# Patient Record
Sex: FEMALE | Race: WHITE | Employment: OTHER | URBAN - METROPOLITAN AREA
[De-identification: names, ages, dates, MRNs, and addresses within clinical notes are randomized per-mention and may not be internally consistent; named-entity substitution may affect disease eponyms.]

---

## 2018-09-11 RX ORDER — AMLODIPINE BESYLATE 5 MG/1
5 TABLET ORAL DAILY
COMMUNITY
End: 2019-01-31 | Stop reason: SDUPTHER

## 2018-09-11 NOTE — PROGRESS NOTES
[address] PRE-ADMISSION TESTING GENERAL INSTRUCTIONS- [facility]-phone number:[phone]    GENERAL INSTRUCTIONS  [x] Antibacterial Soap shower Night before and/or AM of Surgery  [] Gigi wipe instruction sheet and wipes given. [x] Nothing by mouth after midnight, including gum, candy, mints, or water. [x] You may brush your teeth, gargle, but do NOT swallow water. []Hibiclens shower  the night before and the morning of surgery. Do not use             Hibiclens on your face or head. []No smoking, chewing tobacco, illegal drugs, or alcohol within 24 hours of your surgery. [x] Jewelry, valuables or body piercing's should not be brought to the hospital. All body and/or tongue piercing's must be removed prior to arriving to hospital.  ALL hair pins must be removed. [x] Do not wear makeup, lotions, powders, deodorant. Nail polish as directed by the nurse. [x] Arrange transportation to and from the hospital.  Arrange for someone to be with you for the remainder of the day and for 24 hours after your procedure due to having had anesthesia. [x] Bring insurance card and photo ID.  [] Transfusion Bracelet: Please bring with you to hospital, day of surgery  [] Bring urine specimen day of surgery. Any small container is acceptable. [] Use inhalers the morning of surgery and bring with you to hospital.   []Bring copy of living will or healthcare power of  papers to be placed in your electronic record. [] CPAP/BI-PAP: Please bring your machine if you are to spend the night in the hospital.     ENDOSCOPY INSTRUCTIONS:   [] Bowel prep instructions reviewed. [] Nothing by mouth after midnight, including gum, candy, mints, or water.  [] You may brush your teeth, gargle, but do NOT swallow water. [] Do not wear makeup, lotions, powders, deodorant. Nail polish as directed by the nurse.   [] Arrange transportation to and from the hospital.  Arrange for someone to be with you for the procedure, you may call the pre-op area if you have concerns about your blood sugar [phone]. [] Use your inhalers the morning of surgery. Bring your emergency inhaler with you day of surgery. [x] Follow physician instructions regarding any blood thinners you may be taking. WHAT TO EXPECT:  [x] The day of surgery you will be greeted and  checked in by the [employer] .  A nurse will greet you in accordance to the time you are needed in the pre-op area to prepare you for surgery. Please do not be discouraged if you are not greeted in the order you arrive as there are many variables that are involved in patient preparation. Your patience is greatly appreciated as you wait for your nurse. Please bring in items such as: books, magazines, newspapers, electronics, or any other items  to occupy your time in the waiting area. []  Delays may occur with surgery and staff will make a sincere effort to keep you informed of delays. If any delays occur with your procedure, we apologize ahead of time for your inconvenience as we recognize the value of your time.

## 2018-09-16 ENCOUNTER — PREP FOR PROCEDURE (OUTPATIENT)
Dept: VASCULAR SURGERY | Age: 63
End: 2018-09-16

## 2018-09-16 RX ORDER — SODIUM CHLORIDE 9 MG/ML
INJECTION, SOLUTION INTRAVENOUS CONTINUOUS
Status: CANCELLED | OUTPATIENT
Start: 2018-09-16 | End: 2019-09-16

## 2018-09-16 RX ORDER — SODIUM CHLORIDE 0.9 % (FLUSH) 0.9 %
10 SYRINGE (ML) INJECTION EVERY 12 HOURS SCHEDULED
Status: CANCELLED | OUTPATIENT
Start: 2018-09-16 | End: 2019-09-16

## 2018-09-16 RX ORDER — SODIUM CHLORIDE 0.9 % (FLUSH) 0.9 %
10 SYRINGE (ML) INJECTION PRN
Status: CANCELLED | OUTPATIENT
Start: 2018-09-16 | End: 2019-09-16

## 2018-09-18 ENCOUNTER — ANESTHESIA (OUTPATIENT)
Dept: OPERATING ROOM | Age: 63
End: 2018-09-18
Payer: MEDICARE

## 2018-09-18 ENCOUNTER — HOSPITAL ENCOUNTER (OUTPATIENT)
Age: 63
Setting detail: OUTPATIENT SURGERY
Discharge: HOME | End: 2018-09-18
Attending: SPECIALIST | Admitting: SPECIALIST
Payer: MEDICARE

## 2018-09-18 ENCOUNTER — ANESTHESIA EVENT (OUTPATIENT)
Dept: OPERATING ROOM | Age: 63
End: 2018-09-18
Payer: MEDICARE

## 2018-09-18 VITALS
TEMPERATURE: 97.7 F | HEART RATE: 72 BPM | RESPIRATION RATE: 15 BRPM | OXYGEN SATURATION: 97 % | DIASTOLIC BLOOD PRESSURE: 71 MMHG | HEIGHT: 67 IN | WEIGHT: 240 LBS | SYSTOLIC BLOOD PRESSURE: 148 MMHG | BODY MASS INDEX: 37.67 KG/M2

## 2018-09-18 VITALS
OXYGEN SATURATION: 96 % | RESPIRATION RATE: 7 BRPM | SYSTOLIC BLOOD PRESSURE: 93 MMHG | DIASTOLIC BLOOD PRESSURE: 49 MMHG

## 2018-09-18 DIAGNOSIS — I77.0 AVF (ARTERIOVENOUS FISTULA) (HCC): ICD-10-CM

## 2018-09-18 DIAGNOSIS — Z01.812 PRE-OPERATIVE LABORATORY EXAMINATION: Primary | ICD-10-CM

## 2018-09-18 LAB
ANION GAP SERPL CALCULATED.3IONS-SCNC: 18 MMOL/L (ref 7–16)
BASOPHILS ABSOLUTE: 0.06 E9/L (ref 0–0.2)
BASOPHILS RELATIVE PERCENT: 0.6 % (ref 0–2)
BUN BLDV-MCNC: 37 MG/DL (ref 8–23)
CALCIUM SERPL-MCNC: 9.3 MG/DL (ref 8.6–10.2)
CHLORIDE BLD-SCNC: 97 MMOL/L (ref 98–107)
CO2: 24 MMOL/L (ref 22–29)
CREAT SERPL-MCNC: 3.3 MG/DL (ref 0.5–1)
EOSINOPHILS ABSOLUTE: 0.21 E9/L (ref 0.05–0.5)
EOSINOPHILS RELATIVE PERCENT: 2 % (ref 0–6)
GFR AFRICAN AMERICAN: 17
GFR NON-AFRICAN AMERICAN: 14 ML/MIN/1.73
GLUCOSE BLD-MCNC: 161 MG/DL
GLUCOSE BLD-MCNC: 169 MG/DL (ref 74–109)
HCT VFR BLD CALC: 34.2 % (ref 34–48)
HEMOGLOBIN: 10.8 G/DL (ref 11.5–15.5)
IMMATURE GRANULOCYTES #: 0.02 E9/L
IMMATURE GRANULOCYTES %: 0.2 % (ref 0–5)
LYMPHOCYTES ABSOLUTE: 3.62 E9/L (ref 1.5–4)
LYMPHOCYTES RELATIVE PERCENT: 35.3 % (ref 20–42)
MCH RBC QN AUTO: 31.6 PG (ref 26–35)
MCHC RBC AUTO-ENTMCNC: 31.6 % (ref 32–34.5)
MCV RBC AUTO: 100 FL (ref 80–99.9)
METER GLUCOSE: 161 MG/DL (ref 70–110)
MONOCYTES ABSOLUTE: 0.89 E9/L (ref 0.1–0.95)
MONOCYTES RELATIVE PERCENT: 8.7 % (ref 2–12)
NEUTROPHILS ABSOLUTE: 5.46 E9/L (ref 1.8–7.3)
NEUTROPHILS RELATIVE PERCENT: 53.2 % (ref 43–80)
PDW BLD-RTO: 12.3 FL (ref 11.5–15)
PLATELET # BLD: 278 E9/L (ref 130–450)
PMV BLD AUTO: 10.4 FL (ref 7–12)
POTASSIUM REFLEX MAGNESIUM: 5 MMOL/L (ref 3.5–5)
RBC # BLD: 3.42 E12/L (ref 3.5–5.5)
SODIUM BLD-SCNC: 139 MMOL/L (ref 132–146)
WBC # BLD: 10.3 E9/L (ref 4.5–11.5)

## 2018-09-18 PROCEDURE — 2709999900 HC NON-CHARGEABLE SUPPLY: Performed by: SPECIALIST

## 2018-09-18 PROCEDURE — 85025 COMPLETE CBC W/AUTO DIFF WBC: CPT

## 2018-09-18 PROCEDURE — 2500000003 HC RX 250 WO HCPCS: Performed by: NURSE ANESTHETIST, CERTIFIED REGISTERED

## 2018-09-18 PROCEDURE — 6360000002 HC RX W HCPCS: Performed by: ANESTHESIOLOGY

## 2018-09-18 PROCEDURE — 2500000003 HC RX 250 WO HCPCS: Performed by: SPECIALIST

## 2018-09-18 PROCEDURE — 82962 GLUCOSE BLOOD TEST: CPT

## 2018-09-18 PROCEDURE — 64415 NJX AA&/STRD BRCH PLXS IMG: CPT | Performed by: ANESTHESIOLOGY

## 2018-09-18 PROCEDURE — 7100000011 HC PHASE II RECOVERY - ADDTL 15 MIN: Performed by: SPECIALIST

## 2018-09-18 PROCEDURE — 80048 BASIC METABOLIC PNL TOTAL CA: CPT

## 2018-09-18 PROCEDURE — 7100000010 HC PHASE II RECOVERY - FIRST 15 MIN: Performed by: SPECIALIST

## 2018-09-18 PROCEDURE — 3700000000 HC ANESTHESIA ATTENDED CARE: Performed by: SPECIALIST

## 2018-09-18 PROCEDURE — 6360000002 HC RX W HCPCS: Performed by: NURSE ANESTHETIST, CERTIFIED REGISTERED

## 2018-09-18 PROCEDURE — 6370000000 HC RX 637 (ALT 250 FOR IP): Performed by: STUDENT IN AN ORGANIZED HEALTH CARE EDUCATION/TRAINING PROGRAM

## 2018-09-18 PROCEDURE — 3700000001 HC ADD 15 MINUTES (ANESTHESIA): Performed by: SPECIALIST

## 2018-09-18 PROCEDURE — 36415 COLL VENOUS BLD VENIPUNCTURE: CPT

## 2018-09-18 PROCEDURE — 6360000002 HC RX W HCPCS: Performed by: SPECIALIST

## 2018-09-18 PROCEDURE — 3600000012 HC SURGERY LEVEL 2 ADDTL 15MIN: Performed by: SPECIALIST

## 2018-09-18 PROCEDURE — C1757 CATH, THROMBECTOMY/EMBOLECT: HCPCS | Performed by: SPECIALIST

## 2018-09-18 PROCEDURE — 2580000003 HC RX 258: Performed by: SPECIALIST

## 2018-09-18 PROCEDURE — 2780000010 HC IMPLANT OTHER: Performed by: SPECIALIST

## 2018-09-18 PROCEDURE — 3600000002 HC SURGERY LEVEL 2 BASE: Performed by: SPECIALIST

## 2018-09-18 RX ORDER — ONDANSETRON 2 MG/ML
4 INJECTION INTRAMUSCULAR; INTRAVENOUS ONCE
Status: COMPLETED | OUTPATIENT
Start: 2018-09-18 | End: 2018-09-18

## 2018-09-18 RX ORDER — MEPERIDINE HYDROCHLORIDE 50 MG/ML
12.5 INJECTION INTRAMUSCULAR; INTRAVENOUS; SUBCUTANEOUS EVERY 5 MIN PRN
Status: DISCONTINUED | OUTPATIENT
Start: 2018-09-18 | End: 2018-09-18 | Stop reason: HOSPADM

## 2018-09-18 RX ORDER — HEPARIN SODIUM 10000 [USP'U]/ML
INJECTION, SOLUTION INTRAVENOUS; SUBCUTANEOUS PRN
Status: DISCONTINUED | OUTPATIENT
Start: 2018-09-18 | End: 2018-09-18 | Stop reason: SDUPTHER

## 2018-09-18 RX ORDER — HYDROCODONE BITARTRATE AND ACETAMINOPHEN 5; 325 MG/1; MG/1
2 TABLET ORAL PRN
Status: DISCONTINUED | OUTPATIENT
Start: 2018-09-18 | End: 2018-09-18 | Stop reason: HOSPADM

## 2018-09-18 RX ORDER — SODIUM CHLORIDE 0.9 % (FLUSH) 0.9 %
10 SYRINGE (ML) INJECTION EVERY 12 HOURS SCHEDULED
Status: DISCONTINUED | OUTPATIENT
Start: 2018-09-18 | End: 2018-09-18 | Stop reason: HOSPADM

## 2018-09-18 RX ORDER — OXYCODONE HYDROCHLORIDE AND ACETAMINOPHEN 5; 325 MG/1; MG/1
1 TABLET ORAL ONCE
Status: COMPLETED | OUTPATIENT
Start: 2018-09-18 | End: 2018-09-18

## 2018-09-18 RX ORDER — MORPHINE SULFATE 2 MG/ML
1 INJECTION, SOLUTION INTRAMUSCULAR; INTRAVENOUS EVERY 5 MIN PRN
Status: DISCONTINUED | OUTPATIENT
Start: 2018-09-18 | End: 2018-09-18 | Stop reason: HOSPADM

## 2018-09-18 RX ORDER — MORPHINE SULFATE 2 MG/ML
2 INJECTION, SOLUTION INTRAMUSCULAR; INTRAVENOUS EVERY 5 MIN PRN
Status: DISCONTINUED | OUTPATIENT
Start: 2018-09-18 | End: 2018-09-18 | Stop reason: HOSPADM

## 2018-09-18 RX ORDER — HYDROCODONE BITARTRATE AND ACETAMINOPHEN 5; 325 MG/1; MG/1
1 TABLET ORAL PRN
Status: DISCONTINUED | OUTPATIENT
Start: 2018-09-18 | End: 2018-09-18 | Stop reason: HOSPADM

## 2018-09-18 RX ORDER — FENTANYL CITRATE 50 UG/ML
50 INJECTION, SOLUTION INTRAMUSCULAR; INTRAVENOUS PRN
Status: DISCONTINUED | OUTPATIENT
Start: 2018-09-18 | End: 2018-09-18 | Stop reason: HOSPADM

## 2018-09-18 RX ORDER — ONDANSETRON 2 MG/ML
INJECTION INTRAMUSCULAR; INTRAVENOUS
Status: DISCONTINUED
Start: 2018-09-18 | End: 2018-09-18 | Stop reason: HOSPADM

## 2018-09-18 RX ORDER — SODIUM CHLORIDE 9 MG/ML
INJECTION, SOLUTION INTRAVENOUS CONTINUOUS
Status: DISCONTINUED | OUTPATIENT
Start: 2018-09-18 | End: 2018-09-18 | Stop reason: HOSPADM

## 2018-09-18 RX ORDER — SODIUM CHLORIDE 0.9 % (FLUSH) 0.9 %
10 SYRINGE (ML) INJECTION PRN
Status: DISCONTINUED | OUTPATIENT
Start: 2018-09-18 | End: 2018-09-18 | Stop reason: HOSPADM

## 2018-09-18 RX ORDER — OXYCODONE HYDROCHLORIDE AND ACETAMINOPHEN 5; 325 MG/1; MG/1
1 TABLET ORAL EVERY 6 HOURS PRN
Qty: 15 TABLET | Refills: 0 | Status: SHIPPED | OUTPATIENT
Start: 2018-09-18 | End: 2018-09-21

## 2018-09-18 RX ORDER — PROPOFOL 10 MG/ML
INJECTION, EMULSION INTRAVENOUS CONTINUOUS PRN
Status: DISCONTINUED | OUTPATIENT
Start: 2018-09-18 | End: 2018-09-18 | Stop reason: SDUPTHER

## 2018-09-18 RX ORDER — LIDOCAINE HYDROCHLORIDE AND EPINEPHRINE 10; 10 MG/ML; UG/ML
INJECTION, SOLUTION INFILTRATION; PERINEURAL PRN
Status: DISCONTINUED | OUTPATIENT
Start: 2018-09-18 | End: 2018-09-18 | Stop reason: HOSPADM

## 2018-09-18 RX ORDER — PROMETHAZINE HYDROCHLORIDE 25 MG/ML
6.25 INJECTION, SOLUTION INTRAMUSCULAR; INTRAVENOUS EVERY 10 MIN PRN
Status: DISCONTINUED | OUTPATIENT
Start: 2018-09-18 | End: 2018-09-18 | Stop reason: HOSPADM

## 2018-09-18 RX ORDER — EPHEDRINE SULFATE/0.9% NACL/PF 50 MG/5 ML
SYRINGE (ML) INTRAVENOUS PRN
Status: DISCONTINUED | OUTPATIENT
Start: 2018-09-18 | End: 2018-09-18 | Stop reason: SDUPTHER

## 2018-09-18 RX ORDER — MIDAZOLAM HYDROCHLORIDE 1 MG/ML
1 INJECTION INTRAMUSCULAR; INTRAVENOUS PRN
Status: DISCONTINUED | OUTPATIENT
Start: 2018-09-18 | End: 2018-09-18 | Stop reason: HOSPADM

## 2018-09-18 RX ADMIN — PROPOFOL 100 MCG/KG/MIN: 10 INJECTION, EMULSION INTRAVENOUS at 07:34

## 2018-09-18 RX ADMIN — HEPARIN SODIUM 4000 UNITS: 10000 INJECTION, SOLUTION INTRAVENOUS; SUBCUTANEOUS at 08:22

## 2018-09-18 RX ADMIN — OXYCODONE AND ACETAMINOPHEN 1 TABLET: 5; 325 TABLET ORAL at 10:56

## 2018-09-18 RX ADMIN — ONDANSETRON 4 MG: 2 INJECTION, SOLUTION INTRAMUSCULAR; INTRAVENOUS at 10:24

## 2018-09-18 RX ADMIN — Medication 10 MG: at 08:19

## 2018-09-18 RX ADMIN — Medication 2 G: at 07:29

## 2018-09-18 RX ADMIN — Medication 5 MG: at 08:09

## 2018-09-18 RX ADMIN — SODIUM CHLORIDE: 9 INJECTION, SOLUTION INTRAVENOUS at 06:48

## 2018-09-18 RX ADMIN — MEPIVACAINE HYDROCHLORIDE 400 MG: 20 INJECTION, SOLUTION EPIDURAL; INFILTRATION at 07:27

## 2018-09-18 ASSESSMENT — PAIN DESCRIPTION - LOCATION
LOCATION: ARM
LOCATION: ARM

## 2018-09-18 ASSESSMENT — PAIN DESCRIPTION - PAIN TYPE
TYPE: SURGICAL PAIN
TYPE: SURGICAL PAIN

## 2018-09-18 ASSESSMENT — PAIN SCALES - GENERAL
PAINLEVEL_OUTOF10: 2
PAINLEVEL_OUTOF10: 4
PAINLEVEL_OUTOF10: 6
PAINLEVEL_OUTOF10: 0
PAINLEVEL_OUTOF10: 0

## 2018-09-18 ASSESSMENT — PULMONARY FUNCTION TESTS
PIF_VALUE: 0

## 2018-09-18 ASSESSMENT — PAIN DESCRIPTION - ORIENTATION
ORIENTATION: LEFT
ORIENTATION: LEFT

## 2018-09-18 ASSESSMENT — PAIN - FUNCTIONAL ASSESSMENT: PAIN_FUNCTIONAL_ASSESSMENT: 0-10

## 2018-09-18 ASSESSMENT — PAIN DESCRIPTION - DESCRIPTORS: DESCRIPTORS: PRESSURE;BURNING

## 2018-09-18 ASSESSMENT — PAIN DESCRIPTION - FREQUENCY: FREQUENCY: CONTINUOUS

## 2018-09-18 NOTE — ANESTHESIA PRE PROCEDURE
08/07/17 228 lb (103.4 kg)   03/09/17 221 lb (100.2 kg)     Body mass index is 37.59 kg/m². CBC: No results found for: WBC, RBC, HGB, HCT, MCV, RDW, PLT    CMP:   Lab Results   Component Value Date     01/28/2016    K 3.4 01/28/2016     01/28/2016    CO2 31 01/28/2016    BUN 17 01/28/2016    CREATININE 1.55 01/28/2016    GLUCOSE 161 09/18/2018    CALCIUM 8.7 01/28/2016       POC Tests: No results for input(s): POCGLU, POCNA, POCK, POCCL, POCBUN, POCHEMO, POCHCT in the last 72 hours. Coags: No results found for: PROTIME, INR, APTT    HCG (If Applicable): No results found for: PREGTESTUR, PREGSERUM, HCG, HCGQUANT     ABGs: No results found for: PHART, PO2ART, QVB7ZNK, EHD2ODM, BEART, M1IVWTWZ     Type & Screen (If Applicable):  No results found for: [facility]         Anesthesia Evaluation  Patient summary reviewed and Nursing notes reviewed no history of anesthetic complications:   Airway: Mallampati: II  TM distance: >3 FB   Neck ROM: full  Mouth opening: > = 3 FB Dental:          Pulmonary: breath sounds clear to auscultation                            ROS comment: Former smoker. Quit in Sept. 2015. Cardiovascular:    (+) hypertension:, dysrhythmias: atrial fibrillation,         Rhythm: regular  Rate: normal           Beta Blocker:  Dose within 24 Hrs (pt takes metoprolol. took this am with small sip of water.)         Neuro/Psych:   (+) depression/anxiety  (stable with treatment.)             ROS comment: Severe spinal stenosis. GI/Hepatic/Renal:   (+) GERD:, renal disease (pt states that she has 13% function. has not started HD yet): ESRD,           Endo/Other:    (+) DiabetesType II DM, well controlled, , malignancy/cancer (bladder cancer. in remission since 2015.). Abdominal:   (+) obese,     Abdomen: soft.     Vascular:                                        Anesthesia Plan      MAC and regional     ASA 3     (Pt has a #20 in RAC at time of exam)  Induction:

## 2018-09-18 NOTE — ANESTHESIA POSTPROCEDURE EVALUATION
Department of Anesthesiology  Postprocedure Note    Patient: Jerzy Lorenzana  MRN: [id number]  YOB: 1955  Date of evaluation: 9/18/2018  Time:  11:24 AM     Procedure Summary     Date:  09/18/18 Room / Location:  [facility] 03 / [facility]    Anesthesia Start:   Anesthesia Stop:      Procedure:  AV FISTULA  LEFT ARM (Left ) Diagnosis:  (CHRONIC RENAL FAILURE )    Surgeon:  Don Bustamante MD Responsible Provider:      Anesthesia Type:  MAC, regional ASA Status:  3          Anesthesia Type: MAC, regional    Janet Phase I: Janet Score: 10    Janet Phase II: Janet Score: 10    Last vitals: Reviewed and per EMR flowsheets.        Anesthesia Post Evaluation    Patient location during evaluation: PACU  Patient participation: complete - patient participated  Level of consciousness: awake  Pain score: 3  Airway patency: patent  Nausea & Vomiting: no nausea and no vomiting  Complications: no  Cardiovascular status: blood pressure returned to baseline  Respiratory status: acceptable  Hydration status: euvolemic

## 2018-09-18 NOTE — OP NOTE
Thomas Patterson  1955      DATE OF PROCEDURE: 9/18/2018     SURGEON: Aisha Dias M.D.     ASSISTANT: Luis Alvarez PGY-2     PREOPERATIVE DIAGNOSIS: Chronic renal failure, Stage IV. POSTOPERATIVE DIAGNOSIS: Same    OPERATION: Creation of left brachiocephalic arteriovenous fistula (67499)     ANESTHESIA: LMAC     ESTIMATED BLOOD LOSS: less than 50 ml     COMPLICATIONS: None    DESCRIPTION OF PROCEDURE: The patient was identified and the procedure was confirmed. The left arm was prepped and draped in the usual sterile fashion. Lidocaine 1% mixed with 0.25% Marcaine was used for local anesthesia. A transverse skin incision was made in the antecubital fossa and carried down through the subcutaneous tissue. The cephalic vein was identified and dissected free from the surrounding tissues. The vein appeared to be good quality for the fistula. It was marked for orientation and branches were divided between silk ties. It was ligated distally and divided. A number 4 Kiran was passed through the vein proximally and dilated. A 8 Fr feeding catheter was inserted into the vein and capped. Medially, the brachial artery was identified and freed from the surrounding tissues. It was surrounded proximally and distally with vessel loops and bulldog clamps for control. The patient was heparinized and the artery was clamped proximally and distally. A longitudinal arteriotomy measuring 5 mm was made. A 7-0 Prolene stay suture was placed on the medial aspect. The vein was cut to the appropriate length and spatulated and anastomosed to the side of the artery using a running 7-0 Prolene suture. After completing the anastomosis, the clamps were released and a thrill was appreciated through the fistula. Some oozing was noted and hemostasis was achieved with 7-0 Prolene mattress sutures and surgical snow. A radial pulse was appreciated in the wrist. Hemostasis was obtained and the incisions were irrigated with saline solution.

## 2018-09-18 NOTE — ANESTHESIA PROCEDURE NOTES
Peripheral Block    Patient location during procedure: holding area  Start time: 9/18/2018 7:10 AM  End time: 9/18/2018 7:26 AM  Staffing  Anesthesiologist: Lucille Moncada  Other anesthesia staff: Manolo Bhatt  Performed: anesthesiologist   Preanesthetic Checklist  Completed: patient identified, site marked, surgical consent, pre-op evaluation, timeout performed, IV checked, risks and benefits discussed, monitors and equipment checked, anesthesia consent given, oxygen available and patient being monitored  Peripheral Block  Patient position: supine  Prep: ChloraPrep  Patient monitoring: cardiac monitor, continuous pulse ox, frequent blood pressure checks and IV access  Block type: Brachial plexus  Laterality: left  Injection technique: single-shot  Procedures: ultrasound guided  Local infiltration: lidocaine  Infiltration strength: 1 %  Dose: 3 mL  Interscalene  Provider prep: mask and sterile gloves  Local infiltration: lidocaine  Needle  Needle gauge: 21 G  Needle length: 10 cm  Needle localization: ultrasound guidance  Assessment  Injection assessment: negative aspiration for heme, no paresthesia on injection and local visualized surrounding nerve on ultrasound  Paresthesia pain: none  Slow fractionated injection: yes  Hemodynamics: stable  Additional Notes  Immediately prior to procedure a \"time out\" was called to verify the correct patient, allergies, laterality, procedure and equipment. Time out performed with  RN    Local Anesthetic: 2%   Amount: 30 ml  in 5 ml increments after negative aspiration each time.         Reason for block: post-op pain management and at surgeon's request

## 2018-09-18 NOTE — H&P
Vascular Surgery History & Physical      HPI :    Rebecca Mercado is a 59 y.o. female who presents for evaluation of elective AVF creation. She is CKD stage IV and is not on dialysis at the moment. She feels well and has not issues today. She stopped taking her Eliquis 5 days ago. ROS : Negative if blank [], Positive if [x]  General Vascular   [] Fevers [] Claudication (Blocks)   [] Chills [] Rest Pain   [] Weight Loss [] Tissue Loss   [] Chest Pain [] Clotting Disorder   [] SOB at rest [x] Leg Swelling   [] SOB with exertion [] DVT/PE      [] Nausea    [] Vomitting [] Stroke/TIA   [] Abdominal Pain [] Focal weakness   [] Melena [] Slurred Speech   [] Hematochezia [] Vision Changes   [] Hematuria    [] Dysuria [] Hx of Central Catheters   [x] Wears Glasses/Contacts  [] Dialysis and If so date initiated   [] Blindness     [x] Right Hand Dominant   [] Difficulty swallowing        Past Medical History:   Diagnosis Date    [facility]     Bladder cancer [facility]     Cancer ([facility]     CKD (chronic kidney disease)     Diabetes ([address]         Past Surgical History:   Procedure Laterality Date    CHOLECYSTECTOMY      COLONOSCOPY      TONSILLECTOMY      TUBAL LIGATION         Current Medications:    sodium chloride 30 mL/hr at 09/18/18 0648      sodium chloride flush, fentaNYL, midazolam    ceFAZolin (ANCEF) IVPB  2 g Intravenous On Call to OR    sodium chloride flush  10 mL Intravenous 2 times per day    mepivacaine  300 mg Intradermal Once        Allergies:  Patient has no known allergies. Social History     Social History    Marital status:      Spouse name: N/A    Number of children: N/A    Years of education: N/A     Occupational History    Not on file.      Social History Main Topics    Smoking status: Former Smoker     Quit date: 9/1/2017    Smokeless tobacco: Never Used    Alcohol use No    Drug use: No    Sexual activity: Not on file     Other Topics Concern    Not on

## 2018-10-30 ENCOUNTER — OFFICE VISIT (OUTPATIENT)
Dept: FAMILY MEDICINE CLINIC | Age: 63
End: 2018-10-30
Payer: MEDICARE

## 2018-10-30 VITALS
RESPIRATION RATE: 18 BRPM | TEMPERATURE: 98.4 F | BODY MASS INDEX: 39.39 KG/M2 | WEIGHT: 251 LBS | DIASTOLIC BLOOD PRESSURE: 68 MMHG | OXYGEN SATURATION: 98 % | HEIGHT: 67 IN | SYSTOLIC BLOOD PRESSURE: 144 MMHG | HEART RATE: 54 BPM

## 2018-10-30 DIAGNOSIS — E11.22 TYPE 2 DIABETES MELLITUS WITH STAGE 5 CHRONIC KIDNEY DISEASE NOT ON CHRONIC DIALYSIS, WITHOUT LONG-TERM CURRENT USE OF INSULIN (HCC): ICD-10-CM

## 2018-10-30 DIAGNOSIS — Z00.00 ANNUAL PHYSICAL EXAM: ICD-10-CM

## 2018-10-30 DIAGNOSIS — E11.22 TYPE 2 DIABETES MELLITUS WITH STAGE 5 CHRONIC KIDNEY DISEASE NOT ON CHRONIC DIALYSIS, WITHOUT LONG-TERM CURRENT USE OF INSULIN (HCC): Primary | ICD-10-CM

## 2018-10-30 DIAGNOSIS — N18.5 TYPE 2 DIABETES MELLITUS WITH STAGE 5 CHRONIC KIDNEY DISEASE NOT ON CHRONIC DIALYSIS, WITHOUT LONG-TERM CURRENT USE OF INSULIN (HCC): ICD-10-CM

## 2018-10-30 DIAGNOSIS — I10 ESSENTIAL HYPERTENSION: ICD-10-CM

## 2018-10-30 DIAGNOSIS — Z23 FLU VACCINE NEED: ICD-10-CM

## 2018-10-30 DIAGNOSIS — R53.82 CHRONIC FATIGUE: ICD-10-CM

## 2018-10-30 DIAGNOSIS — D64.9 ANEMIA, UNSPECIFIED TYPE: ICD-10-CM

## 2018-10-30 DIAGNOSIS — E78.5 HYPERLIPIDEMIA, UNSPECIFIED HYPERLIPIDEMIA TYPE: ICD-10-CM

## 2018-10-30 DIAGNOSIS — N18.5 TYPE 2 DIABETES MELLITUS WITH STAGE 5 CHRONIC KIDNEY DISEASE NOT ON CHRONIC DIALYSIS, WITHOUT LONG-TERM CURRENT USE OF INSULIN (HCC): Primary | ICD-10-CM

## 2018-10-30 DIAGNOSIS — N18.5 CKD (CHRONIC KIDNEY DISEASE) STAGE 5, GFR LESS THAN 15 ML/MIN (HCC): ICD-10-CM

## 2018-10-30 DIAGNOSIS — I48.0 PAROXYSMAL ATRIAL FIBRILLATION (HCC): ICD-10-CM

## 2018-10-30 PROCEDURE — 1036F TOBACCO NON-USER: CPT | Performed by: NURSE PRACTITIONER

## 2018-10-30 PROCEDURE — G0444 DEPRESSION SCREEN ANNUAL: HCPCS | Performed by: NURSE PRACTITIONER

## 2018-10-30 PROCEDURE — G0008 ADMIN INFLUENZA VIRUS VAC: HCPCS | Performed by: NURSE PRACTITIONER

## 2018-10-30 PROCEDURE — 2022F DILAT RTA XM EVC RTNOPTHY: CPT | Performed by: NURSE PRACTITIONER

## 2018-10-30 PROCEDURE — 3017F COLORECTAL CA SCREEN DOC REV: CPT | Performed by: NURSE PRACTITIONER

## 2018-10-30 PROCEDURE — 3046F HEMOGLOBIN A1C LEVEL >9.0%: CPT | Performed by: NURSE PRACTITIONER

## 2018-10-30 PROCEDURE — 99204 OFFICE O/P NEW MOD 45 MIN: CPT | Performed by: NURSE PRACTITIONER

## 2018-10-30 PROCEDURE — 90686 IIV4 VACC NO PRSV 0.5 ML IM: CPT | Performed by: NURSE PRACTITIONER

## 2018-10-30 PROCEDURE — G8417 CALC BMI ABV UP PARAM F/U: HCPCS | Performed by: NURSE PRACTITIONER

## 2018-10-30 PROCEDURE — G8482 FLU IMMUNIZE ORDER/ADMIN: HCPCS | Performed by: NURSE PRACTITIONER

## 2018-10-30 PROCEDURE — G8427 DOCREV CUR MEDS BY ELIG CLIN: HCPCS | Performed by: NURSE PRACTITIONER

## 2018-10-30 RX ORDER — METOPROLOL TARTRATE 50 MG/1
50 TABLET, FILM COATED ORAL 2 TIMES DAILY
Qty: 60 TABLET | Refills: 2 | Status: SHIPPED | OUTPATIENT
Start: 2018-10-30 | End: 2018-10-30 | Stop reason: SDUPTHER

## 2018-10-30 RX ORDER — METOPROLOL TARTRATE 50 MG/1
TABLET, FILM COATED ORAL
Qty: 180 TABLET | Refills: 2 | Status: SHIPPED | OUTPATIENT
Start: 2018-10-30 | End: 2019-07-17 | Stop reason: SDUPTHER

## 2018-10-30 ASSESSMENT — PATIENT HEALTH QUESTIONNAIRE - PHQ9
SUM OF ALL RESPONSES TO PHQ QUESTIONS 1-9: 16
1. LITTLE INTEREST OR PLEASURE IN DOING THINGS: 3
8. MOVING OR SPEAKING SO SLOWLY THAT OTHER PEOPLE COULD HAVE NOTICED. OR THE OPPOSITE, BEING SO FIGETY OR RESTLESS THAT YOU HAVE BEEN MOVING AROUND A LOT MORE THAN USUAL: 0
3. TROUBLE FALLING OR STAYING ASLEEP: 3
9. THOUGHTS THAT YOU WOULD BE BETTER OFF DEAD, OR OF HURTING YOURSELF: 0
7. TROUBLE CONCENTRATING ON THINGS, SUCH AS READING THE NEWSPAPER OR WATCHING TELEVISION: 1
4. FEELING TIRED OR HAVING LITTLE ENERGY: 3
6. FEELING BAD ABOUT YOURSELF - OR THAT YOU ARE A FAILURE OR HAVE LET YOURSELF OR YOUR FAMILY DOWN: 2
2. FEELING DOWN, DEPRESSED OR HOPELESS: 1
SUM OF ALL RESPONSES TO PHQ QUESTIONS 1-9: 16
5. POOR APPETITE OR OVEREATING: 3
10. IF YOU CHECKED OFF ANY PROBLEMS, HOW DIFFICULT HAVE THESE PROBLEMS MADE IT FOR YOU TO DO YOUR WORK, TAKE CARE OF THINGS AT HOME, OR GET ALONG WITH OTHER PEOPLE: 1
SUM OF ALL RESPONSES TO PHQ9 QUESTIONS 1 & 2: 4

## 2018-10-30 ASSESSMENT — ENCOUNTER SYMPTOMS
COUGH: 0
SHORTNESS OF BREATH: 0
NAUSEA: 1
EYES NEGATIVE: 1
DIARRHEA: 0
VOMITING: 0
WHEEZING: 0
CONSTIPATION: 0

## 2018-10-30 NOTE — PROGRESS NOTES
headaches. Psychiatric/Behavioral: Positive for sleep disturbance. Negative for confusion. The patient is not nervous/anxious. VS:  BP (!) 144/68   Pulse 54   Temp 98.4 °F (36.9 °C) (Oral)   Resp 18   Ht 5' 7\" (1.702 m)   Wt 251 lb (113.9 kg)   SpO2 98%   BMI 39.31 kg/m²     Patient's medical, social, and family history reviewed      Physical Exam  Physical Exam   Constitutional: She is oriented to person, place, and time. She appears well-developed and well-nourished. HENT:   Head: Normocephalic. Eyes: Pupils are equal, round, and reactive to light. Neck: Normal range of motion. Neck supple. No JVD present. No tracheal deviation present. No thyromegaly present. Cardiovascular: Normal rate and regular rhythm. Exam reveals no gallop and no friction rub. No murmur heard. Pulmonary/Chest: Effort normal and breath sounds normal. She has no wheezes. She has no rales. Abdominal: Soft. Bowel sounds are normal. She exhibits no distension. There is no tenderness. Musculoskeletal: Normal range of motion. She exhibits no edema or tenderness. Lymphadenopathy:     She has no cervical adenopathy. Neurological: She is alert and oriented to person, place, and time. Skin: Skin is warm and dry. Wart to bottom of right foot    Psychiatric: She has a normal mood and affect. Her behavior is normal.     Visual inspection:  Deformity/amputation: present - see PE  Skin lesions/pre-ulcerative calluses: absent  Edema: right- trace, left- trace    Sensory exam:  Monofilament sensation: normal  (minimum of 5 random plantar locations tested, avoiding callused areas - > 1 area with absence of sensation is + for neuropathy)    Plus at least one of the following:  Pulses: normal,   Pinprick: Intact  Proprioception: Intact  Vibration (128 Hz): N/A    Assessment/Plan:      1.  Type 2 diabetes mellitus with stage 5 chronic kidney disease not on chronic dialysis, without long-term current use of insulin ([facility]  Meds refilled for patient  No change in medications at this time  - metoprolol tartrate (LOPRESSOR) 50 MG tablet; Take 1 tablet by mouth 2 times daily  Dispense: 60 tablet; Refill: 2  - CBC Auto Differential; Future  - POCT Microalbumin; Future  - HM DIABETES FOOT EXAM    2. Chronic fatigue    - TSH without Reflex; Future  - Vitamin D 25 Hydrox, D2 & D3; Future  - Vitamin B12 & Folate; Future    3. Essential hypertension    - metoprolol tartrate (LOPRESSOR) 50 MG tablet; Take 1 tablet by mouth 2 times daily  Dispense: 60 tablet; Refill: 2  - CBC Auto Differential; Future  - Comprehensive Metabolic Panel; Future    4. Paroxysmal atrial fibrillation (HCC)  Continue antiocoagulation as scheduled   Patient to call me with name of the cardiologist     5. Annual physical exam    - TSH without Reflex; Future  - Vitamin D 25 Hydrox, D2 & D3; Future  - Vitamin B12 & Folate; Future    6. Hyperlipidemia, unspecified hyperlipidemia type    - Lipid Panel; Future  - TSH without Reflex; Future    7. CKD (chronic kidney disease) stage 5, GFR less than 15 ml/min ([facility])    - Comprehensive Metabolic Panel; Future  - Vitamin D 25 Hydrox, D2 & D3; Future  - Vitamin B12 & Folate; Future    8. Anemia, unspecified type    - Iron and TIBC; Future  - Ferritin; Future    9. Flu vaccine need    - INFLUENZA, QUADV, 3 YRS AND OLDER, IM, PF, PREFILL SYR OR SDV, 0.5ML (FLUZONE QUADV, PF)    Patient to return to the office this week for fasting labs    Return in about 3 months (around 1/30/2019) for Blood pressure, Diabetes, anxiety, Depression follow up.     RENUKA Rao - CNP

## 2018-11-05 ENCOUNTER — APPOINTMENT (OUTPATIENT)
Dept: GENERAL RADIOLOGY | Age: 63
End: 2018-11-05
Payer: MEDICARE

## 2018-11-05 ENCOUNTER — APPOINTMENT (OUTPATIENT)
Dept: CT IMAGING | Age: 63
End: 2018-11-05
Payer: MEDICARE

## 2018-11-05 ENCOUNTER — HOSPITAL ENCOUNTER (OUTPATIENT)
Age: 63
Setting detail: OBSERVATION
Discharge: HOME | End: 2018-11-07
Attending: EMERGENCY MEDICINE | Admitting: FAMILY MEDICINE
Payer: MEDICARE

## 2018-11-05 DIAGNOSIS — R55 SYNCOPE AND COLLAPSE: ICD-10-CM

## 2018-11-05 DIAGNOSIS — S20.212A CONTUSION OF LEFT CHEST WALL, INITIAL ENCOUNTER: Primary | ICD-10-CM
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
## 2018-11-05 LAB
ALBUMIN SERPL-MCNC: 4.1 G/DL (ref 3.5–5.2)
ALP BLD-CCNC: 128 U/L (ref 35–104)
ALT SERPL-CCNC: 12 U/L (ref 0–32)
ANION GAP SERPL CALCULATED.3IONS-SCNC: 15 MMOL/L (ref 7–16)
AST SERPL-CCNC: 15 U/L (ref 0–31)
BASOPHILS ABSOLUTE: 0.09 E9/L (ref 0–0.2)
BASOPHILS RELATIVE PERCENT: 0.9 % (ref 0–2)
BILIRUB SERPL-MCNC: 0.2 MG/DL (ref 0–1.2)
BUN BLDV-MCNC: 28 MG/DL (ref 8–23)
CALCIUM SERPL-MCNC: 9.5 MG/DL (ref 8.6–10.2)
CHLORIDE BLD-SCNC: 102 MMOL/L (ref 98–107)
CO2: 28 MMOL/L (ref 22–29)
CREAT SERPL-MCNC: 3.3 MG/DL (ref 0.5–1)
EKG ATRIAL RATE: 65 BPM
EKG P AXIS: 78 DEGREES
EKG P-R INTERVAL: 112 MS
EKG Q-T INTERVAL: 450 MS
EKG QRS DURATION: 94 MS
EKG QTC CALCULATION (BAZETT): 468 MS
EKG R AXIS: 39 DEGREES
EKG T AXIS: 54 DEGREES
EKG VENTRICULAR RATE: 65 BPM
EOSINOPHILS ABSOLUTE: 0.18 E9/L (ref 0.05–0.5)
EOSINOPHILS RELATIVE PERCENT: 1.8 % (ref 0–6)
GFR AFRICAN AMERICAN: 17
GFR NON-AFRICAN AMERICAN: 14 ML/MIN/1.73
GLUCOSE BLD-MCNC: 82 MG/DL (ref 74–99)
HCT VFR BLD CALC: 35.2 % (ref 34–48)
HEMOGLOBIN: 10.9 G/DL (ref 11.5–15.5)
IMMATURE GRANULOCYTES #: 0.03 E9/L
IMMATURE GRANULOCYTES %: 0.3 % (ref 0–5)
LYMPHOCYTES ABSOLUTE: 3.65 E9/L (ref 1.5–4)
LYMPHOCYTES RELATIVE PERCENT: 36.5 % (ref 20–42)
MCH RBC QN AUTO: 30.3 PG (ref 26–35)
MCHC RBC AUTO-ENTMCNC: 31 % (ref 32–34.5)
MCV RBC AUTO: 97.8 FL (ref 80–99.9)
MONOCYTES ABSOLUTE: 1.01 E9/L (ref 0.1–0.95)
MONOCYTES RELATIVE PERCENT: 10.1 % (ref 2–12)
NEUTROPHILS ABSOLUTE: 5.04 E9/L (ref 1.8–7.3)
NEUTROPHILS RELATIVE PERCENT: 50.4 % (ref 43–80)
PDW BLD-RTO: 12.4 FL (ref 11.5–15)
PLATELET # BLD: 267 E9/L (ref 130–450)
PMV BLD AUTO: 9.8 FL (ref 7–12)
POTASSIUM SERPL-SCNC: 3.9 MMOL/L (ref 3.5–5)
RBC # BLD: 3.6 E12/L (ref 3.5–5.5)
SODIUM BLD-SCNC: 145 MMOL/L (ref 132–146)
TOTAL PROTEIN: 7.4 G/DL (ref 6.4–8.3)
TROPONIN: <0.01 NG/ML (ref 0–0.03)
WBC # BLD: 10 E9/L (ref 4.5–11.5)

## 2018-11-05 PROCEDURE — 80053 COMPREHEN METABOLIC PANEL: CPT

## 2018-11-05 PROCEDURE — 93005 ELECTROCARDIOGRAM TRACING: CPT | Performed by: NURSE PRACTITIONER

## 2018-11-05 PROCEDURE — 6370000000 HC RX 637 (ALT 250 FOR IP): Performed by: EMERGENCY MEDICINE

## 2018-11-05 PROCEDURE — 85025 COMPLETE CBC W/AUTO DIFF WBC: CPT

## 2018-11-05 PROCEDURE — 71101 X-RAY EXAM UNILAT RIBS/CHEST: CPT

## 2018-11-05 PROCEDURE — 70450 CT HEAD/BRAIN W/O DYE: CPT

## 2018-11-05 PROCEDURE — 72125 CT NECK SPINE W/O DYE: CPT

## 2018-11-05 PROCEDURE — 84484 ASSAY OF TROPONIN QUANT: CPT

## 2018-11-05 PROCEDURE — 36415 COLL VENOUS BLD VENIPUNCTURE: CPT

## 2018-11-05 PROCEDURE — 72128 CT CHEST SPINE W/O DYE: CPT

## 2018-11-05 PROCEDURE — 71250 CT THORAX DX C-: CPT

## 2018-11-05 PROCEDURE — G0378 HOSPITAL OBSERVATION PER HR: HCPCS

## 2018-11-05 PROCEDURE — 99285 EMERGENCY DEPT VISIT HI MDM: CPT

## 2018-11-05 RX ORDER — OXYCODONE HYDROCHLORIDE AND ACETAMINOPHEN 5; 325 MG/1; MG/1
1 TABLET ORAL ONCE
Status: COMPLETED | OUTPATIENT
Start: 2018-11-05 | End: 2018-11-05

## 2018-11-05 RX ORDER — ACETAMINOPHEN 325 MG/1
650 TABLET ORAL ONCE
Status: COMPLETED | OUTPATIENT
Start: 2018-11-05 | End: 2018-11-05

## 2018-11-05 RX ADMIN — ACETAMINOPHEN 650 MG: 325 TABLET, FILM COATED ORAL at 23:01

## 2018-11-05 RX ADMIN — OXYCODONE AND ACETAMINOPHEN 1 TABLET: 5; 325 TABLET ORAL at 23:01

## 2018-11-05 ASSESSMENT — PAIN DESCRIPTION - LOCATION: LOCATION: RIB CAGE

## 2018-11-05 ASSESSMENT — PAIN SCALES - GENERAL
PAINLEVEL_OUTOF10: 9
PAINLEVEL_OUTOF10: 9

## 2018-11-05 ASSESSMENT — PAIN DESCRIPTION - ORIENTATION: ORIENTATION: LEFT

## 2018-11-06 PROBLEM — I10 HTN (HYPERTENSION): Status: ACTIVE | Noted: 2018-11-06

## 2018-11-06 PROBLEM — D64.9 ANEMIA: Status: ACTIVE | Noted: 2018-11-06

## 2018-11-06 PROBLEM — E11.9 TYPE 2 DIABETES MELLITUS, WITHOUT LONG-TERM CURRENT USE OF INSULIN (HCC): Status: ACTIVE | Noted: 2018-11-06

## 2018-11-06 PROBLEM — N18.5 CKD (CHRONIC KIDNEY DISEASE) STAGE 5, GFR LESS THAN 15 ML/MIN (HCC): Chronic | Status: ACTIVE | Noted: 2018-11-06

## 2018-11-06 PROBLEM — R73.03 PREDIABETES: Status: ACTIVE | Noted: 2018-11-06

## 2018-11-06 PROBLEM — N17.9 AKI (ACUTE KIDNEY INJURY) (HCC): Status: ACTIVE | Noted: 2018-11-06

## 2018-11-06 LAB
ANION GAP SERPL CALCULATED.3IONS-SCNC: 13 MMOL/L (ref 7–16)
BASOPHILS ABSOLUTE: 0.06 E9/L (ref 0–0.2)
BASOPHILS RELATIVE PERCENT: 0.7 % (ref 0–2)
BUN BLDV-MCNC: 30 MG/DL (ref 8–23)
CALCIUM SERPL-MCNC: 8.9 MG/DL (ref 8.6–10.2)
CHLORIDE BLD-SCNC: 99 MMOL/L (ref 98–107)
CHOLESTEROL, TOTAL: 172 MG/DL (ref 0–199)
CO2: 28 MMOL/L (ref 22–29)
CREAT SERPL-MCNC: 3.4 MG/DL (ref 0.5–1)
EOSINOPHILS ABSOLUTE: 0.21 E9/L (ref 0.05–0.5)
EOSINOPHILS RELATIVE PERCENT: 2.5 % (ref 0–6)
GFR AFRICAN AMERICAN: 17
GFR NON-AFRICAN AMERICAN: 14 ML/MIN/1.73
GLUCOSE BLD-MCNC: 88 MG/DL (ref 74–99)
HBA1C MFR BLD: 6 % (ref 4–5.6)
HCT VFR BLD CALC: 31.5 % (ref 34–48)
HDLC SERPL-MCNC: 37 MG/DL
HEMOGLOBIN: 9.7 G/DL (ref 11.5–15.5)
IMMATURE GRANULOCYTES #: 0.02 E9/L
IMMATURE GRANULOCYTES %: 0.2 % (ref 0–5)
LDL CHOLESTEROL CALCULATED: 93 MG/DL (ref 0–99)
LYMPHOCYTES ABSOLUTE: 3.6 E9/L (ref 1.5–4)
LYMPHOCYTES RELATIVE PERCENT: 42.4 % (ref 20–42)
MAGNESIUM: 2.1 MG/DL (ref 1.6–2.6)
MCH RBC QN AUTO: 30.7 PG (ref 26–35)
MCHC RBC AUTO-ENTMCNC: 30.8 % (ref 32–34.5)
MCV RBC AUTO: 99.7 FL (ref 80–99.9)
METER GLUCOSE: 151 MG/DL (ref 74–99)
METER GLUCOSE: 179 MG/DL (ref 74–99)
METER GLUCOSE: 212 MG/DL (ref 74–99)
METER GLUCOSE: 99 MG/DL (ref 74–99)
MONOCYTES ABSOLUTE: 0.7 E9/L (ref 0.1–0.95)
MONOCYTES RELATIVE PERCENT: 8.2 % (ref 2–12)
NEUTROPHILS ABSOLUTE: 3.9 E9/L (ref 1.8–7.3)
NEUTROPHILS RELATIVE PERCENT: 46 % (ref 43–80)
PDW BLD-RTO: 12.2 FL (ref 11.5–15)
PLATELET # BLD: 216 E9/L (ref 130–450)
PMV BLD AUTO: 10 FL (ref 7–12)
POTASSIUM SERPL-SCNC: 3.7 MMOL/L (ref 3.5–5)
RBC # BLD: 3.16 E12/L (ref 3.5–5.5)
SODIUM BLD-SCNC: 140 MMOL/L (ref 132–146)
TRIGL SERPL-MCNC: 211 MG/DL (ref 0–149)
VLDLC SERPL CALC-MCNC: 42 MG/DL
WBC # BLD: 8.5 E9/L (ref 4.5–11.5)

## 2018-11-06 PROCEDURE — G0378 HOSPITAL OBSERVATION PER HR: HCPCS

## 2018-11-06 PROCEDURE — 80048 BASIC METABOLIC PNL TOTAL CA: CPT

## 2018-11-06 PROCEDURE — 85025 COMPLETE CBC W/AUTO DIFF WBC: CPT

## 2018-11-06 PROCEDURE — 82962 GLUCOSE BLOOD TEST: CPT

## 2018-11-06 PROCEDURE — 80061 LIPID PANEL: CPT

## 2018-11-06 PROCEDURE — 83735 ASSAY OF MAGNESIUM: CPT

## 2018-11-06 PROCEDURE — 36415 COLL VENOUS BLD VENIPUNCTURE: CPT

## 2018-11-06 PROCEDURE — 6370000000 HC RX 637 (ALT 250 FOR IP): Performed by: INTERNAL MEDICINE

## 2018-11-06 PROCEDURE — 83036 HEMOGLOBIN GLYCOSYLATED A1C: CPT

## 2018-11-06 PROCEDURE — 6370000000 HC RX 637 (ALT 250 FOR IP): Performed by: FAMILY MEDICINE

## 2018-11-06 RX ORDER — DEXTROSE MONOHYDRATE 50 MG/ML
100 INJECTION, SOLUTION INTRAVENOUS PRN
Status: DISCONTINUED | OUTPATIENT
Start: 2018-11-06 | End: 2018-11-07 | Stop reason: HOSPADM

## 2018-11-06 RX ORDER — TORSEMIDE 20 MG/1
20 TABLET ORAL DAILY
Status: DISCONTINUED | OUTPATIENT
Start: 2018-11-06 | End: 2018-11-06

## 2018-11-06 RX ORDER — DEXTROSE MONOHYDRATE 25 G/50ML
12.5 INJECTION, SOLUTION INTRAVENOUS PRN
Status: DISCONTINUED | OUTPATIENT
Start: 2018-11-06 | End: 2018-11-07 | Stop reason: HOSPADM

## 2018-11-06 RX ORDER — TRAMADOL HYDROCHLORIDE 50 MG/1
50 TABLET ORAL EVERY 6 HOURS PRN
Status: DISCONTINUED | OUTPATIENT
Start: 2018-11-06 | End: 2018-11-07 | Stop reason: HOSPADM

## 2018-11-06 RX ORDER — BUPROPION HYDROCHLORIDE 150 MG/1
150 TABLET, EXTENDED RELEASE ORAL 2 TIMES DAILY
Status: DISCONTINUED | OUTPATIENT
Start: 2018-11-06 | End: 2018-11-07 | Stop reason: HOSPADM

## 2018-11-06 RX ORDER — NICOTINE POLACRILEX 4 MG
15 LOZENGE BUCCAL PRN
Status: DISCONTINUED | OUTPATIENT
Start: 2018-11-06 | End: 2018-11-07 | Stop reason: HOSPADM

## 2018-11-06 RX ORDER — AMLODIPINE BESYLATE 5 MG/1
5 TABLET ORAL DAILY
Status: DISCONTINUED | OUTPATIENT
Start: 2018-11-06 | End: 2018-11-06

## 2018-11-06 RX ORDER — TRAZODONE HYDROCHLORIDE 50 MG/1
100 TABLET ORAL NIGHTLY
Status: DISCONTINUED | OUTPATIENT
Start: 2018-11-06 | End: 2018-11-07 | Stop reason: HOSPADM

## 2018-11-06 RX ORDER — DEXTROSE MONOHYDRATE 25 G/50ML
12.5 INJECTION, SOLUTION INTRAVENOUS PRN
Status: DISCONTINUED | OUTPATIENT
Start: 2018-11-06 | End: 2018-11-06 | Stop reason: SDUPTHER

## 2018-11-06 RX ORDER — NICOTINE POLACRILEX 4 MG
15 LOZENGE BUCCAL PRN
Status: DISCONTINUED | OUTPATIENT
Start: 2018-11-06 | End: 2018-11-06 | Stop reason: SDUPTHER

## 2018-11-06 RX ORDER — PROPAFENONE HYDROCHLORIDE 325 MG/1
325 CAPSULE, EXTENDED RELEASE ORAL 2 TIMES DAILY
Status: DISCONTINUED | OUTPATIENT
Start: 2018-11-06 | End: 2018-11-07 | Stop reason: HOSPADM

## 2018-11-06 RX ORDER — ACETAMINOPHEN 325 MG/1
650 TABLET ORAL EVERY 4 HOURS PRN
Status: DISCONTINUED | OUTPATIENT
Start: 2018-11-06 | End: 2018-11-07 | Stop reason: HOSPADM

## 2018-11-06 RX ORDER — GLIPIZIDE 5 MG/1
10 TABLET ORAL
Status: DISCONTINUED | OUTPATIENT
Start: 2018-11-06 | End: 2018-11-06

## 2018-11-06 RX ORDER — ATORVASTATIN CALCIUM 20 MG/1
20 TABLET, FILM COATED ORAL DAILY
Status: DISCONTINUED | OUTPATIENT
Start: 2018-11-06 | End: 2018-11-07 | Stop reason: HOSPADM

## 2018-11-06 RX ORDER — AMLODIPINE BESYLATE 10 MG/1
10 TABLET ORAL DAILY
Status: DISCONTINUED | OUTPATIENT
Start: 2018-11-07 | End: 2018-11-07 | Stop reason: HOSPADM

## 2018-11-06 RX ORDER — METOPROLOL TARTRATE 50 MG/1
50 TABLET, FILM COATED ORAL 2 TIMES DAILY
Status: DISCONTINUED | OUTPATIENT
Start: 2018-11-06 | End: 2018-11-07 | Stop reason: HOSPADM

## 2018-11-06 RX ADMIN — INSULIN LISPRO 2 UNITS: 100 INJECTION, SOLUTION INTRAVENOUS; SUBCUTANEOUS at 21:22

## 2018-11-06 RX ADMIN — METOPROLOL TARTRATE 50 MG: 50 TABLET ORAL at 10:28

## 2018-11-06 RX ADMIN — TRAMADOL HYDROCHLORIDE 50 MG: 50 TABLET, FILM COATED ORAL at 18:01

## 2018-11-06 RX ADMIN — BUPROPION HYDROCHLORIDE 150 MG: 150 TABLET, EXTENDED RELEASE ORAL at 21:12

## 2018-11-06 RX ADMIN — INSULIN LISPRO 2 UNITS: 100 INJECTION, SOLUTION INTRAVENOUS; SUBCUTANEOUS at 12:53

## 2018-11-06 RX ADMIN — TORSEMIDE 20 MG: 20 TABLET ORAL at 09:35

## 2018-11-06 RX ADMIN — TRAZODONE HYDROCHLORIDE 100 MG: 50 TABLET ORAL at 21:12

## 2018-11-06 RX ADMIN — APIXABAN 5 MG: 5 TABLET, FILM COATED ORAL at 21:12

## 2018-11-06 RX ADMIN — INSULIN LISPRO 4 UNITS: 100 INJECTION, SOLUTION INTRAVENOUS; SUBCUTANEOUS at 18:07

## 2018-11-06 RX ADMIN — METOPROLOL TARTRATE 50 MG: 50 TABLET ORAL at 21:12

## 2018-11-06 RX ADMIN — ATORVASTATIN CALCIUM 20 MG: 20 TABLET, FILM COATED ORAL at 09:35

## 2018-11-06 RX ADMIN — BUPROPION HYDROCHLORIDE 150 MG: 150 TABLET, EXTENDED RELEASE ORAL at 09:35

## 2018-11-06 RX ADMIN — TRAMADOL HYDROCHLORIDE 50 MG: 50 TABLET, FILM COATED ORAL at 09:35

## 2018-11-06 RX ADMIN — AMLODIPINE BESYLATE 5 MG: 5 TABLET ORAL at 09:37

## 2018-11-06 ASSESSMENT — PAIN SCALES - GENERAL
PAINLEVEL_OUTOF10: 7
PAINLEVEL_OUTOF10: 6
PAINLEVEL_OUTOF10: 6
PAINLEVEL_OUTOF10: 7

## 2018-11-06 ASSESSMENT — PAIN DESCRIPTION - PAIN TYPE
TYPE: ACUTE PAIN

## 2018-11-06 ASSESSMENT — PAIN DESCRIPTION - FREQUENCY
FREQUENCY: CONTINUOUS

## 2018-11-06 ASSESSMENT — PAIN DESCRIPTION - ORIENTATION
ORIENTATION: LEFT

## 2018-11-06 ASSESSMENT — PAIN DESCRIPTION - LOCATION
LOCATION: RIB CAGE;CHEST
LOCATION: RIB CAGE
LOCATION: RIB CAGE

## 2018-11-06 ASSESSMENT — PAIN DESCRIPTION - DESCRIPTORS: DESCRIPTORS: BURNING

## 2018-11-06 ASSESSMENT — PAIN DESCRIPTION - PROGRESSION
CLINICAL_PROGRESSION: NOT CHANGED

## 2018-11-06 NOTE — ED PROVIDER NOTES
HPI:  11/5/18, Time: 1940. Rosmery Asher is a 59 y.o. female presenting to the ED for Fall, beginning a few hours ago. The complaint has been constant, moderate in severity, and worsened by general exertion. Pt arrives complaining of left chest wall pain, bilateral knee pain, thoracic back pain, and shortness of breath secondary to CP. Back pain is not new as she has a hx of spinal stenosis. Pt states she was walking to her vehicle when her legs gave out, causing her to fall onto her knees and left hand. Patient does not know whether she lost consciousness or how she really fell She did not slip, , and did not injure her head/neck. However, this occurrence has not happened before in the past. Pt currently is taking Eliquis and is undergoing kidney failure. She denies neck pain, headache, abdominal/leg pain, numbness/tingling, dizziness, nausea, emesis, or any other symptoms at this time. ROS:  Pertinent items noted in HPI. Unless otherwise stated in this report or unable to obtain because of the patient's clinical or mental status as evidenced by the medical record, this patients's positive and negative responses for Review of Systems, constitutional, psych, eyes, ENT, cardiovascular, respiratory, gastrointestinal, neurological, genitourinary, musculoskeletal, integument systems and systems related to the presenting problem are either stated in the preceding or were not pertinent or were negative for the symptoms and/or complaints related to the medical problem. PHYSICAL EXAM:  Nursing notes and vitals reviewed. Constitutional: She appears well-developed and well-nourished. Moderate distress. Head: Normocephalic and atraumatic. Eyes: Conjunctivae are normal. PERRL. HENT: Mucous membranes are moist.  Neck: Supple. Cardiovascular: Regular rate. Regular rhythm. Heart sounds normal.  Distal pulses intact. Pulmonary/Chest: No respiratory distress.  Breath sounds normal. Left chest wall

## 2018-11-07 VITALS
SYSTOLIC BLOOD PRESSURE: 152 MMHG | HEART RATE: 66 BPM | HEIGHT: 67 IN | DIASTOLIC BLOOD PRESSURE: 78 MMHG | TEMPERATURE: 97.1 F | RESPIRATION RATE: 18 BRPM | BODY MASS INDEX: 40.1 KG/M2 | OXYGEN SATURATION: 94 % | WEIGHT: 255.5 LBS

## 2018-11-07 LAB
ANION GAP SERPL CALCULATED.3IONS-SCNC: 14 MMOL/L (ref 7–16)
BUN BLDV-MCNC: 35 MG/DL (ref 8–23)
CALCIUM SERPL-MCNC: 9.2 MG/DL (ref 8.6–10.2)
CHLORIDE BLD-SCNC: 103 MMOL/L (ref 98–107)
CHLORIDE URINE RANDOM: 36 MMOL/L
CO2: 28 MMOL/L (ref 22–29)
CREAT SERPL-MCNC: 3.8 MG/DL (ref 0.5–1)
CREATININE URINE: 101 MG/DL (ref 29–226)
FERRITIN: 16 NG/ML
GFR AFRICAN AMERICAN: 15
GFR NON-AFRICAN AMERICAN: 12 ML/MIN/1.73
GLUCOSE BLD-MCNC: 133 MG/DL (ref 74–99)
HCT VFR BLD CALC: 30.7 % (ref 34–48)
HEMOGLOBIN: 9.5 G/DL (ref 11.5–15.5)
IRON SATURATION: 35 % (ref 15–50)
IRON: 112 MCG/DL (ref 37–145)
MAGNESIUM: 2.3 MG/DL (ref 1.6–2.6)
MCH RBC QN AUTO: 30.5 PG (ref 26–35)
MCHC RBC AUTO-ENTMCNC: 30.9 % (ref 32–34.5)
MCV RBC AUTO: 98.7 FL (ref 80–99.9)
METER GLUCOSE: 121 MG/DL (ref 74–99)
METER GLUCOSE: 132 MG/DL (ref 74–99)
OSMOLALITY URINE: 404 MOSM/KG (ref 300–900)
PDW BLD-RTO: 12.5 FL (ref 11.5–15)
PHOSPHORUS: 5.3 MG/DL (ref 2.5–4.5)
PLATELET # BLD: 212 E9/L (ref 130–450)
PMV BLD AUTO: 10 FL (ref 7–12)
POTASSIUM SERPL-SCNC: 3.8 MMOL/L (ref 3.5–5)
PROTEIN PROTEIN: 25 MG/DL (ref 0–12)
PROTEIN/CREAT RATIO: 0.2
PROTEIN/CREAT RATIO: 0.2 (ref 0–0.2)
RBC # BLD: 3.11 E12/L (ref 3.5–5.5)
SODIUM BLD-SCNC: 145 MMOL/L (ref 132–146)
SODIUM URINE: 86 MMOL/L
TOTAL IRON BINDING CAPACITY: 318 MCG/DL (ref 250–450)
WBC # BLD: 6.9 E9/L (ref 4.5–11.5)

## 2018-11-07 PROCEDURE — 84100 ASSAY OF PHOSPHORUS: CPT

## 2018-11-07 PROCEDURE — 82962 GLUCOSE BLOOD TEST: CPT

## 2018-11-07 PROCEDURE — 80048 BASIC METABOLIC PNL TOTAL CA: CPT

## 2018-11-07 PROCEDURE — 36415 COLL VENOUS BLD VENIPUNCTURE: CPT

## 2018-11-07 PROCEDURE — 82436 ASSAY OF URINE CHLORIDE: CPT

## 2018-11-07 PROCEDURE — 6370000000 HC RX 637 (ALT 250 FOR IP): Performed by: INTERNAL MEDICINE

## 2018-11-07 PROCEDURE — 6370000000 HC RX 637 (ALT 250 FOR IP): Performed by: FAMILY MEDICINE

## 2018-11-07 PROCEDURE — 84156 ASSAY OF PROTEIN URINE: CPT

## 2018-11-07 PROCEDURE — 83735 ASSAY OF MAGNESIUM: CPT

## 2018-11-07 PROCEDURE — 83540 ASSAY OF IRON: CPT

## 2018-11-07 PROCEDURE — G0378 HOSPITAL OBSERVATION PER HR: HCPCS

## 2018-11-07 PROCEDURE — 84300 ASSAY OF URINE SODIUM: CPT

## 2018-11-07 PROCEDURE — 83935 ASSAY OF URINE OSMOLALITY: CPT

## 2018-11-07 PROCEDURE — 83550 IRON BINDING TEST: CPT

## 2018-11-07 PROCEDURE — 85027 COMPLETE CBC AUTOMATED: CPT

## 2018-11-07 PROCEDURE — 82728 ASSAY OF FERRITIN: CPT

## 2018-11-07 PROCEDURE — 82570 ASSAY OF URINE CREATININE: CPT

## 2018-11-07 RX ADMIN — AMLODIPINE BESYLATE 10 MG: 10 TABLET ORAL at 08:59

## 2018-11-07 RX ADMIN — METOPROLOL TARTRATE 50 MG: 50 TABLET ORAL at 08:59

## 2018-11-07 RX ADMIN — TRAMADOL HYDROCHLORIDE 50 MG: 50 TABLET, FILM COATED ORAL at 13:58

## 2018-11-07 RX ADMIN — APIXABAN 5 MG: 5 TABLET, FILM COATED ORAL at 09:02

## 2018-11-07 RX ADMIN — BUPROPION HYDROCHLORIDE 150 MG: 150 TABLET, EXTENDED RELEASE ORAL at 08:59

## 2018-11-07 RX ADMIN — ATORVASTATIN CALCIUM 20 MG: 20 TABLET, FILM COATED ORAL at 09:02

## 2018-11-07 RX ADMIN — ACETAMINOPHEN 650 MG: 325 TABLET, FILM COATED ORAL at 08:59

## 2018-11-07 ASSESSMENT — PAIN SCALES - GENERAL
PAINLEVEL_OUTOF10: 0
PAINLEVEL_OUTOF10: 4
PAINLEVEL_OUTOF10: 6

## 2018-11-07 ASSESSMENT — PAIN DESCRIPTION - PROGRESSION: CLINICAL_PROGRESSION: NOT CHANGED

## 2018-11-07 NOTE — DISCHARGE SUMMARY
any daily progress note from day of discharge. Time Spent on discharge is 45 minutes  in the examination, evaluation, counseling and review of medications and discharge plan. Signed:    Marlena Sal DO   11/7/2018      Thank you Ina Campos MD for the opportunity to be involved in this patient's care.  If you have any questions or concerns please feel free to contact me at [phone]

## 2018-11-10 RX ORDER — SODIUM CHLORIDE 9 MG/ML
INJECTION, SOLUTION INTRAVENOUS CONTINUOUS
Status: CANCELLED | OUTPATIENT
Start: 2018-11-10

## 2018-11-10 RX ORDER — SODIUM CHLORIDE 0.9 % (FLUSH) 0.9 %
10 SYRINGE (ML) INJECTION EVERY 12 HOURS SCHEDULED
Status: CANCELLED | OUTPATIENT
Start: 2018-11-10

## 2018-11-10 RX ORDER — SODIUM CHLORIDE 0.9 % (FLUSH) 0.9 %
10 SYRINGE (ML) INJECTION PRN
Status: CANCELLED | OUTPATIENT
Start: 2018-11-10

## 2018-11-14 ENCOUNTER — HOSPITAL ENCOUNTER (OUTPATIENT)
Age: 63
Setting detail: OUTPATIENT SURGERY
Discharge: HOME | End: 2018-11-14
Attending: SPECIALIST | Admitting: SPECIALIST
Payer: MEDICARE

## 2018-11-14 ENCOUNTER — ANESTHESIA (OUTPATIENT)
Dept: OPERATING ROOM | Age: 63
End: 2018-11-14
Payer: MEDICARE

## 2018-11-14 ENCOUNTER — ANESTHESIA EVENT (OUTPATIENT)
Dept: OPERATING ROOM | Age: 63
End: 2018-11-14
Payer: MEDICARE

## 2018-11-14 VITALS
RESPIRATION RATE: 13 BRPM | OXYGEN SATURATION: 97 % | DIASTOLIC BLOOD PRESSURE: 65 MMHG | SYSTOLIC BLOOD PRESSURE: 114 MMHG

## 2018-11-14 VITALS
HEART RATE: 61 BPM | TEMPERATURE: 98.5 F | RESPIRATION RATE: 18 BRPM | HEIGHT: 67 IN | WEIGHT: 255 LBS | OXYGEN SATURATION: 92 % | BODY MASS INDEX: 40.02 KG/M2 | SYSTOLIC BLOOD PRESSURE: 142 MMHG | DIASTOLIC BLOOD PRESSURE: 75 MMHG

## 2018-11-14 DIAGNOSIS — T82.590D DIALYSIS AV FISTULA MALFUNCTION, SUBSEQUENT ENCOUNTER: ICD-10-CM

## 2018-11-14 DIAGNOSIS — Z01.812 PRE-OPERATIVE LABORATORY EXAMINATION: Primary | ICD-10-CM

## 2018-11-14 DIAGNOSIS — G89.18 POSTOPERATIVE PAIN: ICD-10-CM

## 2018-11-14 LAB
ANION GAP SERPL CALCULATED.3IONS-SCNC: 17 MMOL/L (ref 7–16)
APTT: 29.8 SEC (ref 24.5–35.1)
BASOPHILS ABSOLUTE: 0.06 E9/L (ref 0–0.2)
BASOPHILS RELATIVE PERCENT: 0.6 % (ref 0–2)
BUN BLDV-MCNC: 30 MG/DL (ref 8–23)
CALCIUM SERPL-MCNC: 9.2 MG/DL (ref 8.6–10.2)
CHLORIDE BLD-SCNC: 104 MMOL/L (ref 98–107)
CO2: 25 MMOL/L (ref 22–29)
CREAT SERPL-MCNC: 3.4 MG/DL (ref 0.5–1)
EOSINOPHILS ABSOLUTE: 0.2 E9/L (ref 0.05–0.5)
EOSINOPHILS RELATIVE PERCENT: 2.1 % (ref 0–6)
GFR AFRICAN AMERICAN: 17
GFR NON-AFRICAN AMERICAN: 14 ML/MIN/1.73
GLUCOSE BLD-MCNC: 130 MG/DL (ref 74–99)
HCT VFR BLD CALC: 32.7 % (ref 34–48)
HEMOGLOBIN: 10.3 G/DL (ref 11.5–15.5)
IMMATURE GRANULOCYTES #: 0.04 E9/L
IMMATURE GRANULOCYTES %: 0.4 % (ref 0–5)
INR BLD: 1
LYMPHOCYTES ABSOLUTE: 3.36 E9/L (ref 1.5–4)
LYMPHOCYTES RELATIVE PERCENT: 35 % (ref 20–42)
MCH RBC QN AUTO: 31.2 PG (ref 26–35)
MCHC RBC AUTO-ENTMCNC: 31.5 % (ref 32–34.5)
MCV RBC AUTO: 99.1 FL (ref 80–99.9)
METER GLUCOSE: 129 MG/DL (ref 74–99)
MONOCYTES ABSOLUTE: 0.9 E9/L (ref 0.1–0.95)
MONOCYTES RELATIVE PERCENT: 9.4 % (ref 2–12)
NEUTROPHILS ABSOLUTE: 5.05 E9/L (ref 1.8–7.3)
NEUTROPHILS RELATIVE PERCENT: 52.5 % (ref 43–80)
PDW BLD-RTO: 12.5 FL (ref 11.5–15)
PLATELET # BLD: 282 E9/L (ref 130–450)
PMV BLD AUTO: 10.2 FL (ref 7–12)
POTASSIUM REFLEX MAGNESIUM: 4 MMOL/L (ref 3.5–5)
PROTHROMBIN TIME: 11.8 SEC (ref 9.3–12.4)
RBC # BLD: 3.3 E12/L (ref 3.5–5.5)
SODIUM BLD-SCNC: 146 MMOL/L (ref 132–146)
WBC # BLD: 9.6 E9/L (ref 4.5–11.5)

## 2018-11-14 PROCEDURE — 2580000003 HC RX 258: Performed by: ANESTHESIOLOGY

## 2018-11-14 PROCEDURE — 6360000002 HC RX W HCPCS: Performed by: SPECIALIST

## 2018-11-14 PROCEDURE — 3600000012 HC SURGERY LEVEL 2 ADDTL 15MIN: Performed by: SPECIALIST

## 2018-11-14 PROCEDURE — 64415 NJX AA&/STRD BRCH PLXS IMG: CPT | Performed by: ANESTHESIOLOGY

## 2018-11-14 PROCEDURE — 36415 COLL VENOUS BLD VENIPUNCTURE: CPT

## 2018-11-14 PROCEDURE — 3700000001 HC ADD 15 MINUTES (ANESTHESIA): Performed by: SPECIALIST

## 2018-11-14 PROCEDURE — 2709999900 HC NON-CHARGEABLE SUPPLY: Performed by: SPECIALIST

## 2018-11-14 PROCEDURE — 2500000003 HC RX 250 WO HCPCS: Performed by: SPECIALIST

## 2018-11-14 PROCEDURE — 85025 COMPLETE CBC W/AUTO DIFF WBC: CPT

## 2018-11-14 PROCEDURE — 85730 THROMBOPLASTIN TIME PARTIAL: CPT

## 2018-11-14 PROCEDURE — 6360000002 HC RX W HCPCS: Performed by: ANESTHESIOLOGIST ASSISTANT

## 2018-11-14 PROCEDURE — 80048 BASIC METABOLIC PNL TOTAL CA: CPT

## 2018-11-14 PROCEDURE — 82962 GLUCOSE BLOOD TEST: CPT

## 2018-11-14 PROCEDURE — 85610 PROTHROMBIN TIME: CPT

## 2018-11-14 PROCEDURE — 2500000003 HC RX 250 WO HCPCS: Performed by: ANESTHESIOLOGIST ASSISTANT

## 2018-11-14 PROCEDURE — 7100000011 HC PHASE II RECOVERY - ADDTL 15 MIN: Performed by: SPECIALIST

## 2018-11-14 PROCEDURE — 2580000003 HC RX 258: Performed by: SPECIALIST

## 2018-11-14 PROCEDURE — 6360000002 HC RX W HCPCS: Performed by: ANESTHESIOLOGY

## 2018-11-14 PROCEDURE — 3600000002 HC SURGERY LEVEL 2 BASE: Performed by: SPECIALIST

## 2018-11-14 PROCEDURE — 3700000000 HC ANESTHESIA ATTENDED CARE: Performed by: SPECIALIST

## 2018-11-14 PROCEDURE — 7100000010 HC PHASE II RECOVERY - FIRST 15 MIN: Performed by: SPECIALIST

## 2018-11-14 RX ORDER — FENTANYL CITRATE 50 UG/ML
50 INJECTION, SOLUTION INTRAMUSCULAR; INTRAVENOUS ONCE
Status: COMPLETED | OUTPATIENT
Start: 2018-11-14 | End: 2018-11-14

## 2018-11-14 RX ORDER — SODIUM CHLORIDE 9 MG/ML
INJECTION, SOLUTION INTRAVENOUS CONTINUOUS
Status: DISCONTINUED | OUTPATIENT
Start: 2018-11-14 | End: 2018-11-14 | Stop reason: HOSPADM

## 2018-11-14 RX ORDER — PROPOFOL 10 MG/ML
INJECTION, EMULSION INTRAVENOUS CONTINUOUS PRN
Status: DISCONTINUED | OUTPATIENT
Start: 2018-11-14 | End: 2018-11-14 | Stop reason: SDUPTHER

## 2018-11-14 RX ORDER — 0.9 % SODIUM CHLORIDE 0.9 %
15 VIAL (ML) INJECTION ONCE
Status: COMPLETED | OUTPATIENT
Start: 2018-11-14 | End: 2018-11-14

## 2018-11-14 RX ORDER — MIDAZOLAM HYDROCHLORIDE 1 MG/ML
1 INJECTION INTRAMUSCULAR; INTRAVENOUS ONCE
Status: COMPLETED | OUTPATIENT
Start: 2018-11-14 | End: 2018-11-14

## 2018-11-14 RX ORDER — SODIUM CHLORIDE 0.9 % (FLUSH) 0.9 %
10 SYRINGE (ML) INJECTION EVERY 12 HOURS SCHEDULED
Status: DISCONTINUED | OUTPATIENT
Start: 2018-11-14 | End: 2018-11-14 | Stop reason: HOSPADM

## 2018-11-14 RX ORDER — OXYCODONE HYDROCHLORIDE AND ACETAMINOPHEN 5; 325 MG/1; MG/1
1 TABLET ORAL EVERY 6 HOURS PRN
Qty: 20 TABLET | Refills: 0 | Status: SHIPPED | OUTPATIENT
Start: 2018-11-14 | End: 2018-11-19

## 2018-11-14 RX ORDER — LIDOCAINE HYDROCHLORIDE 20 MG/ML
INJECTION, SOLUTION INFILTRATION; PERINEURAL PRN
Status: DISCONTINUED | OUTPATIENT
Start: 2018-11-14 | End: 2018-11-14 | Stop reason: SDUPTHER

## 2018-11-14 RX ORDER — FENTANYL CITRATE 50 UG/ML
INJECTION, SOLUTION INTRAMUSCULAR; INTRAVENOUS PRN
Status: DISCONTINUED | OUTPATIENT
Start: 2018-11-14 | End: 2018-11-14 | Stop reason: SDUPTHER

## 2018-11-14 RX ORDER — MIDAZOLAM HYDROCHLORIDE 1 MG/ML
INJECTION INTRAMUSCULAR; INTRAVENOUS PRN
Status: DISCONTINUED | OUTPATIENT
Start: 2018-11-14 | End: 2018-11-14 | Stop reason: SDUPTHER

## 2018-11-14 RX ORDER — SODIUM CHLORIDE 0.9 % (FLUSH) 0.9 %
10 SYRINGE (ML) INJECTION PRN
Status: DISCONTINUED | OUTPATIENT
Start: 2018-11-14 | End: 2018-11-14 | Stop reason: HOSPADM

## 2018-11-14 RX ORDER — DOCUSATE SODIUM 100 MG/1
100 CAPSULE, LIQUID FILLED ORAL 2 TIMES DAILY PRN
Qty: 30 CAPSULE | Refills: 1 | Status: SHIPPED | OUTPATIENT
Start: 2018-11-14 | End: 2019-05-17

## 2018-11-14 RX ADMIN — FENTANYL CITRATE 50 MCG: 50 INJECTION, SOLUTION INTRAMUSCULAR; INTRAVENOUS at 07:16

## 2018-11-14 RX ADMIN — PROPOFOL 50 MCG/KG/MIN: 10 INJECTION, EMULSION INTRAVENOUS at 07:24

## 2018-11-14 RX ADMIN — FENTANYL CITRATE 12.5 MCG: 50 INJECTION, SOLUTION INTRAMUSCULAR; INTRAVENOUS at 07:42

## 2018-11-14 RX ADMIN — MIDAZOLAM HYDROCHLORIDE 1 MG: 1 INJECTION, SOLUTION INTRAMUSCULAR; INTRAVENOUS at 07:18

## 2018-11-14 RX ADMIN — LIDOCAINE HYDROCHLORIDE 100 MG: 20 INJECTION, SOLUTION INFILTRATION; PERINEURAL at 07:24

## 2018-11-14 RX ADMIN — Medication 1.5 G: at 06:24

## 2018-11-14 RX ADMIN — SODIUM CHLORIDE: 9 INJECTION, SOLUTION INTRAVENOUS at 06:17

## 2018-11-14 RX ADMIN — FENTANYL CITRATE 12.5 MCG: 50 INJECTION, SOLUTION INTRAMUSCULAR; INTRAVENOUS at 08:06

## 2018-11-14 RX ADMIN — Medication 1.5 G: at 07:18

## 2018-11-14 RX ADMIN — SODIUM CHLORIDE 15 ML: 9 INJECTION INTRAMUSCULAR; INTRAVENOUS; SUBCUTANEOUS at 07:15

## 2018-11-14 RX ADMIN — MIDAZOLAM HYDROCHLORIDE 1 MG: 1 INJECTION, SOLUTION INTRAMUSCULAR; INTRAVENOUS at 07:15

## 2018-11-14 RX ADMIN — MEPIVACAINE HYDROCHLORIDE 15 ML: 20 INJECTION, SOLUTION EPIDURAL; INFILTRATION at 07:15

## 2018-11-14 ASSESSMENT — PAIN DESCRIPTION - ORIENTATION: ORIENTATION: LEFT

## 2018-11-14 ASSESSMENT — PAIN SCALES - GENERAL
PAINLEVEL_OUTOF10: 3
PAINLEVEL_OUTOF10: 2
PAINLEVEL_OUTOF10: 0
PAINLEVEL_OUTOF10: 2

## 2018-11-14 ASSESSMENT — PAIN DESCRIPTION - LOCATION: LOCATION: ARM

## 2018-11-14 ASSESSMENT — PAIN DESCRIPTION - DESCRIPTORS
DESCRIPTORS: ACHING;SORE
DESCRIPTORS: DULL;ACHING

## 2018-11-14 ASSESSMENT — PAIN - FUNCTIONAL ASSESSMENT: PAIN_FUNCTIONAL_ASSESSMENT: 0-10

## 2018-11-14 ASSESSMENT — PAIN DESCRIPTION - PAIN TYPE
TYPE: SURGICAL PAIN
TYPE: SURGICAL PAIN

## 2018-11-14 NOTE — ED NOTES
FIRST PROVIDER CONTACT ASSESSMENT NOTE      Department of Emergency Medicine   11/5/18  7:00 PM    Chief Complaint: Fall (states she was standing on the side of the car and \"just went down\", c/o left sided ribcage pain; doesn't believe she hit her head, +Eliquis)      History of Present Illness:    Montana Mejia is a 59 y.o. female who presents to the ED by private car for Fall. Patient states that she wasn't feeling well all day today. She denies any specific chest pain or shortness breath just not feeling well. She walked out her car and then the next thing she remembered she went down on the ground landing on the concrete. She states that her left rib, cervical spine and thoracic spine are all areas of pain she doesn't believe she hit her head she thinks she braced her fall with her hand. She denies loss of consciousness. She reports she was not able to get up on her own independently. She  also reports that she does have end-stage renal disease and a new left arm fistula was placed as well she has not yet started dialysis  F.ocused Screening Exam:  Constitutional:  Alert, appears stated age and is in no distress. *ALLERGIES*     Patient has no known allergies.      ED Triage Vitals [11/05/18 1857]   BP Temp Temp src Pulse Resp SpO2 Height Weight   (!) 150/65 98.2 °F (36.8 °C) -- 67 18 95 % 5' 7\" (1.702 m) 251 lb (113.9 kg)        Initial Plan of Care:  Initiate Treatment-Testing, Proceed toTreatment Area When Bed Available for ED Attending/MLP to Continue Care    -----------------END OF FIRST PROVIDER CONTACT ASSESSMENT NOTE--------------  Electronically signed by RENUKA Murray CNP   DD: 11/5/18         RENUKA Murray CNP  11/05/18 [id number] No

## 2018-11-14 NOTE — ANESTHESIA PRE PROCEDURE
upper abdomen including liver spleen,   pancreas and upper pole of both kidneys appears within normal patient   is status post cholecystectomy with surgical clips in the gallbladder   fossa. No acute pathology seen a CT of the chest      Anesthesia Evaluation  Patient summary reviewed and Nursing notes reviewed  Airway: Mallampati: II  TM distance: <3 FB   Neck ROM: full  Mouth opening: > = 3 FB Dental:          Pulmonary:       Smoker: ex smoker. Cardiovascular:    (+) hypertension:, dysrhythmias: atrial fibrillation,       ECG reviewed  Rhythm: regular  Rate: normal           Beta Blocker:  Dose within 24 Hrs         Neuro/Psych:   (+) psychiatric history (insomnia):depression/anxiety             GI/Hepatic/Renal:   (+) renal disease (stage 5, not on dialysis yet):,           Endo/Other:    (+) DiabetesType II DM, , electrolyte abnormalities (anemia), malignancy/cancer (bladder CA). Abdominal:           Vascular:                                      Anesthesia Plan      regional and MAC     ASA 4       Induction: intravenous. Anesthetic plan and risks discussed with patient. Use of blood products discussed with patient whom consented to blood products. Plan discussed with attending.                 Sebastian Royal   11/14/2018
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
## 2019-01-04 RX ORDER — GLIPIZIDE 5 MG/1
10 TABLET ORAL
Qty: 120 TABLET | Refills: 2 | Status: SHIPPED | OUTPATIENT
Start: 2019-01-04 | End: 2019-01-04 | Stop reason: SDUPTHER

## 2019-01-06 RX ORDER — GLIPIZIDE 5 MG/1
TABLET ORAL
Qty: 360 TABLET | Refills: 2 | Status: SHIPPED | OUTPATIENT
Start: 2019-01-06 | End: 2019-01-08 | Stop reason: SDUPTHER

## 2019-01-07 ENCOUNTER — HOSPITAL ENCOUNTER (OUTPATIENT)
Dept: SLEEP CENTER | Age: 64
Discharge: HOME | End: 2019-01-07
Payer: MEDICARE

## 2019-01-07 DIAGNOSIS — F32.A DEPRESSION, UNSPECIFIED DEPRESSION TYPE: ICD-10-CM

## 2019-01-07 DIAGNOSIS — I48.91 ATRIAL FIBRILLATION AND FLUTTER (HCC): ICD-10-CM

## 2019-01-07 DIAGNOSIS — F51.05 INSOMNIA DUE TO MENTAL CONDITION: ICD-10-CM

## 2019-01-07 DIAGNOSIS — E66.9 CLASS 2 OBESITY WITH BODY MASS INDEX (BMI) OF 39.0 TO 39.9 IN ADULT, UNSPECIFIED OBESITY TYPE, UNSPECIFIED WHETHER SERIOUS COMORBIDITY PRESENT: Primary | ICD-10-CM

## 2019-01-07 DIAGNOSIS — I48.92 ATRIAL FIBRILLATION AND FLUTTER (HCC): ICD-10-CM

## 2019-01-07 DIAGNOSIS — R06.83 SNORES: ICD-10-CM

## 2019-01-07 DIAGNOSIS — K21.9 GASTROESOPHAGEAL REFLUX DISEASE, ESOPHAGITIS PRESENCE NOT SPECIFIED: ICD-10-CM

## 2019-01-07 PROCEDURE — 95810 POLYSOM 6/> YRS 4/> PARAM: CPT

## 2019-01-08 VITALS
SYSTOLIC BLOOD PRESSURE: 142 MMHG | HEART RATE: 73 BPM | WEIGHT: 254 LBS | HEIGHT: 66 IN | DIASTOLIC BLOOD PRESSURE: 74 MMHG | BODY MASS INDEX: 40.82 KG/M2 | OXYGEN SATURATION: 94 %

## 2019-01-08 RX ORDER — GLIPIZIDE 5 MG/1
TABLET, FILM COATED, EXTENDED RELEASE ORAL
Qty: 120 TABLET | Refills: 1 | Status: SHIPPED | OUTPATIENT
Start: 2019-01-08 | End: 2019-01-31 | Stop reason: SDUPTHER

## 2019-01-08 RX ORDER — GLIPIZIDE 5 MG/1
TABLET, FILM COATED, EXTENDED RELEASE ORAL
Refills: 1 | COMMUNITY
Start: 2018-11-14 | End: 2019-01-08 | Stop reason: SDUPTHER

## 2019-01-08 ASSESSMENT — SLEEP AND FATIGUE QUESTIONNAIRES
HOW LIKELY ARE YOU TO NOD OFF OR FALL ASLEEP WHILE SITTING AND READING: 2
HOW LIKELY ARE YOU TO NOD OFF OR FALL ASLEEP WHILE SITTING AND TALKING TO SOMEONE: 0
HOW LIKELY ARE YOU TO NOD OFF OR FALL ASLEEP IN A CAR, WHILE STOPPED FOR A FEW MINUTES IN TRAFFIC: 0
HOW LIKELY ARE YOU TO NOD OFF OR FALL ASLEEP WHILE SITTING QUIETLY AFTER LUNCH WITHOUT ALCOHOL: 0
HOW LIKELY ARE YOU TO NOD OFF OR FALL ASLEEP WHILE LYING DOWN TO REST IN THE AFTERNOON WHEN CIRCUMSTANCES PERMIT: 3
HOW LIKELY ARE YOU TO NOD OFF OR FALL ASLEEP WHILE SITTING INACTIVE IN A PUBLIC PLACE: 0
ESS TOTAL SCORE: 9
HOW LIKELY ARE YOU TO NOD OFF OR FALL ASLEEP WHEN YOU ARE A PASSENGER IN A CAR FOR AN HOUR WITHOUT A BREAK: 2
HOW LIKELY ARE YOU TO NOD OFF OR FALL ASLEEP WHILE WATCHING TV: 2

## 2019-01-20 ENCOUNTER — HOSPITAL ENCOUNTER (INPATIENT)
Age: 64
LOS: 4 days | Discharge: HOME | DRG: 253 | End: 2019-01-24
Attending: EMERGENCY MEDICINE | Admitting: STUDENT IN AN ORGANIZED HEALTH CARE EDUCATION/TRAINING PROGRAM
Payer: MEDICARE

## 2019-01-20 DIAGNOSIS — T82.838A BLEEDING FROM DIALYSIS SHUNT, INITIAL ENCOUNTER (HCC): Primary | ICD-10-CM

## 2019-01-20 LAB
ABO/RH: NORMAL
ALBUMIN SERPL-MCNC: 3.6 G/DL (ref 3.5–5.2)
ALP BLD-CCNC: 133 U/L (ref 35–104)
ALT SERPL-CCNC: 12 U/L (ref 0–32)
ANION GAP SERPL CALCULATED.3IONS-SCNC: 12 MMOL/L (ref 7–16)
ANTIBODY SCREEN: NORMAL
APTT: 28 SEC (ref 24.5–35.1)
AST SERPL-CCNC: 24 U/L (ref 0–31)
BASOPHILS ABSOLUTE: 0.09 E9/L (ref 0–0.2)
BASOPHILS RELATIVE PERCENT: 0.7 % (ref 0–2)
BILIRUB SERPL-MCNC: 0.4 MG/DL (ref 0–1.2)
BUN BLDV-MCNC: 27 MG/DL (ref 8–23)
CALCIUM SERPL-MCNC: 8.9 MG/DL (ref 8.6–10.2)
CHLORIDE BLD-SCNC: 100 MMOL/L (ref 98–107)
CO2: 27 MMOL/L (ref 22–29)
CREAT SERPL-MCNC: 3.2 MG/DL (ref 0.5–1)
EOSINOPHILS ABSOLUTE: 0.13 E9/L (ref 0.05–0.5)
EOSINOPHILS RELATIVE PERCENT: 1 % (ref 0–6)
GFR AFRICAN AMERICAN: 18
GFR NON-AFRICAN AMERICAN: 15 ML/MIN/1.73
GLUCOSE BLD-MCNC: 174 MG/DL (ref 74–99)
HCT VFR BLD CALC: 32.8 % (ref 34–48)
HEMOGLOBIN: 10.3 G/DL (ref 11.5–15.5)
IMMATURE GRANULOCYTES #: 0.03 E9/L
IMMATURE GRANULOCYTES %: 0.2 % (ref 0–5)
LYMPHOCYTES ABSOLUTE: 3.48 E9/L (ref 1.5–4)
LYMPHOCYTES RELATIVE PERCENT: 26.5 % (ref 20–42)
MCH RBC QN AUTO: 30.4 PG (ref 26–35)
MCHC RBC AUTO-ENTMCNC: 31.4 % (ref 32–34.5)
MCV RBC AUTO: 96.8 FL (ref 80–99.9)
METER GLUCOSE: 172 MG/DL (ref 74–99)
MONOCYTES ABSOLUTE: 0.89 E9/L (ref 0.1–0.95)
MONOCYTES RELATIVE PERCENT: 6.8 % (ref 2–12)
NEUTROPHILS ABSOLUTE: 8.53 E9/L (ref 1.8–7.3)
NEUTROPHILS RELATIVE PERCENT: 64.8 % (ref 43–80)
PDW BLD-RTO: 13.2 FL (ref 11.5–15)
PLATELET # BLD: 355 E9/L (ref 130–450)
PMV BLD AUTO: 10.4 FL (ref 7–12)
POTASSIUM REFLEX MAGNESIUM: 4.6 MMOL/L (ref 3.5–5)
RBC # BLD: 3.39 E12/L (ref 3.5–5.5)
SODIUM BLD-SCNC: 139 MMOL/L (ref 132–146)
TOTAL PROTEIN: 7 G/DL (ref 6.4–8.3)
WBC # BLD: 13.2 E9/L (ref 4.5–11.5)

## 2019-01-20 PROCEDURE — 6360000002 HC RX W HCPCS

## 2019-01-20 PROCEDURE — 6370000000 HC RX 637 (ALT 250 FOR IP): Performed by: STUDENT IN AN ORGANIZED HEALTH CARE EDUCATION/TRAINING PROGRAM

## 2019-01-20 PROCEDURE — 85610 PROTHROMBIN TIME: CPT

## 2019-01-20 PROCEDURE — 99284 EMERGENCY DEPT VISIT MOD MDM: CPT

## 2019-01-20 PROCEDURE — 82962 GLUCOSE BLOOD TEST: CPT

## 2019-01-20 PROCEDURE — 85025 COMPLETE CBC W/AUTO DIFF WBC: CPT

## 2019-01-20 PROCEDURE — 85730 THROMBOPLASTIN TIME PARTIAL: CPT

## 2019-01-20 PROCEDURE — 2700000000 HC OXYGEN THERAPY PER DAY

## 2019-01-20 PROCEDURE — 1200000000 HC SEMI PRIVATE

## 2019-01-20 PROCEDURE — 36415 COLL VENOUS BLD VENIPUNCTURE: CPT

## 2019-01-20 PROCEDURE — 6360000002 HC RX W HCPCS: Performed by: STUDENT IN AN ORGANIZED HEALTH CARE EDUCATION/TRAINING PROGRAM

## 2019-01-20 PROCEDURE — 6360000002 HC RX W HCPCS: Performed by: EMERGENCY MEDICINE

## 2019-01-20 PROCEDURE — 2500000003 HC RX 250 WO HCPCS

## 2019-01-20 PROCEDURE — 86901 BLOOD TYPING SEROLOGIC RH(D): CPT

## 2019-01-20 PROCEDURE — 86900 BLOOD TYPING SEROLOGIC ABO: CPT

## 2019-01-20 PROCEDURE — 86850 RBC ANTIBODY SCREEN: CPT

## 2019-01-20 PROCEDURE — 2580000003 HC RX 258: Performed by: STUDENT IN AN ORGANIZED HEALTH CARE EDUCATION/TRAINING PROGRAM

## 2019-01-20 PROCEDURE — 96374 THER/PROPH/DIAG INJ IV PUSH: CPT

## 2019-01-20 PROCEDURE — 80053 COMPREHEN METABOLIC PANEL: CPT

## 2019-01-20 RX ORDER — LIDOCAINE HYDROCHLORIDE AND EPINEPHRINE 10; 10 MG/ML; UG/ML
INJECTION, SOLUTION INFILTRATION; PERINEURAL
Status: COMPLETED
Start: 2019-01-20 | End: 2019-01-20

## 2019-01-20 RX ORDER — METOPROLOL TARTRATE 50 MG/1
50 TABLET, FILM COATED ORAL 2 TIMES DAILY
Status: DISCONTINUED | OUTPATIENT
Start: 2019-01-20 | End: 2019-01-24 | Stop reason: HOSPADM

## 2019-01-20 RX ORDER — BUPROPION HYDROCHLORIDE 150 MG/1
150 TABLET, EXTENDED RELEASE ORAL 2 TIMES DAILY
Status: DISCONTINUED | OUTPATIENT
Start: 2019-01-20 | End: 2019-01-24 | Stop reason: HOSPADM

## 2019-01-20 RX ORDER — SODIUM CHLORIDE 0.9 % (FLUSH) 0.9 %
10 SYRINGE (ML) INJECTION EVERY 12 HOURS SCHEDULED
Status: DISCONTINUED | OUTPATIENT
Start: 2019-01-20 | End: 2019-01-24 | Stop reason: HOSPADM

## 2019-01-20 RX ORDER — FENTANYL CITRATE 50 UG/ML
INJECTION, SOLUTION INTRAMUSCULAR; INTRAVENOUS
Status: DISCONTINUED
Start: 2019-01-20 | End: 2019-01-20 | Stop reason: WASHOUT

## 2019-01-20 RX ORDER — DOCUSATE SODIUM 100 MG/1
100 CAPSULE, LIQUID FILLED ORAL 2 TIMES DAILY PRN
Status: DISCONTINUED | OUTPATIENT
Start: 2019-01-20 | End: 2019-01-24 | Stop reason: HOSPADM

## 2019-01-20 RX ORDER — OXYCODONE HYDROCHLORIDE 5 MG/1
5 TABLET ORAL EVERY 4 HOURS PRN
Status: DISCONTINUED | OUTPATIENT
Start: 2019-01-20 | End: 2019-01-22

## 2019-01-20 RX ORDER — TRAZODONE HYDROCHLORIDE 50 MG/1
100 TABLET ORAL NIGHTLY
Status: DISCONTINUED | OUTPATIENT
Start: 2019-01-20 | End: 2019-01-24 | Stop reason: HOSPADM

## 2019-01-20 RX ORDER — ONDANSETRON 2 MG/ML
4 INJECTION INTRAMUSCULAR; INTRAVENOUS EVERY 6 HOURS PRN
Status: DISCONTINUED | OUTPATIENT
Start: 2019-01-20 | End: 2019-01-24 | Stop reason: HOSPADM

## 2019-01-20 RX ORDER — TORSEMIDE 20 MG/1
20 TABLET ORAL DAILY
Status: DISCONTINUED | OUTPATIENT
Start: 2019-01-20 | End: 2019-01-24 | Stop reason: HOSPADM

## 2019-01-20 RX ORDER — ACETAMINOPHEN 325 MG/1
650 TABLET ORAL EVERY 4 HOURS PRN
Status: DISCONTINUED | OUTPATIENT
Start: 2019-01-20 | End: 2019-01-24 | Stop reason: HOSPADM

## 2019-01-20 RX ORDER — FENTANYL CITRATE 50 UG/ML
INJECTION, SOLUTION INTRAMUSCULAR; INTRAVENOUS
Status: COMPLETED
Start: 2019-01-20 | End: 2019-01-20

## 2019-01-20 RX ORDER — DULOXETIN HYDROCHLORIDE 60 MG/1
60 CAPSULE, DELAYED RELEASE ORAL DAILY
Status: DISCONTINUED | OUTPATIENT
Start: 2019-01-20 | End: 2019-01-20

## 2019-01-20 RX ORDER — FENTANYL CITRATE 50 UG/ML
50 INJECTION, SOLUTION INTRAMUSCULAR; INTRAVENOUS ONCE
Status: COMPLETED | OUTPATIENT
Start: 2019-01-20 | End: 2019-01-20

## 2019-01-20 RX ORDER — KETAMINE HYDROCHLORIDE 10 MG/ML
INJECTION, SOLUTION INTRAMUSCULAR; INTRAVENOUS
Status: DISCONTINUED
Start: 2019-01-20 | End: 2019-01-20 | Stop reason: WASHOUT

## 2019-01-20 RX ORDER — AMLODIPINE BESYLATE 5 MG/1
5 TABLET ORAL DAILY
Status: DISCONTINUED | OUTPATIENT
Start: 2019-01-20 | End: 2019-01-24 | Stop reason: HOSPADM

## 2019-01-20 RX ORDER — NICOTINE POLACRILEX 4 MG
15 LOZENGE BUCCAL PRN
Status: DISCONTINUED | OUTPATIENT
Start: 2019-01-20 | End: 2019-01-24 | Stop reason: HOSPADM

## 2019-01-20 RX ORDER — DEXTROSE MONOHYDRATE 50 MG/ML
100 INJECTION, SOLUTION INTRAVENOUS PRN
Status: DISCONTINUED | OUTPATIENT
Start: 2019-01-20 | End: 2019-01-24 | Stop reason: HOSPADM

## 2019-01-20 RX ORDER — OXYCODONE HYDROCHLORIDE 10 MG/1
10 TABLET ORAL EVERY 4 HOURS PRN
Status: DISCONTINUED | OUTPATIENT
Start: 2019-01-20 | End: 2019-01-22

## 2019-01-20 RX ORDER — SODIUM CHLORIDE 0.9 % (FLUSH) 0.9 %
10 SYRINGE (ML) INJECTION PRN
Status: DISCONTINUED | OUTPATIENT
Start: 2019-01-20 | End: 2019-01-24 | Stop reason: HOSPADM

## 2019-01-20 RX ORDER — DEXTROSE MONOHYDRATE 25 G/50ML
12.5 INJECTION, SOLUTION INTRAVENOUS PRN
Status: DISCONTINUED | OUTPATIENT
Start: 2019-01-20 | End: 2019-01-24 | Stop reason: HOSPADM

## 2019-01-20 RX ORDER — ATORVASTATIN CALCIUM 20 MG/1
20 TABLET, FILM COATED ORAL DAILY
Status: DISCONTINUED | OUTPATIENT
Start: 2019-01-20 | End: 2019-01-24 | Stop reason: HOSPADM

## 2019-01-20 RX ORDER — FENTANYL CITRATE 50 UG/ML
100 INJECTION, SOLUTION INTRAMUSCULAR; INTRAVENOUS ONCE
Status: COMPLETED | OUTPATIENT
Start: 2019-01-20 | End: 2019-01-20

## 2019-01-20 RX ORDER — LIDOCAINE HYDROCHLORIDE AND EPINEPHRINE 10; 10 MG/ML; UG/ML
INJECTION, SOLUTION INFILTRATION; PERINEURAL
Status: DISPENSED
Start: 2019-01-20 | End: 2019-01-21

## 2019-01-20 RX ADMIN — HYDROMORPHONE HYDROCHLORIDE 1 MG: 1 INJECTION, SOLUTION INTRAMUSCULAR; INTRAVENOUS; SUBCUTANEOUS at 21:56

## 2019-01-20 RX ADMIN — AMLODIPINE BESYLATE 5 MG: 5 TABLET ORAL at 18:50

## 2019-01-20 RX ADMIN — ONDANSETRON 4 MG: 2 INJECTION INTRAMUSCULAR; INTRAVENOUS at 21:37

## 2019-01-20 RX ADMIN — Medication 10 ML: at 21:59

## 2019-01-20 RX ADMIN — FENTANYL CITRATE 50 MCG: 50 INJECTION INTRAMUSCULAR; INTRAVENOUS at 16:55

## 2019-01-20 RX ADMIN — Medication 10 ML: at 20:41

## 2019-01-20 RX ADMIN — LIDOCAINE HYDROCHLORIDE,EPINEPHRINE BITARTRATE: 10; .01 INJECTION, SOLUTION INFILTRATION; PERINEURAL at 17:25

## 2019-01-20 RX ADMIN — TRAZODONE HYDROCHLORIDE 100 MG: 50 TABLET ORAL at 20:41

## 2019-01-20 RX ADMIN — ATORVASTATIN CALCIUM 20 MG: 20 TABLET, FILM COATED ORAL at 18:50

## 2019-01-20 RX ADMIN — BUPROPION HYDROCHLORIDE 150 MG: 150 TABLET, EXTENDED RELEASE ORAL at 20:41

## 2019-01-20 RX ADMIN — FENTANYL CITRATE 100 MCG: 50 INJECTION, SOLUTION INTRAMUSCULAR; INTRAVENOUS at 15:55

## 2019-01-20 RX ADMIN — Medication 10 ML: at 21:37

## 2019-01-20 RX ADMIN — METOPROLOL TARTRATE 50 MG: 50 TABLET, FILM COATED ORAL at 20:41

## 2019-01-20 RX ADMIN — OXYCODONE HYDROCHLORIDE 10 MG: 10 TABLET ORAL at 20:40

## 2019-01-20 RX ADMIN — INSULIN LISPRO 1 UNITS: 100 INJECTION, SOLUTION INTRAVENOUS; SUBCUTANEOUS at 18:51

## 2019-01-20 ASSESSMENT — PAIN DESCRIPTION - FREQUENCY: FREQUENCY: CONTINUOUS

## 2019-01-20 ASSESSMENT — PAIN SCALES - GENERAL
PAINLEVEL_OUTOF10: 10
PAINLEVEL_OUTOF10: 2
PAINLEVEL_OUTOF10: 0
PAINLEVEL_OUTOF10: 7
PAINLEVEL_OUTOF10: 0
PAINLEVEL_OUTOF10: 8
PAINLEVEL_OUTOF10: 10
PAINLEVEL_OUTOF10: 7

## 2019-01-20 ASSESSMENT — PAIN DESCRIPTION - ORIENTATION
ORIENTATION: LEFT
ORIENTATION: LEFT

## 2019-01-20 ASSESSMENT — PAIN DESCRIPTION - PAIN TYPE
TYPE: ACUTE PAIN
TYPE: ACUTE PAIN

## 2019-01-20 ASSESSMENT — PAIN DESCRIPTION - LOCATION
LOCATION: ARM
LOCATION: ARM

## 2019-01-20 ASSESSMENT — PAIN DESCRIPTION - DESCRIPTORS: DESCRIPTORS: SHARP

## 2019-01-21 ENCOUNTER — ANESTHESIA EVENT (OUTPATIENT)
Dept: OPERATING ROOM | Age: 64
DRG: 253 | End: 2019-01-21
Payer: MEDICARE

## 2019-01-21 ENCOUNTER — APPOINTMENT (OUTPATIENT)
Dept: GENERAL RADIOLOGY | Age: 64
DRG: 253 | End: 2019-01-21
Payer: MEDICARE

## 2019-01-21 ENCOUNTER — PREP FOR PROCEDURE (OUTPATIENT)
Dept: VASCULAR SURGERY | Age: 64
End: 2019-01-21

## 2019-01-21 LAB
ANION GAP SERPL CALCULATED.3IONS-SCNC: 13 MMOL/L (ref 7–16)
BUN BLDV-MCNC: 30 MG/DL (ref 8–23)
CALCIUM SERPL-MCNC: 8.5 MG/DL (ref 8.6–10.2)
CHLORIDE BLD-SCNC: 103 MMOL/L (ref 98–107)
CO2: 24 MMOL/L (ref 22–29)
CREAT SERPL-MCNC: 3.7 MG/DL (ref 0.5–1)
GFR AFRICAN AMERICAN: 15
GFR NON-AFRICAN AMERICAN: 12 ML/MIN/1.73
GLUCOSE BLD-MCNC: 187 MG/DL (ref 74–99)
HCT VFR BLD CALC: 22.7 % (ref 34–48)
HCT VFR BLD CALC: 22.9 % (ref 34–48)
HCT VFR BLD CALC: 24 % (ref 34–48)
HCT VFR BLD CALC: 24.7 % (ref 34–48)
HCT VFR BLD CALC: 25.2 % (ref 34–48)
HEMOGLOBIN: 6.9 G/DL (ref 11.5–15.5)
HEMOGLOBIN: 7 G/DL (ref 11.5–15.5)
HEMOGLOBIN: 7.3 G/DL (ref 11.5–15.5)
HEMOGLOBIN: 7.4 G/DL (ref 11.5–15.5)
HEMOGLOBIN: 7.7 G/DL (ref 11.5–15.5)
INR BLD: ABNORMAL
METER GLUCOSE: 151 MG/DL (ref 74–99)
METER GLUCOSE: 154 MG/DL (ref 74–99)
METER GLUCOSE: 175 MG/DL (ref 74–99)
METER GLUCOSE: 187 MG/DL (ref 74–99)
POTASSIUM REFLEX MAGNESIUM: 4.1 MMOL/L (ref 3.5–5)
PROTHROMBIN TIME: ABNORMAL SEC (ref 9.3–12.4)
SODIUM BLD-SCNC: 140 MMOL/L (ref 132–146)

## 2019-01-21 PROCEDURE — 82962 GLUCOSE BLOOD TEST: CPT

## 2019-01-21 PROCEDURE — 80048 BASIC METABOLIC PNL TOTAL CA: CPT

## 2019-01-21 PROCEDURE — 6360000002 HC RX W HCPCS: Performed by: STUDENT IN AN ORGANIZED HEALTH CARE EDUCATION/TRAINING PROGRAM

## 2019-01-21 PROCEDURE — 6370000000 HC RX 637 (ALT 250 FOR IP): Performed by: STUDENT IN AN ORGANIZED HEALTH CARE EDUCATION/TRAINING PROGRAM

## 2019-01-21 PROCEDURE — 86923 COMPATIBILITY TEST ELECTRIC: CPT

## 2019-01-21 PROCEDURE — 93005 ELECTROCARDIOGRAM TRACING: CPT | Performed by: STUDENT IN AN ORGANIZED HEALTH CARE EDUCATION/TRAINING PROGRAM

## 2019-01-21 PROCEDURE — 71045 X-RAY EXAM CHEST 1 VIEW: CPT

## 2019-01-21 PROCEDURE — 1200000000 HC SEMI PRIVATE

## 2019-01-21 PROCEDURE — 36415 COLL VENOUS BLD VENIPUNCTURE: CPT

## 2019-01-21 PROCEDURE — 85014 HEMATOCRIT: CPT

## 2019-01-21 PROCEDURE — 2580000003 HC RX 258: Performed by: STUDENT IN AN ORGANIZED HEALTH CARE EDUCATION/TRAINING PROGRAM

## 2019-01-21 PROCEDURE — 85018 HEMOGLOBIN: CPT

## 2019-01-21 RX ORDER — SODIUM CHLORIDE 0.9 % (FLUSH) 0.9 %
10 SYRINGE (ML) INJECTION EVERY 12 HOURS SCHEDULED
Status: CANCELLED | OUTPATIENT
Start: 2019-01-21

## 2019-01-21 RX ORDER — SODIUM CHLORIDE 0.9 % (FLUSH) 0.9 %
10 SYRINGE (ML) INJECTION PRN
Status: CANCELLED | OUTPATIENT
Start: 2019-01-21

## 2019-01-21 RX ORDER — SODIUM CHLORIDE 9 MG/ML
INJECTION, SOLUTION INTRAVENOUS CONTINUOUS
Status: CANCELLED | OUTPATIENT
Start: 2019-01-21

## 2019-01-21 RX ORDER — CALCIUM CARBONATE 200(500)MG
500 TABLET,CHEWABLE ORAL 2 TIMES DAILY PRN
Status: DISCONTINUED | OUTPATIENT
Start: 2019-01-21 | End: 2019-01-24 | Stop reason: HOSPADM

## 2019-01-21 RX ADMIN — INSULIN LISPRO 1 UNITS: 100 INJECTION, SOLUTION INTRAVENOUS; SUBCUTANEOUS at 16:35

## 2019-01-21 RX ADMIN — Medication 10 ML: at 07:01

## 2019-01-21 RX ADMIN — Medication 10 ML: at 09:17

## 2019-01-21 RX ADMIN — HYDROMORPHONE HYDROCHLORIDE 1 MG: 1 INJECTION, SOLUTION INTRAMUSCULAR; INTRAVENOUS; SUBCUTANEOUS at 02:19

## 2019-01-21 RX ADMIN — INSULIN LISPRO 1 UNITS: 100 INJECTION, SOLUTION INTRAVENOUS; SUBCUTANEOUS at 11:10

## 2019-01-21 RX ADMIN — HYDROMORPHONE HYDROCHLORIDE 1 MG: 1 INJECTION, SOLUTION INTRAMUSCULAR; INTRAVENOUS; SUBCUTANEOUS at 21:23

## 2019-01-21 RX ADMIN — TORSEMIDE 20 MG: 20 TABLET ORAL at 09:16

## 2019-01-21 RX ADMIN — Medication 10 ML: at 02:19

## 2019-01-21 RX ADMIN — HYDROMORPHONE HYDROCHLORIDE 1 MG: 1 INJECTION, SOLUTION INTRAMUSCULAR; INTRAVENOUS; SUBCUTANEOUS at 11:10

## 2019-01-21 RX ADMIN — Medication 10 ML: at 21:06

## 2019-01-21 RX ADMIN — OXYCODONE HYDROCHLORIDE 10 MG: 10 TABLET ORAL at 00:46

## 2019-01-21 RX ADMIN — HYDROMORPHONE HYDROCHLORIDE 1 MG: 1 INJECTION, SOLUTION INTRAMUSCULAR; INTRAVENOUS; SUBCUTANEOUS at 16:36

## 2019-01-21 RX ADMIN — BUPROPION HYDROCHLORIDE 150 MG: 150 TABLET, EXTENDED RELEASE ORAL at 21:07

## 2019-01-21 RX ADMIN — OXYCODONE HYDROCHLORIDE 10 MG: 10 TABLET ORAL at 06:03

## 2019-01-21 RX ADMIN — BUPROPION HYDROCHLORIDE 150 MG: 150 TABLET, EXTENDED RELEASE ORAL at 09:16

## 2019-01-21 RX ADMIN — AMLODIPINE BESYLATE 5 MG: 5 TABLET ORAL at 09:16

## 2019-01-21 RX ADMIN — ATORVASTATIN CALCIUM 20 MG: 20 TABLET, FILM COATED ORAL at 09:16

## 2019-01-21 RX ADMIN — Medication 10 ML: at 16:36

## 2019-01-21 RX ADMIN — ACETAMINOPHEN 650 MG: 325 TABLET ORAL at 10:53

## 2019-01-21 RX ADMIN — INSULIN LISPRO 1 UNITS: 100 INJECTION, SOLUTION INTRAVENOUS; SUBCUTANEOUS at 09:18

## 2019-01-21 RX ADMIN — METOPROLOL TARTRATE 50 MG: 50 TABLET, FILM COATED ORAL at 09:16

## 2019-01-21 RX ADMIN — Medication 10 ML: at 11:10

## 2019-01-21 RX ADMIN — TRAZODONE HYDROCHLORIDE 100 MG: 50 TABLET ORAL at 21:01

## 2019-01-21 RX ADMIN — HYDROMORPHONE HYDROCHLORIDE 1 MG: 1 INJECTION, SOLUTION INTRAMUSCULAR; INTRAVENOUS; SUBCUTANEOUS at 07:00

## 2019-01-21 ASSESSMENT — PAIN DESCRIPTION - ORIENTATION
ORIENTATION: LEFT

## 2019-01-21 ASSESSMENT — PAIN DESCRIPTION - LOCATION
LOCATION: ARM

## 2019-01-21 ASSESSMENT — PAIN DESCRIPTION - PAIN TYPE
TYPE: ACUTE PAIN

## 2019-01-21 ASSESSMENT — PAIN SCALES - GENERAL
PAINLEVEL_OUTOF10: 8
PAINLEVEL_OUTOF10: 0
PAINLEVEL_OUTOF10: 1
PAINLEVEL_OUTOF10: 7
PAINLEVEL_OUTOF10: 7
PAINLEVEL_OUTOF10: 9
PAINLEVEL_OUTOF10: 3
PAINLEVEL_OUTOF10: 8
PAINLEVEL_OUTOF10: 7
PAINLEVEL_OUTOF10: 7
PAINLEVEL_OUTOF10: 0
PAINLEVEL_OUTOF10: 0

## 2019-01-21 ASSESSMENT — PAIN DESCRIPTION - ONSET
ONSET: ON-GOING

## 2019-01-21 ASSESSMENT — PAIN - FUNCTIONAL ASSESSMENT
PAIN_FUNCTIONAL_ASSESSMENT: PREVENTS OR INTERFERES SOME ACTIVE ACTIVITIES AND ADLS
PAIN_FUNCTIONAL_ASSESSMENT: PREVENTS OR INTERFERES WITH ALL ACTIVE AND SOME PASSIVE ACTIVITIES

## 2019-01-21 ASSESSMENT — PAIN DESCRIPTION - PROGRESSION
CLINICAL_PROGRESSION: NOT CHANGED
CLINICAL_PROGRESSION: GRADUALLY WORSENING
CLINICAL_PROGRESSION: NOT CHANGED
CLINICAL_PROGRESSION: NOT CHANGED

## 2019-01-21 ASSESSMENT — PAIN DESCRIPTION - FREQUENCY
FREQUENCY: CONTINUOUS

## 2019-01-21 ASSESSMENT — PAIN DESCRIPTION - DESCRIPTORS
DESCRIPTORS: SHARP;CONSTANT;DISCOMFORT
DESCRIPTORS: CONSTANT;SHARP;OTHER (COMMENT)

## 2019-01-22 ENCOUNTER — ANESTHESIA (OUTPATIENT)
Dept: OPERATING ROOM | Age: 64
DRG: 253 | End: 2019-01-22
Payer: MEDICARE

## 2019-01-22 VITALS — OXYGEN SATURATION: 93 % | SYSTOLIC BLOOD PRESSURE: 155 MMHG | DIASTOLIC BLOOD PRESSURE: 97 MMHG

## 2019-01-22 LAB
ANION GAP SERPL CALCULATED.3IONS-SCNC: 13 MMOL/L (ref 7–16)
APTT: 27.1 SEC (ref 24.5–35.1)
BASOPHILS ABSOLUTE: 0.07 E9/L (ref 0–0.2)
BASOPHILS RELATIVE PERCENT: 0.5 % (ref 0–2)
BUN BLDV-MCNC: 34 MG/DL (ref 8–23)
CALCIUM SERPL-MCNC: 8.7 MG/DL (ref 8.6–10.2)
CHLORIDE BLD-SCNC: 97 MMOL/L (ref 98–107)
CO2: 26 MMOL/L (ref 22–29)
CREAT SERPL-MCNC: 4.3 MG/DL (ref 0.5–1)
EKG ATRIAL RATE: 77 BPM
EKG ATRIAL RATE: 77 BPM
EKG P AXIS: 38 DEGREES
EKG P AXIS: 52 DEGREES
EKG P-R INTERVAL: 154 MS
EKG P-R INTERVAL: 186 MS
EKG Q-T INTERVAL: 378 MS
EKG Q-T INTERVAL: 492 MS
EKG QRS DURATION: 152 MS
EKG QRS DURATION: 94 MS
EKG QTC CALCULATION (BAZETT): 427 MS
EKG QTC CALCULATION (BAZETT): 611 MS
EKG R AXIS: 20 DEGREES
EKG R AXIS: 29 DEGREES
EKG T AXIS: 35 DEGREES
EKG T AXIS: 61 DEGREES
EKG VENTRICULAR RATE: 77 BPM
EKG VENTRICULAR RATE: 93 BPM
EOSINOPHILS ABSOLUTE: 0.13 E9/L (ref 0.05–0.5)
EOSINOPHILS RELATIVE PERCENT: 1 % (ref 0–6)
GFR AFRICAN AMERICAN: 13
GFR NON-AFRICAN AMERICAN: 10 ML/MIN/1.73
GLUCOSE BLD-MCNC: 149 MG/DL (ref 74–99)
HCT VFR BLD CALC: 21.5 % (ref 34–48)
HCT VFR BLD CALC: 22.6 % (ref 34–48)
HCT VFR BLD CALC: 23.6 % (ref 34–48)
HEMOGLOBIN: 6.6 G/DL (ref 11.5–15.5)
HEMOGLOBIN: 7.1 G/DL (ref 11.5–15.5)
HEMOGLOBIN: 7.3 G/DL (ref 11.5–15.5)
IMMATURE GRANULOCYTES #: 0.05 E9/L
IMMATURE GRANULOCYTES %: 0.4 % (ref 0–5)
INR BLD: 1.1
LYMPHOCYTES ABSOLUTE: 4.65 E9/L (ref 1.5–4)
LYMPHOCYTES RELATIVE PERCENT: 34.4 % (ref 20–42)
MCH RBC QN AUTO: 30.4 PG (ref 26–35)
MCHC RBC AUTO-ENTMCNC: 30.7 % (ref 32–34.5)
MCV RBC AUTO: 99.1 FL (ref 80–99.9)
METER GLUCOSE: 135 MG/DL (ref 74–99)
METER GLUCOSE: 138 MG/DL (ref 74–99)
METER GLUCOSE: 160 MG/DL (ref 74–99)
METER GLUCOSE: 163 MG/DL (ref 74–99)
MONOCYTES ABSOLUTE: 1.34 E9/L (ref 0.1–0.95)
MONOCYTES RELATIVE PERCENT: 9.9 % (ref 2–12)
NEUTROPHILS ABSOLUTE: 7.29 E9/L (ref 1.8–7.3)
NEUTROPHILS RELATIVE PERCENT: 53.8 % (ref 43–80)
PDW BLD-RTO: 13.5 FL (ref 11.5–15)
PLATELET # BLD: 241 E9/L (ref 130–450)
PMV BLD AUTO: 10.1 FL (ref 7–12)
POTASSIUM REFLEX MAGNESIUM: 4.2 MMOL/L (ref 3.5–5)
PROTHROMBIN TIME: 12.8 SEC (ref 9.3–12.4)
RBC # BLD: 2.17 E12/L (ref 3.5–5.5)
SODIUM BLD-SCNC: 136 MMOL/L (ref 132–146)
WBC # BLD: 13.5 E9/L (ref 4.5–11.5)

## 2019-01-22 PROCEDURE — 7100000000 HC PACU RECOVERY - FIRST 15 MIN: Performed by: SPECIALIST

## 2019-01-22 PROCEDURE — 2580000003 HC RX 258: Performed by: SPECIALIST

## 2019-01-22 PROCEDURE — 3700000000 HC ANESTHESIA ATTENDED CARE: Performed by: SPECIALIST

## 2019-01-22 PROCEDURE — 6370000000 HC RX 637 (ALT 250 FOR IP): Performed by: STUDENT IN AN ORGANIZED HEALTH CARE EDUCATION/TRAINING PROGRAM

## 2019-01-22 PROCEDURE — 3700000001 HC ADD 15 MINUTES (ANESTHESIA): Performed by: SPECIALIST

## 2019-01-22 PROCEDURE — 3600000012 HC SURGERY LEVEL 2 ADDTL 15MIN: Performed by: SPECIALIST

## 2019-01-22 PROCEDURE — 85610 PROTHROMBIN TIME: CPT

## 2019-01-22 PROCEDURE — 2700000000 HC OXYGEN THERAPY PER DAY

## 2019-01-22 PROCEDURE — 85730 THROMBOPLASTIN TIME PARTIAL: CPT

## 2019-01-22 PROCEDURE — 6360000002 HC RX W HCPCS

## 2019-01-22 PROCEDURE — 85025 COMPLETE CBC W/AUTO DIFF WBC: CPT

## 2019-01-22 PROCEDURE — 2709999900 HC NON-CHARGEABLE SUPPLY: Performed by: SPECIALIST

## 2019-01-22 PROCEDURE — 1200000000 HC SEMI PRIVATE

## 2019-01-22 PROCEDURE — 85014 HEMATOCRIT: CPT

## 2019-01-22 PROCEDURE — 82962 GLUCOSE BLOOD TEST: CPT

## 2019-01-22 PROCEDURE — 80048 BASIC METABOLIC PNL TOTAL CA: CPT

## 2019-01-22 PROCEDURE — 6360000002 HC RX W HCPCS: Performed by: ANESTHESIOLOGY

## 2019-01-22 PROCEDURE — 93010 ELECTROCARDIOGRAM REPORT: CPT | Performed by: INTERNAL MEDICINE

## 2019-01-22 PROCEDURE — 6360000002 HC RX W HCPCS: Performed by: STUDENT IN AN ORGANIZED HEALTH CARE EDUCATION/TRAINING PROGRAM

## 2019-01-22 PROCEDURE — 05LF3ZZ OCCLUSION OF LEFT CEPHALIC VEIN, PERCUTANEOUS APPROACH: ICD-10-PCS | Performed by: SPECIALIST

## 2019-01-22 PROCEDURE — 2500000003 HC RX 250 WO HCPCS: Performed by: SPECIALIST

## 2019-01-22 PROCEDURE — 2580000003 HC RX 258: Performed by: STUDENT IN AN ORGANIZED HEALTH CARE EDUCATION/TRAINING PROGRAM

## 2019-01-22 PROCEDURE — 64415 NJX AA&/STRD BRCH PLXS IMG: CPT | Performed by: ANESTHESIOLOGY

## 2019-01-22 PROCEDURE — 85018 HEMOGLOBIN: CPT

## 2019-01-22 PROCEDURE — 3600000002 HC SURGERY LEVEL 2 BASE: Performed by: SPECIALIST

## 2019-01-22 PROCEDURE — 6360000002 HC RX W HCPCS: Performed by: SPECIALIST

## 2019-01-22 PROCEDURE — 36415 COLL VENOUS BLD VENIPUNCTURE: CPT

## 2019-01-22 PROCEDURE — 7100000001 HC PACU RECOVERY - ADDTL 15 MIN: Performed by: SPECIALIST

## 2019-01-22 PROCEDURE — P9016 RBC LEUKOCYTES REDUCED: HCPCS

## 2019-01-22 RX ORDER — SODIUM CHLORIDE 0.9 % (FLUSH) 0.9 %
10 SYRINGE (ML) INJECTION EVERY 12 HOURS SCHEDULED
Status: DISCONTINUED | OUTPATIENT
Start: 2019-01-22 | End: 2019-01-22 | Stop reason: HOSPADM

## 2019-01-22 RX ORDER — OXYCODONE HYDROCHLORIDE AND ACETAMINOPHEN 5; 325 MG/1; MG/1
1 TABLET ORAL EVERY 6 HOURS PRN
Qty: 20 TABLET | Refills: 0 | Status: SHIPPED | OUTPATIENT
Start: 2019-01-22 | End: 2019-01-27

## 2019-01-22 RX ORDER — PROPOFOL 10 MG/ML
INJECTION, EMULSION INTRAVENOUS CONTINUOUS PRN
Status: DISCONTINUED | OUTPATIENT
Start: 2019-01-22 | End: 2019-01-22 | Stop reason: SDUPTHER

## 2019-01-22 RX ORDER — MIDAZOLAM HYDROCHLORIDE 1 MG/ML
2 INJECTION INTRAMUSCULAR; INTRAVENOUS ONCE
Status: COMPLETED | OUTPATIENT
Start: 2019-01-22 | End: 2019-01-22

## 2019-01-22 RX ORDER — LIDOCAINE HYDROCHLORIDE 10 MG/ML
INJECTION, SOLUTION INFILTRATION; PERINEURAL PRN
Status: DISCONTINUED | OUTPATIENT
Start: 2019-01-22 | End: 2019-01-22 | Stop reason: HOSPADM

## 2019-01-22 RX ORDER — MORPHINE SULFATE 2 MG/ML
1 INJECTION, SOLUTION INTRAMUSCULAR; INTRAVENOUS EVERY 5 MIN PRN
Status: DISCONTINUED | OUTPATIENT
Start: 2019-01-22 | End: 2019-01-22 | Stop reason: HOSPADM

## 2019-01-22 RX ORDER — PROMETHAZINE HYDROCHLORIDE 25 MG/ML
12.5 INJECTION, SOLUTION INTRAMUSCULAR; INTRAVENOUS
Status: DISCONTINUED | OUTPATIENT
Start: 2019-01-22 | End: 2019-01-22 | Stop reason: HOSPADM

## 2019-01-22 RX ORDER — FENTANYL CITRATE 50 UG/ML
100 INJECTION, SOLUTION INTRAMUSCULAR; INTRAVENOUS ONCE
Status: COMPLETED | OUTPATIENT
Start: 2019-01-22 | End: 2019-01-22

## 2019-01-22 RX ORDER — OXYCODONE HYDROCHLORIDE AND ACETAMINOPHEN 5; 325 MG/1; MG/1
2 TABLET ORAL EVERY 4 HOURS PRN
Status: DISCONTINUED | OUTPATIENT
Start: 2019-01-22 | End: 2019-01-24 | Stop reason: HOSPADM

## 2019-01-22 RX ORDER — OXYCODONE HYDROCHLORIDE AND ACETAMINOPHEN 5; 325 MG/1; MG/1
1 TABLET ORAL EVERY 4 HOURS PRN
Status: DISCONTINUED | OUTPATIENT
Start: 2019-01-22 | End: 2019-01-24 | Stop reason: HOSPADM

## 2019-01-22 RX ORDER — SODIUM CHLORIDE 9 MG/ML
INJECTION, SOLUTION INTRAVENOUS CONTINUOUS
Status: DISCONTINUED | OUTPATIENT
Start: 2019-01-22 | End: 2019-01-22

## 2019-01-22 RX ORDER — 0.9 % SODIUM CHLORIDE 0.9 %
250 INTRAVENOUS SOLUTION INTRAVENOUS ONCE
Status: COMPLETED | OUTPATIENT
Start: 2019-01-22 | End: 2019-01-22

## 2019-01-22 RX ORDER — SODIUM CHLORIDE 0.9 % (FLUSH) 0.9 %
10 SYRINGE (ML) INJECTION PRN
Status: DISCONTINUED | OUTPATIENT
Start: 2019-01-22 | End: 2019-01-22 | Stop reason: HOSPADM

## 2019-01-22 RX ORDER — PHENYLEPHRINE HYDROCHLORIDE 10 MG/ML
INJECTION INTRAVENOUS PRN
Status: DISCONTINUED | OUTPATIENT
Start: 2019-01-22 | End: 2019-01-22 | Stop reason: SDUPTHER

## 2019-01-22 RX ORDER — LABETALOL HYDROCHLORIDE 5 MG/ML
10 INJECTION, SOLUTION INTRAVENOUS EVERY 10 MIN PRN
Status: DISCONTINUED | OUTPATIENT
Start: 2019-01-22 | End: 2019-01-22 | Stop reason: HOSPADM

## 2019-01-22 RX ADMIN — BUPROPION HYDROCHLORIDE 150 MG: 150 TABLET, EXTENDED RELEASE ORAL at 10:40

## 2019-01-22 RX ADMIN — BUPROPION HYDROCHLORIDE 150 MG: 150 TABLET, EXTENDED RELEASE ORAL at 21:08

## 2019-01-22 RX ADMIN — FENTANYL CITRATE 100 MCG: 50 INJECTION, SOLUTION INTRAMUSCULAR; INTRAVENOUS at 07:10

## 2019-01-22 RX ADMIN — PROPOFOL 50 MCG/KG/MIN: 10 INJECTION, EMULSION INTRAVENOUS at 07:25

## 2019-01-22 RX ADMIN — METOPROLOL TARTRATE 50 MG: 50 TABLET, FILM COATED ORAL at 10:40

## 2019-01-22 RX ADMIN — TRAZODONE HYDROCHLORIDE 100 MG: 50 TABLET ORAL at 21:09

## 2019-01-22 RX ADMIN — MEPIVACAINE HYDROCHLORIDE 300 MG: 10 INJECTION, SOLUTION EPIDURAL; INFILTRATION at 07:17

## 2019-01-22 RX ADMIN — OXYCODONE AND ACETAMINOPHEN 2 TABLET: 5; 325 TABLET ORAL at 19:11

## 2019-01-22 RX ADMIN — PHENYLEPHRINE HYDROCHLORIDE 100 MCG: 10 INJECTION INTRAVENOUS at 08:03

## 2019-01-22 RX ADMIN — SODIUM CHLORIDE 250 ML: 9 INJECTION, SOLUTION INTRAVENOUS at 10:39

## 2019-01-22 RX ADMIN — HYDROMORPHONE HYDROCHLORIDE 1 MG: 1 INJECTION, SOLUTION INTRAMUSCULAR; INTRAVENOUS; SUBCUTANEOUS at 21:00

## 2019-01-22 RX ADMIN — TORSEMIDE 20 MG: 20 TABLET ORAL at 10:40

## 2019-01-22 RX ADMIN — PHENYLEPHRINE HYDROCHLORIDE 100 MCG: 10 INJECTION INTRAVENOUS at 07:58

## 2019-01-22 RX ADMIN — HYDROMORPHONE HYDROCHLORIDE 1 MG: 1 INJECTION, SOLUTION INTRAMUSCULAR; INTRAVENOUS; SUBCUTANEOUS at 02:37

## 2019-01-22 RX ADMIN — METOPROLOL TARTRATE 50 MG: 50 TABLET, FILM COATED ORAL at 21:08

## 2019-01-22 RX ADMIN — MIDAZOLAM HYDROCHLORIDE 2 MG: 2 INJECTION, SOLUTION INTRAMUSCULAR; INTRAVENOUS at 07:10

## 2019-01-22 RX ADMIN — OXYCODONE AND ACETAMINOPHEN 2 TABLET: 5; 325 TABLET ORAL at 11:54

## 2019-01-22 RX ADMIN — VANCOMYCIN HYDROCHLORIDE 1.5 G: 10 INJECTION, POWDER, LYOPHILIZED, FOR SOLUTION INTRAVENOUS at 07:35

## 2019-01-22 RX ADMIN — ATORVASTATIN CALCIUM 20 MG: 20 TABLET, FILM COATED ORAL at 10:40

## 2019-01-22 RX ADMIN — PHENYLEPHRINE HYDROCHLORIDE 100 MCG: 10 INJECTION INTRAVENOUS at 07:55

## 2019-01-22 RX ADMIN — Medication 10 ML: at 21:03

## 2019-01-22 RX ADMIN — INSULIN LISPRO 1 UNITS: 100 INJECTION, SOLUTION INTRAVENOUS; SUBCUTANEOUS at 11:59

## 2019-01-22 RX ADMIN — SODIUM CHLORIDE: 9 INJECTION, SOLUTION INTRAVENOUS at 04:00

## 2019-01-22 ASSESSMENT — PAIN DESCRIPTION - LOCATION
LOCATION: ARM

## 2019-01-22 ASSESSMENT — PAIN DESCRIPTION - PAIN TYPE
TYPE: ACUTE PAIN

## 2019-01-22 ASSESSMENT — PAIN SCALES - GENERAL
PAINLEVEL_OUTOF10: 5
PAINLEVEL_OUTOF10: 0
PAINLEVEL_OUTOF10: 5
PAINLEVEL_OUTOF10: 7
PAINLEVEL_OUTOF10: 6
PAINLEVEL_OUTOF10: 8
PAINLEVEL_OUTOF10: 7
PAINLEVEL_OUTOF10: 0

## 2019-01-22 ASSESSMENT — PAIN DESCRIPTION - DESCRIPTORS
DESCRIPTORS: ACHING;DISCOMFORT
DESCRIPTORS: ACHING;DISCOMFORT;THROBBING
DESCRIPTORS: ACHING;PENETRATING;THROBBING

## 2019-01-22 ASSESSMENT — PAIN DESCRIPTION - ORIENTATION
ORIENTATION: LEFT

## 2019-01-22 ASSESSMENT — PAIN DESCRIPTION - FREQUENCY: FREQUENCY: CONTINUOUS

## 2019-01-22 ASSESSMENT — PAIN DESCRIPTION - PROGRESSION: CLINICAL_PROGRESSION: NOT CHANGED

## 2019-01-22 ASSESSMENT — PAIN DESCRIPTION - ONSET: ONSET: ON-GOING

## 2019-01-22 ASSESSMENT — PAIN - FUNCTIONAL ASSESSMENT
PAIN_FUNCTIONAL_ASSESSMENT: PREVENTS OR INTERFERES SOME ACTIVE ACTIVITIES AND ADLS
PAIN_FUNCTIONAL_ASSESSMENT: PREVENTS OR INTERFERES SOME ACTIVE ACTIVITIES AND ADLS

## 2019-01-23 LAB
BLOOD BANK DISPENSE STATUS: NORMAL
BLOOD BANK DISPENSE STATUS: NORMAL
BLOOD BANK PRODUCT CODE: NORMAL
BLOOD BANK PRODUCT CODE: NORMAL
BPU ID: NORMAL
BPU ID: NORMAL
DESCRIPTION BLOOD BANK: NORMAL
DESCRIPTION BLOOD BANK: NORMAL
HCT VFR BLD CALC: 21.8 % (ref 34–48)
HCT VFR BLD CALC: 24.8 % (ref 34–48)
HCT VFR BLD CALC: 28 % (ref 34–48)
HEMOGLOBIN: 6.9 G/DL (ref 11.5–15.5)
HEMOGLOBIN: 7.7 G/DL (ref 11.5–15.5)
HEMOGLOBIN: 8.9 G/DL (ref 11.5–15.5)
METER GLUCOSE: 146 MG/DL (ref 74–99)
METER GLUCOSE: 150 MG/DL (ref 74–99)
METER GLUCOSE: 232 MG/DL (ref 74–99)

## 2019-01-23 PROCEDURE — 36415 COLL VENOUS BLD VENIPUNCTURE: CPT

## 2019-01-23 PROCEDURE — 85018 HEMOGLOBIN: CPT

## 2019-01-23 PROCEDURE — 2700000000 HC OXYGEN THERAPY PER DAY

## 2019-01-23 PROCEDURE — 6370000000 HC RX 637 (ALT 250 FOR IP): Performed by: STUDENT IN AN ORGANIZED HEALTH CARE EDUCATION/TRAINING PROGRAM

## 2019-01-23 PROCEDURE — 36430 TRANSFUSION BLD/BLD COMPNT: CPT

## 2019-01-23 PROCEDURE — P9016 RBC LEUKOCYTES REDUCED: HCPCS

## 2019-01-23 PROCEDURE — 85014 HEMATOCRIT: CPT

## 2019-01-23 PROCEDURE — 82962 GLUCOSE BLOOD TEST: CPT

## 2019-01-23 PROCEDURE — 2580000003 HC RX 258: Performed by: STUDENT IN AN ORGANIZED HEALTH CARE EDUCATION/TRAINING PROGRAM

## 2019-01-23 PROCEDURE — 1200000000 HC SEMI PRIVATE

## 2019-01-23 RX ORDER — SODIUM CHLORIDE 9 MG/ML
250 INJECTION, SOLUTION INTRAVENOUS ONCE
Status: COMPLETED | OUTPATIENT
Start: 2019-01-23 | End: 2019-01-23

## 2019-01-23 RX ADMIN — INSULIN LISPRO 1 UNITS: 100 INJECTION, SOLUTION INTRAVENOUS; SUBCUTANEOUS at 17:28

## 2019-01-23 RX ADMIN — INSULIN LISPRO 1 UNITS: 100 INJECTION, SOLUTION INTRAVENOUS; SUBCUTANEOUS at 19:58

## 2019-01-23 RX ADMIN — AMLODIPINE BESYLATE 5 MG: 5 TABLET ORAL at 08:51

## 2019-01-23 RX ADMIN — OXYCODONE AND ACETAMINOPHEN 2 TABLET: 5; 325 TABLET ORAL at 19:51

## 2019-01-23 RX ADMIN — TRAZODONE HYDROCHLORIDE 100 MG: 50 TABLET ORAL at 19:52

## 2019-01-23 RX ADMIN — BUPROPION HYDROCHLORIDE 150 MG: 150 TABLET, EXTENDED RELEASE ORAL at 19:51

## 2019-01-23 RX ADMIN — BUPROPION HYDROCHLORIDE 150 MG: 150 TABLET, EXTENDED RELEASE ORAL at 08:51

## 2019-01-23 RX ADMIN — OXYCODONE AND ACETAMINOPHEN 2 TABLET: 5; 325 TABLET ORAL at 00:13

## 2019-01-23 RX ADMIN — OXYCODONE AND ACETAMINOPHEN 2 TABLET: 5; 325 TABLET ORAL at 15:50

## 2019-01-23 RX ADMIN — SODIUM CHLORIDE 250 ML: 0.9 INJECTION, SOLUTION INTRAVENOUS at 08:50

## 2019-01-23 RX ADMIN — OXYCODONE AND ACETAMINOPHEN 2 TABLET: 5; 325 TABLET ORAL at 08:50

## 2019-01-23 RX ADMIN — ATORVASTATIN CALCIUM 20 MG: 20 TABLET, FILM COATED ORAL at 08:51

## 2019-01-23 RX ADMIN — METOPROLOL TARTRATE 50 MG: 50 TABLET, FILM COATED ORAL at 19:52

## 2019-01-23 RX ADMIN — Medication 10 ML: at 19:55

## 2019-01-23 RX ADMIN — DOCUSATE SODIUM 100 MG: 100 CAPSULE, LIQUID FILLED ORAL at 08:51

## 2019-01-23 RX ADMIN — TORSEMIDE 20 MG: 20 TABLET ORAL at 08:51

## 2019-01-23 RX ADMIN — OXYCODONE AND ACETAMINOPHEN 2 TABLET: 5; 325 TABLET ORAL at 04:29

## 2019-01-23 RX ADMIN — METOPROLOL TARTRATE 50 MG: 50 TABLET, FILM COATED ORAL at 08:51

## 2019-01-23 RX ADMIN — INSULIN LISPRO 1 UNITS: 100 INJECTION, SOLUTION INTRAVENOUS; SUBCUTANEOUS at 08:53

## 2019-01-23 RX ADMIN — Medication 10 ML: at 08:53

## 2019-01-23 ASSESSMENT — PAIN SCALES - GENERAL
PAINLEVEL_OUTOF10: 7
PAINLEVEL_OUTOF10: 0
PAINLEVEL_OUTOF10: 7
PAINLEVEL_OUTOF10: 0
PAINLEVEL_OUTOF10: 7

## 2019-01-23 ASSESSMENT — PAIN DESCRIPTION - FREQUENCY
FREQUENCY: CONTINUOUS
FREQUENCY: CONTINUOUS

## 2019-01-23 ASSESSMENT — PAIN DESCRIPTION - PROGRESSION
CLINICAL_PROGRESSION: NOT CHANGED
CLINICAL_PROGRESSION: NOT CHANGED

## 2019-01-23 ASSESSMENT — PAIN DESCRIPTION - ONSET
ONSET: ON-GOING
ONSET: ON-GOING

## 2019-01-23 ASSESSMENT — PAIN DESCRIPTION - DESCRIPTORS
DESCRIPTORS: ACHING;CONSTANT;THROBBING

## 2019-01-23 ASSESSMENT — PAIN DESCRIPTION - LOCATION
LOCATION: ARM

## 2019-01-23 ASSESSMENT — PAIN DESCRIPTION - ORIENTATION
ORIENTATION: LEFT

## 2019-01-23 ASSESSMENT — PAIN DESCRIPTION - PAIN TYPE
TYPE: ACUTE PAIN
TYPE: ACUTE PAIN

## 2019-01-24 VITALS
TEMPERATURE: 98.1 F | WEIGHT: 250 LBS | RESPIRATION RATE: 18 BRPM | SYSTOLIC BLOOD PRESSURE: 141 MMHG | HEART RATE: 67 BPM | DIASTOLIC BLOOD PRESSURE: 69 MMHG | OXYGEN SATURATION: 98 % | BODY MASS INDEX: 40.18 KG/M2 | HEIGHT: 66 IN

## 2019-01-24 LAB
ANION GAP SERPL CALCULATED.3IONS-SCNC: 17 MMOL/L (ref 7–16)
BUN BLDV-MCNC: 33 MG/DL (ref 8–23)
CALCIUM SERPL-MCNC: 8.6 MG/DL (ref 8.6–10.2)
CHLORIDE BLD-SCNC: 102 MMOL/L (ref 98–107)
CO2: 20 MMOL/L (ref 22–29)
CREAT SERPL-MCNC: 3.8 MG/DL (ref 0.5–1)
GFR AFRICAN AMERICAN: 15
GFR NON-AFRICAN AMERICAN: 12 ML/MIN/1.73
GLUCOSE BLD-MCNC: 85 MG/DL (ref 74–99)
HCT VFR BLD CALC: 25.1 % (ref 34–48)
HCT VFR BLD CALC: 26.3 % (ref 34–48)
HEMOGLOBIN: 8 G/DL (ref 11.5–15.5)
HEMOGLOBIN: 8.2 G/DL (ref 11.5–15.5)
METER GLUCOSE: 115 MG/DL (ref 74–99)
METER GLUCOSE: 130 MG/DL (ref 74–99)
POTASSIUM SERPL-SCNC: 4.5 MMOL/L (ref 3.5–5)
SODIUM BLD-SCNC: 139 MMOL/L (ref 132–146)

## 2019-01-24 PROCEDURE — 2700000000 HC OXYGEN THERAPY PER DAY

## 2019-01-24 PROCEDURE — 2580000003 HC RX 258: Performed by: STUDENT IN AN ORGANIZED HEALTH CARE EDUCATION/TRAINING PROGRAM

## 2019-01-24 PROCEDURE — 80048 BASIC METABOLIC PNL TOTAL CA: CPT

## 2019-01-24 PROCEDURE — 82962 GLUCOSE BLOOD TEST: CPT

## 2019-01-24 PROCEDURE — 85014 HEMATOCRIT: CPT

## 2019-01-24 PROCEDURE — 6370000000 HC RX 637 (ALT 250 FOR IP): Performed by: STUDENT IN AN ORGANIZED HEALTH CARE EDUCATION/TRAINING PROGRAM

## 2019-01-24 PROCEDURE — 36415 COLL VENOUS BLD VENIPUNCTURE: CPT

## 2019-01-24 PROCEDURE — 85018 HEMOGLOBIN: CPT

## 2019-01-24 RX ADMIN — OXYCODONE AND ACETAMINOPHEN 2 TABLET: 5; 325 TABLET ORAL at 13:11

## 2019-01-24 RX ADMIN — METOPROLOL TARTRATE 50 MG: 50 TABLET, FILM COATED ORAL at 08:05

## 2019-01-24 RX ADMIN — Medication 10 ML: at 08:05

## 2019-01-24 RX ADMIN — AMLODIPINE BESYLATE 5 MG: 5 TABLET ORAL at 08:05

## 2019-01-24 RX ADMIN — TORSEMIDE 20 MG: 20 TABLET ORAL at 08:05

## 2019-01-24 RX ADMIN — BUPROPION HYDROCHLORIDE 150 MG: 150 TABLET, EXTENDED RELEASE ORAL at 08:05

## 2019-01-24 RX ADMIN — ATORVASTATIN CALCIUM 20 MG: 20 TABLET, FILM COATED ORAL at 08:05

## 2019-01-24 RX ADMIN — OXYCODONE AND ACETAMINOPHEN 2 TABLET: 5; 325 TABLET ORAL at 08:06

## 2019-01-24 ASSESSMENT — PAIN SCALES - GENERAL
PAINLEVEL_OUTOF10: 3
PAINLEVEL_OUTOF10: 7
PAINLEVEL_OUTOF10: 8

## 2019-01-24 ASSESSMENT — PAIN DESCRIPTION - ONSET: ONSET: GRADUAL

## 2019-01-24 ASSESSMENT — PAIN DESCRIPTION - FREQUENCY: FREQUENCY: INTERMITTENT

## 2019-01-24 ASSESSMENT — PAIN DESCRIPTION - PAIN TYPE: TYPE: ACUTE PAIN

## 2019-01-24 ASSESSMENT — PAIN DESCRIPTION - ORIENTATION: ORIENTATION: RIGHT;LEFT

## 2019-01-24 ASSESSMENT — PAIN - FUNCTIONAL ASSESSMENT: PAIN_FUNCTIONAL_ASSESSMENT: PREVENTS OR INTERFERES SOME ACTIVE ACTIVITIES AND ADLS

## 2019-01-24 ASSESSMENT — PAIN DESCRIPTION - PROGRESSION: CLINICAL_PROGRESSION: NOT CHANGED

## 2019-01-24 ASSESSMENT — PAIN DESCRIPTION - DESCRIPTORS: DESCRIPTORS: ACHING;CONSTANT;SHARP

## 2019-01-24 ASSESSMENT — PAIN DESCRIPTION - LOCATION: LOCATION: ARM

## 2019-01-31 ENCOUNTER — OFFICE VISIT (OUTPATIENT)
Dept: FAMILY MEDICINE CLINIC | Age: 64
End: 2019-01-31
Payer: MEDICARE

## 2019-01-31 ENCOUNTER — HOSPITAL ENCOUNTER (OUTPATIENT)
Age: 64
Discharge: HOME | End: 2019-02-02
Payer: MEDICARE

## 2019-01-31 VITALS
SYSTOLIC BLOOD PRESSURE: 126 MMHG | OXYGEN SATURATION: 96 % | BODY MASS INDEX: 40.18 KG/M2 | WEIGHT: 250 LBS | HEIGHT: 66 IN | RESPIRATION RATE: 16 BRPM | DIASTOLIC BLOOD PRESSURE: 64 MMHG | TEMPERATURE: 97.6 F | HEART RATE: 62 BPM

## 2019-01-31 DIAGNOSIS — E11.22 TYPE 2 DIABETES MELLITUS WITH STAGE 5 CHRONIC KIDNEY DISEASE NOT ON CHRONIC DIALYSIS, WITHOUT LONG-TERM CURRENT USE OF INSULIN (HCC): ICD-10-CM

## 2019-01-31 DIAGNOSIS — R53.83 FATIGUE, UNSPECIFIED TYPE: ICD-10-CM

## 2019-01-31 DIAGNOSIS — I48.91 ATRIAL FIBRILLATION, UNSPECIFIED TYPE (HCC): ICD-10-CM

## 2019-01-31 DIAGNOSIS — N18.5 TYPE 2 DIABETES MELLITUS WITH STAGE 5 CHRONIC KIDNEY DISEASE NOT ON CHRONIC DIALYSIS, WITHOUT LONG-TERM CURRENT USE OF INSULIN (HCC): ICD-10-CM

## 2019-01-31 DIAGNOSIS — T82.9XXD COMPLICATION OF ARTERIOVENOUS DIALYSIS FISTULA, SUBSEQUENT ENCOUNTER: Primary | ICD-10-CM

## 2019-01-31 DIAGNOSIS — N18.5 CKD (CHRONIC KIDNEY DISEASE) STAGE 5, GFR LESS THAN 15 ML/MIN (HCC): Chronic | ICD-10-CM

## 2019-01-31 DIAGNOSIS — D64.9 ANEMIA, UNSPECIFIED TYPE: ICD-10-CM

## 2019-01-31 LAB
BASOPHILS ABSOLUTE: 0.06 E9/L (ref 0–0.2)
BASOPHILS RELATIVE PERCENT: 0.6 % (ref 0–2)
EOSINOPHILS ABSOLUTE: 0.16 E9/L (ref 0.05–0.5)
EOSINOPHILS RELATIVE PERCENT: 1.7 % (ref 0–6)
HCT VFR BLD CALC: 30.6 % (ref 34–48)
HEMOGLOBIN: 9.5 G/DL (ref 11.5–15.5)
IMMATURE GRANULOCYTES #: 0.04 E9/L
IMMATURE GRANULOCYTES %: 0.4 % (ref 0–5)
LYMPHOCYTES ABSOLUTE: 3.28 E9/L (ref 1.5–4)
LYMPHOCYTES RELATIVE PERCENT: 35 % (ref 20–42)
MCH RBC QN AUTO: 29.9 PG (ref 26–35)
MCHC RBC AUTO-ENTMCNC: 31 % (ref 32–34.5)
MCV RBC AUTO: 96.2 FL (ref 80–99.9)
MONOCYTES ABSOLUTE: 0.79 E9/L (ref 0.1–0.95)
MONOCYTES RELATIVE PERCENT: 8.4 % (ref 2–12)
NEUTROPHILS ABSOLUTE: 5.04 E9/L (ref 1.8–7.3)
NEUTROPHILS RELATIVE PERCENT: 53.9 % (ref 43–80)
PDW BLD-RTO: 14.1 FL (ref 11.5–15)
PLATELET # BLD: 478 E9/L (ref 130–450)
PMV BLD AUTO: 10.1 FL (ref 7–12)
RBC # BLD: 3.18 E12/L (ref 3.5–5.5)
TSH SERPL DL<=0.05 MIU/L-ACNC: 0.8 UIU/ML (ref 0.27–4.2)
VITAMIN D 25-HYDROXY: 28 NG/ML (ref 30–100)
WBC # BLD: 9.4 E9/L (ref 4.5–11.5)

## 2019-01-31 PROCEDURE — G8417 CALC BMI ABV UP PARAM F/U: HCPCS | Performed by: NURSE PRACTITIONER

## 2019-01-31 PROCEDURE — 99214 OFFICE O/P EST MOD 30 MIN: CPT | Performed by: NURSE PRACTITIONER

## 2019-01-31 PROCEDURE — 3017F COLORECTAL CA SCREEN DOC REV: CPT | Performed by: NURSE PRACTITIONER

## 2019-01-31 PROCEDURE — 1111F DSCHRG MED/CURRENT MED MERGE: CPT | Performed by: NURSE PRACTITIONER

## 2019-01-31 PROCEDURE — G8427 DOCREV CUR MEDS BY ELIG CLIN: HCPCS | Performed by: NURSE PRACTITIONER

## 2019-01-31 PROCEDURE — 84443 ASSAY THYROID STIM HORMONE: CPT

## 2019-01-31 PROCEDURE — 85025 COMPLETE CBC W/AUTO DIFF WBC: CPT

## 2019-01-31 PROCEDURE — G8482 FLU IMMUNIZE ORDER/ADMIN: HCPCS | Performed by: NURSE PRACTITIONER

## 2019-01-31 PROCEDURE — 1036F TOBACCO NON-USER: CPT | Performed by: NURSE PRACTITIONER

## 2019-01-31 PROCEDURE — 36415 COLL VENOUS BLD VENIPUNCTURE: CPT | Performed by: NURSE PRACTITIONER

## 2019-01-31 PROCEDURE — 2022F DILAT RTA XM EVC RTNOPTHY: CPT | Performed by: NURSE PRACTITIONER

## 2019-01-31 PROCEDURE — 82306 VITAMIN D 25 HYDROXY: CPT

## 2019-01-31 PROCEDURE — 3046F HEMOGLOBIN A1C LEVEL >9.0%: CPT | Performed by: NURSE PRACTITIONER

## 2019-01-31 RX ORDER — GLIPIZIDE 5 MG/1
TABLET, FILM COATED, EXTENDED RELEASE ORAL
Qty: 120 TABLET | Refills: 3 | Status: SHIPPED | OUTPATIENT
Start: 2019-01-31 | End: 2019-06-27

## 2019-01-31 RX ORDER — ATORVASTATIN CALCIUM 20 MG/1
20 TABLET, FILM COATED ORAL DAILY
Qty: 30 TABLET | Refills: 3 | Status: SHIPPED | OUTPATIENT
Start: 2019-01-31 | End: 2019-07-05 | Stop reason: DRUGHIGH

## 2019-01-31 RX ORDER — AMLODIPINE BESYLATE 5 MG/1
5 TABLET ORAL DAILY
Qty: 30 TABLET | Refills: 3 | Status: SHIPPED | OUTPATIENT
Start: 2019-01-31 | End: 2020-01-31 | Stop reason: SDUPTHER

## 2019-01-31 ASSESSMENT — ENCOUNTER SYMPTOMS
SHORTNESS OF BREATH: 0
WHEEZING: 0
COUGH: 0
NAUSEA: 1
CONSTIPATION: 0
VOMITING: 0
DIARRHEA: 0

## 2019-02-15 RX ORDER — TORSEMIDE 20 MG/1
20 TABLET ORAL DAILY
Qty: 30 TABLET | Refills: 2 | Status: SHIPPED | OUTPATIENT
Start: 2019-02-15 | End: 2019-02-15 | Stop reason: SDUPTHER

## 2019-02-18 RX ORDER — TORSEMIDE 20 MG/1
20 TABLET ORAL DAILY
Qty: 90 TABLET | Refills: 2 | Status: SHIPPED | OUTPATIENT
Start: 2019-02-18 | End: 2019-11-15 | Stop reason: SDUPTHER

## 2019-03-14 ENCOUNTER — OFFICE VISIT (OUTPATIENT)
Dept: FAMILY MEDICINE CLINIC | Age: 64
End: 2019-03-14
Payer: MEDICARE

## 2019-03-14 ENCOUNTER — HOSPITAL ENCOUNTER (OUTPATIENT)
Age: 64
Discharge: HOME | End: 2019-03-16
Payer: MEDICARE

## 2019-03-14 VITALS
SYSTOLIC BLOOD PRESSURE: 132 MMHG | TEMPERATURE: 97.8 F | BODY MASS INDEX: 43.54 KG/M2 | HEIGHT: 64 IN | RESPIRATION RATE: 16 BRPM | HEART RATE: 81 BPM | OXYGEN SATURATION: 96 % | WEIGHT: 255 LBS | DIASTOLIC BLOOD PRESSURE: 70 MMHG

## 2019-03-14 DIAGNOSIS — I48.91 ATRIAL FIBRILLATION, UNSPECIFIED TYPE (HCC): ICD-10-CM

## 2019-03-14 DIAGNOSIS — R31.9 URINARY TRACT INFECTION WITH HEMATURIA, SITE UNSPECIFIED: Primary | ICD-10-CM

## 2019-03-14 DIAGNOSIS — R31.9 URINARY TRACT INFECTION WITH HEMATURIA, SITE UNSPECIFIED: ICD-10-CM

## 2019-03-14 DIAGNOSIS — N39.0 URINARY TRACT INFECTION WITH HEMATURIA, SITE UNSPECIFIED: ICD-10-CM

## 2019-03-14 DIAGNOSIS — N39.0 URINARY TRACT INFECTION WITH HEMATURIA, SITE UNSPECIFIED: Primary | ICD-10-CM

## 2019-03-14 LAB
BILIRUBIN, POC: ABNORMAL
BLOOD URINE, POC: ABNORMAL
CLARITY, POC: ABNORMAL
COLOR, POC: YELLOW
GLUCOSE URINE, POC: ABNORMAL
KETONES, POC: ABNORMAL
LEUKOCYTE EST, POC: ABNORMAL
NITRITE, POC: ABNORMAL
PH, POC: 5.5
PROTEIN, POC: ABNORMAL
SPECIFIC GRAVITY, POC: 1.02
UROBILINOGEN, POC: 0.2

## 2019-03-14 PROCEDURE — 1036F TOBACCO NON-USER: CPT | Performed by: NURSE PRACTITIONER

## 2019-03-14 PROCEDURE — G8427 DOCREV CUR MEDS BY ELIG CLIN: HCPCS | Performed by: NURSE PRACTITIONER

## 2019-03-14 PROCEDURE — 81002 URINALYSIS NONAUTO W/O SCOPE: CPT | Performed by: NURSE PRACTITIONER

## 2019-03-14 PROCEDURE — 87186 SC STD MICRODIL/AGAR DIL: CPT

## 2019-03-14 PROCEDURE — 87077 CULTURE AEROBIC IDENTIFY: CPT

## 2019-03-14 PROCEDURE — G8417 CALC BMI ABV UP PARAM F/U: HCPCS | Performed by: NURSE PRACTITIONER

## 2019-03-14 PROCEDURE — 87088 URINE BACTERIA CULTURE: CPT

## 2019-03-14 PROCEDURE — G8482 FLU IMMUNIZE ORDER/ADMIN: HCPCS | Performed by: NURSE PRACTITIONER

## 2019-03-14 PROCEDURE — 99213 OFFICE O/P EST LOW 20 MIN: CPT | Performed by: NURSE PRACTITIONER

## 2019-03-14 PROCEDURE — 3017F COLORECTAL CA SCREEN DOC REV: CPT | Performed by: NURSE PRACTITIONER

## 2019-03-14 RX ORDER — NITROFURANTOIN MACROCRYSTALS 100 MG/1
100 CAPSULE ORAL 2 TIMES DAILY
Qty: 10 CAPSULE | Refills: 0 | Status: SHIPPED | OUTPATIENT
Start: 2019-03-14 | End: 2019-03-19

## 2019-03-14 RX ORDER — PROPAFENONE HYDROCHLORIDE 325 MG/1
325 CAPSULE, EXTENDED RELEASE ORAL 2 TIMES DAILY
Qty: 180 CAPSULE | Refills: 1 | Status: SHIPPED | OUTPATIENT
Start: 2019-03-14 | End: 2019-07-05 | Stop reason: SDUPTHER

## 2019-03-14 ASSESSMENT — ENCOUNTER SYMPTOMS
NAUSEA: 0
WHEEZING: 0
SHORTNESS OF BREATH: 0
COUGH: 0
CONSTIPATION: 0
VOMITING: 0
DIARRHEA: 0

## 2019-03-16 LAB
ORGANISM: ABNORMAL
URINE CULTURE, ROUTINE: ABNORMAL
URINE CULTURE, ROUTINE: ABNORMAL

## 2019-04-10 ENCOUNTER — HOSPITAL ENCOUNTER (OUTPATIENT)
Age: 64
Discharge: HOME | End: 2019-04-12
Payer: MEDICARE

## 2019-04-10 ENCOUNTER — NURSE ONLY (OUTPATIENT)
Dept: FAMILY MEDICINE CLINIC | Age: 64
End: 2019-04-10

## 2019-04-10 DIAGNOSIS — D63.1 ANEMIA OF CHRONIC RENAL FAILURE, UNSPECIFIED CKD STAGE: ICD-10-CM

## 2019-04-10 DIAGNOSIS — N18.9 ANEMIA OF CHRONIC RENAL FAILURE, UNSPECIFIED CKD STAGE: ICD-10-CM

## 2019-04-10 DIAGNOSIS — E55.9 VITAMIN D DEFICIENCY: ICD-10-CM

## 2019-04-10 LAB
ALBUMIN SERPL-MCNC: 4.2 G/DL (ref 3.5–5.2)
ALP BLD-CCNC: 119 U/L (ref 35–104)
ALT SERPL-CCNC: 18 U/L (ref 0–32)
ANION GAP SERPL CALCULATED.3IONS-SCNC: 14 MMOL/L (ref 7–16)
AST SERPL-CCNC: 20 U/L (ref 0–31)
BILIRUB SERPL-MCNC: 0.4 MG/DL (ref 0–1.2)
BUN BLDV-MCNC: 28 MG/DL (ref 8–23)
CALCIUM SERPL-MCNC: 9.9 MG/DL (ref 8.6–10.2)
CHLORIDE BLD-SCNC: 104 MMOL/L (ref 98–107)
CO2: 24 MMOL/L (ref 22–29)
CREAT SERPL-MCNC: 3.1 MG/DL (ref 0.5–1)
GFR AFRICAN AMERICAN: 18
GFR NON-AFRICAN AMERICAN: 15 ML/MIN/1.73
GLUCOSE BLD-MCNC: 176 MG/DL (ref 74–99)
HCT VFR BLD CALC: 37.9 % (ref 34–48)
HEMOGLOBIN: 11 G/DL (ref 11.5–15.5)
IRON SATURATION: 26 % (ref 15–50)
IRON: 104 MCG/DL (ref 37–145)
MCH RBC QN AUTO: 28.9 PG (ref 26–35)
MCHC RBC AUTO-ENTMCNC: 29 % (ref 32–34.5)
MCV RBC AUTO: 99.5 FL (ref 80–99.9)
PARATHYROID HORMONE INTACT: 217 PG/ML (ref 15–65)
PDW BLD-RTO: 16.3 FL (ref 11.5–15)
PHOSPHORUS: 5.3 MG/DL (ref 2.5–4.5)
PLATELET # BLD: 371 E9/L (ref 130–450)
PMV BLD AUTO: 11.2 FL (ref 7–12)
POTASSIUM SERPL-SCNC: 4.8 MMOL/L (ref 3.5–5)
RBC # BLD: 3.81 E12/L (ref 3.5–5.5)
SODIUM BLD-SCNC: 142 MMOL/L (ref 132–146)
TOTAL IRON BINDING CAPACITY: 403 MCG/DL (ref 250–450)
TOTAL PROTEIN: 7.6 G/DL (ref 6.4–8.3)
VITAMIN D 25-HYDROXY: 23 NG/ML (ref 30–100)
WBC # BLD: 10.8 E9/L (ref 4.5–11.5)

## 2019-04-10 PROCEDURE — 80053 COMPREHEN METABOLIC PANEL: CPT

## 2019-04-10 PROCEDURE — 83550 IRON BINDING TEST: CPT

## 2019-04-10 PROCEDURE — 85027 COMPLETE CBC AUTOMATED: CPT

## 2019-04-10 PROCEDURE — 84100 ASSAY OF PHOSPHORUS: CPT

## 2019-04-10 PROCEDURE — 83540 ASSAY OF IRON: CPT

## 2019-04-10 PROCEDURE — 83970 ASSAY OF PARATHORMONE: CPT

## 2019-04-10 PROCEDURE — 82306 VITAMIN D 25 HYDROXY: CPT

## 2019-05-16 PROBLEM — N17.9 AKI (ACUTE KIDNEY INJURY) (HCC): Status: RESOLVED | Noted: 2018-11-06 | Resolved: 2019-05-16

## 2019-05-17 ENCOUNTER — OFFICE VISIT (OUTPATIENT)
Dept: FAMILY MEDICINE CLINIC | Age: 64
End: 2019-05-17
Payer: MEDICARE

## 2019-05-17 VITALS
WEIGHT: 258 LBS | HEIGHT: 67 IN | HEART RATE: 70 BPM | TEMPERATURE: 98.9 F | OXYGEN SATURATION: 94 % | DIASTOLIC BLOOD PRESSURE: 70 MMHG | RESPIRATION RATE: 16 BRPM | SYSTOLIC BLOOD PRESSURE: 124 MMHG | BODY MASS INDEX: 40.49 KG/M2

## 2019-05-17 DIAGNOSIS — F33.1 MODERATE EPISODE OF RECURRENT MAJOR DEPRESSIVE DISORDER (HCC): ICD-10-CM

## 2019-05-17 DIAGNOSIS — Z23 NEED FOR PROPHYLACTIC VACCINATION AGAINST STREPTOCOCCUS PNEUMONIAE (PNEUMOCOCCUS): ICD-10-CM

## 2019-05-17 DIAGNOSIS — Z12.11 COLON CANCER SCREENING: Primary | ICD-10-CM

## 2019-05-17 DIAGNOSIS — I48.91 ATRIAL FIBRILLATION, UNSPECIFIED TYPE (HCC): ICD-10-CM

## 2019-05-17 DIAGNOSIS — I10 HYPERTENSION, UNSPECIFIED TYPE: ICD-10-CM

## 2019-05-17 DIAGNOSIS — Z12.31 ENCOUNTER FOR SCREENING MAMMOGRAM FOR BREAST CANCER: ICD-10-CM

## 2019-05-17 PROCEDURE — 99214 OFFICE O/P EST MOD 30 MIN: CPT | Performed by: NURSE PRACTITIONER

## 2019-05-17 PROCEDURE — 90732 PPSV23 VACC 2 YRS+ SUBQ/IM: CPT | Performed by: NURSE PRACTITIONER

## 2019-05-17 PROCEDURE — G0009 ADMIN PNEUMOCOCCAL VACCINE: HCPCS | Performed by: NURSE PRACTITIONER

## 2019-05-17 RX ORDER — CALCIUM ACETATE 667 MG/1
CAPSULE ORAL
Refills: 3 | COMMUNITY
Start: 2019-04-17 | End: 2020-10-15

## 2019-05-17 RX ORDER — DULOXETIN HYDROCHLORIDE 30 MG/1
30 CAPSULE, DELAYED RELEASE ORAL DAILY
Qty: 30 CAPSULE | Refills: 1 | Status: SHIPPED | OUTPATIENT
Start: 2019-05-17 | End: 2019-07-17 | Stop reason: ALTCHOICE

## 2019-05-17 RX ORDER — BUPROPION HYDROCHLORIDE 150 MG/1
150 TABLET, EXTENDED RELEASE ORAL 2 TIMES DAILY
Qty: 60 TABLET | Refills: 5 | Status: CANCELLED | OUTPATIENT
Start: 2019-05-17

## 2019-05-17 RX ORDER — DULOXETIN HYDROCHLORIDE 60 MG/1
60 CAPSULE, DELAYED RELEASE ORAL DAILY
Qty: 30 CAPSULE | Refills: 5 | Status: CANCELLED | OUTPATIENT
Start: 2019-05-17

## 2019-05-17 RX ORDER — MELATONIN
1000 DAILY
Refills: 3 | COMMUNITY
Start: 2019-04-16 | End: 2021-04-27 | Stop reason: SDUPTHER

## 2019-05-17 ASSESSMENT — ENCOUNTER SYMPTOMS
SINUS PAIN: 0
CONSTIPATION: 0
COUGH: 0
VOMITING: 0
COLOR CHANGE: 0
SHORTNESS OF BREATH: 1
CHEST TIGHTNESS: 1
EYE PAIN: 0
WHEEZING: 0
NAUSEA: 1
DIARRHEA: 0

## 2019-05-24 ENCOUNTER — NURSE ONLY (OUTPATIENT)
Dept: FAMILY MEDICINE CLINIC | Age: 64
End: 2019-05-24

## 2019-05-24 ENCOUNTER — HOSPITAL ENCOUNTER (OUTPATIENT)
Age: 64
Discharge: HOME | End: 2019-05-26
Payer: MEDICARE

## 2019-05-24 DIAGNOSIS — F41.1 GENERALIZED ANXIETY DISORDER: ICD-10-CM

## 2019-05-24 DIAGNOSIS — Z12.11 COLON CANCER SCREENING: ICD-10-CM

## 2019-05-24 LAB
ANION GAP SERPL CALCULATED.3IONS-SCNC: 23 MMOL/L (ref 7–16)
BACTERIA: ABNORMAL /HPF
BILIRUBIN URINE: NEGATIVE
BLOOD, URINE: NEGATIVE
BUN BLDV-MCNC: 33 MG/DL (ref 8–23)
CALCIUM SERPL-MCNC: 10.2 MG/DL (ref 8.6–10.2)
CHLORIDE BLD-SCNC: 100 MMOL/L (ref 98–107)
CLARITY: CLEAR
CO2: 20 MMOL/L (ref 22–29)
COLOR: YELLOW
CREAT SERPL-MCNC: 2.8 MG/DL (ref 0.5–1)
CREATININE URINE: 49 MG/DL (ref 29–226)
GFR AFRICAN AMERICAN: 21
GFR NON-AFRICAN AMERICAN: 17 ML/MIN/1.73
GLUCOSE BLD-MCNC: 235 MG/DL (ref 74–99)
GLUCOSE URINE: NEGATIVE MG/DL
HCT VFR BLD CALC: 36.9 % (ref 34–48)
HEMOGLOBIN: 10.9 G/DL (ref 11.5–15.5)
KETONES, URINE: NEGATIVE MG/DL
LEUKOCYTE ESTERASE, URINE: ABNORMAL
MCH RBC QN AUTO: 29.3 PG (ref 26–35)
MCHC RBC AUTO-ENTMCNC: 29.5 % (ref 32–34.5)
MCV RBC AUTO: 99.2 FL (ref 80–99.9)
MICROALBUMIN UR-MCNC: 149.5 MG/L
MICROALBUMIN/CREAT UR-RTO: 305.1 (ref 0–30)
NITRITE, URINE: NEGATIVE
PARATHYROID HORMONE INTACT: 127 PG/ML (ref 15–65)
PDW BLD-RTO: 14.9 FL (ref 11.5–15)
PH UA: 6 (ref 5–9)
PHOSPHORUS: 4.1 MG/DL (ref 2.5–4.5)
PLATELET # BLD: 462 E9/L (ref 130–450)
PMV BLD AUTO: 10.7 FL (ref 7–12)
POTASSIUM SERPL-SCNC: 4.2 MMOL/L (ref 3.5–5)
PROTEIN PROTEIN: 35 MG/DL (ref 0–12)
PROTEIN UA: 30 MG/DL
PROTEIN/CREAT RATIO: 0.7
PROTEIN/CREAT RATIO: 0.7 (ref 0–0.2)
RBC # BLD: 3.72 E12/L (ref 3.5–5.5)
RBC UA: ABNORMAL /HPF (ref 0–2)
SODIUM BLD-SCNC: 143 MMOL/L (ref 132–146)
SPECIFIC GRAVITY UA: 1.01 (ref 1–1.03)
UROBILINOGEN, URINE: 0.2 E.U./DL
WBC # BLD: 12.6 E9/L (ref 4.5–11.5)
WBC UA: ABNORMAL /HPF (ref 0–5)

## 2019-05-24 PROCEDURE — 82044 UR ALBUMIN SEMIQUANTITATIVE: CPT

## 2019-05-24 PROCEDURE — 81001 URINALYSIS AUTO W/SCOPE: CPT

## 2019-05-24 PROCEDURE — 84100 ASSAY OF PHOSPHORUS: CPT

## 2019-05-24 PROCEDURE — 82570 ASSAY OF URINE CREATININE: CPT

## 2019-05-24 PROCEDURE — 84156 ASSAY OF PROTEIN URINE: CPT

## 2019-05-24 PROCEDURE — 83970 ASSAY OF PARATHORMONE: CPT

## 2019-05-24 PROCEDURE — 85027 COMPLETE CBC AUTOMATED: CPT

## 2019-05-24 PROCEDURE — 80048 BASIC METABOLIC PNL TOTAL CA: CPT

## 2019-06-06 ENCOUNTER — TELEPHONE (OUTPATIENT)
Dept: SURGERY | Age: 64
End: 2019-06-06

## 2019-06-06 NOTE — TELEPHONE ENCOUNTER
MA called pt to see if she would like to reschedule missed appointment with Dr. Matteo Nassar form today, pt opted to reschedule for 7/11/19 at 2:00 pm. Pt accepted new time/date for appointment MA mailed new appointment letter to pt.   Electronically signed by Danielle Morocho on 6/6/19 at 4:01 PM

## 2019-06-16 PROBLEM — Z12.11 COLON CANCER SCREENING: Status: RESOLVED | Noted: 2019-05-17 | Resolved: 2019-06-16

## 2019-06-26 DIAGNOSIS — N18.5 TYPE 2 DIABETES MELLITUS WITH STAGE 5 CHRONIC KIDNEY DISEASE NOT ON CHRONIC DIALYSIS, WITHOUT LONG-TERM CURRENT USE OF INSULIN (HCC): Primary | ICD-10-CM

## 2019-06-26 DIAGNOSIS — E11.22 TYPE 2 DIABETES MELLITUS WITH STAGE 5 CHRONIC KIDNEY DISEASE NOT ON CHRONIC DIALYSIS, WITHOUT LONG-TERM CURRENT USE OF INSULIN (HCC): Primary | ICD-10-CM

## 2019-06-27 RX ORDER — SODIUM BICARBONATE 650 MG/1
TABLET ORAL
Refills: 3 | COMMUNITY
Start: 2019-06-03 | End: 2021-08-06

## 2019-06-27 RX ORDER — GLIPIZIDE 5 MG/1
TABLET, FILM COATED, EXTENDED RELEASE ORAL
Qty: 120 TABLET | Refills: 0 | Status: SHIPPED | OUTPATIENT
Start: 2019-06-27 | End: 2019-07-27 | Stop reason: SDUPTHER

## 2019-07-05 ENCOUNTER — OFFICE VISIT (OUTPATIENT)
Dept: CARDIOLOGY CLINIC | Age: 64
End: 2019-07-05
Payer: MEDICARE

## 2019-07-05 VITALS
HEART RATE: 88 BPM | DIASTOLIC BLOOD PRESSURE: 50 MMHG | BODY MASS INDEX: 40.65 KG/M2 | WEIGHT: 259 LBS | RESPIRATION RATE: 16 BRPM | SYSTOLIC BLOOD PRESSURE: 130 MMHG | HEIGHT: 67 IN

## 2019-07-05 DIAGNOSIS — N18.30 TYPE 2 DIABETES MELLITUS WITH STAGE 3 CHRONIC KIDNEY DISEASE, WITHOUT LONG-TERM CURRENT USE OF INSULIN (HCC): ICD-10-CM

## 2019-07-05 DIAGNOSIS — I48.0 PAROXYSMAL ATRIAL FIBRILLATION (HCC): Primary | ICD-10-CM

## 2019-07-05 DIAGNOSIS — I10 ESSENTIAL HYPERTENSION: ICD-10-CM

## 2019-07-05 DIAGNOSIS — J44.9 CHRONIC OBSTRUCTIVE PULMONARY DISEASE, UNSPECIFIED COPD TYPE (HCC): ICD-10-CM

## 2019-07-05 DIAGNOSIS — R06.09 EXERTIONAL DYSPNEA: ICD-10-CM

## 2019-07-05 DIAGNOSIS — E66.01 MORBID OBESITY (HCC): ICD-10-CM

## 2019-07-05 DIAGNOSIS — G47.33 OBSTRUCTIVE SLEEP APNEA: ICD-10-CM

## 2019-07-05 DIAGNOSIS — E11.22 TYPE 2 DIABETES MELLITUS WITH STAGE 3 CHRONIC KIDNEY DISEASE, WITHOUT LONG-TERM CURRENT USE OF INSULIN (HCC): ICD-10-CM

## 2019-07-05 DIAGNOSIS — E78.00 PURE HYPERCHOLESTEROLEMIA: ICD-10-CM

## 2019-07-05 PROCEDURE — G8926 SPIRO NO PERF OR DOC: HCPCS | Performed by: INTERNAL MEDICINE

## 2019-07-05 PROCEDURE — G8417 CALC BMI ABV UP PARAM F/U: HCPCS | Performed by: INTERNAL MEDICINE

## 2019-07-05 PROCEDURE — G8427 DOCREV CUR MEDS BY ELIG CLIN: HCPCS | Performed by: INTERNAL MEDICINE

## 2019-07-05 PROCEDURE — 99205 OFFICE O/P NEW HI 60 MIN: CPT | Performed by: INTERNAL MEDICINE

## 2019-07-05 PROCEDURE — 2022F DILAT RTA XM EVC RTNOPTHY: CPT | Performed by: INTERNAL MEDICINE

## 2019-07-05 PROCEDURE — 3023F SPIROM DOC REV: CPT | Performed by: INTERNAL MEDICINE

## 2019-07-05 PROCEDURE — 93000 ELECTROCARDIOGRAM COMPLETE: CPT | Performed by: INTERNAL MEDICINE

## 2019-07-05 RX ORDER — ATORVASTATIN CALCIUM 40 MG/1
40 TABLET, FILM COATED ORAL DAILY
Qty: 30 TABLET | Refills: 3 | Status: SHIPPED | OUTPATIENT
Start: 2019-07-05 | End: 2019-11-19 | Stop reason: SDUPTHER

## 2019-07-05 RX ORDER — PROPAFENONE HYDROCHLORIDE 325 MG/1
325 CAPSULE, EXTENDED RELEASE ORAL 2 TIMES DAILY
Qty: 180 CAPSULE | Refills: 1 | Status: SHIPPED | OUTPATIENT
Start: 2019-07-05 | End: 2019-12-10 | Stop reason: SDUPTHER

## 2019-07-05 NOTE — PROGRESS NOTES
OUTPATIENT CARDIOLOGY CONSULT    Name: Rupa Nicole    Age: 62 y.o. Date of Service: 7/5/2019    Reason for Consultation: Excisional atrial fibrillation, chronic obstructive lung disease, hypertension, morbid obesity, obstructive sleep apnea    Referring Physician: Petros Gomez NP    History of Present Illness: The patient is a 23-year-old white female with a history of paroxysmal atrial fibrillation presently maintained in sinus rhythm on antiarrhythmic therapy and anticoagulation. She has no apparent history of documented structural heart disease with coronary angiography at the time of her initial diagnosis reportedly in 2008 demonstrating no evidence of coronary atherosclerosis with normal left ventricular systolic function. Additionally has a history of hypertension, diabetes with associated stage III chronic kidney disease, hyperlipidemia, chronic obstructive lung disease with ongoing tobacco consumption as well as that of morbid obesity with obstructive sleep apnea and lack of compliance with recommended initiation of nocturnal CPAP at the time of her initial sleep assessment. She relates no recent symptomatology of anginal-like chest discomfort or other ischemic equivalents nor manifestations of decompensated left ventricular systolic dysfunction or volume overload while maintaining a sedentary lifestyle and functional capabilities of less than 5 METs. She denies any symptoms of palpitations or other arrhythmia related manifestations and denies symptoms of a focal neurologic origin or bleeding in the face of chronic oral anticoagulation. Review of Systems: The remainder of a complete multisystem review including consitutional, central nervous, respiratory, circulatory, gastrointestinal, genitourinary, endocrinologic, hematologic, musculoskeletal and psychiatric are negative.     Past Medical History:  Past Medical History:   Diagnosis Date    Atrial fibrillation ([address]     Bladder

## 2019-07-11 ENCOUNTER — PREP FOR PROCEDURE (OUTPATIENT)
Dept: SURGERY | Age: 64
End: 2019-07-11

## 2019-07-11 ENCOUNTER — OFFICE VISIT (OUTPATIENT)
Dept: SURGERY | Age: 64
End: 2019-07-11
Payer: MEDICARE

## 2019-07-11 VITALS
HEIGHT: 67 IN | WEIGHT: 262 LBS | BODY MASS INDEX: 41.12 KG/M2 | HEART RATE: 79 BPM | DIASTOLIC BLOOD PRESSURE: 83 MMHG | SYSTOLIC BLOOD PRESSURE: 157 MMHG | RESPIRATION RATE: 16 BRPM | TEMPERATURE: 98.5 F | OXYGEN SATURATION: 95 %

## 2019-07-11 DIAGNOSIS — Z80.0 FAMILY HISTORY OF COLON CANCER: ICD-10-CM

## 2019-07-11 DIAGNOSIS — I48.0 PAROXYSMAL ATRIAL FIBRILLATION (HCC): Chronic | ICD-10-CM

## 2019-07-11 DIAGNOSIS — N18.30 TYPE 2 DIABETES MELLITUS WITH STAGE 3 CHRONIC KIDNEY DISEASE, WITHOUT LONG-TERM CURRENT USE OF INSULIN (HCC): Chronic | ICD-10-CM

## 2019-07-11 DIAGNOSIS — Z86.010 HISTORY OF COLON POLYPS: Primary | ICD-10-CM

## 2019-07-11 DIAGNOSIS — N18.5 CKD (CHRONIC KIDNEY DISEASE) STAGE 5, GFR LESS THAN 15 ML/MIN (HCC): Chronic | ICD-10-CM

## 2019-07-11 DIAGNOSIS — E11.22 TYPE 2 DIABETES MELLITUS WITH STAGE 3 CHRONIC KIDNEY DISEASE, WITHOUT LONG-TERM CURRENT USE OF INSULIN (HCC): Chronic | ICD-10-CM

## 2019-07-11 PROBLEM — I10 ESSENTIAL HYPERTENSION: Chronic | Status: ACTIVE | Noted: 2018-11-06

## 2019-07-11 PROBLEM — I48.91 ATRIAL FIBRILLATION (HCC): Chronic | Status: ACTIVE | Noted: 2019-01-31

## 2019-07-11 PROBLEM — E78.00 PURE HYPERCHOLESTEROLEMIA: Chronic | Status: ACTIVE | Noted: 2019-07-05

## 2019-07-11 PROBLEM — J44.9 CHRONIC OBSTRUCTIVE PULMONARY DISEASE (HCC): Chronic | Status: ACTIVE | Noted: 2019-07-05

## 2019-07-11 PROBLEM — G47.33 OBSTRUCTIVE SLEEP APNEA: Chronic | Status: ACTIVE | Noted: 2019-07-05

## 2019-07-11 PROBLEM — Z23 NEED FOR PROPHYLACTIC VACCINATION AGAINST STREPTOCOCCUS PNEUMONIAE (PNEUMOCOCCUS): Status: RESOLVED | Noted: 2019-05-17 | Resolved: 2019-07-11

## 2019-07-11 PROBLEM — E11.9 TYPE 2 DIABETES MELLITUS, WITHOUT LONG-TERM CURRENT USE OF INSULIN (HCC): Chronic | Status: ACTIVE | Noted: 2018-11-06

## 2019-07-11 PROCEDURE — 99202 OFFICE O/P NEW SF 15 MIN: CPT | Performed by: SURGERY

## 2019-07-11 PROCEDURE — 3046F HEMOGLOBIN A1C LEVEL >9.0%: CPT | Performed by: SURGERY

## 2019-07-11 PROCEDURE — 99999 PR OFFICE/OUTPT VISIT,PROCEDURE ONLY: CPT | Performed by: SURGERY

## 2019-07-11 PROCEDURE — 4004F PT TOBACCO SCREEN RCVD TLK: CPT | Performed by: SURGERY

## 2019-07-11 PROCEDURE — G8427 DOCREV CUR MEDS BY ELIG CLIN: HCPCS | Performed by: SURGERY

## 2019-07-11 PROCEDURE — 3017F COLORECTAL CA SCREEN DOC REV: CPT | Performed by: SURGERY

## 2019-07-11 PROCEDURE — 2022F DILAT RTA XM EVC RTNOPTHY: CPT | Performed by: SURGERY

## 2019-07-11 PROCEDURE — G8417 CALC BMI ABV UP PARAM F/U: HCPCS | Performed by: SURGERY

## 2019-07-11 RX ORDER — SODIUM CHLORIDE 0.9 % (FLUSH) 0.9 %
10 SYRINGE (ML) INJECTION EVERY 12 HOURS SCHEDULED
Status: CANCELLED | OUTPATIENT
Start: 2019-07-11

## 2019-07-11 RX ORDER — 0.9 % SODIUM CHLORIDE 0.9 %
10 VIAL (ML) INJECTION PRN
Status: CANCELLED | OUTPATIENT
Start: 2019-07-11

## 2019-07-11 RX ORDER — POLYETHYLENE GLYCOL 3350 17 G/17G
POWDER, FOR SOLUTION ORAL
Qty: 238 G | Refills: 0 | Status: ON HOLD | OUTPATIENT
Start: 2019-07-11 | End: 2019-08-29 | Stop reason: HOSPADM

## 2019-07-11 RX ORDER — SODIUM CHLORIDE 9 MG/ML
INJECTION, SOLUTION INTRAVENOUS CONTINUOUS
Status: CANCELLED | OUTPATIENT
Start: 2019-07-11

## 2019-07-11 NOTE — PATIENT INSTRUCTIONS
be slowly inserted through the rectum and into the bowel. The colonoscope will inject air into the colon. A small attached video camera will allow the doctor to view the colon's lining on a screen. The doctor will continue guiding the tool through the bowel and assess the lining. A tissue sample or polyps may be removed during the procedure. How Long Will It Take? Usually it takes about 30 to 45 minutes     Will It Hurt? Most people do not feel anything during the procedure and will not remember the procedure. After the procedure, gas pains and cramping are common. These pains should go away with the passing of gas. Post-procedure Care   If any tissue was removed: It will be sent to a lab to be examined. It may take 1-2 weeks for results. The doctor will usually give an initial report after the scope is removed. Other tests may be recommended. A small amount of bleeding may occur during the first few days after the procedure. When you return home after the procedure, be sure to follow your doctor's instructions, which may include:   Resume medicines as instructed by your doctor. Resume normal diet, unless directed otherwise by your doctor. The sedative will make you drowsy. Avoid driving, operating machinery, or making important decisions for the rest of the day. Rest for the remainder of the day. After arriving home, contact your doctor if any of the following occurs:   Bleeding from your rectum, notify your doctor if you pass a teaspoonful of blood or more. Black, tarry stools   Severe abdominal pain   Hard, swollen abdomen   Signs of infection, including fever or chills   Inability to pass gas or stool   Coughing, shortness of breath, chest pain, severe nausea or vomiting     In case of an emergency, CALL 911 .

## 2019-07-11 NOTE — PROGRESS NOTES
alternatives of the procedure were discussed. Patient understood and wished to proceed with the procedure. Electronically signed by Burton Ritter MD on 7/11/19 at 3:11 PM    Attending Physician Note  Chief Complaint   Patient presents with   [facility] Consultation     pt is here for colonoscopy consult, pt states that she had a colonoscopy prior in [place], about 10 years ago. PGF passed away from colon cancer     History:  The patient is 62 y.o. female. I discussed the case with the resident and I saw and examined the patient. I reviewed the resident's note and made corrections as appropriate. I reviewed the patient's Past Medical History, Past Surgical History, Medications, and Allergies. Physical Exam:  Vitals:    07/11/19 1418   BP: (!) 157/83   Site: Right Upper Arm   Position: Sitting   Cuff Size: Large Adult   Pulse: 79   Resp: 16   Temp: 98.5 °F (36.9 °C)   TempSrc: Oral   SpO2: 95%   Weight: 262 lb (118.8 kg)   Height: 5' 7\" (1.702 m)       Body mass index is 41.04 kg/m². Chest: Breath sounds were clear and equal with no rales, wheezes, or rhonchi. Respiratory effort was normal with no retractions or use of accessory muscles. Cardiovascular: Heart sounds were normal with a regular rate and rhythm. There were no murmurs or gallops. Abdomen: Bowel sounds were normal.  The abdomen was soft and non distended. There was no tenderness, guarding, rebound, or rigidity. There was no masses, hepatosplenomegaly, or hernias. Genitourinary: No inguinal hernias were noted on coughing and straining. Impression/Plan:  I reviewed the resident's impression and plan and made corrections as appropriate. History of Colon Polyp and Family History of Rectal Cancer - I recommended high risk screening colonoscopy with possible biopsy or polypectomy and explained the risk, benefits, expected outcome, and alternatives to the procedure.   Risks included but are not limited to bleeding, infection, respiratory

## 2019-07-12 ENCOUNTER — TELEPHONE (OUTPATIENT)
Dept: SURGERY | Age: 64
End: 2019-07-12

## 2019-07-12 NOTE — H&P
GENERAL SURGERY  H&P NOTE  7/11/2019    CC: Screening colonoscopy     HPI  Domenico Burkett is a 62 y.o. female who presents for evaluation of screening colonoscopy. Hx of colonoscopy 10 yrs ago in [place]. They removed a benign polyp at that time but no report is available so do not know what type of polyp it was. She denied fever, chills, night sweats, weight loss, nausea, vomiting, abdominal pain, diarrhea, melena or hematochezia. Denied changes to the frequency, consistency, caliber and color of stool. Daily bowel movements. Intermittent 1/2 ppd day smoking history for 52 years. No EtOH use. Family hx consistent with rectal cancer in her maternal grandfather at the age of 76. Hx of bladder cancer which he had intravesicular injection of chemotherapy. Treat completed in [place]. Patient has CKD with attempt creation of LUE fistula at had to be ligated this year. Patient reported that her CKD has improved. She makes urine and does not require dialysis at this time. She is on Eliquis for afib.        Past Medical History:   Diagnosis Date    Atrial fibrillation ([address]     Bladder cancer ([address]     Cancer ([address]     CKD (chronic kidney disease)     CKD (chronic kidney disease) stage 5, GFR less than 15 ml/min ([address] 11/6/2018    Diabetes ([address]     Hypertension        Past Surgical History:   Procedure Laterality Date    [facility]IAC [facility]HETERIZATION [facility]02/2008    @ [facility]    CHOLECYSTECTOMY      COLONOSCOPY      DIALYSIS FISTULA CREATION Left 1/22/2019    LIGATIION LEFT LEFT UPPER EXTREMITY OF AV FISTULA , EVACUATION HEMATOMA performed by Nida Low MD at [place] [id number] Left 9/18/2018    AV FISTULA  LEFT ARM performed by Nida Low MD at [place] [address] Left 11/14/2018    SUPERFICIALIZATION AV FISTULA  LEFT ARM , LIGATION TRIBUTORY AV FISTULA LEFT ARM performed by Nida Low MD at [place] [address]         Medications Prior to Admission:    Prior to Admission medications    Medication Sig Start Date End Date Taking?  Authorizing Provider   atorvastatin (LIPITOR) 40 MG tablet Take 1 tablet by mouth daily 7/5/19  Yes Elisabeth Spears MD   propafenone (RYTHMOL SR) 325 MG extended release capsule Take 1 capsule by mouth 2 times daily 7/5/19  Yes Elisabeth Spears MD   glipiZIDE (GLUCOTROL XL) 5 MG extended release tablet TAKE 2 TABLETS BY MOUTH TWICE DAILY 6/27/19  Yes RENUKA Upton CNP   sodium bicarbonate 650 MG tablet TK 1 T PO BID 6/3/19  Yes Historical Provider, MD   Calcium Acetate, Phos Binder, 667 MG CAPS TK 1 C PO TID WITH MEALS 4/17/19  Yes Historical Provider, MD   Cholecalciferol (VITAMIN D3) 1000 units TABS TK 1 T PO QD 4/16/19  Yes Historical Provider, MD   DULoxetine (CYMBALTA) 30 MG extended release capsule Take 1 capsule by mouth daily 5/17/19  Yes RENUKA Upton CNP   ELIQUIS 5 MG TABS tablet TAKE 1 TABLET BY MOUTH TWICE DAILY 4/29/19  Yes RENUKA Espinoza CNP   torsemide (DEMADEX) 20 MG tablet TAKE 1 TABLET BY MOUTH DAILY 2/18/19  Yes RENUKA Espinoza CNP   amLODIPine (NORVASC) 5 MG tablet Take 1 tablet by mouth daily  Patient taking differently: Take 5 mg by mouth 2 times daily  1/31/19  Yes RENUKA Espinoza CNP   metoprolol tartrate (LOPRESSOR) 50 MG tablet TAKE 1 TABLET BY MOUTH TWICE DAILY 10/30/18  Yes RENUKA Espinoza CNP       Allergies   Allergen Reactions    Adhesive Tape Rash    Penicillins Rash       Family History   Problem Relation Age of Onset    Cancer Mother         bladder    Diabetes Mother    Gavin Ban COPD Mother     Depression Father     Diabetes Father     Heart Disease Father     High Blood Pressure Father     Stroke Father     Prostate Cancer Father     Dementia Father     Alcohol Abuse Brother     Diabetes Brother     Bipolar Disorder Paternal Uncle     Anxiety Disorder Daughter     Colon Cancer Maternal Grandfather     Diabetes Brother

## 2019-07-15 ENCOUNTER — TELEPHONE (OUTPATIENT)
Dept: CARDIOLOGY CLINIC | Age: 64
End: 2019-07-15

## 2019-07-15 ENCOUNTER — TELEPHONE (OUTPATIENT)
Dept: SURGERY | Age: 64
End: 2019-07-15

## 2019-07-17 ENCOUNTER — OFFICE VISIT (OUTPATIENT)
Dept: FAMILY MEDICINE CLINIC | Age: 64
End: 2019-07-17
Payer: MEDICARE

## 2019-07-17 VITALS
HEART RATE: 79 BPM | BODY MASS INDEX: 41.09 KG/M2 | SYSTOLIC BLOOD PRESSURE: 132 MMHG | HEIGHT: 67 IN | RESPIRATION RATE: 18 BRPM | WEIGHT: 261.8 LBS | DIASTOLIC BLOOD PRESSURE: 68 MMHG | TEMPERATURE: 98.4 F | OXYGEN SATURATION: 98 %

## 2019-07-17 DIAGNOSIS — E11.22 TYPE 2 DIABETES MELLITUS WITH STAGE 5 CHRONIC KIDNEY DISEASE NOT ON CHRONIC DIALYSIS, WITHOUT LONG-TERM CURRENT USE OF INSULIN (HCC): ICD-10-CM

## 2019-07-17 DIAGNOSIS — I48.91 ATRIAL FIBRILLATION, UNSPECIFIED TYPE (HCC): ICD-10-CM

## 2019-07-17 DIAGNOSIS — N18.5 TYPE 2 DIABETES MELLITUS WITH STAGE 5 CHRONIC KIDNEY DISEASE NOT ON CHRONIC DIALYSIS, WITHOUT LONG-TERM CURRENT USE OF INSULIN (HCC): ICD-10-CM

## 2019-07-17 DIAGNOSIS — I10 ESSENTIAL HYPERTENSION: ICD-10-CM

## 2019-07-17 DIAGNOSIS — F33.1 MODERATE EPISODE OF RECURRENT MAJOR DEPRESSIVE DISORDER (HCC): ICD-10-CM

## 2019-07-17 PROCEDURE — 99213 OFFICE O/P EST LOW 20 MIN: CPT | Performed by: NURSE PRACTITIONER

## 2019-07-17 RX ORDER — DULOXETIN HYDROCHLORIDE 60 MG/1
60 CAPSULE, DELAYED RELEASE ORAL DAILY
Qty: 60 CAPSULE | Refills: 0 | Status: SHIPPED | OUTPATIENT
Start: 2019-07-17 | End: 2019-09-12 | Stop reason: SDUPTHER

## 2019-07-17 RX ORDER — METOPROLOL TARTRATE 50 MG/1
TABLET, FILM COATED ORAL
Qty: 180 TABLET | Refills: 2 | Status: SHIPPED | OUTPATIENT
Start: 2019-07-17 | End: 2019-07-29

## 2019-07-17 ASSESSMENT — ENCOUNTER SYMPTOMS
COUGH: 0
DIARRHEA: 0
SINUS PAIN: 0
SHORTNESS OF BREATH: 0
VOMITING: 0
EYE PAIN: 0
COLOR CHANGE: 0
CONSTIPATION: 0
WHEEZING: 0
CHEST TIGHTNESS: 0
NAUSEA: 0

## 2019-07-28 DIAGNOSIS — I10 ESSENTIAL HYPERTENSION: ICD-10-CM

## 2019-07-28 DIAGNOSIS — E11.22 TYPE 2 DIABETES MELLITUS WITH STAGE 5 CHRONIC KIDNEY DISEASE NOT ON CHRONIC DIALYSIS, WITHOUT LONG-TERM CURRENT USE OF INSULIN (HCC): ICD-10-CM

## 2019-07-28 DIAGNOSIS — N18.5 TYPE 2 DIABETES MELLITUS WITH STAGE 5 CHRONIC KIDNEY DISEASE NOT ON CHRONIC DIALYSIS, WITHOUT LONG-TERM CURRENT USE OF INSULIN (HCC): ICD-10-CM

## 2019-07-29 DIAGNOSIS — N18.30 TYPE 2 DIABETES MELLITUS WITH STAGE 3 CHRONIC KIDNEY DISEASE, WITHOUT LONG-TERM CURRENT USE OF INSULIN (HCC): Primary | Chronic | ICD-10-CM

## 2019-07-29 DIAGNOSIS — E11.22 TYPE 2 DIABETES MELLITUS WITH STAGE 3 CHRONIC KIDNEY DISEASE, WITHOUT LONG-TERM CURRENT USE OF INSULIN (HCC): Primary | Chronic | ICD-10-CM

## 2019-07-29 RX ORDER — METOPROLOL TARTRATE 50 MG/1
TABLET, FILM COATED ORAL
Qty: 180 TABLET | Refills: 0 | Status: SHIPPED | OUTPATIENT
Start: 2019-07-29 | End: 2020-01-07

## 2019-08-02 ENCOUNTER — HOSPITAL ENCOUNTER (OUTPATIENT)
Dept: CARDIOLOGY | Age: 64
Discharge: HOME | End: 2019-08-02
Payer: MEDICARE

## 2019-08-02 ENCOUNTER — TELEPHONE (OUTPATIENT)
Dept: CARDIOLOGY CLINIC | Age: 64
End: 2019-08-02

## 2019-08-02 VITALS
SYSTOLIC BLOOD PRESSURE: 136 MMHG | WEIGHT: 259 LBS | BODY MASS INDEX: 40.65 KG/M2 | HEIGHT: 67 IN | DIASTOLIC BLOOD PRESSURE: 60 MMHG | HEART RATE: 93 BPM

## 2019-08-02 DIAGNOSIS — I48.0 PAROXYSMAL ATRIAL FIBRILLATION (HCC): ICD-10-CM

## 2019-08-02 DIAGNOSIS — R06.09 EXERTIONAL DYSPNEA: ICD-10-CM

## 2019-08-02 LAB
LV EF: 65 %
LVEF MODALITY: NORMAL

## 2019-08-02 PROCEDURE — 93306 TTE W/DOPPLER COMPLETE: CPT

## 2019-08-02 PROCEDURE — 93017 CV STRESS TEST TRACING ONLY: CPT

## 2019-08-02 PROCEDURE — 78452 HT MUSCLE IMAGE SPECT MULT: CPT

## 2019-08-02 PROCEDURE — 2580000003 HC RX 258: Performed by: INTERNAL MEDICINE

## 2019-08-02 PROCEDURE — 3430000000 HC RX DIAGNOSTIC RADIOPHARMACEUTICAL: Performed by: INTERNAL MEDICINE

## 2019-08-02 PROCEDURE — 6360000002 HC RX W HCPCS: Performed by: INTERNAL MEDICINE

## 2019-08-02 PROCEDURE — A9502 TC99M TETROFOSMIN: HCPCS | Performed by: INTERNAL MEDICINE

## 2019-08-02 RX ORDER — SODIUM CHLORIDE 0.9 % (FLUSH) 0.9 %
10 SYRINGE (ML) INJECTION PRN
Status: DISCONTINUED | OUTPATIENT
Start: 2019-08-02 | End: 2019-08-03 | Stop reason: HOSPADM

## 2019-08-02 RX ADMIN — TETROFOSMIN 12 MILLICURIE: 0.23 INJECTION, POWDER, LYOPHILIZED, FOR SOLUTION INTRAVENOUS at 09:29

## 2019-08-02 RX ADMIN — Medication 10 ML: at 10:47

## 2019-08-02 RX ADMIN — Medication 10 ML: at 09:29

## 2019-08-02 RX ADMIN — REGADENOSON 0.4 MG: 0.08 INJECTION, SOLUTION INTRAVENOUS at 10:48

## 2019-08-02 RX ADMIN — Medication 10 ML: at 10:48

## 2019-08-02 RX ADMIN — TETROFOSMIN 35.8 MILLICURIE: 0.23 INJECTION, POWDER, LYOPHILIZED, FOR SOLUTION INTRAVENOUS at 10:48

## 2019-08-26 NOTE — PROGRESS NOTES
if you are to spend the night in the hospital.     PARKING INSTRUCTIONS:   [x] Arrival Time:0700_____________  · [] Parking lot '\"I\"  is located on [facility] (the corner of [facility] and [facility]). To enter, press the button and the gate will lift. A free token will be provided to exit the lot. One car per patient is allowed to park in this lot. All other cars are to park on [address] [place] [address] either in the parking garage or the handicap lot. [] To reach the [facility] lobby from [address] [place] [address], upon entering the hospital, take elevator B to the 3rd floor. EDUCATION INSTRUCTIONS:      [] Knee or hip replacement booklet & exercise pamphlets given. [x] [address] placed in chart. [] Pre-admission Testing educational folder given  [] Incentive Spirometry,coughing & deep breathing exercises reviewed. []Medication information sheet(s)   []Fluoroscopy-Xray used in surgery reviewed with patient. Educational pamphlet placed in chart. [x]Pain: Post-op pain is normal and to be expected. You will be asked to rate your pain from 0-10(a zero is not acceptable-education is needed). Your post-op pain goal is:2[] Ask your nurse for your pain medication. [] Joint camp offered. [] Joint replacement booklets given. [] Other:___________________________    MEDICATION INSTRUCTIONS:   [x]Bring a complete list of your medications, please write the last time you took the medicine, give this list to the nurse. [x] Take the following medications the morning of surgery with 1-2 ounces of water: SEE MED LIST  [] Stop herbal supplements and vitamins 5 days before your surgery. [] DO NOT take any diabetic medicine the morning of surgery. Follow instructions for insulin the day before surgery. [] If you are diabetic and your blood sugar is low or you feel symptomatic, you may drink 1-2 ounces of apple juice or take a glucose tablet.   The morning of your procedure, you may call

## 2019-08-28 ENCOUNTER — ANESTHESIA EVENT (OUTPATIENT)
Dept: ENDOSCOPY | Age: 64
End: 2019-08-28
Payer: MEDICARE

## 2019-08-29 ENCOUNTER — ANESTHESIA (OUTPATIENT)
Dept: ENDOSCOPY | Age: 64
End: 2019-08-29
Payer: MEDICARE

## 2019-08-29 ENCOUNTER — HOSPITAL ENCOUNTER (OUTPATIENT)
Age: 64
Setting detail: OUTPATIENT SURGERY
Discharge: HOME | End: 2019-08-29
Attending: SURGERY | Admitting: SURGERY
Payer: MEDICARE

## 2019-08-29 VITALS
DIASTOLIC BLOOD PRESSURE: 62 MMHG | RESPIRATION RATE: 16 BRPM | SYSTOLIC BLOOD PRESSURE: 122 MMHG | OXYGEN SATURATION: 90 %

## 2019-08-29 VITALS
RESPIRATION RATE: 15 BRPM | OXYGEN SATURATION: 93 % | BODY MASS INDEX: 41.62 KG/M2 | SYSTOLIC BLOOD PRESSURE: 133 MMHG | HEART RATE: 77 BPM | WEIGHT: 259 LBS | HEIGHT: 66 IN | TEMPERATURE: 97 F | DIASTOLIC BLOOD PRESSURE: 58 MMHG

## 2019-08-29 LAB — METER GLUCOSE: 183 MG/DL (ref 74–99)

## 2019-08-29 PROCEDURE — 2580000003 HC RX 258: Performed by: SURGERY

## 2019-08-29 PROCEDURE — 3700000000 HC ANESTHESIA ATTENDED CARE: Performed by: SURGERY

## 2019-08-29 PROCEDURE — 45384 COLONOSCOPY W/LESION REMOVAL: CPT | Performed by: SURGERY

## 2019-08-29 PROCEDURE — 88305 TISSUE EXAM BY PATHOLOGIST: CPT

## 2019-08-29 PROCEDURE — 2709999900 HC NON-CHARGEABLE SUPPLY: Performed by: SURGERY

## 2019-08-29 PROCEDURE — 45380 COLONOSCOPY AND BIOPSY: CPT | Performed by: SURGERY

## 2019-08-29 PROCEDURE — 7100000010 HC PHASE II RECOVERY - FIRST 15 MIN: Performed by: SURGERY

## 2019-08-29 PROCEDURE — 6360000002 HC RX W HCPCS: Performed by: NURSE ANESTHETIST, CERTIFIED REGISTERED

## 2019-08-29 PROCEDURE — 3609010200 HC COLONOSCOPY ABLATION TUMOR POLYP/OTHER LES: Performed by: SURGERY

## 2019-08-29 PROCEDURE — 7100000011 HC PHASE II RECOVERY - ADDTL 15 MIN: Performed by: SURGERY

## 2019-08-29 PROCEDURE — 3609010600 HC COLONOSCOPY POLYPECTOMY SNARE/COLD BIOPSY: Performed by: SURGERY

## 2019-08-29 PROCEDURE — 82962 GLUCOSE BLOOD TEST: CPT

## 2019-08-29 PROCEDURE — 3700000001 HC ADD 15 MINUTES (ANESTHESIA): Performed by: SURGERY

## 2019-08-29 RX ORDER — 0.9 % SODIUM CHLORIDE 0.9 %
10 VIAL (ML) INJECTION PRN
Status: DISCONTINUED | OUTPATIENT
Start: 2019-08-29 | End: 2019-08-29 | Stop reason: HOSPADM

## 2019-08-29 RX ORDER — SODIUM CHLORIDE 0.9 % (FLUSH) 0.9 %
10 SYRINGE (ML) INJECTION EVERY 12 HOURS SCHEDULED
Status: DISCONTINUED | OUTPATIENT
Start: 2019-08-29 | End: 2019-08-29 | Stop reason: HOSPADM

## 2019-08-29 RX ORDER — PROPOFOL 10 MG/ML
INJECTION, EMULSION INTRAVENOUS PRN
Status: DISCONTINUED | OUTPATIENT
Start: 2019-08-29 | End: 2019-08-29 | Stop reason: SDUPTHER

## 2019-08-29 RX ORDER — SODIUM CHLORIDE 9 MG/ML
INJECTION, SOLUTION INTRAVENOUS CONTINUOUS
Status: DISCONTINUED | OUTPATIENT
Start: 2019-08-29 | End: 2019-08-29 | Stop reason: HOSPADM

## 2019-08-29 RX ORDER — ONDANSETRON 2 MG/ML
INJECTION INTRAMUSCULAR; INTRAVENOUS PRN
Status: DISCONTINUED | OUTPATIENT
Start: 2019-08-29 | End: 2019-08-29 | Stop reason: SDUPTHER

## 2019-08-29 RX ORDER — MIDAZOLAM HYDROCHLORIDE 1 MG/ML
INJECTION INTRAMUSCULAR; INTRAVENOUS PRN
Status: DISCONTINUED | OUTPATIENT
Start: 2019-08-29 | End: 2019-08-29 | Stop reason: SDUPTHER

## 2019-08-29 RX ADMIN — SODIUM CHLORIDE: 9 INJECTION, SOLUTION INTRAVENOUS at 08:25

## 2019-08-29 RX ADMIN — PROPOFOL 600 MG: 10 INJECTION, EMULSION INTRAVENOUS at 08:27

## 2019-08-29 RX ADMIN — ONDANSETRON HYDROCHLORIDE 4 MG: 2 INJECTION, SOLUTION INTRAMUSCULAR; INTRAVENOUS at 08:32

## 2019-08-29 RX ADMIN — MIDAZOLAM HYDROCHLORIDE 2 MG: 1 INJECTION, SOLUTION INTRAMUSCULAR; INTRAVENOUS at 08:27

## 2019-08-29 RX ADMIN — SODIUM CHLORIDE: 9 INJECTION, SOLUTION INTRAVENOUS at 07:49

## 2019-08-29 ASSESSMENT — LIFESTYLE VARIABLES: SMOKING_STATUS: 1

## 2019-08-29 ASSESSMENT — PAIN - FUNCTIONAL ASSESSMENT: PAIN_FUNCTIONAL_ASSESSMENT: 0-10

## 2019-08-29 ASSESSMENT — ENCOUNTER SYMPTOMS: DYSPNEA ACTIVITY LEVEL: AFTER AMBULATING 1 FLIGHT OF STAIRS

## 2019-08-29 ASSESSMENT — PAIN SCALES - GENERAL: PAINLEVEL_OUTOF10: 0

## 2019-08-29 NOTE — H&P
History and Physical    Patient's Name/Date of Birth: Calvin Hubbard / 1955, (62 y.o.), female      Date: August 29, 2019     Chief Complaint: colorectal cancer screening    HPI:   She was seen in the office on 7/11/2019 for the above. She had a colonoscopy 10 yrs ago in [place]. A benign polyp was reportedly removed but no report was available so do not know what type of polyp it was. She denied any GI or abdominal complaints. Family was positive for rectal cancer in maternal grandfather at the age of 76. She had a history of bladder cancer which was treated with intravesicular injection of chemotherapy. Patient had  Chronic Kidney Disease (CKD) but has not required dialysis yet. She has atrial fibrillation and has been on Eliquis. Her cardiologist, Dr. Silver Gunderson, approved holding Eliquis for 2 days prior to colonoscopy. Past Medical History:   Diagnosis Date    Atrial fibrillation ([address]     Bladder cancer ([address]     Cancer ([address]     CKD (chronic kidney disease)     CKD (chronic kidney disease) stage 5, GFR less than 15 ml/min ([address] 11/6/2018    Diabetes ([address]     Hypertension          Past Surgical History:   Procedure Laterality Date    [facility]    CHOLECYSTECTOMY      COLONOSCOPY      DIALYSIS FISTULA CREATION Left 1/22/2019    LIGATIION LEFT LEFT UPPER EXTREMITY OF AV FISTULA , EVACUATION HEMATOMA performed by Miya Lanza MD at [place] [address] Left 9/18/2018    AV FISTULA  LEFT ARM performed by Miya Lanza MD at [place] [address] Left 11/14/2018    SUPERFICIALIZATION AV FISTULA  LEFT ARM , LIGATION TRIBUTORY AV FISTULA LEFT ARM performed by Miya Lanza MD at [place] [address]         No current facility-administered medications for this encounter.         Allergies   Allergen Reactions    Adhesive Tape Rash    Penicillins Rash       Family History   Problem Relation Age of Onset    Cancer Mother         bladder    Diabetes Mother     COPD Mother     Depression Father     Diabetes Father     Heart Disease Father     High Blood Pressure Father     Stroke Father     Prostate Cancer Father     Dementia Father     Alcohol Abuse Brother     Diabetes Brother     Bipolar Disorder Paternal Uncle     Anxiety Disorder Daughter     Colon Cancer Maternal Grandfather        Social History     Socioeconomic History    Marital status:      Spouse name: Not on file    Number of children: Not on file    Years of education: Not on file    Highest education level: Not on file   Occupational History    Not on file   Social Needs    Financial resource strain: Not on file    Food insecurity:     Worry: Not on file     Inability: Not on file    Transportation needs:     Medical: Not on file     Non-medical: Not on file   Tobacco Use    Smoking status: Current Every Day Smoker     Packs/day: 0.50     Years: 43.00     Pack years: 21.50     Types: Cigarettes     Last attempt to quit: 2017     Years since quittin.9    Smokeless tobacco: Never Used   Substance and Sexual Activity    Alcohol use: Not Currently    Drug use: Never    Sexual activity: Not on file   Lifestyle    Physical activity:     Days per week: Not on file     Minutes per session: Not on file    Stress: Not on file   Relationships    Social connections:     Talks on phone: Not on file     Gets together: Not on file     Attends Scientologist service: Not on file     Active member of club or organization: Not on file     Attends meetings of clubs or organizations: Not on file     Relationship status: Not on file    Intimate partner violence:     Fear of current or ex partner: Not on file     Emotionally abused: Not on file     Physically abused: Not on file     Forced sexual activity: Not on file   Other Topics Concern    Not on file   Social History Narrative    Drinks 1 cup of coffee daily.

## 2019-08-29 NOTE — ANESTHESIA PRE PROCEDURE
by mouth 2 times daily  1/31/19  Yes RENUKA Garza - CNP       Current medications:    Current Facility-Administered Medications   Medication Dose Route Frequency Provider Last Rate Last Dose    0.9 % sodium chloride infusion   Intravenous Continuous Sharyn Shine MD 75 mL/hr at 08/29/19 0749      sodium chloride (PF) 0.9 % injection 10 mL  10 mL Intravenous PRN Sharyn Shine MD        sodium chloride flush 0.9 % injection 10 mL  10 mL Intravenous 2 times per day Sharyn Shine MD           Allergies:     Allergies   Allergen Reactions    Adhesive Tape Rash    Penicillins Rash       Problem List:    Patient Active Problem List   Diagnosis Code    Moderate episode of recurrent major depressive disorder ([facility]) F33.1    Generalized anxiety disorder F41.1    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.05    Anemia D64.9    Type 2 diabetes mellitus, without long-term current use of insulin ([facility]) E11.9    Essential hypertension I10    CKD (chronic kidney disease) stage 5, GFR less than 15 ml/min ([facility]) N18.5    Atrial fibrillation ([facility]) I48.91    Pure hypercholesterolemia E78.00    Morbid obesity ([facility]) E66.01    Chronic obstructive pulmonary disease ([facility]) J44.9    Obstructive sleep apnea G47.33    Exertional dyspnea R06.09       Past Medical History:        Diagnosis Date    Atrial fibrillation (HCC)     Bladder cancer ([facility])     Cancer [facility]     CKD (chronic kidney disease)     CKD (chronic kidney disease) stage 5, GFR less than 15 ml/min ([facility] 11/6/2018    Diabetes ([facility]     Hypertension        Past Surgical History:        Procedure Laterality Date    [facility]AC [facility]    @ [facility]    CHOLECYSTECTOMY      COLONOSCOPY      DIALYSIS FISTULA CREATION Left 1/22/2019    LIGATIION LEFT LEFT UPPER EXTREMITY OF AV FISTULA , EVACUATION HEMATOMA performed by Jaime Moss MD at [place] [address] Left 9/18/2018    AV FISTULA  LEFT ARM performed by Kavita Vazquez Shane Richard MD at [place] [address] Left 2018    SUPERFICIALIZATION AV FISTULA  LEFT ARM , LIGATION TRIBUTORY AV FISTULA LEFT ARM performed by Amelie Soliz MD at [facility]         Social History:    Social History     Tobacco Use    Smoking status: Current Every Day Smoker     Packs/day: 0.50     Years: 43.00     Pack years: 21.50     Types: Cigarettes     Last attempt to quit: 2017     Years since quittin.9    Smokeless tobacco: Never Used   Substance Use Topics    Alcohol use: Not Currently                                Ready to quit: Not Answered  Counseling given: Not Answered      Vital Signs (Current):   Vitals:    19 1153 19 0719   BP:  (!) 19193   Pulse:  81   Resp:  20   Temp:  36.4 °C (97.6 °F)   TempSrc:  Temporal   SpO2:  95%   Weight: 259 lb (117.5 kg) 259 lb (117.5 kg)   Height: 5' 6\" (1.676 m) 5' 6\" (1.676 m)                                              BP Readings from Last 3 Encounters:   19 (!) 191/93   19 136/60   19 132/68       NPO Status: Time of last liquid consumption:                         Time of last solid consumption:                         Date of last liquid consumption: 19                        Date of last solid food consumption: 19    BMI:   Wt Readings from Last 3 Encounters:   19 259 lb (117.5 kg)   19 259 lb (117.5 kg)   19 261 lb 12.8 oz (118.8 kg)     Body mass index is 41.8 kg/m².     CBC:   Lab Results   Component Value Date    WBC 12.6 2019    RBC 3.72 2019    HGB 10.9 2019    HCT 36.9 2019    MCV 99.2 2019    RDW 14.9 2019     2019       CMP:   Lab Results   Component Value Date     2019    K 4.2 2019    K 4.2 2019     2019    CO2 20 2019    BUN 33 2019    CREATININE 2.8 2019    GFRAA 2019    LABGLOM 17 2019 evaluated.   [address]  8/29/19

## 2019-09-03 ENCOUNTER — NURSE ONLY (OUTPATIENT)
Dept: FAMILY MEDICINE CLINIC | Age: 64
End: 2019-09-03
Payer: MEDICARE

## 2019-09-03 ENCOUNTER — HOSPITAL ENCOUNTER (OUTPATIENT)
Age: 64
Discharge: HOME | End: 2019-09-05
Payer: MEDICARE

## 2019-09-03 DIAGNOSIS — J44.9 CHRONIC OBSTRUCTIVE PULMONARY DISEASE, UNSPECIFIED COPD TYPE (HCC): Chronic | ICD-10-CM

## 2019-09-03 DIAGNOSIS — Z86.010 HISTORY OF COLON POLYPS: ICD-10-CM

## 2019-09-03 LAB
ALBUMIN SERPL-MCNC: 4.4 G/DL (ref 3.5–5.2)
ALP BLD-CCNC: 112 U/L (ref 35–104)
ALT SERPL-CCNC: 16 U/L (ref 0–32)
ANION GAP SERPL CALCULATED.3IONS-SCNC: 20 MMOL/L (ref 7–16)
AST SERPL-CCNC: 21 U/L (ref 0–31)
BILIRUB SERPL-MCNC: 0.3 MG/DL (ref 0–1.2)
BUN BLDV-MCNC: 31 MG/DL (ref 8–23)
C3 COMPLEMENT: 167 MG/DL (ref 90–180)
C4 COMPLEMENT: 29 MG/DL (ref 10–40)
CALCIUM SERPL-MCNC: 10.7 MG/DL (ref 8.6–10.2)
CHLORIDE BLD-SCNC: 99 MMOL/L (ref 98–107)
CO2: 26 MMOL/L (ref 22–29)
CREAT SERPL-MCNC: 3 MG/DL (ref 0.5–1)
CREATININE URINE: 61 MG/DL (ref 29–226)
GFR AFRICAN AMERICAN: 19
GFR NON-AFRICAN AMERICAN: 16 ML/MIN/1.73
GLUCOSE BLD-MCNC: 226 MG/DL (ref 74–99)
HCT VFR BLD CALC: 46.2 % (ref 34–48)
HEMOGLOBIN: 14.2 G/DL (ref 11.5–15.5)
MCH RBC QN AUTO: 32 PG (ref 26–35)
MCHC RBC AUTO-ENTMCNC: 30.7 % (ref 32–34.5)
MCV RBC AUTO: 104.1 FL (ref 80–99.9)
PARATHYROID HORMONE INTACT: 121 PG/ML (ref 15–65)
PDW BLD-RTO: 14.2 FL (ref 11.5–15)
PHOSPHORUS: 3.7 MG/DL (ref 2.5–4.5)
PLATELET # BLD: 361 E9/L (ref 130–450)
PMV BLD AUTO: 10.4 FL (ref 7–12)
POTASSIUM SERPL-SCNC: 3.4 MMOL/L (ref 3.5–5)
PROTEIN PROTEIN: 57 MG/DL (ref 0–12)
PROTEIN/CREAT RATIO: 0.9
PROTEIN/CREAT RATIO: 0.9 (ref 0–0.2)
RBC # BLD: 4.44 E12/L (ref 3.5–5.5)
SODIUM BLD-SCNC: 145 MMOL/L (ref 132–146)
TOTAL PROTEIN: 7.9 G/DL (ref 6.4–8.3)
VITAMIN D 25-HYDROXY: 27 NG/ML (ref 30–100)
WBC # BLD: 10 E9/L (ref 4.5–11.5)

## 2019-09-03 PROCEDURE — 82306 VITAMIN D 25 HYDROXY: CPT

## 2019-09-03 PROCEDURE — 36415 COLL VENOUS BLD VENIPUNCTURE: CPT | Performed by: NURSE PRACTITIONER

## 2019-09-03 PROCEDURE — 80053 COMPREHEN METABOLIC PANEL: CPT

## 2019-09-03 PROCEDURE — 82570 ASSAY OF URINE CREATININE: CPT

## 2019-09-03 PROCEDURE — 86334 IMMUNOFIX E-PHORESIS SERUM: CPT

## 2019-09-03 PROCEDURE — 83970 ASSAY OF PARATHORMONE: CPT

## 2019-09-03 PROCEDURE — 86038 ANTINUCLEAR ANTIBODIES: CPT

## 2019-09-03 PROCEDURE — 84156 ASSAY OF PROTEIN URINE: CPT

## 2019-09-03 PROCEDURE — 84100 ASSAY OF PHOSPHORUS: CPT

## 2019-09-03 PROCEDURE — 86160 COMPLEMENT ANTIGEN: CPT

## 2019-09-03 PROCEDURE — 84165 PROTEIN E-PHORESIS SERUM: CPT

## 2019-09-03 PROCEDURE — 85027 COMPLETE CBC AUTOMATED: CPT

## 2019-09-04 LAB — ANTI-NUCLEAR ANTIBODY (ANA): NEGATIVE

## 2019-09-06 LAB
ALBUMIN SERPL-MCNC: 3.4 G/DL (ref 3.5–4.7)
ALPHA-1-GLOBULIN: 0.2 G/DL (ref 0.2–0.4)
ALPHA-2-GLOBULIN: 1.3 G/FL (ref 0.5–1)
BETA GLOBULIN: 1.6 G/DL (ref 0.8–1.3)
ELECTROPHORESIS: ABNORMAL
GAMMA GLOBULIN: 1.1 G/DL (ref 0.7–1.6)
IMMUNOFIXATION RESULT, SERUM: NORMAL

## 2019-09-19 ENCOUNTER — TELEPHONE (OUTPATIENT)
Dept: FAMILY MEDICINE CLINIC | Age: 64
End: 2019-09-19

## 2019-10-15 DIAGNOSIS — I48.91 ATRIAL FIBRILLATION, UNSPECIFIED TYPE (HCC): ICD-10-CM

## 2019-10-18 DIAGNOSIS — I48.91 ATRIAL FIBRILLATION, UNSPECIFIED TYPE (HCC): ICD-10-CM

## 2019-10-21 ENCOUNTER — OFFICE VISIT (OUTPATIENT)
Dept: FAMILY MEDICINE CLINIC | Age: 64
End: 2019-10-21
Payer: MEDICARE

## 2019-10-21 ENCOUNTER — HOSPITAL ENCOUNTER (OUTPATIENT)
Age: 64
Discharge: HOME | End: 2019-10-23
Payer: MEDICARE

## 2019-10-21 VITALS
OXYGEN SATURATION: 95 % | DIASTOLIC BLOOD PRESSURE: 68 MMHG | BODY MASS INDEX: 41.64 KG/M2 | HEART RATE: 102 BPM | TEMPERATURE: 98 F | RESPIRATION RATE: 14 BRPM | WEIGHT: 258 LBS | SYSTOLIC BLOOD PRESSURE: 132 MMHG

## 2019-10-21 DIAGNOSIS — E11.22 TYPE 2 DIABETES MELLITUS WITH STAGE 3 CHRONIC KIDNEY DISEASE, WITHOUT LONG-TERM CURRENT USE OF INSULIN (HCC): Primary | Chronic | ICD-10-CM

## 2019-10-21 DIAGNOSIS — R53.83 FATIGUE, UNSPECIFIED TYPE: ICD-10-CM

## 2019-10-21 DIAGNOSIS — E11.22 TYPE 2 DIABETES MELLITUS WITH STAGE 3 CHRONIC KIDNEY DISEASE, WITHOUT LONG-TERM CURRENT USE OF INSULIN (HCC): Chronic | ICD-10-CM

## 2019-10-21 DIAGNOSIS — Z23 FLU VACCINE NEED: ICD-10-CM

## 2019-10-21 DIAGNOSIS — N18.30 TYPE 2 DIABETES MELLITUS WITH STAGE 3 CHRONIC KIDNEY DISEASE, WITHOUT LONG-TERM CURRENT USE OF INSULIN (HCC): Primary | Chronic | ICD-10-CM

## 2019-10-21 DIAGNOSIS — D51.9 ANEMIA DUE TO VITAMIN B12 DEFICIENCY, UNSPECIFIED B12 DEFICIENCY TYPE: ICD-10-CM

## 2019-10-21 DIAGNOSIS — N18.30 TYPE 2 DIABETES MELLITUS WITH STAGE 3 CHRONIC KIDNEY DISEASE, WITHOUT LONG-TERM CURRENT USE OF INSULIN (HCC): Chronic | ICD-10-CM

## 2019-10-21 LAB
FOLATE: 9.6 NG/ML (ref 4.8–24.2)
HBA1C MFR BLD: 6.6 % (ref 4–5.6)
VITAMIN B-12: 371 PG/ML (ref 211–946)

## 2019-10-21 PROCEDURE — 82607 VITAMIN B-12: CPT

## 2019-10-21 PROCEDURE — 4004F PT TOBACCO SCREEN RCVD TLK: CPT | Performed by: NURSE PRACTITIONER

## 2019-10-21 PROCEDURE — 83036 HEMOGLOBIN GLYCOSYLATED A1C: CPT

## 2019-10-21 PROCEDURE — G8417 CALC BMI ABV UP PARAM F/U: HCPCS | Performed by: NURSE PRACTITIONER

## 2019-10-21 PROCEDURE — 3017F COLORECTAL CA SCREEN DOC REV: CPT | Performed by: NURSE PRACTITIONER

## 2019-10-21 PROCEDURE — 99213 OFFICE O/P EST LOW 20 MIN: CPT | Performed by: NURSE PRACTITIONER

## 2019-10-21 PROCEDURE — 90653 IIV ADJUVANT VACCINE IM: CPT | Performed by: NURSE PRACTITIONER

## 2019-10-21 PROCEDURE — 82746 ASSAY OF FOLIC ACID SERUM: CPT

## 2019-10-21 PROCEDURE — 3044F HG A1C LEVEL LT 7.0%: CPT | Performed by: NURSE PRACTITIONER

## 2019-10-21 PROCEDURE — G8427 DOCREV CUR MEDS BY ELIG CLIN: HCPCS | Performed by: NURSE PRACTITIONER

## 2019-10-21 PROCEDURE — 2022F DILAT RTA XM EVC RTNOPTHY: CPT | Performed by: NURSE PRACTITIONER

## 2019-10-21 PROCEDURE — G0008 ADMIN INFLUENZA VIRUS VAC: HCPCS | Performed by: NURSE PRACTITIONER

## 2019-10-21 PROCEDURE — G8482 FLU IMMUNIZE ORDER/ADMIN: HCPCS | Performed by: NURSE PRACTITIONER

## 2019-10-21 PROCEDURE — 36415 COLL VENOUS BLD VENIPUNCTURE: CPT | Performed by: NURSE PRACTITIONER

## 2019-10-21 ASSESSMENT — ENCOUNTER SYMPTOMS
NAUSEA: 0
DIARRHEA: 0
CONSTIPATION: 0
CHEST TIGHTNESS: 0
COUGH: 0
WHEEZING: 0
VOMITING: 0
SINUS PAIN: 0
EYE PAIN: 0
SHORTNESS OF BREATH: 0
COLOR CHANGE: 0

## 2019-11-19 RX ORDER — ATORVASTATIN CALCIUM 40 MG/1
40 TABLET, FILM COATED ORAL DAILY
Qty: 90 TABLET | Refills: 2 | Status: SHIPPED
Start: 2019-11-19 | End: 2020-07-30 | Stop reason: SDUPTHER

## 2019-12-10 RX ORDER — PROPAFENONE HYDROCHLORIDE 325 MG/1
325 CAPSULE, EXTENDED RELEASE ORAL 2 TIMES DAILY
Qty: 180 CAPSULE | Refills: 3 | Status: SHIPPED
Start: 2019-12-10 | End: 2020-12-02 | Stop reason: SDUPTHER

## 2020-01-03 ENCOUNTER — HOSPITAL ENCOUNTER (OUTPATIENT)
Age: 65
Discharge: HOME | End: 2020-01-05
Payer: MEDICARE

## 2020-01-03 ENCOUNTER — HOSPITAL ENCOUNTER (OUTPATIENT)
Age: 65
End: 2020-01-03
Payer: MEDICARE

## 2020-01-03 LAB
ALBUMIN SERPL-MCNC: 4 G/DL (ref 3.5–5.2)
ALBUMIN SERPL-MCNC: 4 G/DL (ref 3.5–5.2)
ALP BLD-CCNC: 133 U/L (ref 35–104)
ALP BLD-CCNC: 133 U/L (ref 35–104)
ALT SERPL-CCNC: 12 U/L (ref 0–32)
ALT SERPL-CCNC: 13 U/L (ref 0–32)
ANION GAP SERPL CALCULATED.3IONS-SCNC: 16 MMOL/L (ref 7–16)
ANION GAP SERPL CALCULATED.3IONS-SCNC: 16 MMOL/L (ref 7–16)
ANION GAP SERPL CALCULATED.3IONS-SCNC: 17 MMOL/L (ref 7–16)
AST SERPL-CCNC: 15 U/L (ref 0–31)
AST SERPL-CCNC: 15 U/L (ref 0–31)
BILIRUB SERPL-MCNC: 0.4 MG/DL (ref 0–1.2)
BILIRUB SERPL-MCNC: 0.4 MG/DL (ref 0–1.2)
BUN BLDV-MCNC: 32 MG/DL (ref 8–23)
BUN BLDV-MCNC: 32 MG/DL (ref 8–23)
BUN BLDV-MCNC: 33 MG/DL (ref 8–23)
CALCIUM SERPL-MCNC: 9.6 MG/DL (ref 8.6–10.2)
CALCIUM SERPL-MCNC: 9.8 MG/DL (ref 8.6–10.2)
CALCIUM SERPL-MCNC: 9.8 MG/DL (ref 8.6–10.2)
CHLORIDE BLD-SCNC: 103 MMOL/L (ref 98–107)
CHLORIDE BLD-SCNC: 103 MMOL/L (ref 98–107)
CHLORIDE BLD-SCNC: 104 MMOL/L (ref 98–107)
CO2: 23 MMOL/L (ref 22–29)
CREAT SERPL-MCNC: 3.1 MG/DL (ref 0.5–1)
CREAT SERPL-MCNC: 3.4 MG/DL (ref 0.5–1)
CREAT SERPL-MCNC: 3.5 MG/DL (ref 0.5–1)
GFR AFRICAN AMERICAN: 16
GFR AFRICAN AMERICAN: 16
GFR AFRICAN AMERICAN: 18
GFR NON-AFRICAN AMERICAN: 13 ML/MIN/1.73
GFR NON-AFRICAN AMERICAN: 14 ML/MIN/1.73
GFR NON-AFRICAN AMERICAN: 15 ML/MIN/1.73
GLUCOSE BLD-MCNC: 174 MG/DL (ref 74–99)
GLUCOSE BLD-MCNC: 178 MG/DL (ref 74–99)
GLUCOSE BLD-MCNC: 198 MG/DL (ref 74–99)
HCT VFR BLD CALC: 43.3 % (ref 34–48)
HEMOGLOBIN: 13.2 G/DL (ref 11.5–15.5)
MCH RBC QN AUTO: 31.9 PG (ref 26–35)
MCHC RBC AUTO-ENTMCNC: 30.5 % (ref 32–34.5)
MCV RBC AUTO: 104.6 FL (ref 80–99.9)
PARATHYROID HORMONE INTACT: 143 PG/ML (ref 15–65)
PDW BLD-RTO: 12.5 FL (ref 11.5–15)
PHOSPHORUS: 3.2 MG/DL (ref 2.5–4.5)
PLATELET # BLD: 287 E9/L (ref 130–450)
PMV BLD AUTO: 10.7 FL (ref 7–12)
POTASSIUM SERPL-SCNC: 4 MMOL/L (ref 3.5–5)
POTASSIUM SERPL-SCNC: 4 MMOL/L (ref 3.5–5)
POTASSIUM SERPL-SCNC: 4.1 MMOL/L (ref 3.5–5)
RBC # BLD: 4.14 E12/L (ref 3.5–5.5)
SODIUM BLD-SCNC: 142 MMOL/L (ref 132–146)
SODIUM BLD-SCNC: 142 MMOL/L (ref 132–146)
SODIUM BLD-SCNC: 144 MMOL/L (ref 132–146)
TOTAL PROTEIN: 7.3 G/DL (ref 6.4–8.3)
TOTAL PROTEIN: 7.5 G/DL (ref 6.4–8.3)
VITAMIN D 25-HYDROXY: 24 NG/ML (ref 30–100)
WBC # BLD: 10.5 E9/L (ref 4.5–11.5)

## 2020-01-03 PROCEDURE — 84100 ASSAY OF PHOSPHORUS: CPT

## 2020-01-03 PROCEDURE — 80053 COMPREHEN METABOLIC PANEL: CPT

## 2020-01-03 PROCEDURE — 85027 COMPLETE CBC AUTOMATED: CPT

## 2020-01-03 PROCEDURE — 83970 ASSAY OF PARATHORMONE: CPT

## 2020-01-03 PROCEDURE — 80048 BASIC METABOLIC PNL TOTAL CA: CPT

## 2020-01-03 PROCEDURE — 82306 VITAMIN D 25 HYDROXY: CPT

## 2020-01-07 RX ORDER — METOPROLOL TARTRATE 50 MG/1
TABLET, FILM COATED ORAL
Qty: 180 TABLET | Refills: 0 | Status: SHIPPED
Start: 2020-01-07 | End: 2020-04-05

## 2020-01-31 ENCOUNTER — OFFICE VISIT (OUTPATIENT)
Dept: FAMILY MEDICINE CLINIC | Age: 65
End: 2020-01-31
Payer: MEDICARE

## 2020-01-31 VITALS
RESPIRATION RATE: 20 BRPM | HEIGHT: 67 IN | BODY MASS INDEX: 41.2 KG/M2 | WEIGHT: 262.5 LBS | SYSTOLIC BLOOD PRESSURE: 126 MMHG | HEART RATE: 68 BPM | OXYGEN SATURATION: 97 % | TEMPERATURE: 98.1 F | DIASTOLIC BLOOD PRESSURE: 70 MMHG

## 2020-01-31 LAB — HBA1C MFR BLD: 6.7 %

## 2020-01-31 PROCEDURE — G8482 FLU IMMUNIZE ORDER/ADMIN: HCPCS | Performed by: NURSE PRACTITIONER

## 2020-01-31 PROCEDURE — 3017F COLORECTAL CA SCREEN DOC REV: CPT | Performed by: NURSE PRACTITIONER

## 2020-01-31 PROCEDURE — G0438 PPPS, INITIAL VISIT: HCPCS | Performed by: NURSE PRACTITIONER

## 2020-01-31 PROCEDURE — 83036 HEMOGLOBIN GLYCOSYLATED A1C: CPT | Performed by: NURSE PRACTITIONER

## 2020-01-31 PROCEDURE — 3044F HG A1C LEVEL LT 7.0%: CPT | Performed by: NURSE PRACTITIONER

## 2020-01-31 ASSESSMENT — ENCOUNTER SYMPTOMS
NAUSEA: 0
DIARRHEA: 0
COLOR CHANGE: 0
SHORTNESS OF BREATH: 0
CONSTIPATION: 0
WHEEZING: 0
EYE PAIN: 0
COUGH: 0
VOMITING: 0
CHEST TIGHTNESS: 0
SINUS PAIN: 0

## 2020-01-31 ASSESSMENT — LIFESTYLE VARIABLES: HOW OFTEN DO YOU HAVE A DRINK CONTAINING ALCOHOL: 0

## 2020-01-31 ASSESSMENT — PATIENT HEALTH QUESTIONNAIRE - PHQ9
SUM OF ALL RESPONSES TO PHQ QUESTIONS 1-9: 2
SUM OF ALL RESPONSES TO PHQ QUESTIONS 1-9: 2

## 2020-01-31 NOTE — PROGRESS NOTES
Medicare Annual Wellness Visit  Name: Enmanuel Garcia Date: 2020   MRN: [id number] Sex: Female   Age: 60 y.o. Ethnicity: Non-/Non    : 1955 Race: Asim Liao is here for Medicare AWV and Health Maintenance (needs dm eye and foot exam, mammogram not complete)    Screenings for behavioral, psychosocial and functional/safety risks, and cognitive dysfunction are all negative except as indicated below. These results, as well as other patient data from the [address] form, are documented in Flowsheets linked to this Encounter. Allergies   Allergen Reactions    Adhesive Tape Rash    Penicillins Rash         Prior to Visit Medications    Medication Sig Taking?  Authorizing Provider   DULoxetine (CYMBALTA) 60 MG extended release capsule Take 1 capsule by mouth daily Yes RENUKA Martínez CNP   amLODIPine (NORVASC) 5 MG tablet Take 1 tablet by mouth 2 times daily Yes RENUKA Martínez CNP   hydrochlorothiazide (MICROZIDE) 12.5 MG capsule Take 1 capsule by mouth every other day On Monday, Wednesday & Friday Yes RENUKA Martínez CNP   Respiratory Therapy Supplies (SPIROMETER) KIT 1 kit by Does not apply route 2 times daily Yes RENUKA Martínez CNP   metoprolol tartrate (LOPRESSOR) 50 MG tablet TAKE 1 TABLET BY MOUTH TWICE DAILY Yes RENUKA Martínez CNP   apixaban (ELIQUIS) 2.5 MG TABS tablet Take 1 tablet by mouth 2 times daily Yes RENUKA Martínez CNP   propafenone (RYTHMOL SR) 325 MG extended release capsule Take 1 capsule by mouth 2 times daily Yes Cain Anguiano MD   atorvastatin (LIPITOR) 40 MG tablet Take 1 tablet by mouth daily Yes Cain Anguiano MD   torsemide (DEMADEX) 20 MG tablet TAKE 1 TABLET BY MOUTH DAILY Yes RENUKA Martínez CNP   glipiZIDE (GLUCOTROL XL) 5 MG extended release tablet TAKE 2 TABLETS BY MOUTH TWICE DAILY Yes RENUKA Martínez CNP   sodium bicarbonate 650 MG tablet TK 1 T PO BID Yes Historical Provider, MD   Calcium Acetate, Phos Binder, 667 MG CAPS TK 1 C PO TID WITH MEALS Yes Historical Provider, MD   Cholecalciferol (VITAMIN D3) 1000 units TABS TK 1 T PO QD Yes Historical Provider, MD         Past Medical History:   Diagnosis Date    At[facility] fibrillation ([facility]     Bladder cancer ([facility]     Cancer ([facility]     CKD (chronic kidney disease)     CKD (chronic kidney disease) stage 5, GFR less than 15 ml/min ([facility] 11/6/2018    Diabetes ([facility]     Hypertension        Past Surgical History:   Procedure Laterality Date    CARDIAC CATHETERIZATION Left 10/02/2008    @ [facility]    CHOLECYSTECTOMY      COLONOSCOPY      COLONOSCOPY N/A 8/29/2019    mid ascending colon polyp bx/cauterization (inflammatory polyp), proximal transverse colon polyp bx/cauterized (tubular adenoma), descending colon polyp 80 cm from anus bx/cauterized (hyperplastic polyp), sigmoid colon polyp 35 cm from anus bx/cauterized (tubular adenoma), sigmoid colon polyps 30 cm from anus bx/cauterized (tubular adenoma), Dr. Aleida Gee, [facility]    COLONOSCOPY  8/29/2019    mid ascending colon polyp bx/cauterization (inflammatory polyp), proximal transverse colon polyp bx/cauterized (tubular adenoma), descending colon polyp 80 cm from anus bx/cauterized (hyperplastic polyp), sigmoid colon polyp 35 cm from anus bx/cauterized (tubular adenoma), sigmoid colon polyps 30 cm from anus bx/cauterized (tubular adenoma), Dr. Aleida Gee, [facility]    DIALYSIS FISTULA CREATION Left 1/22/2019    LIGATIION LEFT LEFT UPPER EXTREMITY OF AV FISTULA , EVACUATION HEMATOMA performed by Alberto Couch MD at [place] [address] Left 9/18/2018    AV FISTULA  LEFT ARM performed by Alberto Couch MD at [place] [address] Left 11/14/2018    SUPERFICIALIZATION AV FISTULA  LEFT ARM , LIGATION TRIBUTORY AV FISTULA LEFT ARM performed by [facility]to [facility]ouch [facility] at [facility] OR    TONSILLECTOMY      TUBAL LIGATION           Family History   Problem Relation telephone use, food preparation, transportation, or taking medications?: (!) Housekeeping  ADL Interventions:  · Patient declines any further evaluation/treatment for this issue    Personalized Preventive Plan   Current Health Maintenance Status  Immunization History   Administered Date(s) Administered    Influenza Vaccine, unspecified formulation 10/02/2008    Influenza Virus Vaccine 10/02/2008    Influenza, Quadv, IM, PF (6 mo and older Fluzone, Flulaval, Fluarix, and 3 yrs and older Afluria) 10/30/2018    Influenza, Triv, inactivated, subunit, adjuvanted, IM (Fluad 65 yrs and older) 10/21/2019    Pneumococcal Polysaccharide ([id number]) 10/02/2008, 05/17/2019      Health Maintenance   Topic Date Due    Diabetic retinal exam  01/29/1966    DTaP/Tdap/Td vaccine (1 - Tdap) 01/29/1967    HIV screen  01/29/1971    Hepatitis B vaccine (1 of 3 - Risk 3-dose series) 01/29/1975    Cervical cancer screen  01/29/1977    Breast cancer screen  01/29/2006    Shingles Vaccine (1 of 2) 01/29/2006    Annual Wellness Visit (AWV)  05/29/2019    Diabetic foot exam  10/30/2019    Lipid screen  11/06/2019    Pneumococcal 0-64 years Vaccine (3 of 3 - PCV13) 05/17/2020    Diabetic microalbuminuria test  05/24/2020    Potassium monitoring  01/03/2021    Creatinine monitoring  01/03/2021    A1C test (Diabetic or Prediabetic)  01/31/2021    Colon cancer screen colonoscopy  08/29/2022    Flu vaccine  Completed    Hepatitis C screen  Completed    HPV vaccine  Aged Out     Recommendations for Preventive Services Due: see orders and patient instructions/AVS.  . Recommended screening schedule for the next 5-10 years is provided to the patient in written form: see Patient Instructions/AVS.    Payton Rosario was seen today for medicare awv and health maintenance.     Diagnoses and all orders for this visit:    Type 2 diabetes mellitus with stage 5 chronic kidney disease not on chronic dialysis, without long-term current use of insulin (HCC)  -     POCT glycosylated hemoglobin (Hb A1C), 6.7%, stable  - The current medical regimen is effective;  continue present plan and medications. Moderate episode of recurrent major depressive disorder (HCC)  -     DULoxetine (CYMBALTA) 60 MG extended release capsule; Take 1 capsule by mouth daily    Routine general medical examination at a health care facility    Essential hypertension, stable  -     amLODIPine (NORVASC) 5 MG tablet; Take 1 tablet by mouth 2 times daily  -     hydrochlorothiazide (MICROZIDE) 12.5 MG capsule; Take 1 capsule by mouth every other day On Monday, Wednesday & Friday    Chronic obstructive pulmonary disease, unspecified COPD type ([facility]  -     Respiratory Therapy Supplies (SPIROMETER) KIT; 1 kit by Does not apply route 2 times daily  - The current medical regimen is effective;  continue present plan and medications.

## 2020-02-01 RX ORDER — AMLODIPINE BESYLATE 5 MG/1
5 TABLET ORAL 2 TIMES DAILY
Qty: 180 TABLET | Refills: 1 | Status: SHIPPED
Start: 2020-02-01 | End: 2020-09-01

## 2020-02-01 RX ORDER — DULOXETIN HYDROCHLORIDE 60 MG/1
60 CAPSULE, DELAYED RELEASE ORAL DAILY
Qty: 90 CAPSULE | Refills: 1 | Status: SHIPPED
Start: 2020-02-01 | End: 2020-09-01

## 2020-02-01 RX ORDER — HYDROCHLOROTHIAZIDE 12.5 MG/1
12.5 CAPSULE, GELATIN COATED ORAL EVERY OTHER DAY
Qty: 30 CAPSULE | Refills: 5 | Status: SHIPPED | OUTPATIENT
Start: 2020-02-01 | End: 2020-02-03

## 2020-03-27 ENCOUNTER — TELEPHONE (OUTPATIENT)
Dept: FAMILY MEDICINE CLINIC | Age: 65
End: 2020-03-27

## 2020-07-11 RX ORDER — APIXABAN 2.5 MG/1
TABLET, FILM COATED ORAL
Qty: 180 TABLET | Refills: 1 | Status: SHIPPED
Start: 2020-07-11 | End: 2020-10-01 | Stop reason: SDUPTHER

## 2020-07-30 RX ORDER — ATORVASTATIN CALCIUM 40 MG/1
40 TABLET, FILM COATED ORAL DAILY
Qty: 90 TABLET | Refills: 2 | Status: SHIPPED
Start: 2020-07-30 | End: 2020-10-01 | Stop reason: SDUPTHER

## 2020-09-01 RX ORDER — DULOXETIN HYDROCHLORIDE 60 MG/1
60 CAPSULE, DELAYED RELEASE ORAL DAILY
Qty: 90 CAPSULE | Refills: 1 | Status: SHIPPED
Start: 2020-09-01 | End: 2020-10-01 | Stop reason: SDUPTHER

## 2020-09-01 RX ORDER — AMLODIPINE BESYLATE 5 MG/1
TABLET ORAL
Qty: 180 TABLET | Refills: 1 | Status: SHIPPED
Start: 2020-09-01 | End: 2020-10-01 | Stop reason: SDUPTHER

## 2020-09-03 ENCOUNTER — HOSPITAL ENCOUNTER (OUTPATIENT)
Age: 65
Discharge: HOME | End: 2020-09-05
Payer: MEDICARE

## 2020-09-03 LAB
ANION GAP SERPL CALCULATED.3IONS-SCNC: 13 MMOL/L (ref 7–16)
BUN BLDV-MCNC: 43 MG/DL (ref 8–23)
CALCIUM SERPL-MCNC: 9.8 MG/DL (ref 8.6–10.2)
CHLORIDE BLD-SCNC: 102 MMOL/L (ref 98–107)
CO2: 26 MMOL/L (ref 22–29)
CREAT SERPL-MCNC: 3.5 MG/DL (ref 0.5–1)
GFR AFRICAN AMERICAN: 16
GFR NON-AFRICAN AMERICAN: 13 ML/MIN/1.73
GLUCOSE BLD-MCNC: 223 MG/DL (ref 74–99)
POTASSIUM SERPL-SCNC: 4.7 MMOL/L (ref 3.5–5)
SODIUM BLD-SCNC: 141 MMOL/L (ref 132–146)

## 2020-09-03 PROCEDURE — 80048 BASIC METABOLIC PNL TOTAL CA: CPT

## 2020-09-03 PROCEDURE — 36415 COLL VENOUS BLD VENIPUNCTURE: CPT

## 2020-09-29 ENCOUNTER — OFFICE VISIT (OUTPATIENT)
Dept: FAMILY MEDICINE CLINIC | Age: 65
End: 2020-09-29
Payer: MEDICARE

## 2020-09-29 VITALS
HEART RATE: 71 BPM | RESPIRATION RATE: 16 BRPM | HEIGHT: 67 IN | WEIGHT: 261 LBS | SYSTOLIC BLOOD PRESSURE: 130 MMHG | BODY MASS INDEX: 40.97 KG/M2 | DIASTOLIC BLOOD PRESSURE: 64 MMHG | TEMPERATURE: 97 F | OXYGEN SATURATION: 94 %

## 2020-09-29 PROCEDURE — 3017F COLORECTAL CA SCREEN DOC REV: CPT | Performed by: NURSE PRACTITIONER

## 2020-09-29 PROCEDURE — 4004F PT TOBACCO SCREEN RCVD TLK: CPT | Performed by: NURSE PRACTITIONER

## 2020-09-29 PROCEDURE — 3044F HG A1C LEVEL LT 7.0%: CPT | Performed by: NURSE PRACTITIONER

## 2020-09-29 PROCEDURE — 90686 IIV4 VACC NO PRSV 0.5 ML IM: CPT | Performed by: NURSE PRACTITIONER

## 2020-09-29 PROCEDURE — G8417 CALC BMI ABV UP PARAM F/U: HCPCS | Performed by: NURSE PRACTITIONER

## 2020-09-29 PROCEDURE — G0008 ADMIN INFLUENZA VIRUS VAC: HCPCS | Performed by: NURSE PRACTITIONER

## 2020-09-29 PROCEDURE — G8427 DOCREV CUR MEDS BY ELIG CLIN: HCPCS | Performed by: NURSE PRACTITIONER

## 2020-09-29 PROCEDURE — 2022F DILAT RTA XM EVC RTNOPTHY: CPT | Performed by: NURSE PRACTITIONER

## 2020-09-29 PROCEDURE — 99213 OFFICE O/P EST LOW 20 MIN: CPT | Performed by: NURSE PRACTITIONER

## 2020-09-29 PROCEDURE — 83036 HEMOGLOBIN GLYCOSYLATED A1C: CPT | Performed by: NURSE PRACTITIONER

## 2020-09-29 RX ORDER — ALBUTEROL SULFATE 90 UG/1
2 AEROSOL, METERED RESPIRATORY (INHALATION) EVERY 6 HOURS PRN
Qty: 1 INHALER | Refills: 5 | Status: SHIPPED
Start: 2020-09-29 | End: 2020-09-30

## 2020-09-29 RX ORDER — BROMPHENIRAMINE MALEATE, PSEUDOEPHEDRINE HYDROCHLORIDE, AND DEXTROMETHORPHAN HYDROBROMIDE 2; 30; 10 MG/5ML; MG/5ML; MG/5ML
10 SYRUP ORAL 4 TIMES DAILY PRN
Qty: 120 ML | Refills: 0 | Status: SHIPPED
Start: 2020-09-29 | End: 2020-10-01

## 2020-09-29 RX ORDER — BENZONATATE 100 MG/1
100 CAPSULE ORAL 3 TIMES DAILY PRN
Qty: 21 CAPSULE | Refills: 0 | Status: SHIPPED | OUTPATIENT
Start: 2020-09-29 | End: 2020-10-06

## 2020-09-29 RX ORDER — DOXYCYCLINE HYCLATE 100 MG
100 TABLET ORAL 2 TIMES DAILY
Qty: 20 TABLET | Refills: 0 | Status: SHIPPED | OUTPATIENT
Start: 2020-09-29 | End: 2020-10-09

## 2020-09-29 RX ORDER — PREDNISONE 20 MG/1
20 TABLET ORAL 2 TIMES DAILY
Qty: 10 TABLET | Refills: 0 | Status: SHIPPED | OUTPATIENT
Start: 2020-09-29 | End: 2020-10-04

## 2020-09-29 ASSESSMENT — ENCOUNTER SYMPTOMS
SHORTNESS OF BREATH: 1
COUGH: 1
CHEST TIGHTNESS: 0
COLOR CHANGE: 0
CONSTIPATION: 0
VOMITING: 0
SINUS PAIN: 0
NAUSEA: 0
DIARRHEA: 0
WHEEZING: 1
EYE PAIN: 0

## 2020-09-29 ASSESSMENT — PATIENT HEALTH QUESTIONNAIRE - PHQ9
SUM OF ALL RESPONSES TO PHQ9 QUESTIONS 1 & 2: 0
SUM OF ALL RESPONSES TO PHQ QUESTIONS 1-9: 0
SUM OF ALL RESPONSES TO PHQ QUESTIONS 1-9: 0
2. FEELING DOWN, DEPRESSED OR HOPELESS: 0
1. LITTLE INTEREST OR PLEASURE IN DOING THINGS: 0

## 2020-09-29 NOTE — PROGRESS NOTES
Dominic Gibbs is a 60 y.o. female who presents today for     Chief Complaint   Patient presents with    Diabetes     pt hasnt been sleeping      She is concerned about her  & his snoring. His son has come to stay with them & brought him a Pitbull which is frustrating for her. She tries to stay safe with coloring and games. Admits she has had SOB and a cough that won't clear. Notices worse when she is laying down. Has been trying to manage with OTC and rest.     [address]:    Patient's past medical, surgical, social and/or family history reviewed, updated in chart, and are non-contributory (unless otherwise stated). Medications and allergies also reviewed and updated in chart. Review of Systems    Review of Systems   Constitutional: Positive for fatigue. Negative for activity change, appetite change, chills and fever. HENT: Negative for congestion, ear pain, sinus pain and sneezing. Eyes: Negative for pain and visual disturbance. Respiratory: Positive for cough (yellow-green), shortness of breath and wheezing. Negative for chest tightness. Cardiovascular: Negative for chest pain, palpitations and leg swelling. Gastrointestinal: Negative for constipation, diarrhea, nausea and vomiting. Endocrine: Negative for cold intolerance, heat intolerance and polyuria. Genitourinary: Negative for difficulty urinating. Musculoskeletal: Negative for arthralgias and myalgias. Skin: Negative for color change and rash. Neurological: Negative for dizziness, light-headedness and headaches. Hematological: Negative for adenopathy. Does not bruise/bleed easily. Psychiatric/Behavioral: Positive for sleep disturbance. Negative for agitation, decreased concentration and suicidal ideas. The patient is not nervous/anxious.       Physical Exam:    VS:  /64   Pulse 71   Temp 97 °F (36.1 °C)   Resp 16   Ht 5' 7\" (1.702 m)   Wt 261 lb (118.4 kg)   SpO2 94%   BMI 40.88 kg/m²     LAST WEIGHT:  Wt Readings from Last 3 Encounters:   09/29/20 261 lb (118.4 kg)   01/31/20 262 lb 8 oz (119.1 kg)   10/21/19 258 lb (117 kg)     BMI Readings from Last 3 Encounters:   09/29/20 40.88 kg/m²   01/31/20 41.11 kg/m²   10/21/19 41.64 kg/m²     Physical Exam  Vitals signs reviewed. Constitutional:       General: She is not in acute distress. Appearance: Normal appearance. She is well-developed and normal weight. She is not ill-appearing, toxic-appearing or diaphoretic. HENT:      Head: Normocephalic and atraumatic. Right Ear: Tympanic membrane, ear canal and external ear normal.      Left Ear: Tympanic membrane, ear canal and external ear normal.      Nose: Congestion and rhinorrhea present. Mouth/Throat:      Mouth: Mucous membranes are moist.      Pharynx: Oropharynx is clear. Eyes:      Extraocular Movements: Extraocular movements intact. Conjunctiva/sclera: Conjunctivae normal.      Pupils: Pupils are equal, round, and reactive to light. Neck:      Musculoskeletal: Normal range of motion and neck supple. Thyroid: No thyromegaly. Vascular: No carotid bruit or JVD. Cardiovascular:      Rate and Rhythm: Normal rate and regular rhythm. Pulses: Normal pulses. Heart sounds: Normal heart sounds. No murmur. Pulmonary:      Effort: Pulmonary effort is normal. No respiratory distress. Breath sounds: Wheezing (diffuse expiratory) present. Comments: Intermittent, moist cough during examination    Chest:      Chest wall: No tenderness. Abdominal:      General: Bowel sounds are normal. There is no distension. Palpations: Abdomen is soft. Tenderness: There is no abdominal tenderness. Genitourinary:     Comments: Deferred. Musculoskeletal: Normal range of motion. General: No swelling or deformity. Right lower leg: No edema. Left lower leg: No edema. Lymphadenopathy:      Cervical: No cervical adenopathy.    Skin:     General: Skin is warm and dry. Capillary Refill: Capillary refill takes less than 2 seconds. Findings: No bruising, erythema or rash. Neurological:      General: No focal deficit present. Mental Status: She is alert and oriented to person, place, and time. Mental status is at baseline. Cranial Nerves: No cranial nerve deficit. Sensory: No sensory deficit. Motor: No abnormal muscle tone. Coordination: Coordination normal.      Gait: Gait normal.      Deep Tendon Reflexes: Reflexes normal.   Psychiatric:         Mood and Affect: Mood normal.         Behavior: Behavior normal.         Thought Content: Thought content normal.         Judgment: Judgment normal.     Labs:    Lab Results   Component Value Date     09/03/2020    K 4.7 09/03/2020    K 4.2 01/22/2019     09/03/2020    CO2 26 09/03/2020    BUN 43 09/03/2020    CREATININE 3.5 09/03/2020    PROT 7.3 01/03/2020    LABALBU 4.0 01/03/2020    CALCIUM 9.8 09/03/2020    GFRAA 16 09/03/2020    LABGLOM 13 09/03/2020    GLUCOSE 223 09/03/2020    AST 15 01/03/2020    ALT 12 01/03/2020    ALKPHOS 133 01/03/2020    BILITOT 0.4 01/03/2020    TSH 0.804 01/31/2019    CHOL 172 11/06/2018    TRIG 211 11/06/2018    HDL 37 11/06/2018    LDLCALC 93 11/06/2018    LABA1C 6.7 01/31/2020        Lab Results   Component Value Date    CHOL 172 11/06/2018     Lab Results   Component Value Date    TRIG 211 (H) 11/06/2018     Lab Results   Component Value Date    HDL 37 11/06/2018     Lab Results   Component Value Date    LDLCALC 93 11/06/2018       Lab Results   Component Value Date    LABA1C 6.7 01/31/2020    LABA1C 6.6 (H) 10/21/2019    LABA1C 6.0 (H) 11/06/2018     Lab Results   Component Value Date    LABMICR 149.5 (H) 05/24/2019    LDLCALC 93 11/06/2018    CREATININE 3.5 (H) 09/03/2020         Assessment / Plan:      Navin Prescott was seen today for diabetes.     Diagnoses and all orders for this visit:    Type 2 diabetes mellitus with stage 5 chronic kidney disease not on chronic dialysis, without long-term current use of insulin (HCC)  -     POCT glycosylated hemoglobin (Hb A1C)  - The current medical regimen is effective;  continue present plan and medications. Persistent cough for 3 weeks or longer  -     doxycycline hyclate (VIBRA-TABS) 100 MG tablet; Take 1 tablet by mouth 2 times daily for 10 days  -     predniSONE (DELTASONE) 20 MG tablet; Take 1 tablet by mouth 2 times daily for 5 days  -     XR CHEST STANDARD (2 VW); Future  -     benzonatate (TESSALON PERLES) 100 MG capsule; Take 1 capsule by mouth 3 times daily as needed for Cough  - Contact office if symptoms remain,may have to be evaluated in the flu clinic    Diffuse wheezing  -     brompheniramine-pseudoephedrine-DM (BROMFED DM) 2-30-10 MG/5ML syrup; Take 10 mLs by mouth 4 times daily as needed for Congestion or Cough  - Insurance will not cover, OTC recommendations are suggested    SOB (shortness of breath)  -     albuterol sulfate  (90 Base) MCG/ACT inhaler; Inhale 2 puffs into the lungs every 6 hours as needed for Wheezing    Flu vaccine need  -     INFLUENZA, QUADV, 3 YRS AND OLDER, IM PF, PREFILL SYR OR SDV, 0.5ML (Vanda Jaimes, PF)    Kate Roman is interested in the mail order/dispense pill packs & would like participate with Brianda Olivares. We will place orders for 30 days & 5 refills & submit paperwork for her & her . Follow Up:    Return in about 2 months (around 11/29/2020), or if symptoms worsen or fail to improve. or sooner if necessary. Educational materials and/or home exercises printed for patient's review and were included in patient instructions on his/her AfterVisit Summary and given to patient at the end of visit. Counseled regarding above diagnosis,including possible risks and complications,  especially if left uncontrolled.  Counseled regarding the possible side effects, risks, benefits and alternatives to treatment; patient and/or guardian verbalizes understanding, agrees, feels comfortable with and wishes to proceed with above treatment plan. Advised patient to call with any new medication issues, and read all Rx info from pharmacy to assure of all possible risks and side effects of medication before taking. Reviewed age and gender appropriate health screening exams and vaccinations. Advised patient regarding importance of keeping up with recommended health maintenance and to schedule as soon as possible if overdue, as this is important in assessing for undiagnosed pathology, especially cancer, as well as protecting against potentially harmful/life threatening disease. Patient and/or guardian verbalizes understandingand agrees with above counseling, assessment and plan. All questions answered.     José Miguel Stringer, APRN - CNP

## 2020-09-30 ENCOUNTER — TELEPHONE (OUTPATIENT)
Dept: FAMILY MEDICINE CLINIC | Age: 65
End: 2020-09-30

## 2020-09-30 NOTE — TELEPHONE ENCOUNTER
Pharm sent over request for alternate drug called insurance they said there is no alternative we need to maybe try the cough syrups separate instead of all three together please advise.

## 2020-10-01 RX ORDER — AMLODIPINE BESYLATE 5 MG/1
5 TABLET ORAL DAILY
Qty: 30 TABLET | Refills: 5 | Status: SHIPPED | OUTPATIENT
Start: 2020-10-01 | End: 2021-08-23

## 2020-10-01 RX ORDER — ALBUTEROL SULFATE 90 UG/1
1 AEROSOL, METERED RESPIRATORY (INHALATION) EVERY 6 HOURS PRN
Qty: 25.5 G | Refills: 5 | Status: SHIPPED | OUTPATIENT
Start: 2020-10-01 | End: 2020-10-15

## 2020-10-01 RX ORDER — ATORVASTATIN CALCIUM 40 MG/1
40 TABLET, FILM COATED ORAL DAILY
Qty: 30 TABLET | Refills: 5 | Status: SHIPPED | OUTPATIENT
Start: 2020-10-01 | End: 2021-04-27 | Stop reason: SDUPTHER

## 2020-10-01 RX ORDER — TORSEMIDE 20 MG/1
20 TABLET ORAL DAILY
Qty: 30 TABLET | Refills: 5 | Status: SHIPPED | OUTPATIENT
Start: 2020-10-01 | End: 2021-05-26

## 2020-10-01 RX ORDER — DULOXETIN HYDROCHLORIDE 60 MG/1
60 CAPSULE, DELAYED RELEASE ORAL DAILY
Qty: 30 CAPSULE | Refills: 5 | Status: SHIPPED | OUTPATIENT
Start: 2020-10-01 | End: 2021-07-20

## 2020-10-01 RX ORDER — GLIPIZIDE 5 MG/1
5 TABLET, FILM COATED, EXTENDED RELEASE ORAL 2 TIMES DAILY
Qty: 60 TABLET | Refills: 5 | Status: SHIPPED | OUTPATIENT
Start: 2020-10-01 | End: 2020-11-20 | Stop reason: SDUPTHER

## 2020-10-01 RX ORDER — METOPROLOL TARTRATE 50 MG/1
50 TABLET, FILM COATED ORAL 2 TIMES DAILY
Qty: 60 TABLET | Refills: 5 | Status: SHIPPED | OUTPATIENT
Start: 2020-10-01 | End: 2021-04-27

## 2020-10-06 ENCOUNTER — HOSPITAL ENCOUNTER (OUTPATIENT)
Dept: ULTRASOUND IMAGING | Age: 65
Discharge: HOME | End: 2020-10-08
Payer: MEDICARE

## 2020-10-06 PROCEDURE — 93970 EXTREMITY STUDY: CPT

## 2020-10-06 PROCEDURE — 93970 EXTREMITY STUDY: CPT | Performed by: RADIOLOGY

## 2020-10-07 ENCOUNTER — TELEPHONE (OUTPATIENT)
Dept: VASCULAR SURGERY | Age: 65
End: 2020-10-07

## 2020-10-07 NOTE — TELEPHONE ENCOUNTER
Referral received from Dr. Paula Prado for evaluation for AVF.   Spoke with pt, scheduled office consultation with Dr. Caro Casey 11/11

## 2020-10-15 ENCOUNTER — OFFICE VISIT (OUTPATIENT)
Dept: CARDIOLOGY CLINIC | Age: 65
End: 2020-10-15
Payer: MEDICARE

## 2020-10-15 VITALS
HEART RATE: 71 BPM | HEIGHT: 67 IN | DIASTOLIC BLOOD PRESSURE: 84 MMHG | BODY MASS INDEX: 40.02 KG/M2 | SYSTOLIC BLOOD PRESSURE: 126 MMHG | WEIGHT: 255 LBS

## 2020-10-15 PROBLEM — E11.9 TYPE 2 DIABETES MELLITUS, WITHOUT LONG-TERM CURRENT USE OF INSULIN (HCC): Chronic | Status: RESOLVED | Noted: 2018-11-06 | Resolved: 2020-10-15

## 2020-10-15 PROCEDURE — 4004F PT TOBACCO SCREEN RCVD TLK: CPT | Performed by: INTERNAL MEDICINE

## 2020-10-15 PROCEDURE — 93000 ELECTROCARDIOGRAM COMPLETE: CPT | Performed by: INTERNAL MEDICINE

## 2020-10-15 PROCEDURE — 3044F HG A1C LEVEL LT 7.0%: CPT | Performed by: INTERNAL MEDICINE

## 2020-10-15 PROCEDURE — G8482 FLU IMMUNIZE ORDER/ADMIN: HCPCS | Performed by: INTERNAL MEDICINE

## 2020-10-15 PROCEDURE — 3023F SPIROM DOC REV: CPT | Performed by: INTERNAL MEDICINE

## 2020-10-15 PROCEDURE — G8417 CALC BMI ABV UP PARAM F/U: HCPCS | Performed by: INTERNAL MEDICINE

## 2020-10-15 PROCEDURE — G8427 DOCREV CUR MEDS BY ELIG CLIN: HCPCS | Performed by: INTERNAL MEDICINE

## 2020-10-15 PROCEDURE — 3017F COLORECTAL CA SCREEN DOC REV: CPT | Performed by: INTERNAL MEDICINE

## 2020-10-15 PROCEDURE — G8926 SPIRO NO PERF OR DOC: HCPCS | Performed by: INTERNAL MEDICINE

## 2020-10-15 PROCEDURE — 2022F DILAT RTA XM EVC RTNOPTHY: CPT | Performed by: INTERNAL MEDICINE

## 2020-10-15 PROCEDURE — 99214 OFFICE O/P EST MOD 30 MIN: CPT | Performed by: INTERNAL MEDICINE

## 2020-10-15 NOTE — PROGRESS NOTES
proximal transverse colon polyp bx/cauterized (tubular adenoma), descending colon polyp 80 cm from anus bx/cauterized (hyperplastic polyp), sigmoid colon polyp 35 cm from anus bx/cauterized (tubular adenoma), sigmoid colon polyps 30 cm from anus bx/cauterized (tubular adenoma), Dr. Escobedo, [facility]    COLONOSCOPY  8/29/2019    mid ascending colon polyp bx/cauterization (inflammatory polyp), proximal transverse colon polyp bx/cauterized (tubular adenoma), descending colon polyp 80 cm from anus bx/cauterized (hyperplastic polyp), sigmoid colon polyp 35 cm from anus bx/cauterized (tubular adenoma), sigmoid colon polyps 30 cm from anus bx/cauterized (tubular adenoma), Dr. Escobedo, [facility]    DIALYSIS FISTULA CREATION Left 1/22/2019    LIGATIION LEFT LEFT UPPER EXTREMITY OF AV FISTULA , EVACUATION HEMATOMA performed by Karlie Lovell MD at [place] [address] Left 9/18/2018    AV FISTULA  LEFT ARM performed by Karlie Lovell MD at [place] [address] Left 11/14/2018    SUPERFICIALIZATION AV FISTULA  LEFT ARM , LIGATION TRIBUTORY AV FISTULA LEFT ARM performed by Karlie Lovell MD at [place] [id number]         Family History:  Family History   Problem Relation Age of Onset    Cancer Mother         bladder    Diabetes Mother     COPD Mother     Depression Father     Diabetes Father     Heart Disease Father     High Blood Pressure Father     Stroke Father     Prostate Cancer Father     Dementia Father     Alcohol Abuse Brother     Diabetes Brother     Bipolar Disorder Paternal Uncle     Anxiety Disorder Daughter     Colon Cancer Maternal Grandfather        Social History:  Social History     Socioeconomic History    Marital status:      Spouse name: Not on file    Number of children: Not on file    Years of education: Not on file    Highest education level: Not on file   Occupational History    Not on file   Social Needs    Financial resource strain: Not on file    Food insecurity     Worry: Not on file     Inability: Not on file    Transportation needs     Medical: Not on file     Non-medical: Not on file   Tobacco Use    Smoking status: Current Every Day Smoker     Packs/day: 0.50     Years: 43.00     Pack years: 21.50     Types: Cigarettes     Last attempt to quit: 9/1/2017     Years since quitting: 3.1    Smokeless tobacco: Never Used   Substance and Sexual Activity    Alcohol use: Not Currently     Comment: 1 cup of coffee a day     Drug use: Never    Sexual activity: Not on file   Lifestyle    Physical activity     Days per week: Not on file     Minutes per session: Not on file    Stress: Not on file   Relationships    Social connections     Talks on phone: Not on file     Gets together: Not on file     Attends Baptism service: Not on file     Active member of club or organization: Not on file     Attends meetings of clubs or organizations: Not on file     Relationship status: Not on file    Intimate partner violence     Fear of current or ex partner: Not on file     Emotionally abused: Not on file     Physically abused: Not on file     Forced sexual activity: Not on file   Other Topics Concern    Not on file   Social History Narrative    Drinks 1 cup of coffee daily. Allergies:   Allergies   Allergen Reactions    Adhesive Tape Rash    Penicillins Rash       Current Medications:  Current Outpatient Medications   Medication Sig Dispense Refill    apixaban (ELIQUIS) 5 MG TABS tablet Take 1 tablet by mouth 2 times daily 60 tablet 3    DULoxetine (CYMBALTA) 60 MG extended release capsule Take 1 capsule by mouth daily 30 capsule 5    amLODIPine (NORVASC) 5 MG tablet Take 1 tablet by mouth daily 30 tablet 5    atorvastatin (LIPITOR) 40 MG tablet Take 1 tablet by mouth daily 30 tablet 5    torsemide (DEMADEX) 20 MG tablet Take 1 tablet by mouth daily 30 tablet 5    metoprolol tartrate (LOPRESSOR) 50 MG tablet Take 1 tablet by mouth 2 times daily 60 tablet 5    glipiZIDE (GLUCOTROL XL) 5 MG extended release tablet Take 1 tablet by mouth 2 times daily 60 tablet 5    Respiratory Therapy Supplies (SPIROMETER) KIT 1 kit by Does not apply route 2 times daily 1 kit 0    propafenone (RYTHMOL SR) 325 MG extended release capsule Take 1 capsule by mouth 2 times daily 180 capsule 3    sodium bicarbonate 650 MG tablet TK 1 T PO BID  3    Cholecalciferol (VITAMIN D3) 1000 units TABS TK 1 T PO QD  3     No current facility-administered medications for this visit. Physical Exam:  /84 (Site: Right Upper Arm, Position: Sitting, Cuff Size: Large Adult)   Pulse 71   Ht 5' 7\" (1.702 m)   Wt 255 lb (115.7 kg)   BMI 39.94 kg/m²   Wt Readings from Last 3 Encounters:   10/15/20 255 lb (115.7 kg)   09/29/20 261 lb (118.4 kg)   01/31/20 262 lb 8 oz (119.1 kg)     The patient is awake, alert and in no discomfort or distress. No gross musculoskeletal deformity is present. No significant skin or nail changes are present. Gross examination of head, eyes, nose and throat are negative. Jugular venous pressure is normal and no carotid bruits are present. Normal respiratory effort is noted with no accessory muscle usage present. Lung fields are clear to ascultation. Cardiac examination is notable for a regular rate and rhythm with no palpable thrill. No gallop rhythm or cardiac murmur are identified. A benign abdominal examination is present with the exception of obesity and no masses or organomegaly. Intact pulses are present throughout all extremities and no peripheral edema is present. No focal neurologic deficits are present.     Laboratory Tests:  Lab Results   Component Value Date    CREATININE 3.5 (H) 09/03/2020    BUN 43 (H) 09/03/2020     09/03/2020    K 4.7 09/03/2020     09/03/2020    CO2 26 09/03/2020     No results found for: BNP  Lab Results   Component Value Date    WBC 10.5 01/03/2020    RBC 4.14 01/03/2020 HGB 13.2 01/03/2020    HCT 43.3 01/03/2020    .6 01/03/2020    MCH 31.9 01/03/2020    MCHC 30.5 01/03/2020    RDW 12.5 01/03/2020     01/03/2020    MPV 10.7 01/03/2020     No results for input(s): ALKPHOS, ALT, AST, PROT, BILITOT, BILIDIR, LABALBU in the last 72 hours. Lab Results   Component Value Date    MG 2.3 11/07/2018     Lab Results   Component Value Date    PROTIME 12.8 01/22/2019    INR 1.1 01/22/2019     Lab Results   Component Value Date    TSH 0.804 01/31/2019     No components found for: CHLPL  Lab Results   Component Value Date    TRIG 211 (H) 11/06/2018     Lab Results   Component Value Date    HDL 37 11/06/2018     Lab Results   Component Value Date    LDLCALC 93 11/06/2018       Cardiac Tests:  ECG: A resting electrocardiogram demonstrates evidence of sinus rhythm with nonspecific ST changes and a corrected QT interval approximately 420 ms      ASSESSMENT / PLAN: On a clinical basis, the patient remains stable from a cardiovascular standpoint with no interim arrhythmia related symptoms nor objective assessment at the time of her most recent assessment contraindicating her present antiarrhythmic therapy. In spite of her progressive renal insufficiency based on the associated factors of her age and weight, the reduction of her apixaban dosage is inappropriate with present dose modification to that of previously recommended 5 mg twice daily dosing. She is scheduled to undergo placement of a dialysis fistula but if present relates in the absence of uremic symptomatology or volume related issues no immediate plans of the initiation of dialysis with need of continued careful monitoring of her renal function to optimize medication administration.   I have continue to encourage lifestyle modification to achieve weight reduction to benefit diastolic cardiac performance as well as to assist in management of her obstructive sleep apnea in addition to the appropriate use of her nocturnal CPAP to reduce risk of adverse cardiovascular effects including an increased risk of recurrent atrial arrhythmias. Continued aggressive risk factor modification of blood pressure, diabetes and serum lipids remain essential to reducing risk of future atherosclerotic development. Presently plan to continue annual cardiovascular reassessment and would happily reassess her in the interim should additional cardiovascular difficulties or concerns arise. The patient's current medication list, allergies, problem list and results of all previously ordered testing were reviewed at today's visit. Follow-up office visit in 1 year      Note: This report was completed using computerized voice recognition software. Every effort has been made to ensure accuracy, however; and invert and computerized transcription errors may be present. Rubi Gibson [facility], [address] [facility]    An electronic copy of this follow-up progress note was forwarded to Dr. Clara Patel and Marga Villa NP

## 2020-10-21 ENCOUNTER — TELEPHONE (OUTPATIENT)
Dept: FAMILY MEDICINE CLINIC | Age: 65
End: 2020-10-21

## 2020-10-21 NOTE — TELEPHONE ENCOUNTER
Pharmacy called for clarification on script of ELIQUIS  is it 5mg or 2.5mg they have a script for both with like someone to call [phone]. Please advise.

## 2020-11-10 ENCOUNTER — TELEPHONE (OUTPATIENT)
Dept: VASCULAR SURGERY | Age: 65
End: 2020-11-10

## 2020-11-11 ENCOUNTER — OFFICE VISIT (OUTPATIENT)
Dept: VASCULAR SURGERY | Age: 65
End: 2020-11-11
Payer: MEDICARE

## 2020-11-11 VITALS
HEIGHT: 67 IN | SYSTOLIC BLOOD PRESSURE: 140 MMHG | DIASTOLIC BLOOD PRESSURE: 80 MMHG | BODY MASS INDEX: 40.81 KG/M2 | WEIGHT: 260 LBS | RESPIRATION RATE: 16 BRPM

## 2020-11-11 PROCEDURE — G8417 CALC BMI ABV UP PARAM F/U: HCPCS | Performed by: SURGERY

## 2020-11-11 PROCEDURE — G8427 DOCREV CUR MEDS BY ELIG CLIN: HCPCS | Performed by: SURGERY

## 2020-11-11 PROCEDURE — G8482 FLU IMMUNIZE ORDER/ADMIN: HCPCS | Performed by: SURGERY

## 2020-11-11 PROCEDURE — 99203 OFFICE O/P NEW LOW 30 MIN: CPT | Performed by: SURGERY

## 2020-11-11 PROCEDURE — 4004F PT TOBACCO SCREEN RCVD TLK: CPT | Performed by: SURGERY

## 2020-11-11 PROCEDURE — 3017F COLORECTAL CA SCREEN DOC REV: CPT | Performed by: SURGERY

## 2020-11-11 NOTE — PROGRESS NOTES
Vascular Surgery Outpatient Consultation        Reason for Consult:    Chief Complaint   Patient presents with   [facility] Consultation     new pt. AVF eval       Requesting Physician:  No referring provider defined for this encounter. Chief Complaint   Patient presents with   [facility] Consultation     new pt. AVF eval       HISTORY OF PRESENT ILLNESS:                The patient is a 60 y.o. female who is referred for evaluation of arteriovenous access. She has a history of renal disease. She has a left arm arteriovenous access with multiple procedures including an initial procedure followed by an elevation which she describes. Followed by balloon angioplasties. At this point this is thrombosed. She has an adequate basilic vein on the left side but it is short. On the right side her cephalic vein action looks very good and she is not opposed to using her right arm even though she is right-handed dominant. She denies any chest pain shortness of breath or discomfort. She is currently not on dialysis. She is on anticoagulation for her atrial fibrillation. .    Past Medical History:        Diagnosis Date    Atrial fibrillation ([address]     Bladder cancer ([address]     Cancer ([address]     CKD (chronic kidney disease)     CKD (chronic kidney disease) stage 5, GFR less than 15 ml/min ([address] 11/6/2018    Diabetes ([address]     Hypertension      Past Surgical History:        Procedure Laterality Date    CARDIAC CATHETERIZATION Left 10/02/2008    @ [facility]    CHOLECYSTECTOMY      COLONOSCOPY      COLONOSCOPY N/A 8/29/2019    mid ascending colon polyp bx/cauterization (inflammatory polyp), proximal transverse colon polyp bx/cauterized (tubular adenoma), descending colon polyp 80 cm from anus bx/cauterized (hyperplastic polyp), sigmoid colon polyp 35 cm from anus bx/cauterized (tubular adenoma), sigmoid colon polyps 30 cm from anus bx/cauterized (tubular adenoma), Dr. Davis Meza, [facility]    COLONOSCOPY  8/29/2019    mid ascending colon polyp bx/cauterization (inflammatory polyp), proximal transverse colon polyp bx/cauterized (tubular adenoma), descending colon polyp 80 cm from anus bx/cauterized (hyperplastic polyp), sigmoid colon polyp 35 cm from anus bx/cauterized (tubular adenoma), sigmoid colon polyps 30 cm from anus bx/cauterized (tubular adenoma), Dr. Brandon Artis, [facility]    DIALYSIS FISTULA CREATION Left 1/22/2019    LIGATIION LEFT LEFT UPPER EXTREMITY OF AV FISTULA , EVACUATION HEMATOMA performed by Chrissie Landa MD at [place] [address] Left 9/18/2018    AV FISTULA  LEFT ARM performed by Chrissie Landa MD at [place] [address] Left 11/14/2018    SUPERFICIALIZATION AV FISTULA  LEFT ARM , LIGATION TRIBUTORY AV FISTULA LEFT ARM performed by Chrissie Landa MD at [place] [address]       Current Medications:   Prior to Admission medications    Medication Sig Start Date End Date Taking?  Authorizing Provider   apixaban (ELIQUIS) 5 MG TABS tablet Take 1 tablet by mouth 2 times daily 10/15/20  Yes Eda Francis MD   DULoxetine (CYMBALTA) 60 MG extended release capsule Take 1 capsule by mouth daily 10/1/20  Yes Eliott Collet, APRN - CNP   amLODIPine (NORVASC) 5 MG tablet Take 1 tablet by mouth daily 10/1/20  Yes Eliott Collet, APRN - CNP   atorvastatin (LIPITOR) 40 MG tablet Take 1 tablet by mouth daily 10/1/20  Yes Eliott Collet, APRN - CNP   torsemide (DEMADEX) 20 MG tablet Take 1 tablet by mouth daily 10/1/20  Yes Eliott Collet, APRN - CNP   metoprolol tartrate (LOPRESSOR) 50 MG tablet Take 1 tablet by mouth 2 times daily 10/1/20  Yes Eliott Collet, APRN - CNP   glipiZIDE (GLUCOTROL XL) 5 MG extended release tablet Take 1 tablet by mouth 2 times daily 10/1/20  Yes Eliott Collet, APRN - CNP   Respiratory Therapy Supplies (SPIROMETER) KIT 1 kit by Does not apply route 2 times daily 2/1/20  Yes Eliott Collet, APRN - CNP   propafenone (RYTHMOL SR) 325 MG extended release capsule Take 1 capsule by mouth 2 times daily 12/10/19  Yes Ailyn Andrews MD   sodium bicarbonate 650 MG tablet TK 1 T PO BID 6/3/19  Yes Historical Provider, MD   Cholecalciferol (VITAMIN D3) 1000 units TABS TK 1 T PO QD 4/16/19  Yes Historical Provider, MD     Allergies:  Adhesive tape and Penicillins    Social History     Socioeconomic History    Marital status:      Spouse name: Not on file    Number of children: Not on file    Years of education: Not on file    Highest education level: Not on file   Occupational History    Not on file   Social Needs    Financial resource strain: Not on file    Food insecurity     Worry: Not on file     Inability: Not on file    Transportation needs     Medical: Not on file     Non-medical: Not on file   Tobacco Use    Smoking status: Current Every Day Smoker     Packs/day: 0.50     Years: 43.00     Pack years: 21.50     Types: Cigarettes     Last attempt to quit: 9/1/2017     Years since quitting: 3.1    Smokeless tobacco: Never Used   Substance and Sexual Activity    Alcohol use: Not Currently     Comment: 1 cup of coffee a day     Drug use: Never    Sexual activity: Not on file   Lifestyle    Physical activity     Days per week: Not on file     Minutes per session: Not on file    Stress: Not on file   Relationships    Social connections     Talks on phone: Not on file     Gets together: Not on file     Attends Judaism service: Not on file     Active member of club or organization: Not on file     Attends meetings of clubs or organizations: Not on file     Relationship status: Not on file    Intimate partner violence     Fear of current or ex partner: Not on file     Emotionally abused: Not on file     Physically abused: Not on file     Forced sexual activity: Not on file   Other Topics Concern    Not on file   Social History Narrative    Drinks 1 cup of coffee daily.          Family History   Problem Relation Age of Onset    Cancer Mother         bladder    Diabetes Mother     COPD Mother     Depression Father     Diabetes Father     Heart Disease Father     High Blood Pressure Father     Stroke Father     Prostate Cancer Father     Dementia Father     Alcohol Abuse Brother     Diabetes Brother     Bipolar Disorder Paternal Uncle     Anxiety Disorder Daughter     Colon Cancer Maternal Grandfather        REVIEW OF SYSTEMS (New symptoms):    Eyes:      Blurred vision:  No [x]/Yes []               Diplopia:   No [x]/Yes []               Vision loss:       No [x]/Yes []   Ears, nose, throat:             Hearing loss:    No [x]/Yes []      Vertigo:   No [x]/Yes []                       Swallowing problem:  No [x]/Yes []               Nose bleeds:   No [x]/Yes []      Voice hoarseness:  No [x]/Yes []  Respiratory:             Cough:   No [x]/Yes []      Pleuritic chest pain:  No [x]/Yes []                        Dyspnea:   No [x]/Yes []      Wheezing:   No [x]/Yes []  Cardiovascular:             Angina:   No [x]/Yes []      Palpitations:   No [x]/Yes []          Claudication:    No [x]/Yes []      Leg swelling:   No [x]/Yes []  Gastrointestinal:             Nausea or vomiting:  No [x]/Yes []               Abdominal pain:  No [x]/Yes []                     Intestinal bleeding: No [x]/Yes []  Musculoskeletal:             Leg pain:   No [x]/Yes []      Back pain:   No [x]/Yes []                    Weakness:   No [x]/Yes []  Neurologic:             Numbness:   No [x]/Yes []      Paralysis:   No [x]/Yes []                       Headaches:   No [x]/Yes []  Hematologic, lymphatic:   Anemia:   No [x]/Yes []              Bleeding or bruising:  No [x]/Yes []              Fevers or chills: No [x]/Yes []  Endocrine:             Temp intolerance:   No [x]/Yes []                       Polydipsia, polyuria:  No [x]/Yes []  Skin:              Rash:    No [x]/Yes []      Ulcers:   No [x]/Yes []              Abnorm pigment: No [x]/Yes []  :              Frequency/urgency:  No [x]/Yes []      Hematuria:    No [x]/Yes []                      Incontinence:    No [x]/Yes []  He has a history of atrial fibrillation currently is negative see above  PHYSICAL EXAM:  Vitals:    11/11/20 1007   BP: (!) 140/80   Resp: 16     General Appearance: Morbidly obese alert and oriented to person, place and time, well developed and well- nourished, in no acute distress  Skin: warm and dry, no rash or erythema  Head: normocephalic and atraumatic  Eyes: extraocular eye movements intact, conjunctivae normal  ENT: external ear and ear canal normal bilaterally, nose without deformity  Pulmonary/Chest: clear to auscultation bilaterally- no wheezes, rales or rhonchi, normal air movement, no respiratory distress  Cardiovascular: normal rate, regular rhythm, normal S1 and S2, no murmurs, no carotid bruits  Abdomen: soft, non-tender, non-distended, normal bowel sounds, no masses or organomegaly  Musculoskeletal: normal range of motion, no joint swelling, deformity or tenderness  Neurologic: no cranial nerve deficit, gait, coordination and speech normal  Extremities: Patient with bilateral brachial radial pulses. Both are symmetric and equal.  Jeferson's test bilaterally is unremarkable. She has a scar of the left upper extremity. Consistent with prior access interventions. There is multiple scars that are present. Her basilic vein is patent but is small in joins the brachial about the mid segment of her arm. On the right side she has a nicely developed cephalic vein better than the vein mapping and measures over 3 mm at the antecubital crease and is fairly consistent throughout the upper arm. She has a nice disease-free brachial artery. Problem List Items Addressed This Visit     CKD (chronic kidney disease) stage 5, GFR less than 15 ml/min ([facility]) - Primary (Chronic)            1 end-stage renal disease not on dialysis.   I have given her the option such as a brachiobasilic transposition I have explained the issues with regards to the access. I also talked about the possibility of using her right arm. She is not opposed at all and using her right arm and therefore we will proceed with a right arm brachiocephalic fistula. Risk benefits and alternatives were discussed with the patient including but not limited to bleeding, infection, arterial venous nerve injury, arterial steal, venous hypertension, sensorimotor disturbance or loss, maintenance of the access, need for additional access, loss of the access, if an AV graft needs to be used risk of infection and/or removal, distal embolization, wound complications, limb loss and/or death the patient understands and wishes to proceed. No follow-ups on file.

## 2020-11-18 ENCOUNTER — TELEPHONE (OUTPATIENT)
Dept: FAMILY MEDICINE CLINIC | Age: 65
End: 2020-11-18

## 2020-11-18 NOTE — TELEPHONE ENCOUNTER
Pharmacy asking for new script to be sent over with directions. Pt said she is taking different then what signature says please advise.

## 2020-11-20 RX ORDER — GLIPIZIDE 5 MG/1
5 TABLET, FILM COATED, EXTENDED RELEASE ORAL 2 TIMES DAILY
Qty: 60 TABLET | Refills: 5 | Status: SHIPPED
Start: 2020-11-20 | End: 2021-01-05 | Stop reason: SDUPTHER

## 2020-11-25 NOTE — PROGRESS NOTES
Patient agreed to COVID test on 12/2 at the  [facility]  located at  [address] [place] [address] Drive the hours of 6 am- 2:30 pm, instructed to bring ID. Patient instructed to self isolate until day of surgery.

## 2020-12-01 NOTE — PROGRESS NOTES
[address] PRE-ADMISSION TESTING GENERAL INSTRUCTIONS- [facility]-phone number:[phone]    GENERAL INSTRUCTIONS  [x] Antibacterial Soap shower Night before and/or AM of Surgery  [] Gigi wipe instruction sheet and wipes given. [x] Nothing by mouth after midnight, including gum, candy, mints, or water. [x] You may brush your teeth, gargle, but do NOT swallow water. []Hibiclens shower  the night before and the morning of surgery. Do not use             Hibiclens on your face or head. [x]No smoking, chewing tobacco, illegal drugs, or alcohol within 24 hours of your surgery. [x] Jewelry, valuables or body piercing's should not be brought to the hospital. All body and/or tongue piercing's must be removed prior to arriving to hospital.  ALL hair pins must be removed. [x] Do not wear makeup, lotions, powders, deodorant. Nail polish as directed by the nurse. [x] Arrange transportation with a responsible adult  to and from the hospital. If you do not have a responsible adult  to transport you, you will need to make arrangements with a medical transportation company (i.e. [employer]. A Uber/taxi/bus is not appropriate unless you are accompanied by a responsible adult ). Arrange for someone to be with you for the remainder of the day and for 24 hours after your procedure due to having had anesthesia. Who will be your  for transportation?______HUSBAND____________   Who will be staying with you for 24 hrs after your procedure?____________HUSBNAND______  [x] Bring insurance card and photo ID.  [] Transfusion Bracelet: Please bring with you to hospital, day of surgery  [] Bring urine specimen day of surgery. Any small container is acceptable. [] Use inhalers the morning of surgery and bring with you to hospital.  [] Bring copy of living will or healthcare power of  papers to be placed in your electronic record.   [] CPAP/BI-PAP: Please bring your machine if you are to spend the night in the hospital.     PARKING INSTRUCTIONS:   [x] Arrival Time:_______1130______  · [] Parking lot '\"I\"  is located on [facility] (the corner of [facility] and [facility]). To enter, press the button and the gate will lift. A free token will be provided to exit the lot. One car per patient is allowed to park in this lot. All other cars are to park on [address] [place] [address] either in the parking garage or the handicap lot. [] To reach the [facility] lobby from [address] [place] [address], upon entering the hospital, take elevator B to the 3rd floor. EDUCATION INSTRUCTIONS:      [] Knee or hip replacement booklet & exercise pamphlets given. [] [address] placed in chart. [] Pre-admission Testing educational folder given  [] Incentive Spirometry,coughing & deep breathing exercises reviewed. []Medication information sheet(s)   []Fluoroscopy-Xray used in surgery reviewed with patient. Educational pamphlet placed in chart. []Pain: Post-op pain is normal and to be expected. You will be asked to rate your pain from 0-10(a zero is not acceptable-education is needed). Your post-op pain goal is:  [] Ask your nurse for your pain medication. [] Joint camp offered. [] Joint replacement booklets given. [] Other:___________________________    MEDICATION INSTRUCTIONS:   [x]Bring a complete list of your medications, please write the last time you took the medicine, give this list to the nurse. [x] Take the following medications the morning of surgery with 1-2 ounces of water: SEE MED SHEET  [x] Stop herbal supplements and vitamins 5 days before your surgery. [x] DO NOT take any diabetic medicine the morning of surgery. Follow instructions for insulin the day before surgery. [x] If you are diabetic and your blood sugar is low or you feel symptomatic, you may drink 1-2 ounces of apple juice or take a glucose tablet.   The morning of your procedure, you may call the pre-op area if you have concerns about your blood sugar [phone]. [] Use your inhalers the morning of surgery. Bring your emergency inhaler with you day of surgery. [] Follow physician instructions regarding any blood thinners you may be taking. WHAT TO EXPECT:  [x] The day of surgery you will be greeted and checked in by the [facility].  In addition, you will be registered in the [facility] by a Patient Access Representative. Please bring your photo ID and insurance card. A nurse will greet you in accordance to the time you are needed in the pre-op area to prepare you for surgery. Please do not be discouraged if you are not greeted in the order you arrive as there are many variables that are involved in patient preparation. Your patience is greatly appreciated as you wait for your nurse. Please bring in items such as: books, magazines, newspapers, electronics, or any other items  to occupy your time in the waiting area. []  Delays may occur with surgery and staff will make a sincere effort to keep you informed of delays. If any delays occur with your procedure, we apologize ahead of time for your inconvenience as we recognize the value of your time.

## 2020-12-02 ENCOUNTER — HOSPITAL ENCOUNTER (OUTPATIENT)
Age: 65
Discharge: HOME | End: 2020-12-04
Payer: MEDICARE

## 2020-12-02 PROCEDURE — U0003 INFECTIOUS AGENT DETECTION BY NUCLEIC ACID (DNA OR RNA); SEVERE ACUTE RESPIRATORY SYNDROME CORONAVIRUS 2 (SARS-COV-2) (CORONAVIRUS DISEASE [COVID-19]), AMPLIFIED PROBE TECHNIQUE, MAKING USE OF HIGH THROUGHPUT TECHNOLOGIES AS DESCRIBED BY CMS-2020-01-R: HCPCS

## 2020-12-02 RX ORDER — PROPAFENONE HYDROCHLORIDE 325 MG/1
325 CAPSULE, EXTENDED RELEASE ORAL 2 TIMES DAILY
Qty: 180 CAPSULE | Refills: 3 | Status: SHIPPED
Start: 2020-12-02 | End: 2021-08-18 | Stop reason: SDUPTHER

## 2020-12-04 LAB
SARS-COV-2: NOT DETECTED
SOURCE: NORMAL

## 2020-12-06 ENCOUNTER — ANESTHESIA EVENT (OUTPATIENT)
Dept: OPERATING ROOM | Age: 65
End: 2020-12-06
Payer: MEDICARE

## 2020-12-06 RX ORDER — ROPIVACAINE HYDROCHLORIDE 5 MG/ML
30 INJECTION, SOLUTION EPIDURAL; INFILTRATION; PERINEURAL ONCE
Status: CANCELLED | OUTPATIENT
Start: 2020-12-06

## 2020-12-06 RX ORDER — MIDAZOLAM HYDROCHLORIDE 2 MG/2ML
1 INJECTION, SOLUTION INTRAMUSCULAR; INTRAVENOUS PRN
Status: CANCELLED | OUTPATIENT
Start: 2020-12-06

## 2020-12-06 RX ORDER — FENTANYL CITRATE 50 UG/ML
50 INJECTION, SOLUTION INTRAMUSCULAR; INTRAVENOUS PRN
Status: CANCELLED | OUTPATIENT
Start: 2020-12-06

## 2020-12-06 NOTE — PROGRESS NOTES
Form completed, signed, and faxed after virtual appointment to [phone]   HPI:  The patient is a 62 y.o. female who presents today to establish care, she moved a year ago and has been taking care of her . The patient complains of palpitations, SOB, depression & kidney failure. Wilver Jimenez was taking the Wellbutrin as a 2nd drug for smoking cessation and depression. She is again smoking about a 1/2 pack a day, sometimes less. It's habit for her, that she is aware of. She was also taking the Cymbalta for depression, she realizes that it was a high dose. She was seeing a jonas for several months and he retired. She had seen Dr Kelli Walters for her medications, but she does not see him anymore. She does not follow a formal exercise plan, she can not work in her yard like she used to, she gets fatigued & SOB. She has spinal stenosis. She walks in grocery store with shopping cart. She has horrible eating habits, especially when she doesn't feel good. She eats ice all night long and doesn't choose the best foods for her or her . She does not have a cardiologist in the area, she has swelling in both ankles, hands & face.      Past Medical History:   Diagnosis Date    Atrial fibrillation ([address]     Bladder cancer ([address]     Cancer ([address]     CKD (chronic kidney disease)     CKD (chronic kidney disease) stage 5, GFR less than 15 ml/min ([address] 11/6/2018    Diabetes ([address]     Hypertension       Past Surgical History:   Procedure Laterality Date    CHOLECYSTECTOMY      COLONOSCOPY      DIALYSIS FISTULA CREATION Left 1/22/2019    LIGATIION LEFT LEFT UPPER EXTREMITY OF AV FISTULA , EVACUATION HEMATOMA performed by Daren Batista MD at [place] [address] Left 9/18/2018    AV FISTULA  LEFT ARM performed by Daren Batista MD at [place] [address] Left 11/14/2018    SUPERFICIALIZATION AV FISTULA  LEFT ARM , LIGATION TRIBUTORY AV FISTULA LEFT ARM performed by Daren Batista MD at [facility] heat intolerance and polyuria. Genitourinary: Negative for difficulty urinating. Musculoskeletal: Positive for gait problem (Unsteady at times). Negative for arthralgias and myalgias. Skin: Negative for color change, rash and wound. Dry itching skin, she applies OTC itchy cream & it helps. Neurological: Positive for dizziness, light-headedness and headaches. Falls towards direction of head. Hematological: Negative for adenopathy. Bruises/bleeds easily (takes medication. ). Psychiatric/Behavioral: Positive for sleep disturbance (Trouble falling asleep, mind races). Negative for agitation, decreased concentration and suicidal ideas. The patient is not nervous/anxious. BP Readings from Last 1 Encounters:   05/17/19 124/70    Recheck if >140/90  Hemoglobin A1C (%)   Date Value   11/06/2018 6.0 (H)     Have you had your Pneumonia Vaccine no, will take    Have you had a Colorectal Screening  Yes, found polyps never followed up, will refer for screening    Have you had a Screening Mammogram  No, will do bus    Have you seen any other physician or provider since your last visit: kidney & hospital admission    Have you had any other diagnostic tests since your last visit? yes -     Physical Exam:    Vitals:    05/17/19 0916   BP: 124/70   Site: Left Upper Arm   Position: Sitting   Cuff Size: Large Adult   Pulse: 70   Resp: 16   Temp: 98.9 °F (37.2 °C)   TempSrc: Axillary   SpO2: 94%   Weight: 258 lb (117 kg)   Height: 5' 7\" (1.702 m)     Physical Exam   Constitutional: She is oriented to person, place, and time. She appears well-developed and well-nourished. HENT:   Head: Normocephalic and atraumatic. Eyes: Pupils are equal, round, and reactive to light. EOM are normal.   Neck: Normal range of motion. Neck supple. No thyromegaly present. Cardiovascular: Normal rate, regular rhythm and intact distal pulses. Murmur heard.   Pulmonary/Chest: Effort normal and breath sounds normal. Abdominal: Soft. Bowel sounds are normal.   Genitourinary:   Genitourinary Comments: Deferred. Musculoskeletal: Normal range of motion. She exhibits edema (+1, bilateral ankles). Lymphadenopathy:     She has no cervical adenopathy. Neurological: She is alert and oriented to person, place, and time. No cranial nerve deficit. She exhibits normal muscle tone. Coordination normal.   CN II to XII intact. Gait steady and purposeful. Skin: Skin is warm and dry. Capillary refill takes less than 2 seconds. Psychiatric: She has a normal mood and affect. Judgment normal.   Nursing note and vitals reviewed. Assessment/Plan:    Jaz Do was seen today for [facility] care and health maintenance. Diagnoses and all orders for this visit:    Colon cancer screening  -     Cancel: COLONOSCOPY (Screening)  -     Wolfgang Castorena MD, General Surgery, [place]    Moderate episode of recurrent major depressive disorder (HCC)  -     DULoxetine (CYMBALTA) 30 MG extended release capsule; Take 1 capsule by mouth daily  - Last dose was Cymbalta 60 mg, but no records of provider prescribing. Choice was Wellbutrin or Cymbalta and patient chose this. Dose was lowered to 30 mg or we can refer her for therapy for increased dose. Encounter for screening mammogram for breast cancer  -     [facility] Digital Screen Bilateral [[id number]]; Future    Need for prophylactic vaccination against Streptococcus pneumoniae (pneumococcus)  -     Pneumococcal polysaccharide vaccine 23-valent PPSV23    Atrial fibrillation, unspecified type ([facility]   -The current medical regimen is effective;  continue present plan and medications. - Referral to [facility]    Hypertension, unspecified type    - The current medical regimen is effective;  continue present plan and medications. - Referral to [facility]    - Will complete lab work at next appointment    Return in about 2 months (around 7/17/2019). , or sooner if necessary.

## 2020-12-07 ENCOUNTER — HOSPITAL ENCOUNTER (OUTPATIENT)
Age: 65
Setting detail: OUTPATIENT SURGERY
Discharge: HOME | End: 2020-12-07
Attending: SURGERY | Admitting: SURGERY
Payer: MEDICARE

## 2020-12-07 ENCOUNTER — ANESTHESIA (OUTPATIENT)
Dept: OPERATING ROOM | Age: 65
End: 2020-12-07
Payer: MEDICARE

## 2020-12-07 VITALS
TEMPERATURE: 97.8 F | WEIGHT: 270 LBS | SYSTOLIC BLOOD PRESSURE: 138 MMHG | OXYGEN SATURATION: 95 % | RESPIRATION RATE: 16 BRPM | HEIGHT: 67 IN | HEART RATE: 88 BPM | DIASTOLIC BLOOD PRESSURE: 68 MMHG | BODY MASS INDEX: 42.38 KG/M2

## 2020-12-07 VITALS — DIASTOLIC BLOOD PRESSURE: 64 MMHG | SYSTOLIC BLOOD PRESSURE: 110 MMHG | TEMPERATURE: 97.5 F | OXYGEN SATURATION: 95 %

## 2020-12-07 LAB
ABO/RH: NORMAL
ANION GAP SERPL CALCULATED.3IONS-SCNC: 14 MMOL/L (ref 7–16)
ANTIBODY SCREEN: NORMAL
BUN BLDV-MCNC: 40 MG/DL (ref 8–23)
CALCIUM SERPL-MCNC: 9.4 MG/DL (ref 8.6–10.2)
CHLORIDE BLD-SCNC: 103 MMOL/L (ref 98–107)
CO2: 23 MMOL/L (ref 22–29)
CREAT SERPL-MCNC: 3.4 MG/DL (ref 0.5–1)
GFR AFRICAN AMERICAN: 16
GFR NON-AFRICAN AMERICAN: 14 ML/MIN/1.73
GLUCOSE BLD-MCNC: 214 MG/DL (ref 74–99)
HCT VFR BLD CALC: 40.6 % (ref 34–48)
HEMOGLOBIN: 13 G/DL (ref 11.5–15.5)
INR BLD: 1
MCH RBC QN AUTO: 31.9 PG (ref 26–35)
MCHC RBC AUTO-ENTMCNC: 32 % (ref 32–34.5)
MCV RBC AUTO: 99.8 FL (ref 80–99.9)
METER GLUCOSE: 203 MG/DL (ref 74–99)
PDW BLD-RTO: 12.5 FL (ref 11.5–15)
PLATELET # BLD: 336 E9/L (ref 130–450)
PMV BLD AUTO: 10.3 FL (ref 7–12)
POTASSIUM SERPL-SCNC: 3.6 MMOL/L (ref 3.5–5)
PROTHROMBIN TIME: 11.4 SEC (ref 9.3–12.4)
RBC # BLD: 4.07 E12/L (ref 3.5–5.5)
SODIUM BLD-SCNC: 140 MMOL/L (ref 132–146)
WBC # BLD: 12.4 E9/L (ref 4.5–11.5)

## 2020-12-07 PROCEDURE — 85610 PROTHROMBIN TIME: CPT

## 2020-12-07 PROCEDURE — 82962 GLUCOSE BLOOD TEST: CPT

## 2020-12-07 PROCEDURE — 2580000003 HC RX 258: Performed by: SURGERY

## 2020-12-07 PROCEDURE — 86850 RBC ANTIBODY SCREEN: CPT

## 2020-12-07 PROCEDURE — 2500000003 HC RX 250 WO HCPCS: Performed by: SURGERY

## 2020-12-07 PROCEDURE — 7100000001 HC PACU RECOVERY - ADDTL 15 MIN: Performed by: SURGERY

## 2020-12-07 PROCEDURE — 7100000010 HC PHASE II RECOVERY - FIRST 15 MIN: Performed by: SURGERY

## 2020-12-07 PROCEDURE — 3600000012 HC SURGERY LEVEL 2 ADDTL 15MIN: Performed by: SURGERY

## 2020-12-07 PROCEDURE — 7100000011 HC PHASE II RECOVERY - ADDTL 15 MIN: Performed by: SURGERY

## 2020-12-07 PROCEDURE — 94640 AIRWAY INHALATION TREATMENT: CPT

## 2020-12-07 PROCEDURE — 7100000000 HC PACU RECOVERY - FIRST 15 MIN: Performed by: SURGERY

## 2020-12-07 PROCEDURE — 80048 BASIC METABOLIC PNL TOTAL CA: CPT

## 2020-12-07 PROCEDURE — 3700000000 HC ANESTHESIA ATTENDED CARE: Performed by: SURGERY

## 2020-12-07 PROCEDURE — 6360000002 HC RX W HCPCS: Performed by: SURGERY

## 2020-12-07 PROCEDURE — 6360000002 HC RX W HCPCS: Performed by: ANESTHESIOLOGY

## 2020-12-07 PROCEDURE — 2709999900 HC NON-CHARGEABLE SUPPLY: Performed by: SURGERY

## 2020-12-07 PROCEDURE — 2580000003 HC RX 258

## 2020-12-07 PROCEDURE — 36415 COLL VENOUS BLD VENIPUNCTURE: CPT

## 2020-12-07 PROCEDURE — 3700000001 HC ADD 15 MINUTES (ANESTHESIA): Performed by: SURGERY

## 2020-12-07 PROCEDURE — 85027 COMPLETE CBC AUTOMATED: CPT

## 2020-12-07 PROCEDURE — 36821 AV FUSION DIRECT ANY SITE: CPT | Performed by: SURGERY

## 2020-12-07 PROCEDURE — 3600000002 HC SURGERY LEVEL 2 BASE: Performed by: SURGERY

## 2020-12-07 PROCEDURE — 86900 BLOOD TYPING SEROLOGIC ABO: CPT

## 2020-12-07 PROCEDURE — 6360000002 HC RX W HCPCS

## 2020-12-07 PROCEDURE — 94664 DEMO&/EVAL PT USE INHALER: CPT

## 2020-12-07 PROCEDURE — 86901 BLOOD TYPING SEROLOGIC RH(D): CPT

## 2020-12-07 RX ORDER — SODIUM CHLORIDE 0.9 % (FLUSH) 0.9 %
10 SYRINGE (ML) INJECTION EVERY 12 HOURS SCHEDULED
Status: DISCONTINUED | OUTPATIENT
Start: 2020-12-07 | End: 2020-12-07 | Stop reason: HOSPADM

## 2020-12-07 RX ORDER — SODIUM CHLORIDE 450 MG/100ML
INJECTION, SOLUTION INTRAVENOUS CONTINUOUS
Status: DISCONTINUED | OUTPATIENT
Start: 2020-12-07 | End: 2020-12-07 | Stop reason: HOSPADM

## 2020-12-07 RX ORDER — MIDAZOLAM HYDROCHLORIDE 2 MG/2ML
2 INJECTION, SOLUTION INTRAMUSCULAR; INTRAVENOUS ONCE
Status: COMPLETED | OUTPATIENT
Start: 2020-12-07 | End: 2020-12-07

## 2020-12-07 RX ORDER — SODIUM CHLORIDE 9 MG/ML
INJECTION, SOLUTION INTRAVENOUS CONTINUOUS PRN
Status: DISCONTINUED | OUTPATIENT
Start: 2020-12-07 | End: 2020-12-07 | Stop reason: SDUPTHER

## 2020-12-07 RX ORDER — ALBUTEROL SULFATE 2.5 MG/3ML
2.5 SOLUTION RESPIRATORY (INHALATION) ONCE
Status: COMPLETED | OUTPATIENT
Start: 2020-12-07 | End: 2020-12-07

## 2020-12-07 RX ORDER — ROPIVACAINE HYDROCHLORIDE 5 MG/ML
30 INJECTION, SOLUTION EPIDURAL; INFILTRATION; PERINEURAL ONCE
Status: COMPLETED | OUTPATIENT
Start: 2020-12-07 | End: 2020-12-07

## 2020-12-07 RX ORDER — HEPARIN SODIUM 1000 [USP'U]/ML
INJECTION, SOLUTION INTRAVENOUS; SUBCUTANEOUS PRN
Status: DISCONTINUED | OUTPATIENT
Start: 2020-12-07 | End: 2020-12-07 | Stop reason: SDUPTHER

## 2020-12-07 RX ORDER — FENTANYL CITRATE 50 UG/ML
25 INJECTION, SOLUTION INTRAMUSCULAR; INTRAVENOUS EVERY 5 MIN PRN
Status: DISCONTINUED | OUTPATIENT
Start: 2020-12-07 | End: 2020-12-07 | Stop reason: HOSPADM

## 2020-12-07 RX ORDER — PROPOFOL 10 MG/ML
INJECTION, EMULSION INTRAVENOUS CONTINUOUS PRN
Status: DISCONTINUED | OUTPATIENT
Start: 2020-12-07 | End: 2020-12-07 | Stop reason: SDUPTHER

## 2020-12-07 RX ORDER — PROTAMINE SULFATE 10 MG/ML
INJECTION, SOLUTION INTRAVENOUS PRN
Status: DISCONTINUED | OUTPATIENT
Start: 2020-12-07 | End: 2020-12-07 | Stop reason: SDUPTHER

## 2020-12-07 RX ORDER — SODIUM CHLORIDE 0.9 % (FLUSH) 0.9 %
10 SYRINGE (ML) INJECTION PRN
Status: DISCONTINUED | OUTPATIENT
Start: 2020-12-07 | End: 2020-12-07 | Stop reason: HOSPADM

## 2020-12-07 RX ORDER — HYDROCODONE BITARTRATE AND ACETAMINOPHEN 5; 325 MG/1; MG/1
1 TABLET ORAL EVERY 6 HOURS PRN
Qty: 28 TABLET | Refills: 0 | Status: SHIPPED | OUTPATIENT
Start: 2020-12-07 | End: 2020-12-14

## 2020-12-07 RX ORDER — FENTANYL CITRATE 50 UG/ML
50 INJECTION, SOLUTION INTRAMUSCULAR; INTRAVENOUS EVERY 5 MIN PRN
Status: DISCONTINUED | OUTPATIENT
Start: 2020-12-07 | End: 2020-12-07 | Stop reason: HOSPADM

## 2020-12-07 RX ORDER — FENTANYL CITRATE 50 UG/ML
100 INJECTION, SOLUTION INTRAMUSCULAR; INTRAVENOUS ONCE
Status: COMPLETED | OUTPATIENT
Start: 2020-12-07 | End: 2020-12-07

## 2020-12-07 RX ADMIN — ROPIVACAINE HYDROCHLORIDE 30 ML: 5 INJECTION, SOLUTION EPIDURAL; INFILTRATION; PERINEURAL at 12:30

## 2020-12-07 RX ADMIN — FENTANYL CITRATE 100 MCG: 50 INJECTION, SOLUTION INTRAMUSCULAR; INTRAVENOUS at 12:22

## 2020-12-07 RX ADMIN — ALBUTEROL SULFATE 2.5 MG: 2.5 SOLUTION RESPIRATORY (INHALATION) at 14:45

## 2020-12-07 RX ADMIN — MIDAZOLAM 2 MG: 1 INJECTION INTRAMUSCULAR; INTRAVENOUS at 12:21

## 2020-12-07 RX ADMIN — HEPARIN SODIUM 12000 UNITS: 1000 INJECTION INTRAVENOUS; SUBCUTANEOUS at 12:59

## 2020-12-07 RX ADMIN — Medication 2 G: at 12:46

## 2020-12-07 RX ADMIN — PROPOFOL 50 MCG/KG/MIN: 10 INJECTION, EMULSION INTRAVENOUS at 12:43

## 2020-12-07 RX ADMIN — PROTAMINE SULFATE 30 MG: 10 INJECTION, SOLUTION INTRAVENOUS at 13:20

## 2020-12-07 RX ADMIN — SODIUM CHLORIDE: 9 INJECTION, SOLUTION INTRAVENOUS at 12:37

## 2020-12-07 ASSESSMENT — PAIN SCALES - GENERAL
PAINLEVEL_OUTOF10: 0

## 2020-12-07 ASSESSMENT — PULMONARY FUNCTION TESTS
PIF_VALUE: 1
PIF_VALUE: 0
PIF_VALUE: 1
PIF_VALUE: 0
PIF_VALUE: 1
PIF_VALUE: 0
PIF_VALUE: 1
PIF_VALUE: 0
PIF_VALUE: 1
PIF_VALUE: 0
PIF_VALUE: 0
PIF_VALUE: 1
PIF_VALUE: 0
PIF_VALUE: 1

## 2020-12-07 ASSESSMENT — LIFESTYLE VARIABLES: SMOKING_STATUS: 1

## 2020-12-07 ASSESSMENT — ENCOUNTER SYMPTOMS: SHORTNESS OF BREATH: 1

## 2020-12-07 ASSESSMENT — PAIN - FUNCTIONAL ASSESSMENT: PAIN_FUNCTIONAL_ASSESSMENT: 0-10

## 2020-12-07 NOTE — OP NOTE
Operative Note      Patient: Jeff Padilla  YOB: 1955  MRN: [id number]      Jeff Padilla  1955      DATE OF PROCEDURE: 12/7/2020     SURGEON: Luke Arguello M.D.     ASSISTANT: Floersita Pacheco     PREOPERATIVE DIAGNOSIS: Chronic renal failure, Stage 5     POSTOPERATIVE DIAGNOSIS: Same    OPERATION: Creation of right brachiocephalic arteriovenous fistula     ANESTHESIA: Regional, local and LMAC     ESTIMATED BLOOD LOSS: Minimal     COMPLICATIONS: None    DESCRIPTION OF PROCEDURE: The patient was identified and the procedure was confirmed. The right arm was prepped and draped in the usual sterile fashion. Lidocaine 1% mixed with 0.25% Marcaine was used for local anesthesia. A transverse skin incision was made in the antecubital fossa and carried down through the subcutaneous tissue. The cephalic vein was identified and dissected free from the surrounding tissues. The vein appeared to be good quality for the fistula. It was marked for orientation and branches were divided between silk ties. It was ligated distally and divided. Medially, the brachial artery was identified and freed from the surrounding tissues. It was surrounded proximally and distally with vessel loops for control. The patient was heparinized and the artery was clamped proximally and distally. A longitudinal arteriotomy measuring 5 mm was made. The vein was cut to the appropriate length and spatulated and anastomosed to the side of the artery using a running 6-0 Prolene suture. After completing the anastomosis, the clamps were released and a thrill was appreciated through the fistula. A radial pulse was appreciated in the wrist. Hemostasis was obtained and the incisions were irrigated with saline solution. The incision was approximated with Vicryl sutures and Dermabond was applied to the incisions in the operating room. Needle, sponge and instrument counts were reported as correct x2.  The patient tolerated the procedure and was transferred to the recovery area in satisfactory condition.      Zoila Vargas    CC : Bautista Lewis, APRN - CNP     Electronically signed by Zoila Vargas MD on 12/7/2020 at 2:05 PM

## 2020-12-07 NOTE — ANESTHESIA PRE PROCEDURE
0.45 % sodium chloride infusion   Intravenous Continuous Darleen Kenyon MD        sodium chloride flush 0.9 % injection 10 mL  10 mL Intravenous 2 times per day Darleen Kenyon MD        sodium chloride flush 0.9 % injection 10 mL  10 mL Intravenous PRN Darleen Kenyon MD        ceFAZolin (ANCEF) 2 g in sterile water 20 mL IV syringe  2 g Intravenous On Call to [address], MD        fentaNYL (SUBLIMAZE) injection 25 mcg  25 mcg Intravenous Q5 Min PRN Carrie Streeter MD        fentaNYL (SUBLIMAZE) injection 50 mcg  50 mcg Intravenous Q5 Min PRN Carrie Streeter MD           Allergies:     Allergies   Allergen Reactions    Adhesive Tape Rash    Penicillins Rash       Problem List:    Patient Active Problem List   Diagnosis Code    Moderate episode of recurrent major depressive disorder (HCC) F33.1    Generalized anxiety disorder F41.1    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.05    Anemia D64.9    Essential hypertension I10    CKD (chronic kidney disease) stage 5, GFR less than 15 ml/min ([facility] N18.5    Atrial fibrillation (HCC) I48.91    Pure hypercholesterolemia E78.00    Morbid obesity (HCC) E66.01    Chronic obstructive pulmonary disease (HCC) J44.9    Obstructive sleep apnea G47.33    Exertional dyspnea R06.00    History of colon polyps Z86.010    Family history of rectal cancer Z80.0       Past Medical History:        Diagnosis Date    Atrial fibrillation (HCC)     Bladder cancer (HCC)     [facility]     CKD (chronic kidney disease)     CKD (chronic kidney disease) stage 5, GFR less than 15 ml/min ([facility] 11/6/2018    Diabetes ([facility]     Hypertension     Obesity     260 #       Past Surgical History:        Procedure Laterality Date    CARDIAC CATHETERIZATION Left 10/02/2008    @ [facility]    CHOLECYSTECTOMY      COLONOSCOPY      COLONOSCOPY N/A 8/29/2019    mid ascending colon polyp bx/cauterization (inflammatory polyp), proximal transverse colon polyp bx/cauterized (tubular adenoma), descending colon polyp 80 cm from anus bx/cauterized (hyperplastic polyp), sigmoid colon polyp 35 cm from anus bx/cauterized (tubular adenoma), sigmoid colon polyps 30 cm from anus bx/cauterized (tubular adenoma), Dr. Hanna Ward, [address] COLONOSCOPY  8/29/2019    mid ascending colon polyp bx/cauterization (inflammatory polyp), proximal transverse colon polyp bx/cauterized (tubular adenoma), descending colon polyp 80 cm from anus bx/cauterized (hyperplastic polyp), sigmoid colon polyp 35 cm from anus bx/cauterized (tubular adenoma), sigmoid colon polyps 30 cm from anus bx/cauterized (tubular adenoma), Dr. Hanna Ward, PHYSICIANS [facility] SURGICAL [facility]    DIALYSIS FISTULA CREATION Left 1/22/2019    LIGATIION LEFT LEFT UPPER EXTREMITY OF AV FISTULA , EVACUATION HEMATOMA performed by [place] Salguero MD at [place] [address] Left 9/18/2018    AV FISTULA  LEFT ARM performed by [place] Salguero MD at [place] [address] Left 11/14/2018    SUPERFICIALIZATION AV FISTULA  LEFT ARM , LIGATION TRIBUTORY AV FISTULA LEFT ARM performed by [place] Salguero MD at [address] [place] [address]         Social History:    Social History     Tobacco Use    Smoking status: Current Every Day Smoker     Packs/day: 0.50     Years: 43.00     Pack years: 21.50     Types: Cigarettes     Last attempt to quit: 9/1/2017     Years since quitting: 3.2    Smokeless tobacco: Never Used   Substance Use Topics    Alcohol use: Not Currently     Comment: 1 cup of coffee a day                                 Ready to quit: Not Answered  Counseling given: Not Answered      Vital Signs (Current):   Vitals:    12/07/20 1053   BP: (!) 168/77   Pulse: 79   Resp: 20   Temp: 97.9 °F (36.6 °C)   TempSrc: Temporal   SpO2: 97%   Weight: 270 lb (122.5 kg)   Height: 5' 7\" (1.702 m)                                              BP Readings from Last 3 Encounters:   12/07/20 (!) 168/77   11/11/20 (!) 140/80   10/15/20 126/84       NPO Status: Time of last liquid consumption: 2200                        Time of last solid consumption: 2000                        Date of last liquid consumption: 12/06/20                        Date of last solid food consumption: 12/06/20    BMI:   Wt Readings from Last 3 Encounters:   12/07/20 270 lb (122.5 kg)   11/11/20 260 lb (117.9 kg)   10/15/20 255 lb (115.7 kg)     Body mass index is 42.29 kg/m². CBC:   Lab Results   Component Value Date    WBC 12.4 12/07/2020    RBC 4.07 12/07/2020    HGB 13.0 12/07/2020    HCT 40.6 12/07/2020    MCV 99.8 12/07/2020    RDW 12.5 12/07/2020     12/07/2020       CMP:   Lab Results   Component Value Date     09/03/2020    K 4.7 09/03/2020    K 4.2 01/22/2019     09/03/2020    CO2 26 09/03/2020    BUN 43 09/03/2020    CREATININE 3.5 09/03/2020    GFRAA 16 09/03/2020    LABGLOM 13 09/03/2020    GLUCOSE 223 09/03/2020    PROT 7.3 01/03/2020    CALCIUM 9.8 09/03/2020    BILITOT 0.4 01/03/2020    ALKPHOS 133 01/03/2020    AST 15 01/03/2020    ALT 12 01/03/2020       POC Tests: No results for input(s): POCGLU, POCNA, POCK, POCCL, POCBUN, POCHEMO, POCHCT in the last 72 hours.     Coags:   Lab Results   Component Value Date    PROTIME 12.8 01/22/2019    INR 1.1 01/22/2019    APTT 27.1 01/22/2019       HCG (If Applicable): No results found for: PREGTESTUR, PREGSERUM, HCG, HCGQUANT     ABGs: No results found for: PHART, PO2ART, JXP5OER, FCC4UFI, BEART, S9TKPDML     Type & Screen (If Applicable):  No results found for: LABABO, LABRH    Drug/Infectious Status (If Applicable):  Lab Results   Component Value Date    HEPCAB NOT DETECTED 01/28/2016       COVID-19 Screening (If Applicable):   Lab Results   Component Value Date    COVID19 Not Detected 12/02/2020         Anesthesia Evaluation  Patient summary reviewed and Nursing notes reviewed no history of anesthetic complications:   Airway: Mallampati: III  TM distance: >3 FB   Neck ROM: full  Mouth opening: > = 3 FB Dental:      Comment: Poor dentition    Pulmonary: breath sounds clear to auscultation  (+) COPD:  shortness of breath:  sleep apnea: on CPAP,  current smoker                           Cardiovascular:    (+) hypertension:, dysrhythmias: atrial fibrillation, IVY:, hyperlipidemia      ECG reviewed  Rhythm: regular  Rate: normal  Echocardiogram reviewed  Stress test reviewed                Neuro/Psych:   (+) psychiatric history:depression/anxiety             GI/Hepatic/Renal:   (+) renal disease: CRI,           Endo/Other:    (+) DiabetesType II DM, poorly controlled, , blood dyscrasia: anticoagulation therapy:., .                 Abdominal:           Vascular:                                        Anesthesia Plan      MAC and regional     ASA 4       Induction: intravenous. Anesthetic plan and risks discussed with patient (granddaughter).       Plan discussed with CRNA and surgical team.                  Mercedez Crowe MD   12/7/2020

## 2020-12-07 NOTE — ANESTHESIA PROCEDURE NOTES
Peripheral Block    Patient location during procedure: procedure area  Start time: 12/7/2020 12:21 PM  End time: 12/7/2020 12:30 PM  Staffing  Anesthesiologist: Gaston Crawford MD  Performed: anesthesiologist   Preanesthetic Checklist  Completed: patient identified, site marked, surgical consent, pre-op evaluation, timeout performed, IV checked, risks and benefits discussed, monitors and equipment checked, anesthesia consent given, oxygen available and patient being monitored  Peripheral Block  Patient position: supine  Prep: ChloraPrep  Patient monitoring: cardiac monitor, continuous pulse ox, continuous capnometry and frequent blood pressure checks  Block type: Brachial plexus  Laterality: right  Injection technique: single-shot  Procedures: ultrasound guided and nerve stimulator  Local infiltration: ropivacaine  Infiltration strength: 0.5 %  Dose: 30 mL  Supraclavicular  Provider prep: mask and sterile gloves  Local infiltration: ropivacaine  Needle  Needle type: combined needle/nerve stimulator   Needle gauge: 21 G  Needle length: 2 inch. Needle localization: nerve stimulator and ultrasound guidance  Assessment  Injection assessment: negative aspiration for heme, no paresthesia on injection and local visualized surrounding nerve on ultrasound  Paresthesia pain: none  Slow fractionated injection: yes  Hemodynamics: stable  Additional Notes  Patient tolerated procedure well. VSS.   Versed 2 mg and fentanyl 100  Mcg IV given in divided doses for sedation for block  Reason for block: post-op pain management, primary anesthetic and at surgeon's request

## 2020-12-07 NOTE — ANESTHESIA POSTPROCEDURE EVALUATION
Department of Anesthesiology  Postprocedure Note    Patient: Patty Helms  MRN: [id number]  YOB: 1955  Date of evaluation: 12/7/2020  Time:  3:03 PM     Procedure Summary     Date:  12/07/20 Room / Location:  [facility] OR 04 / [facility]    Anesthesia Start:  1237 Anesthesia Stop:  1343    Procedure:  AV FISTULA CREATION -- RIGHT ARM (Right ) Diagnosis:  (CHRONIC RENAL FAILURE)    Surgeon:  Nick Carlton MD Responsible Provider:  Severo Loser, MD    Anesthesia Type:  MAC, regional ASA Status:  4          Anesthesia Type: MAC, regional    Janet Phase I: Janet Score: 8    Janet Phase II:      Last vitals: Reviewed and per EMR flowsheets.        Anesthesia Post Evaluation    Patient location during evaluation: PACU  Patient participation: complete - patient participated  Level of consciousness: awake and alert  Pain score: 0  Airway patency: patent  Nausea & Vomiting: no vomiting and no nausea  Complications: no  Cardiovascular status: hemodynamically stable  Respiratory status: spontaneous ventilation  Hydration status: stable

## 2020-12-07 NOTE — PROGRESS NOTES
COVID testing completed on: 12/2/2020  Results of the test: negative  The patient verbally confirms that they did adhere to the self-quarantine guidelines. No signs or symptoms expressed or observed.

## 2020-12-07 NOTE — PROGRESS NOTES
Discharge instructions gone over with patient all questions answered at this time , fabian hilario called patient up to the bathroom and dressed , patients sling applied

## 2020-12-07 NOTE — H&P
Vascular Surgery History & Physical Exam      Chief Complaint: CRF    HISTORY OF PRESENT ILLNESS:                The patient is a 60 y.o. female who presents to the hospital for elective creation of a arteriovenous fistula. The patient denies any problems since the last office visit. IMPRESSION:   Active Hospital Problems    Diagnosis    CKD (chronic kidney disease) stage 5, GFR less than 15 ml/min ([facility]) [N18.5]       PLAN:  Creation of a right  arteriovenous fistula. Risk benefits and alternatives were discussed with the patient including but not limited to bleeding, infection, arterial venous nerve injury, arterial steal, venous hypertension, sensorimotor disturbance or loss, maintenance of the access, need for additional access, loss of the access, if an AV graft needs to be used risk of infection and/or removal, distal embolization, wound complications, limb loss and/or death the patient understands and wishes to proceed.       Patient Active Problem List   Diagnosis Code    Moderate episode of recurrent major depressive disorder (HCC) F33.1    Generalized anxiety disorder F41.1    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.05    Anemia D64.9    Essential hypertension I10    CKD (chronic kidney disease) stage 5, GFR less than 15 ml/min (HCC) N18.5    Atrial fibrillation (HCC) I48.91    Pure hypercholesterolemia E78.00    Morbid obesity (HCC) E66.01    Chronic obstructive pulmonary disease (HCC) J44.9    Obstructive sleep apnea G47.33    Exertional dyspnea R06.00    History of colon polyps Z86.010    Family history of rectal cancer Z80.0       Past Medical History:   Diagnosis Date    Atrial fibrillation (HCC)     Bladder cancer (HCC)     Cancer ([facility] [address]     CKD (chronic kidney disease)     CKD (chronic kidney disease) stage 5, GFR less than 15 ml/min ([address] 11/6/2018    Diabetes ([facility] [address]     Hypertension     Obesity     260 #        Past Surgical History:   Procedure Laterality Date    CARDIAC CATHETERIZATION Left 10/02/2008    @ [facility]    CHOLECYSTECTOMY      COLONOSCOPY      COLONOSCOPY N/A 8/29/2019    mid ascending colon polyp bx/cauterization (inflammatory polyp), proximal transverse colon polyp bx/cauterized (tubular adenoma), descending colon polyp 80 cm from anus bx/cauterized (hyperplastic polyp), sigmoid colon polyp 35 cm from anus bx/cauterized (tubular adenoma), sigmoid colon polyps 30 cm from anus bx/cauterized (tubular adenoma), Dr. Melba Huerta, [address] COLONOSCOPY  8/29/2019    mid ascending colon polyp bx/cauterization (inflammatory polyp), proximal transverse colon polyp bx/cauterized (tubular adenoma), descending colon polyp 80 cm from anus bx/cauterized (hyperplastic polyp), sigmoid colon polyp 35 cm from anus bx/cauterized (tubular adenoma), sigmoid colon polyps 30 cm from anus bx/cauterized (tubular adenoma), Dr. Melba Huerta, [address] [facility] FISTULA CREATION Left 1/22/2019    LIGATIION LEFT LEFT UPPER EXTREMITY OF AV FISTULA , EVACUATION HEMATOMA performed by Leesa Briggs MD at [place] [address] Left 9/18/2018    AV FISTULA  LEFT ARM performed by Leesa Briggs MD at [place] [address] Left 11/14/2018    SUPERFICIALIZATION AV FISTULA  LEFT ARM , LIGATION TRIBUTORY AV FISTULA LEFT ARM performed by Leesa Briggs MD at [address] [facility] [address]         Current Medications:     Current Facility-Administered Medications:     0.45 % sodium chloride infusion, , Intravenous, Continuous, Lindsey Calero MD    sodium chloride flush 0.9 % injection 10 mL, 10 mL, Intravenous, 2 times per day, Lindsey Calero MD    sodium chloride flush 0.9 % injection 10 mL, 10 mL, Intravenous, PRN, Lindsey Calero MD    ceFAZolin (ANCEF) 2 g in sterile water 20 mL IV syringe, 2 g, Intravenous, On Call to OR, Lindsey Calero MD    fentaNYL (SUBLIMAZE) injection 25 mcg, 25 mcg, Intravenous, Q5 Min PRN, Estelle Solomon MD    fentaNYL (SUBLIMAZE) injection 50 mcg, 50 mcg, Intravenous, Q5 Min PRN, Estevan Ramirez MD    ropivacaine (NAROPIN) 0.5% injection 30 mL, 30 mL, Infiltration, Once, Estevan Ramirez MD    fentaNYL (SUBLIMAZE) injection 100 mcg, 100 mcg, Intravenous, Once, Estevan Ramirez MD    midazolam PF (VERSED) injection 2 mg, 2 mg, Intravenous, Once, Estevan Ramirez MD    Allergies:  Adhesive tape and Penicillins    Social History     Socioeconomic History    Marital status:      Spouse name: Not on file    Number of children: Not on file    Years of education: Not on file    Highest education level: Not on file   Occupational History    Not on file   Social Needs    Financial resource strain: Not on file    Food insecurity     Worry: Not on file     Inability: Not on file    Transportation needs     Medical: Not on file     Non-medical: Not on file   Tobacco Use    Smoking status: Current Every Day Smoker     Packs/day: 0.50     Years: 43.00     Pack years: 21.50     Types: Cigarettes     Last attempt to quit: 9/1/2017     Years since quitting: 3.2    Smokeless tobacco: Never Used   Substance and Sexual Activity    Alcohol use: Not Currently     Comment: 1 cup of coffee a day     Drug use: Never    Sexual activity: Not on file   Lifestyle    Physical activity     Days per week: Not on file     Minutes per session: Not on file    Stress: Not on file   Relationships    Social connections     Talks on phone: Not on file     Gets together: Not on file     Attends Jew service: Not on file     Active member of club or organization: Not on file     Attends meetings of clubs or organizations: Not on file     Relationship status: Not on file    Intimate partner violence     Fear of current or ex partner: Not on file     Emotionally abused: Not on file     Physically abused: Not on file     Forced sexual activity: Not on file   Other Topics Concern    Not on file   Social History Narrative    Drinks 1 cup of coffee daily. Family History   Problem Relation Age of Onset   [facility] Cancer Mother         bladder    Diabetes Mother     COPD Mother     Depression Father     Diabetes Father     Heart Disease Father     High Blood Pressure Father     Stroke Father     Prostate Cancer Father     Dementia Father     Alcohol Abuse Brother     Diabetes Brother     Bipolar Disorder Paternal Uncle     Anxiety Disorder Daughter     Colon Cancer Maternal Grandfather        REVIEW OF SYSTEMS:  The chart was reviewed.     PHYSICAL EXAM:    Vitals:    12/07/20 1053   BP: (!) 168/77   Pulse: 79   Resp: 20   Temp: 97.9 °F (36.6 °C)   SpO2: 97%     CONSTITUTIONAL:  awake, alert, cooperative, no apparent distress, and appears stated age  NECK:  Supple, symmetrical, trachea midline, no adenopathy, thyroid symmetric, not enlarged and no tenderness, skin normal  LUNGS:  no increased work of breathing, good air exchange and clear to auscultation  CARDIOVASCULAR:  regular rate and rhythm and pulses 2 plus all extermities bilaterally  ABDOMEN:  soft, non-distended and non-tender    LABS:    Lab Results   Component Value Date    WBC 12.4 (H) 12/07/2020    HGB 13.0 12/07/2020    HCT 40.6 12/07/2020     12/07/2020    PROTIME 12.8 (H) 01/22/2019    INR 1.1 01/22/2019    APTT 27.1 01/22/2019    K 3.6 12/07/2020    BUN 40 (H) 12/07/2020    CREATININE 3.4 (H) 12/07/2020       RADIOLOGY:

## 2020-12-22 ENCOUNTER — TELEPHONE (OUTPATIENT)
Dept: VASCULAR SURGERY | Age: 65
End: 2020-12-22

## 2020-12-22 NOTE — TELEPHONE ENCOUNTER
Called to confirm appointment for 12/23/20 at 930, left message with date time and phone number for patient.

## 2020-12-23 ENCOUNTER — OFFICE VISIT (OUTPATIENT)
Dept: VASCULAR SURGERY | Age: 65
End: 2020-12-23

## 2020-12-23 VITALS
HEIGHT: 67 IN | BODY MASS INDEX: 40.81 KG/M2 | RESPIRATION RATE: 16 BRPM | DIASTOLIC BLOOD PRESSURE: 82 MMHG | WEIGHT: 260 LBS | SYSTOLIC BLOOD PRESSURE: 140 MMHG

## 2020-12-23 PROCEDURE — 99024 POSTOP FOLLOW-UP VISIT: CPT | Performed by: SURGERY

## 2020-12-23 NOTE — PROGRESS NOTES
AV FISTULA CREATION -- RIGHT ARM performed by Connor Rodriguez MD at [place] [address] Left 9/18/2018    AV FISTULA  LEFT ARM performed by Luis Davis MD at [place] [address] Left 11/14/2018    SUPERFICIALIZATION AV FISTULA  LEFT ARM , LIGATION TRIBUTORY AV FISTULA LEFT ARM performed by Luis Davis MD at [place] [address]       Current Medications:   Prior to Admission medications    Medication Sig Start Date End Date Taking?  Authorizing Provider   propafenone (RYTHMOL SR) 325 MG extended release capsule Take 1 capsule by mouth 2 times daily 12/2/20  Yes Hellen Parsons MD   glipiZIDE (GLUCOTROL XL) 5 MG extended release tablet Take 1 tablet by mouth 2 times daily  Patient taking differently: Take 5 mg by mouth 2 times daily TAKES 5 MG  IN AM AND DINNER 2 TABS AT HS 11/20/20  Yes RENUKA Hutton CNP   DULoxetine (CYMBALTA) 60 MG extended release capsule Take 1 capsule by mouth daily 10/1/20  Yes ERNUKA Hutton CNP   amLODIPine (NORVASC) 5 MG tablet Take 1 tablet by mouth daily 10/1/20  Yes RENUKA Hutton CNP   atorvastatin (LIPITOR) 40 MG tablet Take 1 tablet by mouth daily 10/1/20  Yes RENUKA Hutton CNP   torsemide (DEMADEX) 20 MG tablet Take 1 tablet by mouth daily 10/1/20  Yes RENUKA Hutton CNP   metoprolol tartrate (LOPRESSOR) 50 MG tablet Take 1 tablet by mouth 2 times daily 10/1/20  Yes RENUKA Hutton CNP   Respiratory Therapy Supplies (SPIROMETER) KIT 1 kit by Does not apply route 2 times daily 2/1/20  Yes RENUKA Hutotn CNP   sodium bicarbonate 650 MG tablet TK 1 T PO BID 6/3/19  Yes Historical Provider, MD   Cholecalciferol (VITAMIN D3) 1000 units TABS Take 1,000 Units by mouth daily  4/16/19  Yes Historical Provider, MD     Allergies:  Adhesive tape and Penicillins    Social History     Socioeconomic History    Marital status:      Spouse name: Not on file  Number of children: Not on file    Years of education: Not on file    Highest education level: Not on file   Occupational History    Not on file   Social Needs    Financial resource strain: Not on file    Food insecurity     Worry: Not on file     Inability: Not on file    Transportation needs     Medical: Not on file     Non-medical: Not on file   Tobacco Use    Smoking status: Current Every Day Smoker     Packs/day: 0.50     Years: 43.00     Pack years: 21.50     Types: Cigarettes     Last attempt to quit: 9/1/2017     Years since quitting: 3.3    Smokeless tobacco: Never Used   Substance and Sexual Activity    Alcohol use: Not Currently     Comment: 1 cup of coffee a day     Drug use: Never    Sexual activity: Not on file   Lifestyle    Physical activity     Days per week: Not on file     Minutes per session: Not on file    Stress: Not on file   Relationships    Social connections     Talks on phone: Not on file     Gets together: Not on file     Attends Latter day service: Not on file     Active member of club or organization: Not on file     Attends meetings of clubs or organizations: Not on file     Relationship status: Not on file    Intimate partner violence     Fear of current or ex partner: Not on file     Emotionally abused: Not on file     Physically abused: Not on file     Forced sexual activity: Not on file   Other Topics Concern    Not on file   Social History Narrative    Drinks 1 cup of coffee daily.          Family History   Problem Relation Age of Onset   [facility] Cancer Mother         bladder    Diabetes Mother     COPD Mother     Depression Father     Diabetes Father     Heart Disease Father     High Blood Pressure Father     Stroke Father     Prostate Cancer Father     Dementia Father     Alcohol Abuse Brother     Diabetes Brother     Bipolar Disorder Paternal Uncle     Anxiety Disorder Daughter     Colon Cancer Maternal Grandfather        REVIEW OF SYSTEMS: Constitutional: Negative for activity change, appetite change, chills, diaphoresis, fatigue, fever and unexpected weight change. PHYSICAL EXAM:  Vitals:    12/23/20 0954   BP: (!) 140/82   Resp: 16     General Appearance: alert and oriented to person, place and time, well developed and well- nourished, in no acute distress  Skin: warm and dry, no rash or erythema incisions almost healed  Head: normocephalic and atraumatic  Eyes: extraocular eye movements intact, conjunctivae normal  ENT: external ear and ear canal normal bilaterally, nose without deformity  Pulmonary/Chest: clear to auscultation bilaterally- no wheezes, rales or rhonchi, normal air movement, no respiratory distress  Cardiovascular: normal rate, regular rhythm, normal S1 and S2, no murmurs, no carotid bruits  Abdomen: soft, non-tender, non-distended, normal bowel sounds, no masses or organomegaly  Musculoskeletal: normal range of motion, no joint swelling, deformity or tenderness  Neurologic: no cranial nerve deficit, gait, coordination and speech normal  Extremities:     Problem List Items Addressed This Visit     CKD (chronic kidney disease) stage 5, GFR less than 15 ml/min ([facility]) - Primary (Chronic)          Patient with a successful right arm arteriovenous access. At this point have measured it just under a centimeter. The anastomosis is widely patent. It may be a little bit deep in the upper arm. We will see how she does in the next couple months we will see her back then she call for any questions or concerns or issues. If at some point she needs access for hemodialysis they can try to use this in the next up and coming month. I wanted to least allow 6 weeks of maturation prior to proceeding      No follow-ups on file.

## 2021-01-05 ENCOUNTER — TELEPHONE (OUTPATIENT)
Dept: FAMILY MEDICINE CLINIC | Age: 66
End: 2021-01-05

## 2021-01-05 DIAGNOSIS — N18.5 TYPE 2 DIABETES MELLITUS WITH STAGE 5 CHRONIC KIDNEY DISEASE NOT ON CHRONIC DIALYSIS, WITHOUT LONG-TERM CURRENT USE OF INSULIN (HCC): ICD-10-CM

## 2021-01-05 DIAGNOSIS — E11.22 TYPE 2 DIABETES MELLITUS WITH STAGE 5 CHRONIC KIDNEY DISEASE NOT ON CHRONIC DIALYSIS, WITHOUT LONG-TERM CURRENT USE OF INSULIN (HCC): ICD-10-CM

## 2021-01-05 RX ORDER — GLIPIZIDE 5 MG/1
10 TABLET, FILM COATED, EXTENDED RELEASE ORAL 2 TIMES DAILY
Qty: 60 TABLET | Refills: 5 | Status: SHIPPED
Start: 2021-01-05 | End: 2021-06-23

## 2021-01-05 NOTE — TELEPHONE ENCOUNTER
Pt states that she takes Glipizide XL 5mg 2 tablets twice daily last script had one tablet QAM and 2 tablets QPM pt needs new script.   Please advise

## 2021-02-24 ENCOUNTER — TELEPHONE (OUTPATIENT)
Dept: CARDIOLOGY CLINIC | Age: 66
End: 2021-02-24

## 2021-02-24 NOTE — TELEPHONE ENCOUNTER
Patient was taking at the last time of evaluation and has a history of paroxysmal atrial fibrillation.   If this was discontinued need to know who stopped it and for what reason otherwise appropriate dosage should be 5 mg twice daily

## 2021-02-24 NOTE — TELEPHONE ENCOUNTER
Patient is calling in asking for Eliquis 5mg to be sent into [facility] - it is no longer listed in her medication list. Wasn't sure if she's to be on it

## 2021-03-24 ENCOUNTER — OFFICE VISIT (OUTPATIENT)
Dept: VASCULAR SURGERY | Age: 66
End: 2021-03-24
Payer: MEDICARE

## 2021-03-24 VITALS — DIASTOLIC BLOOD PRESSURE: 72 MMHG | SYSTOLIC BLOOD PRESSURE: 130 MMHG

## 2021-03-24 DIAGNOSIS — Z99.2 ENCOUNTER REGARDING VASCULAR ACCESS FOR DIALYSIS FOR ESRD (HCC): Primary | ICD-10-CM

## 2021-03-24 DIAGNOSIS — N18.6 ENCOUNTER REGARDING VASCULAR ACCESS FOR DIALYSIS FOR ESRD (HCC): Primary | ICD-10-CM

## 2021-03-24 PROCEDURE — 1036F TOBACCO NON-USER: CPT | Performed by: PHYSICIAN ASSISTANT

## 2021-03-24 PROCEDURE — 4040F PNEUMOC VAC/ADMIN/RCVD: CPT | Performed by: PHYSICIAN ASSISTANT

## 2021-03-24 PROCEDURE — G8417 CALC BMI ABV UP PARAM F/U: HCPCS | Performed by: PHYSICIAN ASSISTANT

## 2021-03-24 PROCEDURE — 3017F COLORECTAL CA SCREEN DOC REV: CPT | Performed by: PHYSICIAN ASSISTANT

## 2021-03-24 PROCEDURE — 99213 OFFICE O/P EST LOW 20 MIN: CPT | Performed by: PHYSICIAN ASSISTANT

## 2021-03-24 PROCEDURE — G8400 PT W/DXA NO RESULTS DOC: HCPCS | Performed by: PHYSICIAN ASSISTANT

## 2021-03-24 PROCEDURE — G8427 DOCREV CUR MEDS BY ELIG CLIN: HCPCS | Performed by: PHYSICIAN ASSISTANT

## 2021-03-24 PROCEDURE — 1123F ACP DISCUSS/DSCN MKR DOCD: CPT | Performed by: PHYSICIAN ASSISTANT

## 2021-03-24 PROCEDURE — G8482 FLU IMMUNIZE ORDER/ADMIN: HCPCS | Performed by: PHYSICIAN ASSISTANT

## 2021-03-24 PROCEDURE — 1090F PRES/ABSN URINE INCON ASSESS: CPT | Performed by: PHYSICIAN ASSISTANT

## 2021-03-24 NOTE — PROGRESS NOTES
Flor Malin MD at [facility] [address] Left 11/14/2018    SUPERFICIALIZATION AV FISTULA  LEFT ARM , LIGATION TRIBUTORY AV FISTULA LEFT ARM performed by Joshua Baltazar MD at [place] [address]       Physical Exam:  The incision(s) are healing without evidence of infection. Heart rhythm is regular. Fistula has a nice palpable thrill.  + bruit. It still is tracking deep without a long enough superficial segment. Very nicely dilated to over 1 cm. Right      Left   Brachial 3    Radial 3    Ulnar 1    Popliteal     Dorsalis Pedis     Posterior Tibial     (3=normal, 2=diminished, 1=barely palpable, 4=widened)    Assessment:  Post-operative AV access. Problem List Items Addressed This Visit     None      Visit Diagnoses     Encounter regarding vascular access for dialysis for ESRD ([facility] [address]    -  Primary        I reviewed with the patient that normal activities can be resumed as tolerated. I encouraged her to go get her lab work that she has had ordered since December. At this time she is overall feeling well. I reviewed that the fistula does not have a long enough superficial segment for dialysis access. We discussed that we will need to elevate the fistula in order for it to be successfully cannulated. We discussed the procedure as well as risk, benefits, alternatives. She would like to proceed. Plan for R AVF superficialization.     Plan discussed with Dr Tomas Delong PA-C

## 2021-04-02 ENCOUNTER — HOSPITAL ENCOUNTER (OUTPATIENT)
Age: 66
Discharge: HOME | End: 2021-04-04
Payer: MEDICARE

## 2021-04-02 DIAGNOSIS — U07.1 COVID-19: ICD-10-CM

## 2021-04-02 PROCEDURE — U0005 INFEC AGEN DETEC AMPLI PROBE: HCPCS

## 2021-04-02 PROCEDURE — U0003 INFECTIOUS AGENT DETECTION BY NUCLEIC ACID (DNA OR RNA); SEVERE ACUTE RESPIRATORY SYNDROME CORONAVIRUS 2 (SARS-COV-2) (CORONAVIRUS DISEASE [COVID-19]), AMPLIFIED PROBE TECHNIQUE, MAKING USE OF HIGH THROUGHPUT TECHNOLOGIES AS DESCRIBED BY CMS-2020-01-R: HCPCS

## 2021-04-03 LAB
SARS-COV-2: NOT DETECTED
SOURCE: NORMAL

## 2021-04-08 ENCOUNTER — ANESTHESIA (OUTPATIENT)
Dept: OPERATING ROOM | Age: 66
End: 2021-04-08
Payer: MEDICARE

## 2021-04-08 ENCOUNTER — HOSPITAL ENCOUNTER (OUTPATIENT)
Age: 66
Setting detail: OUTPATIENT SURGERY
Discharge: HOME | End: 2021-04-08
Attending: SURGERY | Admitting: SURGERY
Payer: MEDICARE

## 2021-04-08 ENCOUNTER — ANESTHESIA EVENT (OUTPATIENT)
Dept: OPERATING ROOM | Age: 66
End: 2021-04-08
Payer: MEDICARE

## 2021-04-08 VITALS
OXYGEN SATURATION: 96 % | RESPIRATION RATE: 19 BRPM | WEIGHT: 260 LBS | HEIGHT: 67 IN | TEMPERATURE: 98 F | DIASTOLIC BLOOD PRESSURE: 70 MMHG | BODY MASS INDEX: 40.81 KG/M2 | SYSTOLIC BLOOD PRESSURE: 155 MMHG | HEART RATE: 71 BPM

## 2021-04-08 VITALS — SYSTOLIC BLOOD PRESSURE: 131 MMHG | OXYGEN SATURATION: 90 % | TEMPERATURE: 96.4 F | DIASTOLIC BLOOD PRESSURE: 71 MMHG

## 2021-04-08 DIAGNOSIS — Z99.2 ENCOUNTER REGARDING VASCULAR ACCESS FOR DIALYSIS FOR END-STAGE RENAL DISEASE (HCC): ICD-10-CM

## 2021-04-08 DIAGNOSIS — U07.1 COVID-19: Primary | ICD-10-CM

## 2021-04-08 DIAGNOSIS — N18.6 ENCOUNTER REGARDING VASCULAR ACCESS FOR DIALYSIS FOR END-STAGE RENAL DISEASE (HCC): ICD-10-CM

## 2021-04-08 DIAGNOSIS — Z01.818 PREOP TESTING: ICD-10-CM

## 2021-04-08 DIAGNOSIS — G89.18 POST-OP PAIN: ICD-10-CM

## 2021-04-08 LAB
ABO/RH: NORMAL
ANION GAP SERPL CALCULATED.3IONS-SCNC: 17 MMOL/L (ref 7–16)
ANTIBODY SCREEN: NORMAL
BUN BLDV-MCNC: 48 MG/DL (ref 8–23)
CALCIUM SERPL-MCNC: 9.3 MG/DL (ref 8.6–10.2)
CHLORIDE BLD-SCNC: 105 MMOL/L (ref 98–107)
CHP ED QC CHECK: NORMAL
CO2: 21 MMOL/L (ref 22–29)
CREAT SERPL-MCNC: 4.1 MG/DL (ref 0.5–1)
GFR AFRICAN AMERICAN: 13
GFR NON-AFRICAN AMERICAN: 11 ML/MIN/1.73
GLUCOSE BLD-MCNC: 176 MG/DL
GLUCOSE BLD-MCNC: 188 MG/DL (ref 74–99)
HCT VFR BLD CALC: 36.7 % (ref 34–48)
HEMOGLOBIN: 11.4 G/DL (ref 11.5–15.5)
INR BLD: 1
MCH RBC QN AUTO: 30.9 PG (ref 26–35)
MCHC RBC AUTO-ENTMCNC: 31.1 % (ref 32–34.5)
MCV RBC AUTO: 99.5 FL (ref 80–99.9)
METER GLUCOSE: 130 MG/DL (ref 74–99)
METER GLUCOSE: 176 MG/DL (ref 74–99)
PDW BLD-RTO: 12.2 FL (ref 11.5–15)
PLATELET # BLD: 325 E9/L (ref 130–450)
PMV BLD AUTO: 10 FL (ref 7–12)
POTASSIUM REFLEX MAGNESIUM: 4.1 MMOL/L (ref 3.5–5)
PROTHROMBIN TIME: 11.3 SEC (ref 9.3–12.4)
RBC # BLD: 3.69 E12/L (ref 3.5–5.5)
SODIUM BLD-SCNC: 143 MMOL/L (ref 132–146)
WBC # BLD: 13.2 E9/L (ref 4.5–11.5)

## 2021-04-08 PROCEDURE — 6360000002 HC RX W HCPCS: Performed by: NURSE ANESTHETIST, CERTIFIED REGISTERED

## 2021-04-08 PROCEDURE — 7100000011 HC PHASE II RECOVERY - ADDTL 15 MIN: Performed by: SURGERY

## 2021-04-08 PROCEDURE — 7100000000 HC PACU RECOVERY - FIRST 15 MIN: Performed by: SURGERY

## 2021-04-08 PROCEDURE — 6360000002 HC RX W HCPCS: Performed by: ANESTHESIOLOGY

## 2021-04-08 PROCEDURE — 3700000001 HC ADD 15 MINUTES (ANESTHESIA): Performed by: SURGERY

## 2021-04-08 PROCEDURE — 36832 AV FISTULA REVISION OPEN: CPT | Performed by: PHYSICIAN ASSISTANT

## 2021-04-08 PROCEDURE — 3700000000 HC ANESTHESIA ATTENDED CARE: Performed by: SURGERY

## 2021-04-08 PROCEDURE — 85610 PROTHROMBIN TIME: CPT

## 2021-04-08 PROCEDURE — 86901 BLOOD TYPING SEROLOGIC RH(D): CPT

## 2021-04-08 PROCEDURE — 6370000000 HC RX 637 (ALT 250 FOR IP): Performed by: ANESTHESIOLOGY

## 2021-04-08 PROCEDURE — 36415 COLL VENOUS BLD VENIPUNCTURE: CPT

## 2021-04-08 PROCEDURE — 2580000003 HC RX 258: Performed by: NURSE PRACTITIONER

## 2021-04-08 PROCEDURE — 86850 RBC ANTIBODY SCREEN: CPT

## 2021-04-08 PROCEDURE — 3600000012 HC SURGERY LEVEL 2 ADDTL 15MIN: Performed by: SURGERY

## 2021-04-08 PROCEDURE — 6370000000 HC RX 637 (ALT 250 FOR IP)

## 2021-04-08 PROCEDURE — 6360000002 HC RX W HCPCS: Performed by: NURSE PRACTITIONER

## 2021-04-08 PROCEDURE — 94640 AIRWAY INHALATION TREATMENT: CPT

## 2021-04-08 PROCEDURE — 7100000001 HC PACU RECOVERY - ADDTL 15 MIN: Performed by: SURGERY

## 2021-04-08 PROCEDURE — 82962 GLUCOSE BLOOD TEST: CPT

## 2021-04-08 PROCEDURE — 2709999900 HC NON-CHARGEABLE SUPPLY: Performed by: SURGERY

## 2021-04-08 PROCEDURE — 36832 AV FISTULA REVISION OPEN: CPT | Performed by: SURGERY

## 2021-04-08 PROCEDURE — 2500000003 HC RX 250 WO HCPCS: Performed by: SURGERY

## 2021-04-08 PROCEDURE — 2500000003 HC RX 250 WO HCPCS: Performed by: NURSE ANESTHETIST, CERTIFIED REGISTERED

## 2021-04-08 PROCEDURE — 86900 BLOOD TYPING SEROLOGIC ABO: CPT

## 2021-04-08 PROCEDURE — 7100000010 HC PHASE II RECOVERY - FIRST 15 MIN: Performed by: SURGERY

## 2021-04-08 PROCEDURE — 3600000002 HC SURGERY LEVEL 2 BASE: Performed by: SURGERY

## 2021-04-08 PROCEDURE — 85027 COMPLETE CBC AUTOMATED: CPT

## 2021-04-08 PROCEDURE — 80048 BASIC METABOLIC PNL TOTAL CA: CPT

## 2021-04-08 RX ORDER — NEOSTIGMINE METHYLSULFATE 1 MG/ML
INJECTION, SOLUTION INTRAVENOUS PRN
Status: DISCONTINUED | OUTPATIENT
Start: 2021-04-08 | End: 2021-04-08 | Stop reason: SDUPTHER

## 2021-04-08 RX ORDER — FENTANYL CITRATE 50 UG/ML
25 INJECTION, SOLUTION INTRAMUSCULAR; INTRAVENOUS EVERY 5 MIN PRN
Status: DISCONTINUED | OUTPATIENT
Start: 2021-04-08 | End: 2021-04-08 | Stop reason: HOSPADM

## 2021-04-08 RX ORDER — LIDOCAINE HYDROCHLORIDE 20 MG/ML
INJECTION, SOLUTION INTRAVENOUS PRN
Status: DISCONTINUED | OUTPATIENT
Start: 2021-04-08 | End: 2021-04-08 | Stop reason: SDUPTHER

## 2021-04-08 RX ORDER — HYDROCODONE BITARTRATE AND ACETAMINOPHEN 5; 325 MG/1; MG/1
TABLET ORAL
Status: COMPLETED
Start: 2021-04-08 | End: 2021-04-08

## 2021-04-08 RX ORDER — EPHEDRINE SULFATE/0.9% NACL/PF 50 MG/5 ML
SYRINGE (ML) INTRAVENOUS PRN
Status: DISCONTINUED | OUTPATIENT
Start: 2021-04-08 | End: 2021-04-08 | Stop reason: SDUPTHER

## 2021-04-08 RX ORDER — MIDAZOLAM HYDROCHLORIDE 2 MG/2ML
1 INJECTION, SOLUTION INTRAMUSCULAR; INTRAVENOUS PRN
Status: DISCONTINUED | OUTPATIENT
Start: 2021-04-08 | End: 2021-04-08 | Stop reason: HOSPADM

## 2021-04-08 RX ORDER — MORPHINE SULFATE 2 MG/ML
1 INJECTION, SOLUTION INTRAMUSCULAR; INTRAVENOUS EVERY 5 MIN PRN
Status: DISCONTINUED | OUTPATIENT
Start: 2021-04-08 | End: 2021-04-08 | Stop reason: HOSPADM

## 2021-04-08 RX ORDER — SODIUM CHLORIDE 9 MG/ML
INJECTION, SOLUTION INTRAVENOUS CONTINUOUS
Status: DISCONTINUED | OUTPATIENT
Start: 2021-04-08 | End: 2021-04-08 | Stop reason: HOSPADM

## 2021-04-08 RX ORDER — ONDANSETRON 2 MG/ML
4 INJECTION INTRAMUSCULAR; INTRAVENOUS
Status: DISCONTINUED | OUTPATIENT
Start: 2021-04-08 | End: 2021-04-08 | Stop reason: HOSPADM

## 2021-04-08 RX ORDER — HYDROCODONE BITARTRATE AND ACETAMINOPHEN 5; 325 MG/1; MG/1
1 TABLET ORAL
Status: COMPLETED | OUTPATIENT
Start: 2021-04-08 | End: 2021-04-08

## 2021-04-08 RX ORDER — PROPOFOL 10 MG/ML
INJECTION, EMULSION INTRAVENOUS PRN
Status: DISCONTINUED | OUTPATIENT
Start: 2021-04-08 | End: 2021-04-08 | Stop reason: SDUPTHER

## 2021-04-08 RX ORDER — ALBUTEROL SULFATE 2.5 MG/3ML
2.5 SOLUTION RESPIRATORY (INHALATION)
Status: COMPLETED | OUTPATIENT
Start: 2021-04-08 | End: 2021-04-08

## 2021-04-08 RX ORDER — FENTANYL CITRATE 50 UG/ML
50 INJECTION, SOLUTION INTRAMUSCULAR; INTRAVENOUS EVERY 10 MIN PRN
Status: COMPLETED | OUTPATIENT
Start: 2021-04-08 | End: 2021-04-08

## 2021-04-08 RX ORDER — ROCURONIUM BROMIDE 10 MG/ML
INJECTION, SOLUTION INTRAVENOUS PRN
Status: DISCONTINUED | OUTPATIENT
Start: 2021-04-08 | End: 2021-04-08 | Stop reason: SDUPTHER

## 2021-04-08 RX ORDER — FENTANYL CITRATE 50 UG/ML
INJECTION, SOLUTION INTRAMUSCULAR; INTRAVENOUS PRN
Status: DISCONTINUED | OUTPATIENT
Start: 2021-04-08 | End: 2021-04-08 | Stop reason: SDUPTHER

## 2021-04-08 RX ORDER — SODIUM CHLORIDE 0.9 % (FLUSH) 0.9 %
5-40 SYRINGE (ML) INJECTION EVERY 12 HOURS SCHEDULED
Status: DISCONTINUED | OUTPATIENT
Start: 2021-04-08 | End: 2021-04-08 | Stop reason: HOSPADM

## 2021-04-08 RX ORDER — FENTANYL CITRATE 50 UG/ML
50 INJECTION, SOLUTION INTRAMUSCULAR; INTRAVENOUS EVERY 5 MIN PRN
Status: DISCONTINUED | OUTPATIENT
Start: 2021-04-08 | End: 2021-04-08 | Stop reason: HOSPADM

## 2021-04-08 RX ORDER — DEXAMETHASONE SODIUM PHOSPHATE 10 MG/ML
INJECTION, SOLUTION INTRAMUSCULAR; INTRAVENOUS PRN
Status: DISCONTINUED | OUTPATIENT
Start: 2021-04-08 | End: 2021-04-08 | Stop reason: SDUPTHER

## 2021-04-08 RX ORDER — SODIUM CHLORIDE 9 MG/ML
25 INJECTION, SOLUTION INTRAVENOUS PRN
Status: DISCONTINUED | OUTPATIENT
Start: 2021-04-08 | End: 2021-04-08 | Stop reason: HOSPADM

## 2021-04-08 RX ORDER — GLYCOPYRROLATE 1 MG/5 ML
SYRINGE (ML) INTRAVENOUS PRN
Status: DISCONTINUED | OUTPATIENT
Start: 2021-04-08 | End: 2021-04-08 | Stop reason: SDUPTHER

## 2021-04-08 RX ORDER — MORPHINE SULFATE 2 MG/ML
2 INJECTION, SOLUTION INTRAMUSCULAR; INTRAVENOUS EVERY 5 MIN PRN
Status: DISCONTINUED | OUTPATIENT
Start: 2021-04-08 | End: 2021-04-08 | Stop reason: HOSPADM

## 2021-04-08 RX ORDER — SODIUM CHLORIDE 0.9 % (FLUSH) 0.9 %
5-40 SYRINGE (ML) INJECTION PRN
Status: DISCONTINUED | OUTPATIENT
Start: 2021-04-08 | End: 2021-04-08 | Stop reason: HOSPADM

## 2021-04-08 RX ORDER — HYDROCODONE BITARTRATE AND ACETAMINOPHEN 5; 325 MG/1; MG/1
1 TABLET ORAL EVERY 6 HOURS PRN
Qty: 10 TABLET | Refills: 0 | Status: SHIPPED | OUTPATIENT
Start: 2021-04-08 | End: 2021-04-11

## 2021-04-08 RX ORDER — ACETAMINOPHEN 500 MG
500 TABLET ORAL ONCE
Status: COMPLETED | OUTPATIENT
Start: 2021-04-08 | End: 2021-04-08

## 2021-04-08 RX ORDER — SUCCINYLCHOLINE/SOD CL,ISO/PF 200MG/10ML
SYRINGE (ML) INTRAVENOUS PRN
Status: DISCONTINUED | OUTPATIENT
Start: 2021-04-08 | End: 2021-04-08 | Stop reason: SDUPTHER

## 2021-04-08 RX ORDER — ONDANSETRON 2 MG/ML
INJECTION INTRAMUSCULAR; INTRAVENOUS PRN
Status: DISCONTINUED | OUTPATIENT
Start: 2021-04-08 | End: 2021-04-08 | Stop reason: SDUPTHER

## 2021-04-08 RX ADMIN — MIDAZOLAM 1 MG: 1 INJECTION INTRAMUSCULAR; INTRAVENOUS at 09:41

## 2021-04-08 RX ADMIN — FENTANYL CITRATE 50 MCG: 50 INJECTION, SOLUTION INTRAMUSCULAR; INTRAVENOUS at 09:40

## 2021-04-08 RX ADMIN — ACETAMINOPHEN 500 MG: 500 TABLET ORAL at 09:15

## 2021-04-08 RX ADMIN — Medication 3 MG: at 12:38

## 2021-04-08 RX ADMIN — MEPIVACAINE HYDROCHLORIDE 300 MG: 10 INJECTION, SOLUTION EPIDURAL; INFILTRATION at 09:48

## 2021-04-08 RX ADMIN — Medication 2000 MG: at 11:46

## 2021-04-08 RX ADMIN — ROCURONIUM BROMIDE 10 MG: 10 INJECTION, SOLUTION INTRAVENOUS at 11:41

## 2021-04-08 RX ADMIN — HYDROCODONE BITARTRATE AND ACETAMINOPHEN 1 TABLET: 5; 325 TABLET ORAL at 16:05

## 2021-04-08 RX ADMIN — ROCURONIUM BROMIDE 15 MG: 10 INJECTION, SOLUTION INTRAVENOUS at 11:47

## 2021-04-08 RX ADMIN — PROPOFOL 160 MG: 10 INJECTION, EMULSION INTRAVENOUS at 11:41

## 2021-04-08 RX ADMIN — Medication 0.6 MG: at 12:38

## 2021-04-08 RX ADMIN — Medication 2 MG: at 12:43

## 2021-04-08 RX ADMIN — SODIUM CHLORIDE: 9 INJECTION, SOLUTION INTRAVENOUS at 08:12

## 2021-04-08 RX ADMIN — Medication 10 MG: at 11:59

## 2021-04-08 RX ADMIN — LIDOCAINE HYDROCHLORIDE 20 MG: 20 INJECTION, SOLUTION INTRAVENOUS at 11:41

## 2021-04-08 RX ADMIN — ONDANSETRON HYDROCHLORIDE 4 MG: 2 INJECTION, SOLUTION INTRAMUSCULAR; INTRAVENOUS at 11:41

## 2021-04-08 RX ADMIN — Medication 2000 MG: at 11:31

## 2021-04-08 RX ADMIN — Medication 0.4 MG: at 12:43

## 2021-04-08 RX ADMIN — ALBUTEROL SULFATE 2.5 MG: 2.5 SOLUTION RESPIRATORY (INHALATION) at 14:50

## 2021-04-08 RX ADMIN — Medication 180 MG: at 11:41

## 2021-04-08 RX ADMIN — Medication 10 MG: at 11:54

## 2021-04-08 RX ADMIN — SODIUM CHLORIDE: 9 INJECTION, SOLUTION INTRAVENOUS at 11:31

## 2021-04-08 RX ADMIN — MIDAZOLAM 1 MG: 1 INJECTION INTRAMUSCULAR; INTRAVENOUS at 09:37

## 2021-04-08 RX ADMIN — FENTANYL CITRATE 25 MCG: 50 INJECTION, SOLUTION INTRAMUSCULAR; INTRAVENOUS at 13:20

## 2021-04-08 RX ADMIN — DEXAMETHASONE SODIUM PHOSPHATE 10 MG: 10 INJECTION, SOLUTION INTRAMUSCULAR; INTRAVENOUS at 11:41

## 2021-04-08 RX ADMIN — FENTANYL CITRATE 50 MCG: 50 INJECTION, SOLUTION INTRAMUSCULAR; INTRAVENOUS at 11:41

## 2021-04-08 RX ADMIN — FENTANYL CITRATE 50 MCG: 50 INJECTION, SOLUTION INTRAMUSCULAR; INTRAVENOUS at 09:38

## 2021-04-08 ASSESSMENT — PAIN DESCRIPTION - PROGRESSION
CLINICAL_PROGRESSION: NOT CHANGED
CLINICAL_PROGRESSION: NOT CHANGED

## 2021-04-08 ASSESSMENT — PAIN SCALES - GENERAL
PAINLEVEL_OUTOF10: 2
PAINLEVEL_OUTOF10: 6
PAINLEVEL_OUTOF10: 5
PAINLEVEL_OUTOF10: 5
PAINLEVEL_OUTOF10: 4
PAINLEVEL_OUTOF10: 4
PAINLEVEL_OUTOF10: 0

## 2021-04-08 ASSESSMENT — PULMONARY FUNCTION TESTS
PIF_VALUE: 30
PIF_VALUE: 1
PIF_VALUE: 41
PIF_VALUE: 12
PIF_VALUE: 0
PIF_VALUE: 1
PIF_VALUE: 35
PIF_VALUE: 31
PIF_VALUE: 10
PIF_VALUE: 31
PIF_VALUE: 31
PIF_VALUE: 32
PIF_VALUE: 30
PIF_VALUE: 33
PIF_VALUE: 1
PIF_VALUE: 32
PIF_VALUE: 34
PIF_VALUE: 2
PIF_VALUE: 36
PIF_VALUE: 14
PIF_VALUE: 34
PIF_VALUE: 34
PIF_VALUE: 33
PIF_VALUE: 35
PIF_VALUE: 0
PIF_VALUE: 30
PIF_VALUE: 32
PIF_VALUE: 1
PIF_VALUE: 1
PIF_VALUE: 30
PIF_VALUE: 1
PIF_VALUE: 0
PIF_VALUE: 33
PIF_VALUE: 12
PIF_VALUE: 35
PIF_VALUE: 32
PIF_VALUE: 1
PIF_VALUE: 30
PIF_VALUE: 1
PIF_VALUE: 32
PIF_VALUE: 34
PIF_VALUE: 30
PIF_VALUE: 0
PIF_VALUE: 32
PIF_VALUE: 1
PIF_VALUE: 3
PIF_VALUE: 35
PIF_VALUE: 35
PIF_VALUE: 4
PIF_VALUE: 11
PIF_VALUE: 33
PIF_VALUE: 1
PIF_VALUE: 34

## 2021-04-08 ASSESSMENT — PAIN DESCRIPTION - PAIN TYPE
TYPE: SURGICAL PAIN

## 2021-04-08 ASSESSMENT — PAIN DESCRIPTION - FREQUENCY
FREQUENCY: INTERMITTENT
FREQUENCY: INTERMITTENT
FREQUENCY: CONTINUOUS
FREQUENCY: CONTINUOUS
FREQUENCY: INTERMITTENT

## 2021-04-08 ASSESSMENT — ENCOUNTER SYMPTOMS: SHORTNESS OF BREATH: 1

## 2021-04-08 ASSESSMENT — PAIN DESCRIPTION - DESCRIPTORS
DESCRIPTORS: ACHING;DISCOMFORT
DESCRIPTORS: DULL
DESCRIPTORS: ACHING;DISCOMFORT
DESCRIPTORS: ACHING;DISCOMFORT

## 2021-04-08 ASSESSMENT — PAIN DESCRIPTION - LOCATION
LOCATION: ARM

## 2021-04-08 ASSESSMENT — PAIN DESCRIPTION - ONSET
ONSET: ON-GOING
ONSET: ON-GOING

## 2021-04-08 ASSESSMENT — LIFESTYLE VARIABLES: SMOKING_STATUS: 1

## 2021-04-08 NOTE — ANESTHESIA POSTPROCEDURE EVALUATION
Department of Anesthesiology  Postprocedure Note    Patient: Hillary Conroy  MRN: [id number]  YOB: 1955  Date of evaluation: 4/8/2021  Time:  2:44 PM     Procedure Summary     Date: 04/08/21 Room / Location: [facility] OR 04 / [facility]    Anesthesia Start: 1131 Anesthesia Stop: 1257    Procedure: AV FISTULA  REVISION RIGHT ARM (Right Arm Upper) Diagnosis: (CHRONIC RENAL FAILURE)    Surgeons: Sarah Lopez MD Responsible Provider: Crissy Martínez DO    Anesthesia Type: MAC, regional ASA Status: 4          Anesthesia Type: MAC, regional    Janet Phase I: Janet Score: 9    Janet Phase II:      Last vitals: Reviewed and per EMR flowsheets.        Anesthesia Post Evaluation    Patient location during evaluation: PACU  Patient participation: complete - patient participated  Level of consciousness: awake and alert  Airway patency: patent  Nausea & Vomiting: no nausea and no vomiting  Complications: no  Cardiovascular status: blood pressure returned to baseline  Respiratory status: acceptable  Hydration status: euvolemic

## 2021-04-08 NOTE — ANESTHESIA PROCEDURE NOTES
Peripheral Block    Patient location during procedure: procedure area  End time: 4/8/2021 9:45 AM  Staffing  Performed: anesthesiologist   Anesthesiologist: Norma Martínez,   Preanesthetic Checklist  Completed: patient identified, IV checked, site marked, risks and benefits discussed, surgical consent, monitors and equipment checked, pre-op evaluation, timeout performed, anesthesia consent given, oxygen available and patient being monitored  Peripheral Block  Patient position: supine  Prep: ChloraPrep  Patient monitoring: cardiac monitor, continuous pulse ox, frequent blood pressure checks and IV access  Block type: Brachial plexus  Laterality: right  Injection technique: single-shot  Guidance: nerve stimulator and ultrasound guided  Anesthesia block local: Mepivacaine. Infiltration strength: 1 %  Dose: 30 mL  Interscalene  Provider prep: mask and sterile gloves  Anesthesia block local: Mepivacaine. Needle  Needle type:  Other (Stimuplex)   Needle gauge: 22 G  Needle length: 5 cm  Needle localization: nerve stimulator and ultrasound guidance  Assessment  Injection assessment: negative aspiration for heme, no paresthesia on injection and local visualized surrounding nerve on ultrasound  Slow fractionated injection: yes  Hemodynamics: stable  Reason for block: primary anesthetic and at surgeon's request

## 2021-04-08 NOTE — ANESTHESIA PRE PROCEDURE
Department of Anesthesiology  Preprocedure Note       Name:  Kaitlin Franco   Age:  73 y.o.  :  1955                                          MRN:  [id number]         Date:  2021      Surgeon: Emmanuel Cody):  Andres Arita MD    Procedure: Procedure(s):  AV FISTULA  REVISION RIGHT ARM    Medications prior to admission:   Prior to Admission medications    Medication Sig Start Date End Date Taking? Authorizing Provider   apixaban (ELIQUIS) 5 MG TABS tablet Take 1 tablet by mouth 2 times daily 21   Achilles Lesch, MD   glipiZIDE (GLUCOTROL XL) 5 MG extended release tablet Take 2 tablets by mouth 2 times daily 21   RENUKA Vincent CNP   propafenone (RYTHMOL SR) 325 MG extended release capsule Take 1 capsule by mouth 2 times daily 20   Achilles Lesch, MD   DULoxetine (CYMBALTA) 60 MG extended release capsule Take 1 capsule by mouth daily 10/1/20   RENUKA Vincent CNP   amLODIPine (NORVASC) 5 MG tablet Take 1 tablet by mouth daily 10/1/20   RENUKA Vincent CNP   atorvastatin (LIPITOR) 40 MG tablet Take 1 tablet by mouth daily 10/1/20   RENUKA Vincent CNP   torsemide (DEMADEX) 20 MG tablet Take 1 tablet by mouth daily 10/1/20   RENUKA Vincent CNP   metoprolol tartrate (LOPRESSOR) 50 MG tablet Take 1 tablet by mouth 2 times daily 10/1/20   RENUKA Vincent CNP   sodium bicarbonate 650 MG tablet TK 1 T PO BID 6/3/19   Historical Provider, MD   Cholecalciferol (VITAMIN D3) 1000 units TABS Take 1,000 Units by mouth daily  19   Historical Provider, MD       Current medications:    No current facility-administered medications for this visit. No current outpatient medications on file.      Facility-Administered Medications Ordered in Other Visits   Medication Dose Route Frequency Provider Last Rate Last Admin    0.9 % sodium chloride infusion   Intravenous Continuous RENUKA Hernandez  mL/hr at 21 6088 New Bag at 21 6000    sodium chloride flush 0.9 % injection 5-40 mL  5-40 mL Intravenous 2 times per day Delcia Romulo, APRN - CNP        sodium chloride flush 0.9 % injection 5-40 mL  5-40 mL Intravenous PRN Delcia Romulo, APRN - CNP        0.9 % sodium chloride infusion  25 mL Intravenous PRN Delcia Romulo, APRN - CNP        ceFAZolin (ANCEF) 2000 mg in sterile water 20 mL IV syringe  2,000 mg Intravenous On Call to [facility], APRN - CNP        fentaNYL (SUBLIMAZE) injection 25 mcg  25 mcg Intravenous Q5 Min PRN Kevin Peto Erlen, DO        fentaNYL (SUBLIMAZE) injection 50 mcg  50 mcg Intravenous Q5 Min PRN Kevin Peto Erlen, DO        morphine (PF) injection 1 mg  1 mg Intravenous Q5 Min PRN Kevin Peto Erlen, DO        morphine (PF) injection 2 mg  2 mg Intravenous Q5 Min PRN Kevin Peto Erlen, DO        [facility] injection 4 mg  4 mg Intravenous Once PRN Kevin Peto Janis, DO        fentaNYL (SUBLIMAZE) injection 50 mcg  50 mcg Intravenous Q10 Min PRN Kevin Peto Erlen, DO        midazolam PF (VERSED) injection 1 mg  1 mg Intravenous PRN Kevin Peto Janis, DO        mepivacaine (CARBOCAINE) 1 % injection 300 mg  300 mg Intradermal Once Kevin Peto Janis, DO        acetaminophen (TYLENOL) tablet 500 mg  500 mg Oral Once Simona Borden, DO           Allergies:     Allergies   Allergen Reactions    Adhesive Tape Rash    Penicillins Rash       Problem List:    Patient Active Problem List   Diagnosis Code    Moderate episode of recurrent major depressive disorder ([facility]) F33.1    Generalized anxiety disorder F41.1    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.05    Anemia D64.9    Essential hypertension I10    CKD (chronic kidney disease) stage 5, GFR less than 15 ml/min ([facility]) N18.5    Atrial fibrillation ([facility]) I48.91    Pure hypercholesterolemia E78.00    Morbid obesity ([facility]) E66.01    Chronic obstructive pulmonary disease ([facility]) J44.9    Obstructive sleep apnea G47.33    Exertional dyspnea R06.00    History of colon polyps Z86.010    Family history of rectal cancer Z80.0       Past Medical History:        Diagnosis Date    Atrial fibrillation (HCC)     [facility]     Cancer ([facility]     CKD (chronic kidney disease)     CKD (chronic kidney disease) stage 5, GFR less than 15 ml/min ([facility] 11/6/2018    Diabetes ([facility]     Hypertension     Obesity     260 #    Sleep apnea        Past Surgical History:        Procedure Laterality Date    CARDIAC CATHETERIZATION Left 10/02/2008    @ [facility]    CHOLECYSTECTOMY      COLONOSCOPY      COLONOSCOPY N/A 8/29/2019    mid ascending colon polyp bx/cauterization (inflammatory polyp), proximal transverse colon polyp bx/cauterized (tubular adenoma), descending colon polyp 80 cm from anus bx/cauterized (hyperplastic polyp), sigmoid colon polyp 35 cm from anus bx/cauterized (tubular adenoma), sigmoid colon polyps 30 cm from anus bx/cauterized (tubular adenoma), Dr. Eliza Byers, [facility]    COLONOSCOPY  8/29/2019    mid ascending colon polyp bx/cauterization (inflammatory polyp), proximal transverse colon polyp bx/cauterized (tubular adenoma), descending colon polyp 80 cm from anus bx/cauterized (hyperplastic polyp), sigmoid colon polyp 35 cm from anus bx/cauterized (tubular adenoma), sigmoid colon polyps 30 cm from anus bx/cauterized (tubular adenoma), Dr. Eliza Byers, [facility]    DIALYSIS FISTULA CREATION Left 1/22/2019    LIGATIION LEFT LEFT UPPER EXTREMITY OF AV FISTULA , EVACUATION HEMATOMA performed by Sobeida Gentile MD at [address] Right 12/7/2020    AV FISTULA CREATION -- RIGHT ARM performed by Mini Cerna MD at [place] [address] Left 9/18/2018    AV FISTULA  LEFT ARM performed by Sobeida Gentile MD at [place] [address] Left 11/14/2018    SUPERFICIALIZATION AV FISTULA  LEFT ARM , LIGATION TRIBUTORY AV FISTULA LEFT ARM performed by Sobeida Gentile MD at [facility]    TONSILLECTOMY      TUBAL LIGATION         Social History:    Social History     Tobacco Use    Smoking status: Former Smoker     Packs/day: 0.50     Years: 43.00     Pack years: 21.50     Types: Cigarettes     Quit date: 2021     Years since quittin.1    Smokeless tobacco: Never Used   Substance Use Topics    Alcohol use: Not Currently     Comment: 1 cup of coffee a day                                 Counseling given: Not Answered      Vital Signs (Current): There were no vitals filed for this visit. BP Readings from Last 3 Encounters:   21 (!) 190/80   21 130/72   20 (!) 140/82       NPO Status:                                                                                 BMI:   Wt Readings from Last 3 Encounters:   21 260 lb (117.9 kg)   20 260 lb (117.9 kg)   20 270 lb (122.5 kg)     There is no height or weight on file to calculate BMI.    CBC:   Lab Results   Component Value Date    WBC 13.2 2021    RBC 3.69 2021    HGB 11.4 2021    HCT 36.7 2021    MCV 99.5 2021    RDW 12.2 2021     2021       CMP:   Lab Results   Component Value Date     2020    K 3.6 2020    K 4.2 2019     2020    CO2 23 2020    BUN 40 2020    CREATININE 3.4 2020    GFRAA 16 2020    LABGLOM 14 2020    GLUCOSE 176 2021    PROT 7.3 2020    CALCIUM 9.4 2020    BILITOT 0.4 2020    ALKPHOS 133 2020    AST 15 2020    ALT 12 2020       POC Tests: No results for input(s): POCGLU, POCNA, POCK, POCCL, POCBUN, POCHEMO, POCHCT in the last 72 hours.     Coags:   Lab Results   Component Value Date    PROTIME 11.3 2021    INR 1.0 2021    APTT 27.1 2019       HCG (If Applicable): No results found for: PREGTESTUR, PREGSERUM, HCG, HCGQUANT     ABGs: No results found for: PHART, plan discussed with care team members and agreed upon.     Rosalina Brown,    4/8/2021  8:57 AM

## 2021-04-08 NOTE — H&P
Vascular Surgery History & Physical Exam      Chief Complaint: CRF    HISTORY OF PRESENT ILLNESS:                The patient is a 73 y.o. female who presents to the hospital for elective revision/elevation of an arteriovenous fistula. The patient denies any problems since the last office visit. IMPRESSION:   Active Hospital Problems    Diagnosis    Encounter regarding vascular access for dialysis for end-stage renal disease ([facility] [N18.6, Z99.2]       PLAN:  Elevation/revision of a right BC arteriovenous fistula. I reviewed the procedure with the patient. I discussed the risks, benefits, and alternatives of the procedure. The patient understands and consents. All questions were answered.     Patient Active Problem List   Diagnosis Code    Moderate episode of recurrent major depressive disorder (HCC) F33.1    Generalized anxiety disorder F41.1    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.05    Anemia D64.9    Essential hypertension I10    CKD (chronic kidney disease) stage 5, GFR less than 15 ml/min ([facility]) N18.5    Atrial fibrillation (HCC) I48.91    Pure hypercholesterolemia E78.00    Morbid obesity (HCC) E66.01    Chronic obstructive pulmonary disease (HCC) J44.9    Obstructive sleep apnea G47.33    Exertional dyspnea R06.00    History of colon polyps Z86.010    Family history of rectal cancer Z80.0    Encounter regarding vascular access for dialysis for end-stage renal disease (HCC) N18.6, Z99.2       Past Medical History:   Diagnosis Date    Atrial fibrillation (HCC)     Bladder cancer ([facility]     Cancer ([facility]     CKD (chronic kidney disease)     CKD (chronic kidney disease) stage 5, GFR less than 15 ml/min ([address] 11/6/2018    Diabetes ([facility] [address] [facility]     Hypertension     Obesity     260 #    Sleep apnea         Past Surgical History:   Procedure Laterality Date    [facility]ARDIAC [facility]ATION [facility]    @ [facility]    CHOLECYSTECTOMY      COLONOSCOPY      COLONOSCOPY N/A 8/29/2019    mid ascending Relationship status: Not on file    Intimate partner violence     Fear of current or ex partner: Not on file     Emotionally abused: Not on file     Physically abused: Not on file     Forced sexual activity: Not on file   Other Topics Concern    Not on file   Social History Narrative    Drinks 1 cup of coffee daily. Family History   Problem Relation Age of Onset   Wilbert Layer Cancer Mother         bladder    Diabetes Mother     COPD Mother     Depression Father     Diabetes Father     Heart Disease Father     High Blood Pressure Father     Stroke Father     Prostate Cancer Father     Dementia Father     Alcohol Abuse Brother     Diabetes Brother     Bipolar Disorder Paternal Uncle     Anxiety Disorder Daughter     Colon Cancer Maternal Grandfather        REVIEW OF SYSTEMS:  The chart was reviewed. PHYSICAL EXAM:    Vitals:    04/08/21 0945   BP: 139/69   Pulse: 65   Resp: 10   Temp:    SpO2: 100%     CONSTITUTIONAL:  awake, alert, cooperative, no apparent distress  LUNGS:  no increased work of breathing, good air exchange  CARDIOVASCULAR:  regular rate and rhythm  ABDOMEN:  soft, non-distended and non-tender  R UE: + thrill through AVF. 2+ radial pulse    LABS:    Lab Results   Component Value Date    WBC 13.2 (H) 04/08/2021    HGB 11.4 (L) 04/08/2021    HCT 36.7 04/08/2021     04/08/2021    PROTIME 11.3 04/08/2021    INR 1.0 04/08/2021    APTT 27.1 01/22/2019    K 4.1 04/08/2021    BUN 48 (H) 04/08/2021    CREATININE 4.1 (H) 04/08/2021       RADIOLOGY:      Patient was seen and examined. Agree with above. Evaluated the fistula at the nicely dilated fistula. She is just a very large woman and it is deep. Therefore planning fistula revision/elevation. Risk benefits and alternatives were discussed with patient she understands and wishes to proceed.     Rosalinda Talbot

## 2021-04-08 NOTE — PROGRESS NOTES
Covid testing done  Results negative  Self quarantine guidelines have been maintained  No unusual signs or symptoms to report  Screening questionnaire completed
Dr. Bebeto Villa at the bedside regarding patient oxygen saturation dropping. Pt not short of breath and is able to use the IS without difficulty.  Okay to discharge from his point of view
IS given to patient and instructed on how to use.  Currently using IS
Patient agreed to COVID test on 4/2/21 at the  [facility], between the hours of 6 am- 2:30 pm located at  [facility]. Patient instructed to bring ID. Patient instructed to self isolate until day of surgery.
Patient given discharge instructions & verbalized understanding.
Patient sitting up in bed taking PO. Belongings returned. Patient's Granddaughter updated by phone & will come pick patient up.
concerns about your blood sugar [phone]. [] Use your inhalers the morning of surgery. Bring your emergency inhaler with you day of surgery. [x] Follow physician instructions regarding any blood thinners you may be taking. HAS STOPPED ELIQUIS 4-4  WHAT TO EXPECT:  [x] The day of surgery you will be greeted and checked in by the [facility] [employer] [facility].  In addition, you will be registered in the [place] by a Patient Access Representative. Please bring your photo ID and insurance card. A nurse will greet you in accordance to the time you are needed in the pre-op area to prepare you for surgery. Please do not be discouraged if you are not greeted in the order you arrive as there are many variables that are involved in patient preparation. Your patience is greatly appreciated as you wait for your nurse. Please bring in items such as: books, magazines, newspapers, electronics, or any other items  to occupy your time in the waiting area. []  Delays may occur with surgery and staff will make a sincere effort to keep you informed of delays. If any delays occur with your procedure, we apologize ahead of time for your inconvenience as we recognize the value of your time.

## 2021-04-09 NOTE — OP NOTE
Operative Note      Patient: Paris Briceño  YOB: 1955  MRN: [id number]    Date of procedure: 4/8/2021    Preoperative diagnosis: Renal failure. With difficult arterial venous access cannulation secondary to the depth of the cephalic vein. Postoperative diagnosis: Same with identification of nicely dilated cephalic vein. Was deep and was able to be superficial lysed    Surgeon: Fer Méndez MD    Assistant Ana Addison    Anesthesia: Local lidocaine and Marcaine combined with MAC    Estimated blood loss: Less than 10 cc    Procedure:  #1 right arm brachiocephalic fistula revision/elevation. Description of the procedure: After risk benefits and alternatives were discussed with the patient. Consent was achieved. She is brought to the operating room and placed in the supine position. She was prepped and draped in standard sterile fashion. Time was taken verify the person the operative site as well as the procedure. At this point lidocaine was used infiltrate skin and subcutaneous tissue. An incision was made along the cephalic vein. This was premarked with an ultrasound. This was done with a #15 blade. Dissection was performed sharply with Metzenbaum dissection and Bovie dissection all the way up to the axillary crease. The vein was able to be mobilized branches ligated and divided. The vein was able to be superficial lysed in a bed of subcutaneous tissue created with a running 3-0 Vicryl sutures. The overlying subcutaneous tissue was then closed overlying the vein. Followed by running subcuticular closure. At the end of the procedure over-the-counter for the patient tolerate procedure well with a palpable radial pulse and a nice thrill in the access. Kirill Mcclain PA-C was scrubbed and participated in the entire procedure including incision, exposure, anastomosis, and closure. There was no qualified general surgery resident available.       Electronically signed by Vanessa Mendoza Reggie Medina MD on 4/9/2021 at 4:00 PM

## 2021-04-16 LAB
ALBUMIN SERPL-MCNC: 3.7 G/DL (ref 3.5–5.2)
ALP BLD-CCNC: 148 U/L (ref 35–104)
ALT SERPL-CCNC: 19 U/L (ref 0–32)
ANION GAP SERPL CALCULATED.3IONS-SCNC: 19 MMOL/L (ref 7–16)
AST SERPL-CCNC: 21 U/L (ref 0–31)
BILIRUB SERPL-MCNC: 0.3 MG/DL (ref 0–1.2)
BUN BLDV-MCNC: 53 MG/DL (ref 8–23)
CALCIUM SERPL-MCNC: 9.9 MG/DL (ref 8.6–10.2)
CHLORIDE BLD-SCNC: 104 MMOL/L (ref 98–107)
CO2: 21 MMOL/L (ref 22–29)
CREAT SERPL-MCNC: 4.3 MG/DL (ref 0.5–1)
GFR AFRICAN AMERICAN: 12
GFR NON-AFRICAN AMERICAN: 10 ML/MIN/1.73
GLUCOSE BLD-MCNC: 143 MG/DL (ref 74–99)
HCT VFR BLD CALC: 37.9 % (ref 34–48)
HEMOGLOBIN: 11.5 G/DL (ref 11.5–15.5)
MCH RBC QN AUTO: 31.2 PG (ref 26–35)
MCHC RBC AUTO-ENTMCNC: 30.3 % (ref 32–34.5)
MCV RBC AUTO: 102.7 FL (ref 80–99.9)
PARATHYROID HORMONE INTACT: 359 PG/ML (ref 15–65)
PDW BLD-RTO: 12.1 FL (ref 11.5–15)
PHOSPHORUS: 5 MG/DL (ref 2.5–4.5)
PLATELET # BLD: 426 E9/L (ref 130–450)
PMV BLD AUTO: 10.6 FL (ref 7–12)
POTASSIUM SERPL-SCNC: 4.8 MMOL/L (ref 3.5–5)
RBC # BLD: 3.69 E12/L (ref 3.5–5.5)
SODIUM BLD-SCNC: 144 MMOL/L (ref 132–146)
TOTAL PROTEIN: 7.6 G/DL (ref 6.4–8.3)
VITAMIN D 25-HYDROXY: 23 NG/ML (ref 30–100)
WBC # BLD: 13.8 E9/L (ref 4.5–11.5)

## 2021-04-26 ENCOUNTER — OFFICE VISIT (OUTPATIENT)
Dept: FAMILY MEDICINE CLINIC | Age: 66
End: 2021-04-26
Payer: MEDICARE

## 2021-04-26 VITALS
DIASTOLIC BLOOD PRESSURE: 72 MMHG | WEIGHT: 253 LBS | BODY MASS INDEX: 39.71 KG/M2 | TEMPERATURE: 97 F | SYSTOLIC BLOOD PRESSURE: 126 MMHG | HEART RATE: 76 BPM | HEIGHT: 67 IN | OXYGEN SATURATION: 98 %

## 2021-04-26 DIAGNOSIS — I10 ESSENTIAL HYPERTENSION: Chronic | ICD-10-CM

## 2021-04-26 DIAGNOSIS — E11.22 TYPE 2 DIABETES MELLITUS WITH STAGE 5 CHRONIC KIDNEY DISEASE NOT ON CHRONIC DIALYSIS, WITHOUT LONG-TERM CURRENT USE OF INSULIN (HCC): ICD-10-CM

## 2021-04-26 DIAGNOSIS — R05.9 COUGH: Primary | ICD-10-CM

## 2021-04-26 DIAGNOSIS — N18.5 CKD (CHRONIC KIDNEY DISEASE) STAGE 5, GFR LESS THAN 15 ML/MIN (HCC): Primary | Chronic | ICD-10-CM

## 2021-04-26 DIAGNOSIS — N18.5 TYPE 2 DIABETES MELLITUS WITH STAGE 5 CHRONIC KIDNEY DISEASE NOT ON CHRONIC DIALYSIS, WITHOUT LONG-TERM CURRENT USE OF INSULIN (HCC): ICD-10-CM

## 2021-04-26 DIAGNOSIS — E55.9 VITAMIN D DEFICIENCY: ICD-10-CM

## 2021-04-26 PROCEDURE — 4040F PNEUMOC VAC/ADMIN/RCVD: CPT | Performed by: NURSE PRACTITIONER

## 2021-04-26 PROCEDURE — 1090F PRES/ABSN URINE INCON ASSESS: CPT | Performed by: NURSE PRACTITIONER

## 2021-04-26 PROCEDURE — 99213 OFFICE O/P EST LOW 20 MIN: CPT | Performed by: NURSE PRACTITIONER

## 2021-04-26 PROCEDURE — G8427 DOCREV CUR MEDS BY ELIG CLIN: HCPCS | Performed by: NURSE PRACTITIONER

## 2021-04-26 PROCEDURE — 1036F TOBACCO NON-USER: CPT | Performed by: NURSE PRACTITIONER

## 2021-04-26 PROCEDURE — 3017F COLORECTAL CA SCREEN DOC REV: CPT | Performed by: NURSE PRACTITIONER

## 2021-04-26 PROCEDURE — G8417 CALC BMI ABV UP PARAM F/U: HCPCS | Performed by: NURSE PRACTITIONER

## 2021-04-26 PROCEDURE — G8400 PT W/DXA NO RESULTS DOC: HCPCS | Performed by: NURSE PRACTITIONER

## 2021-04-26 PROCEDURE — 1123F ACP DISCUSS/DSCN MKR DOCD: CPT | Performed by: NURSE PRACTITIONER

## 2021-04-26 ASSESSMENT — ENCOUNTER SYMPTOMS
CHEST TIGHTNESS: 0
COLOR CHANGE: 0
SINUS PAIN: 0
DIARRHEA: 0
VOMITING: 0
COUGH: 1
SHORTNESS OF BREATH: 0
NAUSEA: 0
WHEEZING: 0
CONSTIPATION: 0
EYE PAIN: 0

## 2021-04-26 NOTE — PROGRESS NOTES
2021    Daniel Bianchi (:  1955) is a 73 y.o. female, here for a     Chief Complaint   Patient presents with    Cough     Sometimes productive and not as bad as it was after surgery. Since rt arm fistula surgery 21      She has quit smoking 2 months ago, cough has improved since scheduled appointment. Has follow up with surgeon on . Denies recent illness, CP, SOB, fever, N&V or diarrhea. Has been stressed dealing with her own health & her husbands. ASSESSMENT/PLAN:    1. Cough  - Lung sounds clear, no cough during examination. If symptoms return, contact office for x-ray or medication management to be discussed at this time    [facility]  No acute findings today meriting change in management plan. Health Maintenance   Topic Date Due    Diabetic retinal exam  Never done    HIV screen  Never done    COVID-19 Vaccine (1) Never done    DTaP/Tdap/Td vaccine (1 - Tdap) Never done    Cervical cancer screen  Never done    Breast cancer screen  Never done    Shingles Vaccine (1 of 2) Never done    DEXA (modify frequency per FRAX score)  Never done    Annual Wellness Visit (AWV)  Never done    Diabetic foot exam  10/30/2019    Lipid screen  2019    Pneumococcal 65+ yrs at Risk Vaccine (1 of 2 - PCV13) 2021    A1C test (Diabetic or Prediabetic)  2021    Potassium monitoring  2022    Creatinine monitoring  2022    Colon cancer screen colonoscopy  2022    Flu vaccine  Completed    Hepatitis C screen  Completed    Hepatitis A vaccine  Aged Out    Hib vaccine  Aged Out    Meningococcal (ACWY) vaccine  Aged Out     - Health Maintenance reviewed today & counseled patient as appropriate.     Patient Active Problem List   Diagnosis    Moderate episode of recurrent major depressive disorder (HCC)    Generalized anxiety disorder    Insomnia due to mental condition    Anemia    Essential hypertension    CKD (chronic kidney disease) stage 5, GFR less than 15 ml/min (HCC)    Atrial fibrillation (HCC)    Pure hypercholesterolemia    Morbid obesity (HCC)    Chronic obstructive pulmonary disease (HCC)    Obstructive sleep apnea    Exertional dyspnea    History of colon polyps    Family history of rectal cancer    Encounter regarding vascular access for dialysis for end-stage renal disease ([facility]    COVID-19       Vitals:    04/26/21 1105   BP: 126/72   Pulse: 76   Temp: 97 °F (36.1 °C)   TempSrc: Temporal   SpO2: 98%   Weight: 253 lb (114.8 kg)   Height: 5' 7\" (1.702 m)     Estimated body mass index is 39.63 kg/m² as calculated from the following:    Height as of this encounter: 5' 7\" (1.702 m). Weight as of this encounter: 253 lb (114.8 kg). Review of Systems   Constitutional: Negative for activity change, appetite change, chills and fever. HENT: Negative for congestion, ear pain, sinus pain and sneezing. Eyes: Negative for pain and visual disturbance. Respiratory: Positive for cough. Negative for chest tightness, shortness of breath and wheezing. Cardiovascular: Negative for chest pain, palpitations and leg swelling. Gastrointestinal: Negative for constipation, diarrhea, nausea and vomiting. Endocrine: Negative for cold intolerance, heat intolerance and polyuria. Genitourinary: Negative for difficulty urinating. Musculoskeletal: Negative for arthralgias and myalgias. Skin: Negative for color change and rash. Neurological: Negative for dizziness, light-headedness and headaches. Hematological: Negative for adenopathy. Does not bruise/bleed easily. Psychiatric/Behavioral: Negative for agitation, decreased concentration, sleep disturbance and suicidal ideas. The patient is not nervous/anxious. Physical Exam  Vitals signs and nursing note reviewed. Constitutional:       General: She is not in acute distress. Appearance: Normal appearance. She is well-developed and normal weight.  She is not ill-appearing, toxic-appearing or diaphoretic. HENT:      Head: Normocephalic and atraumatic. Right Ear: Tympanic membrane, ear canal and external ear normal. There is no impacted cerumen. Left Ear: Tympanic membrane, ear canal and external ear normal. There is no impacted cerumen. Ears:      Comments: ENT: canals clear, TMs intact and pearly gray, nasal turbinates erythematous and boggy, pharynx shows erythema and post nasal drip       Nose: No congestion or rhinorrhea. Mouth/Throat:      Mouth: Mucous membranes are moist.      Pharynx: Oropharynx is clear. No oropharyngeal exudate or posterior oropharyngeal erythema. Eyes:      Extraocular Movements: Extraocular movements intact. Conjunctiva/sclera: Conjunctivae normal.      Pupils: Pupils are equal, round, and reactive to light. Neck:      Musculoskeletal: Normal range of motion and neck supple. Thyroid: No thyromegaly. Cardiovascular:      Rate and Rhythm: Normal rate and regular rhythm. Pulses: Normal pulses. Heart sounds: Normal heart sounds. No murmur. Pulmonary:      Effort: Pulmonary effort is normal. No respiratory distress. Breath sounds: Normal breath sounds. No wheezing or rhonchi. Chest:      Chest wall: No tenderness. Abdominal:      General: Bowel sounds are normal. There is no distension. Palpations: Abdomen is soft. Tenderness: There is no abdominal tenderness. There is no guarding or rebound. Genitourinary:     Vagina: Normal.      Comments: Deferred. Musculoskeletal: Normal range of motion. Lymphadenopathy:      Cervical: No cervical adenopathy. Skin:     General: Skin is warm and dry. Capillary Refill: Capillary refill takes less than 2 seconds. Findings: No bruising, erythema or rash. Comments: Fistula incision site intact, no erythema, discharge or edema noted. Neurological:      General: No focal deficit present.       Mental Status: She is alert and oriented to person, place, and time. Mental status is at baseline. Cranial Nerves: No cranial nerve deficit. Sensory: No sensory deficit. Motor: No weakness. Coordination: Coordination normal.      Gait: Gait normal.   Psychiatric:         Mood and Affect: Mood normal.         Behavior: Behavior normal.         Thought Content: Thought content normal.         Judgment: Judgment normal.       Prior to Visit Medications    Medication Sig Taking?  Authorizing Provider   apixaban (ELIQUIS) 5 MG TABS tablet Take 1 tablet by mouth 2 times daily Yes Jaylon Drake MD   glipiZIDE (GLUCOTROL XL) 5 MG extended release tablet Take 2 tablets by mouth 2 times daily Yes RENUKA Romero CNP   propafenone (RYTHMOL SR) 325 MG extended release capsule Take 1 capsule by mouth 2 times daily Yes Jaylon Drake MD   DULoxetine (CYMBALTA) 60 MG extended release capsule Take 1 capsule by mouth daily Yes RENUKA Romero CNP   amLODIPine (NORVASC) 5 MG tablet Take 1 tablet by mouth daily Yes RENUKA Romero CNP   atorvastatin (LIPITOR) 40 MG tablet Take 1 tablet by mouth daily Yes RENUKA Romero CNP   torsemide (DEMADEX) 20 MG tablet Take 1 tablet by mouth daily  Patient taking differently: Take 20 mg by mouth every other day  Yes RENUKA Romero CNP   metoprolol tartrate (LOPRESSOR) 50 MG tablet Take 1 tablet by mouth 2 times daily Yes RENUKA Romero CNP   sodium bicarbonate 650 MG tablet TK 1 T PO BID Yes Historical Provider, MD   Cholecalciferol (VITAMIN D3) 1000 units TABS Take 1,000 Units by mouth daily  Yes Historical Provider, MD      Allergies   Allergen Reactions    Adhesive Tape Rash    Penicillins Rash     ADVANCE DIRECTIVE: N, <no information>    Immunization History   Administered Date(s) Administered    Hepatitis B 07/19/2018    Influenza Vaccine, unspecified formulation 10/02/2008    Influenza Virus Vaccine 10/02/2008, 10/09/2015    Influenza, Quadv, IM, PF (6 mo and older Fluzone, Flulaval, Fluarix, and 3 yrs and older Afluria) 10/30/2018, 09/29/2020    Influenza, Triv, inactivated, subunit, adjuvanted, IM (Fluad 65 yrs and older) 10/21/2019    Pneumococcal Polysaccharide ([id number]) 10/02/2008, 05/17/2019     No follow-ups on file. An electronic signature was used to authenticate this note.     --RENUKA Crespo - CNP on 4/26/2021 at 1:52 PM

## 2021-04-26 NOTE — TELEPHONE ENCOUNTER
Last Appointment:  4/26/2021  Future Appointments   Date Time Provider Abhishek Singh   4/28/2021  1:15 PM Andres Arita MD [facility]

## 2021-04-27 RX ORDER — METOPROLOL TARTRATE 50 MG/1
TABLET, FILM COATED ORAL
Qty: 60 TABLET | Refills: 5 | Status: SHIPPED
Start: 2021-04-27 | End: 2021-10-18

## 2021-04-27 RX ORDER — MELATONIN
1000 DAILY
Qty: 30 TABLET | Refills: 5 | Status: SHIPPED
Start: 2021-04-27 | End: 2021-10-14 | Stop reason: SDUPTHER

## 2021-04-27 RX ORDER — ATORVASTATIN CALCIUM 40 MG/1
40 TABLET, FILM COATED ORAL DAILY
Qty: 30 TABLET | Refills: 5 | Status: SHIPPED
Start: 2021-04-27 | End: 2021-10-14

## 2021-04-28 ENCOUNTER — OFFICE VISIT (OUTPATIENT)
Dept: VASCULAR SURGERY | Age: 66
End: 2021-04-28

## 2021-04-28 VITALS
DIASTOLIC BLOOD PRESSURE: 82 MMHG | SYSTOLIC BLOOD PRESSURE: 136 MMHG | WEIGHT: 253 LBS | HEIGHT: 67 IN | BODY MASS INDEX: 39.71 KG/M2

## 2021-04-28 DIAGNOSIS — Z99.2 ENCOUNTER REGARDING VASCULAR ACCESS FOR DIALYSIS FOR ESRD (HCC): Primary | ICD-10-CM

## 2021-04-28 DIAGNOSIS — N18.6 ENCOUNTER REGARDING VASCULAR ACCESS FOR DIALYSIS FOR ESRD (HCC): Primary | ICD-10-CM

## 2021-04-28 LAB
ANION GAP SERPL CALCULATED.3IONS-SCNC: 18 MMOL/L (ref 7–16)
BUN BLDV-MCNC: 53 MG/DL (ref 6–23)
CALCIUM SERPL-MCNC: 9.8 MG/DL (ref 8.6–10.2)
CHLORIDE BLD-SCNC: 106 MMOL/L (ref 98–107)
CO2: 18 MMOL/L (ref 22–29)
CREAT SERPL-MCNC: 4.5 MG/DL (ref 0.5–1)
GFR AFRICAN AMERICAN: 12
GFR NON-AFRICAN AMERICAN: 10 ML/MIN/1.73
GLUCOSE BLD-MCNC: 172 MG/DL (ref 74–99)
POTASSIUM SERPL-SCNC: 5.1 MMOL/L (ref 3.5–5)
SODIUM BLD-SCNC: 142 MMOL/L (ref 132–146)

## 2021-04-28 PROCEDURE — 99024 POSTOP FOLLOW-UP VISIT: CPT | Performed by: NURSE PRACTITIONER

## 2021-04-28 NOTE — PROGRESS NOTES
4/28/2021    Ya Rivera  1955    Chief Complaint   Patient presents with    Post-Op Check     check rt. arm       Patient returns for post operative evaluation status post revision of a right brachiocephalic AVF. The patient denies any unexpected problems since the procedure. She denies any loss of sensation or motor of her right hand. She is not yet on dialysis. She states that her creatinine has increased and has a follow up appointment with her nephrologist, Dr. Silvia Solomon, tomorrow.         Procedure Laterality Date    CARDIAC CATHETERIZATION Left 10/02/2008    @ [facility]    CHOLECYSTECTOMY      COLONOSCOPY      COLONOSCOPY N/A 8/29/2019    mid ascending colon polyp bx/cauterization (inflammatory polyp), proximal transverse colon polyp bx/cauterized (tubular adenoma), descending colon polyp 80 cm from anus bx/cauterized (hyperplastic polyp), sigmoid colon polyp 35 cm from anus bx/cauterized (tubular adenoma), sigmoid colon polyps 30 cm from anus bx/cauterized (tubular adenoma), Dr. Rachel Berman, [facility]    COLONOSCOPY  8/29/2019    mid ascending colon polyp bx/cauterization (inflammatory polyp), proximal transverse colon polyp bx/cauterized (tubular adenoma), descending colon polyp 80 cm from anus bx/cauterized (hyperplastic polyp), sigmoid colon polyp 35 cm from anus bx/cauterized (tubular adenoma), sigmoid colon polyps 30 cm from anus bx/cauterized (tubular adenoma), Dr. Rachel Berman, [facility]    DIALYSIS FISTULA CREATION Left 1/22/2019    LIGATIION LEFT LEFT UPPER EXTREMITY OF AV FISTULA , EVACUATION HEMATOMA performed by Matt Sierra MD at [address] Right 12/7/2020    AV FISTULA CREATION -- RIGHT ARM performed by Yovany Angeles MD at [address] Right 4/8/2021    AV FISTULA  REVISION RIGHT ARM performed by Yovany Angeles MD at [place] [address] Left 9/18/2018    AV FISTULA  LEFT ARM performed by Matt Sierra MD at [place] [address] ANASTOMOSIS,AV,ANY SITE Left 11/14/2018    SUPERFICIALIZATION AV FISTULA  LEFT ARM , LIGATION TRIBUTORY AV FISTULA LEFT ARM performed by Chandra Klinefelter, MD at [address] [facility]         Physical Exam:  The incision(s) are healing without evidence of infection. Heart rhythm is regular. AVF + thrill, + bruit. Right      Left   Brachial     Radial 2    Femoral     Popliteal     Dorsalis Pedis     Posterior Tibial     (3=normal, 2=diminished, 1=barely palpable, 4=widened)    Assessment:  Post-operative AV access. Problem List Items Addressed This Visit     None      Visit Diagnoses     Encounter regarding vascular access for dialysis for ESRD ([facility] [address] [facility]    -  Primary        I reviewed with the patient that her access is patent and can be cannulated for dialysis in 3 weeks if she would need to be started on dialysis. If dialysis needs to be initiated before then, she may require insertion tunneled dialysis catheter. I reviewed with the patient that normal activities can be resumed as tolerated. Pt seen and plan reviewed with Dr. Nicolle Cuevas.      Domenico Campuzano, CNP

## 2021-05-25 DIAGNOSIS — I10 ESSENTIAL HYPERTENSION: Chronic | ICD-10-CM

## 2021-05-26 RX ORDER — TORSEMIDE 20 MG/1
20 TABLET ORAL EVERY OTHER DAY
Qty: 30 TABLET | Refills: 2 | Status: SHIPPED
Start: 2021-05-26 | End: 2021-08-06

## 2021-06-17 LAB
ALBUMIN SERPL-MCNC: 4 G/DL (ref 3.5–5.2)
ALP BLD-CCNC: 146 U/L (ref 35–104)
ALT SERPL-CCNC: 16 U/L (ref 0–32)
ANION GAP SERPL CALCULATED.3IONS-SCNC: 18 MMOL/L (ref 7–16)
AST SERPL-CCNC: 19 U/L (ref 0–31)
BILIRUB SERPL-MCNC: 0.4 MG/DL (ref 0–1.2)
BUN BLDV-MCNC: 42 MG/DL (ref 6–23)
CALCIUM SERPL-MCNC: 10 MG/DL (ref 8.6–10.2)
CHLORIDE BLD-SCNC: 106 MMOL/L (ref 98–107)
CO2: 16 MMOL/L (ref 22–29)
CREAT SERPL-MCNC: 4.2 MG/DL (ref 0.5–1)
GFR AFRICAN AMERICAN: 13
GFR NON-AFRICAN AMERICAN: 11 ML/MIN/1.73
GLUCOSE BLD-MCNC: 141 MG/DL (ref 74–99)
HCT VFR BLD CALC: 36 % (ref 34–48)
HEMOGLOBIN: 11.1 G/DL (ref 11.5–15.5)
MCH RBC QN AUTO: 31.2 PG (ref 26–35)
MCHC RBC AUTO-ENTMCNC: 30.8 % (ref 32–34.5)
MCV RBC AUTO: 101.1 FL (ref 80–99.9)
PARATHYROID HORMONE INTACT: 283 PG/ML (ref 15–65)
PDW BLD-RTO: 12.8 FL (ref 11.5–15)
PHOSPHORUS: 5 MG/DL (ref 2.5–4.5)
PLATELET # BLD: 347 E9/L (ref 130–450)
PMV BLD AUTO: 10.7 FL (ref 7–12)
POTASSIUM SERPL-SCNC: 4.8 MMOL/L (ref 3.5–5)
RBC # BLD: 3.56 E12/L (ref 3.5–5.5)
SODIUM BLD-SCNC: 140 MMOL/L (ref 132–146)
TOTAL PROTEIN: 7.7 G/DL (ref 6.4–8.3)
VITAMIN D 25-HYDROXY: 20 NG/ML (ref 30–100)
WBC # BLD: 12.6 E9/L (ref 4.5–11.5)

## 2021-06-22 DIAGNOSIS — E11.22 TYPE 2 DIABETES MELLITUS WITH STAGE 5 CHRONIC KIDNEY DISEASE NOT ON CHRONIC DIALYSIS, WITHOUT LONG-TERM CURRENT USE OF INSULIN (HCC): ICD-10-CM

## 2021-06-22 DIAGNOSIS — N18.5 TYPE 2 DIABETES MELLITUS WITH STAGE 5 CHRONIC KIDNEY DISEASE NOT ON CHRONIC DIALYSIS, WITHOUT LONG-TERM CURRENT USE OF INSULIN (HCC): ICD-10-CM

## 2021-06-23 RX ORDER — GLIPIZIDE 5 MG/1
TABLET, FILM COATED, EXTENDED RELEASE ORAL
Qty: 120 TABLET | Refills: 0 | Status: SHIPPED
Start: 2021-06-23 | End: 2021-08-23

## 2021-07-20 DIAGNOSIS — F33.1 MODERATE EPISODE OF RECURRENT MAJOR DEPRESSIVE DISORDER (HCC): ICD-10-CM

## 2021-07-20 RX ORDER — DULOXETIN HYDROCHLORIDE 60 MG/1
60 CAPSULE, DELAYED RELEASE ORAL DAILY
Qty: 30 CAPSULE | Refills: 2 | Status: SHIPPED
Start: 2021-07-20 | End: 2021-11-23

## 2021-08-06 ENCOUNTER — HOSPITAL ENCOUNTER (EMERGENCY)
Age: 66
Discharge: HOME | End: 2021-08-06
Attending: EMERGENCY MEDICINE
Payer: MEDICARE

## 2021-08-06 ENCOUNTER — APPOINTMENT (OUTPATIENT)
Dept: GENERAL RADIOLOGY | Age: 66
End: 2021-08-06
Payer: MEDICARE

## 2021-08-06 VITALS
TEMPERATURE: 97.5 F | DIASTOLIC BLOOD PRESSURE: 56 MMHG | RESPIRATION RATE: 16 BRPM | OXYGEN SATURATION: 94 % | BODY MASS INDEX: 39.71 KG/M2 | HEART RATE: 80 BPM | HEIGHT: 67 IN | WEIGHT: 253 LBS | SYSTOLIC BLOOD PRESSURE: 125 MMHG

## 2021-08-06 DIAGNOSIS — E87.5 HYPERKALEMIA: Primary | ICD-10-CM

## 2021-08-06 DIAGNOSIS — R06.02 SHORTNESS OF BREATH: ICD-10-CM

## 2021-08-06 LAB
ALBUMIN SERPL-MCNC: 3.8 G/DL (ref 3.5–5.2)
ALP BLD-CCNC: 155 U/L (ref 35–104)
ALT SERPL-CCNC: 18 U/L (ref 0–32)
ANION GAP SERPL CALCULATED.3IONS-SCNC: 17 MMOL/L (ref 7–16)
AST SERPL-CCNC: 14 U/L (ref 0–31)
BASOPHILS ABSOLUTE: 0.07 E9/L (ref 0–0.2)
BASOPHILS RELATIVE PERCENT: 0.5 % (ref 0–2)
BILIRUB SERPL-MCNC: 0.5 MG/DL (ref 0–1.2)
BUN BLDV-MCNC: 39 MG/DL (ref 6–23)
CALCIUM SERPL-MCNC: 9.8 MG/DL (ref 8.6–10.2)
CHLORIDE BLD-SCNC: 107 MMOL/L (ref 98–107)
CO2: 18 MMOL/L (ref 22–29)
CREAT SERPL-MCNC: 5.3 MG/DL (ref 0.5–1)
EKG ATRIAL RATE: 75 BPM
EKG P-R INTERVAL: 174 MS
EKG Q-T INTERVAL: 402 MS
EKG QRS DURATION: 100 MS
EKG QTC CALCULATION (BAZETT): 448 MS
EKG R AXIS: 46 DEGREES
EKG T AXIS: 51 DEGREES
EKG VENTRICULAR RATE: 75 BPM
EOSINOPHILS ABSOLUTE: 0.16 E9/L (ref 0.05–0.5)
EOSINOPHILS RELATIVE PERCENT: 1.2 % (ref 0–6)
GFR AFRICAN AMERICAN: 10
GFR NON-AFRICAN AMERICAN: 8 ML/MIN/1.73
GLUCOSE BLD-MCNC: 196 MG/DL (ref 74–99)
HCT VFR BLD CALC: 33.2 % (ref 34–48)
HEMOGLOBIN: 10.2 G/DL (ref 11.5–15.5)
IMMATURE GRANULOCYTES #: 0.08 E9/L
IMMATURE GRANULOCYTES %: 0.6 % (ref 0–5)
LYMPHOCYTES ABSOLUTE: 3.01 E9/L (ref 1.5–4)
LYMPHOCYTES RELATIVE PERCENT: 22.2 % (ref 20–42)
MCH RBC QN AUTO: 30.8 PG (ref 26–35)
MCHC RBC AUTO-ENTMCNC: 30.7 % (ref 32–34.5)
MCV RBC AUTO: 100.3 FL (ref 80–99.9)
MONOCYTES ABSOLUTE: 1.12 E9/L (ref 0.1–0.95)
MONOCYTES RELATIVE PERCENT: 8.3 % (ref 2–12)
NEUTROPHILS ABSOLUTE: 9.1 E9/L (ref 1.8–7.3)
NEUTROPHILS RELATIVE PERCENT: 67.2 % (ref 43–80)
PDW BLD-RTO: 12.7 FL (ref 11.5–15)
PLATELET # BLD: 337 E9/L (ref 130–450)
PMV BLD AUTO: 9.8 FL (ref 7–12)
POTASSIUM SERPL-SCNC: 5.1 MMOL/L (ref 3.5–5)
PRO-BNP: 9319 PG/ML (ref 0–125)
RBC # BLD: 3.31 E12/L (ref 3.5–5.5)
SARS-COV-2, NAAT: NOT DETECTED
SODIUM BLD-SCNC: 142 MMOL/L (ref 132–146)
TOTAL PROTEIN: 7.5 G/DL (ref 6.4–8.3)
TROPONIN, HIGH SENSITIVITY: 15 NG/L (ref 0–9)
TROPONIN, HIGH SENSITIVITY: 17 NG/L (ref 0–9)
WBC # BLD: 13.5 E9/L (ref 4.5–11.5)

## 2021-08-06 PROCEDURE — 6360000002 HC RX W HCPCS: Performed by: STUDENT IN AN ORGANIZED HEALTH CARE EDUCATION/TRAINING PROGRAM

## 2021-08-06 PROCEDURE — 93005 ELECTROCARDIOGRAM TRACING: CPT | Performed by: PHYSICIAN ASSISTANT

## 2021-08-06 PROCEDURE — 96375 TX/PRO/DX INJ NEW DRUG ADDON: CPT

## 2021-08-06 PROCEDURE — 96374 THER/PROPH/DIAG INJ IV PUSH: CPT

## 2021-08-06 PROCEDURE — 85025 COMPLETE CBC W/AUTO DIFF WBC: CPT

## 2021-08-06 PROCEDURE — 93010 ELECTROCARDIOGRAM REPORT: CPT | Performed by: INTERNAL MEDICINE

## 2021-08-06 PROCEDURE — 83880 ASSAY OF NATRIURETIC PEPTIDE: CPT

## 2021-08-06 PROCEDURE — 80053 COMPREHEN METABOLIC PANEL: CPT

## 2021-08-06 PROCEDURE — 99284 EMERGENCY DEPT VISIT MOD MDM: CPT

## 2021-08-06 PROCEDURE — 71046 X-RAY EXAM CHEST 2 VIEWS: CPT

## 2021-08-06 PROCEDURE — 6370000000 HC RX 637 (ALT 250 FOR IP): Performed by: STUDENT IN AN ORGANIZED HEALTH CARE EDUCATION/TRAINING PROGRAM

## 2021-08-06 PROCEDURE — 84484 ASSAY OF TROPONIN QUANT: CPT

## 2021-08-06 PROCEDURE — 87635 SARS-COV-2 COVID-19 AMP PRB: CPT

## 2021-08-06 PROCEDURE — 2500000003 HC RX 250 WO HCPCS: Performed by: STUDENT IN AN ORGANIZED HEALTH CARE EDUCATION/TRAINING PROGRAM

## 2021-08-06 RX ORDER — DEXTROSE MONOHYDRATE 25 G/50ML
25 INJECTION, SOLUTION INTRAVENOUS ONCE
Status: COMPLETED | OUTPATIENT
Start: 2021-08-06 | End: 2021-08-06

## 2021-08-06 RX ORDER — FUROSEMIDE 10 MG/ML
40 INJECTION INTRAMUSCULAR; INTRAVENOUS ONCE
Status: COMPLETED | OUTPATIENT
Start: 2021-08-06 | End: 2021-08-06

## 2021-08-06 RX ORDER — TORSEMIDE 20 MG/1
40 TABLET ORAL DAILY
Qty: 30 TABLET | Refills: 1 | Status: SHIPPED | OUTPATIENT
Start: 2021-08-06 | End: 2021-09-21

## 2021-08-06 RX ORDER — SODIUM BICARBONATE 650 MG/1
650 TABLET ORAL 3 TIMES DAILY
Qty: 90 TABLET | Refills: 0 | Status: SHIPPED | OUTPATIENT
Start: 2021-08-06 | End: 2021-09-02 | Stop reason: ALTCHOICE

## 2021-08-06 RX ORDER — DEXTROSE MONOHYDRATE 25 G/50ML
25 INJECTION, SOLUTION INTRAVENOUS PRN
Status: DISCONTINUED | OUTPATIENT
Start: 2021-08-06 | End: 2021-08-06 | Stop reason: HOSPADM

## 2021-08-06 RX ADMIN — DEXTROSE MONOHYDRATE 25 G: 25 INJECTION, SOLUTION INTRAVENOUS at 15:12

## 2021-08-06 RX ADMIN — INSULIN HUMAN 2 UNITS: 100 INJECTION, SOLUTION PARENTERAL at 15:05

## 2021-08-06 RX ADMIN — FUROSEMIDE 40 MG: 10 INJECTION, SOLUTION INTRAMUSCULAR; INTRAVENOUS at 14:00

## 2021-08-06 NOTE — ED NOTES
FIRST PROVIDER CONTACT ASSESSMENT NOTE                                                                                                Department of Emergency Medicine                                                      First Provider Note  21  9:15 AM EDT  NAME: Sameer Winters  : 1955  MRN: [id number]    Chief Complaint: No chief complaint on file. History of Present Illness:   Sameer Winters is a 73 y.o. female who presents to the ED for shortness of breath x 2 days, reports chest pain. Focused Physical Exam:  VS:    ED Triage Vitals [21 0910]   BP Temp Temp Source Pulse Resp SpO2 Height Weight   -- 97.5 °F (36.4 °C) Tympanic 85 -- 94 % -- --        General: Alert and in no apparent distress. Medical History:  has a past medical history of Atrial fibrillation ([address] Bladder cancer ([address] Cancer ([address] CKD (chronic kidney disease), CKD (chronic kidney disease) stage 5, GFR less than 15 ml/min ([address] Diabetes ([address] Hypertension, Obesity, and Sleep apnea. Surgical History:  has a past surgical history that includes Cholecystectomy; Tubal ligation; Tonsillectomy; Colonoscopy; pr anastomosis,av,any site (Left, 2018); pr anastomosis,av,any site (Left, 2018); Dialysis fistula creation (Left, 2019); Cardiac catheterization (Left, 10/02/2008); Colonoscopy (N/A, 2019); Colonoscopy (2019); Dialysis fistula creation (Right, 2020); and Dialysis fistula creation (Right, 2021). Social History:  reports that she quit smoking about 5 months ago. Her smoking use included cigarettes. She has a 21.50 pack-year smoking history. She has never used smokeless tobacco. She reports previous alcohol use. She reports that she does not use drugs. Family History: family history includes Alcohol Abuse in her brother;  Anxiety Disorder in her daughter; Bipolar Disorder in her paternal uncle; COPD in her mother; Cancer in her mother; Colon Cancer in her maternal grandfather; Dementia in her father; Depression in her father; Diabetes in her brother, father, and mother; Heart Disease in her father; High Blood Pressure in her father; Prostate Cancer in her father; Stroke in her father.     Allergies: Adhesive tape and Penicillins     Initial Plan of Care:  Initiate Treatment-Testing, Proceed toTreatment Area When Bed Available for ED Attending/MLP to Continue Care    -------------------------------------------------END OF FIRST PROVIDER CONTACT ASSESSMENT NOTE--------------------------------------------------------  Electronically signed by TOBIAS Roach   DD: 8/6/21       TOBIAS Roach  08/06/21 [id number]

## 2021-08-06 NOTE — ED NOTES
Bed: HB  Expected date:   Expected time:   Means of arrival:   Comments:  triage     Sonny Wilson RN  08/06/21 2192

## 2021-08-06 NOTE — ED NOTES
Bed: 19  Expected date:   Expected time:   Means of arrival:   Comments:  triage     Flavio Mccullough RN  08/06/21 1217

## 2021-08-07 NOTE — ED PROVIDER NOTES
Department of Emergency Medicine   ED  Provider Note  Admit Date/RoomTime: 8/6/2021 12:29 PM  ED Room: [facility]          History of Present Illness:  8/6/21, Time: 8:23 PM EDT  Chief Complaint   Patient presents with    Shortness of Breath     sent in by nephrologist for SOB x2 days and leg swelling          Dominic Gibbs is a 73 y.o. female presenting to the ED for Mild shortness of breath. The patient states that it is worse with exertion. Denies any chest pain, nausea, vomiting associated with it. The patient states that she has some mild lower extremity edema as well. No exacerbating alleviating factors. She states that she has been on the verge of having dialysis started as an outpatient for worsening renal function. Review of Systems:  Constitutional: Denies fevers  Eyes: Denies blurry vision  ENT: Denies sore throat  Cardiovascular: Denies chest pain  Respiratory: Positive for shortness of breath  Gastrointestinal: Denies abdominal pain  Genitourinary: Denies dysuria  Musculoskeletal: Denies myalgias  Integumentary: Denies rashes  Neurological: Denies headache    --------------------------------------------- PAST HISTORY ---------------------------------------------  Past Medical History:  has a past medical history of Atrial fibrillation ([facility] Bladder cancer ([facility] [facility] Cancer ([facility] [facility] CKD (chronic kidney disease), CKD (chronic kidney disease) stage 5, GFR less than 15 ml/min ([facility] [facility] Diabetes ([facility] Hypertension, Obesity, and Sleep apnea. Past Surgical History:  has a past surgical history that includes Cholecystectomy; Tubal ligation; Tonsillectomy; Colonoscopy; pr anastomosis,av,any site (Left, 9/18/2018); pr anastomosis,av,any site (Left, 11/14/2018); Dialysis fistula creation (Left, 1/22/2019); Cardiac catheterization (Left, 10/02/2008); Colonoscopy (N/A, 8/29/2019); Colonoscopy (8/29/2019);  Dialysis fistula creation (Right, 12/7/2020); and Dialysis fistula creation (Right, pulmonary vascular congestion. Work-up was initiated as such. Labs: Patient is hyperkalemic at 5.1 she has elevated creatinine 5.3 slightly above her normal baseline. Patient's troponins trended from 15-17 likely secondary to the patient's renal function. CBC is around the patient's normal baseline she is under any more significant leukocytosis than usual.  Chest x-ray is unremarkable for any pulmonary vascular congestion. Covid swab is negative. Date of EKG: August 6 2021  Time: 9:09 AM    Rhythm: Sinus rhythm  Rate: 75 beats per  Axis: Normal  Conduction: QRS duration within normal limits at 100 ms  ST Segments: No T-segment elevation or depression, there is a PVC noted on the tracing  T Waves: Normal    Clinical Impression: Normal sinus rhythm no ischemia or infarct with 1 PVC  Comparison to prior EKG: None    I spoke with this patient's nephrology attending Dr. Daysi Melendez who requested the patient's dose of torsemide be doubled to 40 mg daily. She is also started on bicarbonate 650 mg to be taken now 2 tabs twice daily instead of 1 tab twice daily. The patient will follow up on Monday. She was also treated for hyperkalemia of 5.1 with insulin as well as D50. Patient will follow-up as an outpatient. Counseling: The emergency provider has spoken with the patient and discussed todays results, in addition to providing specific details for the plan of care and counseling regarding the diagnosis and prognosis. Questions are answered at this time and they are agreeable with the plan.       --------------------------------- IMPRESSION AND DISPOSITION ---------------------------------    IMPRESSION  1. Hyperkalemia    2. Shortness of breath        DISPOSITION  Disposition: Discharge to home  Patient condition is good    I, Dr. Jennifer Urena, am the primary provider of record    Jennifer Urena, DO  Emergency Medicine    NOTE: This report was transcribed using voice recognition software. Every effort was made to ensure accuracy; however, inadvertent computerized transcription errors may be present         Selene Flores DO  08/06/21 2025

## 2021-08-18 RX ORDER — PROPAFENONE HYDROCHLORIDE 325 MG/1
325 CAPSULE, EXTENDED RELEASE ORAL 2 TIMES DAILY
Qty: 180 CAPSULE | Refills: 3 | Status: SHIPPED
Start: 2021-08-18 | End: 2022-09-12

## 2021-08-20 DIAGNOSIS — N18.5 TYPE 2 DIABETES MELLITUS WITH STAGE 5 CHRONIC KIDNEY DISEASE NOT ON CHRONIC DIALYSIS, WITHOUT LONG-TERM CURRENT USE OF INSULIN (HCC): ICD-10-CM

## 2021-08-20 DIAGNOSIS — E11.22 TYPE 2 DIABETES MELLITUS WITH STAGE 5 CHRONIC KIDNEY DISEASE NOT ON CHRONIC DIALYSIS, WITHOUT LONG-TERM CURRENT USE OF INSULIN (HCC): ICD-10-CM

## 2021-08-20 DIAGNOSIS — I10 ESSENTIAL HYPERTENSION: Chronic | ICD-10-CM

## 2021-08-23 RX ORDER — GLIPIZIDE 5 MG/1
TABLET, FILM COATED, EXTENDED RELEASE ORAL
Qty: 120 TABLET | Refills: 0 | Status: SHIPPED
Start: 2021-08-23 | End: 2021-10-14

## 2021-08-23 RX ORDER — AMLODIPINE BESYLATE 5 MG/1
5 TABLET ORAL DAILY
Qty: 30 TABLET | Refills: 0 | Status: SHIPPED
Start: 2021-08-23 | End: 2021-10-14

## 2021-09-02 ENCOUNTER — OFFICE VISIT (OUTPATIENT)
Dept: FAMILY MEDICINE CLINIC | Age: 66
End: 2021-09-02
Payer: MEDICARE

## 2021-09-02 VITALS
BODY MASS INDEX: 41.06 KG/M2 | HEART RATE: 70 BPM | SYSTOLIC BLOOD PRESSURE: 138 MMHG | HEIGHT: 67 IN | RESPIRATION RATE: 16 BRPM | DIASTOLIC BLOOD PRESSURE: 60 MMHG | OXYGEN SATURATION: 96 % | TEMPERATURE: 97.4 F | WEIGHT: 261.6 LBS

## 2021-09-02 DIAGNOSIS — I48.0 PAROXYSMAL ATRIAL FIBRILLATION (HCC): Chronic | ICD-10-CM

## 2021-09-02 DIAGNOSIS — I10 ESSENTIAL HYPERTENSION: Chronic | ICD-10-CM

## 2021-09-02 DIAGNOSIS — F41.1 GENERALIZED ANXIETY DISORDER: ICD-10-CM

## 2021-09-02 DIAGNOSIS — N18.5 CHRONIC KIDNEY DISEASE, STAGE V (HCC): Primary | ICD-10-CM

## 2021-09-02 DIAGNOSIS — J44.9 CHRONIC OBSTRUCTIVE PULMONARY DISEASE, UNSPECIFIED COPD TYPE (HCC): Chronic | ICD-10-CM

## 2021-09-02 PROCEDURE — 99213 OFFICE O/P EST LOW 20 MIN: CPT | Performed by: INTERNAL MEDICINE

## 2021-09-02 PROCEDURE — 4040F PNEUMOC VAC/ADMIN/RCVD: CPT | Performed by: INTERNAL MEDICINE

## 2021-09-02 PROCEDURE — 1090F PRES/ABSN URINE INCON ASSESS: CPT | Performed by: INTERNAL MEDICINE

## 2021-09-02 PROCEDURE — G8427 DOCREV CUR MEDS BY ELIG CLIN: HCPCS | Performed by: INTERNAL MEDICINE

## 2021-09-02 PROCEDURE — G8417 CALC BMI ABV UP PARAM F/U: HCPCS | Performed by: INTERNAL MEDICINE

## 2021-09-02 PROCEDURE — G8926 SPIRO NO PERF OR DOC: HCPCS | Performed by: INTERNAL MEDICINE

## 2021-09-02 PROCEDURE — 1036F TOBACCO NON-USER: CPT | Performed by: INTERNAL MEDICINE

## 2021-09-02 PROCEDURE — G8400 PT W/DXA NO RESULTS DOC: HCPCS | Performed by: INTERNAL MEDICINE

## 2021-09-02 PROCEDURE — 1123F ACP DISCUSS/DSCN MKR DOCD: CPT | Performed by: INTERNAL MEDICINE

## 2021-09-02 PROCEDURE — 3023F SPIROM DOC REV: CPT | Performed by: INTERNAL MEDICINE

## 2021-09-02 PROCEDURE — 3017F COLORECTAL CA SCREEN DOC REV: CPT | Performed by: INTERNAL MEDICINE

## 2021-09-02 RX ORDER — CALCITRIOL 0.25 UG/1
0.5 CAPSULE, LIQUID FILLED ORAL
COMMUNITY
Start: 2021-07-16 | End: 2022-06-20

## 2021-09-02 RX ORDER — SODIUM BICARBONATE 650 MG/1
650 TABLET ORAL 2 TIMES DAILY
COMMUNITY

## 2021-09-02 SDOH — ECONOMIC STABILITY: FOOD INSECURITY: WITHIN THE PAST 12 MONTHS, YOU WORRIED THAT YOUR FOOD WOULD RUN OUT BEFORE YOU GOT MONEY TO BUY MORE.: NEVER TRUE

## 2021-09-02 SDOH — ECONOMIC STABILITY: FOOD INSECURITY: WITHIN THE PAST 12 MONTHS, THE FOOD YOU BOUGHT JUST DIDN'T LAST AND YOU DIDN'T HAVE MONEY TO GET MORE.: NEVER TRUE

## 2021-09-02 ASSESSMENT — SOCIAL DETERMINANTS OF HEALTH (SDOH): HOW HARD IS IT FOR YOU TO PAY FOR THE VERY BASICS LIKE FOOD, HOUSING, MEDICAL CARE, AND HEATING?: NOT HARD AT ALL

## 2021-09-02 NOTE — PROGRESS NOTES
[facility] PRIMARY CARE  [address] [place] [address]  [place] [address]  Dept: [phone]  Dept Fax: [phone]     NAME: Silvana Shahid        :  1955        MRN:  [id number]    Chief Complaint   Patient presents with    New Patient     f/u [facility] 2021, needing labs done from Kidney dr. Zannie Cowden Hypertension     A-fib    COPD    Chronic Kidney Disease     stage 5    Anxiety       History of Present Illness  Silvana Shahid is a 73 y.o. female who presents today to establish care. Previous patient of [facility]. Patient here for routine follow-up stating that she is in need of lab work as ordered by her nephrologist.  Patient has stage V kidney disease and has an AV fistula that has recently matured. She is in the process of being started on dialysis. She is getting monthly BMPs currently. Otherwise patient states that she is doing well on her current regimen of medications. Her blood pressure is well controlled. She does not check. Blood glucose on a regular basis but denies any episodes of hypoglycemia. Review of Systems  Please see HPI above. All bolded are positive.   Gen: fever, chills, fatigue, weakness, diaphoresis, or unintentional weight change  Head: headache, vision change, hearing loss  Chest: chest pain/heaviness, palpitations  Lungs: shortness of breath, wheezing, coughing, hemoptysis  Abdomen: abdominal pain, nausea, vomiting, diarrhea, constipation, melena, hematochezia, hematemesis, or loss of appetite  Extremities: lower extremity edema, myalgias, arthralgias  Urinary: dysuria, hematuria, weak flow, or increase in frequency  Neurologic: lightheadedness, dizziness, confusion, syncope  Endocrine: polydipsia, polyuria, heat or cold intolerance  Psychiatric: depression, suicidal ideation, or anxiety  Derm: Rashes, ulcers, burns    Medical History   Past Medical History:   Diagnosis Date    Atrial fibrillation (HCC)     Bladder [facility] ([facility]     Cancer ([facility]     CKD (chronic kidney disease)     CKD (chronic kidney disease) stage 5, GFR less than 15 ml/min ([facility] 11/6/2018    Diabetes ([facility]     Hypertension     Obesity     260 #    Sleep apnea        Surgical History   Past Surgical History:   Procedure Laterality Date    CARDIAC CATHETERIZATION Left 10/02/2008    @ [facility]    CHOLECYSTECTOMY      COLONOSCOPY      COLONOSCOPY N/A 8/29/2019    mid ascending colon polyp bx/cauterization (inflammatory polyp), proximal transverse colon polyp bx/cauterized (tubular adenoma), descending colon polyp 80 cm from anus bx/cauterized (hyperplastic polyp), sigmoid colon polyp 35 cm from anus bx/cauterized (tubular adenoma), sigmoid colon polyps 30 cm from anus bx/cauterized (tubular adenoma), Dr. Melba Huerta, [facility]    COLONOSCOPY  8/29/2019    mid ascending colon polyp bx/cauterization (inflammatory polyp), proximal transverse colon polyp bx/cauterized (tubular adenoma), descending colon polyp 80 cm from anus bx/cauterized (hyperplastic polyp), sigmoid colon polyp 35 cm from anus bx/cauterized (tubular adenoma), sigmoid colon polyps 30 cm from anus bx/cauterized (tubular adenoma), Dr. Melba Huerta, [facility]    DIALYSIS FISTULA CREATION Left 1/22/2019    LIGATIION LEFT LEFT UPPER EXTREMITY OF AV FISTULA , EVACUATION HEMATOMA performed by Leesa Briggs MD at [address] [place] [address] Right 12/7/2020    AV FISTULA CREATION -- RIGHT ARM performed by Lindsey Calero MD at [address] [place] [address] Right 4/8/2021    AV FISTULA  REVISION RIGHT ARM performed by Lindsey Calero MD at [place] [address] Left 9/18/2018    AV FISTULA  LEFT ARM performed by Leesa Briggs MD at [place] [address] Left 11/14/2018    SUPERFICIALIZATION AV FISTULA  LEFT ARM , LIGATION TRIBUTORY AV FISTULA LEFT ARM performed by Leesa Briggs MD at [place] [address]         Family History  Family History   Problem Relation Age of Onset    Cancer Mother         bladder    Diabetes Mother     COPD Mother     Depression Father     Diabetes Father     Heart Disease Father     High Blood Pressure Father     Stroke Father     Prostate Cancer Father     Dementia Father     Alcohol Abuse Brother     Diabetes Brother     Bipolar Disorder Paternal Uncle     Anxiety Disorder Daughter     Colon Cancer Maternal Grandfather        Social History  Social History     Tobacco Use    Smoking status: Former Smoker     Packs/day: 0.50     Years: 43.00     Pack years: 21.50     Types: Cigarettes     Quit date: 2021     Years since quittin.5    Smokeless tobacco: Never Used   Substance Use Topics    Alcohol use: Not Currently     Comment: 1 cup of coffee a day        Home Medications  Current Outpatient Medications   Medication Sig Dispense Refill    sodium bicarbonate 650 MG tablet Take 1,300 mg by mouth 3 times daily      calcitRIOL (ROCALTROL) 0.25 MCG capsule       apixaban (ELIQUIS) 5 MG TABS tablet Take 1 tablet by mouth 2 times daily 180 tablet 3    glipiZIDE (GLUCOTROL XL) 5 MG extended release tablet TAKE 2 TABLETS BY MOUTH TWICE A  tablet 0    amLODIPine (NORVASC) 5 MG tablet TAKE 1 TABLET BY MOUTH DAILY 30 tablet 0    propafenone (RYTHMOL SR) 325 MG extended release capsule Take 1 capsule by mouth 2 times daily 180 capsule 3    torsemide (DEMADEX) 20 MG tablet Take 2 tablets by mouth daily 30 tablet 1    DULoxetine (CYMBALTA) 60 MG extended release capsule TAKE 1 CAPSULE BY MOUTH DAILY 30 capsule 2    metoprolol tartrate (LOPRESSOR) 50 MG tablet TAKE 1 TABLET BY MOUTH TWICE A DAY 60 tablet 5    atorvastatin (LIPITOR) 40 MG tablet Take 1 tablet by mouth daily 30 tablet 5    vitamin D3 (CHOLECALCIFEROL) 25 MCG (1000 UT) TABS tablet Take 1 tablet by mouth daily 30 tablet 5     No current facility-administered medications for this visit.         Allergies  Allergies   Allergen Reactions    Adhesive Tape Rash    Penicillins Rash       Objective  Vitals:    09/02/21 1321   BP: 138/60   Pulse: 70   Resp: 16   Temp: 97.4 °F (36.3 °C)   TempSrc: Infrared   SpO2: 96%   Weight: 261 lb 9.6 oz (118.7 kg)   Height: 5' 7\" (1.702 m)        Physical Exam:  General: Awake, alert, and oriented to person, place, time, and purpose, appears stated age and cooperative, No acute distress  Head: Normocephalic, atraumatic  Eyes: conjunctivae/corneas clear, EOM's intact. Mouth: Mucous membranes moist with no pharyngeal exudate or erythema  Neck: no JVD, no adenopathy, no carotid bruit, supple, symmetrical, trachea midline  Back: symmetric, ROM normal, No CVA tenderness. Lungs: clear to auscultation bilaterally without wheezes, rales, or rhonchi  Heart: regular rate and rhythm, S1, S2 normal, no murmur, click, rub or gallop  Abdomen: soft, non-tender; bowel sounds normal; no masses,  no organomegaly  Extremities: atraumatic, no cyanosis, no edema, 2+ pulses palpated in all 4 extremities, AV fistula with palpable thrill to right upper extremity  Skin: color, texture, turgor within normal limits.  No rashes or lesions or normal  Neurologic: speech appropriate, moves all 4 extremities, normal muscle strength and tone, CN 2-12 grossly intact    Labs  Lab Results   Component Value Date    WBC 13.5 (H) 08/06/2021    HGB 10.2 (L) 08/06/2021    HCT 33.2 (L) 08/06/2021     08/06/2021     09/02/2021    K 4.6 09/02/2021     09/02/2021    CREATININE 5.3 (H) 09/02/2021    BUN 57 (H) 09/02/2021    CO2 19 (L) 09/02/2021    GLUCOSE 152 (H) 09/02/2021    ALT 18 08/06/2021    AST 14 08/06/2021    INR 1.0 04/08/2021     Lab Results   Component Value Date    TSH 0.804 01/31/2019     Lab Results   Component Value Date    TRIG 211 (H) 11/06/2018     Lab Results   Component Value Date    HDL 37 11/06/2018     Lab Results   Component Value Date    LDLCALC 93 11/06/2018     Lab Results   Component Value Date LABA1C 6.7 01/31/2020     Lab Results   Component Value Date    INR 1.0 04/08/2021    INR 1.0 12/07/2020    INR 1.1 01/22/2019    PROTIME 11.3 04/08/2021    PROTIME 11.4 12/07/2020    PROTIME 12.8 (H) 01/22/2019      *All recent labs were reviewed. Please see electronic chart for a more comprehensive set of labs    Radiology  XR CHEST (2 VW)    Result Date: 8/6/2021  EXAMINATION: TWO XRAY VIEWS OF THE CHEST 8/6/2021 10:34 am COMPARISON: 01/21/2019 HISTORY: ORDERING SYSTEM PROVIDED HISTORY: shortness of breath, chest pain TECHNOLOGIST PROVIDED HISTORY: Reason for exam:->shortness of breath, chest pain What reading provider will be dictating this exam?->CRC FINDINGS: The lungs are without acute focal process. There is no effusion or pneumothorax. The cardiomediastinal silhouette is without acute process. The osseous structures are without acute process. No acute process. Health Maintenance Due   Topic Date Due    Diabetic retinal exam  Never done    COVID-19 Vaccine (1) Never done    HIV screen  Never done    DTaP/Tdap/Td vaccine (1 - Tdap) Never done    Cervical cancer screen  Never done    Breast cancer screen  Never done    Shingles Vaccine (1 of 2) Never done    DEXA (modify frequency per FRAX score)  Never done    Annual Wellness Visit (AWV)  Never done    Diabetic foot exam  10/30/2019    Low dose CT lung screening  11/05/2019    Lipid screen  11/06/2019    Pneumococcal 65+ yrs at Risk Vaccine (1 of 2 - PCV13) 01/29/2021    A1C test (Diabetic or Prediabetic)  01/31/2021    Flu vaccine (1) 09/01/2021         Assessment and Plan  Eun Bloom presents today to establish care. Luisa was seen today for new patient, hypertension, copd, chronic kidney disease and anxiety. Diagnoses and all orders for this visit:    Chronic kidney disease, stage V ([address]  -     BASIC METABOLIC PANEL;  Future    Paroxysmal atrial fibrillation (HCC)    Chronic obstructive pulmonary disease, unspecified COPD type ([facility]    Generalized anxiety disorder    Essential hypertension    -Lab work will be obtained here in the office today with results sent directly to her nephrologist as well.  -Otherwise patient will continue her current regimen of medications and keep her scheduled follow-up in a few months. Educational materials and/or home exercises printed for patient's review and were included in patient instructions on his/her After Visit Summary and given to patient at the end of visit. Counseled regarding above diagnosis, including possible risks and complications, especially if left uncontrolled. Counseled regarding the possible side effects, risks, benefits and alternatives to treatment; patient and/or guardian verbalizes understanding, agrees, feels comfortable with and wishes to proceed with above treatment plan. Advised patient to call Shawn Arriola new medication issues, and read all Rx info from pharmacy to assure aware of all possible risks and side effects of medication before taking. Reviewed age and gender appropriate health screening exams and vaccinations. Advised patient regarding importance of keeping up with recommended health maintenance and to schedule as soon as possible if overdue, as this is important in assessing for undiagnosed pathology, especially cancer, as well as protecting against potentially harmful/life threatening disease. Patient verbalizes understanding and agrees with above counseling, assessment and plan. All questions answered.     Kellen Melendez DO  9/3/2021  12:47 PM

## 2021-10-14 DIAGNOSIS — E11.22 TYPE 2 DIABETES MELLITUS WITH STAGE 5 CHRONIC KIDNEY DISEASE NOT ON CHRONIC DIALYSIS, WITHOUT LONG-TERM CURRENT USE OF INSULIN (HCC): ICD-10-CM

## 2021-10-14 DIAGNOSIS — E55.9 VITAMIN D DEFICIENCY: ICD-10-CM

## 2021-10-14 DIAGNOSIS — N18.5 TYPE 2 DIABETES MELLITUS WITH STAGE 5 CHRONIC KIDNEY DISEASE NOT ON CHRONIC DIALYSIS, WITHOUT LONG-TERM CURRENT USE OF INSULIN (HCC): ICD-10-CM

## 2021-10-14 DIAGNOSIS — I10 ESSENTIAL HYPERTENSION: Chronic | ICD-10-CM

## 2021-10-14 RX ORDER — AMLODIPINE BESYLATE 5 MG/1
5 TABLET ORAL DAILY
Qty: 30 TABLET | Refills: 1 | Status: SHIPPED
Start: 2021-10-14 | End: 2022-01-19

## 2021-10-14 RX ORDER — ATORVASTATIN CALCIUM 40 MG/1
40 TABLET, FILM COATED ORAL DAILY
Qty: 30 TABLET | Refills: 3 | Status: SHIPPED
Start: 2021-10-14 | End: 2022-03-17

## 2021-10-14 RX ORDER — MELATONIN
1000 DAILY
Qty: 30 TABLET | Refills: 1 | Status: SHIPPED
Start: 2021-10-14 | End: 2022-09-14

## 2021-10-14 RX ORDER — GLIPIZIDE 5 MG/1
5 TABLET, FILM COATED, EXTENDED RELEASE ORAL 2 TIMES DAILY
Qty: 60 TABLET | Refills: 1 | Status: SHIPPED
Start: 2021-10-14 | End: 2021-10-18

## 2021-10-14 NOTE — TELEPHONE ENCOUNTER
Last Appointment:  4/26/2021  Future Appointments   Date Time Provider Abhishek Singh   12/2/2021  1:00 PM Jennifer Lepe  [address]

## 2021-10-18 ENCOUNTER — HOSPITAL ENCOUNTER (INPATIENT)
Age: 66
LOS: 9 days | Discharge: HOME HEALTH | DRG: 871 | End: 2021-10-27
Attending: EMERGENCY MEDICINE | Admitting: INTERNAL MEDICINE
Payer: MEDICARE

## 2021-10-18 ENCOUNTER — NURSE TRIAGE (OUTPATIENT)
Dept: OTHER | Facility: CLINIC | Age: 66
End: 2021-10-18

## 2021-10-18 ENCOUNTER — APPOINTMENT (OUTPATIENT)
Dept: GENERAL RADIOLOGY | Age: 66
DRG: 871 | End: 2021-10-18
Payer: MEDICARE

## 2021-10-18 DIAGNOSIS — J12.82 PNEUMONIA DUE TO COVID-19 VIRUS: ICD-10-CM

## 2021-10-18 DIAGNOSIS — J96.01 ACUTE RESPIRATORY FAILURE WITH HYPOXIA (HCC): Primary | ICD-10-CM

## 2021-10-18 DIAGNOSIS — N17.9 ACUTE RENAL FAILURE SUPERIMPOSED ON CHRONIC KIDNEY DISEASE, UNSPECIFIED CKD STAGE, UNSPECIFIED ACUTE RENAL FAILURE TYPE (HCC): ICD-10-CM

## 2021-10-18 DIAGNOSIS — U07.1 PNEUMONIA DUE TO COVID-19 VIRUS: ICD-10-CM

## 2021-10-18 DIAGNOSIS — U07.1 COVID-19: ICD-10-CM

## 2021-10-18 DIAGNOSIS — N18.9 ACUTE RENAL FAILURE SUPERIMPOSED ON CHRONIC KIDNEY DISEASE, UNSPECIFIED CKD STAGE, UNSPECIFIED ACUTE RENAL FAILURE TYPE (HCC): ICD-10-CM

## 2021-10-18 LAB
ALBUMIN SERPL-MCNC: 2.9 G/DL (ref 3.5–5.2)
ALP BLD-CCNC: 126 U/L (ref 35–104)
ALT SERPL-CCNC: 23 U/L (ref 0–32)
ANION GAP SERPL CALCULATED.3IONS-SCNC: 25 MMOL/L (ref 7–16)
AST SERPL-CCNC: 35 U/L (ref 0–31)
BASOPHILS ABSOLUTE: 0.03 E9/L (ref 0–0.2)
BASOPHILS RELATIVE PERCENT: 0.2 % (ref 0–2)
BILIRUB SERPL-MCNC: 0.4 MG/DL (ref 0–1.2)
BUN BLDV-MCNC: 95 MG/DL (ref 6–23)
CALCIUM SERPL-MCNC: 9.2 MG/DL (ref 8.6–10.2)
CHLORIDE BLD-SCNC: 93 MMOL/L (ref 98–107)
CO2: 15 MMOL/L (ref 22–29)
CREAT SERPL-MCNC: 9.4 MG/DL (ref 0.5–1)
EOSINOPHILS ABSOLUTE: 0 E9/L (ref 0.05–0.5)
EOSINOPHILS RELATIVE PERCENT: 0 % (ref 0–6)
GFR AFRICAN AMERICAN: 5
GFR NON-AFRICAN AMERICAN: 4 ML/MIN/1.73
GLUCOSE BLD-MCNC: 258 MG/DL (ref 74–99)
HCT VFR BLD CALC: 29.3 % (ref 34–48)
HEMOGLOBIN: 9.5 G/DL (ref 11.5–15.5)
IMMATURE GRANULOCYTES #: 0.14 E9/L
IMMATURE GRANULOCYTES %: 1.2 % (ref 0–5)
LYMPHOCYTES ABSOLUTE: 1.6 E9/L (ref 1.5–4)
LYMPHOCYTES RELATIVE PERCENT: 13.3 % (ref 20–42)
MCH RBC QN AUTO: 30.5 PG (ref 26–35)
MCHC RBC AUTO-ENTMCNC: 32.4 % (ref 32–34.5)
MCV RBC AUTO: 94.2 FL (ref 80–99.9)
MONOCYTES ABSOLUTE: 1 E9/L (ref 0.1–0.95)
MONOCYTES RELATIVE PERCENT: 8.3 % (ref 2–12)
NEUTROPHILS ABSOLUTE: 9.3 E9/L (ref 1.8–7.3)
NEUTROPHILS RELATIVE PERCENT: 77 % (ref 43–80)
PDW BLD-RTO: 12.5 FL (ref 11.5–15)
PLATELET # BLD: 401 E9/L (ref 130–450)
PMV BLD AUTO: 9.8 FL (ref 7–12)
POTASSIUM SERPL-SCNC: 3.9 MMOL/L (ref 3.5–5)
PRO-BNP: 4850 PG/ML (ref 0–125)
PROCALCITONIN: 1.04 NG/ML (ref 0–0.08)
RBC # BLD: 3.11 E12/L (ref 3.5–5.5)
SARS-COV-2, NAAT: DETECTED
SODIUM BLD-SCNC: 133 MMOL/L (ref 132–146)
TOTAL PROTEIN: 7.3 G/DL (ref 6.4–8.3)
TROPONIN, HIGH SENSITIVITY: 31 NG/L (ref 0–9)
WBC # BLD: 12.1 E9/L (ref 4.5–11.5)

## 2021-10-18 PROCEDURE — 71045 X-RAY EXAM CHEST 1 VIEW: CPT

## 2021-10-18 PROCEDURE — 96374 THER/PROPH/DIAG INJ IV PUSH: CPT

## 2021-10-18 PROCEDURE — 2700000000 HC OXYGEN THERAPY PER DAY

## 2021-10-18 PROCEDURE — 93005 ELECTROCARDIOGRAM TRACING: CPT | Performed by: EMERGENCY MEDICINE

## 2021-10-18 PROCEDURE — 84145 PROCALCITONIN (PCT): CPT

## 2021-10-18 PROCEDURE — 6370000000 HC RX 637 (ALT 250 FOR IP): Performed by: EMERGENCY MEDICINE

## 2021-10-18 PROCEDURE — 2500000003 HC RX 250 WO HCPCS: Performed by: EMERGENCY MEDICINE

## 2021-10-18 PROCEDURE — 6360000002 HC RX W HCPCS: Performed by: EMERGENCY MEDICINE

## 2021-10-18 PROCEDURE — 84484 ASSAY OF TROPONIN QUANT: CPT

## 2021-10-18 PROCEDURE — 36415 COLL VENOUS BLD VENIPUNCTURE: CPT

## 2021-10-18 PROCEDURE — 2580000003 HC RX 258: Performed by: EMERGENCY MEDICINE

## 2021-10-18 PROCEDURE — 80053 COMPREHEN METABOLIC PANEL: CPT

## 2021-10-18 PROCEDURE — 85025 COMPLETE CBC W/AUTO DIFF WBC: CPT

## 2021-10-18 PROCEDURE — 83880 ASSAY OF NATRIURETIC PEPTIDE: CPT

## 2021-10-18 PROCEDURE — 87635 SARS-COV-2 COVID-19 AMP PRB: CPT

## 2021-10-18 PROCEDURE — 99285 EMERGENCY DEPT VISIT HI MDM: CPT

## 2021-10-18 PROCEDURE — 1200000000 HC SEMI PRIVATE

## 2021-10-18 RX ORDER — METOPROLOL TARTRATE 50 MG/1
50 TABLET, FILM COATED ORAL 2 TIMES DAILY
COMMUNITY
End: 2021-11-23

## 2021-10-18 RX ORDER — GLIPIZIDE 5 MG/1
10 TABLET, FILM COATED, EXTENDED RELEASE ORAL DAILY
COMMUNITY
End: 2022-01-19

## 2021-10-18 RX ORDER — DEXAMETHASONE SODIUM PHOSPHATE 10 MG/ML
10 INJECTION INTRAMUSCULAR; INTRAVENOUS ONCE
Status: COMPLETED | OUTPATIENT
Start: 2021-10-18 | End: 2021-10-18

## 2021-10-18 RX ORDER — ACETAMINOPHEN 500 MG
1000 TABLET ORAL ONCE
Status: COMPLETED | OUTPATIENT
Start: 2021-10-18 | End: 2021-10-18

## 2021-10-18 RX ORDER — SODIUM CHLORIDE 9 MG/ML
INJECTION, SOLUTION INTRAVENOUS CONTINUOUS
Status: DISCONTINUED | OUTPATIENT
Start: 2021-10-18 | End: 2021-10-19

## 2021-10-18 RX ORDER — 0.9 % SODIUM CHLORIDE 0.9 %
500 INTRAVENOUS SOLUTION INTRAVENOUS ONCE
Status: COMPLETED | OUTPATIENT
Start: 2021-10-18 | End: 2021-10-18

## 2021-10-18 RX ADMIN — SODIUM CHLORIDE 500 ML: 9 INJECTION, SOLUTION INTRAVENOUS at 14:14

## 2021-10-18 RX ADMIN — SODIUM BICARBONATE: 84 INJECTION, SOLUTION INTRAVENOUS at 21:08

## 2021-10-18 RX ADMIN — SODIUM CHLORIDE: 9 INJECTION, SOLUTION INTRAVENOUS at 15:55

## 2021-10-18 RX ADMIN — ACETAMINOPHEN 1000 MG: 500 TABLET ORAL at 14:13

## 2021-10-18 RX ADMIN — DEXAMETHASONE SODIUM PHOSPHATE 10 MG: 10 INJECTION INTRAMUSCULAR; INTRAVENOUS at 13:05

## 2021-10-18 ASSESSMENT — PAIN SCALES - GENERAL: PAINLEVEL_OUTOF10: 5

## 2021-10-18 NOTE — ED PROVIDER NOTES
Her smoking use included cigarettes. She has a 21.50 pack-year smoking history. She has never used smokeless tobacco. She reports previous alcohol use. She reports that she does not use drugs. Family History: family history includes Alcohol Abuse in her brother; Anxiety Disorder in her daughter; Bipolar Disorder in her paternal uncle; COPD in her mother; Cancer in her mother; Colon Cancer in her maternal grandfather; Dementia in her father; Depression in her father; Diabetes in her brother, father, and mother; Heart Disease in her father; High Blood Pressure in her father; Prostate Cancer in her father; Stroke in her father. . Unless otherwise noted, family history is non contributory    The patients home medications have been reviewed. Allergies: Adhesive tape and Penicillins        ---------------------------------------------------PHYSICAL EXAM--------------------------------------    Constitutional/General: Alert and oriented x3, ill-appearing  Head: Normocephalic and atraumatic  Eyes: PERRL, EOMI, sclera non icteric  Mouth: Oropharynx clear, handling secretions, no trismus, no asymmetry of the posterior oropharynx or uvular edema  Neck: Supple, full ROM, no stridor, no meningeal signs  Respiratory: Lungs clear to auscultation bilaterally, no wheezes, rales, or rhonchi. Not in respiratory distress  Cardiovascular: Irregular rate and rhythm. 2+ distal pulses. Equal extremity pulses. Chest: No chest wall tenderness  GI:  Abdomen Soft, Non tender, Non distended. No rebound, guarding, or rigidity. No pulsatile masses. Musculoskeletal: Moves all extremities x 4. Warm and well perfused, no clubbing, cyanosis, or edema. Capillary refill <3 seconds  Integument: skin warm and dry. No rashes.    Neurologic: GCS 15, no focal deficits, symmetric strength 5/5 in the upper and lower extremities bilaterally  Psychiatric: Normal Affect          -------------------------------------------------- RESULTS -------------------------------------------------  I have personally reviewed all laboratory and imaging results for this patient. Results are listed below.      LABS: (Lab results interpreted by me)  Results for orders placed or performed during the hospital encounter of 10/18/21   COVID-19, Rapid    Specimen: Nasopharyngeal Swab   Result Value Ref Range    SARS-CoV-2, NAAT DETECTED (A) Not Detected   CBC Auto Differential   Result Value Ref Range    WBC 12.1 (H) 4.5 - 11.5 E9/L    RBC 3.11 (L) 3.50 - 5.50 E12/L    Hemoglobin 9.5 (L) 11.5 - 15.5 g/dL    Hematocrit 29.3 (L) 34.0 - 48.0 %    MCV 94.2 80.0 - 99.9 fL    MCH 30.5 26.0 - 35.0 pg    MCHC 32.4 32.0 - 34.5 %    RDW 12.5 11.5 - 15.0 fL    Platelets 501 115 - 466 E9/L    MPV 9.8 7.0 - 12.0 fL    Neutrophils % 77.0 43.0 - 80.0 %    Immature Granulocytes % 1.2 0.0 - 5.0 %    Lymphocytes % 13.3 (L) 20.0 - 42.0 %    Monocytes % 8.3 2.0 - 12.0 %    Eosinophils % 0.0 0.0 - 6.0 %    Basophils % 0.2 0.0 - 2.0 %    Neutrophils Absolute 9.30 (H) 1.80 - 7.30 E9/L    Immature Granulocytes # 0.14 E9/L    Lymphocytes Absolute 1.60 1.50 - 4.00 E9/L    Monocytes Absolute 1.00 (H) 0.10 - 0.95 E9/L    Eosinophils Absolute 0.00 (L) 0.05 - 0.50 E9/L    Basophils Absolute 0.03 0.00 - 0.20 E9/L   Comprehensive Metabolic Panel   Result Value Ref Range    Sodium 133 132 - 146 mmol/L    Potassium 3.9 3.5 - 5.0 mmol/L    Chloride 93 (L) 98 - 107 mmol/L    CO2 15 (L) 22 - 29 mmol/L    Anion Gap 25 (H) 7 - 16 mmol/L    Glucose 258 (H) 74 - 99 mg/dL    BUN 95 (H) 6 - 23 mg/dL    CREATININE 9.4 (HH) 0.5 - 1.0 mg/dL    GFR Non-African American 4 >=60 mL/min/1.73    GFR African American 5     Calcium 9.2 8.6 - 10.2 mg/dL    Total Protein 7.3 6.4 - 8.3 g/dL    Albumin 2.9 (L) 3.5 - 5.2 g/dL    Total Bilirubin 0.4 0.0 - 1.2 mg/dL    Alkaline Phosphatase 126 (H) 35 - 104 U/L    ALT 23 0 - 32 U/L    AST 35 (H) 0 - 31 U/L   Brain Natriuretic Peptide   Result Value Ref Range    Pro-BNP 4,850 (H) 0 - 125 pg/mL   Troponin   Result Value Ref Range    Troponin, High Sensitivity 31 (H) 0 - 9 ng/L   EKG 12 Lead   Result Value Ref Range    Ventricular Rate 146 BPM    Atrial Rate 156 BPM    QRS Duration 88 ms    Q-T Interval 304 ms    QTc Calculation (Bazett) 473 ms    R Axis -3 degrees    T Axis 42 degrees   ,       RADIOLOGY:  Interpreted by Radiologist unless otherwise specified  XR CHEST PORTABLE   Final Result   1. Extensive multifocal bilateral pneumonia. EKG Interpretation  Interpreted by emergency department physician, Dr. Carlos LOPES fib, rate 146, no STEMI, no ischemic change         ------------------------- NURSING NOTES AND VITALS REVIEWED ---------------------------   The nursing notes within the ED encounter and vital signs as below have been reviewed by myself  BP (!) 124/59   Pulse 99   Resp 27   Wt 265 lb (120.2 kg)   SpO2 91%   BMI 41.50 kg/m²     Oxygen Saturation Interpretation: Improved after treatment    The patients available past medical records and past encounters were reviewed. ------------------------------ ED COURSE/MEDICAL DECISION MAKING----------------------  Medications   0.9 % sodium chloride bolus (500 mLs IntraVENous New Bag 10/18/21 1414)   0.9 % sodium chloride infusion (has no administration in time range)   dexamethasone (DECADRON) injection 10 mg (10 mg IntraVENous Given 10/18/21 1305)   acetaminophen (TYLENOL) tablet 1,000 mg (1,000 mg Oral Given 10/18/21 1413)           The cardiac monitor revealed sinus with a heart rate in the 80s as interpreted by me. The cardiac monitor was ordered secondary to the patient's SOB and to monitor the patient for dysrhythmia. CPT [id number]         Medical Decision Making:    Patient was 85% on nonrebreather on arrival.  She was placed with additional nasal cannula which did improve her saturations about 91%. She was then placed on airflow, and responded appropriately. She did receive 10 mg of Decadron.   Labs and imaging reviewed. She is acute on chronic renal failure. Nephrology consulted. She is on Eliquis, low suspicion for PE given this. Discussed with the hospitalist, patient will be admitted. Critical care time: 33 minutes    Counseling: The emergency provider has spoken with the patient and discussed todays results, in addition to providing specific details for the plan of care and counseling regarding the diagnosis and prognosis. Questions are answered at this time and they are agreeable with the plan.       --------------------------------- IMPRESSION AND DISPOSITION ---------------------------------    IMPRESSION  1. Acute respiratory failure with hypoxia ([facility]    2. Pneumonia due to COVID-19 virus    3. Acute renal failure superimposed on chronic kidney disease, unspecified CKD stage, unspecified acute renal failure type ([facility]        DISPOSITION  Disposition: Admit to telemetry  Patient condition is stable        NOTE: This report was transcribed using voice recognition software.  Every effort was made to ensure accuracy; however, inadvertent computerized transcription errors may be present       Meggan Gleason MD  10/18/21 6943

## 2021-10-18 NOTE — ED NOTES
Bed: 13  Expected date:   Expected time:   Means of arrival:   Comments:  doug Esquivel RN  10/18/21 4537

## 2021-10-18 NOTE — TELEPHONE ENCOUNTER
Received call from [facility] at [facility] with Red Flag Complaint. Brief description of triage: See below    Triage indicates for patient to call  now. Per patient's granddaughter, \"I tried earlier and they refused to take her to the hospital because it is too full. \"  Advised to call EMS again and if they refuse to take the patient to the ED then drive patient to the ED; granddaughter v/u. Care advice provided, patient verbalizes understanding; denies any other questions or concerns; instructed to call back for any new or worsening symptoms. Attention Provider: Thank you for allowing me to participate in the care of your patient. The patient was connected to triage in response to information provided to the [facility]/PSC. Please do not respond through this encounter as the response is not directed to a shared pool. Reason for Disposition   Sounds like a life-threatening emergency to the triager    Answer Assessment - Initial Assessment Questions  1. DESCRIPTION: \"Describe your dizziness. \"      Patient is waking up in the middle of the night dazed per patient's granddaughter. Off-balanced. 2. LIGHTHEADED: \"Do you feel lightheaded? \" (e.g., somewhat faint, woozy, weak upon standing)      \"Sometimes\"    3. VERTIGO: \"Do you feel like either you or the room is spinning or tilting? \" (i.e. vertigo)      \"Sometimes\"    4. SEVERITY: \"How bad is it? \"  \"Do you feel like you are going to faint? \" \"Can you stand and walk? \"    - MILD - walking normally    - MODERATE - interferes with normal activities (e.g., work, school)     - SEVERE - unable to stand, requires support to walk, feels like passing out now. Moderate    5. ONSET:  \"When did the dizziness begin? \"      2 weeks ago    6. AGGRAVATING FACTORS: \"Does anything make it worse? \" (e.g., standing, change in head position)      Denies    7. HEART RATE: \"Can you tell me your heart rate? \" \"How many beats in 15 seconds? \"  (Note: not all patients can do this)        Unable to obtain, but patient's granddaughter reports that it is very fast.  Granddaughter reports O2 sat is reading 70%. 8. CAUSE: \"What do you think is causing the dizziness? \"      Unsure    9. RECURRENT SYMPTOM: \"Have you had dizziness before? \" If so, ask: \"When was the last time? \" \"What happened that time? \"      NA    10. OTHER SYMPTOMS: \"Do you have any other symptoms? \" (e.g., fever, chest pain, vomiting, diarrhea, bleeding)        Mouth sores, patient's granddaughter reports that the patient can't stop shaking    Protocols used: [id number]

## 2021-10-18 NOTE — H&P
Hospitalist History & Physical      PCP: Pan Titus DO    Date of Admission: 10/18/2021    Date of Service: Pt seen/examined on 10/18/2021 and is admitted to Inpatient with expected LOS greater than two midnights due to medical therapy. Chief Complaint:  had concerns including Positive For Covid-19 (per EMS patient tested positive for covid 11 days ago, pulse ox in 70s today, heart rate 160s) and Fatigue. History Of Present Illness:    Ms. Francisco Christian, a 73y.o. year old female  who  has a past medical history of Atrial fibrillation ([address] Bladder cancer ([address] Cancer ([address] CKD (chronic kidney disease), CKD (chronic kidney disease) stage 5, GFR less than 15 ml/min ([address] Diabetes ([address] Hypertension, Obesity, and Sleep apnea. A 29-year-old female who lives with her family and is independent with her activities. Patient states that she had a grandson and granddaughter who are sick but she is not sure whether they are positive for Covid or not. Patient herself has been feeling sick with worsening shortness of breath, low-grade fever, chills, loss of appetite and loss of taste, along with some weakness for almost 2 weeks. In the last 48 hours patient was unable to sit up straight without getting short of breath which was concerning and brought her to the hospital.  Patient also complains of decreasing intake and decreasing urine in the last day or so. In the ER, patient was found to have atrial fibrillation with heart rate episodes up to 150s but no persistent rapid ventricular rate. She was positive for COVID-19. Other significant finding was acute kidney injury with creatinine of 9.4, compared to her baseline of 5.2. Her BNP was also elevated to 4850. ER physician discussed with nephrology who recommended low-dose bicarb drip and close management. She also required AIRVO in the ER with oxygen about 30 L. I have seen and examined this patient in the ER.   Patient is alert awake oriented x3 and providing most of the information. She appears anxious with some hand tremors, which she attributes to her sickness.       Past Medical History:   Diagnosis Date    Atrial fibrillation ([address]     Bladder cancer ([address]     Cancer ([address]     CKD (chronic kidney disease)     CKD (chronic kidney disease) stage 5, GFR less than 15 ml/min ([address] 11/6/2018    Diabetes ([address]     Hypertension     Obesity     260 #    Sleep apnea        Past Surgical History:   Procedure Laterality Date    CARDIAC CATHETERIZATION Left 10/02/2008    @ [facility]    CHOLECYSTECTOMY      COLONOSCOPY      COLONOSCOPY N/A 8/29/2019    mid ascending colon polyp bx/cauterization (inflammatory polyp), proximal transverse colon polyp bx/cauterized (tubular adenoma), descending colon polyp 80 cm from anus bx/cauterized (hyperplastic polyp), sigmoid colon polyp 35 cm from anus bx/cauterized (tubular adenoma), sigmoid colon polyps 30 cm from anus bx/cauterized (tubular adenoma), Dr. Bety Jo, [facility]    COLONOSCOPY  8/29/2019    mid ascending colon polyp bx/cauterization (inflammatory polyp), proximal transverse colon polyp bx/cauterized (tubular adenoma), descending colon polyp 80 cm from anus bx/cauterized (hyperplastic polyp), sigmoid colon polyp 35 cm from anus bx/cauterized (tubular adenoma), sigmoid colon polyps 30 cm from anus bx/cauterized (tubular adenoma), Dr. Bety Jo, [facility]    DIALYSIS FISTULA CREATION Left 1/22/2019    LIGATIION LEFT LEFT UPPER EXTREMITY OF AV FISTULA , EVACUATION HEMATOMA performed by Feli Otoole MD at [address] [place] [address] Right 12/7/2020    AV FISTULA CREATION -- RIGHT ARM performed by Antonio Kothari MD at [address] [place] [address] Right 4/8/2021    AV FISTULA  REVISION RIGHT ARM performed by Antonio Kothari MD at [place] [id number] Left 9/18/2018    AV FISTULA  LEFT ARM performed by Feli Otoole MD at [place] [address] Left 11/14/2018    SUPERFICIALIZATION AV FISTULA  LEFT ARM , LIGATION TRIBUTORY AV FISTULA LEFT ARM performed by Geraldine Lei MD at [address] [place] [address]         Prior to Admission medications    Medication Sig Start Date End Date Taking? Authorizing Provider   metoprolol tartrate (LOPRESSOR) 50 MG tablet Take 50 mg by mouth 2 times daily   Yes Historical Provider, MD   glipiZIDE (GLUCOTROL XL) 5 MG extended release tablet Take 10 mg by mouth daily   Yes Historical Provider, MD   amLODIPine (NORVASC) 5 MG tablet TAKE 1 TABLET BY MOUTH DAILY 10/14/21  Yes Malina Espinosa DO   atorvastatin (LIPITOR) 40 MG tablet Take 1 tablet by mouth daily 10/14/21  Yes Malina Espinosa DO   vitamin D3 (CHOLECALCIFEROL) 25 MCG (1000 UT) TABS tablet Take 1 tablet by mouth daily 10/14/21  Yes Malina Espinosa DO   torsemide (DEMADEX) 20 MG tablet Take 2 tablets by mouth daily 9/21/21  Yes Malina Espinosa DO   sodium bicarbonate 650 MG tablet Take 650 mg by mouth 2 times daily    Yes Historical Provider, MD   calcitRIOL (ROCALTROL) 0.25 MCG capsule Take 0.5 mcg by mouth Five times weekly Given Monday,Tuesday,Wednesday,Thursday,Friday 7/16/21  Yes Historical Provider, MD   apixaban (ELIQUIS) 5 MG TABS tablet Take 1 tablet by mouth 2 times daily 8/25/21  Yes Marguerite Antonio MD   propafenone (RYTHMOL SR) 325 MG extended release capsule Take 1 capsule by mouth 2 times daily 8/18/21  Yes Eligio Rangel MD   DULoxetine (CYMBALTA) 60 MG extended release capsule TAKE 1 CAPSULE BY MOUTH DAILY 7/20/21  Yes Osbaldo Darling, APRN - CNP         Allergies:  Adhesive tape and Penicillins    Social History:    TOBACCO:   reports that she quit smoking about 7 months ago. Her smoking use included cigarettes. She has a 21.50 pack-year smoking history. She has never used smokeless tobacco.  ETOH:   reports previous alcohol use. Family History:    Reviewed in detail and negative for DM, CAD, Cancer, CVA. Positive as follows\"      Problem Relation Age of Onset    Cancer Mother         bladder    Diabetes Mother     COPD Mother     Depression Father     Diabetes Father     Heart Disease Father     High Blood Pressure Father     Stroke Father     Prostate Cancer Father     Dementia Father     Alcohol Abuse Brother     Diabetes Brother     Bipolar Disorder Paternal Uncle     Anxiety Disorder Daughter     Colon Cancer Maternal Grandfather        REVIEW OF SYSTEMS:   Pertinent positives as noted in the HPI. All other systems reviewed and negative. PHYSICAL EXAM:  BP (!) 124/59   Pulse 99   Resp 27   Wt 265 lb (120.2 kg)   SpO2 91%   BMI 41.50 kg/m²   General appearance: No apparent distress, appears stated age and cooperative. HEENT: Normal cephalic, atraumatic without obvious deformity. Pupils equal, round, and reactive to light. Extra ocular muscles intact. Conjunctivae/corneas clear. Neck: Supple, with full range of motion. No jugular venous distention. Trachea midline. Respiratory: Bilateral inspiratory crackles noted. No expiratory wheeze or rhonchi noted. Cardiovascular: S1, S2, irregular rhythm. No obvious murmurs noted. Abdomen: Soft, nontender, nondistended. Musculoskeletal: No clubbing, cyanosis, edema of bilateral lower extremities. Brisk capillary refill. Skin: Normal skin color. No rashes or lesions. Neurologic:  Neurovascularly intact without any focal sensory/motor deficits.  Cranial nerves: II-XII intact, grossly non-focal.  Psychiatric: Alert and oriented, thought content appropriate, normal insight    Reviewed EKG and CXR personally      CBC:   Recent Labs     10/18/21  1250   WBC 12.1*   RBC 3.11*   HGB 9.5*   HCT 29.3*   MCV 94.2   RDW 12.5        BMP:   Recent Labs     10/18/21  1250      K 3.9   CL 93*   CO2 15*   BUN 95*   CREATININE 9.4*     LFT:  Recent Labs     10/18/21  1250   PROT 7.3   ALKPHOS 126*   ALT 23   AST 35*   BILITOT 0.4     CE:  No results for input(s): Patrisha Meigs in the last 72 hours. PT/INR: No results for input(s): INR, APTT in the last 72 hours. BNP: No results for input(s): BNP in the last 72 hours.   ESR: No results found for: SEDRATE  CRP: No results found for: CRP  D Dimer: No results found for: DDIMER   Folate and B12:   Lab Results   Component Value Date    GFYSBCQM60 371 10/21/2019   ,   Lab Results   Component Value Date    FOLATE 9.6 10/21/2019     Lactic Acid: No results found for: LACTA  Thyroid Studies:   Lab Results   Component Value Date    TSH 0.804 01/31/2019       Oupatient labs:  Lab Results   Component Value Date    CHOL 172 11/06/2018    TRIG 211 (H) 11/06/2018    HDL 37 11/06/2018    LDLCALC 93 11/06/2018    TSH 0.804 01/31/2019    INR 1.0 04/08/2021    LABA1C 6.7 01/31/2020       Urinalysis:    Lab Results   Component Value Date    NITRU Negative 05/24/2019    WBCUA 0-1 05/24/2019    BACTERIA RARE 05/24/2019    RBCUA NONE 05/24/2019    BLOODU Negative 05/24/2019    SPECGRAV 1.010 05/24/2019    GLUCOSEU Negative 05/24/2019       ASSESSMENT & PLAN:    Sepsis  Secondary to COVID-19 pneumonia  See management below      Acute hypoxemic respiratory failure  Severe hypoxia, use of excessive respiratory muscles, chest x-ray positive for bilateral infiltrates, requiring high flow oxygen  COVID-19 pneumonia  Bilateral airspace disease on chest x-ray  Superimposed bacterial pneumonia cannot be ruled out  Check procalcitonin levels, if elevated, start broad-spectrum antibiotics for community-acquired pneumonia  For Covid maintain on dexamethasone 10 mg IV daily  Consult pulmonology and infectious disease  Currently on AIRVO    Acute kidney injury on chronic kidney disease  Nonoliguric LEW, secondary to COVID-19 pneumonia  Baseline creatinine 5.2  Admitted with creatinine 9.5  No significant electrolyte abnormalities except acidosis  Started on bicarb drip by nephrology    CKD stage V  Presumably secondary to diabetic nephropathy  Patient has right arm AV fistula, with good thrill and bruit, mature and ready for use in case needed    Elevated BNP  No previous history of heart failure  EF 65% in August 2019 without any systolic or diastolic dysfunction  Elevated BNP of 4850, likely due to LEW on CKD  Patient on home dose torsemide, hold for now because of LEW and without any obvious fluid overload    Atrial fibrillation  Not in rapid ventricular rate  Resume home dose metoprolol and Eliquis  Monitor closely    Diabetes mellitus type 2  Home dose glipizide held  Maintain on sliding scale insulin    Hyperlipidemia  Continue statins    End-of-life care discussion  Patient has a living will and she does not want any heroic measures but she is not clear about intubation and resuscitation  Currently wants to be fully coded and discussed with her family      Diet: No diet orders on file  Code Status: Prior  Surrogate decision maker confirmed with patient:   Extended Emergency Contact Information  Primary Emergency Contact: Jonah Metzger [address] Phone: [phone]  Mobile Phone: [phone]  Relation: Spouse  Secondary Emergency Contact: Nicholas German [address] Phone: [phone]  Mobile Phone: [phone]  Relation: Grandchild    DVT Prophylaxis: []Lovenox []Heparin []PCD [x] [address] []Encouraged ambulation  Disposition: []Med/Surg [x] Intermediate [] ICU/CCU  Admit status: [] Observation [x] Inpatient     +++++++++++++++++++++++++++++++++++++++++++++++++  Shubham Joshi MD  [address]  +++++++++++++++++++++++++++++++++++++++++++++++++  NOTE: This report was transcribed using voice recognition software. Every effort was made to ensure accuracy; however, inadvertent computerized transcription errors may be present.

## 2021-10-18 NOTE — ED NOTES
PurWick in place. Patient able to doze off. RR=/rapid, slowed, feels improved per patient. Requesting inpatient bed for comfort. Fistula R AC thrill and bruit felt.        Ruchi Pritchard RN  10/18/21 9713

## 2021-10-19 LAB
ALBUMIN SERPL-MCNC: 2.8 G/DL (ref 3.5–5.2)
ALP BLD-CCNC: 111 U/L (ref 35–104)
ALT SERPL-CCNC: 20 U/L (ref 0–32)
ANION GAP SERPL CALCULATED.3IONS-SCNC: 20 MMOL/L (ref 7–16)
AST SERPL-CCNC: 21 U/L (ref 0–31)
BETA-HYDROXYBUTYRATE: 1.12 MMOL/L (ref 0.02–0.27)
BILIRUB SERPL-MCNC: 0.3 MG/DL (ref 0–1.2)
BUN BLDV-MCNC: 105 MG/DL (ref 6–23)
C-REACTIVE PROTEIN: 7.4 MG/DL (ref 0–0.4)
CALCIUM SERPL-MCNC: 9 MG/DL (ref 8.6–10.2)
CHLORIDE BLD-SCNC: 96 MMOL/L (ref 98–107)
CO2: 15 MMOL/L (ref 22–29)
CREAT SERPL-MCNC: 9.1 MG/DL (ref 0.5–1)
EKG ATRIAL RATE: 156 BPM
EKG Q-T INTERVAL: 304 MS
EKG QRS DURATION: 88 MS
EKG QTC CALCULATION (BAZETT): 473 MS
EKG R AXIS: -3 DEGREES
EKG T AXIS: 42 DEGREES
EKG VENTRICULAR RATE: 146 BPM
GFR AFRICAN AMERICAN: 5
GFR NON-AFRICAN AMERICAN: 4 ML/MIN/1.73
GLUCOSE BLD-MCNC: 311 MG/DL (ref 74–99)
HBA1C MFR BLD: 6.7 % (ref 4–5.6)
HCT VFR BLD CALC: 24.7 % (ref 34–48)
HEMOGLOBIN: 8.2 G/DL (ref 11.5–15.5)
MCH RBC QN AUTO: 29.8 PG (ref 26–35)
MCHC RBC AUTO-ENTMCNC: 33.2 % (ref 32–34.5)
MCV RBC AUTO: 89.8 FL (ref 80–99.9)
METER GLUCOSE: 317 MG/DL (ref 74–99)
METER GLUCOSE: 355 MG/DL (ref 74–99)
METER GLUCOSE: 410 MG/DL (ref 74–99)
PDW BLD-RTO: 12.4 FL (ref 11.5–15)
PLATELET # BLD: 379 E9/L (ref 130–450)
PMV BLD AUTO: 9.5 FL (ref 7–12)
POTASSIUM SERPL-SCNC: 3.6 MMOL/L (ref 3.5–5)
RBC # BLD: 2.75 E12/L (ref 3.5–5.5)
SODIUM BLD-SCNC: 131 MMOL/L (ref 132–146)
TOTAL PROTEIN: 6.6 G/DL (ref 6.4–8.3)
WBC # BLD: 5.1 E9/L (ref 4.5–11.5)

## 2021-10-19 PROCEDURE — 2700000000 HC OXYGEN THERAPY PER DAY

## 2021-10-19 PROCEDURE — 1200000000 HC SEMI PRIVATE

## 2021-10-19 PROCEDURE — 6360000002 HC RX W HCPCS: Performed by: INTERNAL MEDICINE

## 2021-10-19 PROCEDURE — 80053 COMPREHEN METABOLIC PANEL: CPT

## 2021-10-19 PROCEDURE — 82010 KETONE BODYS QUAN: CPT

## 2021-10-19 PROCEDURE — 93010 ELECTROCARDIOGRAM REPORT: CPT | Performed by: INTERNAL MEDICINE

## 2021-10-19 PROCEDURE — 83036 HEMOGLOBIN GLYCOSYLATED A1C: CPT

## 2021-10-19 PROCEDURE — 6370000000 HC RX 637 (ALT 250 FOR IP): Performed by: INTERNAL MEDICINE

## 2021-10-19 PROCEDURE — 86140 C-REACTIVE PROTEIN: CPT

## 2021-10-19 PROCEDURE — 36415 COLL VENOUS BLD VENIPUNCTURE: CPT

## 2021-10-19 PROCEDURE — 99223 1ST HOSP IP/OBS HIGH 75: CPT | Performed by: INTERNAL MEDICINE

## 2021-10-19 PROCEDURE — 87449 NOS EACH ORGANISM AG IA: CPT

## 2021-10-19 PROCEDURE — 85027 COMPLETE CBC AUTOMATED: CPT

## 2021-10-19 PROCEDURE — 82962 GLUCOSE BLOOD TEST: CPT

## 2021-10-19 RX ORDER — ATORVASTATIN CALCIUM 40 MG/1
40 TABLET, FILM COATED ORAL DAILY
Status: DISCONTINUED | OUTPATIENT
Start: 2021-10-19 | End: 2021-10-27 | Stop reason: HOSPADM

## 2021-10-19 RX ORDER — POLYETHYLENE GLYCOL 3350 17 G/17G
17 POWDER, FOR SOLUTION ORAL DAILY PRN
Status: DISCONTINUED | OUTPATIENT
Start: 2021-10-19 | End: 2021-10-27 | Stop reason: HOSPADM

## 2021-10-19 RX ORDER — SODIUM CHLORIDE 0.9 % (FLUSH) 0.9 %
5-40 SYRINGE (ML) INJECTION PRN
Status: DISCONTINUED | OUTPATIENT
Start: 2021-10-19 | End: 2021-10-27 | Stop reason: HOSPADM

## 2021-10-19 RX ORDER — ONDANSETRON 4 MG/1
4 TABLET, ORALLY DISINTEGRATING ORAL EVERY 8 HOURS PRN
Status: DISCONTINUED | OUTPATIENT
Start: 2021-10-19 | End: 2021-10-27 | Stop reason: HOSPADM

## 2021-10-19 RX ORDER — DOXYCYCLINE HYCLATE 100 MG/1
100 CAPSULE ORAL EVERY 12 HOURS SCHEDULED
Status: DISCONTINUED | OUTPATIENT
Start: 2021-10-19 | End: 2021-10-21

## 2021-10-19 RX ORDER — PROPAFENONE HYDROCHLORIDE 325 MG/1
325 CAPSULE, EXTENDED RELEASE ORAL 2 TIMES DAILY
Status: DISCONTINUED | OUTPATIENT
Start: 2021-10-19 | End: 2021-10-22

## 2021-10-19 RX ORDER — SODIUM CHLORIDE 9 MG/ML
25 INJECTION, SOLUTION INTRAVENOUS PRN
Status: DISCONTINUED | OUTPATIENT
Start: 2021-10-19 | End: 2021-10-27 | Stop reason: HOSPADM

## 2021-10-19 RX ORDER — SODIUM CHLORIDE 0.9 % (FLUSH) 0.9 %
5-40 SYRINGE (ML) INJECTION EVERY 12 HOURS SCHEDULED
Status: DISCONTINUED | OUTPATIENT
Start: 2021-10-19 | End: 2021-10-27 | Stop reason: HOSPADM

## 2021-10-19 RX ORDER — DEXTROSE MONOHYDRATE 50 MG/ML
100 INJECTION, SOLUTION INTRAVENOUS PRN
Status: DISCONTINUED | OUTPATIENT
Start: 2021-10-19 | End: 2021-10-27 | Stop reason: HOSPADM

## 2021-10-19 RX ORDER — DEXTROSE MONOHYDRATE 25 G/50ML
12.5 INJECTION, SOLUTION INTRAVENOUS PRN
Status: DISCONTINUED | OUTPATIENT
Start: 2021-10-19 | End: 2021-10-27 | Stop reason: HOSPADM

## 2021-10-19 RX ORDER — ACETAMINOPHEN 650 MG/1
650 SUPPOSITORY RECTAL EVERY 6 HOURS PRN
Status: DISCONTINUED | OUTPATIENT
Start: 2021-10-19 | End: 2021-10-27 | Stop reason: HOSPADM

## 2021-10-19 RX ORDER — ONDANSETRON 2 MG/ML
4 INJECTION INTRAMUSCULAR; INTRAVENOUS EVERY 6 HOURS PRN
Status: DISCONTINUED | OUTPATIENT
Start: 2021-10-19 | End: 2021-10-27 | Stop reason: HOSPADM

## 2021-10-19 RX ORDER — ACETAMINOPHEN 325 MG/1
650 TABLET ORAL EVERY 6 HOURS PRN
Status: DISCONTINUED | OUTPATIENT
Start: 2021-10-19 | End: 2021-10-27 | Stop reason: HOSPADM

## 2021-10-19 RX ORDER — DEXAMETHASONE SODIUM PHOSPHATE 10 MG/ML
10 INJECTION INTRAMUSCULAR; INTRAVENOUS EVERY 24 HOURS
Status: DISCONTINUED | OUTPATIENT
Start: 2021-10-19 | End: 2021-10-24

## 2021-10-19 RX ORDER — CALCITRIOL 0.25 UG/1
0.5 CAPSULE, LIQUID FILLED ORAL
Status: DISCONTINUED | OUTPATIENT
Start: 2021-10-19 | End: 2021-10-27 | Stop reason: HOSPADM

## 2021-10-19 RX ORDER — METOPROLOL TARTRATE 50 MG/1
50 TABLET, FILM COATED ORAL 2 TIMES DAILY
Status: DISCONTINUED | OUTPATIENT
Start: 2021-10-19 | End: 2021-10-27 | Stop reason: HOSPADM

## 2021-10-19 RX ORDER — INSULIN GLARGINE 100 [IU]/ML
10 INJECTION, SOLUTION SUBCUTANEOUS NIGHTLY
Status: DISCONTINUED | OUTPATIENT
Start: 2021-10-19 | End: 2021-10-23

## 2021-10-19 RX ORDER — NICOTINE POLACRILEX 4 MG
15 LOZENGE BUCCAL PRN
Status: DISCONTINUED | OUTPATIENT
Start: 2021-10-19 | End: 2021-10-27 | Stop reason: HOSPADM

## 2021-10-19 RX ADMIN — DEXAMETHASONE SODIUM PHOSPHATE 10 MG: 10 INJECTION INTRAMUSCULAR; INTRAVENOUS at 10:04

## 2021-10-19 RX ADMIN — APIXABAN 5 MG: 5 TABLET, FILM COATED ORAL at 10:03

## 2021-10-19 RX ADMIN — ATORVASTATIN CALCIUM 40 MG: 40 TABLET, FILM COATED ORAL at 10:03

## 2021-10-19 RX ADMIN — APIXABAN 5 MG: 5 TABLET, FILM COATED ORAL at 20:45

## 2021-10-19 RX ADMIN — METOPROLOL TARTRATE 50 MG: 50 TABLET, FILM COATED ORAL at 20:44

## 2021-10-19 RX ADMIN — INSULIN LISPRO 3 UNITS: 100 INJECTION, SOLUTION INTRAVENOUS; SUBCUTANEOUS at 20:45

## 2021-10-19 RX ADMIN — METOPROLOL TARTRATE 50 MG: 50 TABLET, FILM COATED ORAL at 10:04

## 2021-10-19 NOTE — CONSULTS
Pulmonary [facility]                             Pulmonary Consult/Progress Note :          Patient: Stalin Singh  MRN: [id number]  : 1955      Date of Admission: .10/18/2021 12:39 PM    Consulting Physician:Dr Duke Read         Reason for Consultation:COVID   CC : SOB   HPI:   Stalin Singh is a 73y.o. year old female who lives with her family and she has about 5 PPY smoking   Patient states that she had a grandson and granddaughter who are sick but she is not sure whether they are positive for Covidt. Patient herself has been feeling sick with worsening shortness of breath, low-grade fever, chills, loss of appetite and loss of taste, along with some weakness for almost 2 weeks.  she got worse over the last 2 days and she presented with worsening SOB and cough and she was sat in the 70 and she was stsrated on Non rebreather Known CKD stage V/ ESRD      PAST MEDICAL HISTORY:   Past Medical History:   Diagnosis Date    Atrial fibrillation ([address]     Bladder cancer ([address]     Cancer ([address]     CKD (chronic kidney disease)     CKD (chronic kidney disease) stage 5, GFR less than 15 ml/min ([address] 2018    Diabetes ([address]     Hypertension     Obesity     260 #    Sleep apnea        PAST SURGICAL HISTORY:   Past Surgical History:   Procedure Laterality Date    CARDIAC CATHETERIZATION Left 10/02/2008    @ [facility]    CHOLECYSTECTOMY      COLONOSCOPY      COLONOSCOPY N/A 2019    mid ascending colon polyp bx/cauterization (inflammatory polyp), proximal transverse colon polyp bx/cauterized (tubular adenoma), descending colon polyp 80 cm from anus bx/cauterized (hyperplastic polyp), sigmoid colon polyp 35 cm from anus bx/cauterized (tubular adenoma), sigmoid colon polyps 30 cm from anus bx/cauterized (tubular adenoma), Dr. Grabiel Jurado, [facility]    COLONOSCOPY  2019    mid ascending colon polyp bx/cauterization (inflammatory polyp), proximal transverse colon polyp bx/cauterized (tubular adenoma), descending colon polyp 80 cm from anus bx/cauterized (hyperplastic polyp), sigmoid colon polyp 35 cm from anus bx/cauterized (tubular adenoma), sigmoid colon polyps 30 cm from anus bx/cauterized (tubular adenoma), Dr. Gomez Carrel, [facility] FISTULA CREATION Left 2019    LIGATIION LEFT LEFT UPPER EXTREMITY OF AV FISTULA , EVACUATION HEMATOMA performed by Kb You MD at [facility] Right 2020    AV FISTULA CREATION -- RIGHT ARM performed by Jong Herring MD at [address] [place] [address] Right 2021    AV FISTULA  REVISION RIGHT ARM performed by Jong Herring MD at [place] [address] Left 2018    AV FISTULA  LEFT ARM performed by Kb You MD at [place] [address] Left 2018    SUPERFICIALIZATION AV FISTULA  LEFT ARM , LIGATION TRIBUTORY AV FISTULA LEFT ARM performed by Kb You MD at [address] [place] [address] HISTORY:   Family History   Problem Relation Age of Onset    Cancer Mother         bladder    Diabetes Mother     COPD Mother     Depression Father     Diabetes Father     Heart Disease Father     High Blood Pressure Father     Stroke Father     Prostate Cancer Father     Dementia Father     Alcohol Abuse Brother     Diabetes Brother     Bipolar Disorder Paternal Uncle     Anxiety Disorder Daughter     Colon Cancer Maternal Grandfather        SOCIAL HISTORY:   Social History     Socioeconomic History    Marital status:      Spouse name: Not on file    Number of children: Not on file    Years of education: Not on file    Highest education level: Not on file   Occupational History    Not on file   Tobacco Use    Smoking status: Former Smoker     Packs/day: 0.50     Years: 43.00     Pack years: 21.50     Types: Cigarettes     Quit date: 2021     Years since quittin.6    Smokeless tobacco: Never Used   Vaping Use    Vaping Use: Some days    Last attempt to quit: 2016    Substances: Always    Devices: Pre-filled or refillable cartridge, Refillable tank   Substance and Sexual Activity    Alcohol use: Not Currently     Comment: 1 cup of coffee a day     Drug use: Never    Sexual activity: Not on file   Other Topics Concern    Not on file   Social History Narrative    Drinks 1 cup of coffee daily. Social Determinants of Health     Financial Resource Strain: Low Risk     Difficulty of Paying Living Expenses: Not hard at all   Food Insecurity: No Food Insecurity    Worried About Running Out of Food in the Last Year: Never true    Nanette of Food in the Last Year: Never true   Transportation Needs:     Lack of Transportation (Medical):  Lack of Transportation (Non-Medical):    Physical Activity:     Days of Exercise per Week:     Minutes of Exercise per Session:    Stress:     Feeling of Stress :    Social Connections:     Frequency of Communication with Friends and Family:     Frequency of Social Gatherings with Friends and Family:     Attends [employer] Services:     Active Member of Clubs or Organizations:     Attends Club or Organization Meetings:     Marital Status:    Intimate Partner Violence:     Fear of Current or Ex-Partner:     Emotionally Abused:     Physically Abused:     Sexually Abused:      Social History     Tobacco Use   Smoking Status Former Smoker    Packs/day: 0.50    Years: 43.00    Pack years: 21.50    Types: Cigarettes    Quit date: 2021    Years since quittin.6   Smokeless Tobacco Never Used     Social History     Substance and Sexual Activity   Alcohol Use Not Currently    Comment: 1 cup of coffee a day      Social History     Substance and Sexual Activity   Drug Use Never       OCCUPATIONAL HISTORY:            HOME MEDICATIONS:  Prior to Admission medications    Medication Sig Start Date End Date Taking?  Authorizing Provider   metoprolol tartrate (LOPRESSOR) 50 MG tablet Take 50 mg by mouth 2 times daily   Yes Historical Provider, MD   glipiZIDE (GLUCOTROL XL) 5 MG extended release tablet Take 10 mg by mouth daily   Yes Historical Provider, MD   amLODIPine (NORVASC) 5 MG tablet TAKE 1 TABLET BY MOUTH DAILY 10/14/21  Yes Jona Gutierrez, DO   atorvastatin (LIPITOR) 40 MG tablet Take 1 tablet by mouth daily 10/14/21  Yes Jona Gutierrez, DO   vitamin D3 (CHOLECALCIFEROL) 25 MCG (1000 UT) TABS tablet Take 1 tablet by mouth daily 10/14/21  Yes Jona Gutierrez, DO   torsemide (DEMADEX) 20 MG tablet Take 2 tablets by mouth daily 9/21/21  Yes Jona Gutierrez, DO   sodium bicarbonate 650 MG tablet Take 650 mg by mouth 2 times daily    Yes Historical Provider, MD   calcitRIOL (ROCALTROL) 0.25 MCG capsule Take 0.5 mcg by mouth Five times weekly Given Monday,Tuesday,Wednesday,Thursday,Friday 7/16/21  Yes Historical Provider, MD   apixaban (ELIQUIS) 5 MG TABS tablet Take 1 tablet by mouth 2 times daily 8/25/21  Yes Hansel Latham MD   propafenone (RYTHMOL SR) 325 MG extended release capsule Take 1 capsule by mouth 2 times daily 8/18/21  Yes Maxim Serna MD   DULoxetine (CYMBALTA) 60 MG extended release capsule TAKE 1 CAPSULE BY MOUTH DAILY 7/20/21  Yes Josefina Lopes, APRN - CNP       CURRENT MEDICATIONS:  Current Facility-Administered Medications: apixaban (ELIQUIS) tablet 5 mg, 5 mg, Oral, BID  atorvastatin (LIPITOR) tablet 40 mg, 40 mg, Oral, Daily  calcitRIOL (ROCALTROL) capsule 0.5 mcg, 0.5 mcg, Oral, Once per day on Mon Wed Fri  metoprolol tartrate (LOPRESSOR) tablet 50 mg, 50 mg, Oral, BID  propafenone (RYTHMOL SR) extended release capsule 325 mg, 325 mg, Oral, BID  sodium chloride flush 0.9 % injection 5-40 mL, 5-40 mL, IntraVENous, 2 times per day  sodium chloride flush 0.9 % injection 5-40 mL, 5-40 mL, IntraVENous, PRN  0.9 % sodium chloride infusion, 25 mL, IntraVENous, PRN  ondansetron (ZOFRAN-ODT) disintegrating tablet 4 mg, 4 mg, Oral, Q8H PRN **OR** ondansetron (ZOFRAN) injection 4 mg, 4 mg, IntraVENous, Q6H PRN  polyethylene glycol (GLYCOLAX) packet 17 g, 17 g, Oral, Daily PRN  acetaminophen (TYLENOL) tablet 650 mg, 650 mg, Oral, Q6H PRN **OR** acetaminophen (TYLENOL) suppository 650 mg, 650 mg, Rectal, Q6H PRN  glucose (GLUTOSE) 40 % oral gel 15 g, 15 g, Oral, PRN  dextrose 50 % IV solution, 12.5 g, IntraVENous, PRN  glucagon (rDNA) injection 1 mg, 1 mg, IntraMUSCular, PRN  dextrose 5 % solution, 100 mL/hr, IntraVENous, PRN  insulin lispro (HUMALOG) injection vial 0-6 Units, 0-6 Units, SubCUTAneous, TID WC  insulin lispro (HUMALOG) injection vial 0-3 Units, 0-3 Units, SubCUTAneous, Nightly  dexamethasone (DECADRON) injection 10 mg, 10 mg, IntraVENous, Q24H  sodium bicarbonate 150 mEq in dextrose 5 % 1,000 mL infusion, , IntraVENous, Continuous  sodium bicarbonate 8.4 % injection 50 mEq, 50 mEq, IntraVENous, Once    IV MEDICATIONS:   sodium chloride      dextrose      sodium bicarbonate infusion 100 mL/hr at 10/19/21 1110       ALLERGIES:  Allergies   Allergen Reactions    Adhesive Tape Rash    Penicillins Rash       REVIEW OF SYSTEMS:  General ROS:  No weight loss ,no fatigue     ENT ROS:   No Sore throat ,no lymphoadenopathy,no nasal stuffiness     Hematological and Lymphatic ROS:   No ecchymosis ,no tendency to bleed  Respiratory ROS:   SOB   Cardiovascular ROS:   No CP,No Palpitation   Gastrointestinal ROS:   No Gi bleed,no nausea or vomiting      - Musculoskeletal ROS:      - no joint swelling ,no joint pain   Neurological ROS:     -no weakness or numbness    Dermatological ROS:   No skin rash ,no urticaria     PHYSICAL EXAMINATION:     VITAL SIGNS:  /81   Pulse 94   Temp 97.5 °F (36.4 °C) (Infrared)   Resp 23   Wt 265 lb (120.2 kg)   SpO2 95%   BMI 41.50 kg/m²   Wt Readings from Last 3 Encounters:   10/18/21 265 lb (120.2 kg)   09/02/21 261 lb 9.6 oz (118.7 kg)   08/06/21 253 lb (114.8 kg)     Temp Readings from Last 3 Encounters:   10/19/21 97.5 °F (36.4 °C) (Infrared)   09/02/21 97.4 °F (36.3 °C) (Infrared)   08/06/21 97.5 °F (36.4 °C) (Tympanic)     TMAX:  BP Readings from Last 3 Encounters:   10/19/21 111/81   09/02/21 138/60   08/06/21 (!) 125/56     Pulse Readings from Last 3 Encounters:   10/19/21 94   09/02/21 70   08/06/21 80           INTAKE/OUTPUTS:  I/O last 3 completed shifts:   In: 500 [IV Piggyback:500]  Out: 300 [Urine:300]    Intake/Output Summary (Last 24 hours) at 10/19/2021 1551  Last data filed at 10/18/2021 2110  Gross per 24 hour   Intake --   Output 300 ml   Net -300 ml       General Appearance: alert and oriented to person, place and time, well-developed and   well-nourished, in no acute distress   Eyes: pupils equal, round, and reactive to light, extraocular eye movements intact, conjunctivae normal and sclera anicteric   Neck: neck supple and non tender without mass, no thyromegaly, no thyroid nodules and no cervical adenopathy   Pulmonary/Chest:Rhonchi   Cardiovascular: normal rate, regular rhythm, normal S1 and S2, no murmurs, rubs, clicks or gallops, distal pulses intact, no carotid bruits, no murmurs, no gallops, no carotid bruits and no JVD   Abdomen: obese, soft, non-tender, non-distended, normal bowel sounds, no masses or organomegaly   Extremities:no edema ,no cyanosis   Musculoskeletal: normal range of motion, no joint swelling, deformity or tenderness   Neurologic: reflexes normal and symmetric, no cranial nerve deficit noted    LABS/IMAGING:    CBC:  Lab Results   Component Value Date    WBC 5.1 10/19/2021    HGB 8.2 (L) 10/19/2021    HCT 24.7 (L) 10/19/2021    MCV 89.8 10/19/2021     10/19/2021    LYMPHOPCT 13.3 (L) 10/18/2021    RBC 2.75 (L) 10/19/2021    MCH 29.8 10/19/2021    MCHC 33.2 10/19/2021    RDW 12.4 10/19/2021    NEUTOPHILPCT 77.0 10/18/2021    MONOPCT 8.3 10/18/2021    BASOPCT 0.2 10/18/2021    NEUTROABS 9.30 (H) 10/18/2021 LYMPHSABS 1.60 10/18/2021    MONOSABS 1.00 (H) 10/18/2021    EOSABS 0.00 (L) 10/18/2021    BASOSABS 0.03 10/18/2021       Recent Labs     10/19/21  0510 10/18/21  1250   WBC 5.1 12.1*   HGB 8.2* 9.5*   HCT 24.7* 29.3*   MCV 89.8 94.2    401       BMP:   Recent Labs     10/18/21  1250 10/19/21  0510    131*   K 3.9 3.6   CL 93* 96*   CO2 15* 15*   BUN 95* 105*   CREATININE 9.4* 9.1*       MG:   Lab Results   Component Value Date    MG 2.3 11/07/2018     Ca/Phos:   Lab Results   Component Value Date    CALCIUM 9.0 10/19/2021    PHOS 5.0 (H) 06/17/2021     Amylase: No results found for: AMYLASE  Lipase: No results found for: LIPASE  LIVER PROFILE:   Recent Labs     10/18/21  1250 10/19/21  0510   AST 35* 21   ALT 23 20   BILITOT 0.4 0.3   ALKPHOS 126* 111*       PT/INR: No results for input(s): PROTIME, INR in the last 72 hours. APTT: No results for input(s): APTT in the last 72 hours. Cardiac Enzymes:  Lab Results   Component Value Date    TROPONINI <0.01 11/05/2018               PROBLEM LIST:  Patient Active Problem List   Diagnosis    Moderate episode of recurrent major depressive disorder ([facility]    Generalized anxiety disorder    Insomnia due to mental condition    Anemia    Essential hypertension    CKD (chronic kidney disease) stage 5, GFR less than 15 ml/min (HCC)    Atrial fibrillation (HCC)    Pure hypercholesterolemia    Morbid obesity (HCC)    Chronic obstructive pulmonary disease (HCC)    Obstructive sleep apnea    Exertional dyspnea    History of colon polyps    Family history of rectal cancer    Encounter regarding vascular access for dialysis for end-stage renal disease ([facility]    COVID-19    Pneumonia due to COVID-19 virus               ASSESSMENT:  1.) COVID pneumonia   2. )Acute hypoxic respiratory failure   3. )ESRD  4.)A fib   5. )Anemia   6- Possible Pneumonia     PLAN:  *-Nephrology to see  *-Decadron ,watch BS  *-*- Wean O2 as tolerated   *-giving the diffuse

## 2021-10-19 NOTE — ED NOTES
Patient family would like Infectious Disease Doctor to call them for phone consult.  Rosa [phone] granddaughter     Enrico Pacheco RN  10/19/21 7800

## 2021-10-19 NOTE — CARE COORDINATION
GABRIELA transition of care. Pt presented to [facility] ER secondary to oxygen sats in the 70s and tachycardia. Pt is covid positive. Met with pt at bedside who is A&O x 3 to discuss d/c planning pt. Pt is currently on Heated high flow nc and NRB,  receiving IV decadron 10 mg IV daily, and a bicarb gtt @ 100 ml/hr. Pt does not wear home oxygen and if required upon d/c pt has no preference of the company used. Pt lives with her spouse is independent with ADLs does not drive. Reports no hx of HHC or SNF. Pt has a ww and a rollator. Pt d/c plan uncear at this time. CM/SW to follow.   Cata Blackwell RN CM

## 2021-10-19 NOTE — PROGRESS NOTES
Hospitalist Progress Note      SYNOPSIS: Patient admitted on 10/18/2021 for shortness of breath    Patient with significant past medical history came to our hospital with chief complaint of worsening shortness of breath for last 2 weeks and was found to have acute hypoxemic respiratory failure requiring high flow oxygen as well as acute kidney injury on her chronic kidney disease stage V. In the ER, patient was found to have atrial fibrillation with heart rate episodes up to 150s but no persistent rapid ventricular rate. She was positive for COVID-19. Other significant finding was acute kidney injury with creatinine of 9.4, compared to her baseline of 5.2. Her BNP was also elevated to 4850. ER physician discussed with nephrology who recommended low-dose bicarb drip and close management. She also required AIRVO in the ER with oxygen about 30 L    SUBJECTIVE:    Patient seen and examined in the ER. Patient remains comfortable. Denies any overnight major issues. States that shortness of breath slightly improved. Denies any chest pain, nausea, vomiting  Records reviewed. Stable overnight. No other overnight issues reported. Temp (24hrs), Av.4 °F (37.4 °C), Min:99.1 °F (37.3 °C), Max:99.6 °F (37.6 °C)    DIET: ADULT DIET; Regular; 3 carb choices (45 gm/meal); Low Sodium (2 gm); Low Potassium (Less than 3000 mg/day); Low Phosphorus (Less than 1000 mg)  CODE: Full Code    Intake/Output Summary (Last 24 hours) at 10/19/2021 0749  Last data filed at 10/18/2021 2110  Gross per 24 hour   Intake 500 ml   Output 300 ml   Net 200 ml       OBJECTIVE:    BP (!) 130/56   Pulse 96   Temp 99.1 °F (37.3 °C) (Oral)   Resp 22   Wt 265 lb (120.2 kg)   SpO2 94%   BMI 41.50 kg/m²     General appearance: No apparent distress, appears stated age and cooperative. HEENT:  Conjunctivae/corneas clear. Neck: Supple. No jugular venous distention.    Respiratory: Clear to auscultation bilaterally, normal respiratory clear discharge disposition at the moment. Medications:  REVIEWED DAILY    Infusion Medications    sodium chloride      dextrose      sodium chloride 100 mL/hr at 10/18/21 1555    sodium bicarbonate infusion 50 mL/hr at 10/18/21 2108     Scheduled Medications    apixaban  5 mg Oral BID    atorvastatin  40 mg Oral Daily    calcitRIOL  0.5 mcg Oral Once per day on Mon Wed Fri    metoprolol tartrate  50 mg Oral BID    propafenone  325 mg Oral BID    sodium chloride flush  5-40 mL IntraVENous 2 times per day    insulin lispro  0-6 Units SubCUTAneous TID WC    insulin lispro  0-3 Units SubCUTAneous Nightly    dexamethasone  10 mg IntraVENous Q24H    sodium bicarbonate  50 mEq IntraVENous Once     PRN Meds: sodium chloride flush, sodium chloride, ondansetron **OR** ondansetron, polyethylene glycol, acetaminophen **OR** acetaminophen, glucose, dextrose, glucagon (rDNA), dextrose    Labs:     Recent Labs     10/18/21  1250 10/19/21  0510   WBC 12.1* 5.1   HGB 9.5* 8.2*   HCT 29.3* 24.7*    379       Recent Labs     10/18/21  1250 10/19/21  0510    131*   K 3.9 3.6   CL 93* 96*   CO2 15* 15*   BUN 95* 105*   CREATININE 9.4* 9.1*   CALCIUM 9.2 9.0       Recent Labs     10/18/21  1250 10/19/21  0510   PROT 7.3 6.6   ALKPHOS 126* 111*   ALT 23 20   AST 35* 21   BILITOT 0.4 0.3       No results for input(s): INR in the last 72 hours. No results for input(s): Michelle Nelson in the last 72 hours.     Chronic labs:    Lab Results   Component Value Date    CHOL 172 11/06/2018    TRIG 211 (H) 11/06/2018    HDL 37 11/06/2018    LDLCALC 93 11/06/2018    TSH 0.804 01/31/2019    INR 1.0 04/08/2021    LABA1C 6.7 (H) 10/19/2021       Radiology: REVIEWED DAILY    +++++++++++++++++++++++++++++++++++++++++++++++++  Radha Gonzales MD  [place] [facility] Physician - [address] [facility], [place]  +++++++++++++++++++++++++++++++++++++++++++++++++  NOTE: This report was transcribed using voice recognition software. Every effort was made to ensure accuracy; however, inadvertent computerized transcription errors may be present.

## 2021-10-19 NOTE — ACP (ADVANCE CARE PLANNING)
Advance Care Planning     Advance Care Planning Activator (Inpatient)  Conversation Note      Date of ACP Conversation: 10/19/2021     Conversation Conducted with: Patient with Decision Making Capacity    ACP Activator: Janell Mcmanus RN        Health Care Decision Maker:     Current Designated Health Care Decision Maker:     Primary Decision Maker: Dale Jacome - [phone]    Secondary Decision Maker: Dontae Mcneill - Mirian - [phone]   Pt spouse has Parkinson's  Click here to complete Healthcare Decision Makers including section of the Healthcare Decision Maker Relationship (ie \"Primary\")  Today we documented Decision Maker(s) consistent with Legal Next of Kin hierarchy. Care Preferences    Ventilation: \"If you were in your present state of health and suddenly became very ill and were unable to breathe on your own, what would your preference be about the use of a ventilator (breathing machine) if it were available to you? \"      Would the patient desire the use of ventilator (breathing machine)?: yes    \"If your health worsens and it becomes clear that your chance of recovery is unlikely, what would your preference be about the use of a ventilator (breathing machine) if it were available to you? \"     Would the patient desire the use of ventilator (breathing machine)?: Yes      Resuscitation  \"CPR works best to restart the heart when there is a sudden event, like a heart attack, in someone who is otherwise healthy. Unfortunately, CPR does not typically restart the heart for people who have serious health conditions or who are very sick. \"    \"In the event your heart stopped as a result of an underlying serious health condition, would you want attempts to be made to restart your heart (answer \"yes\" for attempt to resuscitate) or would you prefer a natural death (answer \"no\" for do not attempt to resuscitate)? \" yes       [] Yes   [x] No   Educated Patient / Decision Maker regarding differences between Advance Directives and portable DNR orders.     Length of ACP Conversation in minutes:  5    Conversation Outcomes:  [x] ACP discussion completed  [] Existing advance directive reviewed with patient; no changes to patient's previously recorded wishes  [] New Advance Directive completed  [] Portable Do Not Rescitate prepared for Provider review and signature  [] POLST/POST/MOLST/MOST prepared for Provider review and signature      Follow-up plan:    [] Schedule follow-up conversation to continue planning  [] Referred individual to Provider for additional questions/concerns   [] Advised patient/agent/surrogate to review completed ACP document and update if needed with changes in condition, patient preferences or care setting    [x] This note routed to one or more involved healthcare providers

## 2021-10-20 LAB
ALBUMIN SERPL-MCNC: 3 G/DL (ref 3.5–5.2)
ALP BLD-CCNC: 111 U/L (ref 35–104)
ALT SERPL-CCNC: 22 U/L (ref 0–32)
ANION GAP SERPL CALCULATED.3IONS-SCNC: 18 MMOL/L (ref 7–16)
ANION GAP SERPL CALCULATED.3IONS-SCNC: 23 MMOL/L (ref 7–16)
AST SERPL-CCNC: 27 U/L (ref 0–31)
BILIRUB SERPL-MCNC: 0.3 MG/DL (ref 0–1.2)
BUN BLDV-MCNC: 123 MG/DL (ref 6–23)
BUN BLDV-MCNC: 124 MG/DL (ref 6–23)
CALCIUM SERPL-MCNC: 9.7 MG/DL (ref 8.6–10.2)
CALCIUM SERPL-MCNC: 9.7 MG/DL (ref 8.6–10.2)
CHLORIDE BLD-SCNC: 92 MMOL/L (ref 98–107)
CHLORIDE BLD-SCNC: 95 MMOL/L (ref 98–107)
CO2: 20 MMOL/L (ref 22–29)
CO2: 21 MMOL/L (ref 22–29)
CREAT SERPL-MCNC: 9.3 MG/DL (ref 0.5–1)
CREAT SERPL-MCNC: 9.5 MG/DL (ref 0.5–1)
GFR AFRICAN AMERICAN: 5
GFR AFRICAN AMERICAN: 5
GFR NON-AFRICAN AMERICAN: 4 ML/MIN/1.73
GFR NON-AFRICAN AMERICAN: 4 ML/MIN/1.73
GLUCOSE BLD-MCNC: 328 MG/DL (ref 74–99)
GLUCOSE BLD-MCNC: 366 MG/DL (ref 74–99)
HCT VFR BLD CALC: 25.8 % (ref 34–48)
HEMOGLOBIN: 8.4 G/DL (ref 11.5–15.5)
INR BLD: 1.5
L. PNEUMOPHILA SEROGP 1 UR AG: NORMAL
MAGNESIUM: 2.5 MG/DL (ref 1.6–2.6)
MCH RBC QN AUTO: 30.1 PG (ref 26–35)
MCHC RBC AUTO-ENTMCNC: 32.6 % (ref 32–34.5)
MCV RBC AUTO: 92.5 FL (ref 80–99.9)
METER GLUCOSE: 331 MG/DL (ref 74–99)
METER GLUCOSE: 388 MG/DL (ref 74–99)
PDW BLD-RTO: 12.6 FL (ref 11.5–15)
PHOSPHORUS: 7.3 MG/DL (ref 2.5–4.5)
PLATELET # BLD: 542 E9/L (ref 130–450)
PMV BLD AUTO: 9.5 FL (ref 7–12)
POTASSIUM SERPL-SCNC: 3.9 MMOL/L (ref 3.5–5)
POTASSIUM SERPL-SCNC: 3.9 MMOL/L (ref 3.5–5)
PROTHROMBIN TIME: 16.8 SEC (ref 9.3–12.4)
RBC # BLD: 2.79 E12/L (ref 3.5–5.5)
SODIUM BLD-SCNC: 134 MMOL/L (ref 132–146)
SODIUM BLD-SCNC: 135 MMOL/L (ref 132–146)
STREP PNEUMONIAE ANTIGEN, URINE: NORMAL
TOTAL PROTEIN: 6.6 G/DL (ref 6.4–8.3)
WBC # BLD: 9.6 E9/L (ref 4.5–11.5)

## 2021-10-20 PROCEDURE — 6360000002 HC RX W HCPCS: Performed by: INTERNAL MEDICINE

## 2021-10-20 PROCEDURE — 83735 ASSAY OF MAGNESIUM: CPT

## 2021-10-20 PROCEDURE — 82962 GLUCOSE BLOOD TEST: CPT

## 2021-10-20 PROCEDURE — 80048 BASIC METABOLIC PNL TOTAL CA: CPT

## 2021-10-20 PROCEDURE — 6370000000 HC RX 637 (ALT 250 FOR IP): Performed by: INTERNAL MEDICINE

## 2021-10-20 PROCEDURE — 2580000003 HC RX 258: Performed by: INTERNAL MEDICINE

## 2021-10-20 PROCEDURE — 2500000003 HC RX 250 WO HCPCS: Performed by: INTERNAL MEDICINE

## 2021-10-20 PROCEDURE — 80074 ACUTE HEPATITIS PANEL: CPT

## 2021-10-20 PROCEDURE — 99233 SBSQ HOSP IP/OBS HIGH 50: CPT | Performed by: INTERNAL MEDICINE

## 2021-10-20 PROCEDURE — 90935 HEMODIALYSIS ONE EVALUATION: CPT | Performed by: INTERNAL MEDICINE

## 2021-10-20 PROCEDURE — 85610 PROTHROMBIN TIME: CPT

## 2021-10-20 PROCEDURE — 5A1D70Z PERFORMANCE OF URINARY FILTRATION, INTERMITTENT, LESS THAN 6 HOURS PER DAY: ICD-10-PCS | Performed by: INTERNAL MEDICINE

## 2021-10-20 PROCEDURE — 2700000000 HC OXYGEN THERAPY PER DAY

## 2021-10-20 PROCEDURE — 86706 HEP B SURFACE ANTIBODY: CPT

## 2021-10-20 PROCEDURE — 84100 ASSAY OF PHOSPHORUS: CPT

## 2021-10-20 PROCEDURE — 1200000000 HC SEMI PRIVATE

## 2021-10-20 PROCEDURE — 36415 COLL VENOUS BLD VENIPUNCTURE: CPT

## 2021-10-20 PROCEDURE — 85027 COMPLETE CBC AUTOMATED: CPT

## 2021-10-20 PROCEDURE — 80053 COMPREHEN METABOLIC PANEL: CPT

## 2021-10-20 RX ADMIN — SODIUM BICARBONATE: 84 INJECTION, SOLUTION INTRAVENOUS at 21:50

## 2021-10-20 RX ADMIN — WATER 1000 MG: 1 INJECTION INTRAMUSCULAR; INTRAVENOUS; SUBCUTANEOUS at 00:38

## 2021-10-20 RX ADMIN — METOPROLOL TARTRATE 50 MG: 50 TABLET, FILM COATED ORAL at 10:00

## 2021-10-20 RX ADMIN — INSULIN LISPRO 5 UNITS: 100 INJECTION, SOLUTION INTRAVENOUS; SUBCUTANEOUS at 10:01

## 2021-10-20 RX ADMIN — DOXYCYCLINE HYCLATE 100 MG: 100 CAPSULE ORAL at 21:46

## 2021-10-20 RX ADMIN — DEXAMETHASONE SODIUM PHOSPHATE 10 MG: 10 INJECTION INTRAMUSCULAR; INTRAVENOUS at 03:17

## 2021-10-20 RX ADMIN — INSULIN GLARGINE 10 UNITS: 100 INJECTION, SOLUTION SUBCUTANEOUS at 00:38

## 2021-10-20 RX ADMIN — APIXABAN 5 MG: 5 TABLET, FILM COATED ORAL at 21:44

## 2021-10-20 RX ADMIN — INSULIN GLARGINE 10 UNITS: 100 INJECTION, SOLUTION SUBCUTANEOUS at 22:30

## 2021-10-20 RX ADMIN — WATER 1000 MG: 1 INJECTION INTRAMUSCULAR; INTRAVENOUS; SUBCUTANEOUS at 21:46

## 2021-10-20 RX ADMIN — Medication 10 ML: at 21:45

## 2021-10-20 RX ADMIN — INSULIN LISPRO 2 UNITS: 100 INJECTION, SOLUTION INTRAVENOUS; SUBCUTANEOUS at 22:30

## 2021-10-20 RX ADMIN — DOXYCYCLINE HYCLATE 100 MG: 100 CAPSULE ORAL at 10:01

## 2021-10-20 RX ADMIN — METOPROLOL TARTRATE 50 MG: 50 TABLET, FILM COATED ORAL at 21:45

## 2021-10-20 RX ADMIN — ATORVASTATIN CALCIUM 40 MG: 40 TABLET, FILM COATED ORAL at 10:00

## 2021-10-20 RX ADMIN — APIXABAN 5 MG: 5 TABLET, FILM COATED ORAL at 10:00

## 2021-10-20 RX ADMIN — DOXYCYCLINE HYCLATE 100 MG: 100 CAPSULE ORAL at 00:39

## 2021-10-20 ASSESSMENT — PAIN SCALES - GENERAL
PAINLEVEL_OUTOF10: 0

## 2021-10-20 NOTE — FLOWSHEET NOTE
10/20/21 1800   Vital Signs   BP (!) 154/67   Temp 97.5 °F (36.4 °C)   Pulse 109   Resp 18   Weight 252 lb 6.8 oz (114.5 kg)   Weight Method Bed scale   Percent Weight Change -0.87   Pain Assessment   Pain Assessment 0-10   Pain Level 0   Post-Hemodialysis Assessment   Post-Treatment Procedures Blood returned; Access bleeding time < 10 minutes   Machine Disinfection Process Acid/Vinegar Clean;Bleach; Exterior Machine Disinfection   Rinseback Volume (ml) 300 ml   Total Liters Processed (l/min) 21.7 l/min   Dialyzer Clearance Lightly streaked   Duration of Treatment (minutes) 120 minutes   Heparin amount administered during treatment (units) 0 units   Hemodialysis Intake (ml) 300 ml   Hemodialysis Output (ml) 1300 ml   NET Removed (ml) 1000 ml   Tolerated Treatment Good   Patient Response to Treatment tolerated well, 1L fluid removal   Bilateral Breath Sounds Diminished   Edema Left lower extremity;Right lower extremity   Physician Notified?  No

## 2021-10-20 NOTE — CONSULTS
[facility] MD Min Estevez MD Ezequiel Peers, MD Waddell Lan, MD Deanne Rubens, REX Seaman, HARRY  Consultation  Patient's Name: Karen London  9:26 PM  10/19/2021    Nephrologist: Min Iqbal MD    Reason for Consult: LEW, CKD  Requesting Physician:  Adriano Reina DO    Chief Complaint: Dyspnea    History Obtained From: Patient, chart    History of Present Ilness:    Ms. Carmen Giles is a 61-year-old woman with advanced chronic kidney disease, stage V with a baseline creatinine 4.8 to 5.3 mg/dL, secondary to diabetic nephropathy, renal microvascular atherosclerotic disease. She is followed longitudinally from nephrology standpoint by Dr. Carlito Cao. She presented to the emergency department yesterday with a roughly 2-week history of progressive dyspnea, intermittent cough at times productive of thinnish phlegm but not routinely, fever, chills, diaphoresis, anosmia and dysgeusia, progressive generalized weakness. Intake of food and fluid has been quite poor in particular over the past several days. She denies lightheadedness, chest pain or palpitations, abdominal pain or cramping, diarrhea or constipation, dysuria hematuria change in bowel habits of late. On presentation O2 saturations were in the 70s, she was started on nonrebreather mask. She notes that she has been breathing more or less comfortably throughout most of today, and she has been oxygenating well. BUN and creatinine on presentation were 95 and 9.4, respectively (10/18) which compares with BUN/creatinine 57 and 5.3, respectively on (9/2). BUN/creatinine this morning 105 and 9.1, respectively. HCO3 is 15 mmol/L. At the time of my initial evaluation this early evening she was resting more or less comfortably on her gurney in the ER. She notes that she feels a good deal better now than he did the time of presentation.   She is able to speak in complete sentences.     Past Medical History:   Diagnosis Date    At[facility] fibrillation ([facility]     Bladder cancer ([facility]     Cancer ([facility]     CKD (chronic kidney disease)     CKD (chronic kidney disease) stage 5, GFR less than 15 ml/min ([facility] [facility] 11/6/2018    Diabetes ([facility] [facility]     Hypertension     Obesity     260 #    Sleep apnea        Past Surgical History:   Procedure Laterality Date    CARDIAC CATHETERIZATION Left 10/02/2008    @ [facility]    CHOLECYSTECTOMY      COLONOSCOPY      COLONOSCOPY N/A 8/29/2019    mid ascending colon polyp bx/cauterization (inflammatory polyp), proximal transverse colon polyp bx/cauterized (tubular adenoma), descending colon polyp 80 cm from anus bx/cauterized (hyperplastic polyp), sigmoid colon polyp 35 cm from anus bx/cauterized (tubular adenoma), sigmoid colon polyps 30 cm from anus bx/cauterized (tubular adenoma), Dr. Irais Madrid, [address]    COLONOSCOPY  8/29/2019    mid ascending colon polyp bx/cauterization (inflammatory polyp), proximal transverse colon polyp bx/cauterized (tubular adenoma), descending colon polyp 80 cm from anus bx/cauterized (hyperplastic polyp), sigmoid colon polyp 35 cm from anus bx/cauterized (tubular adenoma), sigmoid colon polyps 30 cm from anus bx/cauterized (tubular adenoma), Dr. Irais Madrid, [address]    DIALYSIS FISTULA CREATION Left 1/22/2019    LIGATIION LEFT LEFT UPPER EXTREMITY OF AV FISTULA , EVACUATION HEMATOMA performed by Simon Ag MD at [address] Right 12/7/2020    AV FISTULA CREATION -- RIGHT ARM performed by Lawyer Jayme MD at [address] Right 4/8/2021    AV FISTULA  REVISION RIGHT ARM performed by Lawyer Jayme MD at [place] [id number] Left 9/18/2018    AV FISTULA  LEFT ARM performed by Simon Ag MD at [place] [address] Left 11/14/2018    SUPERFICIALIZATION AV FISTULA  LEFT ARM , LIGATION TRIBUTORY AV FISTULA LEFT ARM performed by [facility] at [facility]    TONSILLECTOMY      TUBAL LIGATION         Family History   Problem Relation Age of Onset   [facility] Cancer Mother         bladder    Diabetes Mother    [facility] COPD Mother     Depression Father     Diabetes Father     Heart Disease Father     High Blood Pressure Father     Stroke Father     Prostate Cancer Father     Dementia Father     Alcohol Abuse Brother     Diabetes Brother     Bipolar Disorder Paternal Uncle     Anxiety Disorder Daughter     Colon Cancer Maternal Grandfather         reports that she quit smoking about 7 months ago. Her smoking use included cigarettes. She has a 21.50 pack-year smoking history. She has never used smokeless tobacco. She reports previous alcohol use. She reports that she does not use drugs.     Allergies:  Adhesive tape and Penicillins    Current Medications:    apixaban (ELIQUIS) tablet 5 mg, BID  atorvastatin (LIPITOR) tablet 40 mg, Daily  calcitRIOL (ROCALTROL) capsule 0.5 mcg, Once per day on Mon Wed Fri  metoprolol tartrate (LOPRESSOR) tablet 50 mg, BID  propafenone (RYTHMOL SR) extended release capsule 325 mg, BID  sodium chloride flush 0.9 % injection 5-40 mL, 2 times per day  sodium chloride flush 0.9 % injection 5-40 mL, PRN  0.9 % sodium chloride infusion, PRN  ondansetron (ZOFRAN-ODT) disintegrating tablet 4 mg, Q8H PRN   Or  ondansetron (ZOFRAN) injection 4 mg, Q6H PRN  polyethylene glycol (GLYCOLAX) packet 17 g, Daily PRN  acetaminophen (TYLENOL) tablet 650 mg, Q6H PRN   Or  acetaminophen (TYLENOL) suppository 650 mg, Q6H PRN  glucose (GLUTOSE) 40 % oral gel 15 g, PRN  dextrose 50 % IV solution, PRN  glucagon (rDNA) injection 1 mg, PRN  dextrose 5 % solution, PRN  insulin lispro (HUMALOG) injection vial 0-6 Units, TID WC  insulin lispro (HUMALOG) injection vial 0-3 Units, Nightly  dexamethasone (DECADRON) injection 10 mg, Q24H  insulin glargine (LANTUS) injection vial 10 Units, Nightly  cefTRIAXone (ROCEPHIN) 1,000 mg in sterile water 10 mL IV syringe, Q24H  doxycycline hyclate (VIBRAMYCIN) capsule 100 mg, 2 times per day  sodium bicarbonate 150 mEq in dextrose 5 % 1,000 mL infusion, Continuous  sodium bicarbonate 8.4 % injection 50 mEq, Once        Review of Systems:   As above HPI, otherwise unremarkable. No NSAIDs. Recent course of antimicrobials. Physical exam:   BP (!) 129/56   Pulse 91   Temp 97.3 °F (36.3 °C) (Infrared)   Resp 24   Wt 265 lb (120.2 kg)   SpO2 95%   BMI 41.50 kg/m²   Age-appropriate morbidly obese white woman who appears ill but in no apparent distress  NC/AT EOMI sclera conjunctiva are clear and anicteric mucous membranes are somewhat dry though she is wearing respiratory mask  Neck soft supple trachea midline no JVD at 60 degrees no bruit though exam somewhat compromised by respiratory noises from the mask  Coarse breath sounds, crackles scattered throughout lung fields, relatively mild, good air entry in all fields. Mildly prolonged expiratory phase  Regular rhythm normal S1 and S2, no murmur no rub  Abdomen soft nontender nondistended normal active bowel sounds no mass no hepatosplenomegaly though could not lay her supine for proper examination  Distal extremities reveal chronic-type edema bilaterally, mild distal erythema consistent with chronic venous stasis, no pitting edema.   No other rash or lesion on visible portions of integument  Pulses 1+x4  Moves all 4 extremities  No asterixis  Cranial nerves II through XII grossly intact  Awake, alert, interactive and appropriate      Data:   Labs:  CBC with Differential:    Lab Results   Component Value Date    WBC 5.1 10/19/2021    RBC 2.75 10/19/2021    HGB 8.2 10/19/2021    HCT 24.7 10/19/2021     10/19/2021    MCV 89.8 10/19/2021    MCH 29.8 10/19/2021    MCHC 33.2 10/19/2021    RDW 12.4 10/19/2021    LYMPHOPCT 13.3 10/18/2021    MONOPCT 8.3 10/18/2021    BASOPCT 0.2 10/18/2021    MONOSABS 1.00 10/18/2021    LYMPHSABS 1.60 10/18/2021    EOSABS 0.00 10/18/2021 BASOSABS 0.03 10/18/2021     CMP:    Lab Results   Component Value Date     10/19/2021    K 3.6 10/19/2021    K 4.1 04/08/2021    CL 96 10/19/2021    CO2 15 10/19/2021     10/19/2021    CREATININE 9.1 10/19/2021    GFRAA 5 10/19/2021    LABGLOM 4 10/19/2021    GLUCOSE 311 10/19/2021    PROT 6.6 10/19/2021    LABALBU 2.8 10/19/2021    CALCIUM 9.0 10/19/2021    BILITOT 0.3 10/19/2021    ALKPHOS 111 10/19/2021    AST 21 10/19/2021    ALT 20 10/19/2021     Ionized Calcium:  No results found for: IONCA  Magnesium:    Lab Results   Component Value Date    MG 2.3 11/07/2018     Phosphorus:    Lab Results   Component Value Date    PHOS 5.0 06/17/2021     U/A:    Lab Results   Component Value Date    NITRITE neg 03/14/2019    COLORU Yellow 05/24/2019    PHUR 6.0 05/24/2019    WBCUA 0-1 05/24/2019    RBCUA NONE 05/24/2019    BACTERIA RARE 05/24/2019    CLARITYU Clear 05/24/2019    SPECGRAV 1.010 05/24/2019    LEUKOCYTESUR TRACE 05/24/2019    UROBILINOGEN 0.2 05/24/2019    BILIRUBINUR Negative 05/24/2019    BILIRUBINUR neg 03/14/2019    BLOODU Negative 05/24/2019    GLUCOSEU Negative 05/24/2019     Microalbumen/Creatinine ratio:  No components found for: RUCREAT  Iron Saturation:  No components found for: PERCENTFE  TIBC:    Lab Results   Component Value Date    TIBC 403 04/10/2019     FERRITIN:    Lab Results   Component Value Date    FERRITIN 16 11/07/2018        Imaging:  XR CHEST PORTABLE   Final Result   1. Extensive multifocal bilateral pneumonia. Assessment  1. Acute kidney injury, likely hemodynamically mediated due to volume contraction associated with poor intake due to anorexia, increase insensible losses due to tachypnea, fever and diaphoresis. Currently nonoliguric. 2. Chronic kidney disease stage V, baseline creatinine 4.8 to 5.3 mg/dL with at least the past 4 months. Etiology is diabetic nephropathy, renal microvascular atherosclerotic disease  3.  Metabolic acidosis, wide anion gap,

## 2021-10-20 NOTE — PROGRESS NOTES
Patients daughter William's contacted, updated on status, and informed that she will be the only person given information on patient. Dual phone consent signed for patient to have HD by two RNs and consent placed in chart.

## 2021-10-20 NOTE — PROGRESS NOTES
[facility] MD Vince Arredondo, MD Delaney Gongora, MD Ryan Phan, MD Jayce Barrios, REX Serrano, HARRY  Progress note   Patient's Name: Fatuma Lion  6:37 PM  10/20/2021    Sub :  10/20 :pt known to me from office . Has advanced ckd and has a recent AVF placed in    stable bp , feels better . 1th HD session today     History of Present Ilness:    Ms. Katherin Maddox is a 55-year-old woman with advanced chronic kidney disease, stage V with a baseline creatinine 4.8 to 5.3 mg/dL, secondary to diabetic nephropathy, renal microvascular atherosclerotic disease. She is followed longitudinally from nephrology standpoint by Dr. Delaney Gongora. She presented to the emergency department yesterday with a roughly 2-week history of progressive dyspnea, intermittent cough at times productive of thinnish phlegm but not routinely, fever, chills, diaphoresis, anosmia and dysgeusia, progressive generalized weakness. Intake of food and fluid has been quite poor in particular over the past several days. She denies lightheadedness, chest pain or palpitations, abdominal pain or cramping, diarrhea or constipation, dysuria hematuria change in bowel habits of late. On presentation O2 saturations were in the 70s, she was started on nonrebreather mask. She notes that she has been breathing more or less comfortably throughout most of today, and she has been oxygenating well. BUN and creatinine on presentation were 95 and 9.4, respectively (10/18) which compares with BUN/creatinine 57 and 5.3, respectively on (9/2). BUN/creatinine this morning 105 and 9.1, respectively. HCO3 is 15 mmol/L. At the time of my initial evaluation this early evening she was resting more or less comfortably on her gurney in the ER. She notes that she feels a good deal better now than he did the time of presentation. She is able to speak in complete sentences.     Past Medical History:   Diagnosis Date  Atrial fibrillation (HCC)     Bladder cancer (HCC)     [facility] ([facility] [facility]     CKD (chronic kidney disease)     CKD (chronic kidney disease) stage 5, GFR less than 15 ml/min ([facility] [address] [facility] 11/6/2018    Diabetes ([facility] [address] [facility]     Hypertension     Obesity     260 #    Sleep apnea        Past Surgical History:   Procedure Laterality Date    CARDIAC CATHETERIZATION [facility] 10/02/2008    @ [facility]    CHOLECYSTECTOMY      COLONOSCOPY      COLONOSCOPY N/A 8/29/2019    mid ascending colon polyp bx/cauterization (inflammatory polyp), proximal transverse colon polyp bx/cauterized (tubular adenoma), descending colon polyp 80 cm from anus bx/cauterized (hyperplastic polyp), sigmoid colon polyp 35 cm from anus bx/cauterized (tubular adenoma), sigmoid colon polyps 30 cm from anus bx/cauterized (tubular adenoma), Dr. Jorge Draper, [facility]    COLONOSCOPY  8/29/2019    mid ascending colon polyp bx/cauterization (inflammatory polyp), proximal transverse colon polyp bx/cauterized (tubular adenoma), descending colon polyp 80 cm from anus bx/cauterized (hyperplastic polyp), sigmoid colon polyp 35 cm from anus bx/cauterized (tubular adenoma), sigmoid colon polyps 30 cm from anus bx/cauterized (tubular adenoma), Dr. Jorge Draper, [facility]    DIALYSIS FISTULA CREATION Left 1/22/2019    LIGATIION LEFT LEFT UPPER EXTREMITY OF AV FISTULA , EVACUATION HEMATOMA performed by Priyank Thompson MD at [address] Right 12/7/2020    AV FISTULA CREATION -- RIGHT ARM performed by Emili Montilla MD at [address] Right 4/8/2021    AV FISTULA  REVISION RIGHT ARM performed by Emili Montilla MD at [facility] Left 9/18/2018    AV FISTULA  LEFT ARM performed by Priyank Thompson MD at [facility] Left 11/14/2018    SUPERFICIALIZATION AV FISTULA  LEFT ARM , LIGATION TRIBUTORY AV FISTULA LEFT ARM performed by Priyank Thompson MD at [place] [address] Family History   Problem Relation Age of Onset   [facility] Cancer Mother         bladder    Diabetes Mother    [facility] COPD Mother     Depression Father     Diabetes Father     Heart Disease Father     High Blood Pressure Father     Stroke Father     Prostate Cancer Father     Dementia Father     Alcohol Abuse Brother     Diabetes Brother     Bipolar Disorder Paternal Uncle     Anxiety Disorder Daughter     Colon Cancer Maternal Grandfather         reports that she quit smoking about 7 months ago. Her smoking use included cigarettes. She has a 21.50 pack-year smoking history. She has never used smokeless tobacco. She reports previous alcohol use. She reports that she does not use drugs.     Allergies:  Adhesive tape and Penicillins    Current Medications:    apixaban (ELIQUIS) tablet 5 mg, BID  atorvastatin (LIPITOR) tablet 40 mg, Daily  calcitRIOL (ROCALTROL) capsule 0.5 mcg, Once per day on Mon Wed Fri  metoprolol tartrate (LOPRESSOR) tablet 50 mg, BID  propafenone (RYTHMOL SR) extended release capsule 325 mg, BID  sodium chloride flush 0.9 % injection 5-40 mL, 2 times per day  sodium chloride flush 0.9 % injection 5-40 mL, PRN  0.9 % sodium chloride infusion, PRN  ondansetron (ZOFRAN-ODT) disintegrating tablet 4 mg, Q8H PRN   Or  ondansetron (ZOFRAN) injection 4 mg, Q6H PRN  polyethylene glycol (GLYCOLAX) packet 17 g, Daily PRN  acetaminophen (TYLENOL) tablet 650 mg, Q6H PRN   Or  acetaminophen (TYLENOL) suppository 650 mg, Q6H PRN  glucose (GLUTOSE) 40 % oral gel 15 g, PRN  dextrose 50 % IV solution, PRN  glucagon (rDNA) injection 1 mg, PRN  dextrose 5 % solution, PRN  insulin lispro (HUMALOG) injection vial 0-6 Units, TID WC  insulin lispro (HUMALOG) injection vial 0-3 Units, Nightly  dexamethasone (DECADRON) injection 10 mg, Q24H  insulin glargine (LANTUS) injection vial 10 Units, Nightly  cefTRIAXone (ROCEPHIN) 1,000 mg in sterile water 10 mL IV syringe, Q24H  doxycycline hyclate (VIBRAMYCIN) capsule 100 mg, 2 times per day  sodium bicarbonate 150 mEq in dextrose 5 % 1,000 mL infusion, Continuous  sodium bicarbonate 8.4 % injection 50 mEq, Once        Review of Systems:   As above HPI, otherwise unremarkable. No NSAIDs. Recent course of antimicrobials. Physical exam:   BP (!) 154/67   Pulse 109   Temp 97.5 °F (36.4 °C)   Resp 18   Wt 252 lb 6.8 oz (114.5 kg)   SpO2 93%   BMI 39.54 kg/m²   Age-appropriate morbidly obese white woman who appears ill but in no apparent distress  NC/AT EOMI sclera conjunctiva are clear and anicteric mucous membranes are somewhat dry though she is wearing respiratory mask  Neck soft supple trachea midline no JVD at 60 degrees no bruit though exam somewhat compromised by respiratory noises from the mask  Coarse breath sounds, crackles scattered throughout lung fields, relatively mild, good air entry in all fields. Mildly prolonged expiratory phase  Regular rhythm normal S1 and S2, no murmur no rub  Abdomen soft nontender nondistended normal active bowel sounds no mass no hepatosplenomegaly though could not lay her supine for proper examination  Distal extremities reveal chronic-type edema bilaterally, mild distal erythema consistent with chronic venous stasis, no pitting edema.   No other rash or lesion on visible portions of integument  Pulses 1+x4  Moves all 4 extremities  No asterixis  Cranial nerves II through XII grossly intact  Awake, alert, interactive and appropriate      Data:   Labs:  CBC with Differential:    Lab Results   Component Value Date    WBC 9.6 10/20/2021    RBC 2.79 10/20/2021    HGB 8.4 10/20/2021    HCT 25.8 10/20/2021     10/20/2021    MCV 92.5 10/20/2021    MCH 30.1 10/20/2021    MCHC 32.6 10/20/2021    RDW 12.6 10/20/2021    LYMPHOPCT 13.3 10/18/2021    MONOPCT 8.3 10/18/2021    BASOPCT 0.2 10/18/2021    MONOSABS 1.00 10/18/2021    LYMPHSABS 1.60 10/18/2021    EOSABS 0.00 10/18/2021    BASOSABS 0.03 10/18/2021     CMP:    Lab Results   Component Value Date     10/20/2021    K 3.9 10/20/2021    K 4.1 04/08/2021    CL 92 10/20/2021    CO2 20 10/20/2021     10/20/2021    CREATININE 9.3 10/20/2021    GFRAA 5 10/20/2021    LABGLOM 4 10/20/2021    GLUCOSE 328 10/20/2021    PROT 6.6 10/20/2021    LABALBU 3.0 10/20/2021    CALCIUM 9.7 10/20/2021    BILITOT 0.3 10/20/2021    ALKPHOS 111 10/20/2021    AST 27 10/20/2021    ALT 22 10/20/2021     Ionized Calcium:  No results found for: IONCA  Magnesium:    Lab Results   Component Value Date    MG 2.5 10/20/2021     Phosphorus:    Lab Results   Component Value Date    PHOS 7.3 10/20/2021     U/A:    Lab Results   Component Value Date    NITRITE neg 03/14/2019    COLORU Yellow 05/24/2019    PHUR 6.0 05/24/2019    WBCUA 0-1 05/24/2019    RBCUA NONE 05/24/2019    BACTERIA RARE 05/24/2019    CLARITYU Clear 05/24/2019    SPECGRAV 1.010 05/24/2019    LEUKOCYTESUR TRACE 05/24/2019    UROBILINOGEN 0.2 05/24/2019    BILIRUBINUR Negative 05/24/2019    BILIRUBINUR neg 03/14/2019    BLOODU Negative 05/24/2019    GLUCOSEU Negative 05/24/2019     Microalbumen/Creatinine ratio:  No components found for: RUCREAT  Iron Saturation:  No components found for: PERCENTFE  TIBC:    Lab Results   Component Value Date    TIBC 403 04/10/2019     FERRITIN:    Lab Results   Component Value Date    FERRITIN 16 11/07/2018        Imaging:  XR CHEST PORTABLE   Final Result   1. Extensive multifocal bilateral pneumonia. Assessment  1. Acute kidney injury, likely hemodynamically mediated due to volume contraction associated with poor intake due to anorexia, increase insensible losses due to tachypnea, fever and diaphoresis. Currently nonoliguric. 2. Chronic kidney disease stage V, baseline creatinine 4.8 to 5.3 mg/dL with at least the past 4 months. Etiology is diabetic nephropathy, renal microvascular atherosclerotic disease  3.  Metabolic acidosis, wide anion gap, secondary to acute kidney injury superimposed on chronic kidney disease  4. Anemia secondary to advanced chronic kidney disease. No recent melena or hematochezia  5. Secondary hyperparathyroidism/mineral bone disease due to advanced CKD. On low-dose calcitriol Mondays Wednesdays and Fridays.   6. Morbid obesity, BMI 41.5 kg/m²    PLAN :    Progressive azotemia in patient with advanced CKD V at baseline   Plan 1th HD session via AVF   Next HD treatment in am             Electronically signed by Marj Fairchild MD on 10/20/2021

## 2021-10-20 NOTE — ED NOTES
Bed: 16  Expected date:   Expected time:   Means of arrival:   Comments:  Room [address] [place] [address]  10/19/21 4054

## 2021-10-20 NOTE — PROGRESS NOTES
Hospitalist Progress Note      SYNOPSIS: Patient admitted on 10/18/2021 for shortness of breath    Patient with significant past medical history came to our hospital with chief complaint of worsening shortness of breath for last 2 weeks and was found to have acute hypoxemic respiratory failure requiring high flow oxygen as well as acute kidney injury on her chronic kidney disease stage V. In the ER, patient was found to have atrial fibrillation with heart rate episodes up to 150s but no persistent rapid ventricular rate. She was positive for COVID-19. Other significant finding was acute kidney injury with creatinine of 9.4, compared to her baseline of 5.2. Her BNP was also elevated to 4850. ER physician discussed with nephrology who recommended low-dose bicarb drip and close management. She also required AIRVO in the ER with oxygen about 30 L. Over the next 48 hours her renal function remained stable without requiring any dialysis. She was seen by pulmonology and started on IV ceftriaxone and doxycycline to cover for community-acquired pneumonia. SUBJECTIVE:    Patient seen and examined in the ER. Patient overall feeling better. Denies any major overnight events. Shortness of breath stable. Denies any chest pain, nausea, vomiting  Records reviewed. Stable overnight. No other overnight issues reported. Temp (24hrs), Av.5 °F (36.4 °C), Min:97.3 °F (36.3 °C), Max:97.7 °F (36.5 °C)    DIET: ADULT DIET; Regular; 3 carb choices (45 gm/meal); Low Sodium (2 gm); Low Potassium (Less than 3000 mg/day);  Low Phosphorus (Less than 1000 mg)  CODE: Full Code    Intake/Output Summary (Last 24 hours) at 10/20/2021 0816  Last data filed at 10/20/2021 2470  Gross per 24 hour   Intake --   Output 1150 ml   Net -1150 ml       OBJECTIVE:    /79   Pulse 89   Temp 97.3 °F (36.3 °C) (Infrared)   Resp 23   Wt 265 lb (120.2 kg)   SpO2 99%   BMI 41.50 kg/m²     General appearance: No apparent distress, appears stated age and cooperative. HEENT:  Conjunctivae/corneas clear. Neck: Supple. No jugular venous distention. Respiratory: Clear to auscultation bilaterally, normal respiratory effort. Bilateral inspiratory crackles noted. Cardiovascular: Regular rate rhythm, normal S1-S2  Abdomen: Soft, nontender, nondistended  Musculoskeletal: No clubbing, cyanosis, no bilateral lower extremity edema. Brisk capillary refill. Skin:  No rashes  on visible skin  Neurologic: awake, alert and following commands. Barrow catheter noted.     ASSESSMENT & PLAN:    Sepsis  Secondary to COVID-19 pneumonia  See management below     Acute hypoxemic respiratory failure  Severe hypoxia, use of excessive respiratory muscles, chest x-ray positive for bilateral infiltrates, requiring high flow oxygen  COVID-19 pneumonia  Bilateral airspace disease on chest x-ray  For Covid maintain on dexamethasone 10 mg IV daily, started on 10/18  Consult pulmonology, waiting for recommendations  Currently on [facility]  Pulmonology recommending Tocilizumab    Community-acquired pneumonia  Bilateral infiltrates, elevated procalcitonin of 1.04  Started on IV ceftriaxone and doxycycline on 10/19     Acute kidney injury on chronic kidney disease  Nonoliguric LEW, secondary to COVID-19 pneumonia  Baseline creatinine 5.2  Admitted with creatinine 9.5, remained stable at about 9.5  Nephrology following, no plan for acute dialysis at the moment    CKD stage V  Presumably secondary to diabetic nephropathy  Patient has right arm AV fistula, with good thrill and bruit, mature and ready for use in case needed     Elevated BNP  No previous history of heart failure  EF 65% in August 2019 without any systolic or diastolic dysfunction  Elevated BNP of 4850, likely due to LEW on CKD  Patient on home dose torsemide, hold for now because of LEW and without any obvious fluid overload     Atrial fibrillation  Not in rapid ventricular rate  Resume home dose metoprolol and Eliquis  Monitor closely     Diabetes mellitus type 2  Home dose glipizide held  Maintain on sliding scale insulin     Hyperlipidemia  Continue statins     End-of-life care discussion  Full code for now       DISPOSITION:   No clear discharge disposition at the moment. Remains in the emergency room due to lack of beds.     Medications:  REVIEWED DAILY    Infusion Medications    sodium chloride      dextrose      sodium bicarbonate infusion 100 mL/hr at 10/19/21 1110     Scheduled Medications    apixaban  5 mg Oral BID    atorvastatin  40 mg Oral Daily    calcitRIOL  0.5 mcg Oral Once per day on Mon Wed Fri    metoprolol tartrate  50 mg Oral BID    propafenone  325 mg Oral BID    sodium chloride flush  5-40 mL IntraVENous 2 times per day    insulin lispro  0-6 Units SubCUTAneous TID WC    insulin lispro  0-3 Units SubCUTAneous Nightly    dexamethasone  10 mg IntraVENous Q24H    insulin glargine  10 Units SubCUTAneous Nightly    cefTRIAXone (ROCEPHIN) IV  1,000 mg IntraVENous Q24H    doxycycline hyclate  100 mg Oral 2 times per day    sodium bicarbonate  50 mEq IntraVENous Once     PRN Meds: sodium chloride flush, sodium chloride, ondansetron **OR** ondansetron, polyethylene glycol, acetaminophen **OR** acetaminophen, glucose, dextrose, glucagon (rDNA), dextrose    Labs:     Recent Labs     10/18/21  1250 10/19/21  0510 10/20/21  0558   WBC 12.1* 5.1 9.6   HGB 9.5* 8.2* 8.4*   HCT 29.3* 24.7* 25.8*    379 542*       Recent Labs     10/18/21  1250 10/19/21  0510 10/20/21  0558    131* 134   K 3.9 3.6 3.9   CL 93* 96* 95*   CO2 15* 15* 21*   BUN 95* 105* 123*   CREATININE 9.4* 9.1* 9.5*   CALCIUM 9.2 9.0 9.7   PHOS  --   --  7.3*       Recent Labs     10/18/21  1250 10/19/21  0510 10/20/21  0558   PROT 7.3 6.6 6.6   ALKPHOS 126* 111* 111*   ALT 23 20 22   AST 35* 21 27   BILITOT 0.4 0.3 0.3       Recent Labs     10/20/21  0558   INR 1.5       No results for input(s): Patrisha Meigs in the last 72 hours. Chronic labs:    Lab Results   Component Value Date    CHOL 172 11/06/2018    TRIG 211 (H) 11/06/2018    HDL 37 11/06/2018    LDLCALC 93 11/06/2018    TSH 0.804 01/31/2019    INR 1.5 10/20/2021    LABA1C 6.7 (H) 10/19/2021       Radiology: REVIEWED DAILY    +++++++++++++++++++++++++++++++++++++++++++++++++  Cherie Woody MD  [employer] - [address] [place], [place]  +++++++++++++++++++++++++++++++++++++++++++++++++  NOTE: This report was transcribed using voice recognition software. Every effort was made to ensure accuracy; however, inadvertent computerized transcription errors may be present.

## 2021-10-20 NOTE — ACP (ADVANCE CARE PLANNING)
Advance Care Planning   The patient has the following advanced directives on file:  Advance Directives     Power of [address]art[address] Will ACP-Advance Directive ACP-Power of     Not on File Not on File Not on File Not on File          The patient has appointed the following active healthcare agents:    Primary Decision Maker: Kamron Diana - [phone]    Secondary Decision Maker: Socorro Gibson Child - [phone]    The Patient has the following current code status:    Code Status: Full Code      Christy Ellis RN  10/20/2021

## 2021-10-20 NOTE — ED NOTES
Pt found with both her high flow NC and NRB off. Room air spo2 68%. Pt states she took them off by accident. High flow replaced, spo2 increased to 82%. Spo2 up to 99% with addition of NRB 15L. AM labs obtained. Barrow emptied. Pt has no other requests.       Anthony Au, LACY  10/20/21 2276

## 2021-10-21 ENCOUNTER — APPOINTMENT (OUTPATIENT)
Dept: GENERAL RADIOLOGY | Age: 66
DRG: 871 | End: 2021-10-21
Payer: MEDICARE

## 2021-10-21 LAB
ALBUMIN SERPL-MCNC: 2.7 G/DL (ref 3.5–5.2)
ALBUMIN SERPL-MCNC: 2.9 G/DL (ref 3.5–5.2)
ALP BLD-CCNC: 102 U/L (ref 35–104)
ALP BLD-CCNC: 108 U/L (ref 35–104)
ALT SERPL-CCNC: 30 U/L (ref 0–32)
ALT SERPL-CCNC: 30 U/L (ref 0–32)
ANION GAP SERPL CALCULATED.3IONS-SCNC: 15 MMOL/L (ref 7–16)
ANION GAP SERPL CALCULATED.3IONS-SCNC: 19 MMOL/L (ref 7–16)
AST SERPL-CCNC: 38 U/L (ref 0–31)
AST SERPL-CCNC: 39 U/L (ref 0–31)
BASOPHILS ABSOLUTE: 0 E9/L (ref 0–0.2)
BASOPHILS RELATIVE PERCENT: 0.1 % (ref 0–2)
BILIRUB SERPL-MCNC: 0.3 MG/DL (ref 0–1.2)
BILIRUB SERPL-MCNC: 0.3 MG/DL (ref 0–1.2)
BUN BLDV-MCNC: 91 MG/DL (ref 6–23)
BUN BLDV-MCNC: 92 MG/DL (ref 6–23)
C-REACTIVE PROTEIN: 2.7 MG/DL (ref 0–0.4)
CALCIUM SERPL-MCNC: 9.2 MG/DL (ref 8.6–10.2)
CALCIUM SERPL-MCNC: 9.4 MG/DL (ref 8.6–10.2)
CHLORIDE BLD-SCNC: 90 MMOL/L (ref 98–107)
CHLORIDE BLD-SCNC: 94 MMOL/L (ref 98–107)
CO2: 26 MMOL/L (ref 22–29)
CO2: 29 MMOL/L (ref 22–29)
CREAT SERPL-MCNC: 6.4 MG/DL (ref 0.5–1)
CREAT SERPL-MCNC: 6.5 MG/DL (ref 0.5–1)
EOSINOPHILS ABSOLUTE: 0 E9/L (ref 0.05–0.5)
EOSINOPHILS RELATIVE PERCENT: 0 % (ref 0–6)
GFR AFRICAN AMERICAN: 8
GFR AFRICAN AMERICAN: 8
GFR NON-AFRICAN AMERICAN: 6 ML/MIN/1.73
GFR NON-AFRICAN AMERICAN: 7 ML/MIN/1.73
GLUCOSE BLD-MCNC: 344 MG/DL (ref 74–99)
GLUCOSE BLD-MCNC: 373 MG/DL (ref 74–99)
HAV IGM SER IA-ACNC: NORMAL
HBV SURFACE AB TITR SER: NORMAL {TITER}
HCT VFR BLD CALC: 23 % (ref 34–48)
HCT VFR BLD CALC: 24.3 % (ref 34–48)
HEMOGLOBIN: 7.9 G/DL (ref 11.5–15.5)
HEMOGLOBIN: 8.1 G/DL (ref 11.5–15.5)
HEPATITIS B CORE IGM ANTIBODY: NORMAL
HEPATITIS B SURFACE ANTIGEN INTERPRETATION: NORMAL
HEPATITIS C ANTIBODY INTERPRETATION: NORMAL
LYMPHOCYTES ABSOLUTE: 0.33 E9/L (ref 1.5–4)
LYMPHOCYTES RELATIVE PERCENT: 4.3 % (ref 20–42)
MCH RBC QN AUTO: 30.5 PG (ref 26–35)
MCH RBC QN AUTO: 31.1 PG (ref 26–35)
MCHC RBC AUTO-ENTMCNC: 33.3 % (ref 32–34.5)
MCHC RBC AUTO-ENTMCNC: 34.3 % (ref 32–34.5)
MCV RBC AUTO: 90.6 FL (ref 80–99.9)
MCV RBC AUTO: 91.4 FL (ref 80–99.9)
METAMYELOCYTES RELATIVE PERCENT: 1.7 % (ref 0–1)
METER GLUCOSE: 226 MG/DL (ref 74–99)
METER GLUCOSE: 286 MG/DL (ref 74–99)
METER GLUCOSE: 348 MG/DL (ref 74–99)
METER GLUCOSE: 357 MG/DL (ref 74–99)
METER GLUCOSE: 373 MG/DL (ref 74–99)
MONOCYTES ABSOLUTE: 0.9 E9/L (ref 0.1–0.95)
MONOCYTES RELATIVE PERCENT: 11.3 % (ref 2–12)
MYELOCYTE PERCENT: 0.9 % (ref 0–0)
NEUTROPHILS ABSOLUTE: 6.89 E9/L (ref 1.8–7.3)
NEUTROPHILS RELATIVE PERCENT: 81.7 % (ref 43–80)
PDW BLD-RTO: 12.3 FL (ref 11.5–15)
PDW BLD-RTO: 12.5 FL (ref 11.5–15)
PLATELET # BLD: 471 E9/L (ref 130–450)
PLATELET # BLD: 487 E9/L (ref 130–450)
PMV BLD AUTO: 9.2 FL (ref 7–12)
PMV BLD AUTO: 9.3 FL (ref 7–12)
POLYCHROMASIA: ABNORMAL
POTASSIUM SERPL-SCNC: 3.5 MMOL/L (ref 3.5–5)
POTASSIUM SERPL-SCNC: 3.7 MMOL/L (ref 3.5–5)
RBC # BLD: 2.54 E12/L (ref 3.5–5.5)
RBC # BLD: 2.66 E12/L (ref 3.5–5.5)
SODIUM BLD-SCNC: 134 MMOL/L (ref 132–146)
SODIUM BLD-SCNC: 139 MMOL/L (ref 132–146)
TOTAL PROTEIN: 5.7 G/DL (ref 6.4–8.3)
TOTAL PROTEIN: 6.3 G/DL (ref 6.4–8.3)
WBC # BLD: 7.9 E9/L (ref 4.5–11.5)
WBC # BLD: 8.2 E9/L (ref 4.5–11.5)

## 2021-10-21 PROCEDURE — 2500000003 HC RX 250 WO HCPCS: Performed by: INTERNAL MEDICINE

## 2021-10-21 PROCEDURE — 36415 COLL VENOUS BLD VENIPUNCTURE: CPT

## 2021-10-21 PROCEDURE — 2700000000 HC OXYGEN THERAPY PER DAY

## 2021-10-21 PROCEDURE — 94660 CPAP INITIATION&MGMT: CPT

## 2021-10-21 PROCEDURE — 2580000003 HC RX 258: Performed by: INTERNAL MEDICINE

## 2021-10-21 PROCEDURE — 82962 GLUCOSE BLOOD TEST: CPT

## 2021-10-21 PROCEDURE — 6370000000 HC RX 637 (ALT 250 FOR IP): Performed by: INTERNAL MEDICINE

## 2021-10-21 PROCEDURE — 99233 SBSQ HOSP IP/OBS HIGH 50: CPT | Performed by: INTERNAL MEDICINE

## 2021-10-21 PROCEDURE — 86140 C-REACTIVE PROTEIN: CPT

## 2021-10-21 PROCEDURE — 80053 COMPREHEN METABOLIC PANEL: CPT

## 2021-10-21 PROCEDURE — 1200000000 HC SEMI PRIVATE

## 2021-10-21 PROCEDURE — 90935 HEMODIALYSIS ONE EVALUATION: CPT

## 2021-10-21 PROCEDURE — 6360000002 HC RX W HCPCS: Performed by: INTERNAL MEDICINE

## 2021-10-21 PROCEDURE — 85027 COMPLETE CBC AUTOMATED: CPT

## 2021-10-21 PROCEDURE — 71045 X-RAY EXAM CHEST 1 VIEW: CPT

## 2021-10-21 PROCEDURE — 2140000000 HC CCU INTERMEDIATE R&B

## 2021-10-21 PROCEDURE — 85025 COMPLETE CBC W/AUTO DIFF WBC: CPT

## 2021-10-21 RX ADMIN — Medication 30 ML: at 11:57

## 2021-10-21 RX ADMIN — INSULIN LISPRO 5 UNITS: 100 INJECTION, SOLUTION INTRAVENOUS; SUBCUTANEOUS at 06:18

## 2021-10-21 RX ADMIN — METOPROLOL TARTRATE 50 MG: 50 TABLET, FILM COATED ORAL at 20:36

## 2021-10-21 RX ADMIN — ATORVASTATIN CALCIUM 40 MG: 40 TABLET, FILM COATED ORAL at 20:37

## 2021-10-21 RX ADMIN — INSULIN LISPRO 2 UNITS: 100 INJECTION, SOLUTION INTRAVENOUS; SUBCUTANEOUS at 21:27

## 2021-10-21 RX ADMIN — INSULIN GLARGINE 10 UNITS: 100 INJECTION, SOLUTION SUBCUTANEOUS at 21:27

## 2021-10-21 RX ADMIN — METOPROLOL TARTRATE 50 MG: 50 TABLET, FILM COATED ORAL at 11:50

## 2021-10-21 RX ADMIN — SODIUM BICARBONATE: 84 INJECTION, SOLUTION INTRAVENOUS at 16:30

## 2021-10-21 RX ADMIN — APIXABAN 5 MG: 5 TABLET, FILM COATED ORAL at 11:50

## 2021-10-21 RX ADMIN — INSULIN LISPRO 5 UNITS: 100 INJECTION, SOLUTION INTRAVENOUS; SUBCUTANEOUS at 17:10

## 2021-10-21 RX ADMIN — APIXABAN 5 MG: 5 TABLET, FILM COATED ORAL at 20:37

## 2021-10-21 RX ADMIN — DEXAMETHASONE SODIUM PHOSPHATE 10 MG: 10 INJECTION INTRAMUSCULAR; INTRAVENOUS at 03:28

## 2021-10-21 RX ADMIN — SODIUM BICARBONATE: 84 INJECTION, SOLUTION INTRAVENOUS at 06:50

## 2021-10-21 RX ADMIN — INSULIN LISPRO 3 UNITS: 100 INJECTION, SOLUTION INTRAVENOUS; SUBCUTANEOUS at 12:33

## 2021-10-21 RX ADMIN — DOXYCYCLINE HYCLATE 100 MG: 100 CAPSULE ORAL at 11:50

## 2021-10-21 ASSESSMENT — PAIN SCALES - GENERAL
PAINLEVEL_OUTOF10: 0

## 2021-10-21 NOTE — CONSULTS
[address] [place] [address] [facility]  DARIUS    Consultation Note     Admit Date: 10/18/2021 12:39 PM    Reason for Consult:   Family requested Rimactane for treatment of COVID-19      Attending Physician:  Brien Cardona MD     Chief Complaint: Short of breath    HISTORY OF PRESENT ILLNESS:   The patient is a 73 y.o.  woman not known to the Infectious Diseases service. The patient is placed in the emergency room with shortness of breath. Patient with significant past medical history came to our hospital with chief complaint of worsening shortness of breath for last 2 weeks and was found to have acute hypoxemic respiratory failure requiring high flow oxygen as well as acute kidney injury on her chronic kidney disease stage V. In the ER, patient was found to have atrial fibrillation with heart rate episodes up to 150s but no persistent rapid ventricular rate.  She was positive for COVID-19.  Other significant finding was acute kidney injury with creatinine of 9.4, compared to her baseline of 5.2. Trenda Stamp BNP was also elevated to 4850.  ER physician discussed with nephrology who recommended low-dose bicarb drip and close management. She also required AIRVO in the ER with oxygen about 30 L. Over the next 48 hours her renal function remained stable without requiring any dialysis. She was seen by pulmonology and started on IV ceftriaxone and doxycycline to cover for community-acquired pneumonia. In addition to the dexamethasone she is on a bicarbonate drip. Patient was never vaccinated. Her C-reactive protein is 7.4 white count is 8.2 and chest x-ray shows bilateral infiltrates.   ID was asked to evaluate because family requested patient be treated with ivermectin              Past Medical History:        Diagnosis Date    Atrial fibrillation ([facility]     Bladder cancer ([facility]     Cancer ([facility]     CKD (chronic kidney disease)     CKD (chronic kidney disease) stage 5, GFR less than 15 ml/min ([address] 11/6/2018    Diabetes ([facility]     Hypertension     Obesity     260 #    Sleep apnea      Past Surgical History:        Procedure Laterality Date    CARDIAC CATHETERIZATION Left 10/02/2008    @ [facility]    CHOLECYSTECTOMY      COLONOSCOPY      COLONOSCOPY N/A 8/29/2019    mid ascending colon polyp bx/cauterization (inflammatory polyp), proximal transverse colon polyp bx/cauterized (tubular adenoma), descending colon polyp 80 cm from anus bx/cauterized (hyperplastic polyp), sigmoid colon polyp 35 cm from anus bx/cauterized (tubular adenoma), sigmoid colon polyps 30 cm from anus bx/cauterized (tubular adenoma), Dr. Donna Elias, [address] COLONOSCOPY  8/29/2019    mid ascending colon polyp bx/cauterization (inflammatory polyp), proximal transverse colon polyp bx/cauterized (tubular adenoma), descending colon polyp 80 cm from anus bx/cauterized (hyperplastic polyp), sigmoid colon polyp 35 cm from anus bx/cauterized (tubular adenoma), sigmoid colon polyps 30 cm from anus bx/cauterized (tubular adenoma), Dr. Donna Elias, [facility]    DIALYSIS FISTULA CREATION Left 1/22/2019    LIGATIION LEFT LEFT UPPER EXTREMITY OF AV FISTULA , EVACUATION HEMATOMA performed by Alvin Canela MD at [address] Right 12/7/2020    AV FISTULA CREATION -- RIGHT ARM performed by Khushboo Waller MD at [address] Right 4/8/2021    AV FISTULA  REVISION RIGHT ARM performed by Khushboo Waller MD at [place] [address] Left 9/18/2018    AV FISTULA  LEFT ARM performed by Alvin Canela MD at [place] [address] Left 11/14/2018    SUPERFICIALIZATION AV FISTULA  LEFT ARM , LIGATION TRIBUTORY AV FISTULA LEFT ARM performed by Alvin Canela MD at [place] [address]       Current Medications:   Scheduled Meds:   apixaban  5 mg Oral BID    atorvastatin  40 mg Oral Daily    calcitRIOL  0.5 mcg Oral Once per day on Mon Wed Fri    metoprolol tartrate Never true   Transportation Needs:     Lack of Transportation (Medical):  Lack of Transportation (Non-Medical):    Physical Activity:     Days of Exercise per Week:     Minutes of Exercise per Session:    Stress:     Feeling of Stress :    Social Connections:     Frequency of Communication with Friends and Family:     Frequency of Social Gatherings with Friends and Family:     Attends Shinto Services:     Active Member of Clubs or Organizations:     Attends Club or Organization Meetings:     Marital Status:    Intimate Partner Violence:     Fear of Current or Ex-Partner:     Emotionally Abused:     Physically Abused:     Sexually Abused:        Family History:       Problem Relation Age of Onset    Cancer Mother         bladder    Diabetes Mother     COPD Mother     Depression Father     Diabetes Father     Heart Disease Father     High Blood Pressure Father     Stroke Father     Prostate Cancer Father     Dementia Father     Alcohol Abuse Brother     Diabetes Brother     Bipolar Disorder Paternal Uncle     Anxiety Disorder Daughter     Colon Cancer Maternal Grandfather    . Otherwise non-pertinent to the chief complaint. REVIEW OF SYSTEMS:    CONSTITUTIONAL: Positive chills, low-grade fevers or night sweats. No loss of weight. EYES:  No double vision or drainage from eyes, ears or throat. HEENT:  No neck stiffness. No dysphagia. No drainage from eyes, ears or throat  RESPIRATORY: Positive cough, productive sputum or hemoptysis. CARDIOVASCULAR:  No chest pain, palpitations, positive orthopnea positive dyspnea on exertion. GASTROINTESTINAL:  No nausea, vomiting, diarrhea or constipation or hematochezia   GENITOURINARY:  No frequency burning dysuria or hematuria. INTEGUMENT/BREAST:  No rash or breast masses. HEMATOLOGIC/LYMPHATIC:  No lymphadenopathy or blood dyscrasics. ALLERGIC/IMMUNOLOGIC:  No anaphylaxis.    ENDOCRINE:  No polyuria or polydipsia or temperature intolerance. MUSCULOSKELETAL:  No myalgia or arthralgia. Full ROM. NEUROLOGICAL:  No focal motor sensory deficit. BEHAVIOR/PSYCH:  No psychosis. PHYSICAL EXAM:    Vitals:    BP (!) 152/57   Pulse 92   Temp 98.9 °F (37.2 °C) (Axillary)   Resp 22   Wt 245 lb 9.5 oz (111.4 kg)   SpO2 97%   BMI 38.47 kg/m²   Constitutional: The patient is awake, alert, and oriented. Skin: Warm and dry. No rashes were noted. No jaundice. HEENT: Eyes show round, and reactive pupils. Moist mucous membranes, no ulcerations, no thrush. Neck: Supple to movements. No lymphadenopathy. Chest: No use of accessory muscles to breathe. Symmetrical expansion. Auscultation reveals no wheezing, crackles, or rhonchi. Poor air exchange  Cardiovascular: S1 and S2 are rhythmic and regular. No murmurs appreciated. Abdomen: Positive bowel sounds to auscultation. Benign to palpation. No masses felt. No hepatosplenomegaly. Genitourinary: Female  Extremities: No clubbing, no cyanosis, positive edema. Musculoskeletal: Equal and symmetrical  Neurological: No focal  Lines: peripheral      CBC+dif:  Recent Labs     10/20/21  0558 10/20/21  0558 10/21/21  0702 10/21/21  0702 10/21/21  0805   WBC 9.6  --  7.9  --  8.2   HGB 8.4*   < > 7.9*   < > 8.1*   HCT 25.8*   < > 23.0*   < > 24.3*   MCV 92.5   < > 90.6   < > 91.4   *   < > 471*   < > 487*   NEUTROABS  --   --   --   --  6.89    < > = values in this interval not displayed.      Lab Results   Component Value Date    CRP 7.4 (H) 10/19/2021     No results found for: CRPHS  No results found for: SEDRATE  Lab Results   Component Value Date    ALT 30 10/21/2021    AST 39 (H) 10/21/2021    ALKPHOS 108 (H) 10/21/2021    BILITOT 0.3 10/21/2021     Lab Results   Component Value Date     10/21/2021    K 3.7 10/21/2021    K 4.1 04/08/2021    CL 94 10/21/2021    CO2 26 10/21/2021    BUN 92 10/21/2021    CREATININE 6.4 10/21/2021    GFRAA 8 10/21/2021    LABGLOM 7 10/21/2021    GLUCOSE 344 10/21/2021    PROT 6.3 10/21/2021    LABALBU 2.7 10/21/2021    CALCIUM 9.4 10/21/2021    BILITOT 0.3 10/21/2021    ALKPHOS 108 10/21/2021    AST 39 10/21/2021    ALT 30 10/21/2021       Lab Results   Component Value Date    PROTIME 16.8 10/20/2021    INR 1.5 10/20/2021       Lab Results   Component Value Date    TSH 0.804 01/31/2019       Lab Results   Component Value Date    NITRITE neg 03/14/2019    COLORU Yellow 05/24/2019    PHUR 6.0 05/24/2019    WBCUA 0-1 05/24/2019    RBCUA NONE 05/24/2019    BACTERIA RARE 05/24/2019    CLARITYU Clear 05/24/2019    SPECGRAV 1.010 05/24/2019    LEUKOCYTESUR TRACE 05/24/2019    UROBILINOGEN 0.2 05/24/2019    BILIRUBINUR Negative 05/24/2019    BILIRUBINUR neg 03/14/2019    BLOODU Negative 05/24/2019    GLUCOSEU Negative 05/24/2019       No results found for: YYN9PTX, BEART, D6NRJJRV, PHART, THGBART, MVY3HLZ, PO2ART, BGG3PQB  Radiology:  XR CHEST PORTABLE   Final Result   Persistent widespread bilateral infiltrates. The no significant changes   since the October 18. The heart appears to be enlarged. XR CHEST PORTABLE   Final Result   1. Extensive multifocal bilateral pneumonia. Microbiology:  Pending  No results for input(s): BC in the last 72 hours. No results for input(s): ORG in the last 72 hours. No results for input(s): Sanjiv Puff in the last 72 hours. Recent Labs     10/19/21  2124   STREPNEUMAGU Presumptive negative- suggests no current or recent  pneumococcal infection. Infection due to Strep pneumoniae cannot be  ruled out since the antigen present in the sample  may be below the detection limit of the test.  Normal Range:Presumptive Negative       Recent Labs     10/19/21  2057   LP1UAG Presumptive Negative -suggesting no recent or current infections  with Legionella pneumophila serogroup 1.   Infection to Legionella cannot be ruled out since other serogroups  and species may cause infection, antigen may not be present in  early infection, or level of antigen may be below the  detection limit. Normal Range: Presumptive Negative       No results for input(s): ASO in the last 72 hours. No results for input(s): CULTRESP in the last 72 hours. Assessment:  · Covid pneumonia superimposed on this volume overload    Plan:    · Cont dexamethasone plus remdesivir  · Stop Rocephin and doxycycline  · Ivermectin is not FDA approved for treatment animal studies from outside [place] of note have been weak and very controversial.  In in addition is not on her formulary because it is not FDA approved for treatment for Covid only for parasitic infections  · Check cultures  · Discussed with pulmonary  · Baseline ESR, CRP  · Monitor labs  · Will follow with you    Thank you for having us see this patient in consultation. I will be discussing this case with the treating physicians.       Electronically signed by Chaim Kumar MD on 10/21/2021 at 6:01 PM

## 2021-10-21 NOTE — PROGRESS NOTES
[facility] MD Chago Martinez, MD Orlando Llanes, MD Ria Luna, REX Zimmer, HARRY  Progress note   Patient's Name: Shea Kohli  11:46 AM  10/21/2021    Subjective :  10/20 :pt known to me from office . Has advanced ckd and has a recent AVF placed in    stable bp , feels better . 1th HD session today   10/21: Patient was seen, interviewed and examined while undergoing her second hemodialysis treatment today. Nurses were using 17-gauge needles to cannulate her arm for her 2-1/2-hour dialysis today. She is scheduled for her third treatment tomorrow with higher blood flow attempting to go to 250-300 mill per minute. History of Present Ilness:    Ms. Miles Newby is a 21-year-old woman with advanced chronic kidney disease, stage V with a baseline creatinine 4.8 to 5.3 mg/dL, secondary to diabetic nephropathy, renal microvascular atherosclerotic disease. She is followed longitudinally from nephrology standpoint by Dr. Orlando Llanes. She presented to the emergency department yesterday with a roughly 2-week history of progressive dyspnea, intermittent cough at times productive of thinnish phlegm but not routinely, fever, chills, diaphoresis, anosmia and dysgeusia, progressive generalized weakness. Intake of food and fluid has been quite poor in particular over the past several days. She denies lightheadedness, chest pain or palpitations, abdominal pain or cramping, diarrhea or constipation, dysuria hematuria change in bowel habits of late. On presentation O2 saturations were in the 70s, she was started on nonrebreather mask. She notes that she has been breathing more or less comfortably throughout most of today, and she has been oxygenating well. BUN and creatinine on presentation were 95 and 9.4, respectively (10/18) which compares with BUN/creatinine 57 and 5.3, respectively on (9/2).   BUN/creatinine this morning 105 and 9.1, respectively. HCO3 is 15 mmol/L. At the time of my initial evaluation this early evening she was resting more or less comfortably on her gurney in the ER. She notes that she feels a good deal better now than he did the time of presentation. She is able to speak in complete sentences.     Past Medical History:   Diagnosis Date    Atrial fibrillation ([address]     Bladder cancer ([address]     Cancer ([address]     CKD (chronic kidney disease)     CKD (chronic kidney disease) stage 5, GFR less than 15 ml/min ([address] 11/6/2018    Diabetes ([address]     Hypertension     Obesity     260 #    Sleep apnea        Past Surgical History:   Procedure Laterality Date    CARDIAC CATHETERIZATION Left 10/02/2008    @ [facility]    CHOLECYSTECTOMY      COLONOSCOPY      COLONOSCOPY N/A 8/29/2019    mid ascending colon polyp bx/cauterization (inflammatory polyp), proximal transverse colon polyp bx/cauterized (tubular adenoma), descending colon polyp 80 cm from anus bx/cauterized (hyperplastic polyp), sigmoid colon polyp 35 cm from anus bx/cauterized (tubular adenoma), sigmoid colon polyps 30 cm from anus bx/cauterized (tubular adenoma), Dr. Griselda Anderson, [facility]    COLONOSCOPY  8/29/2019    mid ascending colon polyp bx/cauterization (inflammatory polyp), proximal transverse colon polyp bx/cauterized (tubular adenoma), descending colon polyp 80 cm from anus bx/cauterized (hyperplastic polyp), sigmoid colon polyp 35 cm from anus bx/cauterized (tubular adenoma), sigmoid colon polyps 30 cm from anus bx/cauterized (tubular adenoma), Dr. Griselda Anderson, [facility]    DIALYSIS FISTULA CREATION Left 1/22/2019    LIGATIION LEFT LEFT UPPER EXTREMITY OF AV FISTULA , EVACUATION HEMATOMA performed by Jhonatan Alejandre MD at [address] Right 12/7/2020    AV FISTULA CREATION -- RIGHT ARM performed by Rande Dandy, MD at [address] Right 4/8/2021    AV FISTULA  REVISION RIGHT ARM performed by Rande Dandy, MD at [facility] OR    SD ANASTOMOSIS,AV,ANY SITE Left 9/18/2018    AV FISTULA  LEFT ARM performed by Harmeet Petty MD at [facility] [address] Left 11/14/2018    SUPERFICIALIZATION AV FISTULA  LEFT ARM , LIGATION TRIBUTORY AV FISTULA LEFT ARM performed by Harmeet Petty MD at [facility] 62         Family History   Problem Relation Age of Onset   [facility] Cancer Mother         bladder    Diabetes Mother     COPD Mother     Depression Father     Diabetes Father     Heart Disease Father     High Blood Pressure Father     Stroke Father     Prostate Cancer Father     Dementia Father     Alcohol Abuse Brother     Diabetes Brother     Bipolar Disorder Paternal Uncle     Anxiety Disorder Daughter     Colon Cancer Maternal Grandfather         reports that she quit smoking about 7 months ago. Her smoking use included cigarettes. She has a 21.50 pack-year smoking history. She has never used smokeless tobacco. She reports previous alcohol use. She reports that she does not use drugs.     Allergies:  Adhesive tape and Penicillins    Current Medications:    apixaban (ELIQUIS) tablet 5 mg, BID  atorvastatin (LIPITOR) tablet 40 mg, Daily  calcitRIOL (ROCALTROL) capsule 0.5 mcg, Once per day on Mon Wed Fri  metoprolol tartrate (LOPRESSOR) tablet 50 mg, BID  propafenone (RYTHMOL SR) extended release capsule 325 mg, BID  sodium chloride flush 0.9 % injection 5-40 mL, 2 times per day  sodium chloride flush 0.9 % injection 5-40 mL, PRN  0.9 % sodium chloride infusion, PRN  ondansetron (ZOFRAN-ODT) disintegrating tablet 4 mg, Q8H PRN   Or  ondansetron (ZOFRAN) injection 4 mg, Q6H PRN  polyethylene glycol (GLYCOLAX) packet 17 g, Daily PRN  acetaminophen (TYLENOL) tablet 650 mg, Q6H PRN   Or  acetaminophen (TYLENOL) suppository 650 mg, Q6H PRN  glucose (GLUTOSE) 40 % oral gel 15 g, PRN  dextrose 50 % IV solution, PRN  glucagon (rDNA) injection 1 mg, PRN  dextrose 5 % solution, PRN  insulin lispro (HUMALOG) injection vial 0-6 Units, TID WC  insulin lispro (HUMALOG) injection vial 0-3 Units, Nightly  dexamethasone (DECADRON) injection 10 mg, Q24H  insulin glargine (LANTUS) injection vial 10 Units, Nightly  cefTRIAXone (ROCEPHIN) 1,000 mg in sterile water 10 mL IV syringe, Q24H  doxycycline hyclate (VIBRAMYCIN) capsule 100 mg, 2 times per day  sodium bicarbonate 150 mEq in dextrose 5 % 1,000 mL infusion, Continuous  sodium bicarbonate 8.4 % injection 50 mEq, Once        Review of Systems:   As above HPI, otherwise unremarkable. No NSAIDs. Recent course of antimicrobials. Physical exam:   /67   Pulse 93   Temp 98.2 °F (36.8 °C) (Temporal)   Resp 21   Wt 245 lb 9.5 oz (111.4 kg)   SpO2 98%   BMI 38.47 kg/m²   Age-appropriate morbidly obese white woman who appears ill but in no apparent distress  NC/AT EOMI sclera conjunctiva are clear and anicteric mucous membranes are somewhat dry though she is wearing respiratory mask  Neck soft supple trachea midline no JVD at 60 degrees no bruit though exam somewhat compromised by respiratory noises from the mask  Coarse breath sounds, crackles scattered throughout lung fields, relatively mild, good air entry in all fields. Mildly prolonged expiratory phase  Regular rhythm normal S1 and S2, no murmur no rub  Abdomen soft nontender nondistended normal active bowel sounds no mass no hepatosplenomegaly though could not lay her supine for proper examination  Distal extremities reveal chronic-type edema bilaterally, mild distal erythema consistent with chronic venous stasis, no pitting edema.   No other rash or lesion on visible portions of integument  Pulses 1+x4  Moves all 4 extremities  No asterixis  Cranial nerves II through XII grossly intact  Awake, alert, interactive and appropriate      Data:   Labs:  CBC with Differential:    Lab Results   Component Value Date    WBC 8.2 10/21/2021    RBC 2.66 10/21/2021    HGB 8.1 10/21/2021    HCT 24.3 10/21/2021    PLT 487 10/21/2021    MCV 91.4 10/21/2021    MCH 30.5 10/21/2021    MCHC 33.3 10/21/2021    RDW 12.3 10/21/2021    METASPCT 1.7 10/21/2021    LYMPHOPCT 4.3 10/21/2021    MONOPCT 11.3 10/21/2021    MYELOPCT 0.9 10/21/2021    BASOPCT 0.1 10/21/2021    MONOSABS 0.90 10/21/2021    LYMPHSABS 0.33 10/21/2021    EOSABS 0.00 10/21/2021    BASOSABS 0.00 10/21/2021     CMP:    Lab Results   Component Value Date     10/21/2021    K 3.7 10/21/2021    K 4.1 04/08/2021    CL 94 10/21/2021    CO2 26 10/21/2021    BUN 92 10/21/2021    CREATININE 6.4 10/21/2021    GFRAA 8 10/21/2021    LABGLOM 7 10/21/2021    GLUCOSE 344 10/21/2021    PROT 6.3 10/21/2021    LABALBU 2.7 10/21/2021    CALCIUM 9.4 10/21/2021    BILITOT 0.3 10/21/2021    ALKPHOS 108 10/21/2021    AST 39 10/21/2021    ALT 30 10/21/2021     Ionized Calcium:  No results found for: IONCA  Magnesium:    Lab Results   Component Value Date    MG 2.5 10/20/2021     Phosphorus:    Lab Results   Component Value Date    PHOS 7.3 10/20/2021     U/A:    Lab Results   Component Value Date    NITRITE neg 03/14/2019    COLORU Yellow 05/24/2019    PHUR 6.0 05/24/2019    WBCUA 0-1 05/24/2019    RBCUA NONE 05/24/2019    BACTERIA RARE 05/24/2019    CLARITYU Clear 05/24/2019    SPECGRAV 1.010 05/24/2019    LEUKOCYTESUR TRACE 05/24/2019    UROBILINOGEN 0.2 05/24/2019    BILIRUBINUR Negative 05/24/2019    BILIRUBINUR neg 03/14/2019    BLOODU Negative 05/24/2019    GLUCOSEU Negative 05/24/2019     Microalbumen/Creatinine ratio:  No components found for: RUCREAT  Iron Saturation:  No components found for: PERCENTFE  TIBC:    Lab Results   Component Value Date    TIBC 403 04/10/2019     FERRITIN:    Lab Results   Component Value Date    FERRITIN 16 11/07/2018        Imaging:  XR CHEST PORTABLE   Final Result   1. Extensive multifocal bilateral pneumonia. XR CHEST PORTABLE    (Results Pending)       Assessment  Admitted with pneumonia due to COVID-19 virus.   1. Acute kidney injury, likely hemodynamically mediated due to volume contraction associated with poor intake due to anorexia, increase insensible losses due to tachypnea, fever and diaphoresis. Currently nonoliguric. 2. Chronic kidney disease stage V, baseline creatinine 4.8 to 5.3 mg/dL with at least the past 4 months. Etiology is diabetic nephropathy, renal microvascular atherosclerotic disease  3. Metabolic acidosis, wide anion gap, secondary to acute kidney injury superimposed on chronic kidney disease  4. Anemia secondary to advanced chronic kidney disease. No recent melena or hematochezia  5. Secondary hyperparathyroidism/mineral bone disease due to advanced CKD. On low-dose calcitriol Mondays Wednesdays and Fridays. 6. Morbid obesity, BMI 41.5 kg/m²    PLAN :    Progressive azotemia in patient with advanced CKD V at baseline   Plan: third HD session via AVF   Next HD treatment in am   Follow clinical condition daily with lab reviews.           Electronically signed by Luis Beltran MD on 10/21/2021

## 2021-10-21 NOTE — CARE COORDINATION
10/21/21 Update CM Note: Spoke at length with patients daughter, CASA [facility], who is a nurse and resides in [place]. She states her mother lives with her grand daughter in an apartment off the main house with her . Her  is not well and has parkinsons disease. She states the grand daughter cares for both of them. Daughter is requesting a call from Pulmonary and PCP. Perfect serve sent with request to both. Patient is currently on day 2 of Hemodialysis. She will got tomorrow as well. Call placed to [facility] and spoke with Karl Tenorio who requested clinical be faxed to [phone]. Will fax all information once hep panel is resulted. Discharge plan is unclear currently as patient is requiring increased oxygen and is currently on PAP 85%. She is on iv rocephin/po doxy/iv decadron and iv bicarb drip. Will follow.  Yola Faith RN CM

## 2021-10-21 NOTE — PROGRESS NOTES
Hospitalist Progress Note      SYNOPSIS: Patient admitted on 10/18/2021 for shortness of breath    Patient with significant past medical history came to our hospital with chief complaint of worsening shortness of breath for last 2 weeks and was found to have acute hypoxemic respiratory failure requiring high flow oxygen as well as acute kidney injury on her chronic kidney disease stage V. In the ER, patient was found to have atrial fibrillation with heart rate episodes up to 150s but no persistent rapid ventricular rate. She was positive for COVID-19. Other significant finding was acute kidney injury with creatinine of 9.4, compared to her baseline of 5.2. Her BNP was also elevated to 4850. ER physician discussed with nephrology who recommended low-dose bicarb drip and close management. She also required AIRVO in the ER with oxygen about 30 L. Over the next 48 hours her renal function remained stable without requiring any dialysis. She was seen by pulmonology and started on IV ceftriaxone and doxycycline to cover for community-acquired pneumonia. SUBJECTIVE:    Patient seen and examined in the ER. Patient seen and examined in her room. No major overnight events. Denies any worsening shortness of breath, chest pain, nausea, vomiting. According O2 60 L  Records reviewed. Stable overnight. No other overnight issues reported. Temp (24hrs), Av.2 °F (36.8 °C), Min:97.5 °F (36.4 °C), Max:98.7 °F (37.1 °C)    DIET: ADULT DIET; Regular; 3 carb choices (45 gm/meal); Low Sodium (2 gm); Low Potassium (Less than 3000 mg/day);  Low Phosphorus (Less than 1000 mg)  CODE: Full Code    Intake/Output Summary (Last 24 hours) at 10/21/2021 0800  Last data filed at 10/21/2021 0600  Gross per 24 hour   Intake 600 ml   Output 1925 ml   Net -1325 ml       OBJECTIVE:    BP (!) 120/94   Pulse 94   Temp 98.2 °F (36.8 °C) (Oral)   Resp 20   Wt 252 lb 6.8 oz (114.5 kg)   SpO2 94%   BMI 39.54 kg/m²     General appearance: No apparent distress, appears stated age and cooperative. HEENT:  Conjunctivae/corneas clear. Neck: Supple. No jugular venous distention. Respiratory: Clear to auscultation bilaterally, normal respiratory effort. Bilateral inspiratory crackles noted. Cardiovascular: Regular rate rhythm, normal S1-S2  Abdomen: Soft, nontender, nondistended  Musculoskeletal: No clubbing, cyanosis, no bilateral lower extremity edema. Brisk capillary refill. Skin:  No rashes  on visible skin  Neurologic: awake, alert and following commands. Barrow catheter noted.     ASSESSMENT & PLAN:    Sepsis  Secondary to COVID-19 pneumonia  See management below     Acute hypoxemic respiratory failure  Severe hypoxia, use of excessive respiratory muscles, chest x-ray positive for bilateral infiltrates, requiring high flow oxygen  COVID-19 pneumonia  Bilateral airspace disease on chest x-ray  For Covid maintain on dexamethasone 10 mg IV daily, started on 10/18  Consult pulmonology, waiting for recommendations  Currently on [facility]  Pulmonology recommending Tocilizumab    Community-acquired pneumonia  Bilateral infiltrates, elevated procalcitonin of 1.04  Started on IV ceftriaxone and doxycycline on 10/19     Acute kidney injury on chronic kidney disease  Nonoliguric LEW, secondary to COVID-19 pneumonia  Baseline creatinine 5.2  Admitted with creatinine 9.5, remained stable at about 9.5  Started hemodialysis via right upper arm AV fistula on 10/21  Remains on IV fluids on 10/21, hopefully will be discontinued once start dialysis today    CKD stage V  Presumably secondary to diabetic nephropathy  Patient has right arm AV fistula, with good thrill and bruit  Started hemodialysis on 10/21     Elevated BNP  No previous history of heart failure  EF 65% in August 2019 without any systolic or diastolic dysfunction  Elevated BNP of 4850, likely due to LEW on CKD  Patient on home dose torsemide, hold for now because of LEW and without any obvious fluid overload     Atrial fibrillation  Not in rapid ventricular rate  Resume home dose metoprolol and Eliquis  Monitor closely     Diabetes mellitus type 2  Home dose glipizide held  Maintain on sliding scale insulin     Hyperlipidemia  Continue statins     End-of-life care discussion  Full code for now       DISPOSITION:   No clear discharge disposition at the moment. Remains in the emergency room due to lack of beds.     Medications:  REVIEWED DAILY    Infusion Medications    sodium chloride      dextrose      sodium bicarbonate infusion 100 mL/hr at 10/21/21 0650     Scheduled Medications    apixaban  5 mg Oral BID    atorvastatin  40 mg Oral Daily    calcitRIOL  0.5 mcg Oral Once per day on Mon Wed Fri    metoprolol tartrate  50 mg Oral BID    propafenone  325 mg Oral BID    sodium chloride flush  5-40 mL IntraVENous 2 times per day    insulin lispro  0-6 Units SubCUTAneous TID WC    insulin lispro  0-3 Units SubCUTAneous Nightly    dexamethasone  10 mg IntraVENous Q24H    insulin glargine  10 Units SubCUTAneous Nightly    cefTRIAXone (ROCEPHIN) IV  1,000 mg IntraVENous Q24H    doxycycline hyclate  100 mg Oral 2 times per day    sodium bicarbonate  50 mEq IntraVENous Once     PRN Meds: sodium chloride flush, sodium chloride, ondansetron **OR** ondansetron, polyethylene glycol, acetaminophen **OR** acetaminophen, glucose, dextrose, glucagon (rDNA), dextrose    Labs:     Recent Labs     10/18/21  1250 10/19/21  0510 10/20/21  0558   WBC 12.1* 5.1 9.6   HGB 9.5* 8.2* 8.4*   HCT 29.3* 24.7* 25.8*    379 542*       Recent Labs     10/19/21  0510 10/20/21  0558 10/20/21  1404   * 134 135   K 3.6 3.9 3.9   CL 96* 95* 92*   CO2 15* 21* 20*   * 123* 124*   CREATININE 9.1* 9.5* 9.3*   CALCIUM 9.0 9.7 9.7   PHOS  --  7.3*  --        Recent Labs     10/18/21  1250 10/19/21  0510 10/20/21  0558   PROT 7.3 6.6 6.6   ALKPHOS 126* 111* 111*   ALT 23 20 22   AST 35* 21 27   BILITOT 0.4 0.3 0.3       Recent Labs     10/20/21  0558   INR 1.5       No results for input(s): Rakel Pruitt in the last 72 hours. Chronic labs:    Lab Results   Component Value Date    CHOL 172 11/06/2018    TRIG 211 (H) 11/06/2018    HDL 37 11/06/2018    LDLCALC 93 11/06/2018    TSH 0.804 01/31/2019    INR 1.5 10/20/2021    LABA1C 6.7 (H) 10/19/2021       Radiology: REVIEWED DAILY    +++++++++++++++++++++++++++++++++++++++++++++++++  Steven Abdalla MD  [facility] - [address] [place] [address] [place], [facility]  +++++++++++++++++++++++++++++++++++++++++++++++++  NOTE: This report was transcribed using voice recognition software. Every effort was made to ensure accuracy; however, inadvertent computerized transcription errors may be present.

## 2021-10-21 NOTE — PLAN OF CARE
Pt. Breakfast tray came early d/t plan for hemodialysis this AM. POCT obtained and insulin coverage given. Pt. Refusing to eat, states she is not hungry and wants to sleep.

## 2021-10-21 NOTE — PROGRESS NOTES
Called ID office regarding new consult. They stated that they would call with covering physician .     Notified Xray that pt is back in room  Santiago Fall RN

## 2021-10-21 NOTE — FLOWSHEET NOTE
10/21/21 1003   Vital Signs   /67   Temp 98 °F (36.7 °C)   Pulse 87   Resp 20   Weight 245 lb 9.5 oz (111.4 kg)   Weight Method Bed scale   Percent Weight Change -0.89   Pain Assessment   Pain Assessment 0-10   Pain Level 0   Post-Hemodialysis Assessment   Post-Treatment Procedures Blood returned   Machine Disinfection Process Acid/Vinegar Clean;Heat Disinfect; Exterior Machine Disinfection   Rinseback Volume (ml) 150 ml   Total Liters Processed (l/min) 24.9 l/min   Dialyzer Clearance Clotted   Duration of Treatment (minutes) 120 minutes   Heparin amount administered during treatment (units) 0 units   Hemodialysis Intake (ml) 150 ml   Hemodialysis Output (ml) 1300 ml   NET Removed (ml) 1150 ml   Tolerated Treatment Good   Patient Response to Treatment system clotted with 30 minutes remaining, Dr Kellen Enriquez here and aware   Bilateral Breath Sounds Diminished   Edema Right lower extremity; Left lower extremity   Physician Notified?  Yes

## 2021-10-21 NOTE — PROGRESS NOTES
Pharmacy Documentation     Medication: Remdesivir     Date: 10/21/21  Physician: Dr. Adolfo Le consulted to initiate remdesivir. Patient does not [facility] meet [facility] for Use to initiate remdesivir based on symptom onset >7 days. Pharmacy will sign off. Please re-consult if the clinical condition changes and patient meets criteria for initiation based on [facility] Remdesivir Criteria for Use.      Thank you for this consult,  Jonatan Verdin RPH

## 2021-10-21 NOTE — CARE COORDINATION
10/21/21 Update CM note: All clinical/demos faxed to [facility] dialysis center at [phone].   Kiara Patterson RN CM

## 2021-10-21 NOTE — PROGRESS NOTES
range of motion, no joint swelling, deformity or tenderness   Neurologic: reflexes normal and symmetric, no cranial nerve deficit noted    LABS/IMAGING:    CBC:  Lab Results   Component Value Date    WBC 9.6 10/20/2021    HGB 8.4 (L) 10/20/2021    HCT 25.8 (L) 10/20/2021    MCV 92.5 10/20/2021     (H) 10/20/2021    LYMPHOPCT 13.3 (L) 10/18/2021    RBC 2.79 (L) 10/20/2021    MCH 30.1 10/20/2021    MCHC 32.6 10/20/2021    RDW 12.6 10/20/2021    NEUTOPHILPCT 77.0 10/18/2021    MONOPCT 8.3 10/18/2021    BASOPCT 0.2 10/18/2021    NEUTROABS 9.30 (H) 10/18/2021    LYMPHSABS 1.60 10/18/2021    MONOSABS 1.00 (H) 10/18/2021    EOSABS 0.00 (L) 10/18/2021    BASOSABS 0.03 10/18/2021       Recent Labs     10/20/21  0558 10/19/21  0510 10/18/21  1250   WBC 9.6 5.1 12.1*   HGB 8.4* 8.2* 9.5*   HCT 25.8* 24.7* 29.3*   MCV 92.5 89.8 94.2   * 379 401       BMP:   Recent Labs     10/19/21  0510 10/20/21  0558 10/20/21  1404   * 134 135   K 3.6 3.9 3.9   CL 96* 95* 92*   CO2 15* 21* 20*   PHOS  --  7.3*  --    * 123* 124*   CREATININE 9.1* 9.5* 9.3*       MG:   Lab Results   Component Value Date    MG 2.5 10/20/2021     Ca/Phos:   Lab Results   Component Value Date    CALCIUM 9.7 10/20/2021    PHOS 7.3 (H) 10/20/2021     Amylase: No results found for: AMYLASE  Lipase: No results found for: LIPASE  LIVER PROFILE:   Recent Labs     10/18/21  1250 10/19/21  0510 10/20/21  0558   AST 35* 21 27   ALT 23 20 22   BILITOT 0.4 0.3 0.3   ALKPHOS 126* 111* 111*       PT/INR:   Recent Labs     10/20/21  0558   PROTIME 16.8*   INR 1.5     APTT: No results for input(s): APTT in the last 72 hours.     Cardiac Enzymes:  Lab Results   Component Value Date    TROPONINI <0.01 11/05/2018               PROBLEM LIST:  Patient Active Problem List   Diagnosis    Moderate episode of recurrent major depressive disorder ([facility]    Generalized anxiety disorder    Insomnia due to mental condition    Anemia    Essential hypertension    CKD (chronic kidney disease) stage 5, GFR less than 15 ml/min (HCC)    Atrial fibrillation (HCC)    Pure hypercholesterolemia    Morbid obesity (HCC)    Chronic obstructive pulmonary disease (HCC)    Obstructive sleep apnea    Exertional dyspnea    History of colon polyps    Family history of rectal cancer    Encounter regarding vascular access for dialysis for end-stage renal disease ([facility]    COVID-19    Pneumonia due to COVID-19 virus               ASSESSMENT:  1.) COVID pneumonia   2. )Acute hypoxic respiratory failure   3. )ESRD  4.)A fib   5. )Anemia   6- Possible Pneumonia     PLAN:  *-Nephrology for HD if indicated ,very concern about giving IVF with COVID and possible fluid overload   *-Decadron ,watch BS  *-*- Wean O2 as tolerated   *-repeat CRP ,if up will consider Toci  *-Avoid fluid overload  *_Patient can get worse if so we will transfer to the medical ICU  If hypoxia worse will Start OptiFlow soon and BiPAP as needed   Aggressive pulmonary toilet  Albuterol as needed  *budesonide   *_on elquis  *-stool hemoccult   Sugar control as per primary   IV abx ,rocophine,doxy  Check legionella neg  Check strep neg         Erica Alberts MD,[facility]  Pulmonary&Critical Care Medicine   Director of [address] [place] [address] Director of [address] [place] [address]    Sandra Duke    NOTE: This report was transcribed using voice recognition software. Every effort was made to ensure accuracy; however, inadvertent computerized transcription errors may be present.

## 2021-10-22 LAB
ANION GAP SERPL CALCULATED.3IONS-SCNC: 15 MMOL/L (ref 7–16)
BUN BLDV-MCNC: 71 MG/DL (ref 6–23)
CALCIUM SERPL-MCNC: 9.6 MG/DL (ref 8.6–10.2)
CHLORIDE BLD-SCNC: 91 MMOL/L (ref 98–107)
CO2: 33 MMOL/L (ref 22–29)
CREAT SERPL-MCNC: 4.9 MG/DL (ref 0.5–1)
GFR AFRICAN AMERICAN: 11
GFR NON-AFRICAN AMERICAN: 9 ML/MIN/1.73
GLUCOSE BLD-MCNC: 420 MG/DL (ref 74–99)
HCT VFR BLD CALC: 29.2 % (ref 34–48)
HEMOGLOBIN: 9.5 G/DL (ref 11.5–15.5)
MCH RBC QN AUTO: 29.5 PG (ref 26–35)
MCHC RBC AUTO-ENTMCNC: 32.5 % (ref 32–34.5)
MCV RBC AUTO: 90.7 FL (ref 80–99.9)
METER GLUCOSE: 222 MG/DL (ref 74–99)
METER GLUCOSE: 313 MG/DL (ref 74–99)
METER GLUCOSE: 390 MG/DL (ref 74–99)
METER GLUCOSE: 435 MG/DL (ref 74–99)
PDW BLD-RTO: 12.1 FL (ref 11.5–15)
PLATELET # BLD: 530 E9/L (ref 130–450)
PMV BLD AUTO: 9.2 FL (ref 7–12)
POTASSIUM SERPL-SCNC: 3.7 MMOL/L (ref 3.5–5)
RBC # BLD: 3.22 E12/L (ref 3.5–5.5)
SODIUM BLD-SCNC: 139 MMOL/L (ref 132–146)
WBC # BLD: 8.7 E9/L (ref 4.5–11.5)

## 2021-10-22 PROCEDURE — 2140000000 HC CCU INTERMEDIATE R&B

## 2021-10-22 PROCEDURE — 2580000003 HC RX 258: Performed by: INTERNAL MEDICINE

## 2021-10-22 PROCEDURE — 1200000000 HC SEMI PRIVATE

## 2021-10-22 PROCEDURE — 36415 COLL VENOUS BLD VENIPUNCTURE: CPT

## 2021-10-22 PROCEDURE — 85027 COMPLETE CBC AUTOMATED: CPT

## 2021-10-22 PROCEDURE — 6370000000 HC RX 637 (ALT 250 FOR IP): Performed by: INTERNAL MEDICINE

## 2021-10-22 PROCEDURE — 80048 BASIC METABOLIC PNL TOTAL CA: CPT

## 2021-10-22 PROCEDURE — 2500000003 HC RX 250 WO HCPCS: Performed by: INTERNAL MEDICINE

## 2021-10-22 PROCEDURE — 2700000000 HC OXYGEN THERAPY PER DAY

## 2021-10-22 PROCEDURE — 90935 HEMODIALYSIS ONE EVALUATION: CPT

## 2021-10-22 PROCEDURE — 94660 CPAP INITIATION&MGMT: CPT

## 2021-10-22 PROCEDURE — 82962 GLUCOSE BLOOD TEST: CPT

## 2021-10-22 PROCEDURE — 6360000002 HC RX W HCPCS: Performed by: INTERNAL MEDICINE

## 2021-10-22 PROCEDURE — 99233 SBSQ HOSP IP/OBS HIGH 50: CPT | Performed by: INTERNAL MEDICINE

## 2021-10-22 RX ORDER — PROPAFENONE HYDROCHLORIDE 150 MG/1
300 TABLET, FILM COATED ORAL 2 TIMES DAILY
Status: DISCONTINUED | OUTPATIENT
Start: 2021-10-22 | End: 2021-10-27 | Stop reason: HOSPADM

## 2021-10-22 RX ADMIN — CALCITRIOL 0.5 MCG: 0.25 CAPSULE ORAL at 09:14

## 2021-10-22 RX ADMIN — METOPROLOL TARTRATE 50 MG: 50 TABLET, FILM COATED ORAL at 09:14

## 2021-10-22 RX ADMIN — SODIUM CHLORIDE, PRESERVATIVE FREE 10 ML: 5 INJECTION INTRAVENOUS at 03:10

## 2021-10-22 RX ADMIN — ATORVASTATIN CALCIUM 40 MG: 40 TABLET, FILM COATED ORAL at 09:14

## 2021-10-22 RX ADMIN — INSULIN LISPRO 2 UNITS: 100 INJECTION, SOLUTION INTRAVENOUS; SUBCUTANEOUS at 16:33

## 2021-10-22 RX ADMIN — INSULIN LISPRO 5 UNITS: 100 INJECTION, SOLUTION INTRAVENOUS; SUBCUTANEOUS at 08:30

## 2021-10-22 RX ADMIN — INSULIN LISPRO 6 UNITS: 100 INJECTION, SOLUTION INTRAVENOUS; SUBCUTANEOUS at 12:03

## 2021-10-22 RX ADMIN — INSULIN GLARGINE 10 UNITS: 100 INJECTION, SOLUTION SUBCUTANEOUS at 20:47

## 2021-10-22 RX ADMIN — INSULIN LISPRO 2 UNITS: 100 INJECTION, SOLUTION INTRAVENOUS; SUBCUTANEOUS at 20:48

## 2021-10-22 RX ADMIN — METOPROLOL TARTRATE 50 MG: 50 TABLET, FILM COATED ORAL at 20:33

## 2021-10-22 RX ADMIN — APIXABAN 5 MG: 5 TABLET, FILM COATED ORAL at 20:33

## 2021-10-22 RX ADMIN — APIXABAN 5 MG: 5 TABLET, FILM COATED ORAL at 09:14

## 2021-10-22 RX ADMIN — Medication 10 ML: at 20:33

## 2021-10-22 RX ADMIN — DEXAMETHASONE SODIUM PHOSPHATE 10 MG: 10 INJECTION INTRAMUSCULAR; INTRAVENOUS at 03:10

## 2021-10-22 RX ADMIN — SODIUM BICARBONATE: 84 INJECTION, SOLUTION INTRAVENOUS at 04:19

## 2021-10-22 RX ADMIN — PROPAFENONE HYDROCHLORIDE 300 MG: 150 TABLET, FILM COATED ORAL at 20:47

## 2021-10-22 ASSESSMENT — PAIN SCALES - GENERAL
PAINLEVEL_OUTOF10: 0
PAINLEVEL_OUTOF10: 0

## 2021-10-22 NOTE — CARE COORDINATION
10/22/21 Update CM Note; Patient remains in isolation due to covid. She is currently on iv decadron and oxygen at heated high flow at 55liters. CRP is 2.7. BUN 71/Cr 4.9. IV bicarb completed. CXR continues with bilateral infiltrates. Discharge plan is unclear. Patient still requires daily dialysis as she is on treatment three. [facility] has been notified and all information faxed. PT/OT are currently pending. Will follow.    Kiara Patterson RN CM

## 2021-10-22 NOTE — FLOWSHEET NOTE
10/22/21 1530   Vital Signs   BP (!) 168/83   Temp 98 °F (36.7 °C)   Pulse 88   Resp 18   Weight 249 lb 9 oz (113.2 kg)   Weight Method Bed scale   Percent Weight Change -0.7   Post-Hemodialysis Assessment   Post-Treatment Procedures Blood returned; Access bleeding time < 10 minutes   Machine Disinfection Process Acid/Vinegar Clean;Heat Disinfect   Rinseback Volume (ml) 300 ml   Total Liters Processed (l/min) 41.1 l/min   Dialyzer Clearance Clear   NET Removed (ml) 1075 ml   Tolerated Treatment Good   Patient Response to Treatment pt responded well to treatment, no complaints

## 2021-10-22 NOTE — PROGRESS NOTES
[facility] MD Tavo Gee, MD Stephan Ryder, MD Nereyda Valencia, MD Wes Ornelas, CNP   Sheela Lion, HARRY  Progress note   Patient's Name: Jn Awad  2:12 PM  10/22/2021    Subjective :  10/20 :pt known to me from office . Has advanced ckd and has a recent AVF placed in    stable bp , feels better . 1th HD session today   10/21: Patient was seen, interviewed and examined while undergoing her second hemodialysis treatment today. Nurses were using 17-gauge needles to cannulate her arm for her 2-1/2-hour dialysis today. She is scheduled for her third treatment tomorrow with higher blood flow attempting to go to 250-300 mill per minute. 10/22: Patient was seen, interviewed and examined while undergoing her third hemodialysis treatment today. She has no new complaints, she was confirming that she feels much better terms of breathing and overall condition. Case was discussed with dialysis nurse, labs reviewed, all other consults reviewed. History of Present Ilness:    Ms. Kristen Sacks is a 79-year-old woman with advanced chronic kidney disease, stage V with a baseline creatinine 4.8 to 5.3 mg/dL, secondary to diabetic nephropathy, renal microvascular atherosclerotic disease. She is followed longitudinally from nephrology standpoint by Dr. Stephan Ryder. She presented to the emergency department yesterday with a roughly 2-week history of progressive dyspnea, intermittent cough at times productive of thinnish phlegm but not routinely, fever, chills, diaphoresis, anosmia and dysgeusia, progressive generalized weakness. Intake of food and fluid has been quite poor in particular over the past several days. She denies lightheadedness, chest pain or palpitations, abdominal pain or cramping, diarrhea or constipation, dysuria hematuria change in bowel habits of late. On presentation O2 saturations were in the 70s, she was started on nonrebreather mask.   She notes HEMATOMA performed by Kb You MD at [place] [address] Right 12/7/2020    AV FISTULA CREATION -- RIGHT ARM performed by Jong Herring MD at [place] [address] Right 4/8/2021    AV FISTULA  REVISION RIGHT ARM performed by Jong Herring MD at [place] [id number] Left 9/18/2018    AV FISTULA  LEFT ARM performed by Kb You MD at [place] [address] Left 11/14/2018    SUPERFICIALIZATION AV FISTULA  LEFT ARM , LIGATION TRIBUTORY AV FISTULA LEFT ARM performed by Kb You MD at [place] [id number]         Family History   Problem Relation Age of Onset   [employer] Cancer Mother         bladder    Diabetes Mother     COPD Mother     Depression Father     Diabetes Father     Heart Disease Father     High Blood Pressure Father     Stroke Father     Prostate Cancer Father     Dementia Father     Alcohol Abuse Brother     Diabetes Brother     Bipolar Disorder Paternal Uncle     Anxiety Disorder Daughter     Colon Cancer Maternal Grandfather         reports that she quit smoking about 7 months ago. Her smoking use included cigarettes. She has a 21.50 pack-year smoking history. She has never used smokeless tobacco. She reports previous alcohol use. She reports that she does not use drugs.     Allergies:  Adhesive tape and Penicillins    Current Medications:    apixaban (ELIQUIS) tablet 5 mg, BID  atorvastatin (LIPITOR) tablet 40 mg, Daily  calcitRIOL (ROCALTROL) capsule 0.5 mcg, Once per day on Mon Wed Fri  metoprolol tartrate (LOPRESSOR) tablet 50 mg, BID  propafenone (RYTHMOL SR) extended release capsule 325 mg, BID  sodium chloride flush 0.9 % injection 5-40 mL, 2 times per day  sodium chloride flush 0.9 % injection 5-40 mL, PRN  0.9 % sodium chloride infusion, PRN  ondansetron (ZOFRAN-ODT) disintegrating tablet 4 mg, Q8H PRN   Or  ondansetron (ZOFRAN) injection 4 mg, Q6H PRN  polyethylene glycol (GLYCOLAX) packet 17 g, Daily PRN  acetaminophen (TYLENOL) tablet 650 mg, Q6H PRN   Or  acetaminophen (TYLENOL) suppository 650 mg, Q6H PRN  glucose (GLUTOSE) 40 % oral gel 15 g, PRN  dextrose 50 % IV solution, PRN  glucagon (rDNA) injection 1 mg, PRN  dextrose 5 % solution, PRN  insulin lispro (HUMALOG) injection vial 0-6 Units, TID WC  insulin lispro (HUMALOG) injection vial 0-3 Units, Nightly  dexamethasone (DECADRON) injection 10 mg, Q24H  insulin glargine (LANTUS) injection vial 10 Units, Nightly  sodium bicarbonate 8.4 % injection 50 mEq, Once        Review of Systems:   As above HPI, otherwise unremarkable. No NSAIDs. Recent course of antimicrobials. Physical exam:   BP (!) 123/58   Pulse 84   Temp 97.9 °F (36.6 °C) Comment: axillary  Resp 18   Wt 251 lb 5.2 oz (114 kg)   SpO2 94%   BMI 39.36 kg/m²   Age-appropriate morbidly obese white woman who appears ill but in no apparent distress  NC/AT EOMI sclera conjunctiva are clear and anicteric mucous membranes are somewhat dry though she is wearing respiratory mask  Neck soft supple trachea midline no JVD at 60 degrees no bruit though exam somewhat compromised by respiratory noises from the mask  Coarse breath sounds, crackles scattered throughout lung fields, relatively mild, good air entry in all fields. Mildly prolonged expiratory phase  Regular rhythm normal S1 and S2, no murmur no rub  Abdomen soft nontender nondistended normal active bowel sounds no mass no hepatosplenomegaly though could not lay her supine for proper examination  Distal extremities reveal chronic-type edema bilaterally, mild distal erythema consistent with chronic venous stasis, no pitting edema.   No other rash or lesion on visible portions of integument  Pulses 1+x4  Moves all 4 extremities  No asterixis  Cranial nerves II through XII grossly intact  Awake, alert, interactive and appropriate      Data:   Labs:  CBC with Differential:    Lab Results   Component Value the October 18. The heart appears to be enlarged. XR CHEST PORTABLE   Final Result   1. Extensive multifocal bilateral pneumonia. Assessment  Admitted with pneumonia due to COVID-19 virus. 1. Acute kidney injury, likely hemodynamically mediated due to volume contraction associated with poor intake due to anorexia, increase insensible losses due to tachypnea, fever and diaphoresis. Currently nonoliguric. 2. Chronic kidney disease stage V, baseline creatinine 4.8 to 5.3 mg/dL with at least the past 4 months. Etiology is diabetic nephropathy, renal microvascular atherosclerotic disease  3. Metabolic acidosis, wide anion gap, secondary to acute kidney injury superimposed on chronic kidney disease  4. Anemia secondary to advanced chronic kidney disease. No recent melena or hematochezia  5. Secondary hyperparathyroidism/mineral bone disease due to advanced CKD. On low-dose calcitriol Mondays Wednesdays and Fridays. 6. Morbid obesity, BMI 41.5 kg/m²    PLAN :    Progressive azotemia in patient with advanced CKD V at baseline   Plan: third HD session via AVF today. Next HD treatment in am   Follow clinical condition daily with lab reviews.           Electronically signed by Gisel Fischer MD on 10/22/2021

## 2021-10-22 NOTE — PROGRESS NOTES
Pulmonary [facility]                             Pulmonary Consult/Progress Note :              Reason for Consultation:COVID   CC : SOB   HPI:   Doing better and more comfortable on BiPAP   SOB improved  Seen by nephrology  No chest pain   Weaned to Opti flow and now on 75 L and 60 L . Lawernce Roughen PHYSICAL EXAMINATION:     VITAL SIGNS:  /60   Pulse 90   Temp 98.9 °F (37.2 °C) (Axillary)   Resp 22   Wt 245 lb 9.5 oz (111.4 kg)   SpO2 99%   BMI 38.47 kg/m²   Wt Readings from Last 3 Encounters:   10/21/21 245 lb 9.5 oz (111.4 kg)   09/02/21 261 lb 9.6 oz (118.7 kg)   08/06/21 253 lb (114.8 kg)     Temp Readings from Last 3 Encounters:   10/21/21 98.9 °F (37.2 °C) (Axillary)   09/02/21 97.4 °F (36.3 °C) (Infrared)   08/06/21 97.5 °F (36.4 °C) (Tympanic)     TMAX:  BP Readings from Last 3 Encounters:   10/21/21 139/60   09/02/21 138/60   08/06/21 (!) 125/56     Pulse Readings from Last 3 Encounters:   10/21/21 90   09/02/21 70   08/06/21 80           INTAKE/OUTPUTS:  I/O last 3 completed shifts:   In: 750 [P.O.:300]  Out: 3841 [Urine:825]    Intake/Output Summary (Last 24 hours) at 10/21/2021 2128  Last data filed at 10/21/2021 1825  Gross per 24 hour   Intake 1690.8 ml   Output 2232 ml   Net -541.2 ml       General Appearance: alert and oriented to person, place and time, well-developed and   well-nourished, in no acute distress   Eyes: pupils equal, round, and reactive to light, extraocular eye movements intact, conjunctivae normal and sclera anicteric   Neck: neck supple and non tender without mass, no thyromegaly, no thyroid nodules and no cervical adenopathy   Pulmonary/Chest:Rhonchi   Cardiovascular: normal rate, regular rhythm, normal S1 and S2, no murmurs, rubs, clicks or gallops, distal pulses intact, no carotid bruits, no murmurs, no gallops, no carotid bruits and no JVD   Abdomen: obese, soft, non-tender, non-distended, normal bowel sounds, no masses or organomegaly   Extremities:no edema ,no cyanosis   Musculoskeletal: normal range of motion, no joint swelling, deformity or tenderness   Neurologic: reflexes normal and symmetric, no cranial nerve deficit noted    LABS/IMAGING:    CBC:  Lab Results   Component Value Date    WBC 8.2 10/21/2021    HGB 8.1 (L) 10/21/2021    HCT 24.3 (L) 10/21/2021    MCV 91.4 10/21/2021     (H) 10/21/2021    LYMPHOPCT 4.3 (L) 10/21/2021    RBC 2.66 (L) 10/21/2021    MCH 30.5 10/21/2021    MCHC 33.3 10/21/2021    RDW 12.3 10/21/2021    NEUTOPHILPCT 81.7 (H) 10/21/2021    MONOPCT 11.3 10/21/2021    BASOPCT 0.1 10/21/2021    NEUTROABS 6.89 10/21/2021    LYMPHSABS 0.33 (L) 10/21/2021    MONOSABS 0.90 10/21/2021    EOSABS 0.00 (L) 10/21/2021    BASOSABS 0.00 10/21/2021       Recent Labs     10/21/21  0805 10/21/21  0702 10/20/21  0558   WBC 8.2 7.9 9.6   HGB 8.1* 7.9* 8.4*   HCT 24.3* 23.0* 25.8*   MCV 91.4 90.6 92.5   * 471* 542*       BMP:   Recent Labs     10/20/21  0558 10/20/21  0558 10/20/21  1404 10/21/21  0702 10/21/21  0805      < > 135 134 139   K 3.9   < > 3.9 3.5 3.7   CL 95*   < > 92* 90* 94*   CO2 21*   < > 20* 29 26   PHOS 7.3*  --   --   --   --    *   < > 124* 91* 92*   CREATININE 9.5*   < > 9.3* 6.5* 6.4*    < > = values in this interval not displayed. MG:   Lab Results   Component Value Date    MG 2.5 10/20/2021     Ca/Phos:   Lab Results   Component Value Date    CALCIUM 9.4 10/21/2021    PHOS 7.3 (H) 10/20/2021     Amylase: No results found for: AMYLASE  Lipase: No results found for: LIPASE  LIVER PROFILE:   Recent Labs     10/20/21  0558 10/21/21  0702 10/21/21  0805   AST 27 38* 39*   ALT 22 30 30   BILITOT 0.3 0.3 0.3   ALKPHOS 111* 102 108*       PT/INR:   Recent Labs     10/20/21  0558   PROTIME 16.8*   INR 1.5     APTT: No results for input(s): APTT in the last 72 hours.     Cardiac Enzymes:  Lab Results   Component Value Date    TROPONINI <0.01 11/05/2018               PROBLEM LIST:  Patient Active Problem List   Diagnosis    Moderate episode of recurrent major depressive disorder (HCC)    Generalized anxiety disorder    Insomnia due to mental condition    Anemia    Essential hypertension    CKD (chronic kidney disease) stage 5, GFR less than 15 ml/min (HCC)    Atrial fibrillation (HCC)    Pure hypercholesterolemia    Morbid obesity (HCC)    Chronic obstructive pulmonary disease (HCC)    Obstructive sleep apnea    Exertional dyspnea    History of colon polyps    Family history of rectal cancer    Encounter regarding vascular access for dialysis for end-stage renal disease ([facility] [address]    COVID-19    Pneumonia due to COVID-19 virus               ASSESSMENT:  1.) COVID pneumonia   2. )Acute hypoxic respiratory failure   3. )ESRD  4.)A fib   5. )Anemia   6- Possible Pneumonia     PLAN:  *-seems we start weaning ,will continue   *-Decadron ,watch BS,need aggressive  Insulin therapy ,defer to promary   *- Wean O2 as tolerated   *-repeat CRP came very low ,will hold on Toci ,also she has drop in Hb more than 3 g   ,ID will start remedisvir   *-Avoid fluid overload  *_Patient can get worse if so we will transfer to the medical ICU  Aggressive pulmonary toilet  Albuterol as needed  *budesonide   *_on elquis  *-stool hemoccult   Sugar control as per primary   IV abx ,rocophine,doxy  Check legionella neg  Check strep neg   discuss with daughter   CXR in am   Discussed with dr Hector Araujo with her daughter     Oscar Wilson  Pulmonary&Critical Care Medicine   Director of [address] [place] [address] Director of [address] [place] [address]    Rosette Cam    NOTE: This report was transcribed using voice recognition software. Every effort was made to ensure accuracy; however, inadvertent computerized transcription errors may be present.

## 2021-10-22 NOTE — PROGRESS NOTES
[address] [place] [address] [facility]  Progress Note      Chief Complaint   Patient presents with    Positive For Covid-19     per EMS patient tested positive for covid 11 days ago, pulse ox in 70s today, heart rate 160s    Fatigue       SUBJECTIVE:  Patient is tolerating medications. No reported adverse drug reactions. No nausea, vomiting, diarrhea. No sob at rest. On bipap FIO2 70%    Review of systems:  As stated above in the chief complaint, otherwise negative. Medications:  Scheduled Meds:   apixaban  5 mg Oral BID    atorvastatin  40 mg Oral Daily    calcitRIOL  0.5 mcg Oral Once per day on     metoprolol tartrate  50 mg Oral BID    propafenone  325 mg Oral BID    sodium chloride flush  5-40 mL IntraVENous 2 times per day    insulin lispro  0-6 Units SubCUTAneous TID WC    insulin lispro  0-3 Units SubCUTAneous Nightly    dexamethasone  10 mg IntraVENous Q24H    insulin glargine  10 Units SubCUTAneous Nightly    sodium bicarbonate  50 mEq IntraVENous Once     Continuous Infusions:   sodium chloride      dextrose       PRN Meds:sodium chloride flush, sodium chloride, ondansetron **OR** ondansetron, polyethylene glycol, acetaminophen **OR** acetaminophen, glucose, dextrose, glucagon (rDNA), dextrose    OBJECTIVE:  /61   Pulse 90   Temp 97.8 °F (36.6 °C) (Axillary)   Resp 18   Wt 245 lb 9.5 oz (111.4 kg)   SpO2 100%   BMI 38.47 kg/m²   Temp  Av.9 °F (36.6 °C)  Min: 97.1 °F (36.2 °C)  Max: 98.9 °F (37.2 °C)  Constitutional: The patient is awake, alert, and oriented. Skin: Warm and dry. No rashes were noted. HEENT: Round and reactive pupils. Moist mucous membranes. No ulcerations or thrush. Neck: Supple to movements. Chest: No use of accessory muscles to breathe. Symmetrical expansion. No wheezing, crackles or rhonchi. Poor air exhange  Cardiovascular: S1 and S2 are rhythmic and regular. No murmurs appreciated.    Abdomen: Positive bowel sounds to auscultation. Benign to palpation. No masses felt. No hepatosplenomegaly. Extremities: No clubbing, no cyanosis, no edema.   Lines: peripheral    Laboratory and Tests Review:  Lab Results   Component Value Date    WBC 8.7 10/22/2021    WBC 8.2 10/21/2021    WBC 7.9 10/21/2021    HGB 9.5 (L) 10/22/2021    HCT 29.2 (L) 10/22/2021    MCV 90.7 10/22/2021     (H) 10/22/2021     Lab Results   Component Value Date    NEUTROABS 6.89 10/21/2021    NEUTROABS 9.30 (H) 10/18/2021    NEUTROABS 9.10 (H) 08/06/2021     No results found for: CRPHS  Lab Results   Component Value Date    ALT 30 10/21/2021    AST 39 (H) 10/21/2021    ALKPHOS 108 (H) 10/21/2021    BILITOT 0.3 10/21/2021     Lab Results   Component Value Date     10/22/2021    K 3.7 10/22/2021    K 4.1 04/08/2021    CL 91 10/22/2021    CO2 33 10/22/2021    BUN 71 10/22/2021    CREATININE 4.9 10/22/2021    CREATININE 6.4 10/21/2021    CREATININE 6.5 10/21/2021    GFRAA 11 10/22/2021    LABGLOM 9 10/22/2021    GLUCOSE 420 10/22/2021    PROT 6.3 10/21/2021    LABALBU 2.7 10/21/2021    CALCIUM 9.6 10/22/2021    BILITOT 0.3 10/21/2021    ALKPHOS 108 10/21/2021    AST 39 10/21/2021    ALT 30 10/21/2021     Lab Results   Component Value Date    CRP 2.7 (H) 10/21/2021    CRP 7.4 (H) 10/19/2021     No results found for: [address]  Radiology:      Microbiology:   Lab Results   Component Value Date    ORG Klebsiella oxytoca 03/14/2019     Lab Results   Component Value Date    ORG Klebsiella oxytoca 03/14/2019     No results found for: WNDABS  No results found for: RESPSMEAR  No results found for: MPNEUMO, CLAMYDCU, LABLEGI, AFBCX, FUNGSM, LABFUNG  No results found for: CULTRESP  No results found for: CXCATHTIP  No results found for: BFCS  No results found for: CXSURG  Urine Culture, Routine   Date Value Ref Range Status   03/14/2019 >100,000 CFU/ml  Final     No results found for: MRSAC   urine antigen negtaive  Assessment:  Covid pneumonia superimposed on this volume overload     Plan:    Cont dexamethasone   remdesivir-does not meet criteria per pharmacy  Ivermectin is not FDA approved for treatment animal studies from outside [place] of note have been weak and very controversial.  In in addition is not on her formulary because it is not FDA approved for treatment for Covid only for parasitic infections  Check cultures  Discussed with pulmonary   CRP down to 2.7  Monitor labs  Will sign off     Thank you for having us see this patient in consultation    Electronically signed by RENUKA Ingram CNP on 10/22/2021 at 9:49 AM    Pt seen and examined. Above discussed agree with advanced practice nurse. Labs, cultures, and radiographs reviewed. Face to Face encounter occurred. Changes made as necessary.      Sandra Sahu MD

## 2021-10-22 NOTE — PROGRESS NOTES
Hospitalist Progress Note      SYNOPSIS: Patient admitted on 10/18/2021 for shortness of breath    Patient with significant past medical history came to our hospital with chief complaint of worsening shortness of breath for last 2 weeks and was found to have acute hypoxemic respiratory failure requiring high flow oxygen as well as acute kidney injury on her chronic kidney disease stage V. In the ER, patient was found to have atrial fibrillation with heart rate episodes up to 150s but no persistent rapid ventricular rate. She was positive for COVID-19. Other significant finding was acute kidney injury with creatinine of 9.4, compared to her baseline of 5.2. Her BNP was also elevated to 4850. ER physician discussed with nephrology who recommended low-dose bicarb drip and close management. She also required AIRVO in the ER with oxygen about 30 L. Over the next 48 hours her renal function remained stable without requiring any dialysis. She was seen by pulmonology and started on IV ceftriaxone and doxycycline to cover for community-acquired pneumonia. Ceftriaxone and doxycycline discontinued on 10/21 by ID. Patient was started on hemodialysis on 10/21. SUBJECTIVE:    Patient seen and examined in her room. On BiPAP that was started last night due to O2 saturation 88% on 6 L O2. Patient denies any worsening shortness of breath, nausea, vomiting. Records reviewed. Stable overnight. No other overnight issues reported. Temp (24hrs), Av.9 °F (36.6 °C), Min:97.1 °F (36.2 °C), Max:98.9 °F (37.2 °C)    DIET: ADULT DIET; Regular; 3 carb choices (45 gm/meal); Low Sodium (2 gm); Low Potassium (Less than 3000 mg/day);  Low Phosphorus (Less than 1000 mg)  CODE: Full Code    Intake/Output Summary (Last 24 hours) at 10/22/2021 0837  Last data filed at 10/22/2021 0641  Gross per 24 hour   Intake 1490.8 ml   Output 2232 ml   Net -741.2 ml       OBJECTIVE:    BP (!) 166/75   Pulse 93   Temp 97.1 °F (36.2 °C) (Temporal)   Resp 20   Wt 245 lb 9.5 oz (111.4 kg)   SpO2 98%   BMI 38.47 kg/m²     General appearance: No apparent distress, appears stated age and cooperative. HEENT:  Conjunctivae/corneas clear. Neck: Supple. No jugular venous distention. Respiratory: Clear to auscultation bilaterally, normal respiratory effort. Bilateral inspiratory crackles noted. Cardiovascular: Regular rate rhythm, normal S1-S2  Abdomen: Soft, nontender, nondistended  Musculoskeletal: No clubbing, cyanosis, no bilateral lower extremity edema. Brisk capillary refill. Skin:  No rashes  on visible skin  Neurologic: awake, alert and following commands. Barrow catheter noted.     ASSESSMENT & PLAN:    Sepsis  Secondary to COVID-19 pneumonia  See management below     Acute hypoxemic respiratory failure  Severe hypoxia, use of excessive respiratory muscles, chest x-ray positive for bilateral infiltrates, requiring high flow oxygen  COVID-19 pneumonia  Bilateral airspace disease on chest x-ray  For Covid maintain on dexamethasone 10 mg IV daily, started on 10/18  Consult pulmonology, waiting for recommendations  Currently on AIRVO  Pulmonology recommending Tocilizumab  Consulted ID on family request: Do not recommend ivermectin that family requested    Community-acquired pneumonia  Bilateral infiltrates, elevated procalcitonin of 1.04  Started on IV ceftriaxone and doxycycline on 10/19, discontinued on 10/21 as per ID recommendations     Acute kidney injury on chronic kidney disease  Nonoliguric LEW, secondary to COVID-19 pneumonia  Baseline creatinine 5.2  Admitted with creatinine 9.5, remained stable at about 9.5  Started hemodialysis via right upper arm AV fistula on 10/21  Discontinued IV bicarb on 10/22  Start oral sodium bicarbonate once patient starts becoming acidotic, currently bicarb 33    CKD stage V  Presumably secondary to diabetic nephropathy  Patient has right arm AV fistula, with good thrill and bruit  Started hemodialysis on 10/21     Elevated BNP  No previous history of heart failure  EF 65% in August 2019 without any systolic or diastolic dysfunction  Elevated BNP of 4850, likely due to LEW on CKD  Patient on home dose torsemide, hold for now because of LEW and without any obvious fluid overload     Atrial fibrillation  Not in rapid ventricular rate  Resume home dose metoprolol and Eliquis  Monitor closely     Diabetes mellitus type 2  Home dose glipizide held  Maintain on sliding scale insulin  Diabetes poorly controlled because of steroids  Consult endocrinology for diabetes management     Hyperlipidemia  Continue statins     End-of-life care discussion  Full code for now       DISPOSITION:   Unclear discharge disposition at the moment.     Medications:  REVIEWED DAILY    Infusion Medications    sodium chloride      dextrose      sodium bicarbonate infusion 100 mL/hr at 10/22/21 0419     Scheduled Medications    apixaban  5 mg Oral BID    atorvastatin  40 mg Oral Daily    calcitRIOL  0.5 mcg Oral Once per day on Mon Wed Fri    metoprolol tartrate  50 mg Oral BID    propafenone  325 mg Oral BID    sodium chloride flush  5-40 mL IntraVENous 2 times per day    insulin lispro  0-6 Units SubCUTAneous TID WC    insulin lispro  0-3 Units SubCUTAneous Nightly    dexamethasone  10 mg IntraVENous Q24H    insulin glargine  10 Units SubCUTAneous Nightly    sodium bicarbonate  50 mEq IntraVENous Once     PRN Meds: sodium chloride flush, sodium chloride, ondansetron **OR** ondansetron, polyethylene glycol, acetaminophen **OR** acetaminophen, glucose, dextrose, glucagon (rDNA), dextrose    Labs:     Recent Labs     10/21/21  0702 10/21/21  0805 10/22/21  0635   WBC 7.9 8.2 8.7   HGB 7.9* 8.1* 9.5*   HCT 23.0* 24.3* 29.2*   * 487* 530*       Recent Labs     10/20/21  0558 10/20/21  1404 10/21/21  0702 10/21/21  0805 10/22/21  0635      < > 134 139 139   K 3.9   < > 3.5 3.7 3.7   CL 95*   < > 90* 94* 91*   CO2 21*   < > 29 26 33*   *   < > 91* 92* 71*   CREATININE 9.5*   < > 6.5* 6.4* 4.9*   CALCIUM 9.7   < > 9.2 9.4 9.6   PHOS 7.3*  --   --   --   --     < > = values in this interval not displayed. Recent Labs     10/20/21  0558 10/21/21  0702 10/21/21  0805   PROT 6.6 5.7* 6.3*   ALKPHOS 111* 102 108*   ALT 22 30 30   AST 27 38* 39*   BILITOT 0.3 0.3 0.3       Recent Labs     10/20/21  0558   INR 1.5       No results for input(s): Martita Wilson in the last 72 hours. Chronic labs:    Lab Results   Component Value Date    CHOL 172 11/06/2018    TRIG 211 (H) 11/06/2018    HDL 37 11/06/2018    LDLCALC 93 11/06/2018    TSH 0.804 01/31/2019    INR 1.5 10/20/2021    LABA1C 6.7 (H) 10/19/2021       Radiology: REVIEWED DAILY    +++++++++++++++++++++++++++++++++++++++++++++++++  Tam Martinez MD  [facility], [place] Physician - [address], [place]  +++++++++++++++++++++++++++++++++++++++++++++++++  NOTE: This report was transcribed using voice recognition software. Every effort was made to ensure accuracy; however, inadvertent computerized transcription errors may be present.

## 2021-10-23 LAB
ALBUMIN SERPL-MCNC: 3 G/DL (ref 3.5–5.2)
ALP BLD-CCNC: 101 U/L (ref 35–104)
ALT SERPL-CCNC: 57 U/L (ref 0–32)
ANION GAP SERPL CALCULATED.3IONS-SCNC: 14 MMOL/L (ref 7–16)
AST SERPL-CCNC: 52 U/L (ref 0–31)
BILIRUB SERPL-MCNC: 0.6 MG/DL (ref 0–1.2)
BUN BLDV-MCNC: 57 MG/DL (ref 6–23)
CALCIUM SERPL-MCNC: 9.2 MG/DL (ref 8.6–10.2)
CHLORIDE BLD-SCNC: 92 MMOL/L (ref 98–107)
CO2: 29 MMOL/L (ref 22–29)
CREAT SERPL-MCNC: 4.3 MG/DL (ref 0.5–1)
GFR AFRICAN AMERICAN: 12
GFR NON-AFRICAN AMERICAN: 10 ML/MIN/1.73
GLUCOSE BLD-MCNC: 318 MG/DL (ref 74–99)
HCT VFR BLD CALC: 24.9 % (ref 34–48)
HEMOGLOBIN: 8.3 G/DL (ref 11.5–15.5)
MCH RBC QN AUTO: 31.1 PG (ref 26–35)
MCHC RBC AUTO-ENTMCNC: 33.3 % (ref 32–34.5)
MCV RBC AUTO: 93.3 FL (ref 80–99.9)
METER GLUCOSE: 270 MG/DL (ref 74–99)
METER GLUCOSE: 298 MG/DL (ref 74–99)
METER GLUCOSE: 312 MG/DL (ref 74–99)
METER GLUCOSE: 336 MG/DL (ref 74–99)
PDW BLD-RTO: 12 FL (ref 11.5–15)
PLATELET # BLD: 464 E9/L (ref 130–450)
PMV BLD AUTO: 9.1 FL (ref 7–12)
POTASSIUM SERPL-SCNC: 3.9 MMOL/L (ref 3.5–5)
RBC # BLD: 2.67 E12/L (ref 3.5–5.5)
SODIUM BLD-SCNC: 135 MMOL/L (ref 132–146)
TOTAL PROTEIN: 5.6 G/DL (ref 6.4–8.3)
WBC # BLD: 13.9 E9/L (ref 4.5–11.5)

## 2021-10-23 PROCEDURE — 6360000002 HC RX W HCPCS: Performed by: INTERNAL MEDICINE

## 2021-10-23 PROCEDURE — 1200000000 HC SEMI PRIVATE

## 2021-10-23 PROCEDURE — 2580000003 HC RX 258: Performed by: INTERNAL MEDICINE

## 2021-10-23 PROCEDURE — 6370000000 HC RX 637 (ALT 250 FOR IP): Performed by: INTERNAL MEDICINE

## 2021-10-23 PROCEDURE — 80053 COMPREHEN METABOLIC PANEL: CPT

## 2021-10-23 PROCEDURE — 2140000000 HC CCU INTERMEDIATE R&B

## 2021-10-23 PROCEDURE — 36415 COLL VENOUS BLD VENIPUNCTURE: CPT

## 2021-10-23 PROCEDURE — 2700000000 HC OXYGEN THERAPY PER DAY

## 2021-10-23 PROCEDURE — 94660 CPAP INITIATION&MGMT: CPT

## 2021-10-23 PROCEDURE — 99233 SBSQ HOSP IP/OBS HIGH 50: CPT | Performed by: INTERNAL MEDICINE

## 2021-10-23 PROCEDURE — 82962 GLUCOSE BLOOD TEST: CPT

## 2021-10-23 PROCEDURE — 85027 COMPLETE CBC AUTOMATED: CPT

## 2021-10-23 PROCEDURE — 99232 SBSQ HOSP IP/OBS MODERATE 35: CPT | Performed by: INTERNAL MEDICINE

## 2021-10-23 RX ORDER — INSULIN GLARGINE 100 [IU]/ML
20 INJECTION, SOLUTION SUBCUTANEOUS 2 TIMES DAILY
Status: DISCONTINUED | OUTPATIENT
Start: 2021-10-23 | End: 2021-10-25

## 2021-10-23 RX ORDER — LORAZEPAM 2 MG/ML
1 INJECTION INTRAMUSCULAR EVERY 6 HOURS PRN
Status: DISCONTINUED | OUTPATIENT
Start: 2021-10-23 | End: 2021-10-25

## 2021-10-23 RX ORDER — INSULIN GLARGINE 100 [IU]/ML
20 INJECTION, SOLUTION SUBCUTANEOUS
Status: DISCONTINUED | OUTPATIENT
Start: 2021-10-23 | End: 2021-10-23

## 2021-10-23 RX ADMIN — PROPAFENONE HYDROCHLORIDE 300 MG: 150 TABLET, FILM COATED ORAL at 21:39

## 2021-10-23 RX ADMIN — APIXABAN 5 MG: 5 TABLET, FILM COATED ORAL at 08:21

## 2021-10-23 RX ADMIN — INSULIN HUMAN 6 UNITS: 100 INJECTION, SOLUTION PARENTERAL at 11:21

## 2021-10-23 RX ADMIN — INSULIN LISPRO 3 UNITS: 100 INJECTION, SOLUTION INTRAVENOUS; SUBCUTANEOUS at 16:24

## 2021-10-23 RX ADMIN — APIXABAN 5 MG: 5 TABLET, FILM COATED ORAL at 21:39

## 2021-10-23 RX ADMIN — ATORVASTATIN CALCIUM 40 MG: 40 TABLET, FILM COATED ORAL at 08:21

## 2021-10-23 RX ADMIN — INSULIN LISPRO 5 UNITS: 100 INJECTION, SOLUTION INTRAVENOUS; SUBCUTANEOUS at 21:54

## 2021-10-23 RX ADMIN — Medication 10 ML: at 09:25

## 2021-10-23 RX ADMIN — LORAZEPAM 1 MG: 2 INJECTION INTRAMUSCULAR; INTRAVENOUS at 08:22

## 2021-10-23 RX ADMIN — METOPROLOL TARTRATE 50 MG: 50 TABLET, FILM COATED ORAL at 08:21

## 2021-10-23 RX ADMIN — INSULIN HUMAN 6 UNITS: 100 INJECTION, SOLUTION PARENTERAL at 16:24

## 2021-10-23 RX ADMIN — INSULIN LISPRO 4 UNITS: 100 INJECTION, SOLUTION INTRAVENOUS; SUBCUTANEOUS at 11:21

## 2021-10-23 RX ADMIN — INSULIN LISPRO 4 UNITS: 100 INJECTION, SOLUTION INTRAVENOUS; SUBCUTANEOUS at 08:29

## 2021-10-23 RX ADMIN — INSULIN GLARGINE 20 UNITS: 100 INJECTION, SOLUTION SUBCUTANEOUS at 08:29

## 2021-10-23 RX ADMIN — Medication 10 ML: at 21:46

## 2021-10-23 RX ADMIN — PROPAFENONE HYDROCHLORIDE 300 MG: 150 TABLET, FILM COATED ORAL at 08:21

## 2021-10-23 RX ADMIN — Medication 10 ML: at 08:28

## 2021-10-23 RX ADMIN — LORAZEPAM 1 MG: 2 INJECTION INTRAMUSCULAR; INTRAVENOUS at 16:25

## 2021-10-23 RX ADMIN — INSULIN GLARGINE 20 UNITS: 100 INJECTION, SOLUTION SUBCUTANEOUS at 21:55

## 2021-10-23 RX ADMIN — METOPROLOL TARTRATE 50 MG: 50 TABLET, FILM COATED ORAL at 21:39

## 2021-10-23 RX ADMIN — DEXAMETHASONE SODIUM PHOSPHATE 10 MG: 10 INJECTION INTRAMUSCULAR; INTRAVENOUS at 05:44

## 2021-10-23 ASSESSMENT — PAIN SCALES - GENERAL
PAINLEVEL_OUTOF10: 0
PAINLEVEL_OUTOF10: 0

## 2021-10-23 NOTE — PROGRESS NOTES
Pulmonary [address] [facility]                             Pulmonary Consult/Progress Note :              Reason for Consultation:COVID   CC : SOB   HPI:   On Optiflow ,BiPAP as needed  S/p HD    SOB improved  Seen by nephrology  No chest pain   Weaned to Opti flow and now on 55L and 50 L . Romy Boyer       PHYSICAL EXAMINATION:     VITAL SIGNS:  BP (!) 168/83   Pulse 88   Temp 98 °F (36.7 °C)   Resp 18   Wt 249 lb 9 oz (113.2 kg)   SpO2 97%   BMI 39.09 kg/m²   Wt Readings from Last 3 Encounters:   10/22/21 249 lb 9 oz (113.2 kg)   09/02/21 261 lb 9.6 oz (118.7 kg)   08/06/21 253 lb (114.8 kg)     Temp Readings from Last 3 Encounters:   10/22/21 98 °F (36.7 °C)   09/02/21 97.4 °F (36.3 °C) (Infrared)   08/06/21 97.5 °F (36.4 °C) (Tympanic)     TMAX:  BP Readings from Last 3 Encounters:   10/22/21 (!) 168/83   09/02/21 138/60   08/06/21 (!) 125/56     Pulse Readings from Last 3 Encounters:   10/22/21 88   09/02/21 70   08/06/21 80           INTAKE/OUTPUTS:  I/O last 3 completed shifts:  In: -   Out: 675 [Urine:675]    Intake/Output Summary (Last 24 hours) at 10/22/2021 2322  Last data filed at 10/22/2021 1848  Gross per 24 hour   Intake --   Output 675 ml   Net -675 ml       General Appearance: alert and oriented to person, place and time, well-developed and   well-nourished, in no acute distress   Eyes: pupils equal, round, and reactive to light, extraocular eye movements intact, conjunctivae normal and sclera anicteric   Neck: neck supple and non tender without mass, no thyromegaly, no thyroid nodules and no cervical adenopathy   Pulmonary/Chest:Rhonchi   Cardiovascular: normal rate, regular rhythm, normal S1 and S2, no murmurs, rubs, clicks or gallops, distal pulses intact, no carotid bruits, no murmurs, no gallops, no carotid bruits and no JVD   Abdomen: obese, soft, non-tender, non-distended, normal bowel sounds, no masses or organomegaly   Extremities:no edema ,no cyanosis Musculoskeletal: normal range of motion, no joint swelling, deformity or tenderness   Neurologic: reflexes normal and symmetric, no cranial nerve deficit noted    LABS/IMAGING:    CBC:  Lab Results   Component Value Date    WBC 8.7 10/22/2021    HGB 9.5 (L) 10/22/2021    HCT 29.2 (L) 10/22/2021    MCV 90.7 10/22/2021     (H) 10/22/2021    LYMPHOPCT 4.3 (L) 10/21/2021    RBC 3.22 (L) 10/22/2021    MCH 29.5 10/22/2021    MCHC 32.5 10/22/2021    RDW 12.1 10/22/2021    NEUTOPHILPCT 81.7 (H) 10/21/2021    MONOPCT 11.3 10/21/2021    BASOPCT 0.1 10/21/2021    NEUTROABS 6.89 10/21/2021    LYMPHSABS 0.33 (L) 10/21/2021    MONOSABS 0.90 10/21/2021    EOSABS 0.00 (L) 10/21/2021    BASOSABS 0.00 10/21/2021       Recent Labs     10/22/21  0635 10/21/21  0805 10/21/21  0702   WBC 8.7 8.2 7.9   HGB 9.5* 8.1* 7.9*   HCT 29.2* 24.3* 23.0*   MCV 90.7 91.4 90.6   * 487* 471*       BMP:   Recent Labs     10/20/21  0558 10/20/21  1404 10/21/21  0702 10/21/21  0805 10/22/21  0635      < > 134 139 139   K 3.9   < > 3.5 3.7 3.7   CL 95*   < > 90* 94* 91*   CO2 21*   < > 29 26 33*   PHOS 7.3*  --   --   --   --    *   < > 91* 92* 71*   CREATININE 9.5*   < > 6.5* 6.4* 4.9*    < > = values in this interval not displayed. MG:   Lab Results   Component Value Date    MG 2.5 10/20/2021     Ca/Phos:   Lab Results   Component Value Date    CALCIUM 9.6 10/22/2021    PHOS 7.3 (H) 10/20/2021     Amylase: No results found for: AMYLASE  Lipase: No results found for: LIPASE  LIVER PROFILE:   Recent Labs     10/20/21  0558 10/21/21  0702 10/21/21  0805   AST 27 38* 39*   ALT 22 30 30   BILITOT 0.3 0.3 0.3   ALKPHOS 111* 102 108*       PT/INR:   Recent Labs     10/20/21  0558   PROTIME 16.8*   INR 1.5     APTT: No results for input(s): APTT in the last 72 hours.     Cardiac Enzymes:  Lab Results   Component Value Date    TROPONINI <0.01 11/05/2018               PROBLEM LIST:  Patient Active Problem List   Diagnosis    Moderate episode of recurrent major depressive disorder (HCC)    Generalized anxiety disorder    Insomnia due to mental condition    Anemia    Essential hypertension    CKD (chronic kidney disease) stage 5, GFR less than 15 ml/min (HCC)    Atrial fibrillation (HCC)    Pure hypercholesterolemia    Morbid obesity (HCC)    Chronic obstructive pulmonary disease (HCC)    Obstructive sleep apnea    Exertional dyspnea    History of colon polyps    Family history of rectal cancer    Encounter regarding vascular access for dialysis for end-stage renal disease ([facility] [address]    COVID-19    Pneumonia due to COVID-19 virus               ASSESSMENT:  1.) COVID pneumonia   2. )Acute hypoxic respiratory failure   3. )ESRD  4.)A fib   5. )Anemia   6- Possible Pneumonia     PLAN:  *-seems doing better and weaning O2  *- HD seems helped a lot   *-CXR in am   *-Decadron ,watch BS,need aggressive  Insulin therapy ,defer to primary ,need to adjust Insulin  *-repeat CRP came very low ,will hold on Toci ,also she has drop in Hb more than 3 g   ,ID On remedisvir ,ID following   *_Patient can get worse if so we will transfer to the medical ICU  Aggressive pulmonary toilet  Albuterol as needed  *budesonide   *_on elquis  *-Hb stable ,anemia per primary  Sugar control as per primary   IV abx ,rocophine,doxy  Check legionella neg  Check strep neg   discuss with daughter     Discussed with dr Steven Gamino with her daughter yesterday and will update once I get chance    Erica Alberts MD,[facility]  Pulmonary&Critical Care Medicine   Director of [address] [place] [address] Director of [address] [place] [address]    Keiko Aguilera    NOTE: This report was transcribed using voice recognition software. Every effort was made to ensure accuracy; however, inadvertent computerized transcription errors may be present.

## 2021-10-23 NOTE — PROGRESS NOTES
Patient on bibab through the night.  Attempted to change to heated high flow pt sated into the 80 and dropped to the 70's pt placed back on Bipap 94

## 2021-10-23 NOTE — PROGRESS NOTES
[facility] Roberto A. Alanda Rouse, MD Tessa Spatz, MD Katherin Cortez, MD Mary Ocampo, REX Hatch, HARRY  Progress note   Patient's Name: Jermaine Bergeron  2:18 PM  10/23/2021    Subjective :  10/20 :pt known to me from office . Has advanced ckd and has a recent AVF placed in    stable bp , feels better . 1th HD session today   10/21: Patient was seen, interviewed and examined while undergoing her second hemodialysis treatment today. Nurses were using 17-gauge needles to cannulate her arm for her 2-1/2-hour dialysis today. She is scheduled for her third treatment tomorrow with higher blood flow attempting to go to 250-300 mill per minute. 10/22: Patient was seen, interviewed and examined while undergoing her third hemodialysis treatment today. She has no new complaints, she was confirming that she feels much better terms of breathing and overall condition. Case was discussed with dialysis nurse, labs reviewed, all other consults reviewed. 10/23: Patient was seen in her room, she was arousable with face max oxygen with acute distress reported overnight per nursing staff. Next dialysis will be done  Monday, MWF schedule. History of Present Ilness:    Ms. Enrique Lowery is a 71-year-old woman with advanced chronic kidney disease, stage V with a baseline creatinine 4.8 to 5.3 mg/dL, secondary to diabetic nephropathy, renal microvascular atherosclerotic disease. She is followed longitudinally from nephrology standpoint by Dr. Katherin Cortez. She presented to the emergency department yesterday with a roughly 2-week history of progressive dyspnea, intermittent cough at times productive of thinnish phlegm but not routinely, fever, chills, diaphoresis, anosmia and dysgeusia, progressive generalized weakness. Intake of food and fluid has been quite poor in particular over the past several days.   She denies lightheadedness, chest pain or palpitations, abdominal pain or cramping, diarrhea or constipation, dysuria hematuria change in bowel habits of late. On presentation O2 saturations were in the 70s, she was started on nonrebreather mask. She notes that she has been breathing more or less comfortably throughout most of today, and she has been oxygenating well. BUN and creatinine on presentation were 95 and 9.4, respectively (10/18) which compares with BUN/creatinine 57 and 5.3, respectively on (9/2). BUN/creatinine this morning 105 and 9.1, respectively. HCO3 is 15 mmol/L. At the time of my initial evaluation this early evening she was resting more or less comfortably on her gurney in the ER. She notes that she feels a good deal better now than he did the time of presentation. She is able to speak in complete sentences.     Past Medical History:   Diagnosis Date    Atrial fibrillation ([address]     Bladder cancer ([address]     Cancer ([address]     CKD (chronic kidney disease)     CKD (chronic kidney disease) stage 5, GFR less than 15 ml/min ([address] 11/6/2018    Diabetes ([address]     Hypertension     Obesity     260 #    Sleep apnea        Past Surgical History:   Procedure Laterality Date    CARDIAC CATHETERIZATION Left 10/02/2008    @ [facility]    CHOLECYSTECTOMY      COLONOSCOPY      COLONOSCOPY N/A 8/29/2019    mid ascending colon polyp bx/cauterization (inflammatory polyp), proximal transverse colon polyp bx/cauterized (tubular adenoma), descending colon polyp 80 cm from anus bx/cauterized (hyperplastic polyp), sigmoid colon polyp 35 cm from anus bx/cauterized (tubular adenoma), sigmoid colon polyps 30 cm from anus bx/cauterized (tubular adenoma), Dr. Jorge Draper, [facility]    COLONOSCOPY  8/29/2019    mid ascending colon polyp bx/cauterization (inflammatory polyp), proximal transverse colon polyp bx/cauterized (tubular adenoma), descending colon polyp 80 cm from anus bx/cauterized (hyperplastic polyp), sigmoid colon polyp 35 cm from anus bx/cauterized (tubular adenoma), sigmoid colon polyps 30 cm from anus bx/cauterized (tubular adenoma), Dr. Irais Madrid, [facility]    DIALYSIS FISTULA CREATION Left 1/22/2019    LIGATIION LEFT LEFT UPPER EXTREMITY OF AV FISTULA , EVACUATION HEMATOMA performed by Simon Ag MD at [address] Right 12/7/2020    AV FISTULA CREATION -- RIGHT ARM performed by Lawyer Jayme MD at [address] Right 4/8/2021    AV FISTULA  REVISION RIGHT ARM performed by Lawyer Jayme MD at [facility] [address] Left 9/18/2018    AV FISTULA  LEFT ARM performed by Simon Ag MD at [facility] [address] Left 11/14/2018    SUPERFICIALIZATION AV FISTULA  LEFT ARM , LIGATION TRIBUTORY AV FISTULA LEFT ARM performed by Simon Ag MD at [address] [facility]         Family History   Problem Relation Age of Onset    Cancer Mother         bladder    Diabetes Mother     COPD Mother     Depression Father     Diabetes Father     Heart Disease Father     High Blood Pressure Father     Stroke Father     Prostate Cancer Father     Dementia Father     Alcohol Abuse Brother     Diabetes Brother     Bipolar Disorder Paternal Uncle     Anxiety Disorder Daughter     Colon Cancer Maternal Grandfather         reports that she quit smoking about 7 months ago. Her smoking use included cigarettes. She has a 21.50 pack-year smoking history. She has never used smokeless tobacco. She reports previous alcohol use. She reports that she does not use drugs.     Allergies:  Adhesive tape and Penicillins    Current Medications:    LORazepam (ATIVAN) injection 1 mg, Q6H PRN  insulin glargine (LANTUS) injection vial 20 Units, Daily with breakfast  insulin regular (HUMULIN R;NOVOLIN R) injection 6 Units, TID AC  propafenone (RYTHMOL) tablet 300 mg, BID  apixaban (ELIQUIS) tablet 5 mg, BID  atorvastatin (LIPITOR) tablet 40 mg, Daily  calcitRIOL (ROCALTROL) capsule 0.5 mcg, Once per day erythema consistent with chronic venous stasis, no pitting edema.   No other rash or lesion on visible portions of integument  Pulses 1+x4  Moves all 4 extremities  No asterixis  Cranial nerves II through XII grossly intact  Awake, alert, interactive and appropriate      Data:   Labs:  CBC with Differential:    Lab Results   Component Value Date    WBC 13.9 10/23/2021    RBC 2.67 10/23/2021    HGB 8.3 10/23/2021    HCT 24.9 10/23/2021     10/23/2021    MCV 93.3 10/23/2021    MCH 31.1 10/23/2021    MCHC 33.3 10/23/2021    RDW 12.0 10/23/2021    METASPCT 1.7 10/21/2021    LYMPHOPCT 4.3 10/21/2021    MONOPCT 11.3 10/21/2021    MYELOPCT 0.9 10/21/2021    BASOPCT 0.1 10/21/2021    MONOSABS 0.90 10/21/2021    LYMPHSABS 0.33 10/21/2021    EOSABS 0.00 10/21/2021    BASOSABS 0.00 10/21/2021     CMP:    Lab Results   Component Value Date     10/23/2021    K 3.9 10/23/2021    K 4.1 04/08/2021    CL 92 10/23/2021    CO2 29 10/23/2021    BUN 57 10/23/2021    CREATININE 4.3 10/23/2021    GFRAA 12 10/23/2021    LABGLOM 10 10/23/2021    GLUCOSE 318 10/23/2021    PROT 5.6 10/23/2021    LABALBU 3.0 10/23/2021    CALCIUM 9.2 10/23/2021    BILITOT 0.6 10/23/2021    ALKPHOS 101 10/23/2021    AST 52 10/23/2021    ALT 57 10/23/2021     Ionized Calcium:  No results found for: IONCA  Magnesium:    Lab Results   Component Value Date    MG 2.5 10/20/2021     Phosphorus:    Lab Results   Component Value Date    PHOS 7.3 10/20/2021     U/A:    Lab Results   Component Value Date    NITRITE neg 03/14/2019    COLORU Yellow 05/24/2019    PHUR 6.0 05/24/2019    WBCUA 0-1 05/24/2019    RBCUA NONE 05/24/2019    BACTERIA RARE 05/24/2019    CLARITYU Clear 05/24/2019    SPECGRAV 1.010 05/24/2019    LEUKOCYTESUR TRACE 05/24/2019    UROBILINOGEN 0.2 05/24/2019    BILIRUBINUR Negative 05/24/2019    BILIRUBINUR neg 03/14/2019    BLOODU Negative 05/24/2019    GLUCOSEU Negative 05/24/2019     Microalbumen/Creatinine ratio:  No components found for: RUCREAT  Iron Saturation:  No components found for: PERCENTFE  TIBC:    Lab Results   Component Value Date    TIBC 403 04/10/2019     FERRITIN:    Lab Results   Component Value Date    FERRITIN 16 11/07/2018        Imaging:  XR CHEST PORTABLE   Final Result   Persistent widespread bilateral infiltrates. The no significant changes   since the October 18. The heart appears to be enlarged. XR CHEST PORTABLE   Final Result   1. Extensive multifocal bilateral pneumonia. Assessment  Admitted with pneumonia due to COVID-19 virus. 1. Acute kidney injury, likely hemodynamically mediated due to volume contraction associated with poor intake due to anorexia, increase insensible losses due to tachypnea, fever and diaphoresis. Currently nonoliguric. 2. Chronic kidney disease stage V, baseline creatinine 4.8 to 5.3 mg/dL with at least the past 4 months. Etiology is diabetic nephropathy, renal microvascular atherosclerotic disease  3. Metabolic acidosis, wide anion gap, secondary to acute kidney injury superimposed on chronic kidney disease  4. Anemia secondary to advanced chronic kidney disease. No recent melena or hematochezia  5. Secondary hyperparathyroidism/mineral bone disease due to advanced CKD. On low-dose calcitriol Mondays Wednesdays and Fridays. 6. Morbid obesity, BMI 41.5 kg/m²    PLAN :    Progressive azotemia in patient with advanced CKD V at baseline   Plan: third HD session via AVF today. Next HD treatment in am   Follow clinical condition daily with lab reviews.           Electronically signed by Luiza Mcelroy MD on 10/23/2021

## 2021-10-23 NOTE — PROGRESS NOTES
Hospitalist Progress Note      SYNOPSIS: Patient admitted on 10/18/2021 for shortness of breath    Patient with significant past medical history came to our hospital with chief complaint of worsening shortness of breath for last 2 weeks and was found to have acute hypoxemic respiratory failure requiring high flow oxygen as well as acute kidney injury on her chronic kidney disease stage V. In the ER, patient was found to have atrial fibrillation with heart rate episodes up to 150s but no persistent rapid ventricular rate. She was positive for COVID-19. Other significant finding was acute kidney injury with creatinine of 9.4, compared to her baseline of 5.2. Her BNP was also elevated to 4850. ER physician discussed with nephrology who recommended low-dose bicarb drip and close management. She also required AIRVO in the ER with oxygen about 30 L. Over the next 48 hours her renal function remained stable without requiring any dialysis. She was seen by pulmonology and started on IV ceftriaxone and doxycycline to cover for community-acquired pneumonia. Ceftriaxone and doxycycline discontinued on 10/21 by ID. Patient was started on hemodialysis on 10/21. SUBJECTIVE:    Patient seen and examined in her room. Patient remains on BiPAP. No major overnight events. Patient denies any worsening shortness of breath, nausea, vomiting. Records reviewed. Stable overnight. No other overnight issues reported. Temp (24hrs), Av.9 °F (36.6 °C), Min:97.8 °F (36.6 °C), Max:98 °F (36.7 °C)    DIET: ADULT DIET; Regular; 3 carb choices (45 gm/meal); Low Sodium (2 gm); Low Potassium (Less than 3000 mg/day);  Low Phosphorus (Less than 1000 mg)  CODE: Full Code    Intake/Output Summary (Last 24 hours) at 10/23/2021 0809  Last data filed at 10/22/2021 1848  Gross per 24 hour   Intake --   Output 250 ml   Net -250 ml       OBJECTIVE:    BP (!) 148/68   Pulse 98   Temp 98 °F (36.7 °C) (Temporal)   Resp 18   Wt 249 lb 9 oz (113.2 kg)   SpO2 99%   BMI 39.09 kg/m²     General appearance: No apparent distress, appears stated age and cooperative. HEENT:  Conjunctivae/corneas clear. Neck: Supple. No jugular venous distention. Respiratory: Clear to auscultation bilaterally, normal respiratory effort. Bilateral inspiratory crackles noted. Cardiovascular: Regular rate rhythm, normal S1-S2  Abdomen: Soft, nontender, nondistended  Musculoskeletal: No clubbing, cyanosis, no bilateral lower extremity edema. Brisk capillary refill. Skin:  No rashes  on visible skin  Neurologic: awake, alert and following commands. Barrow catheter noted.     ASSESSMENT & PLAN:    Sepsis  Secondary to COVID-19 pneumonia  See management below     Acute hypoxemic respiratory failure  Severe hypoxia, use of excessive respiratory muscles, chest x-ray positive for bilateral infiltrates, requiring high flow oxygen  COVID-19 pneumonia  Bilateral airspace disease on chest x-ray  For Covid maintain on dexamethasone 10 mg IV daily, started on 10/18  Consult pulmonology, waiting for recommendations  Currently on BiPAP, started on 10/22  Pulmonology, follow recommendation  Consulted ID on family request: Do not recommend ivermectin that family requested  Started on Ativan as needed for agitation on BiPAP    Community-acquired pneumonia  Bilateral infiltrates, elevated procalcitonin of 1.04  Started on IV ceftriaxone and doxycycline on 10/19, discontinued on 10/21 as per ID recommendations     Acute kidney injury on chronic kidney disease  Nonoliguric LEW, secondary to COVID-19 pneumonia  Baseline creatinine 5.2  Admitted with creatinine 9.5, remained stable at about 9.5  Started hemodialysis via right upper arm AV fistula on 10/21  Discontinued IV bicarb on 10/22  Start oral sodium bicarbonate once patient starts becoming acidotic, currently bicarb 33    CKD stage V  Presumably secondary to diabetic nephropathy  Patient has right arm AV fistula, with good thrill and bruit  Started hemodialysis on 10/21     Elevated BNP  No previous history of heart failure  EF 65% in August 2019 without any systolic or diastolic dysfunction  Elevated BNP of 4850, likely due to LEW on CKD  Patient on home dose torsemide, hold for now because of LEW and without any obvious fluid overload     Atrial fibrillation  Not in rapid ventricular rate  Resume home dose metoprolol and Eliquis  Monitor closely     Diabetes mellitus type 2  Home dose glipizide held  Maintain on sliding scale insulin  Diabetes poorly controlled because of steroids  Consult endocrinology for diabetes management  Meanwhile start on Lantus 20 units with breakfast and Humalog 6 units 3 times daily along with sliding scale     Hyperlipidemia  Continue statins     End-of-life care discussion  Full code for now       DISPOSITION:   Unclear discharge disposition at the moment.     Medications:  REVIEWED DAILY    Infusion Medications    sodium chloride      dextrose       Scheduled Medications    propafenone  300 mg Oral BID    apixaban  5 mg Oral BID    atorvastatin  40 mg Oral Daily    calcitRIOL  0.5 mcg Oral Once per day on Mon Wed Fri    metoprolol tartrate  50 mg Oral BID    sodium chloride flush  5-40 mL IntraVENous 2 times per day    insulin lispro  0-6 Units SubCUTAneous TID WC    insulin lispro  0-3 Units SubCUTAneous Nightly    dexamethasone  10 mg IntraVENous Q24H    insulin glargine  10 Units SubCUTAneous Nightly    sodium bicarbonate  50 mEq IntraVENous Once     PRN Meds: LORazepam, sodium chloride flush, sodium chloride, ondansetron **OR** ondansetron, polyethylene glycol, acetaminophen **OR** acetaminophen, glucose, dextrose, glucagon (rDNA), dextrose    Labs:     Recent Labs     10/21/21  0805 10/22/21  0635 10/23/21  0658   WBC 8.2 8.7 13.9*   HGB 8.1* 9.5* 8.3*   HCT 24.3* 29.2* 24.9*   * 530* 464*       Recent Labs     10/21/21  0805 10/22/21  0635 10/23/21  0658    139 135   K 3.7 3.7 3.9   CL 94* 91* 92*   CO2 26 33* 29   BUN 92* 71* 57*   CREATININE 6.4* 4.9* 4.3*   CALCIUM 9.4 9.6 9.2       Recent Labs     10/21/21  0702 10/21/21  0805 10/23/21  0658   PROT 5.7* 6.3* 5.6*   ALKPHOS 102 108* 101   ALT 30 30 57*   AST 38* 39* 52*   BILITOT 0.3 0.3 0.6       No results for input(s): INR in the last 72 hours. No results for input(s): Magdalene Kim in the last 72 hours. Chronic labs:    Lab Results   Component Value Date    CHOL 172 11/06/2018    TRIG 211 (H) 11/06/2018    HDL 37 11/06/2018    LDLCALC 93 11/06/2018    TSH 0.804 01/31/2019    INR 1.5 10/20/2021    LABA1C 6.7 (H) 10/19/2021       Radiology: REVIEWED DAILY    +++++++++++++++++++++++++++++++++++++++++++++++++  Darline Coronado MD  [facility] Physician - [address], [place]  +++++++++++++++++++++++++++++++++++++++++++++++++  NOTE: This report was transcribed using voice recognition software. Every effort was made to ensure accuracy; however, inadvertent computerized transcription errors may be present.

## 2021-10-24 LAB
ALBUMIN SERPL-MCNC: 3.1 G/DL (ref 3.5–5.2)
ALP BLD-CCNC: 96 U/L (ref 35–104)
ALT SERPL-CCNC: 54 U/L (ref 0–32)
ANION GAP SERPL CALCULATED.3IONS-SCNC: 16 MMOL/L (ref 7–16)
AST SERPL-CCNC: 33 U/L (ref 0–31)
BILIRUB SERPL-MCNC: 0.5 MG/DL (ref 0–1.2)
BUN BLDV-MCNC: 80 MG/DL (ref 6–23)
CALCIUM SERPL-MCNC: 9.6 MG/DL (ref 8.6–10.2)
CHLORIDE BLD-SCNC: 94 MMOL/L (ref 98–107)
CO2: 30 MMOL/L (ref 22–29)
CREAT SERPL-MCNC: 5.5 MG/DL (ref 0.5–1)
GFR AFRICAN AMERICAN: 9
GFR NON-AFRICAN AMERICAN: 8 ML/MIN/1.73
GLUCOSE BLD-MCNC: 188 MG/DL (ref 74–99)
HCT VFR BLD CALC: 24 % (ref 34–48)
HEMOGLOBIN: 7.9 G/DL (ref 11.5–15.5)
MCH RBC QN AUTO: 29.9 PG (ref 26–35)
MCHC RBC AUTO-ENTMCNC: 32.9 % (ref 32–34.5)
MCV RBC AUTO: 90.9 FL (ref 80–99.9)
METER GLUCOSE: 154 MG/DL (ref 74–99)
METER GLUCOSE: 189 MG/DL (ref 74–99)
METER GLUCOSE: 189 MG/DL (ref 74–99)
METER GLUCOSE: 216 MG/DL (ref 74–99)
PDW BLD-RTO: 12 FL (ref 11.5–15)
PLATELET # BLD: 452 E9/L (ref 130–450)
PMV BLD AUTO: 9.6 FL (ref 7–12)
POTASSIUM SERPL-SCNC: 3.9 MMOL/L (ref 3.5–5)
RBC # BLD: 2.64 E12/L (ref 3.5–5.5)
SODIUM BLD-SCNC: 140 MMOL/L (ref 132–146)
TOTAL PROTEIN: 6.2 G/DL (ref 6.4–8.3)
WBC # BLD: 14.4 E9/L (ref 4.5–11.5)

## 2021-10-24 PROCEDURE — 2700000000 HC OXYGEN THERAPY PER DAY

## 2021-10-24 PROCEDURE — 80053 COMPREHEN METABOLIC PANEL: CPT

## 2021-10-24 PROCEDURE — 2140000000 HC CCU INTERMEDIATE R&B

## 2021-10-24 PROCEDURE — 6370000000 HC RX 637 (ALT 250 FOR IP): Performed by: INTERNAL MEDICINE

## 2021-10-24 PROCEDURE — 2580000003 HC RX 258: Performed by: INTERNAL MEDICINE

## 2021-10-24 PROCEDURE — 99232 SBSQ HOSP IP/OBS MODERATE 35: CPT | Performed by: INTERNAL MEDICINE

## 2021-10-24 PROCEDURE — 94660 CPAP INITIATION&MGMT: CPT

## 2021-10-24 PROCEDURE — 82962 GLUCOSE BLOOD TEST: CPT

## 2021-10-24 PROCEDURE — 36415 COLL VENOUS BLD VENIPUNCTURE: CPT

## 2021-10-24 PROCEDURE — 6360000002 HC RX W HCPCS: Performed by: INTERNAL MEDICINE

## 2021-10-24 PROCEDURE — 85027 COMPLETE CBC AUTOMATED: CPT

## 2021-10-24 RX ORDER — DEXAMETHASONE SODIUM PHOSPHATE 10 MG/ML
6 INJECTION, SOLUTION INTRAMUSCULAR; INTRAVENOUS EVERY 24 HOURS
Status: DISCONTINUED | OUTPATIENT
Start: 2021-10-25 | End: 2021-10-25

## 2021-10-24 RX ADMIN — INSULIN LISPRO 3 UNITS: 100 INJECTION, SOLUTION INTRAVENOUS; SUBCUTANEOUS at 17:13

## 2021-10-24 RX ADMIN — PROPAFENONE HYDROCHLORIDE 300 MG: 150 TABLET, FILM COATED ORAL at 19:58

## 2021-10-24 RX ADMIN — LORAZEPAM 1 MG: 2 INJECTION INTRAMUSCULAR; INTRAVENOUS at 07:51

## 2021-10-24 RX ADMIN — INSULIN LISPRO 3 UNITS: 100 INJECTION, SOLUTION INTRAVENOUS; SUBCUTANEOUS at 07:58

## 2021-10-24 RX ADMIN — DEXAMETHASONE SODIUM PHOSPHATE 10 MG: 10 INJECTION INTRAMUSCULAR; INTRAVENOUS at 04:11

## 2021-10-24 RX ADMIN — INSULIN LISPRO 6 UNITS: 100 INJECTION, SOLUTION INTRAVENOUS; SUBCUTANEOUS at 11:59

## 2021-10-24 RX ADMIN — INSULIN GLARGINE 20 UNITS: 100 INJECTION, SOLUTION SUBCUTANEOUS at 20:08

## 2021-10-24 RX ADMIN — PROPAFENONE HYDROCHLORIDE 300 MG: 150 TABLET, FILM COATED ORAL at 07:46

## 2021-10-24 RX ADMIN — INSULIN LISPRO 8 UNITS: 100 INJECTION, SOLUTION INTRAVENOUS; SUBCUTANEOUS at 17:13

## 2021-10-24 RX ADMIN — INSULIN LISPRO 2 UNITS: 100 INJECTION, SOLUTION INTRAVENOUS; SUBCUTANEOUS at 20:09

## 2021-10-24 RX ADMIN — Medication 10 ML: at 07:46

## 2021-10-24 RX ADMIN — Medication 10 ML: at 20:03

## 2021-10-24 RX ADMIN — INSULIN LISPRO 8 UNITS: 100 INJECTION, SOLUTION INTRAVENOUS; SUBCUTANEOUS at 07:58

## 2021-10-24 RX ADMIN — METOPROLOL TARTRATE 50 MG: 50 TABLET, FILM COATED ORAL at 07:46

## 2021-10-24 RX ADMIN — ATORVASTATIN CALCIUM 40 MG: 40 TABLET, FILM COATED ORAL at 07:46

## 2021-10-24 RX ADMIN — APIXABAN 5 MG: 5 TABLET, FILM COATED ORAL at 07:46

## 2021-10-24 RX ADMIN — APIXABAN 5 MG: 5 TABLET, FILM COATED ORAL at 19:59

## 2021-10-24 RX ADMIN — METOPROLOL TARTRATE 50 MG: 50 TABLET, FILM COATED ORAL at 19:58

## 2021-10-24 RX ADMIN — INSULIN LISPRO 8 UNITS: 100 INJECTION, SOLUTION INTRAVENOUS; SUBCUTANEOUS at 11:59

## 2021-10-24 RX ADMIN — INSULIN GLARGINE 20 UNITS: 100 INJECTION, SOLUTION SUBCUTANEOUS at 07:58

## 2021-10-24 ASSESSMENT — PAIN - FUNCTIONAL ASSESSMENT: PAIN_FUNCTIONAL_ASSESSMENT: ACTIVITIES ARE NOT PREVENTED

## 2021-10-24 ASSESSMENT — PAIN SCALES - GENERAL
PAINLEVEL_OUTOF10: 0
PAINLEVEL_OUTOF10: 4

## 2021-10-24 ASSESSMENT — PAIN DESCRIPTION - LOCATION: LOCATION: HEAD

## 2021-10-24 ASSESSMENT — PAIN DESCRIPTION - ONSET: ONSET: ON-GOING

## 2021-10-24 ASSESSMENT — PAIN DESCRIPTION - FREQUENCY: FREQUENCY: INTERMITTENT

## 2021-10-24 ASSESSMENT — PAIN DESCRIPTION - PROGRESSION: CLINICAL_PROGRESSION: NOT CHANGED

## 2021-10-24 ASSESSMENT — PAIN DESCRIPTION - PAIN TYPE: TYPE: ACUTE PAIN

## 2021-10-24 ASSESSMENT — PAIN DESCRIPTION - DESCRIPTORS: DESCRIPTORS: HEADACHE

## 2021-10-24 NOTE — PROGRESS NOTES
Patient removed BiPAP herself, oxygen saturation between 79-82%.  Patient verbalized wanting to eat breakfast, switched over to high flow nasal cannula at 15 liters for breakfast oxygen saturation between 92-97%

## 2021-10-24 NOTE — PROGRESS NOTES
[facility] MD Tavo Gee, MD Stephan Ryder, MD Nereyda Valencia, MD Wes Ornelas, CNP   Sheela Lion, HARRY  Progress note   Patient's Name: Jn Awad  2:26 PM  10/24/2021    Subjective :  10/20 :pt known to me from office . Has advanced ckd and has a recent AVF placed in    stable bp , feels better . 1th HD session today   10/21: Patient was seen, interviewed and examined while undergoing her second hemodialysis treatment today. Nurses were using 17-gauge needles to cannulate her arm for her 2-1/2-hour dialysis today. She is scheduled for her third treatment tomorrow with higher blood flow attempting to go to 250-300 mill per minute. 10/22: Patient was seen, interviewed and examined while undergoing her third hemodialysis treatment today. She has no new complaints, she was confirming that she feels much better terms of breathing and overall condition. Case was discussed with dialysis nurse, labs reviewed, all other consults reviewed. 10/23: Patient was seen in her room, she was arousable with face max oxygen with acute distress reported overnight per nursing staff. Next dialysis will be done  Monday, MWF schedule. 10/24: Patient was seen and interviewed in her room, clinically doing much better. Dialysis scheduled for tomorrow. History of Present Ilness:    Ms. Kristen Sacks is a 27-year-old woman with advanced chronic kidney disease, stage V with a baseline creatinine 4.8 to 5.3 mg/dL, secondary to diabetic nephropathy, renal microvascular atherosclerotic disease. She is followed longitudinally from nephrology standpoint by Dr. Stephan Ryder. She presented to the emergency department yesterday with a roughly 2-week history of progressive dyspnea, intermittent cough at times productive of thinnish phlegm but not routinely, fever, chills, diaphoresis, anosmia and dysgeusia, progressive generalized weakness.   Intake of food and fluid has been quite poor in particular over the past several days. She denies lightheadedness, chest pain or palpitations, abdominal pain or cramping, diarrhea or constipation, dysuria hematuria change in bowel habits of late. On presentation O2 saturations were in the 70s, she was started on nonrebreather mask. She notes that she has been breathing more or less comfortably throughout most of today, and she has been oxygenating well. BUN and creatinine on presentation were 95 and 9.4, respectively (10/18) which compares with BUN/creatinine 57 and 5.3, respectively on (9/2). BUN/creatinine this morning 105 and 9.1, respectively. HCO3 is 15 mmol/L. At the time of my initial evaluation this early evening she was resting more or less comfortably on her gurney in the ER. She notes that she feels a good deal better now than he did the time of presentation. She is able to speak in complete sentences.     Past Medical History:   Diagnosis Date    Atrial fibrillation ([address]     Bladder cancer ([address]     Cancer ([address]     CKD (chronic kidney disease)     CKD (chronic kidney disease) stage 5, GFR less than 15 ml/min ([address] 11/6/2018    Diabetes ([address]     Hypertension     Obesity     260 #    Sleep apnea        Past Surgical History:   Procedure Laterality Date    CARDIAC CATHETERIZATION Left 10/02/2008    @ [facility]    CHOLECYSTECTOMY      COLONOSCOPY      COLONOSCOPY N/A 8/29/2019    mid ascending colon polyp bx/cauterization (inflammatory polyp), proximal transverse colon polyp bx/cauterized (tubular adenoma), descending colon polyp 80 cm from anus bx/cauterized (hyperplastic polyp), sigmoid colon polyp 35 cm from anus bx/cauterized (tubular adenoma), sigmoid colon polyps 30 cm from anus bx/cauterized (tubular adenoma), Dr. Alexander De Leon, [facility]    COLONOSCOPY  8/29/2019    mid ascending colon polyp bx/cauterization (inflammatory polyp), proximal transverse colon polyp bx/cauterized (tubular adenoma), descending colon polyp 80 cm from anus bx/cauterized (hyperplastic polyp), sigmoid colon polyp 35 cm from anus bx/cauterized (tubular adenoma), sigmoid colon polyps 30 cm from anus bx/cauterized (tubular adenoma), Dr. Irais Madrid, [facility]    DIALYSIS FISTULA CREATION Left 1/22/2019    LIGATIION LEFT LEFT UPPER EXTREMITY OF AV FISTULA , EVACUATION HEMATOMA performed by Simon Ag MD at [address] [place] [address] Right 12/7/2020    AV FISTULA CREATION -- RIGHT ARM performed by Lawyer Jayme MD at [address] [place] [address] Right 4/8/2021    AV FISTULA  REVISION RIGHT ARM performed by Lawyer Jayme MD at [place] [address] Left 9/18/2018    AV FISTULA  LEFT ARM performed by Simon Ag MD at [place] [address] Left 11/14/2018    SUPERFICIALIZATION AV FISTULA  LEFT ARM , LIGATION TRIBUTORY AV FISTULA LEFT ARM performed by Simon Ag MD at [place] [address]         Family History   Problem Relation Age of Onset    Cancer Mother         bladder    Diabetes Mother     COPD Mother     Depression Father     Diabetes Father     Heart Disease Father     High Blood Pressure Father     Stroke Father     Prostate Cancer Father     Dementia Father     Alcohol Abuse Brother     Diabetes Brother     Bipolar Disorder Paternal Uncle     Anxiety Disorder Daughter     Colon Cancer Maternal Grandfather         reports that she quit smoking about 7 months ago. Her smoking use included cigarettes. She has a 21.50 pack-year smoking history. She has never used smokeless tobacco. She reports previous alcohol use. She reports that she does not use drugs.     Allergies:  Adhesive tape and Penicillins    Current Medications:    [START ON 10/25/2021] dexamethasone (PF) (DECADRON) injection 6 mg, Q24H  LORazepam (ATIVAN) injection 1 mg, Q6H PRN  insulin glargine (LANTUS) injection vial 20 Units, BID  insulin lispro (HUMALOG) injection vial 0-18 Units, TID WC  insulin lispro (HUMALOG) injection vial 0-9 Units, Nightly  insulin lispro (HUMALOG) injection vial 8 Units, TID WC  propafenone (RYTHMOL) tablet 300 mg, BID  apixaban (ELIQUIS) tablet 5 mg, BID  atorvastatin (LIPITOR) tablet 40 mg, Daily  calcitRIOL (ROCALTROL) capsule 0.5 mcg, Once per day on Mon Wed Fri  metoprolol tartrate (LOPRESSOR) tablet 50 mg, BID  sodium chloride flush 0.9 % injection 5-40 mL, 2 times per day  sodium chloride flush 0.9 % injection 5-40 mL, PRN  0.9 % sodium chloride infusion, PRN  ondansetron (ZOFRAN-ODT) disintegrating tablet 4 mg, Q8H PRN   Or  ondansetron (ZOFRAN) injection 4 mg, Q6H PRN  polyethylene glycol (GLYCOLAX) packet 17 g, Daily PRN  acetaminophen (TYLENOL) tablet 650 mg, Q6H PRN   Or  acetaminophen (TYLENOL) suppository 650 mg, Q6H PRN  glucose (GLUTOSE) 40 % oral gel 15 g, PRN  dextrose 50 % IV solution, PRN  glucagon (rDNA) injection 1 mg, PRN  dextrose 5 % solution, PRN  sodium bicarbonate 8.4 % injection 50 mEq, Once        Review of Systems:   As above HPI, otherwise unremarkable. No NSAIDs. Recent course of antimicrobials. Physical exam:   /69   Pulse 93   Temp 96.9 °F (36.1 °C) (Temporal)   Resp 20   Wt 249 lb 9 oz (113.2 kg)   SpO2 97%   BMI 39.09 kg/m²   Age-appropriate morbidly obese white woman who appears ill but in no apparent distress  NC/AT EOMI sclera conjunctiva are clear and anicteric mucous membranes are somewhat dry though she is wearing respiratory mask  Neck soft supple trachea midline no JVD at 60 degrees no bruit though exam somewhat compromised by respiratory noises from the mask  Coarse breath sounds, crackles scattered throughout lung fields, relatively mild, good air entry in all fields.   Mildly prolonged expiratory phase  Regular rhythm normal S1 and S2, no murmur no rub  Abdomen soft nontender nondistended normal active bowel sounds no mass no hepatosplenomegaly though could not lay her supine for proper examination  Distal extremities reveal chronic-type edema bilaterally, mild distal erythema consistent with chronic venous stasis, no pitting edema.   No other rash or lesion on visible portions of integument  Pulses 1+x4  Moves all 4 extremities  No asterixis  Cranial nerves II through XII grossly intact  Awake, alert, interactive and appropriate      Data:   Labs:  CBC with Differential:    Lab Results   Component Value Date    WBC 14.4 10/24/2021    RBC 2.64 10/24/2021    HGB 7.9 10/24/2021    HCT 24.0 10/24/2021     10/24/2021    MCV 90.9 10/24/2021    MCH 29.9 10/24/2021    MCHC 32.9 10/24/2021    RDW 12.0 10/24/2021    METASPCT 1.7 10/21/2021    LYMPHOPCT 4.3 10/21/2021    MONOPCT 11.3 10/21/2021    MYELOPCT 0.9 10/21/2021    BASOPCT 0.1 10/21/2021    MONOSABS 0.90 10/21/2021    LYMPHSABS 0.33 10/21/2021    EOSABS 0.00 10/21/2021    BASOSABS 0.00 10/21/2021     CMP:    Lab Results   Component Value Date     10/24/2021    K 3.9 10/24/2021    K 4.1 04/08/2021    CL 94 10/24/2021    CO2 30 10/24/2021    BUN 80 10/24/2021    CREATININE 5.5 10/24/2021    GFRAA 9 10/24/2021    LABGLOM 8 10/24/2021    GLUCOSE 188 10/24/2021    PROT 6.2 10/24/2021    LABALBU 3.1 10/24/2021    CALCIUM 9.6 10/24/2021    BILITOT 0.5 10/24/2021    ALKPHOS 96 10/24/2021    AST 33 10/24/2021    ALT 54 10/24/2021     Ionized Calcium:  No results found for: IONCA  Magnesium:    Lab Results   Component Value Date    MG 2.5 10/20/2021     Phosphorus:    Lab Results   Component Value Date    PHOS 7.3 10/20/2021     U/A:    Lab Results   Component Value Date    NITRITE neg 03/14/2019    COLORU Yellow 05/24/2019    PHUR 6.0 05/24/2019    WBCUA 0-1 05/24/2019    RBCUA NONE 05/24/2019    BACTERIA RARE 05/24/2019    CLARITYU Clear 05/24/2019    SPECGRAV 1.010 05/24/2019    LEUKOCYTESUR TRACE 05/24/2019    UROBILINOGEN 0.2 05/24/2019    BILIRUBINUR Negative 05/24/2019    BILIRUBINUR neg 03/14/2019    BLOODU Negative 05/24/2019    GLUCOSEU Negative 05/24/2019 Microalbumen/Creatinine ratio:  No components found for: RUCREAT  Iron Saturation:  No components found for: PERCENTFE  TIBC:    Lab Results   Component Value Date    TIBC 403 04/10/2019     FERRITIN:    Lab Results   Component Value Date    FERRITIN 16 11/07/2018        Imaging:  XR CHEST PORTABLE   Final Result   Persistent widespread bilateral infiltrates. The no significant changes   since the October 18. The heart appears to be enlarged. XR CHEST PORTABLE   Final Result   1. Extensive multifocal bilateral pneumonia. Assessment  Admitted with pneumonia due to COVID-19 virus. 1. Acute kidney injury, likely hemodynamically mediated due to volume contraction associated with poor intake due to anorexia, increase insensible losses due to tachypnea, fever and diaphoresis. Currently nonoliguric. 2. Chronic kidney disease stage V, baseline creatinine 4.8 to 5.3 mg/dL with at least the past 4 months. Etiology is diabetic nephropathy, renal microvascular atherosclerotic disease  3. Metabolic acidosis, wide anion gap, secondary to acute kidney injury superimposed on chronic kidney disease  4. Anemia secondary to advanced chronic kidney disease. No recent melena or hematochezia  5. Secondary hyperparathyroidism/mineral bone disease due to advanced CKD. On low-dose calcitriol Mondays Wednesdays and Fridays. 6. Morbid obesity, BMI 41.5 kg/m²    PLAN :    Progressive azotemia in patient with advanced CKD V at baseline   Plan: third HD session via AVF today. Next HD treatment in am   Follow clinical condition daily with lab reviews.           Electronically signed by Emely Rosas MD on 10/24/2021

## 2021-10-24 NOTE — PROGRESS NOTES
ENDOCRINOLOGY PROGRESS NOTE      Date of admission: 10/18/2021  Date of service: 10/24/2021  Admitting physician: Urvashi Grover MD   Primary Care Physician: Nathalia Santiago DO  Consultant physician: James Gee MD     Reason for the consultation:  Uncontrolled DM    History of Present Illness: The history is provided by the patient. Accuracy of the patient data is excellent    Khadra Grimm is a very pleasant 73 y.o. old female with PMH Obesity, CKD, Afib and other listed below admitted to [facility] on 10/18/2021 because of SOB and tested positive for COVID, endocrine service was consulted for diabetes management.     Subjective   No acute issues overnight, BG improving     Inpatient diet:   Carb Restricted diet     Point of care glucose monitoring   (Independently reviewed)   Recent Labs     10/22/21  2039 10/23/21  0549 10/23/21  1114 10/23/21  1558 10/23/21  2142 10/24/21  0556 10/24/21  1127 10/24/21  1659   GLUMET 313* 312* 336* 298* 270* 189* 216* 189*     Scheduled Meds:   [START ON 10/25/2021] dexamethasone  6 mg IntraVENous Q24H    insulin glargine  20 Units SubCUTAneous BID    insulin lispro  0-18 Units SubCUTAneous TID WC    insulin lispro  0-9 Units SubCUTAneous Nightly    insulin lispro  8 Units SubCUTAneous TID WC    propafenone  300 mg Oral BID    apixaban  5 mg Oral BID    atorvastatin  40 mg Oral Daily    calcitRIOL  0.5 mcg Oral Once per day on Mon Wed Fri    metoprolol tartrate  50 mg Oral BID    sodium chloride flush  5-40 mL IntraVENous 2 times per day    sodium bicarbonate  50 mEq IntraVENous Once       PRN Meds:   LORazepam, 1 mg, Q6H PRN  sodium chloride flush, 5-40 mL, PRN  sodium chloride, 25 mL, PRN  ondansetron, 4 mg, Q8H PRN   Or  ondansetron, 4 mg, Q6H PRN  polyethylene glycol, 17 g, Daily PRN  acetaminophen, 650 mg, Q6H PRN   Or  acetaminophen, 650 mg, Q6H PRN  glucose, 15 g, PRN  dextrose, 12.5 g, PRN  glucagon (rDNA), 1 mg, PRN  dextrose, 100 mL/hr, PRN      Continuous Infusions:   sodium chloride      dextrose         Review of Systems  All systems reviewed. All negative except for symptoms mentioned in HPI     OBJECTIVE    /69   Pulse 93   Temp 96.9 °F (36.1 °C) (Temporal)   Resp 20   Wt 249 lb 9 oz (113.2 kg)   SpO2 97%   BMI 39.09 kg/m²     Intake/Output Summary (Last 24 hours) at 10/24/2021 1816  Last data filed at 10/24/2021 3381  Gross per 24 hour   Intake 240 ml   Output 200 ml   Net 40 ml     Physical examination:  Due to this being a TeleHealth encounter, evaluation of the following organ systems is limited: Vitals/Constitutional/EENT/Resp/CV/GI//MS/Neuro/Skin/Heme-Lymph-Imm.     Modified physical exam    General: Communicating well   Neck: no obvious neck mass. No obvious neck deformity     CVS: no distress   Chest: no distress.  Chest is moving with respiration    Extremities:  no visible tremor  Skin: No visible rashes    Musculoskeletal: no visible deformity  Neuro: Alert and oriented to person, place, and time.       Review of Laboratory Data:  I personally reviewed the following labs:   Recent Labs     10/22/21  0635 10/23/21  0658 10/24/21  0412   WBC 8.7 13.9* 14.4*   RBC 3.22* 2.67* 2.64*   HGB 9.5* 8.3* 7.9*   HCT 29.2* 24.9* 24.0*   MCV 90.7 93.3 90.9   MCH 29.5 31.1 29.9   MCHC 32.5 33.3 32.9   RDW 12.1 12.0 12.0   * 464* 452*   MPV 9.2 9.1 9.6     Recent Labs     10/22/21  0635 10/23/21  0658 10/24/21  0412    135 140   K 3.7 3.9 3.9   CL 91* 92* 94*   CO2 33* 29 30*   BUN 71* 57* 80*   CREATININE 4.9* 4.3* 5.5*   GLUCOSE 420* 318* 188*   CALCIUM 9.6 9.2 9.6   PROT  --  5.6* 6.2*   LABALBU  --  3.0* 3.1*   BILITOT  --  0.6 0.5   ALKPHOS  --  101 96   AST  --  52* 33*   ALT  --  57* 54*     Beta-Hydroxybutyrate   Date Value Ref Range Status   10/19/2021 1.12 (H) 0.02 - 0.27 mmol/L Final     Lab Results   Component Value Date    LABA1C 6.7 10/19/2021    LABA1C 6.7 01/31/2020    LABA1C 6.6 10/21/2019     Lab Results   Component Value Date/Time    TSH 0.804 01/31/2019 12:41 PM     Lab Results   Component Value Date    LABA1C 6.7 10/19/2021    GLUCOSE 188 10/24/2021    MALBCR 305.1 05/24/2019    LABMICR 149.5 05/24/2019    LABCREA 61 09/03/2019     Lab Results   Component Value Date    TRIG 211 11/06/2018    HDL 37 11/06/2018    LDLCALC 93 11/06/2018    CHOL 172 11/06/2018       Blood culture   No results found for: [facility]    Radiology:  XR CHEST PORTABLE   Final Result   Persistent widespread bilateral infiltrates. The no significant changes   since the October 18. The heart appears to be enlarged. XR CHEST PORTABLE   Final Result   1. Extensive multifocal bilateral pneumonia. Medical Records/Labs/Images review:   I personally reviewed and summarized previous records   All labs and imaging were reviewed independently     Jatinder Clarke, a 73 y.o.-old female seen today for inpatient diabetes management     Diabetes Mellitus type 2  · Pt' DM is uncontrolled. A1c 6.7% but she is anemic   · Currently worsened with steroids  · We recommend the following DM regimen    · Lantus 20 U BID   · Humalog 8 U with meals   · High dose sliding scale with meals and and at night   · For now, will change diabetes regimen to:  · Continue glucose check with meals and at bedtime   · Will titrate insulin dose based on the blood glucose trend & insulin requirement    COVID-19 Infection   · Management per primary service   · Will closely monitoring BG and adjust insulin therapy while pt on dexamethasone     The above issues were reviewed with the patient who understood and agreed with the plan. Thank you for allowing us to participate in the care of this patient. Please do not hesitate to contact us with any additional questions.      Meli Jansen MD  Endocrinologist, [facility] Diabetes Care and Endocrinology   [address] [facility] [address]   Phone: [phone]  Fax: [phone]  --------------------------------------------  An electronic signature was used to authenticate this note.  Clary Agudelo MD on 10/24/2021 at 6:16 PM

## 2021-10-24 NOTE — CONSULTS
ENDOCRINOLOGY INITIAL CONSULTATION NOTE      Date of admission: 10/18/2021  Date of service: 10/23/2021  Admitting physician: Norman Duval MD   Primary Care Physician: Alycia Salcedo DO  Consultant physician: Chelsey Dorman MD     Reason for the consultation:  Uncontrolled DM    History of Present Illness: The history is provided by the patient. Accuracy of the patient data is excellent    Alex Cardoza is a very pleasant 73 y.o. old female with PMH Obesity, CKD, Afib and other listed below admitted to [facility] on 10/18/2021 because of SOB and tested positive for COVID, endocrine service was consulted for diabetes management. Pt currently on Dexamethasone 10 mg daily. BG has been runninng high     Prior to admission  The patient was diagnosed with type 2 DM long time and currently on Glipizide er 10 mg daily. Patient has had no hypoglycemic episodes.  Patient has not been eating consistent carbohydrate meals, self-blood glucose monitoring has been at goal prior to admission   Lab Results   Component Value Date    LABA1C 6.7 10/19/2021     Inpatient diet:   Carb Restricted diet     Point of care glucose monitoring   (Independently reviewed)   Recent Labs     10/21/21  2124 10/22/21  0634 10/22/21  1111 10/22/21  1609 10/22/21  2039 10/23/21  0549 10/23/21  1114 10/23/21  1558   GLUMET 348* 390* 435* 222* 313* 312* 336* 298*       Past medical history:   Past Medical History:   Diagnosis Date    Atrial fibrillation ([address]     Bladder cancer ([address]     Cancer ([address]     CKD (chronic kidney disease)     CKD (chronic kidney disease) stage 5, GFR less than 15 ml/min ([address] 11/6/2018    Diabetes ([address]     Hypertension     Obesity     260 #    Sleep apnea        Past surgical history:  Past Surgical History:   Procedure Laterality Date    CARDIAC CATHETERIZATION Left 10/02/2008    @ [facility]    CHOLECYSTECTOMY      COLONOSCOPY      COLONOSCOPY N/A 8/29/2019    mid ascending colon polyp bx/cauterization (inflammatory polyp), proximal transverse colon polyp bx/cauterized (tubular adenoma), descending colon polyp 80 cm from anus bx/cauterized (hyperplastic polyp), sigmoid colon polyp 35 cm from anus bx/cauterized (tubular adenoma), sigmoid colon polyps 30 cm from anus bx/cauterized (tubular adenoma), Dr. Dov Stark, [address] COLONOSCOPY  8/29/2019    mid ascending colon polyp bx/cauterization (inflammatory polyp), proximal transverse colon polyp bx/cauterized (tubular adenoma), descending colon polyp 80 cm from anus bx/cauterized (hyperplastic polyp), sigmoid colon polyp 35 cm from anus bx/cauterized (tubular adenoma), sigmoid colon polyps 30 cm from anus bx/cauterized (tubular adenoma), Dr. Dov Stark, [facility]    DIALYSIS FISTULA CREATION Left 1/22/2019    LIGATIION LEFT LEFT UPPER EXTREMITY OF AV FISTULA , EVACUATION HEMATOMA performed by Edwin Rocha MD at [address] Right 12/7/2020    AV FISTULA CREATION -- RIGHT ARM performed by Purnima Hoover MD at [address] Right 4/8/2021    AV FISTULA  REVISION RIGHT ARM performed by Purnima Hoover MD at [place] [address] Left 9/18/2018    AV FISTULA  LEFT ARM performed by Edwin Rocha MD at [place] [address] Left 11/14/2018    SUPERFICIALIZATION AV FISTULA  LEFT ARM , LIGATION TRIBUTORY AV FISTULA LEFT ARM performed by Edwin Rocha MD at [place] [id number]         Social history:   Tobacco:   reports that she quit smoking about 7 months ago. Her smoking use included cigarettes. She has a 21.50 pack-year smoking history. She has never used smokeless tobacco.  Alcohol:   reports previous alcohol use. Drugs:   reports no history of drug use.     Family history:    Family History   Problem Relation Age of Onset   Khushbu Huerta Cancer Mother         bladder    Diabetes Mother     COPD Mother     Depression Father     Diabetes Father     Heart Disease Father    Khushbu Huerta High Blood Pressure Father     Stroke Father     Prostate Cancer Father     Dementia Father     Alcohol Abuse Brother     Diabetes Brother     Bipolar Disorder Paternal Uncle     Anxiety Disorder Daughter     Colon Cancer Maternal Grandfather        Allergy and drug reactions: Allergies   Allergen Reactions    Adhesive Tape Rash    Penicillins Rash       Scheduled Meds:   insulin glargine  20 Units SubCUTAneous BID    [START ON 10/24/2021] insulin lispro  0-18 Units SubCUTAneous TID WC    insulin lispro  0-9 Units SubCUTAneous Nightly    [START ON 10/24/2021] insulin lispro  8 Units SubCUTAneous TID     propafenone  300 mg Oral BID    apixaban  5 mg Oral BID    atorvastatin  40 mg Oral Daily    calcitRIOL  0.5 mcg Oral Once per day on Mon Wed Fri    metoprolol tartrate  50 mg Oral BID    sodium chloride flush  5-40 mL IntraVENous 2 times per day    dexamethasone  10 mg IntraVENous Q24H    sodium bicarbonate  50 mEq IntraVENous Once       PRN Meds:   LORazepam, 1 mg, Q6H PRN  sodium chloride flush, 5-40 mL, PRN  sodium chloride, 25 mL, PRN  ondansetron, 4 mg, Q8H PRN   Or  ondansetron, 4 mg, Q6H PRN  polyethylene glycol, 17 g, Daily PRN  acetaminophen, 650 mg, Q6H PRN   Or  acetaminophen, 650 mg, Q6H PRN  glucose, 15 g, PRN  dextrose, 12.5 g, PRN  glucagon (rDNA), 1 mg, PRN  dextrose, 100 mL/hr, PRN      Continuous Infusions:   sodium chloride      dextrose         Review of Systems  All systems reviewed.  All negative except for symptoms mentioned in HPI     OBJECTIVE    BP (!) 156/59   Pulse 91   Temp 98.2 °F (36.8 °C) (Temporal)   Resp 21   Wt 249 lb 9 oz (113.2 kg)   SpO2 95%   BMI 39.09 kg/m²     Intake/Output Summary (Last 24 hours) at 10/23/2021 2037  Last data filed at 10/23/2021 1800  Gross per 24 hour   Intake 130 ml   Output 300 ml   Net -170 ml     Physical examination:  Due to this being a TeleHealth encounter, evaluation of the following organ systems is limited: Vitals/Constitutional/EENT/Resp/CV/GI//MS/Neuro/Skin/Heme-Lymph-Imm. Modified physical exam    General: Communicating well   Neck: no obvious neck mass. No obvious neck deformity     CVS: no distress   Chest: no distress. Chest is moving with respiration    Extremities:  no visible tremor  Skin: No visible rashes    Musculoskeletal: no visible deformity  Neuro: Alert and oriented to person, place, and time. Review of Laboratory Data:  I personally reviewed the following labs:   Recent Labs     10/21/21  0805 10/22/21  0635 10/23/21  0658   WBC 8.2 8.7 13.9*   RBC 2.66* 3.22* 2.67*   HGB 8.1* 9.5* 8.3*   HCT 24.3* 29.2* 24.9*   MCV 91.4 90.7 93.3   MCH 30.5 29.5 31.1   MCHC 33.3 32.5 33.3   RDW 12.3 12.1 12.0   * 530* 464*   MPV 9.2 9.2 9.1     Recent Labs     10/21/21  0702 10/21/21  0702 10/21/21  0805 10/22/21  0635 10/23/21  0658      < > 139 139 135   K 3.5   < > 3.7 3.7 3.9   CL 90*   < > 94* 91* 92*   CO2 29   < > 26 33* 29   BUN 91*   < > 92* 71* 57*   CREATININE 6.5*   < > 6.4* 4.9* 4.3*   GLUCOSE 373*   < > 344* 420* 318*   CALCIUM 9.2   < > 9.4 9.6 9.2   PROT 5.7*  --  6.3*  --  5.6*   LABALBU 2.9*  --  2.7*  --  3.0*   BILITOT 0.3  --  0.3  --  0.6   ALKPHOS 102  --  108*  --  101   AST 38*  --  39*  --  52*   ALT 30  --  30  --  57*    < > = values in this interval not displayed.      Beta-Hydroxybutyrate   Date Value Ref Range Status   10/19/2021 1.12 (H) 0.02 - 0.27 mmol/L Final     Lab Results   Component Value Date    LABA1C 6.7 10/19/2021    LABA1C 6.7 01/31/2020    LABA1C 6.6 10/21/2019     Lab Results   Component Value Date/Time    TSH 0.804 01/31/2019 12:41 PM     Lab Results   Component Value Date    LABA1C 6.7 10/19/2021    GLUCOSE 318 10/23/2021    MALBCR 305.1 05/24/2019    LABMICR 149.5 05/24/2019    LABCREA 61 09/03/2019     Lab Results   Component Value Date    TRIG 211 11/06/2018    HDL 37 11/06/2018    LDLCALC 93 11/06/2018    CHOL 172 11/06/2018       Blood culture   No results found for: [facility]    Radiology:  XR CHEST PORTABLE   Final Result   Persistent widespread bilateral infiltrates. The no significant changes   since the October 18. The heart appears to be enlarged. XR CHEST PORTABLE   Final Result   1. Extensive multifocal bilateral pneumonia. Medical Records/Labs/Images review:   I personally reviewed and summarized previous records   All labs and imaging were reviewed independently     Jatinder Clarke, a 73 y.o.-old female seen today for inpatient diabetes management     Diabetes Mellitus type 2  · Pt' DM is uncontrolled. A1c 6.7% but she is anemic   · Currently worsened with steroids   · Lantus 20 U BID   · Humalog 8 U with meals   · High dose sliding scale with meals and and at night   · For now, will change diabetes regimen to:  · Continue glucose check with meals and at bedtime   · Will titrate insulin dose based on the blood glucose trend & insulin requirement    COVID-19 Infection   · Management per primary service   · Will closely monitoring BG and adjust insulin therapy while pt on dexamethasone     The above issues were reviewed with the patient who understood and agreed with the plan. Thank you for allowing us to participate in the care of this patient. Please do not hesitate to contact us with any additional questions. Hollie Jo MD  Endocrinologist, [facility] Diabetes Care and Endocrinology   [address] [place] [id number]   Phone: [phone]  Fax: [phone]  --------------------------------------------  An electronic signature was used to authenticate this note.  Magnus Bullock MD on 10/23/2021 at 8:37 PM

## 2021-10-24 NOTE — PROGRESS NOTES
Hospitalist Progress Note      SYNOPSIS: Patient admitted on 10/18/2021 for shortness of breath    Patient with significant past medical history came to our hospital with chief complaint of worsening shortness of breath for last 2 weeks and was found to have acute hypoxemic respiratory failure requiring high flow oxygen as well as acute kidney injury on her chronic kidney disease stage V. In the ER, patient was found to have atrial fibrillation with heart rate episodes up to 150s but no persistent rapid ventricular rate. She was positive for COVID-19. Other significant finding was acute kidney injury with creatinine of 9.4, compared to her baseline of 5.2. Her BNP was also elevated to 4850. ER physician discussed with nephrology who recommended low-dose bicarb drip and close management. She also required AIRVO in the ER with oxygen about 30 L. Over the next 48 hours her renal function remained stable without requiring any dialysis. She was seen by pulmonology and started on IV ceftriaxone and doxycycline to cover for community-acquired pneumonia. Ceftriaxone and doxycycline discontinued on 10/21 by ID. Patient was started on hemodialysis on 10/21. Showed significant improvement in her volume overload state over the next 72 hours. SUBJECTIVE:    Patient seen and examined in her room. Patient currently off BiPAP on 10 L O2. No major overnight events. Patient denies any worsening shortness of breath, nausea, vomiting. Records reviewed. Stable overnight. No other overnight issues reported. Temp (24hrs), Av.6 °F (35.9 °C), Min:96.2 °F (35.7 °C), Max:96.9 °F (36.1 °C)    DIET: ADULT DIET; Regular; 4 carb choices (60 gm/meal); Low Sodium (2 gm); Low Potassium (Less than 3000 mg/day);  Low Phosphorus (Less than 1000 mg)  CODE: Full Code    Intake/Output Summary (Last 24 hours) at 10/24/2021 0830  Last data filed at 10/24/2021 0817  Gross per 24 hour   Intake 360 ml   Output 500 ml   Net -140 ml       OBJECTIVE:    /69   Pulse 93   Temp 96.9 °F (36.1 °C) (Temporal)   Resp 20   Wt 249 lb 9 oz (113.2 kg)   SpO2 97%   BMI 39.09 kg/m²     General appearance: No apparent distress, appears stated age and cooperative. HEENT:  Conjunctivae/corneas clear. Neck: Supple. No jugular venous distention. Respiratory: Clear to auscultation bilaterally, overall breathing sounds improved. Cardiovascular: Regular rate rhythm, normal S1-S2  Abdomen: Soft, nontender, nondistended  Musculoskeletal: No clubbing, cyanosis, no bilateral lower extremity edema. Brisk capillary refill. Skin:  No rashes  on visible skin  Neurologic: awake, alert and following commands. Barrow catheter noted.     ASSESSMENT & PLAN:    Sepsis  Secondary to COVID-19 pneumonia  See management below     Acute hypoxemic respiratory failure, improving  Severe hypoxia, use of excessive respiratory muscles, chest x-ray positive for bilateral infiltrates, requiring high flow oxygen  COVID-19 pneumonia  Bilateral airspace disease on chest x-ray  For Covid maintain on dexamethasone 10 mg IV daily, started on 10/18  Consult pulmonology, decrease dose of dexamethasone to 6 mg IV daily on 10/24  Was on high flow, then required BiPAP for 48 hours, down to 10 L on 10/24  Pulmonology, follow recommendation  Consulted ID on family request: Do not recommend ivermectin that family requested    Community-acquired pneumonia  Bilateral infiltrates, elevated procalcitonin of 1.04  Started on IV ceftriaxone and doxycycline on 10/19, discontinued on 10/21 as per ID recommendations     Acute kidney injury on chronic kidney disease  Nonoliguric LEW, secondary to COVID-19 pneumonia  Baseline creatinine 5.2  Admitted with creatinine 9.5, remained stable at about 9.5  Started hemodialysis via right upper arm AV fistula on 10/21  Discontinued IV bicarb on 10/22  Start oral sodium bicarbonate once patient starts becoming acidotic, currently bicarb 33    CKD stage V  Presumably secondary to diabetic nephropathy  Patient has right arm AV fistula, with good thrill and bruit  Started hemodialysis on 10/21     Elevated BNP  No previous history of heart failure  EF 65% in August 2019 without any systolic or diastolic dysfunction  Elevated BNP of 4850, likely due to LEW on CKD  Patient on home dose torsemide, hold for now because of LEW and without any obvious fluid overload     Atrial fibrillation  Not in rapid ventricular rate  Resume home dose metoprolol and Eliquis  Monitor closely     Diabetes mellitus type 2  Home dose glipizide held  Maintain on sliding scale insulin  Diabetes poorly controlled because of steroids  Consult endocrinology for diabetes management  Meanwhile start on Lantus 20 units with breakfast and Humalog 6 units 3 times daily along with sliding scale     Hyperlipidemia  Continue statins     End-of-life care discussion  Full code for now       DISPOSITION:   Possible discharge in the next 2 to 3 days.     Medications:  REVIEWED DAILY    Infusion Medications    sodium chloride      dextrose       Scheduled Medications    insulin glargine  20 Units SubCUTAneous BID    insulin lispro  0-18 Units SubCUTAneous TID WC    insulin lispro  0-9 Units SubCUTAneous Nightly    insulin lispro  8 Units SubCUTAneous TID WC    propafenone  300 mg Oral BID    apixaban  5 mg Oral BID    atorvastatin  40 mg Oral Daily    calcitRIOL  0.5 mcg Oral Once per day on Mon Wed Fri    metoprolol tartrate  50 mg Oral BID    sodium chloride flush  5-40 mL IntraVENous 2 times per day    dexamethasone  10 mg IntraVENous Q24H    sodium bicarbonate  50 mEq IntraVENous Once     PRN Meds: LORazepam, sodium chloride flush, sodium chloride, ondansetron **OR** ondansetron, polyethylene glycol, acetaminophen **OR** acetaminophen, glucose, dextrose, glucagon (rDNA), dextrose    Labs:     Recent Labs     10/22/21  0635 10/23/21  0658 10/24/21  0412   WBC 8.7 13.9* 14.4*   HGB 9.5* 8.3* 7.9*   HCT 29.2* 24.9* 24.0*   * 464* 452*       Recent Labs     10/22/21  0635 10/23/21  0658 10/24/21  0412    135 140   K 3.7 3.9 3.9   CL 91* 92* 94*   CO2 33* 29 30*   BUN 71* 57* 80*   CREATININE 4.9* 4.3* 5.5*   CALCIUM 9.6 9.2 9.6       Recent Labs     10/23/21  0658 10/24/21  0412   PROT 5.6* 6.2*   ALKPHOS 101 96   ALT 57* 54*   AST 52* 33*   BILITOT 0.6 0.5       No results for input(s): INR in the last 72 hours. No results for input(s): Patrisha Meigs in the last 72 hours. Chronic labs:    Lab Results   Component Value Date    CHOL 172 11/06/2018    TRIG 211 (H) 11/06/2018    HDL 37 11/06/2018    LDLCALC 93 11/06/2018    TSH 0.804 01/31/2019    INR 1.5 10/20/2021    LABA1C 6.7 (H) 10/19/2021       Radiology: REVIEWED DAILY    +++++++++++++++++++++++++++++++++++++++++++++++++  Chalino López MD  [employer] - [address], [facility]  +++++++++++++++++++++++++++++++++++++++++++++++++  NOTE: This report was transcribed using voice recognition software. Every effort was made to ensure accuracy; however, inadvertent computerized transcription errors may be present.

## 2021-10-24 NOTE — PROGRESS NOTES
Pulmonary [address] [facility]                             Pulmonary Consult/Progress Note :              Reason for Consultation:COVID   CC : SOB   HPI:   On Optiflow  And weaning very smoothly   S/p HD    SOB improved  Seen by nephrology  No chest pain   No events over night  Rest on bed with no distress . Pat Fabio PHYSICAL EXAMINATION:     VITAL SIGNS:  BP (!) 156/59   Pulse 91   Temp 98.2 °F (36.8 °C) (Temporal)   Resp 21   Wt 249 lb 9 oz (113.2 kg)   SpO2 95%   BMI 39.09 kg/m²   Wt Readings from Last 3 Encounters:   10/22/21 249 lb 9 oz (113.2 kg)   09/02/21 261 lb 9.6 oz (118.7 kg)   08/06/21 253 lb (114.8 kg)     Temp Readings from Last 3 Encounters:   10/23/21 98.2 °F (36.8 °C) (Temporal)   09/02/21 97.4 °F (36.3 °C) (Infrared)   08/06/21 97.5 °F (36.4 °C) (Tympanic)     TMAX:  BP Readings from Last 3 Encounters:   10/23/21 (!) 156/59   09/02/21 138/60   08/06/21 (!) 125/56     Pulse Readings from Last 3 Encounters:   10/23/21 91   09/02/21 70   08/06/21 80           INTAKE/OUTPUTS:  I/O last 3 completed shifts: In: 130 [P.O.:120;  I.V.:10]  Out: 300 [Urine:300]    Intake/Output Summary (Last 24 hours) at 10/23/2021 2339  Last data filed at 10/23/2021 1800  Gross per 24 hour   Intake 130 ml   Output 300 ml   Net -170 ml       General Appearance: alert and oriented to person, place and time, well-developed and   well-nourished, in no acute distress   Eyes: pupils equal, round, and reactive to light, extraocular eye movements intact, conjunctivae normal and sclera anicteric   Neck: neck supple and non tender without mass, no thyromegaly, no thyroid nodules and no cervical adenopathy   Pulmonary/Chest:Rhonchi   Cardiovascular: normal rate, regular rhythm, normal S1 and S2, no murmurs, rubs, clicks or gallops, distal pulses intact, no carotid bruits, no murmurs, no gallops, no carotid bruits and no JVD   Abdomen: obese, soft, non-tender, non-distended, normal bowel sounds, no masses or organomegaly   Extremities:no edema ,no cyanosis   Musculoskeletal: normal range of motion, no joint swelling, deformity or tenderness   Neurologic: reflexes normal and symmetric, no cranial nerve deficit noted    LABS/IMAGING:    CBC:  Lab Results   Component Value Date    WBC 13.9 (H) 10/23/2021    HGB 8.3 (L) 10/23/2021    HCT 24.9 (L) 10/23/2021    MCV 93.3 10/23/2021     (H) 10/23/2021    LYMPHOPCT 4.3 (L) 10/21/2021    RBC 2.67 (L) 10/23/2021    MCH 31.1 10/23/2021    MCHC 33.3 10/23/2021    RDW 12.0 10/23/2021    NEUTOPHILPCT 81.7 (H) 10/21/2021    MONOPCT 11.3 10/21/2021    BASOPCT 0.1 10/21/2021    NEUTROABS 6.89 10/21/2021    LYMPHSABS 0.33 (L) 10/21/2021    MONOSABS 0.90 10/21/2021    EOSABS 0.00 (L) 10/21/2021    BASOSABS 0.00 10/21/2021       Recent Labs     10/23/21  0658 10/22/21  0635 10/21/21  0805   WBC 13.9* 8.7 8.2   HGB 8.3* 9.5* 8.1*   HCT 24.9* 29.2* 24.3*   MCV 93.3 90.7 91.4   * 530* 487*       BMP:   Recent Labs     10/21/21  0805 10/22/21  0635 10/23/21  0658    139 135   K 3.7 3.7 3.9   CL 94* 91* 92*   CO2 26 33* 29   BUN 92* 71* 57*   CREATININE 6.4* 4.9* 4.3*       MG:   Lab Results   Component Value Date    MG 2.5 10/20/2021     Ca/Phos:   Lab Results   Component Value Date    CALCIUM 9.2 10/23/2021    PHOS 7.3 (H) 10/20/2021     Amylase: No results found for: AMYLASE  Lipase: No results found for: LIPASE  LIVER PROFILE:   Recent Labs     10/21/21  0702 10/21/21  0805 10/23/21  0658   AST 38* 39* 52*   ALT 30 30 57*   BILITOT 0.3 0.3 0.6   ALKPHOS 102 108* 101       PT/INR:   No results for input(s): PROTIME, INR in the last 72 hours. APTT: No results for input(s): APTT in the last 72 hours.     Cardiac Enzymes:  Lab Results   Component Value Date    TROPONINI <0.01 11/05/2018               PROBLEM LIST:  Patient Active Problem List   Diagnosis    Moderate episode of recurrent major depressive disorder (HCC)    Generalized anxiety disorder    Insomnia due to mental condition    Anemia    Essential hypertension    CKD (chronic kidney disease) stage 5, GFR less than 15 ml/min (HCC)    Atrial fibrillation (HCC)    Pure hypercholesterolemia    Morbid obesity (HCC)    Chronic obstructive pulmonary disease (HCC)    Obstructive sleep apnea    Exertional dyspnea    History of colon polyps    Family history of rectal cancer    Encounter regarding vascular access for dialysis for end-stage renal disease ([facility] [address]    COVID-19    Pneumonia due to COVID-19 virus               ASSESSMENT:  1.) COVID pneumonia   2. )Acute hypoxic respiratory failure   3. )ESRD  4.)A fib   5. )Anemia   6- Possible Pneumonia     PLAN:  *-seems doing better and weaning O2  *- HD seems helped a lot   *-CXR in am 10/24 ,  *-Decadron ,watch BS,need aggressive  Insulin therapy ,defer to primary ,need to adjust Insulin  *-repeat CRP came very low ,will hold on Toci ,also she has drop in Hb more than 3 g   ,  remedisvir ,ID following   Aggressive pulmonary toilet  Albuterol as needed  *budesonide   *_on elquis  *-Hb stable ,anemia per primary  Sugar control as per primary   IV abx ,rocophine,doxy asjust by ID   legionella neg  Check strep neg   ddaughter   Need PT and OT       Erica Alberts MD,[facility]  Pulmonary&Critical Care Medicine   Director of [address] [place] [address] Director of [address] [place] [address]    Hilda Navarro    NOTE: This report was transcribed using voice recognition software. Every effort was made to ensure accuracy; however, inadvertent computerized transcription errors may be present.

## 2021-10-25 ENCOUNTER — APPOINTMENT (OUTPATIENT)
Dept: GENERAL RADIOLOGY | Age: 66
DRG: 871 | End: 2021-10-25
Payer: MEDICARE

## 2021-10-25 LAB
ALBUMIN SERPL-MCNC: 2.9 G/DL (ref 3.5–5.2)
ALP BLD-CCNC: 96 U/L (ref 35–104)
ALT SERPL-CCNC: 51 U/L (ref 0–32)
ANION GAP SERPL CALCULATED.3IONS-SCNC: 18 MMOL/L (ref 7–16)
AST SERPL-CCNC: 32 U/L (ref 0–31)
BILIRUB SERPL-MCNC: 0.6 MG/DL (ref 0–1.2)
BUN BLDV-MCNC: 100 MG/DL (ref 6–23)
CALCIUM SERPL-MCNC: 9.7 MG/DL (ref 8.6–10.2)
CHLORIDE BLD-SCNC: 98 MMOL/L (ref 98–107)
CO2: 27 MMOL/L (ref 22–29)
CREAT SERPL-MCNC: 6.6 MG/DL (ref 0.5–1)
GFR AFRICAN AMERICAN: 8
GFR NON-AFRICAN AMERICAN: 6 ML/MIN/1.73
GLUCOSE BLD-MCNC: 191 MG/DL (ref 74–99)
HCT VFR BLD CALC: 27 % (ref 34–48)
HEMOGLOBIN: 8.8 G/DL (ref 11.5–15.5)
MCH RBC QN AUTO: 30.9 PG (ref 26–35)
MCHC RBC AUTO-ENTMCNC: 32.6 % (ref 32–34.5)
MCV RBC AUTO: 94.7 FL (ref 80–99.9)
METER GLUCOSE: 180 MG/DL (ref 74–99)
METER GLUCOSE: 193 MG/DL (ref 74–99)
METER GLUCOSE: 197 MG/DL (ref 74–99)
METER GLUCOSE: 254 MG/DL (ref 74–99)
PDW BLD-RTO: 12 FL (ref 11.5–15)
PLATELET # BLD: 513 E9/L (ref 130–450)
PMV BLD AUTO: 9.6 FL (ref 7–12)
POTASSIUM SERPL-SCNC: 4 MMOL/L (ref 3.5–5)
RBC # BLD: 2.85 E12/L (ref 3.5–5.5)
SODIUM BLD-SCNC: 143 MMOL/L (ref 132–146)
TOTAL PROTEIN: 6.3 G/DL (ref 6.4–8.3)
WBC # BLD: 18.6 E9/L (ref 4.5–11.5)

## 2021-10-25 PROCEDURE — 85027 COMPLETE CBC AUTOMATED: CPT

## 2021-10-25 PROCEDURE — 6370000000 HC RX 637 (ALT 250 FOR IP): Performed by: INTERNAL MEDICINE

## 2021-10-25 PROCEDURE — 2580000003 HC RX 258: Performed by: INTERNAL MEDICINE

## 2021-10-25 PROCEDURE — 94660 CPAP INITIATION&MGMT: CPT

## 2021-10-25 PROCEDURE — 90935 HEMODIALYSIS ONE EVALUATION: CPT

## 2021-10-25 PROCEDURE — 80053 COMPREHEN METABOLIC PANEL: CPT

## 2021-10-25 PROCEDURE — 97535 SELF CARE MNGMENT TRAINING: CPT

## 2021-10-25 PROCEDURE — 2700000000 HC OXYGEN THERAPY PER DAY

## 2021-10-25 PROCEDURE — 71045 X-RAY EXAM CHEST 1 VIEW: CPT

## 2021-10-25 PROCEDURE — 99233 SBSQ HOSP IP/OBS HIGH 50: CPT | Performed by: INTERNAL MEDICINE

## 2021-10-25 PROCEDURE — 97530 THERAPEUTIC ACTIVITIES: CPT

## 2021-10-25 PROCEDURE — 99232 SBSQ HOSP IP/OBS MODERATE 35: CPT | Performed by: INTERNAL MEDICINE

## 2021-10-25 PROCEDURE — 97162 PT EVAL MOD COMPLEX 30 MIN: CPT

## 2021-10-25 PROCEDURE — 36415 COLL VENOUS BLD VENIPUNCTURE: CPT

## 2021-10-25 PROCEDURE — 97165 OT EVAL LOW COMPLEX 30 MIN: CPT

## 2021-10-25 PROCEDURE — 82962 GLUCOSE BLOOD TEST: CPT

## 2021-10-25 PROCEDURE — 2140000000 HC CCU INTERMEDIATE R&B

## 2021-10-25 PROCEDURE — 6360000002 HC RX W HCPCS: Performed by: INTERNAL MEDICINE

## 2021-10-25 RX ORDER — LORAZEPAM 1 MG/1
1 TABLET ORAL EVERY 8 HOURS PRN
Status: DISCONTINUED | OUTPATIENT
Start: 2021-10-25 | End: 2021-10-27 | Stop reason: HOSPADM

## 2021-10-25 RX ORDER — DEXAMETHASONE 4 MG/1
6 TABLET ORAL DAILY
Status: DISCONTINUED | OUTPATIENT
Start: 2021-10-26 | End: 2021-10-27 | Stop reason: HOSPADM

## 2021-10-25 RX ORDER — INSULIN GLARGINE 100 [IU]/ML
24 INJECTION, SOLUTION SUBCUTANEOUS 2 TIMES DAILY
Status: DISCONTINUED | OUTPATIENT
Start: 2021-10-25 | End: 2021-10-27 | Stop reason: HOSPADM

## 2021-10-25 RX ADMIN — INSULIN LISPRO 3 UNITS: 100 INJECTION, SOLUTION INTRAVENOUS; SUBCUTANEOUS at 09:00

## 2021-10-25 RX ADMIN — DEXAMETHASONE SODIUM PHOSPHATE 6 MG: 10 INJECTION, SOLUTION INTRAMUSCULAR; INTRAVENOUS at 04:24

## 2021-10-25 RX ADMIN — LORAZEPAM 1 MG: 2 INJECTION INTRAMUSCULAR; INTRAVENOUS at 04:23

## 2021-10-25 RX ADMIN — INSULIN LISPRO 2 UNITS: 100 INJECTION, SOLUTION INTRAVENOUS; SUBCUTANEOUS at 21:00

## 2021-10-25 RX ADMIN — INSULIN LISPRO 8 UNITS: 100 INJECTION, SOLUTION INTRAVENOUS; SUBCUTANEOUS at 17:00

## 2021-10-25 RX ADMIN — APIXABAN 5 MG: 5 TABLET, FILM COATED ORAL at 09:20

## 2021-10-25 RX ADMIN — CALCITRIOL 0.5 MCG: 0.25 CAPSULE ORAL at 09:21

## 2021-10-25 RX ADMIN — INSULIN GLARGINE 20 UNITS: 100 INJECTION, SOLUTION SUBCUTANEOUS at 11:00

## 2021-10-25 RX ADMIN — INSULIN GLARGINE 24 UNITS: 100 INJECTION, SOLUTION SUBCUTANEOUS at 21:00

## 2021-10-25 RX ADMIN — INSULIN LISPRO 9 UNITS: 100 INJECTION, SOLUTION INTRAVENOUS; SUBCUTANEOUS at 12:02

## 2021-10-25 RX ADMIN — Medication 10 ML: at 09:22

## 2021-10-25 RX ADMIN — METOPROLOL TARTRATE 50 MG: 50 TABLET, FILM COATED ORAL at 20:20

## 2021-10-25 RX ADMIN — INSULIN LISPRO 3 UNITS: 100 INJECTION, SOLUTION INTRAVENOUS; SUBCUTANEOUS at 17:00

## 2021-10-25 RX ADMIN — INSULIN LISPRO 8 UNITS: 100 INJECTION, SOLUTION INTRAVENOUS; SUBCUTANEOUS at 08:00

## 2021-10-25 RX ADMIN — APIXABAN 5 MG: 5 TABLET, FILM COATED ORAL at 20:20

## 2021-10-25 RX ADMIN — ATORVASTATIN CALCIUM 40 MG: 40 TABLET, FILM COATED ORAL at 09:20

## 2021-10-25 RX ADMIN — Medication 10 ML: at 20:43

## 2021-10-25 RX ADMIN — INSULIN LISPRO 8 UNITS: 100 INJECTION, SOLUTION INTRAVENOUS; SUBCUTANEOUS at 12:02

## 2021-10-25 RX ADMIN — PROPAFENONE HYDROCHLORIDE 300 MG: 150 TABLET, FILM COATED ORAL at 09:22

## 2021-10-25 RX ADMIN — PROPAFENONE HYDROCHLORIDE 300 MG: 150 TABLET, FILM COATED ORAL at 20:20

## 2021-10-25 ASSESSMENT — PAIN SCALES - GENERAL
PAINLEVEL_OUTOF10: 0

## 2021-10-25 NOTE — PROGRESS NOTES
Pulmonary [address] [facility]                             Pulmonary Consult/Progress Note :              Reason for Consultation:COVID   CC : SOB   HPI:   Doing very well   weaning O2   S/p HD    SOB improved and decrease in O2 requirement    No chest pain   No events over night  Rest on bed with no distress . Dion Bruno PHYSICAL EXAMINATION:     VITAL SIGNS:  /69   Pulse 93   Temp 96.9 °F (36.1 °C) (Temporal)   Resp 20   Wt 249 lb 9 oz (113.2 kg)   SpO2 97%   BMI 39.09 kg/m²   Wt Readings from Last 3 Encounters:   10/22/21 249 lb 9 oz (113.2 kg)   09/02/21 261 lb 9.6 oz (118.7 kg)   08/06/21 253 lb (114.8 kg)     Temp Readings from Last 3 Encounters:   10/24/21 96.9 °F (36.1 °C) (Temporal)   09/02/21 97.4 °F (36.3 °C) (Infrared)   08/06/21 97.5 °F (36.4 °C) (Tympanic)     TMAX:  BP Readings from Last 3 Encounters:   10/24/21 136/69   09/02/21 138/60   08/06/21 (!) 125/56     Pulse Readings from Last 3 Encounters:   10/24/21 93   09/02/21 70   08/06/21 80           INTAKE/OUTPUTS:  I/O last 3 completed shifts:   In: 360 [P.O.:360]  Out: 500 [Urine:500]    Intake/Output Summary (Last 24 hours) at 10/24/2021 2215  Last data filed at 10/24/2021 1928  Gross per 24 hour   Intake 240 ml   Output 850 ml   Net -610 ml       General Appearance: alert and oriented to person, place and time, well-developed and   well-nourished, in no acute distress   Eyes: pupils equal, round, and reactive to light, extraocular eye movements intact, conjunctivae normal and sclera anicteric   Neck: neck supple and non tender without mass, no thyromegaly, no thyroid nodules and no cervical adenopathy   Pulmonary/Chest:Rhonchi   Cardiovascular: normal rate, regular rhythm, normal S1 and S2, no murmurs, rubs, clicks or gallops, distal pulses intact, no carotid bruits, no murmurs, no gallops, no carotid bruits and no JVD   Abdomen: obese, soft, non-tender, non-distended, normal bowel sounds, no masses or organomegaly   Extremities:no edema ,no cyanosis   Musculoskeletal: normal range of motion, no joint swelling, deformity or tenderness   Neurologic: reflexes normal and symmetric, no cranial nerve deficit noted    LABS/IMAGING:    CBC:  Lab Results   Component Value Date    WBC 14.4 (H) 10/24/2021    HGB 7.9 (L) 10/24/2021    HCT 24.0 (L) 10/24/2021    MCV 90.9 10/24/2021     (H) 10/24/2021    LYMPHOPCT 4.3 (L) 10/21/2021    RBC 2.64 (L) 10/24/2021    MCH 29.9 10/24/2021    MCHC 32.9 10/24/2021    RDW 12.0 10/24/2021    NEUTOPHILPCT 81.7 (H) 10/21/2021    MONOPCT 11.3 10/21/2021    BASOPCT 0.1 10/21/2021    NEUTROABS 6.89 10/21/2021    LYMPHSABS 0.33 (L) 10/21/2021    MONOSABS 0.90 10/21/2021    EOSABS 0.00 (L) 10/21/2021    BASOSABS 0.00 10/21/2021       Recent Labs     10/24/21  0412 10/23/21  0658 10/22/21  0635   WBC 14.4* 13.9* 8.7   HGB 7.9* 8.3* 9.5*   HCT 24.0* 24.9* 29.2*   MCV 90.9 93.3 90.7   * 464* 530*       BMP:   Recent Labs     10/22/21  0635 10/23/21  0658 10/24/21  0412    135 140   K 3.7 3.9 3.9   CL 91* 92* 94*   CO2 33* 29 30*   BUN 71* 57* 80*   CREATININE 4.9* 4.3* 5.5*       MG:   Lab Results   Component Value Date    MG 2.5 10/20/2021     Ca/Phos:   Lab Results   Component Value Date    CALCIUM 9.6 10/24/2021    PHOS 7.3 (H) 10/20/2021     Amylase: No results found for: AMYLASE  Lipase: No results found for: LIPASE  LIVER PROFILE:   Recent Labs     10/23/21  0658 10/24/21  0412   AST 52* 33*   ALT 57* 54*   BILITOT 0.6 0.5   ALKPHOS 101 96       PT/INR:   No results for input(s): PROTIME, INR in the last 72 hours. APTT: No results for input(s): APTT in the last 72 hours.     Cardiac Enzymes:  Lab Results   Component Value Date    TROPONINI <0.01 11/05/2018               PROBLEM LIST:  Patient Active Problem List   Diagnosis    Moderate episode of recurrent major depressive disorder (HCC)    Generalized anxiety disorder    Insomnia due to mental condition    Anemia    Essential hypertension    CKD (chronic kidney disease) stage 5, GFR less than 15 ml/min (HCC)    Atrial fibrillation (HCC)    Pure hypercholesterolemia    Morbid obesity (HCC)    Chronic obstructive pulmonary disease (HCC)    Obstructive sleep apnea    Exertional dyspnea    History of colon polyps    Family history of rectal cancer    Encounter regarding vascular access for dialysis for end-stage renal disease ([facility] [address]    COVID-19    Pneumonia due to COVID-19 virus               ASSESSMENT:  1.) COVID pneumonia   2. )Acute hypoxic respiratory failure   3. )ESRD  4.)A fib   5. )Anemia   6- Possible Pneumonia     PLAN:  *-seems doing better and weaning O2 very smoothly   *- HD seems helped a lot   *-CXR in am 10/25 ,ordered   *-Decadron ,watch BS,need aggressive  Insulin therapy ,defer to primary ,need to adjust Insulin  *-repeat CRP came very low ,will hold on Toci ,also she has drop in Hb more than 3 g   ,  remedisvir ,ID following   Aggressive pulmonary toilet  Albuterol as needed  *budesonide   *_on elquis  *-Hb stable ,anemia per primary  Sugar control as per primary   IV abx ,rocophine,doxy asjust by ID   legionella neg  Check strep neg   ddaughter   Need PT and OT       Erica Alberts MD,[facility]  Pulmonary&Critical Care Medicine   Director of [address] [place] [address] Director of [address] [place] [address]    Renée Lewis    NOTE: This report was transcribed using voice recognition software. Every effort was made to ensure accuracy; however, inadvertent computerized transcription errors may be present.

## 2021-10-25 NOTE — PROGRESS NOTES
Patient pulled of bipap for second time. Placed patient back on high flow NC 10L. Pt sating at 96% and tolerating.

## 2021-10-25 NOTE — PROGRESS NOTES
Hospitalist Progress Note      SYNOPSIS: Patient admitted on 10/18/2021 for shortness of breath    Patient with significant past medical history came to our hospital with chief complaint of worsening shortness of breath for last 2 weeks and was found to have acute hypoxemic respiratory failure requiring high flow oxygen as well as acute kidney injury on her chronic kidney disease stage V. In the ER, patient was found to have atrial fibrillation with heart rate episodes up to 150s but no persistent rapid ventricular rate. She was positive for COVID-19. Other significant finding was acute kidney injury with creatinine of 9.4, compared to her baseline of 5.2. Her BNP was also elevated to 4850. ER physician discussed with nephrology who recommended low-dose bicarb drip and close management. She also required AIRVO in the ER with oxygen about 30 L. Over the next 48 hours her renal function remained stable without requiring any dialysis. She was seen by pulmonology and started on IV ceftriaxone and doxycycline to cover for community-acquired pneumonia. Ceftriaxone and doxycycline discontinued on 10/21 by ID. Patient was started on hemodialysis on 10/21. Showed significant improvement in her volume overload state over the next 72 hours. SUBJECTIVE:    Patient seen and examined in her room. Off BiPAP since 10/24, tolerating well on 6 L O2. Patient denies any worsening shortness of breath, nausea, vomiting. Records reviewed. Stable overnight. No other overnight issues reported. Temp (24hrs), Av.1 °F (36.7 °C), Min:98.1 °F (36.7 °C), Max:98.1 °F (36.7 °C)    DIET: ADULT DIET; Regular; 4 carb choices (60 gm/meal); Low Sodium (2 gm); Low Potassium (Less than 3000 mg/day);  Low Phosphorus (Less than 1000 mg)  CODE: Full Code    Intake/Output Summary (Last 24 hours) at 10/25/2021 0890  Last data filed at 10/25/2021 0615  Gross per 24 hour   Intake 0 ml   Output 1050 ml   Net -1050 ml OBJECTIVE:    BP (!) 172/77   Pulse 94   Temp 98.1 °F (36.7 °C) (Temporal)   Resp 18   Wt 249 lb 9 oz (113.2 kg)   SpO2 95%   BMI 39.09 kg/m²     General appearance: No apparent distress, appears stated age and cooperative. HEENT:  Conjunctivae/corneas clear. Neck: Supple. No jugular venous distention. Respiratory: Clear to auscultation bilaterally, overall breathing sounds improved. Cardiovascular: Regular rate rhythm, normal S1-S2  Abdomen: Soft, nontender, nondistended  Musculoskeletal: No clubbing, cyanosis, no bilateral lower extremity edema. Brisk capillary refill. Skin:  No rashes  on visible skin  Neurologic: awake, alert and following commands. Barrow catheter noted.     ASSESSMENT & PLAN:    Sepsis  Secondary to COVID-19 pneumonia  See management below     Acute hypoxemic respiratory failure, improving  Severe hypoxia, use of excessive respiratory muscles, chest x-ray positive for bilateral infiltrates, requiring high flow oxygen  COVID-19 pneumonia  Bilateral airspace disease on chest x-ray  For Covid maintain on dexamethasone 10 mg IV daily, started on 10/18  Consult pulmonology, decrease dose of dexamethasone to 6 mg IV daily on 10/24, changed to dexamethasone 6 mg p.o. daily on 10/25 for 5 more days, last dose on 10/30  Was on high flow, then required BiPAP for 48 hours, down to 10 L on 10/24  Pulmonology, follow recommendation  Consulted ID on family request: Do not recommend ivermectin that family requested    Community-acquired pneumonia  Bilateral infiltrates, elevated procalcitonin of 1.04  Started on IV ceftriaxone and doxycycline on 10/19, discontinued on 10/21 as per ID recommendations     Acute kidney injury on chronic kidney disease  Nonoliguric LEW, secondary to COVID-19 pneumonia  Baseline creatinine 5.2  Admitted with creatinine 9.5, remained stable at about 9.5  Started hemodialysis via right upper arm AV fistula on 10/21  Discontinued IV bicarb on 10/22  Waiting for nephrology recommendations regarding starting permanent dialysis    CKD stage V  Presumably secondary to diabetic nephropathy  Patient has right arm AV fistula, with good thrill and bruit  Started hemodialysis on 10/21     Elevated BNP  No previous history of heart failure  EF 65% in August 2019 without any systolic or diastolic dysfunction  Elevated BNP of 4850, likely due to LEW on CKD  Patient on home dose torsemide, hold for now because of LEW and without any obvious fluid overload     Atrial fibrillation  Not in rapid ventricular rate  Resume home dose metoprolol and Eliquis  Monitor closely     Diabetes mellitus type 2  Home dose glipizide held  Maintain on sliding scale insulin  Diabetes poorly controlled because of steroids  Consult endocrinology for diabetes management  Meanwhile start on Lantus 20 units with breakfast and Humalog 6 units 3 times daily along with sliding scale     Hyperlipidemia  Continue statins     End-of-life care discussion  Full code for now       DISPOSITION:   Medically stable for discharge on the morning of 10/26. Case management to discuss with nephrology regarding permanent dialysis and placement issues. Called patient daughter on 10/25 and updated her about her mother's improving condition and need for rehab.     Medications:  REVIEWED DAILY    Infusion Medications    sodium chloride      dextrose       Scheduled Medications    dexamethasone  6 mg IntraVENous Q24H    insulin glargine  20 Units SubCUTAneous BID    insulin lispro  0-18 Units SubCUTAneous TID     insulin lispro  0-9 Units SubCUTAneous Nightly    insulin lispro  8 Units SubCUTAneous TID     propafenone  300 mg Oral BID    apixaban  5 mg Oral BID    atorvastatin  40 mg Oral Daily    calcitRIOL  0.5 mcg Oral Once per day on Mon Wed Fri    metoprolol tartrate  50 mg Oral BID    sodium chloride flush  5-40 mL IntraVENous 2 times per day    sodium bicarbonate  50 mEq IntraVENous Once     PRN Meds: LORazepam, sodium chloride flush, sodium chloride, ondansetron **OR** ondansetron, polyethylene glycol, acetaminophen **OR** acetaminophen, glucose, dextrose, glucagon (rDNA), dextrose    Labs:     Recent Labs     10/23/21  0658 10/24/21  0412 10/25/21  0636   WBC 13.9* 14.4* 18.6*   HGB 8.3* 7.9* 8.8*   HCT 24.9* 24.0* 27.0*   * 452* 513*       Recent Labs     10/23/21  0658 10/24/21  0412 10/25/21  0636    140 143   K 3.9 3.9 4.0   CL 92* 94* 98   CO2 29 30* 27   BUN 57* 80* 100*   CREATININE 4.3* 5.5* 6.6*   CALCIUM 9.2 9.6 9.7       Recent Labs     10/23/21  0658 10/24/21  0412 10/25/21  0636   PROT 5.6* 6.2* 6.3*   ALKPHOS 101 96 96   ALT 57* 54* 51*   AST 52* 33* 32*   BILITOT 0.6 0.5 0.6       No results for input(s): INR in the last 72 hours. No results for input(s): Ardelle Chalk in the last 72 hours. Chronic labs:    Lab Results   Component Value Date    CHOL 172 11/06/2018    TRIG 211 (H) 11/06/2018    HDL 37 11/06/2018    LDLCALC 93 11/06/2018    TSH 0.804 01/31/2019    INR 1.5 10/20/2021    LABA1C 6.7 (H) 10/19/2021       Radiology: REVIEWED DAILY    +++++++++++++++++++++++++++++++++++++++++++++++++  Cj Nunez MD  [facility] [place] Physician - [address] [place] [address], [place]  +++++++++++++++++++++++++++++++++++++++++++++++++  NOTE: This report was transcribed using voice recognition software. Every effort was made to ensure accuracy; however, inadvertent computerized transcription errors may be present.

## 2021-10-25 NOTE — PROGRESS NOTES
[facility] MD Noemy Diallo, MD Jimena Quach, MD Luna Lira, MD Frederick Adam, CNP   Edi Montes, ANP  Progress note   Patient's Name: Ulises Quevedo  3:12 PM  10/25/2021    Subjective :  10/20 :pt known to me from office . Has advanced ckd and has a recent AVF placed in    stable bp , feels better . 1th HD session today   10/21: Patient was seen, interviewed and examined while undergoing her second hemodialysis treatment today. Nurses were using 17-gauge needles to cannulate her arm for her 2-1/2-hour dialysis today. She is scheduled for her third treatment tomorrow with higher blood flow attempting to go to 250-300 mill per minute. 10/22: Patient was seen, interviewed and examined while undergoing her third hemodialysis treatment today. She has no new complaints, she was confirming that she feels much better terms of breathing and overall condition. Case was discussed with dialysis nurse, labs reviewed, all other consults reviewed. 10/23: Patient was seen in her room, she was arousable with face max oxygen with acute distress reported overnight per nursing staff. Next dialysis will be done  Monday, MWF schedule. 10/24: Patient was seen and interviewed in her room, clinically doing much better. Dialysis scheduled for tomorrow. 10/25: Patient was seen, interviewed and examined while undergoing acute hemodialysis today dialysis with. Overall doing better with no complications or side effects on dialysis. History of Present Ilness:    Ms. Russ Wong is a 16-year-old woman with advanced chronic kidney disease, stage V with a baseline creatinine 4.8 to 5.3 mg/dL, secondary to diabetic nephropathy, renal microvascular atherosclerotic disease. She is followed longitudinally from nephrology standpoint by Dr. Jimena Quach.   She presented to the emergency department yesterday with a roughly 2-week history of progressive dyspnea, intermittent cough at times 8/29/2019    mid ascending colon polyp bx/cauterization (inflammatory polyp), proximal transverse colon polyp bx/cauterized (tubular adenoma), descending colon polyp 80 cm from anus bx/cauterized (hyperplastic polyp), sigmoid colon polyp 35 cm from anus bx/cauterized (tubular adenoma), sigmoid colon polyps 30 cm from anus bx/cauterized (tubular adenoma), Dr. Gilbert Sanchez, [facility]    DIALYSIS FISTULA CREATION Left 1/22/2019    LIGATIION LEFT LEFT UPPER EXTREMITY OF AV FISTULA , EVACUATION HEMATOMA performed by Ulysses Paganini, MD at [address] Right 12/7/2020    AV FISTULA CREATION -- RIGHT ARM performed by Buddy Avendaño MD at [address] Right 4/8/2021    AV FISTULA  REVISION RIGHT ARM performed by Buddy Avendaño MD at [place] [address] Left 9/18/2018    AV FISTULA  LEFT ARM performed by Ulysses Paganini, MD at [place] [address] Left 11/14/2018    SUPERFICIALIZATION AV FISTULA  LEFT ARM , LIGATION TRIBUTORY AV FISTULA LEFT ARM performed by Ulysses Paganini, MD at [place] [address]         Family History   Problem Relation Age of Onset    Cancer Mother         bladder    Diabetes Mother     COPD Mother     Depression Father     Diabetes Father     Heart Disease Father     High Blood Pressure Father     Stroke Father     Prostate Cancer Father     Dementia Father     Alcohol Abuse Brother     Diabetes Brother     Bipolar Disorder Paternal Uncle     Anxiety Disorder Daughter     Colon Cancer Maternal Grandfather         reports that she quit smoking about 7 months ago. Her smoking use included cigarettes. She has a 21.50 pack-year smoking history. She has never used smokeless tobacco. She reports previous alcohol use. She reports that she does not use drugs.     Allergies:  Adhesive tape and Penicillins    Current Medications:    [START ON 10/26/2021] dexamethasone (DECADRON) tablet 6 mg, Daily  LORazepam (ATIVAN) injection 1 mg, Q6H PRN  insulin glargine (LANTUS) injection vial 20 Units, BID  insulin lispro (HUMALOG) injection vial 0-18 Units, TID WC  insulin lispro (HUMALOG) injection vial 0-9 Units, Nightly  insulin lispro (HUMALOG) injection vial 8 Units, TID WC  propafenone (RYTHMOL) tablet 300 mg, BID  apixaban (ELIQUIS) tablet 5 mg, BID  atorvastatin (LIPITOR) tablet 40 mg, Daily  calcitRIOL (ROCALTROL) capsule 0.5 mcg, Once per day on Mon Wed Fri  metoprolol tartrate (LOPRESSOR) tablet 50 mg, BID  sodium chloride flush 0.9 % injection 5-40 mL, 2 times per day  sodium chloride flush 0.9 % injection 5-40 mL, PRN  0.9 % sodium chloride infusion, PRN  ondansetron (ZOFRAN-ODT) disintegrating tablet 4 mg, Q8H PRN   Or  ondansetron (ZOFRAN) injection 4 mg, Q6H PRN  polyethylene glycol (GLYCOLAX) packet 17 g, Daily PRN  acetaminophen (TYLENOL) tablet 650 mg, Q6H PRN   Or  acetaminophen (TYLENOL) suppository 650 mg, Q6H PRN  glucose (GLUTOSE) 40 % oral gel 15 g, PRN  dextrose 50 % IV solution, PRN  glucagon (rDNA) injection 1 mg, PRN  dextrose 5 % solution, PRN  sodium bicarbonate 8.4 % injection 50 mEq, Once        Review of Systems:   As above HPI, otherwise unremarkable. No NSAIDs. Recent course of antimicrobials. Physical exam:   /71   Pulse 85   Temp 97.7 °F (36.5 °C)   Resp 18   Ht 5' 7\" (1.702 m)   Wt 243 lb 6.2 oz (110.4 kg)   SpO2 93%   BMI 38.12 kg/m²   Age-appropriate morbidly obese white woman who appears ill but in no apparent distress  NC/AT EOMI sclera conjunctiva are clear and anicteric mucous membranes are somewhat dry though she is wearing respiratory mask  Neck soft supple trachea midline no JVD at 60 degrees no bruit though exam somewhat compromised by respiratory noises from the mask  Coarse breath sounds, crackles scattered throughout lung fields, relatively mild, good air entry in all fields.   Mildly prolonged expiratory phase  Regular rhythm normal S1 and S2, no murmur no rub  Abdomen soft nontender nondistended normal active bowel sounds no mass no hepatosplenomegaly though could not lay her supine for proper examination  Distal extremities reveal chronic-type edema bilaterally, mild distal erythema consistent with chronic venous stasis, no pitting edema.   No other rash or lesion on visible portions of integument  Pulses 1+x4  Moves all 4 extremities  No asterixis  Cranial nerves II through XII grossly intact  Awake, alert, interactive and appropriate      Data:   Labs:  CBC with Differential:    Lab Results   Component Value Date    WBC 18.6 10/25/2021    RBC 2.85 10/25/2021    HGB 8.8 10/25/2021    HCT 27.0 10/25/2021     10/25/2021    MCV 94.7 10/25/2021    MCH 30.9 10/25/2021    MCHC 32.6 10/25/2021    RDW 12.0 10/25/2021    METASPCT 1.7 10/21/2021    LYMPHOPCT 4.3 10/21/2021    MONOPCT 11.3 10/21/2021    MYELOPCT 0.9 10/21/2021    BASOPCT 0.1 10/21/2021    MONOSABS 0.90 10/21/2021    LYMPHSABS 0.33 10/21/2021    EOSABS 0.00 10/21/2021    BASOSABS 0.00 10/21/2021     CMP:    Lab Results   Component Value Date     10/25/2021    K 4.0 10/25/2021    K 4.1 04/08/2021    CL 98 10/25/2021    CO2 27 10/25/2021     10/25/2021    CREATININE 6.6 10/25/2021    GFRAA 8 10/25/2021    LABGLOM 6 10/25/2021    GLUCOSE 191 10/25/2021    PROT 6.3 10/25/2021    LABALBU 2.9 10/25/2021    CALCIUM 9.7 10/25/2021    BILITOT 0.6 10/25/2021    ALKPHOS 96 10/25/2021    AST 32 10/25/2021    ALT 51 10/25/2021     Ionized Calcium:  No results found for: IONCA  Magnesium:    Lab Results   Component Value Date    MG 2.5 10/20/2021     Phosphorus:    Lab Results   Component Value Date    PHOS 7.3 10/20/2021     U/A:    Lab Results   Component Value Date    NITRITE neg 03/14/2019    COLORU Yellow 05/24/2019    PHUR 6.0 05/24/2019    WBCUA 0-1 05/24/2019    RBCUA NONE 05/24/2019    BACTERIA RARE 05/24/2019    CLARITYU Clear 05/24/2019    SPECGRAV 1.010 05/24/2019    LEUKOCYTESUR TRACE 05/24/2019    UROBILINOGEN 0.2 05/24/2019    BILIRUBINUR Negative 05/24/2019    BILIRUBINUR neg 03/14/2019    BLOODU Negative 05/24/2019    GLUCOSEU Negative 05/24/2019     Microalbumen/Creatinine ratio:  No components found for: RUCREAT  Iron Saturation:  No components found for: PERCENTFE  TIBC:    Lab Results   Component Value Date    TIBC 403 04/10/2019     FERRITIN:    Lab Results   Component Value Date    FERRITIN 16 11/07/2018        Imaging:  XR CHEST PORTABLE   Final Result   1. No interval change in the multifocal bilateral airspace disease   2. Mild cardiomegaly. XR CHEST PORTABLE   Final Result   Persistent widespread bilateral infiltrates. The no significant changes   since the October 18. The heart appears to be enlarged. XR CHEST PORTABLE   Final Result   1. Extensive multifocal bilateral pneumonia. Assessment  Admitted with pneumonia due to COVID-19 virus. 1. Acute kidney injury, likely hemodynamically mediated due to volume contraction associated with poor intake due to anorexia, increase insensible losses due to tachypnea, fever and diaphoresis. Currently nonoliguric. 2. Chronic kidney disease stage V, baseline creatinine 4.8 to 5.3 mg/dL with at least the past 4 months. Etiology is diabetic nephropathy, renal microvascular atherosclerotic disease  3. Metabolic acidosis, wide anion gap, secondary to acute kidney injury superimposed on chronic kidney disease  4. Anemia secondary to advanced chronic kidney disease. No recent melena or hematochezia  5. Secondary hyperparathyroidism/mineral bone disease due to advanced CKD. On low-dose calcitriol Mondays Wednesdays and Fridays. 6. Morbid obesity, BMI 41.5 kg/m²  7.    COVID-19 infection  PLAN :    Progressive azotemia in patient with advanced CKD V at baseline   Next HD treatment on Wednesday  We will continue schedule of Monday, Wednesday, Friday dialysis  Follow clinical condition daily with lab reviews.           Electronically signed by Vanna Grijalva MD on 10/25/2021

## 2021-10-25 NOTE — PROGRESS NOTES
Date: 10/24/2021    Time: 11:11 PM    Patient Placed On BIPAP/CPAP/ Non-Invasive Ventilation? Yes    If no must comment. Facial area red/color change? No           If YES are Blister/Lesion present? No   If yes must notify nursing staff  BIPAP/CPAP skin barrier?   Yes    Skin barrier type:mepilexlite       Comments:        Dimitry Warner RCP

## 2021-10-25 NOTE — CARE COORDINATION
Spoke with patient's Daughter formerly [facility] regarding discharge planning. Choices of covid accepting facilities provided to her. Patients daughter wants to investigate these facilities prior to choosing. Called [facility] and they have not set up outpatient dialysis yet because they did not receive all the information needed. Refaxed info to [phone]. Last covid + test 10/18. Will await daughters choice for rehab. CM  SW will continue to follow. Daughter returned call and gave choices  [id number]   information faxed to  second choice if [facility] can't accept is [facility] in [facility] at [phone]. Will await acceptance from [facility]. Then Outpatient dialysis will need set up near the facility.  CM/SW will continue to follow

## 2021-10-25 NOTE — PROGRESS NOTES
Physical Therapy    Physical Therapy Initial Assessment     Name: Sushma Ivory  : 1955  MRN: [id number]      Date of Service: 10/25/2021    Evaluating PT:  Piotr iNcole, PT, DPT  [id number]     Room #:  3104/7843-J  Diagnosis:  Acute respiratory failure with hypoxia ([facility] [J96.01]  Acute renal failure superimposed on chronic kidney disease, unspecified CKD stage, unspecified acute renal failure type ([facility] [N17.9, N18.9]  Pneumonia due to COVID-19 virus [U07.1, J12.82]  PMHx/PSHx:  Bladder CA, DM, HTN, Afib, CKD, obesity, sleep apnea  Precautions:  Fall Risk, Droplet plus (COVID-19),  O2, continuous pulse ox, bed alarm / cognition   Equipment Needs:  NA    SUBJECTIVE:    Pt is a poor historian. She is only able to report she \"lives with familly. \"      OBJECTIVE:   Initial Evaluation  Date: 10/25/21 Treatment Short Term/ Long Term   Goals   AM-PAC 6 Clicks      Was pt agreeable to Eval/treatment? Yes      Does pt have pain? No C/o pain      Bed Mobility  Rolling: NT  Supine to sit: SBA to long sitting, Mod A to EOB  Sit to supine: Mod A  Scooting: Mod A  Rolling: SBA  Supine to sit: SBA  Sit to supine: SBA  Scooting: SBA   Transfers Sit to stand: Mod A  Stand to sit: Mod A  Stand pivot: Mod A with FWW  Sit to stand: SBA  Stand to sit: SBA  Stand pivot: SBA with AAD   Ambulation    Few feet x2 with FWW Mod A   >25 feet with AAD Min A   Stair negotiation: ascended and descended  NT  NA   ROM BUE:  Per OT eval  BLE:  WFL     Strength BUE:  Per OT eval   BLE:  Grossly 3/5     Balance Sitting EOB:  SBA  Dynamic Standing:   Mod A  Sitting EOB:  SBA  Dynamic Standing:  Min A     Pt is A & O x 1-2 self and hospital   Sensation:  Pt denies numbness and tingling to extremities  Edema:  Unremarkable    Vitals:  6L  Rest: O2 sat 95%, HR 80 bpm  EOB/ seated activity: O2 sat 89-92%,  bpm  Transfer to [facility]: O2 sat 87%,  bpm (1 minute delayed desaturation)  Recovered to 90% in 1 additional minute, 92% an additional minute  Transfer to bed, sit to stand: O2 sat 88%,  bpm  Bed mobility / repositioning: O2 sat 89%,  bpm  Rest in bed: O2 sat 93%, HR 85 bpm      Therapeutic Exercises:    BLE ROM    Patient education  Pt educated on PT role, safety during functional mobility     Patient response to education:   Pt verbalized understanding Pt demonstrated skill Pt requires further education in this area   no Yes  Yes      ASSESSMENT:    Conditions Requiring Skilled Therapeutic Intervention:    [x]Decreased strength     []Decreased ROM  [x]Decreased functional mobility  [x]Decreased balance   [x]Decreased endurance   []Decreased posture  []Decreased sensation  [x]Decreased coordination   []Decreased vision  []Decreased safety awareness   []Increased pain       Comments:  Pt received supine and agreeable to PT evaluation with OT collaboration. Pt cleared for participation by RN prior to session. Vitals monitored during session. Pt pleasantly confused throughout session. She is able to follow commands but with slow processing and requires multiple verbal cues and visual demonstrations. Required assistance of LE to sit up to EOB. Performed STS and short distance ambulation bed<>BSC. Pt demonstrates gross weakness and unsteadiness during transfer. Pt desaturates but recovers with time and PLB. Back in bed in chair position on exit. Pt left with call button in reach, lines attached, and needs met.   Discussed pt case at interdisciplinary rounds in AM.     Treatment:  Patient practiced and was instructed in the following treatment:     Bed mobility training - pt given verbal and tactile cues to facilitate proper sequencing and safety during rolling and supine>sit as well as provided with physical assistance to complete task     STS and pivot transfer training - pt educated on proper hand and foot placement, safety and sequencing, and use of FWW to safely complete sit<>stand and pivot transfers with hands on assistance to complete task safely    Gait training- pt was given verbal and tactile cues to facilitate safety/balance and FWW usage during ambulation as well as provided with physical assistance to complete task. Pt's/ family goals   1. None stated     Prognosis is fair for reaching above PT goals. Patient and or family understand(s) diagnosis, prognosis, and plan of care. Yes     PHYSICAL THERAPY PLAN OF CARE:    PT POC is established based on physician order and patient diagnosis     Referring provider/PT Order:    10/25/21 0900  PT eval and treat Start: 10/25/21 0900, End: 10/25/21 0900, ONE TIME, Standing Count: 1 Occurrences, R      Renan Braden MD       Diagnosis:  Acute respiratory failure with hypoxia ([facility]) [J96.01]  Acute renal failure superimposed on chronic kidney disease, unspecified CKD stage, unspecified acute renal failure type ([facility] [N17.9, N18.9]  Pneumonia due to COVID-19 virus [U07.1, J12.82]  Specific instructions for next treatment:  Progress gait, safety training, fall prevention    Current Treatment Recommendations:     [x] Strengthening to improve independence with functional mobility   [] ROM to improve independence with functional mobility   [x] Balance Training to improve static/dynamic balance and to reduce fall risk  [x] Endurance Training to improve activity tolerance during functional mobility   [x] Transfer Training to improve safety and independence with all functional transfers   [x] Gait Training to improve gait mechanics, endurance and asses need for appropriate assistive device  [x] Stair Training in preparation for safe discharge home and/or into the community   [x] Positioning to prevent skin breakdown and contractures  [x] Safety and Education Training   [x] Patient/Caregiver Education   [x] HEP  [] Other     PT long term treatment goals are located in above grid    Frequency of treatments: 2-5x/week x 1-2 weeks.     Time in  1015  Time out  1045    Total Treatment Time  15 minutes     Evaluation Time includes thorough review of current medical information, gathering information on past medical history/social history and prior level of function, completion of standardized testing/informal observation of tasks, assessment of data and education on plan of care and goals.     CPT codes:  [] Low Complexity PT evaluation 58024  [x] Moderate Complexity PT evaluation 77001  [] High Complexity PT evaluation 90600  [] PT Re-evaluation 34299  [] Gait training 22297 -- minutes  [] Manual therapy 96381 -- minutes  [x] Therapeutic activities 03367 15 minutes  [] Therapeutic exercises 32743 -- minutes  [] Neuromuscular reeducation 25561 -- minutes     Juliann Ashby, PT, DPT  [id number]

## 2021-10-25 NOTE — PROGRESS NOTES
improve independence/tolerance for self-care routine  * Functional transfer/mobility training/DME recommendations for increased independence, safety, and fall prevention  * Patient/Family education to increase follow through with safety techniques and functional independence  * Recommendation of environmental modifications for increased safety with functional transfers/mobility and ADLs  * Cognitive retraining/development of therapeutic activities to improve problem solving, judgement, memory, and attention for increased safety/participation in ADL/IADL tasks  * Therapeutic exercise to improve motor endurance, ROM, and functional strength for ADLs/functional transfers  * Therapeutic activities to facilitate/challenge dynamic balance, stand tolerance for increased safety and independence with ADLs  * Therapeutic activities to facilitate gross/fine motor skills for increased independence with ADLs  * Neuro-muscular re-education: facilitation of righting/equilibrium reactions, midline orientation, scapular stability/mobility, normalization of muscle tone, and facilitation of volitional active controled movement      Recommended Adaptive Equipment:  TBD     Home Living: Pt is a poor historian:  Reports she lives with family in a 1 story home with 1 MANUEL  Perseverating on previous answers   Bathroom setup: ?    Equipment owned: ?    Prior Level of Function: Pt is a poor historian and unable to provide PLOF   Driving: ?   Occupation: ?    Pain Level: Pt denies pain this session    Cognition: A&O: 2/4 (self and \"hospital\");  Follows 1 step directions with cuing   Memory:  poor   Sequencing:  poor   Problem solving:  poor   Judgement/safety:  Poor   Delayed processing, poor insight, perseverating on previous answers to questions     Functional Assessment:  AM-PAC Daily Activity Raw Score: 14/24   Initial Eval Status  Date: 10/25/21 Treatment Status  Date: STGs = LTGs  Time frame: 10-14 days   Feeding Set-up   Mod I   Grooming Minimal Assist   Supervision    UB Dressing Minimal Assist   Stand by Assist    LB Dressing Maximal Assist  Minimal Assist    Bathing Maximal Assist  Minimal Assist    Toileting Dependent   Minimal Assist    Bed Mobility  Supine to sit: Moderate Assist   Sit to supine: Moderate Assist   Supine to sit: Stand by Assist   Sit to supine: Stand by Assist    Functional Transfers Moderate Assist   Stand by Assist   Functional Mobility Moderate Assist     Several small steps to/from [facility] with HHA  Stand by Assist    Balance Sitting:     Static:  SBA    Dynamic:min A  Standing: mod A      Activity Tolerance F-  F+   Visual/  Perceptual wfl                  Vitals:  6L  Rest: O2 sat 95%, HR 80 bpm  EOB/ seated activity: O2 sat 89-92%,  bpm  Transfer to BSC: O2 sat 87%,  bpm (1 minute delayed desaturation)  Recovered to 90% in 1 additional minute, 92% an additional minute  Transfer to bed, sit to stand: O2 sat 88%,  bpm  Bed mobility / repositioning: O2 sat 89%,  bpm  Rest in bed: O2 sat 93%, HR 85 bpm        Hand Dominance right   Strength ROM Additional Info:    RUE   4-/5 wfl good  and wfl FMC/dexterity noted during ADL tasks     LUE 4-/5 wfl good  and wfl FMC/dexterity noted during ADL tasks     Hearing: wfl  Sensation:wfl  Tone: wfl  Edema:none noted     Comments: Upon arrival patient supine in bed and agreeable to OT session. Therapist educated pt on role of OT. At end of session, patient semi-supine in bed with call light and phone within reach, all lines and tubes intact. Overall patient demonstrated decreased independence and safety during completion of ADL/functional transfer/mobility tasks. Pt would benefit from continued skilled OT to increase safety and independence with completion of ADL/IADL tasks for functional independence and quality of life.     Treatment: OT treatment provided this date includes: Facilitation of bed mobility (rolling, supine<>sit), unsupported sitting balance at EOB (impacting ADLs; addressing posture, weight shifting, dynamic reaching), functional transfers (various surfaces: bed, BSC), standing tolerance tasks (addressing posture, balance and activity tolerance; impacting ADLs and functional activity) and short functional ambulation tasks with w/w (cuing on posture, w/w management and safety) - skilled cuing on sequencing, hand placement, posture, body mechanics, energy conservation techniques and safety. Therapist facilitated self-care retraining: UB/LB self-care tasks (gown, socks),  Toileting hygiene task and seated grooming tasks while educating pt on sequencing, modified techniques, posture, safety and energy conservation techniques. Skilled monitoring of HR, O2 sats and pts response to treatment. Rehab Potential: Good for established goals     Patient / Family Goal: none stated      Patient and/or family were instructed on functional diagnosis, prognosis/goals and OT plan of care. Demonstrated fair- understanding. Eval Complexity: Low    Time In: 1020  Time Out: 1100  Total Treatment Time: 23 minutes    Min Units   OT Eval Low 97165  x  1   OT Eval Medium 16686      OT Eval High 52096      OT Re-Eval [id number]       Therapeutic Ex 09793       Therapeutic Activities 30821  10  1   ADL/Self Care 84071  13  1   Orthotic Management 74064       Manual [id number]     Neuro Re-Ed 57299       Non-Billable Time          Evaluation Time additionally includes thorough review of current medical information, gathering information on past medical history/social history and prior level of function, interpretation of standardized testing/informal observation of tasks, assessment of data and development of plan of care and goals.           Hero Peng OTR/L #[id number]

## 2021-10-25 NOTE — PROGRESS NOTES
ENDOCRINOLOGY PROGRESS NOTE  Via Tele-Health Service      Date of admission: 10/18/2021  Date of service: 10/25/2021  Admitting physician: Luz Nettles MD   Primary Care Physician: Zoila Barber DO  Consultant physician: Romario Hoffmann MD     Reason for the consultation:  Uncontrolled DM    History of Present Illness: The history is provided by the patient. Accuracy of the patient data is excellent    Jaquan Pineda is a very pleasant 73 y.o. old female with PMH Obesity, CKD, Afib and other listed below admitted to [facility] on 10/18/2021 because of SOB and tested positive for COVID, endocrine service was consulted for diabetes management.     Subjective   BG improving but still above goal     Inpatient diet:   Carb Restricted diet     Point of care glucose monitoring   (Independently reviewed)   Recent Labs     10/23/21  2142 10/24/21  0556 10/24/21  1127 10/24/21  1659 10/24/21  1956/21  0606 10/25/21  1136 10/25/21  1754   GLUMET 270* 189* 216* 189* 154* 193* 254* 180*     Scheduled Meds:   [START ON 10/26/2021] dexamethasone  6 mg Oral Daily    insulin glargine  20 Units SubCUTAneous BID    insulin lispro  0-18 Units SubCUTAneous TID WC    insulin lispro  0-9 Units SubCUTAneous Nightly    insulin lispro  8 Units SubCUTAneous TID WC    propafenone  300 mg Oral BID    apixaban  5 mg Oral BID    atorvastatin  40 mg Oral Daily    calcitRIOL  0.5 mcg Oral Once per day on Mon Wed Fri    metoprolol tartrate  50 mg Oral BID    sodium chloride flush  5-40 mL IntraVENous 2 times per day    sodium bicarbonate  50 mEq IntraVENous Once       PRN Meds:   LORazepam, 1 mg, Q6H PRN  sodium chloride flush, 5-40 mL, PRN  sodium chloride, 25 mL, PRN  ondansetron, 4 mg, Q8H PRN   Or  ondansetron, 4 mg, Q6H PRN  polyethylene glycol, 17 g, Daily PRN  acetaminophen, 650 mg, Q6H PRN   Or  acetaminophen, 650 mg, Q6H PRN  glucose, 15 g, PRN  dextrose, 12.5 g, PRN  glucagon (rDNA), 1 mg, PRN  dextrose, 100 mL/hr, PRN      Continuous Infusions:   sodium chloride      dextrose         Review of Systems  All systems reviewed. All negative except for symptoms mentioned in HPI     OBJECTIVE    BP (!) 148/84   Pulse 92   Temp 98.5 °F (36.9 °C) (Temporal)   Resp 18   Ht 5' 7\" (1.702 m)   Wt 239 lb 10.2 oz (108.7 kg)   SpO2 94%   BMI 37.53 kg/m²     Intake/Output Summary (Last 24 hours) at 10/25/2021 1939  Last data filed at 10/25/2021 1858  Gross per 24 hour   Intake --   Output 600 ml   Net -600 ml     Physical examination:  Due to this being a TeleHealth encounter, evaluation of the following organ systems is limited: Vitals/Constitutional/EENT/Resp/CV/GI//MS/Neuro/Skin/Heme-Lymph-Imm.     Modified physical exam    General: Communicating well   Neck: no obvious neck mass. No obvious neck deformity     CVS: no distress   Chest: no distress.  Chest is moving with respiration    Extremities:  no visible tremor  Skin: No visible rashes    Musculoskeletal: no visible deformity  Neuro: Alert and oriented to person, place, and time.       Review of Laboratory Data:  I personally reviewed the following labs:   Recent Labs     10/23/21  0658 10/24/21  0412 10/25/21  0636   WBC 13.9* 14.4* 18.6*   RBC 2.67* 2.64* 2.85*   HGB 8.3* 7.9* 8.8*   HCT 24.9* 24.0* 27.0*   MCV 93.3 90.9 94.7   MCH 31.1 29.9 30.9   MCHC 33.3 32.9 32.6   RDW 12.0 12.0 12.0   * 452* 513*   MPV 9.1 9.6 9.6     Recent Labs     10/23/21  0658 10/24/21  0412 10/25/21  0636    140 143   K 3.9 3.9 4.0   CL 92* 94* 98   CO2 29 30* 27   BUN 57* 80* 100*   CREATININE 4.3* 5.5* 6.6*   GLUCOSE 318* 188* 191*   CALCIUM 9.2 9.6 9.7   PROT 5.6* 6.2* 6.3*   LABALBU 3.0* 3.1* 2.9*   BILITOT 0.6 0.5 0.6   ALKPHOS 101 96 96   AST 52* 33* 32*   ALT 57* 54* 51*     Beta-Hydroxybutyrate   Date Value Ref Range Status   10/19/2021 1.12 (H) 0.02 - 0.27 mmol/L Final     Lab Results   Component Value Date    LABA1C 6.7 10/19/2021    LABA1C 6.7 01/31/2020    LABA1C 6.6 [phone]  --------------------------------------------  An electronic signature was used to authenticate this note.  Jerri Khanna MD on 10/25/2021 at 7:39 PM    This visit was performed as a virtual video visit using a synchronous, two-way, audio-video telehealth technology platform

## 2021-10-25 NOTE — PROGRESS NOTES
Occupational Therapy  OCCUPATIONAL THERAPY INITIAL EVALUATION    [facility] [employer] [address] [address]      Date:10/25/2021                                                Patient Name: Sushma Ivory  MRN: [id number]  : 1955  Room: [address] [place] [address]    Evaluating OT: Silvina Alvarez OTR/L #[id number]     Referring Provider: Aaln Bateman MD  Specific Provider Orders/Date: OT eval and treat 10/24/21    Diagnosis: Acute respiratory failure with hypoxia ([address] [J96.01]  Acute renal failure superimposed on chronic kidney disease, unspecified CKD stage, unspecified acute renal failure type ([address] [N17.9, N18.9]  Pneumonia due to COVID-19 virus [U07.1, J12.82]   Pt admitted to hospital due to SOB       Pertinent Medical History:  has a past medical history of Atrial fibrillation ([address] Bladder cancer ([address] Cancer ([address] CKD (chronic kidney disease), CKD (chronic kidney disease) stage 5, GFR less than 15 ml/min ([address] Diabetes ([address] Hypertension, Obesity, and Sleep apnea.        Precautions:  Fall Risk, Droplet plus (COVID-19),  O2, continuous pulse ox, bed alarm / cognition     Assessment of current deficits   [x] Functional mobility  [x]ADLs  [x] Strength               [x]Cognition    [x] Functional transfers   [x] IADLs         [x] Safety Awareness   [x]Endurance    [] Fine Coordination              [x] Balance      [] Vision/perception   []Sensation     []Gross Motor Coordination  [] ROM  [] Delirium                   [] Motor Control     OT PLAN OF CARE   OT POC based on physician orders, patient diagnosis and results of clinical assessment    Frequency/Duration 1-3 days/wk for 2 weeks PRN   Specific OT Treatment Interventions to include:   * Instruction/training on adapted ADL techniques and AE recommendations to increase functional independence within precautions       * Training on energy conservation strategies, correct breathing pattern and techniques to improve independence/tolerance for self-care routine  * Functional transfer/mobility training/DME recommendations for increased independence, safety, and fall prevention  * Patient/Family education to increase follow through with safety techniques and functional independence  * Recommendation of environmental modifications for increased safety with functional transfers/mobility and ADLs  * Cognitive retraining/development of therapeutic activities to improve problem solving, judgement, memory, and attention for increased safety/participation in ADL/IADL tasks  * Therapeutic exercise to improve motor endurance, ROM, and functional strength for ADLs/functional transfers  * Therapeutic activities to facilitate/challenge dynamic balance, stand tolerance for increased safety and independence with ADLs  * Therapeutic activities to facilitate gross/fine motor skills for increased independence with ADLs  * Neuro-muscular re-education: facilitation of righting/equilibrium reactions, midline orientation, scapular stability/mobility, normalization of muscle tone, and facilitation of volitional active controled movement      Recommended Adaptive Equipment:  TBD     Home Living: Pt is a poor historian:  Reports she lives with family in a 1 story home with 1 MANUEL  Perseverating on previous answers   Bathroom setup: ?    Equipment owned: ?    Prior Level of Function: Pt is a poor historian and unable to provide PLOF   Driving: ?   Occupation: ?    Pain Level: Pt denies pain this session    Cognition: A&O: 2/4 (self and \"hospital\");  Follows 1 step directions with cuing   Memory:  poor   Sequencing:  poor   Problem solving:  poor   Judgement/safety:  Poor   Delayed processing, poor insight, perseverating on previous answers to questions     Functional Assessment:  AM-PAC Daily Activity Raw Score: 13/24   Initial Eval Status  Date: 10/25/21 Treatment Status  Date: STGs = LTGs  Time frame: 10-14 days   Feeding Set-up   Mod I   Grooming Minimal Assist   Supervision    UB Dressing Minimal Assist   Stand by Assist    LB Dressing Dependent   Minimal Assist    Bathing Maximal Assist  Minimal Assist    Toileting Dependent   Minimal Assist    Bed Mobility  Supine to sit: Moderate Assist   Sit to supine: Moderate Assist   Supine to sit: Stand by Assist   Sit to supine: Stand by Assist    Functional Transfers Moderate Assist   Stand by Assist   Functional Mobility Moderate Assist     Several small steps to/from [facility] with HHA  Stand by Assist    Balance Sitting:     Static:  SBA    Dynamic:min A  Standing: mod A      Activity Tolerance F-  F+   Visual/  Perceptual wfl                  Vitals:  6L  Rest: O2 sat 95%, HR 80 bpm  EOB/ seated activity: O2 sat 89-92%,  bpm  Transfer to BSC: O2 sat 87%,  bpm (1 minute delayed desaturation)  Recovered to 90% in 1 additional minute, 92% an additional minute  Transfer to bed, sit to stand: O2 sat 88%,  bpm  Bed mobility / repositioning: O2 sat 89%,  bpm  Rest in bed: O2 sat 93%, HR 85 bpm        Hand Dominance right   Strength ROM Additional Info:    RUE   4-/5 wfl good  and wfl FMC/dexterity noted during ADL tasks     LUE 4-/5 wfl good  and wfl FMC/dexterity noted during ADL tasks     Hearing: wfl  Sensation:wfl  Tone: wfl  Edema:none noted     Comments: Upon arrival patient supine in bed and agreeable to OT session. Therapist educated pt on role of OT. At end of session, patient semi-supine in bed with call light and phone within reach, all lines and tubes intact. Overall patient demonstrated decreased independence and safety during completion of ADL/functional transfer/mobility tasks. Pt would benefit from continued skilled OT to increase safety and independence with completion of ADL/IADL tasks for functional independence and quality of life.     Treatment: OT treatment provided this date includes: Facilitation of bed mobility (rolling, supine<>sit), unsupported sitting balance at EOB (impacting ADLs; addressing posture, weight shifting, dynamic reaching), functional transfers (various surfaces: bed, BSC), standing tolerance tasks (addressing posture, balance and activity tolerance; impacting ADLs and functional activity) and short functional ambulation tasks with w/w (cuing on posture, w/w management and safety) - skilled cuing on sequencing, hand placement, posture, body mechanics, energy conservation techniques and safety. Therapist facilitated self-care retraining: UB/LB self-care tasks (gown, socks),  Toileting hygiene task and seated grooming tasks while educating pt on sequencing, modified techniques, posture, safety and energy conservation techniques. Skilled monitoring of HR, O2 sats and pts response to treatment. Rehab Potential: Good for established goals     Patient / Family Goal: none stated      Patient and/or family were instructed on functional diagnosis, prognosis/goals and OT plan of care. Demonstrated fair- understanding. Eval Complexity: Low    Time In: 1020  Time Out: 1100  Total Treatment Time: 23 minutes    Min Units   OT Eval Low 97165  x  1   OT Eval Medium 77566      OT Eval High 86469      OT Re-Eval [id number]       Therapeutic Ex 35777       Therapeutic Activities 18856  10  1   ADL/Self Care 48424  13  1   Orthotic Management 61760       Manual 19280     Neuro Re-Ed 71572       Non-Billable Time          Evaluation Time additionally includes thorough review of current medical information, gathering information on past medical history/social history and prior level of function, interpretation of standardized testing/informal observation of tasks, assessment of data and development of plan of care and goals.           Luis Rodríguez OTR/L #[id number]

## 2021-10-25 NOTE — PROGRESS NOTES
Comprehensive Nutrition Assessment    Type and Reason for Visit:  Initial, RD Nutrition Re-Screen/LOS    Nutrition Recommendations/Plan: Will start Nepro BID to supplement intake. Nutrition Assessment:  Pt admitted w/ acute respiratory failure r/t COVID-19 and acute renal failure. PMH of bladder cancer, CKD s/p AV fistula, COPD, and DM. Pt started on HD this admission. Pt reported decreased appetite x 2 weeks PTA, poor intake since admission w/ intermittent BiPAP/HFNC use and confusion. Will start ONS to supplement intake and monitor. Malnutrition Assessment:  Malnutrition Status: At risk for malnutrition (Comment)    Context:  Acute Illness     Findings of the 6 clinical characteristics of malnutrition:  Energy Intake:  7 - 50% or less of estimated energy requirements for 5 or more days  Weight Loss:  Unable to assess (d/t fluid shifts)     Body Fat Loss:  Unable to assess (d/t COVID-19 isolation)     Muscle Mass Loss:  Unable to assess    Fluid Accumulation:  No significant fluid accumulation     Strength:  Not Performed    Estimated Daily Nutrient Needs:  Energy (kcal):  5292-3432 (MS REE= 1710 x 1.2); Weight Used for Energy Requirements:  Current     Protein (g):  80-95 (1.3-1.5 gm/kg IBW); Weight Used for Protein Requirements:  Ideal        Fluid (ml/day):  per nephrology; Method Used for Fluid Requirements:  Standard Renal      Nutrition Related Findings:  Pt oriented to person only, abd WDL, +BS, -4.4L I/O, +1 generalized edema, renal labs elevated, hyperphosphatemia, hyperglycemia      Wounds:  None       Current Nutrition Therapies:    ADULT DIET; Regular; 4 carb choices (60 gm/meal); Low Sodium (2 gm); Low Potassium (Less than 3000 mg/day);  Low Phosphorus (Less than 1000 mg)    Anthropometric Measures:  · Height: 5' 7\" (170.2 cm)  · Current Body Weight: 249 lb 9 oz (113.2 kg) (10/22 bed scale)   · Admission Body Weight: 254 lb 10.1 oz (115.5 kg) (10/20 bed scale)    · Usual Body Weight: 261 lb 9.6 oz (118.7 kg) (9/2/21 actual per EMR)     · Ideal Body Weight: 135 lbs; % Ideal Body Weight 184.9 %   · BMI: 39.1  · Adjusted Body Weight: No Adjustment   · BMI Categories: Obese Class 2 (BMI 35.0 -39.9)       Nutrition Diagnosis:   · Inadequate oral intake related to impaired respiratory function (COVID-19) as evidenced by intake 0-25%, poor intake prior to admission    Nutrition Interventions:   Food and/or Nutrient Delivery:  Continue Current Diet, Start Oral Nutrition Supplement (Will start Nepro BID to supplement intake.)  Nutrition Education/Counseling:  Education not appropriate   Coordination of Nutrition Care:  Continue to monitor while inpatient    Goals:  Pt is to consume >50% of most meals/ONS       Nutrition Monitoring and Evaluation:   Behavioral-Environmental Outcomes:  None Identified   Food/Nutrient Intake Outcomes:  Food and Nutrient Intake, Supplement Intake  Physical Signs/Symptoms Outcomes:  Biochemical Data, GI Status, Fluid Status or Edema, Nutrition Focused Physical Findings, Skin, Weight     Discharge Planning:     Too soon to determine     Electronically signed by Jacklyn Robles RD, LD on 10/25/21 at 11:00 AM EDT    Contact: [phone]

## 2021-10-25 NOTE — PROGRESS NOTES
Pt found out of bed without oxygen her monitor removed and using the bedside commode. Oxygen was reapplied and with the help two nurses was able pivot back into bed.      Monitor and pulse- ox reattached, Spo2 @ 98

## 2021-10-25 NOTE — FLOWSHEET NOTE
10/25/21 1711   Vital Signs   BP (!) 150/74   Temp 98 °F (36.7 °C)   Pulse 93   Resp 18   Weight 239 lb 10.2 oz (108.7 kg)   Weight Method Bed scale   Percent Weight Change -1.54   Post-Hemodialysis Assessment   Post-Treatment Procedures Blood returned; Access bleeding time < 10 minutes   Machine Disinfection Process Acid/Vinegar Clean;Heat Disinfect   Rinseback Volume (ml) 300 ml   Total Liters Processed (l/min) 48 l/min   Dialyzer Clearance Clear   Duration of Treatment (minutes) 210 minutes   Tolerated Treatment Good   Patient Response to Treatment pt states that she feels fine

## 2021-10-26 LAB
ANION GAP SERPL CALCULATED.3IONS-SCNC: 17 MMOL/L (ref 7–16)
BUN BLDV-MCNC: 58 MG/DL (ref 6–23)
CALCIUM SERPL-MCNC: 9.8 MG/DL (ref 8.6–10.2)
CHLORIDE BLD-SCNC: 95 MMOL/L (ref 98–107)
CO2: 27 MMOL/L (ref 22–29)
CREAT SERPL-MCNC: 4.7 MG/DL (ref 0.5–1)
GFR AFRICAN AMERICAN: 11
GFR NON-AFRICAN AMERICAN: 9 ML/MIN/1.73
GLUCOSE BLD-MCNC: 183 MG/DL (ref 74–99)
HCT VFR BLD CALC: 26.8 % (ref 34–48)
HEMOGLOBIN: 8.9 G/DL (ref 11.5–15.5)
MCH RBC QN AUTO: 30.6 PG (ref 26–35)
MCHC RBC AUTO-ENTMCNC: 33.2 % (ref 32–34.5)
MCV RBC AUTO: 92.1 FL (ref 80–99.9)
METER GLUCOSE: 118 MG/DL (ref 74–99)
METER GLUCOSE: 149 MG/DL (ref 74–99)
METER GLUCOSE: 172 MG/DL (ref 74–99)
METER GLUCOSE: 198 MG/DL (ref 74–99)
PDW BLD-RTO: 12.1 FL (ref 11.5–15)
PLATELET # BLD: 517 E9/L (ref 130–450)
PMV BLD AUTO: 10 FL (ref 7–12)
POTASSIUM SERPL-SCNC: 4.1 MMOL/L (ref 3.5–5)
RBC # BLD: 2.91 E12/L (ref 3.5–5.5)
SODIUM BLD-SCNC: 139 MMOL/L (ref 132–146)
WBC # BLD: 20.8 E9/L (ref 4.5–11.5)

## 2021-10-26 PROCEDURE — 6370000000 HC RX 637 (ALT 250 FOR IP): Performed by: FAMILY MEDICINE

## 2021-10-26 PROCEDURE — 2700000000 HC OXYGEN THERAPY PER DAY

## 2021-10-26 PROCEDURE — 82962 GLUCOSE BLOOD TEST: CPT

## 2021-10-26 PROCEDURE — 6370000000 HC RX 637 (ALT 250 FOR IP): Performed by: INTERNAL MEDICINE

## 2021-10-26 PROCEDURE — 94660 CPAP INITIATION&MGMT: CPT

## 2021-10-26 PROCEDURE — 2580000003 HC RX 258: Performed by: INTERNAL MEDICINE

## 2021-10-26 PROCEDURE — 80048 BASIC METABOLIC PNL TOTAL CA: CPT

## 2021-10-26 PROCEDURE — 6360000002 HC RX W HCPCS: Performed by: INTERNAL MEDICINE

## 2021-10-26 PROCEDURE — 2140000000 HC CCU INTERMEDIATE R&B

## 2021-10-26 PROCEDURE — 99232 SBSQ HOSP IP/OBS MODERATE 35: CPT | Performed by: INTERNAL MEDICINE

## 2021-10-26 PROCEDURE — 85027 COMPLETE CBC AUTOMATED: CPT

## 2021-10-26 PROCEDURE — 36415 COLL VENOUS BLD VENIPUNCTURE: CPT

## 2021-10-26 RX ADMIN — INSULIN LISPRO 3 UNITS: 100 INJECTION, SOLUTION INTRAVENOUS; SUBCUTANEOUS at 12:00

## 2021-10-26 RX ADMIN — INSULIN LISPRO 10 UNITS: 100 INJECTION, SOLUTION INTRAVENOUS; SUBCUTANEOUS at 08:50

## 2021-10-26 RX ADMIN — METOPROLOL TARTRATE 50 MG: 50 TABLET, FILM COATED ORAL at 08:50

## 2021-10-26 RX ADMIN — Medication 10 ML: at 08:50

## 2021-10-26 RX ADMIN — INSULIN LISPRO 10 UNITS: 100 INJECTION, SOLUTION INTRAVENOUS; SUBCUTANEOUS at 16:47

## 2021-10-26 RX ADMIN — INSULIN LISPRO 10 UNITS: 100 INJECTION, SOLUTION INTRAVENOUS; SUBCUTANEOUS at 12:00

## 2021-10-26 RX ADMIN — INSULIN GLARGINE 24 UNITS: 100 INJECTION, SOLUTION SUBCUTANEOUS at 08:50

## 2021-10-26 RX ADMIN — DEXAMETHASONE 6 MG: 4 TABLET ORAL at 08:50

## 2021-10-26 RX ADMIN — INSULIN LISPRO 3 UNITS: 100 INJECTION, SOLUTION INTRAVENOUS; SUBCUTANEOUS at 08:50

## 2021-10-26 RX ADMIN — LORAZEPAM 1 MG: 1 TABLET ORAL at 00:20

## 2021-10-26 RX ADMIN — METOPROLOL TARTRATE 50 MG: 50 TABLET, FILM COATED ORAL at 21:58

## 2021-10-26 RX ADMIN — ATORVASTATIN CALCIUM 40 MG: 40 TABLET, FILM COATED ORAL at 08:50

## 2021-10-26 RX ADMIN — PROPAFENONE HYDROCHLORIDE 300 MG: 150 TABLET, FILM COATED ORAL at 21:51

## 2021-10-26 RX ADMIN — INSULIN LISPRO 3 UNITS: 100 INJECTION, SOLUTION INTRAVENOUS; SUBCUTANEOUS at 16:47

## 2021-10-26 RX ADMIN — APIXABAN 5 MG: 5 TABLET, FILM COATED ORAL at 21:50

## 2021-10-26 RX ADMIN — APIXABAN 5 MG: 5 TABLET, FILM COATED ORAL at 08:50

## 2021-10-26 RX ADMIN — PROPAFENONE HYDROCHLORIDE 300 MG: 150 TABLET, FILM COATED ORAL at 08:50

## 2021-10-26 ASSESSMENT — PAIN SCALES - GENERAL
PAINLEVEL_OUTOF10: 0

## 2021-10-26 NOTE — CARE COORDINATION
10/26/21 Update CM note; Per Chris Cotto at [facility] they are not able to take patient due to insurance being out of network and patient not having out of network benefits. Spoke with daughter Jacinto Dumont, who is requesting referrals to skilled facilities in or around [facility]. Referral sent to [place] at [facility]. Call placed to [facility] and they do not take covid. Will await response from Saravanan Villeda at [facility].  Thania Aguilar Rn CM

## 2021-10-26 NOTE — PROGRESS NOTES
Pulmonary [facility]                             Pulmonary Consult/Progress Note :              Reason for Consultation:COVID   CC : SOB   HPI:   No changes   On 6 L   No fever or chills  Resting in bed  HD yesterday       PHYSICAL EXAMINATION:     VITAL SIGNS:  BP (!) 147/69   Pulse 80   Temp 97.7 °F (36.5 °C)   Resp 18   Ht 5' 7\" (1.702 m)   Wt 239 lb 10.2 oz (108.7 kg)   SpO2 93%   BMI 37.53 kg/m²   Wt Readings from Last 3 Encounters:   10/25/21 239 lb 10.2 oz (108.7 kg)   09/02/21 261 lb 9.6 oz (118.7 kg)   08/06/21 253 lb (114.8 kg)     Temp Readings from Last 3 Encounters:   10/26/21 97.7 °F (36.5 °C)   09/02/21 97.4 °F (36.3 °C) (Infrared)   08/06/21 97.5 °F (36.4 °C) (Tympanic)     TMAX:  BP Readings from Last 3 Encounters:   10/26/21 (!) 147/69   09/02/21 138/60   08/06/21 (!) 125/56     Pulse Readings from Last 3 Encounters:   10/26/21 80   09/02/21 70   08/06/21 80           INTAKE/OUTPUTS:  I/O last 3 completed shifts:  In: -   Out: 200 [Urine:200]    Intake/Output Summary (Last 24 hours) at 10/26/2021 1028  Last data filed at 10/25/2021 1858  Gross per 24 hour   Intake --   Output 200 ml   Net -200 ml       General Appearance: alert and oriented to person, place and time, well-developed and   well-nourished, in no acute distress   Eyes: pupils equal, round, and reactive to light, extraocular eye movements intact, conjunctivae normal and sclera anicteric   Neck: neck supple and non tender without mass, no thyromegaly, no thyroid nodules and no cervical adenopathy   Pulmonary/Chest:Rhonchi   Cardiovascular: normal rate, regular rhythm, normal S1 and S2, no murmurs, rubs, clicks or gallops, distal pulses intact, no carotid bruits, no murmurs, no gallops, no carotid bruits and no JVD   Abdomen: obese, soft, non-tender, non-distended, normal bowel sounds, no masses or organomegaly   Extremities:no edema ,no cyanosis   Musculoskeletal: normal range of motion, no joint swelling, deformity or tenderness   Neurologic: reflexes normal and symmetric, no cranial nerve deficit noted    LABS/IMAGING:    CBC:  Lab Results   Component Value Date    WBC 20.8 (H) 10/26/2021    HGB 8.9 (L) 10/26/2021    HCT 26.8 (L) 10/26/2021    MCV 92.1 10/26/2021     (H) 10/26/2021    LYMPHOPCT 4.3 (L) 10/21/2021    RBC 2.91 (L) 10/26/2021    MCH 30.6 10/26/2021    MCHC 33.2 10/26/2021    RDW 12.1 10/26/2021    NEUTOPHILPCT 81.7 (H) 10/21/2021    MONOPCT 11.3 10/21/2021    BASOPCT 0.1 10/21/2021    NEUTROABS 6.89 10/21/2021    LYMPHSABS 0.33 (L) 10/21/2021    MONOSABS 0.90 10/21/2021    EOSABS 0.00 (L) 10/21/2021    BASOSABS 0.00 10/21/2021       Recent Labs     10/26/21  0606 10/25/21  0636 10/24/21  0412   WBC 20.8* 18.6* 14.4*   HGB 8.9* 8.8* 7.9*   HCT 26.8* 27.0* 24.0*   MCV 92.1 94.7 90.9   * 513* 452*       BMP:   Recent Labs     10/24/21  0412 10/25/21  0636 10/26/21  0606    143 139   K 3.9 4.0 4.1   CL 94* 98 95*   CO2 30* 27 27   BUN 80* 100* 58*   CREATININE 5.5* 6.6* 4.7*       MG:   Lab Results   Component Value Date    MG 2.5 10/20/2021     Ca/Phos:   Lab Results   Component Value Date    CALCIUM 9.8 10/26/2021    PHOS 7.3 (H) 10/20/2021     Amylase: No results found for: AMYLASE  Lipase: No results found for: LIPASE  LIVER PROFILE:   Recent Labs     10/24/21  0412 10/25/21  0636   AST 33* 32*   ALT 54* 51*   BILITOT 0.5 0.6   ALKPHOS 96 96       PT/INR:   No results for input(s): PROTIME, INR in the last 72 hours. APTT: No results for input(s): APTT in the last 72 hours.     Cardiac Enzymes:  Lab Results   Component Value Date    TROPONINI <0.01 11/05/2018               PROBLEM LIST:  Patient Active Problem List   Diagnosis    Moderate episode of recurrent major depressive disorder (HCC)    Generalized anxiety disorder    Insomnia due to mental condition    Anemia    Essential hypertension    CKD (chronic kidney disease) stage 5, GFR less than 15 ml/min ([facility]    Atrial fibrillation ([facility]    Pure hypercholesterolemia    Morbid obesity (HCC)    Chronic obstructive pulmonary disease (HCC)    Obstructive sleep apnea    Exertional dyspnea    History of colon polyps    Family history of rectal cancer    Encounter regarding vascular access for dialysis for end-stage renal disease ([facility]    COVID-19    Pneumonia due to COVID-19 virus               ASSESSMENT:  1.) COVID pneumonia   2. )Acute hypoxic respiratory failure   3. )ESRD  4.)A fib   5. )Anemia   6- Possible Pneumonia     PLAN:  *- O2 down to 6 L ,excelent response ,continue to wean as tolerated   *- need physical therapy and up to chair with support   *-CXR in am 10/25 to me better than CXR few days ago   *-Decadron ,watch BS,need aggressive  Insulin therapy ,defer to primary ,need to adjust Insulin  *-   ,  Remedivir as per ID  remedisvir ,ID following   Aggressive pulmonary toilet  Albuterol as needed  *budesonide   *_on elquis  *-Hb stable ,anemia per primary  IV abx ,as per ID,she is off abx         Erica Alberts MD,[facility]  Pulmonary&Critical Care Medicine   Director of [address] [place] [address] Director of [address] [place] [address]    Claudetta Artis    NOTE: This report was transcribed using voice recognition software. Every effort was made to ensure accuracy; however, inadvertent computerized transcription errors may be present.

## 2021-10-26 NOTE — PROGRESS NOTES
[facility] MD Charissa Deluca, MD Andres Mills, MD Ramya Walker, CNP   Rekha Jaquez, HARRY  Progress note   Patient's Name: Jolene Azul  2:38 PM  10/26/2021    Subjective :  10/20 :pt known to me from office . Has advanced ckd and has a recent AVF placed in    stable bp , feels better . 1th HD session today   10/21: Patient was seen, interviewed and examined while undergoing her second hemodialysis treatment today. Nurses were using 17-gauge needles to cannulate her arm for her 2-1/2-hour dialysis today. She is scheduled for her third treatment tomorrow with higher blood flow attempting to go to 250-300 mill per minute. 10/22: Patient was seen, interviewed and examined while undergoing her third hemodialysis treatment today. She has no new complaints, she was confirming that she feels much better terms of breathing and overall condition. Case was discussed with dialysis nurse, labs reviewed, all other consults reviewed. 10/23: Patient was seen in her room, she was arousable with face max oxygen with acute distress reported overnight per nursing staff. Next dialysis will be done  Monday, MWF schedule. 10/24: Patient was seen and interviewed in her room, clinically doing much better. Dialysis scheduled for tomorrow. 10/25: Patient was seen, interviewed and examined while undergoing acute hemodialysis today dialysis with. Overall doing better with no complications or side effects on dialysis. 10/26: Patient was seen and interviewed in her room, overall doing well no new complaints. Dialysis scheduled for tomorrow. History of Present Ilness:    Ms. Chandler Marie is a 25-year-old woman with advanced chronic kidney disease, stage V with a baseline creatinine 4.8 to 5.3 mg/dL, secondary to diabetic nephropathy, renal microvascular atherosclerotic disease. She is followed longitudinally from nephrology standpoint by Dr. Michael Okeefe.   She presented to the emergency department yesterday with a roughly 2-week history of progressive dyspnea, intermittent cough at times productive of thinnish phlegm but not routinely, fever, chills, diaphoresis, anosmia and dysgeusia, progressive generalized weakness. Intake of food and fluid has been quite poor in particular over the past several days. She denies lightheadedness, chest pain or palpitations, abdominal pain or cramping, diarrhea or constipation, dysuria hematuria change in bowel habits of late. On presentation O2 saturations were in the 70s, she was started on nonrebreather mask. She notes that she has been breathing more or less comfortably throughout most of today, and she has been oxygenating well. BUN and creatinine on presentation were 95 and 9.4, respectively (10/18) which compares with BUN/creatinine 57 and 5.3, respectively on (9/2). BUN/creatinine this morning 105 and 9.1, respectively. HCO3 is 15 mmol/L. At the time of my initial evaluation this early evening she was resting more or less comfortably on her gurney in the ER. She notes that she feels a good deal better now than he did the time of presentation. She is able to speak in complete sentences.     Past Medical History:   Diagnosis Date    Atrial fibrillation [facility])     Bladder cancer ([facility]     Cancer ([facility]     CKD (chronic kidney disease)     CKD (chronic kidney disease) stage 5, GFR less than 15 ml/min ([facility] 11/6/2018    Diabetes ([facility]     Hypertension     Obesity     260 #    Sleep apnea        Past Surgical History:   Procedure Laterality Date    CARDIAC [facility]HETERIZATION [facility]02/[facility]    CHOLECYSTECTOMY      COLONOSCOPY      COLONOSCOPY N/A 8/29/2019    mid ascending colon polyp bx/cauterization (inflammatory polyp), proximal transverse colon polyp bx/cauterized (tubular adenoma), descending colon polyp 80 cm from anus bx/cauterized (hyperplastic polyp), sigmoid colon polyp 35 cm from anus bx/cauterized (tubular adenoma), sigmoid colon polyps 30 cm from anus bx/cauterized (tubular adenoma), Dr. Griselda Anderson, [facility]    COLONOSCOPY  8/29/2019    mid ascending colon polyp bx/cauterization (inflammatory polyp), proximal transverse colon polyp bx/cauterized (tubular adenoma), descending colon polyp 80 cm from anus bx/cauterized (hyperplastic polyp), sigmoid colon polyp 35 cm from anus bx/cauterized (tubular adenoma), sigmoid colon polyps 30 cm from anus bx/cauterized (tubular adenoma), Dr. Griselda Anderson, [facility]    DIALYSIS FISTULA CREATION Left 1/22/2019    LIGATIION LEFT LEFT UPPER EXTREMITY OF AV FISTULA , EVACUATION HEMATOMA performed by Jhonatan Alejandre MD at [address] Right 12/7/2020    AV FISTULA CREATION -- RIGHT ARM performed by Rande Dandy, MD at [address] Right 4/8/2021    AV FISTULA  REVISION RIGHT ARM performed by Rande Dandy, MD at [place] [id number] Left 9/18/2018    AV FISTULA  LEFT ARM performed by Jhonatan Alejandre MD at [place] [address] Left 11/14/2018    SUPERFICIALIZATION AV FISTULA  LEFT ARM , LIGATION TRIBUTORY AV FISTULA LEFT ARM performed by Jhonatan Alejandre MD at [address]         Family History   Problem Relation Age of Onset    Cancer Mother         bladder    Diabetes Mother     COPD Mother     Depression Father     Diabetes Father     Heart Disease Father     High Blood Pressure Father     Stroke Father     Prostate Cancer Father     Dementia Father     Alcohol Abuse Brother     Diabetes Brother     Bipolar Disorder Paternal Uncle     Anxiety Disorder Daughter     Colon Cancer Maternal Grandfather         reports that she quit smoking about 8 months ago. Her smoking use included cigarettes. She has a 21.50 pack-year smoking history. She has never used smokeless tobacco. She reports previous alcohol use.  She reports that she does not use drugs. Allergies:  Adhesive tape and Penicillins    Current Medications:    dexamethasone (DECADRON) tablet 6 mg, Daily  insulin glargine (LANTUS) injection vial 24 Units, BID  insulin lispro (HUMALOG) injection vial 10 Units, TID WC  LORazepam (ATIVAN) tablet 1 mg, Q8H PRN  insulin lispro (HUMALOG) injection vial 0-18 Units, TID WC  insulin lispro (HUMALOG) injection vial 0-9 Units, Nightly  propafenone (RYTHMOL) tablet 300 mg, BID  apixaban (ELIQUIS) tablet 5 mg, BID  atorvastatin (LIPITOR) tablet 40 mg, Daily  calcitRIOL (ROCALTROL) capsule 0.5 mcg, Once per day on Mon Wed Fri  metoprolol tartrate (LOPRESSOR) tablet 50 mg, BID  sodium chloride flush 0.9 % injection 5-40 mL, 2 times per day  sodium chloride flush 0.9 % injection 5-40 mL, PRN  0.9 % sodium chloride infusion, PRN  ondansetron (ZOFRAN-ODT) disintegrating tablet 4 mg, Q8H PRN   Or  ondansetron (ZOFRAN) injection 4 mg, Q6H PRN  polyethylene glycol (GLYCOLAX) packet 17 g, Daily PRN  acetaminophen (TYLENOL) tablet 650 mg, Q6H PRN   Or  acetaminophen (TYLENOL) suppository 650 mg, Q6H PRN  glucose (GLUTOSE) 40 % oral gel 15 g, PRN  dextrose 50 % IV solution, PRN  glucagon (rDNA) injection 1 mg, PRN  dextrose 5 % solution, PRN  sodium bicarbonate 8.4 % injection 50 mEq, Once        Review of Systems:   As above HPI, otherwise unremarkable. No NSAIDs. Recent course of antimicrobials.     Physical exam:   BP (!) 147/69   Pulse 80   Temp 97.7 °F (36.5 °C)   Resp 18   Ht 5' 7\" (1.702 m)   Wt 239 lb 10.2 oz (108.7 kg)   SpO2 93%   BMI 37.53 kg/m²   Age-appropriate morbidly obese white woman who appears ill but in no apparent distress  NC/AT EOMI sclera conjunctiva are clear and anicteric mucous membranes are somewhat dry though she is wearing respiratory mask  Neck soft supple trachea midline no JVD at 60 degrees no bruit though exam somewhat compromised by respiratory noises from the mask  Coarse breath sounds, crackles scattered throughout lung fields, relatively mild, good air entry in all fields. Mildly prolonged expiratory phase  Regular rhythm normal S1 and S2, no murmur no rub  Abdomen soft nontender nondistended normal active bowel sounds no mass no hepatosplenomegaly though could not lay her supine for proper examination  Distal extremities reveal chronic-type edema bilaterally, mild distal erythema consistent with chronic venous stasis, no pitting edema.   No other rash or lesion on visible portions of integument  Pulses 1+x4  Moves all 4 extremities  No asterixis  Cranial nerves II through XII grossly intact  Awake, alert, interactive and appropriate      Data:   Labs:  CBC with Differential:    Lab Results   Component Value Date    WBC 20.8 10/26/2021    RBC 2.91 10/26/2021    HGB 8.9 10/26/2021    HCT 26.8 10/26/2021     10/26/2021    MCV 92.1 10/26/2021    MCH 30.6 10/26/2021    MCHC 33.2 10/26/2021    RDW 12.1 10/26/2021    METASPCT 1.7 10/21/2021    LYMPHOPCT 4.3 10/21/2021    MONOPCT 11.3 10/21/2021    MYELOPCT 0.9 10/21/2021    BASOPCT 0.1 10/21/2021    MONOSABS 0.90 10/21/2021    LYMPHSABS 0.33 10/21/2021    EOSABS 0.00 10/21/2021    BASOSABS 0.00 10/21/2021     CMP:    Lab Results   Component Value Date     10/26/2021    K 4.1 10/26/2021    K 4.1 04/08/2021    CL 95 10/26/2021    CO2 27 10/26/2021    BUN 58 10/26/2021    CREATININE 4.7 10/26/2021    GFRAA 11 10/26/2021    LABGLOM 9 10/26/2021    GLUCOSE 183 10/26/2021    PROT 6.3 10/25/2021    LABALBU 2.9 10/25/2021    CALCIUM 9.8 10/26/2021    BILITOT 0.6 10/25/2021    ALKPHOS 96 10/25/2021    AST 32 10/25/2021    ALT 51 10/25/2021     Ionized Calcium:  No results found for: IONCA  Magnesium:    Lab Results   Component Value Date    MG 2.5 10/20/2021     Phosphorus:    Lab Results   Component Value Date    PHOS 7.3 10/20/2021     U/A:    Lab Results   Component Value Date    NITRITE neg 03/14/2019    COLORU Yellow 05/24/2019    PHUR 6.0 05/24/2019    WBCUA 0-1 05/24/2019 RBCUA NONE 05/24/2019    BACTERIA RARE 05/24/2019    CLARITYU Clear 05/24/2019    SPECGRAV 1.010 05/24/2019    LEUKOCYTESUR TRACE 05/24/2019    UROBILINOGEN 0.2 05/24/2019    BILIRUBINUR Negative 05/24/2019    BILIRUBINUR neg 03/14/2019    BLOODU Negative 05/24/2019    GLUCOSEU Negative 05/24/2019     Microalbumen/Creatinine ratio:  No components found for: RUCREAT  Iron Saturation:  No components found for: PERCENTFE  TIBC:    Lab Results   Component Value Date    TIBC 403 04/10/2019     FERRITIN:    Lab Results   Component Value Date    FERRITIN 16 11/07/2018        Imaging:  XR CHEST PORTABLE   Final Result   1. No interval change in the multifocal bilateral airspace disease   2. Mild cardiomegaly. XR CHEST PORTABLE   Final Result   Persistent widespread bilateral infiltrates. The no significant changes   since the October 18. The heart appears to be enlarged. XR CHEST PORTABLE   Final Result   1. Extensive multifocal bilateral pneumonia. Assessment  Admitted with pneumonia due to COVID-19 virus. 1. Acute kidney injury, likely hemodynamically mediated due to volume contraction associated with poor intake due to anorexia, increase insensible losses due to tachypnea, fever and diaphoresis. Currently nonoliguric. 2. Chronic kidney disease stage V, baseline creatinine 4.8 to 5.3 mg/dL with at least the past 4 months. Etiology is diabetic nephropathy, renal microvascular atherosclerotic disease  3. Metabolic acidosis, wide anion gap, secondary to acute kidney injury superimposed on chronic kidney disease  4. Anemia secondary to advanced chronic kidney disease. No recent melena or hematochezia  5. Secondary hyperparathyroidism/mineral bone disease due to advanced CKD. On low-dose calcitriol Mondays Wednesdays and Fridays. 6. Morbid obesity, BMI 41.5 kg/m²  7.    COVID-19 infection  PLAN :    Progressive azotemia in patient with advanced CKD V at baseline   Next HD treatment on Wednesday  We will continue schedule of Monday, Wednesday, Friday dialysis  Follow clinical condition daily with lab reviews.           Electronically signed by Kike Sarah MD on 10/26/2021

## 2021-10-26 NOTE — PROGRESS NOTES
Pulmonary [facility]                             Pulmonary Consult/Progress Note :              Reason for Consultation:COVID   CC : SOB   HPI:   Doing very well   weaning O2 and down to 6 L   S/p HD    SOB improved and decrease in O2 requirement    No chest pain   No events over night        PHYSICAL EXAMINATION:     VITAL SIGNS:  BP (!) 148/84   Pulse 92   Temp 98.5 °F (36.9 °C) (Temporal)   Resp 18   Ht 5' 7\" (1.702 m)   Wt 239 lb 10.2 oz (108.7 kg)   SpO2 94%   BMI 37.53 kg/m²   Wt Readings from Last 3 Encounters:   10/25/21 239 lb 10.2 oz (108.7 kg)   09/02/21 261 lb 9.6 oz (118.7 kg)   08/06/21 253 lb (114.8 kg)     Temp Readings from Last 3 Encounters:   10/25/21 98.5 °F (36.9 °C) (Temporal)   09/02/21 97.4 °F (36.3 °C) (Infrared)   08/06/21 97.5 °F (36.4 °C) (Tympanic)     TMAX:  BP Readings from Last 3 Encounters:   10/25/21 (!) 148/84   09/02/21 138/60   08/06/21 (!) 125/56     Pulse Readings from Last 3 Encounters:   10/25/21 92   09/02/21 70   08/06/21 80           INTAKE/OUTPUTS:  I/O last 3 completed shifts:   In: 0   Out: 1050 [Urine:1050]    Intake/Output Summary (Last 24 hours) at 10/25/2021 2140  Last data filed at 10/25/2021 1858  Gross per 24 hour   Intake --   Output 600 ml   Net -600 ml       General Appearance: alert and oriented to person, place and time, well-developed and   well-nourished, in no acute distress   Eyes: pupils equal, round, and reactive to light, extraocular eye movements intact, conjunctivae normal and sclera anicteric   Neck: neck supple and non tender without mass, no thyromegaly, no thyroid nodules and no cervical adenopathy   Pulmonary/Chest:Rhonchi   Cardiovascular: normal rate, regular rhythm, normal S1 and S2, no murmurs, rubs, clicks or gallops, distal pulses intact, no carotid bruits, no murmurs, no gallops, no carotid bruits and no JVD   Abdomen: obese, soft, non-tender, non-distended, normal bowel sounds, no masses or organomegaly   Extremities:no edema ,no cyanosis   Musculoskeletal: normal range of motion, no joint swelling, deformity or tenderness   Neurologic: reflexes normal and symmetric, no cranial nerve deficit noted    LABS/IMAGING:    CBC:  Lab Results   Component Value Date    WBC 18.6 (H) 10/25/2021    HGB 8.8 (L) 10/25/2021    HCT 27.0 (L) 10/25/2021    MCV 94.7 10/25/2021     (H) 10/25/2021    LYMPHOPCT 4.3 (L) 10/21/2021    RBC 2.85 (L) 10/25/2021    MCH 30.9 10/25/2021    MCHC 32.6 10/25/2021    RDW 12.0 10/25/2021    NEUTOPHILPCT 81.7 (H) 10/21/2021    MONOPCT 11.3 10/21/2021    BASOPCT 0.1 10/21/2021    NEUTROABS 6.89 10/21/2021    LYMPHSABS 0.33 (L) 10/21/2021    MONOSABS 0.90 10/21/2021    EOSABS 0.00 (L) 10/21/2021    BASOSABS 0.00 10/21/2021       Recent Labs     10/25/21  0636 10/24/21  0412 10/23/21  0658   WBC 18.6* 14.4* 13.9*   HGB 8.8* 7.9* 8.3*   HCT 27.0* 24.0* 24.9*   MCV 94.7 90.9 93.3   * 452* 464*       BMP:   Recent Labs     10/23/21  0658 10/24/21  0412 10/25/21  0636    140 143   K 3.9 3.9 4.0   CL 92* 94* 98   CO2 29 30* 27   BUN 57* 80* 100*   CREATININE 4.3* 5.5* 6.6*       MG:   Lab Results   Component Value Date    MG 2.5 10/20/2021     Ca/Phos:   Lab Results   Component Value Date    CALCIUM 9.7 10/25/2021    PHOS 7.3 (H) 10/20/2021     Amylase: No results found for: AMYLASE  Lipase: No results found for: LIPASE  LIVER PROFILE:   Recent Labs     10/23/21  0658 10/24/21  0412 10/25/21  0636   AST 52* 33* 32*   ALT 57* 54* 51*   BILITOT 0.6 0.5 0.6   ALKPHOS 101 96 96       PT/INR:   No results for input(s): PROTIME, INR in the last 72 hours. APTT: No results for input(s): APTT in the last 72 hours.     Cardiac Enzymes:  Lab Results   Component Value Date    TROPONINI <0.01 11/05/2018               PROBLEM LIST:  Patient Active Problem List   Diagnosis    Moderate episode of recurrent major depressive disorder (HCC)    Generalized anxiety disorder    Insomnia due to mental condition    Anemia    Essential hypertension    CKD (chronic kidney disease) stage 5, GFR less than 15 ml/min (HCC)    Atrial fibrillation (HCC)    Pure hypercholesterolemia    Morbid obesity (HCC)    Chronic obstructive pulmonary disease (HCC)    Obstructive sleep apnea    Exertional dyspnea    History of colon polyps    Family history of rectal cancer    Encounter regarding vascular access for dialysis for end-stage renal disease ([facility]    COVID-19    Pneumonia due to COVID-19 virus               ASSESSMENT:  1.) COVID pneumonia   2. )Acute hypoxic respiratory failure   3. )ESRD  4.)A fib   5. )Anemia   6- Possible Pneumonia     PLAN:  *-HD today   *- O2 down to 6 L ,excelent response   *-CXR in am 10/25 to me better than CXR few days ago   *-Decadron ,watch BS,need aggressive  Insulin therapy ,defer to primary ,need to adjust Insulin  *-   ,  Remedivir as per ID  remedisvir ,ID following   Aggressive pulmonary toilet  Albuterol as needed  *budesonide   *_on elquis  *-Hb stable ,anemia per primary  Sugar control as per primary   IV abx ,as per ID,she is off abx         Erica Alberts MD,[facility]  Pulmonary&Critical Care Medicine   Director of [address] [place] [address] Director of [address] [place] [address]    Genoveva Chaney    NOTE: This report was transcribed using voice recognition software. Every effort was made to ensure accuracy; however, inadvertent computerized transcription errors may be present.

## 2021-10-26 NOTE — PROGRESS NOTES
Hospitalist Progress Note      SYNOPSIS: Patient admitted on 10/18/2021 for shortness of breath    Patient with significant past medical history came to our hospital with chief complaint of worsening shortness of breath for last 2 weeks and was found to have acute hypoxemic respiratory failure requiring high flow oxygen as well as acute kidney injury on her chronic kidney disease stage V. In the ER, patient was found to have atrial fibrillation with heart rate episodes up to 150s but no persistent rapid ventricular rate. She was positive for COVID-19. Other significant finding was acute kidney injury with creatinine of 9.4, compared to her baseline of 5.2. Her BNP was also elevated to 4850. ER physician discussed with nephrology who recommended low-dose bicarb drip and close management. She also required AIRVO in the ER with oxygen about 30 L. Over the next 48 hours her renal function remained stable without requiring any dialysis. She was seen by pulmonology and started on IV ceftriaxone and doxycycline to cover for community-acquired pneumonia. Ceftriaxone and doxycycline discontinued on 10/21 by ID. Patient was started on hemodialysis on 10/21. Showed significant improvement in her volume overload state over the next 72 hours. SUBJECTIVE:    Patient seen and examined in her room. Off BiPAP since 10/24, tolerating well on 4 L O2. Had an episode of bleeding from her IV site overnight. Patient denies any worsening shortness of breath, nausea, vomiting. Records reviewed. Stable overnight. No other overnight issues reported. Temp (24hrs), Av.8 °F (36.6 °C), Min:96.9 °F (36.1 °C), Max:98.5 °F (36.9 °C)    DIET: ADULT DIET; Regular; 4 carb choices (60 gm/meal); Low Sodium (2 gm); Low Potassium (Less than 3000 mg/day);  Low Phosphorus (Less than 1000 mg)  ADULT ORAL NUTRITION SUPPLEMENT; Breakfast, Dinner; Renal Oral Supplement  CODE: Full Code    Intake/Output Summary (Last 24 hours) at 10/26/2021 0832  Last data filed at 10/25/2021 1858  Gross per 24 hour   Intake --   Output 200 ml   Net -200 ml       OBJECTIVE:    BP (!) 170/73   Pulse 93   Temp 96.9 °F (36.1 °C) (Temporal)   Resp 18   Ht 5' 7\" (1.702 m)   Wt 239 lb 10.2 oz (108.7 kg)   SpO2 95%   BMI 37.53 kg/m²     General appearance: No apparent distress, appears stated age and cooperative. HEENT:  Conjunctivae/corneas clear. Neck: Supple. No jugular venous distention. Respiratory: Clear to auscultation bilaterally, overall breathing sounds improved. Cardiovascular: Regular rate rhythm, normal S1-S2  Abdomen: Soft, nontender, nondistended  Musculoskeletal: No clubbing, cyanosis, no bilateral lower extremity edema. Brisk capillary refill. Skin:  No rashes  on visible skin  Neurologic: awake, alert and following commands. Barrow catheter noted.     ASSESSMENT & PLAN:    Sepsis  Secondary to COVID-19 pneumonia  See management below     Acute hypoxemic respiratory failure, improving  Severe hypoxia, use of excessive respiratory muscles, chest x-ray positive for bilateral infiltrates, requiring high flow oxygen  COVID-19 pneumonia  Bilateral airspace disease on chest x-ray  For Covid maintain on dexamethasone 10 mg IV daily, started on 10/18  Consult pulmonology, decrease dose of dexamethasone to 6 mg IV daily on 10/24, changed to dexamethasone 6 mg p.o. daily on 10/25 for 5 more days, last dose on 10/30  Was on high flow, then required BiPAP for 48 hours, down to 10 L on 10/24  Pulmonology, follow recommendation  Consulted ID on family request: Do not recommend ivermectin that family requested    Community-acquired pneumonia  Bilateral infiltrates, elevated procalcitonin of 1.04  Started on IV ceftriaxone and doxycycline on 10/19, discontinued on 10/21 as per ID recommendations     Acute kidney injury on chronic kidney disease  Nonoliguric LEW, secondary to COVID-19 pneumonia  Baseline creatinine 5.2  Admitted with creatinine 9.5, remained stable at about 9.5  Started hemodialysis via right upper arm AV fistula on 10/21  Discontinued IV bicarb on 10/22  Waiting for nephrology recommendations regarding starting permanent dialysis    CKD stage V  Presumably secondary to diabetic nephropathy  Patient has right arm AV fistula, with good thrill and bruit  Started hemodialysis on 10/21     Elevated BNP  No previous history of heart failure  EF 65% in August 2019 without any systolic or diastolic dysfunction  Elevated BNP of 4850, likely due to LEW on CKD  Patient on home dose torsemide, hold for now because of LEW and without any obvious fluid overload     Atrial fibrillation  Not in rapid ventricular rate  Resume home dose metoprolol and Eliquis  Monitor closely     Diabetes mellitus type 2  Home dose glipizide held  Maintain on sliding scale insulin  Diabetes poorly controlled because of steroids  Consult endocrinology for diabetes management  Meanwhile start on Lantus 20 units with breakfast and Humalog 6 units 3 times daily along with sliding scale     Hyperlipidemia  Continue statins     End-of-life care discussion  Full code for now       DISPOSITION:   Medically stable for discharge on the morning of 10/26. Case management to discuss with nephrology regarding permanent dialysis and placement issues. Called patient daughter on 10/25 and updated her about her mother's improving condition and need for rehab.     Medications:  REVIEWED DAILY    Infusion Medications    sodium chloride      dextrose       Scheduled Medications    dexamethasone  6 mg Oral Daily    insulin glargine  24 Units SubCUTAneous BID    insulin lispro  10 Units SubCUTAneous TID WC    insulin lispro  0-18 Units SubCUTAneous TID WC    insulin lispro  0-9 Units SubCUTAneous Nightly    propafenone  300 mg Oral BID    apixaban  5 mg Oral BID    atorvastatin  40 mg Oral Daily    calcitRIOL  0.5 mcg Oral Once per day on Mon Wed Fri    metoprolol tartrate  50 mg Oral

## 2021-10-26 NOTE — PROGRESS NOTES
ENDOCRINOLOGY PROGRESS NOTE  Via Tele-Health Service      Date of admission: 10/18/2021  Date of service: 10/26/2021  Admitting physician: Hawk Crawford MD   Primary Care Physician: Sajan Padilla DO  Consultant physician: Rian Rubi MD     Reason for the consultation:  Uncontrolled DM    History of Present Illness: The history is provided by the patient. Accuracy of the patient data is excellent    Donaciano Favre is a very pleasant 73 y.o. old female with PMH Obesity, CKD, Afib and other listed below admitted to [facility] on 10/18/2021 because of SOB and tested positive for COVID, endocrine service was consulted for diabetes management.     Subjective   BG improving      Inpatient diet:   Carb Restricted diet     Point of care glucose monitoring   (Independently reviewed)   Recent Labs     10/24/21  1956/21  0606 10/25/21  1136 10/25/21  1754 10/25/21  2026 10/26/21  0649 10/26/21  1138 10/26/21  1624   GLUMET 154* 193* 254* 180* 197* 198* 149* 172*     Scheduled Meds:   dexamethasone  6 mg Oral Daily    insulin glargine  24 Units SubCUTAneous BID    insulin lispro  10 Units SubCUTAneous TID WC    insulin lispro  0-18 Units SubCUTAneous TID WC    insulin lispro  0-9 Units SubCUTAneous Nightly    propafenone  300 mg Oral BID    apixaban  5 mg Oral BID    atorvastatin  40 mg Oral Daily    calcitRIOL  0.5 mcg Oral Once per day on Mon Wed Fri    metoprolol tartrate  50 mg Oral BID    sodium chloride flush  5-40 mL IntraVENous 2 times per day    sodium bicarbonate  50 mEq IntraVENous Once       PRN Meds:   LORazepam, 1 mg, Q8H PRN  sodium chloride flush, 5-40 mL, PRN  sodium chloride, 25 mL, PRN  ondansetron, 4 mg, Q8H PRN   Or  ondansetron, 4 mg, Q6H PRN  polyethylene glycol, 17 g, Daily PRN  acetaminophen, 650 mg, Q6H PRN   Or  acetaminophen, 650 mg, Q6H PRN  glucose, 15 g, PRN  dextrose, 12.5 g, PRN  glucagon (rDNA), 1 mg, PRN  dextrose, 100 mL/hr, PRN      Continuous Infusions:   sodium chloride      dextrose         Review of Systems  All systems reviewed. All negative except for symptoms mentioned in HPI     OBJECTIVE    BP (!) 165/58   Pulse 73   Temp 97.9 °F (36.6 °C) (Oral)   Resp 22   Ht 5' 7\" (1.702 m)   Wt 239 lb 10.2 oz (108.7 kg)   SpO2 94%   BMI 37.53 kg/m²     Intake/Output Summary (Last 24 hours) at 10/26/2021 1911  Last data filed at 10/26/2021 1856  Gross per 24 hour   Intake --   Output 200 ml   Net -200 ml     Physical examination:  Due to this being a TeleHealth encounter, evaluation of the following organ systems is limited: Vitals/Constitutional/EENT/Resp/CV/GI//MS/Neuro/Skin/Heme-Lymph-Imm.     Modified physical exam    General: Communicating well   Neck: no obvious neck mass. No obvious neck deformity     CVS: no distress   Chest: no distress.  Chest is moving with respiration    Extremities:  no visible tremor  Skin: No visible rashes    Musculoskeletal: no visible deformity  Neuro: Alert and oriented to person, place, and time.       Review of Laboratory Data:  I personally reviewed the following labs:   Recent Labs     10/24/21  0412 10/25/21  0636 10/26/21  0606   WBC 14.4* 18.6* 20.8*   RBC 2.64* 2.85* 2.91*   HGB 7.9* 8.8* 8.9*   HCT 24.0* 27.0* 26.8*   MCV 90.9 94.7 92.1   MCH 29.9 30.9 30.6   MCHC 32.9 32.6 33.2   RDW 12.0 12.0 12.1   * 513* 517*   MPV 9.6 9.6 10.0     Recent Labs     10/24/21  0412 10/25/21  0636 10/26/21  0606    143 139   K 3.9 4.0 4.1   CL 94* 98 95*   CO2 30* 27 27   BUN 80* 100* 58*   CREATININE 5.5* 6.6* 4.7*   GLUCOSE 188* 191* 183*   CALCIUM 9.6 9.7 9.8   PROT 6.2* 6.3*  --    LABALBU 3.1* 2.9*  --    BILITOT 0.5 0.6  --    ALKPHOS 96 96  --    AST 33* 32*  --    ALT 54* 51*  --      Beta-Hydroxybutyrate   Date Value Ref Range Status   10/19/2021 1.12 (H) 0.02 - 0.27 mmol/L Final     Lab Results   Component Value Date    LABA1C 6.7 10/19/2021    LABA1C 6.7 01/31/2020    LABA1C 6.6 10/21/2019     Lab Results   Component Value Date/Time    TSH 0.804 01/31/2019 12:41 PM     Lab Results   Component Value Date    LABA1C 6.7 10/19/2021    GLUCOSE 183 10/26/2021    MALBCR 305.1 05/24/2019    LABMICR 149.5 05/24/2019    LABCREA 61 09/03/2019     Lab Results   Component Value Date    TRIG 211 11/06/2018    HDL 37 11/06/2018    LDLCALC 93 11/06/2018    CHOL 172 11/06/2018       Blood culture   No results found for: [facility]    Radiology:  XR CHEST PORTABLE   Final Result   1. No interval change in the multifocal bilateral airspace disease   2. Mild cardiomegaly. XR CHEST PORTABLE   Final Result   Persistent widespread bilateral infiltrates. The no significant changes   since the October 18. The heart appears to be enlarged. XR CHEST PORTABLE   Final Result   1. Extensive multifocal bilateral pneumonia. Medical Records/Labs/Images review:   I personally reviewed and summarized previous records   All labs and imaging were reviewed independently     Jatinder Clarke, a 73 y.o.-old female seen today for inpatient diabetes management     Diabetes Mellitus type 2  · Pt' DM is uncontrolled. A1c 6.7% but she is anemic   · Currently worsened with steroids  · We recommend the following DM regimen    · Lantus 24 U BID   · Humalog 10 U with meals   · High dose sliding scale with meals and and at night   · Continue glucose check with meals and at bedtime   · Will titrate insulin dose based on the blood glucose trend & insulin requirement    COVID-19 Infection   · Management per primary service   · Will closely monitoring BG and adjust insulin therapy while pt on dexamethasone     Thank you for allowing us to participate in the care of this patient. Please do not hesitate to contact us with any additional questions.      Óscar Lynn MD  Endocrinologist, [facility]   [address] [address]   Phone: [phone]  Fax:

## 2021-10-26 NOTE — CARE COORDINATION
10/26/21 Update CM Note; Patient remains in isolation due to covid. She is currently on 6lnc high flow cannula and sat 95%. She is continued on her dialysis MWF. PT 12/24 and OT 13/24. Referral was called to [facility] and a follow up call was place to Germán at Admissions today. She is reviewing the chart and if accepted can take the patient on Thursday. The dialysis center that is used by [facility] is [facility] in [place]. Will await decision by [facility].  Lew Alonso RN CM

## 2021-10-27 VITALS
WEIGHT: 235.23 LBS | HEIGHT: 67 IN | OXYGEN SATURATION: 95 % | HEART RATE: 77 BPM | RESPIRATION RATE: 18 BRPM | BODY MASS INDEX: 36.92 KG/M2 | SYSTOLIC BLOOD PRESSURE: 106 MMHG | DIASTOLIC BLOOD PRESSURE: 53 MMHG | TEMPERATURE: 97.5 F

## 2021-10-27 LAB
ANION GAP SERPL CALCULATED.3IONS-SCNC: 17 MMOL/L (ref 7–16)
BUN BLDV-MCNC: 91 MG/DL (ref 6–23)
CALCIUM SERPL-MCNC: 9.5 MG/DL (ref 8.6–10.2)
CHLORIDE BLD-SCNC: 94 MMOL/L (ref 98–107)
CO2: 26 MMOL/L (ref 22–29)
CREAT SERPL-MCNC: 6.3 MG/DL (ref 0.5–1)
GFR AFRICAN AMERICAN: 8
GFR NON-AFRICAN AMERICAN: 7 ML/MIN/1.73
GLUCOSE BLD-MCNC: 122 MG/DL (ref 74–99)
HCT VFR BLD CALC: 26.4 % (ref 34–48)
HEMOGLOBIN: 8.8 G/DL (ref 11.5–15.5)
MCH RBC QN AUTO: 30.8 PG (ref 26–35)
MCHC RBC AUTO-ENTMCNC: 33.3 % (ref 32–34.5)
MCV RBC AUTO: 92.3 FL (ref 80–99.9)
METER GLUCOSE: 133 MG/DL (ref 74–99)
METER GLUCOSE: 85 MG/DL (ref 74–99)
PDW BLD-RTO: 12.1 FL (ref 11.5–15)
PLATELET # BLD: 476 E9/L (ref 130–450)
PMV BLD AUTO: 10.1 FL (ref 7–12)
POTASSIUM SERPL-SCNC: 4.3 MMOL/L (ref 3.5–5)
RBC # BLD: 2.86 E12/L (ref 3.5–5.5)
SODIUM BLD-SCNC: 137 MMOL/L (ref 132–146)
WBC # BLD: 23.3 E9/L (ref 4.5–11.5)

## 2021-10-27 PROCEDURE — 85027 COMPLETE CBC AUTOMATED: CPT

## 2021-10-27 PROCEDURE — 6370000000 HC RX 637 (ALT 250 FOR IP): Performed by: INTERNAL MEDICINE

## 2021-10-27 PROCEDURE — 2700000000 HC OXYGEN THERAPY PER DAY

## 2021-10-27 PROCEDURE — 94660 CPAP INITIATION&MGMT: CPT

## 2021-10-27 PROCEDURE — 90935 HEMODIALYSIS ONE EVALUATION: CPT

## 2021-10-27 PROCEDURE — 36415 COLL VENOUS BLD VENIPUNCTURE: CPT

## 2021-10-27 PROCEDURE — 80048 BASIC METABOLIC PNL TOTAL CA: CPT

## 2021-10-27 PROCEDURE — 82962 GLUCOSE BLOOD TEST: CPT

## 2021-10-27 PROCEDURE — 6360000002 HC RX W HCPCS: Performed by: INTERNAL MEDICINE

## 2021-10-27 RX ORDER — INSULIN GLARGINE 100 [IU]/ML
24 INJECTION, SOLUTION SUBCUTANEOUS 2 TIMES DAILY
Qty: 10 ML | Refills: 3 | Status: SHIPPED | OUTPATIENT
Start: 2021-10-27 | End: 2021-11-10 | Stop reason: ALTCHOICE

## 2021-10-27 RX ORDER — DEXAMETHASONE 6 MG/1
6 TABLET ORAL DAILY
Qty: 4 TABLET | Refills: 0 | Status: SHIPPED | OUTPATIENT
Start: 2021-10-27 | End: 2021-10-31

## 2021-10-27 RX ORDER — INSULIN ASPART 100 [IU]/ML
10 INJECTION, SOLUTION INTRAVENOUS; SUBCUTANEOUS
Qty: 5 PEN | Refills: 3 | Status: SHIPPED | OUTPATIENT
Start: 2021-10-27 | End: 2021-11-10 | Stop reason: ALTCHOICE

## 2021-10-27 RX ADMIN — INSULIN LISPRO 10 UNITS: 100 INJECTION, SOLUTION INTRAVENOUS; SUBCUTANEOUS at 10:45

## 2021-10-27 RX ADMIN — METOPROLOL TARTRATE 50 MG: 50 TABLET, FILM COATED ORAL at 10:38

## 2021-10-27 RX ADMIN — ATORVASTATIN CALCIUM 40 MG: 40 TABLET, FILM COATED ORAL at 10:38

## 2021-10-27 RX ADMIN — INSULIN GLARGINE 24 UNITS: 100 INJECTION, SOLUTION SUBCUTANEOUS at 10:45

## 2021-10-27 RX ADMIN — DEXAMETHASONE 6 MG: 4 TABLET ORAL at 10:37

## 2021-10-27 RX ADMIN — APIXABAN 5 MG: 5 TABLET, FILM COATED ORAL at 10:38

## 2021-10-27 RX ADMIN — PROPAFENONE HYDROCHLORIDE 300 MG: 150 TABLET, FILM COATED ORAL at 10:37

## 2021-10-27 RX ADMIN — CALCITRIOL 0.5 MCG: 0.25 CAPSULE ORAL at 10:38

## 2021-10-27 ASSESSMENT — PAIN SCALES - GENERAL
PAINLEVEL_OUTOF10: 0

## 2021-10-27 NOTE — PROGRESS NOTES
Patient found out this morning that her   via facebook post. Stated \" alright all of this has to be put on hold\", pointing to everything around her. Wants to go home and strongly does not want a nursing home.

## 2021-10-27 NOTE — PROGRESS NOTES
[facility] MD Chika Walls, MD Marj Fairchild, MD Adore Cat, MD Sarah Beth Thayer, CNP   Breanna Cleaning, ANP  Progress note   Patient's Name: Damion Stephens  1:34 PM  10/27/2021    Subjective :  10/20 :pt known to me from office . Has advanced ckd and has a recent AVF placed in    stable bp , feels better . 1th HD session today   10/21: Patient was seen, interviewed and examined while undergoing her second hemodialysis treatment today. Nurses were using 17-gauge needles to cannulate her arm for her 2-1/2-hour dialysis today. She is scheduled for her third treatment tomorrow with higher blood flow attempting to go to 250-300 mill per minute. 10/22: Patient was seen, interviewed and examined while undergoing her third hemodialysis treatment today. She has no new complaints, she was confirming that she feels much better terms of breathing and overall condition. Case was discussed with dialysis nurse, labs reviewed, all other consults reviewed. 10/23: Patient was seen in her room, she was arousable with face max oxygen with acute distress reported overnight per nursing staff. Next dialysis will be done  Monday, MWF schedule. 10/24: Patient was seen and interviewed in her room, clinically doing much better. Dialysis scheduled for tomorrow. 10/25: Patient was seen, interviewed and examined while undergoing acute hemodialysis today dialysis with. Overall doing better with no complications or side effects on dialysis. 10/26: Patient was seen and interviewed in her room, overall doing well no new complaints. Dialysis scheduled for tomorrow.   10/27: Patient is overall doing better, she is scheduled for hemodialysis this afternoon  History of Present Ilness:    Ms. Aneita Bamberger is a 76-year-old woman with advanced chronic kidney disease, stage V with a baseline creatinine 4.8 to 5.3 mg/dL, secondary to diabetic nephropathy, renal microvascular atherosclerotic disease. She is followed longitudinally from nephrology standpoint by Dr. Ely Cushing. She presented to the emergency department yesterday with a roughly 2-week history of progressive dyspnea, intermittent cough at times productive of thinnish phlegm but not routinely, fever, chills, diaphoresis, anosmia and dysgeusia, progressive generalized weakness. Intake of food and fluid has been quite poor in particular over the past several days. She denies lightheadedness, chest pain or palpitations, abdominal pain or cramping, diarrhea or constipation, dysuria hematuria change in bowel habits of late. On presentation O2 saturations were in the 70s, she was started on nonrebreather mask. She notes that she has been breathing more or less comfortably throughout most of today, and she has been oxygenating well. BUN and creatinine on presentation were 95 and 9.4, respectively (10/18) which compares with BUN/creatinine 57 and 5.3, respectively on (9/2). BUN/creatinine this morning 105 and 9.1, respectively. HCO3 is 15 mmol/L. At the time of my initial evaluation this early evening she was resting more or less comfortably on her gurney in the ER. She notes that she feels a good deal better now than he did the time of presentation. She is able to speak in complete sentences.     Past Medical History:   Diagnosis Date    Atrial fibrillation [facility])     [facility]     Cancer [facility]     CKD (chronic kidney disease)     CKD (chronic kidney disease) stage 5, GFR less than 15 ml/min ([facility] 11/6/2018    Diabetes ([facility]     Hypertension     Obesity     260 #    Sleep apnea        Past Surgical History:   Procedure Laterality Date    CARDIAC CATHETERIZATION Left 10/02/2008    @ [facility]    CHOLECYSTECTOMY      COLONOSCOPY      COLONOSCOPY N/A 8/29/2019    mid ascending colon polyp bx/cauterization (inflammatory polyp), proximal transverse colon polyp bx/cauterized (tubular adenoma), descending colon polyp 80 cm from anus bx/cauterized (hyperplastic polyp), sigmoid colon polyp 35 cm from anus bx/cauterized (tubular adenoma), sigmoid colon polyps 30 cm from anus bx/cauterized (tubular adenoma), Dr. Jorge Draper, [facility]    COLONOSCOPY  8/29/2019    mid ascending colon polyp bx/cauterization (inflammatory polyp), proximal transverse colon polyp bx/cauterized (tubular adenoma), descending colon polyp 80 cm from anus bx/cauterized (hyperplastic polyp), sigmoid colon polyp 35 cm from anus bx/cauterized (tubular adenoma), sigmoid colon polyps 30 cm from anus bx/cauterized (tubular adenoma), Dr. Jorge Draper, [facility]    DIALYSIS FISTULA CREATION Left 1/22/2019    LIGATIION LEFT LEFT UPPER EXTREMITY OF AV FISTULA , EVACUATION HEMATOMA performed by Priyank Thompson MD at [address] Right 12/7/2020    AV FISTULA CREATION -- RIGHT ARM performed by Emili Montilla MD at [address] Right 4/8/2021    AV FISTULA  REVISION RIGHT ARM performed by Emili Montilla MD at [place] [address] Left 9/18/2018    AV FISTULA  LEFT ARM performed by Priyank Thompson MD at [place] [address] Left 11/14/2018    SUPERFICIALIZATION AV FISTULA  LEFT ARM , LIGATION TRIBUTORY AV FISTULA LEFT ARM performed by Priyank Thompson MD at [facility]         Family History   Problem Relation Age of Onset    Cancer Mother         bladder    Diabetes Mother     COPD Mother     Depression Father     Diabetes Father     Heart Disease Father     High Blood Pressure Father     Stroke Father     Prostate Cancer Father     Dementia Father     Alcohol Abuse Brother     Diabetes Brother     Bipolar Disorder Paternal Uncle     Anxiety Disorder Daughter     Colon Cancer Maternal Grandfather         reports that she quit smoking about 8 months ago. Her smoking use included cigarettes. She has a 21.50 pack-year smoking history.  She has never used smokeless tobacco. She reports previous alcohol use. She reports that she does not use drugs. Allergies:  Adhesive tape and Penicillins    Current Medications:    dexamethasone (DECADRON) tablet 6 mg, Daily  insulin glargine (LANTUS) injection vial 24 Units, BID  insulin lispro (HUMALOG) injection vial 10 Units, TID WC  LORazepam (ATIVAN) tablet 1 mg, Q8H PRN  insulin lispro (HUMALOG) injection vial 0-18 Units, TID WC  insulin lispro (HUMALOG) injection vial 0-9 Units, Nightly  propafenone (RYTHMOL) tablet 300 mg, BID  apixaban (ELIQUIS) tablet 5 mg, BID  atorvastatin (LIPITOR) tablet 40 mg, Daily  calcitRIOL (ROCALTROL) capsule 0.5 mcg, Once per day on Mon Wed Fri  metoprolol tartrate (LOPRESSOR) tablet 50 mg, BID  sodium chloride flush 0.9 % injection 5-40 mL, 2 times per day  sodium chloride flush 0.9 % injection 5-40 mL, PRN  0.9 % sodium chloride infusion, PRN  ondansetron (ZOFRAN-ODT) disintegrating tablet 4 mg, Q8H PRN   Or  ondansetron (ZOFRAN) injection 4 mg, Q6H PRN  polyethylene glycol (GLYCOLAX) packet 17 g, Daily PRN  acetaminophen (TYLENOL) tablet 650 mg, Q6H PRN   Or  acetaminophen (TYLENOL) suppository 650 mg, Q6H PRN  glucose (GLUTOSE) 40 % oral gel 15 g, PRN  dextrose 50 % IV solution, PRN  glucagon (rDNA) injection 1 mg, PRN  dextrose 5 % solution, PRN  sodium bicarbonate 8.4 % injection 50 mEq, Once        Review of Systems:   As above HPI, otherwise unremarkable. No NSAIDs. Recent course of antimicrobials.     Physical exam:   BP (!) 136/53   Pulse 87   Temp 98.2 °F (36.8 °C) (Oral)   Resp 20   Ht 5' 7\" (1.702 m)   Wt 239 lb 10.2 oz (108.7 kg)   SpO2 92%   BMI 37.53 kg/m²   Age-appropriate morbidly obese white woman who appears ill but in no apparent distress  NC/AT EOMI sclera conjunctiva are clear and anicteric mucous membranes are somewhat dry though she is wearing respiratory mask  Neck soft supple trachea midline no JVD at 60 degrees no bruit though exam somewhat compromised by respiratory noises from the mask  Coarse breath sounds, crackles scattered throughout lung fields, relatively mild, good air entry in all fields. Mildly prolonged expiratory phase  Regular rhythm normal S1 and S2, no murmur no rub  Abdomen soft nontender nondistended normal active bowel sounds no mass no hepatosplenomegaly though could not lay her supine for proper examination  Distal extremities reveal chronic-type edema bilaterally, mild distal erythema consistent with chronic venous stasis, no pitting edema.   No other rash or lesion on visible portions of integument  Pulses 1+x4  Moves all 4 extremities  No asterixis  Cranial nerves II through XII grossly intact  Awake, alert, interactive and appropriate      Data:   Labs:  CBC with Differential:    Lab Results   Component Value Date    WBC 23.3 10/27/2021    RBC 2.86 10/27/2021    HGB 8.8 10/27/2021    HCT 26.4 10/27/2021     10/27/2021    MCV 92.3 10/27/2021    MCH 30.8 10/27/2021    MCHC 33.3 10/27/2021    RDW 12.1 10/27/2021    METASPCT 1.7 10/21/2021    LYMPHOPCT 4.3 10/21/2021    MONOPCT 11.3 10/21/2021    MYELOPCT 0.9 10/21/2021    BASOPCT 0.1 10/21/2021    MONOSABS 0.90 10/21/2021    LYMPHSABS 0.33 10/21/2021    EOSABS 0.00 10/21/2021    BASOSABS 0.00 10/21/2021     CMP:    Lab Results   Component Value Date     10/27/2021    K 4.3 10/27/2021    K 4.1 04/08/2021    CL 94 10/27/2021    CO2 26 10/27/2021    BUN 91 10/27/2021    CREATININE 6.3 10/27/2021    GFRAA 8 10/27/2021    LABGLOM 7 10/27/2021    GLUCOSE 122 10/27/2021    PROT 6.3 10/25/2021    LABALBU 2.9 10/25/2021    CALCIUM 9.5 10/27/2021    BILITOT 0.6 10/25/2021    ALKPHOS 96 10/25/2021    AST 32 10/25/2021    ALT 51 10/25/2021     Ionized Calcium:  No results found for: IONCA  Magnesium:    Lab Results   Component Value Date    MG 2.5 10/20/2021     Phosphorus:    Lab Results   Component Value Date    PHOS 7.3 10/20/2021     U/A:    Lab Results   Component Value Date    NITRITE neg 03/14/2019    COLORU Yellow 05/24/2019    PHUR 6.0 05/24/2019    WBCUA 0-1 05/24/2019    RBCUA NONE 05/24/2019    BACTERIA RARE 05/24/2019    CLARITYU Clear 05/24/2019    SPECGRAV 1.010 05/24/2019    LEUKOCYTESUR TRACE 05/24/2019    UROBILINOGEN 0.2 05/24/2019    BILIRUBINUR Negative 05/24/2019    BILIRUBINUR neg 03/14/2019    BLOODU Negative 05/24/2019    GLUCOSEU Negative 05/24/2019     Microalbumen/Creatinine ratio:  No components found for: RUCREAT  Iron Saturation:  No components found for: PERCENTFE  TIBC:    Lab Results   Component Value Date    TIBC 403 04/10/2019     FERRITIN:    Lab Results   Component Value Date    FERRITIN 16 11/07/2018        Imaging:  XR CHEST PORTABLE   Final Result   1. No interval change in the multifocal bilateral airspace disease   2. Mild cardiomegaly. XR CHEST PORTABLE   Final Result   Persistent widespread bilateral infiltrates. The no significant changes   since the October 18. The heart appears to be enlarged. XR CHEST PORTABLE   Final Result   1. Extensive multifocal bilateral pneumonia. Assessment  Admitted with pneumonia due to COVID-19 virus. 1. Acute kidney injury, likely hemodynamically mediated due to volume contraction associated with poor intake due to anorexia, increase insensible losses due to tachypnea, fever and diaphoresis. Currently nonoliguric. 2. Chronic kidney disease stage V, baseline creatinine 4.8 to 5.3 mg/dL with at least the past 4 months. Etiology is diabetic nephropathy, renal microvascular atherosclerotic disease  3. Metabolic acidosis, wide anion gap, secondary to acute kidney injury superimposed on chronic kidney disease  4. Anemia secondary to advanced chronic kidney disease. No recent melena or hematochezia  5. Secondary hyperparathyroidism/mineral bone disease due to advanced CKD. On low-dose calcitriol Mondays Wednesdays and Fridays. 6. Morbid obesity, BMI 41.5 kg/m²  7.    COVID-19 infection  PLAN :    Progressive azotemia in patient with advanced CKD V at baseline   Next HD treatment on Wednesday  We will continue schedule of Monday, Wednesday, Friday dialysis  Follow clinical condition daily with lab reviews.           Electronically signed by Vanna Grijalva MD on 10/27/2021

## 2021-10-27 NOTE — CARE COORDINATION
10/27/21 Update CM Note; [facility] has accepted patient and will be in contact with family and patient to set up home visits.  Adriana Martinez Rn Cm

## 2021-10-27 NOTE — FLOWSHEET NOTE
10/27/21 1814   Vital Signs   BP (!) 106/53   Temp 97.5 °F (36.4 °C)   Resp 18   Weight 235 lb 3.7 oz (106.7 kg)   Weight Method Bed scale   Percent Weight Change -1.84   Pain Assessment   Pain Assessment Faces   Pain Level 0   Post-Hemodialysis Assessment   Post-Treatment Procedures Blood returned; Access bleeding time < 10 minutes   Machine Disinfection Process Acid/Vinegar Clean;Heat Disinfect   Rinseback Volume (ml) 300 ml   Total Liters Processed (l/min) 2600 l/min   Dialyzer Clearance Lightly streaked   Duration of Treatment (minutes) 210 minutes   NET Removed (ml) 2000 ml   Tolerated Treatment Good   Bilateral Breath Sounds Diminished   Edema Generalized   Edema Generalized +2;Non-pitting   1645 16g Venous needle infiltrated. Recannulated above previous venous needle with 17g needle and lowered BFR to 250. Pt otherwise tolerated 3.5hr HD tx well. Blood rinsed back. Bleeding time < 10min.

## 2021-10-27 NOTE — PROGRESS NOTES
Pt declining use of bipap at this time. Pt states she may try later.   remanins on 6L nc and spo2 94%

## 2021-10-27 NOTE — PROGRESS NOTES
Hospitalist Progress Note      SYNOPSIS: Patient admitted on 10/18/2021 for shortness of breath    Patient with significant past medical history came to our hospital with chief complaint of worsening shortness of breath for last 2 weeks and was found to have acute hypoxemic respiratory failure requiring high flow oxygen as well as acute kidney injury on her chronic kidney disease stage V. In the ER, patient was found to have atrial fibrillation with heart rate episodes up to 150s but no persistent rapid ventricular rate. She was positive for COVID-19. Other significant finding was acute kidney injury with creatinine of 9.4, compared to her baseline of 5.2. Her BNP was also elevated to 4850. ER physician discussed with nephrology who recommended low-dose bicarb drip and close management. She also required AIRVO in the ER with oxygen about 30 L. Over the next 48 hours her renal function remained stable without requiring any dialysis. She was seen by pulmonology and started on IV ceftriaxone and doxycycline to cover for community-acquired pneumonia. Ceftriaxone and doxycycline discontinued on 10/21 by ID. Patient was started on hemodialysis on 10/21. Showed significant improvement in her volume overload state over the next 72 hours. SUBJECTIVE:    Patient seen and examined in her room. Off BiPAP since 10/24, tolerating well on 4 L O2. Patient appears comfortable. Patient denies any worsening shortness of breath, nausea, vomiting. Records reviewed. Stable overnight. No other overnight issues reported. Temp (24hrs), Av °F (36.7 °C), Min:97.8 °F (36.6 °C), Max:98.2 °F (36.8 °C)    DIET: ADULT DIET; Regular; 4 carb choices (60 gm/meal); Low Sodium (2 gm); Low Potassium (Less than 3000 mg/day);  Low Phosphorus (Less than 1000 mg)  ADULT ORAL NUTRITION SUPPLEMENT; Breakfast, Dinner; Renal Oral Supplement  CODE: Full Code    Intake/Output Summary (Last 24 hours) at 10/27/2021 0900  Last data filed at 10/26/2021 1856  Gross per 24 hour   Intake --   Output 200 ml   Net -200 ml       OBJECTIVE:    BP (!) 136/53   Pulse 87   Temp 98.2 °F (36.8 °C)   Resp 20   Ht 5' 7\" (1.702 m)   Wt 239 lb 10.2 oz (108.7 kg)   SpO2 92%   BMI 37.53 kg/m²     General appearance: No apparent distress, appears stated age and cooperative. HEENT:  Conjunctivae/corneas clear. Neck: Supple. No jugular venous distention. Respiratory: Clear to auscultation bilaterally, overall breathing sounds improved. Cardiovascular: Regular rate rhythm, normal S1-S2  Abdomen: Soft, nontender, nondistended  Musculoskeletal: No clubbing, cyanosis, no bilateral lower extremity edema. Brisk capillary refill. Skin:  No rashes  on visible skin  Neurologic: awake, alert and following commands. Barrow catheter noted.     ASSESSMENT & PLAN:    Sepsis  Secondary to COVID-19 pneumonia  See management below     Acute hypoxemic respiratory failure, improving  Severe hypoxia, use of excessive respiratory muscles, chest x-ray positive for bilateral infiltrates, requiring high flow oxygen  COVID-19 pneumonia  Bilateral airspace disease on chest x-ray  For Covid maintain on dexamethasone 10 mg IV daily, started on 10/18  Consult pulmonology, decrease dose of dexamethasone to 6 mg IV daily on 10/24, changed to dexamethasone 6 mg p.o. daily on 10/25 for 5 more days, last dose on 10/30  Was on high flow, then required BiPAP for 48 hours, down to 10 L on 10/24  Pulmonology, follow recommendation  Consulted ID on family request: Do not recommend ivermectin that family requested    Community-acquired pneumonia  Bilateral infiltrates, elevated procalcitonin of 1.04  Started on IV ceftriaxone and doxycycline on 10/19, discontinued on 10/21 as per ID recommendations     Acute kidney injury on chronic kidney disease  Nonoliguric LEW, secondary to COVID-19 pneumonia  Baseline creatinine 5.2  Admitted with creatinine 9.5, remained stable at about 9.5  Started IntraVENous 2 times per day    sodium bicarbonate  50 mEq IntraVENous Once     PRN Meds: LORazepam, sodium chloride flush, sodium chloride, ondansetron **OR** ondansetron, polyethylene glycol, acetaminophen **OR** acetaminophen, glucose, dextrose, glucagon (rDNA), dextrose    Labs:     Recent Labs     10/25/21  0636 10/26/21  0606 10/27/21  0447   WBC 18.6* 20.8* 23.3*   HGB 8.8* 8.9* 8.8*   HCT 27.0* 26.8* 26.4*   * 517* 476*       Recent Labs     10/25/21  0636 10/26/21  0606 10/27/21  0447    139 137   K 4.0 4.1 4.3   CL 98 95* 94*   CO2 27 27 26   * 58* 91*   CREATININE 6.6* 4.7* 6.3*   CALCIUM 9.7 9.8 9.5       Recent Labs     10/25/21  0636   PROT 6.3*   ALKPHOS 96   ALT 51*   AST 32*   BILITOT 0.6       No results for input(s): INR in the last 72 hours. No results for input(s): Wayna Khat in the last 72 hours. Chronic labs:    Lab Results   Component Value Date    CHOL 172 11/06/2018    TRIG 211 (H) 11/06/2018    HDL 37 11/06/2018    LDLCALC 93 11/06/2018    TSH 0.804 01/31/2019    INR 1.5 10/20/2021    LABA1C 6.7 (H) 10/19/2021       Radiology: REVIEWED DAILY    +++++++++++++++++++++++++++++++++++++++++++++++++  Deanna Rivero MD  [facility] - [address] [place] [address], [place]  +++++++++++++++++++++++++++++++++++++++++++++++++  NOTE: This report was transcribed using voice recognition software. Every effort was made to ensure accuracy; however, inadvertent computerized transcription errors may be present.

## 2021-10-27 NOTE — PROGRESS NOTES
Pulse ox on room air sitting 86%  Pulse ox on room air ambulating 80%  Pulse ox on 4 liters, sitting recovery 95%  Pulse ox on 4 liters, ambulating recovery 90%

## 2021-10-27 NOTE — CARE COORDINATION
10/27/21 Update CM Note; Patient and family are requesting discharge to home today with homecare. Spoke with daughter Jacinto Dumont who states she would like homecare and also they will pick her up in the private car. Dr Kemal Cotto notified of request. [facility] homecare referral sent to Joslyn Cabrera . She will review. Spoke to Synetta Schirmer at [facility] and patient is scheduled for T-TH-SAT and is to be at dialysis unit in [place] [address] by 10:30am. Bedside nurse to attempt to wean oxygen and to do pulse oximeter readings for possible need for O2 at home. Family does not have a dme preference. Will await pulse oximeter readings. Dr Kemal Cotto notified of above.  Thania Aguilar Rn Cm

## 2021-10-27 NOTE — DISCHARGE SUMMARY
Hospitalist Discharge Summary    Patient ID: Jorge Piedra   Patient : [id number]  Patient's PCP: Margarito Michael DO    Admit Date: 10/18/2021   Admitting Physician: Zoila Hart MD    Discharge Date:  10/27/2021   Discharge Physician: García Waterman MD   Discharge Condition: Stable  Discharge Disposition: Home with [facility] course in brief:    Patient admitted on 10/18/2021 for shortness of breath     Patient with significant past medical history came to our hospital with chief complaint of worsening shortness of breath for last 2 weeks and was found to have acute hypoxemic respiratory failure requiring high flow oxygen as well as acute kidney injury on her chronic kidney disease stage V. In the ER, patient was found to have atrial fibrillation with heart rate episodes up to 150s but no persistent rapid ventricular rate.  She was positive for COVID-19.  Other significant finding was acute kidney injury with creatinine of 9.4, compared to her baseline of 5.2. [facility] BNP was also elevated to 4850.  ER physician discussed with nephrology who recommended low-dose bicarb drip and close management. She also required AIRVO in the ER with oxygen about 30 L. Over the next 48 hours her renal function remained stable without requiring any dialysis. She was seen by pulmonology and started on IV ceftriaxone and doxycycline to cover for community-acquired pneumonia. Ceftriaxone and doxycycline discontinued on 10/21 by ID. Patient was started on hemodialysis on 10/21. Showed significant improvement in her volume overload state over the next 72 hours. Patient requires home O2 at 4 L nasal cannula with diagnoses: Acute respiratory failure with hypoxia and pneumonia due to COVID-19 virus. Because of steroids she was started on high-dose insulin with Lantus 24 units twice daily and NovoLog 10 units 3 times daily at the time of discharge along with sliding scale insulin.   Sliding scale insulin instructions are added in patient discharge instruction. She will need to stay in close contact with primary care physician for down titration of insulin as soon as she finished her steroids in next 3 to 4 days.     Consults:   IP CONSULT TO HOSPITALIST  IP CONSULT TO PULMONOLOGY  IP CONSULT TO NEPHROLOGY  IP CONSULT TO NEPHROLOGY  IP CONSULT TO NEPHROLOGY  IP CONSULT TO INFECTIOUS DISEASES  IP CONSULT TO PHARMACY  IP CONSULT TO ENDOCRINOLOGY  IP CONSULT TO HOME CARE NEEDS    Discharge Diagnoses:    Sepsis  Secondary to COVID-19 pneumonia  See management below     Acute hypoxemic respiratory failure, improving  Severe hypoxia, use of excessive respiratory muscles, chest x-ray positive for bilateral infiltrates, requiring high flow oxygen  COVID-19 pneumonia  Bilateral airspace disease on chest x-ray  For Covid maintain on dexamethasone 10 mg IV daily, started on 10/18  Consult pulmonology, decrease dose of dexamethasone to 6 mg IV daily on 10/24, changed to dexamethasone 6 mg p.o. daily on 10/25 for 5 more days, last dose on 10/30  Was on high flow, then required BiPAP for 48 hours, down to 10 L on 10/24  Pulmonology, follow recommendation  Consulted ID on family request: Do not recommend ivermectin that family requested     Community-acquired pneumonia  Bilateral infiltrates, elevated procalcitonin of 1.04  Started on IV ceftriaxone and doxycycline on 10/19, discontinued on 10/21 as per ID recommendations     Acute kidney injury on chronic kidney disease  Nonoliguric LEW, secondary to COVID-19 pneumonia  Baseline creatinine 5.2  Admitted with creatinine 9.5, remained stable at about 9.5  Started hemodialysis via right upper arm AV fistula on 10/21  Discontinued IV bicarb on 10/22  Waiting for nephrology recommendations regarding starting permanent dialysis     CKD stage V  Presumably secondary to diabetic nephropathy  Patient has right arm AV fistula, with good thrill and bruit  Started hemodialysis on 10/21     Elevated BNP  No previous history of heart failure  EF 65% in August 2019 without any systolic or diastolic dysfunction  Elevated BNP of 4850, likely due to LEW on CKD  Patient on home dose torsemide, hold for now because of LEW and without any obvious fluid overload     Atrial fibrillation  Not in rapid ventricular rate  Resume home dose metoprolol and Eliquis  Monitor closely     Diabetes mellitus type 2  Home dose glipizide held  Maintain on sliding scale insulin  Diabetes poorly controlled because of steroids  Consult endocrinology for diabetes management  Meanwhile start on Lantus 20 units with breakfast and Humalog 6 units 3 times daily along with sliding scale     Hyperlipidemia  Continue statins    Discharge Instructions / Follow up:    No future appointments. Continued appropriate risk factor modification of blood pressure, diabetes and serum lipids will remain essential to reducing risk of future atherosclerotic development    Activity: activity as tolerated    Significant labs:  CBC:   Recent Labs     10/25/21  0636 10/26/21  0606 10/27/21  0447   WBC 18.6* 20.8* 23.3*   RBC 2.85* 2.91* 2.86*   HGB 8.8* 8.9* 8.8*   HCT 27.0* 26.8* 26.4*   MCV 94.7 92.1 92.3   RDW 12.0 12.1 12.1   * 517* 476*     BMP:   Recent Labs     10/25/21  0636 10/26/21  0606 10/27/21  0447    139 137   K 4.0 4.1 4.3   CL 98 95* 94*   CO2 27 27 26   * 58* 91*   CREATININE 6.6* 4.7* 6.3*     LFT:  Recent Labs     10/25/21  0636   PROT 6.3*   ALKPHOS 96   ALT 51*   AST 32*   BILITOT 0.6     PT/INR: No results for input(s): INR, APTT in the last 72 hours. BNP: No results for input(s): BNP in the last 72 hours.   Hgb A1C:   Lab Results   Component Value Date    LABA1C 6.7 (H) 10/19/2021     Folate and B12:   Lab Results   Component Value Date    ADWITKMU16 371 10/21/2019   ,   Lab Results   Component Value Date    FOLATE 9.6 10/21/2019     Thyroid Studies:   Lab Results   Component Value Date    TSH 0.804 01/31/2019       Urinalysis:    Lab Results   Component Value Date    NITRU Negative 05/24/2019    WBCUA 0-1 05/24/2019    BACTERIA RARE 05/24/2019    RBCUA NONE 05/24/2019    BLOODU Negative 05/24/2019    SPECGRAV 1.010 05/24/2019    GLUCOSEU Negative 05/24/2019       Imaging:  XR CHEST PORTABLE    Result Date: 10/25/2021  EXAMINATION: ONE XRAY VIEW OF THE CHEST 10/25/2021 8:20 am COMPARISON: 10/21/2021 and 10/18/2021 HISTORY: ORDERING SYSTEM PROVIDED HISTORY: sob TECHNOLOGIST PROVIDED HISTORY: Reason for exam:->sob What reading provider will be dictating this exam?->CRC FINDINGS: The cardiac silhouette is mildly enlarged. Multifocal bilateral airspace disease is noted. The findings are unchanged. There is no gross pleural effusion. 1. No interval change in the multifocal bilateral airspace disease 2. Mild cardiomegaly. XR CHEST PORTABLE    Result Date: 10/21/2021  EXAMINATION: ONE XRAY VIEW OF THE CHEST 10/21/2021 12:22 pm COMPARISON: October 18 2021, August 6, 2021 HISTORY: ORDERING SYSTEM PROVIDED HISTORY: SOB TECHNOLOGIST PROVIDED HISTORY: Reason for exam:->SOB What reading provider will be dictating this exam?->CRC FINDINGS: Persistent the extensive bilateral pneumonia with ground-glass areas of confluent the density scattered throughout both lungs. These can be manifestation of acute infectious viral pneumonia. The heart appears to be mildly enlarged. No size pleural effusions are seen. There is some widening of the mediastinum which could be positional or indication for volume overload. Persistent widespread bilateral infiltrates. The no significant changes since the October 18. The heart appears to be enlarged.      XR CHEST PORTABLE    Result Date: 10/18/2021  EXAMINATION: ONE XRAY VIEW OF THE CHEST 10/18/2021 2:11 pm COMPARISON: 08/06/2021 HISTORY: ORDERING SYSTEM PROVIDED HISTORY: Shortness of breath TECHNOLOGIST PROVIDED HISTORY: Reason for exam:->Shortness of breath What reading provider will be dictating this exam?->CRC FINDINGS: The cardiac silhouette is at upper limits of normal in size. Multifocal bilateral pulmonary infiltrates are noted most consistent with pneumonia. There is no gross pleural effusion. 1. Extensive multifocal bilateral pneumonia.        Discharge Medications:      Medication List      START taking these medications    dexamethasone 6 MG tablet  Commonly known as: DECADRON  Take 1 tablet by mouth daily for 4 doses     insulin glargine 100 UNIT/ML injection vial  Commonly known as: LANTUS  Inject 24 Units into the skin 2 times daily     insulin lispro 100 UNIT/ML injection vial  Commonly known as: HUMALOG  Inject 10 Units into the skin 3 times daily (with meals)        CONTINUE taking these medications    amLODIPine 5 MG tablet  Commonly known as: NORVASC  TAKE 1 TABLET BY MOUTH DAILY     apixaban 5 MG Tabs tablet  Commonly known as: Eliquis  Take 1 tablet by mouth 2 times daily     atorvastatin 40 MG tablet  Commonly known as: LIPITOR  Take 1 tablet by mouth daily     calcitRIOL 0.25 MCG capsule  Commonly known as: ROCALTROL     DULoxetine 60 MG extended release capsule  Commonly known as: CYMBALTA  TAKE 1 CAPSULE BY MOUTH DAILY     glipiZIDE 5 MG extended release tablet  Commonly known as: GLUCOTROL XL     metoprolol tartrate 50 MG tablet  Commonly known as: LOPRESSOR     propafenone 325 MG extended release capsule  Commonly known as: RYTHMOL SR  Take 1 capsule by mouth 2 times daily     sodium bicarbonate 650 MG tablet     torsemide 20 MG tablet  Commonly known as: DEMADEX  Take 2 tablets by mouth daily     vitamin D3 25 MCG (1000 UT) Tabs tablet  Commonly known as: CHOLECALCIFEROL  Take 1 tablet by mouth daily           Where to Get Your Medications      These medications were sent to [address] [place] [address]  [address] [facility] [address]    Phone: [phone] · dexamethasone 6 MG tablet  · insulin glargine 100 UNIT/ML injection vial  · insulin lispro 100 UNIT/ML injection vial         Time Spent on discharge is more than 45 minutes in the examination, evaluation, counseling and review of medications and discharge plan.    +++++++++++++++++++++++++++++++++++++++++++++++++  Uzma Snyder MD  [address]  +++++++++++++++++++++++++++++++++++++++++++++++++  NOTE: This report was transcribed using voice recognition software. Every effort was made to ensure accuracy; however, inadvertent computerized transcription errors may be present.

## 2021-10-28 ENCOUNTER — CARE COORDINATION (OUTPATIENT)
Dept: CASE MANAGEMENT | Age: 66
End: 2021-10-28

## 2021-10-28 DIAGNOSIS — N18.5 TYPE 2 DIABETES MELLITUS WITH STAGE 5 CHRONIC KIDNEY DISEASE NOT ON CHRONIC DIALYSIS, WITHOUT LONG-TERM CURRENT USE OF INSULIN (HCC): Primary | ICD-10-CM

## 2021-10-28 DIAGNOSIS — E11.22 TYPE 2 DIABETES MELLITUS WITH STAGE 5 CHRONIC KIDNEY DISEASE NOT ON CHRONIC DIALYSIS, WITHOUT LONG-TERM CURRENT USE OF INSULIN (HCC): Primary | ICD-10-CM

## 2021-10-28 NOTE — CARE COORDINATION
COVID-19 Monitoring Initial Follow-up Note    Call within 2 business days of discharge: Yes. First attempt to reach the patient for COVID Monitoring (positive) Care Transition call post hospital discharge. Rani's granddaughter answered and stated Layo Arreola is sleeping, she requested a call back tomorrow.

## 2021-10-28 NOTE — TELEPHONE ENCOUNTER
Was discharged from the hospital and is in need of a glucometer. Will you approve / please advise and thank you.

## 2021-10-28 NOTE — CARE COORDINATION
Spoke with Jonel Nascimento at [facility] is on the schedule for tomorrow, 10/29/2021.

## 2021-10-29 ENCOUNTER — TELEPHONE (OUTPATIENT)
Dept: FAMILY MEDICINE CLINIC | Age: 66
End: 2021-10-29

## 2021-10-29 ENCOUNTER — CARE COORDINATION (OUTPATIENT)
Dept: CASE MANAGEMENT | Age: 66
End: 2021-10-29

## 2021-10-29 DIAGNOSIS — R53.1 WEAKNESS GENERALIZED: Primary | ICD-10-CM

## 2021-10-29 DIAGNOSIS — E11.22 TYPE 2 DIABETES MELLITUS WITH STAGE 5 CHRONIC KIDNEY DISEASE NOT ON CHRONIC DIALYSIS, WITHOUT LONG-TERM CURRENT USE OF INSULIN (HCC): ICD-10-CM

## 2021-10-29 DIAGNOSIS — I10 ESSENTIAL HYPERTENSION: ICD-10-CM

## 2021-10-29 DIAGNOSIS — J44.9 CHRONIC OBSTRUCTIVE PULMONARY DISEASE, UNSPECIFIED COPD TYPE (HCC): ICD-10-CM

## 2021-10-29 DIAGNOSIS — N18.5 TYPE 2 DIABETES MELLITUS WITH STAGE 5 CHRONIC KIDNEY DISEASE NOT ON CHRONIC DIALYSIS, WITHOUT LONG-TERM CURRENT USE OF INSULIN (HCC): ICD-10-CM

## 2021-10-29 RX ORDER — BLOOD PRESSURE TEST KIT
KIT MISCELLANEOUS
Qty: 1 KIT | Refills: 0 | Status: SHIPPED
Start: 2021-10-29 | End: 2021-12-06 | Stop reason: SDUPTHER

## 2021-10-29 NOTE — CARE COORDINATION
COVID-19 Monitoring Initial Follow-up Note    Call within 2 business days of discharge: Yes. Second and final attempt to reach the patient for COVID Monitoring (positive) Care Transition call post hospital discharge. Message left with [facility]'s contact information requesting return phone call. No further outreach planned at this time. Normal vision: sees adequately in most situations; can see medication labels, newsprint

## 2021-10-29 NOTE — TELEPHONE ENCOUNTER
Daughter phoned stating that her mom is very weak  And was placed on 02. She was just discharged from the hospital and is in need of a few things. Bedside commode   Blood pressure cuff  Glucometer   Spacer (not sure how to order this)    ALSO needs advice on if she should be continuing Glipizide because the hospital was not clear about this. And she is on Insulin now / can these be taken together.

## 2021-10-29 NOTE — TELEPHONE ENCOUNTER
Gabriela signed. If patient's blood glucose levels are now being controlled with insulin she can discontinue the Glipizide.  We can reevaluate everything when she is feeling better and possibly restart the glipizide

## 2021-10-30 ENCOUNTER — HOSPITAL ENCOUNTER (EMERGENCY)
Age: 66
Discharge: HOME | End: 2021-10-30
Attending: EMERGENCY MEDICINE
Payer: MEDICARE

## 2021-10-30 ENCOUNTER — APPOINTMENT (OUTPATIENT)
Dept: GENERAL RADIOLOGY | Age: 66
End: 2021-10-30
Payer: MEDICARE

## 2021-10-30 ENCOUNTER — TELEPHONE (OUTPATIENT)
Dept: OTHER | Facility: CLINIC | Age: 66
End: 2021-10-30

## 2021-10-30 VITALS
WEIGHT: 224.21 LBS | HEIGHT: 66 IN | DIASTOLIC BLOOD PRESSURE: 69 MMHG | HEART RATE: 68 BPM | RESPIRATION RATE: 15 BRPM | BODY MASS INDEX: 36.03 KG/M2 | SYSTOLIC BLOOD PRESSURE: 115 MMHG | TEMPERATURE: 97.2 F | OXYGEN SATURATION: 94 %

## 2021-10-30 DIAGNOSIS — N18.6 ESRD (END STAGE RENAL DISEASE) (HCC): Primary | ICD-10-CM

## 2021-10-30 DIAGNOSIS — R55 VASOVAGAL EPISODE: ICD-10-CM

## 2021-10-30 DIAGNOSIS — R79.9 ELEVATED BUN: ICD-10-CM

## 2021-10-30 LAB
ALBUMIN SERPL-MCNC: 3.5 G/DL (ref 3.5–5.2)
ALP BLD-CCNC: 108 U/L (ref 35–104)
ALT SERPL-CCNC: 49 U/L (ref 0–32)
ANION GAP SERPL CALCULATED.3IONS-SCNC: 25 MMOL/L (ref 7–16)
ANISOCYTOSIS: ABNORMAL
AST SERPL-CCNC: 25 U/L (ref 0–31)
BASOPHILS ABSOLUTE: 0 E9/L (ref 0–0.2)
BASOPHILS RELATIVE PERCENT: 0 % (ref 0–2)
BILIRUB SERPL-MCNC: 0.6 MG/DL (ref 0–1.2)
BILIRUBIN DIRECT: 0.2 MG/DL (ref 0–0.3)
BILIRUBIN, INDIRECT: 0.4 MG/DL (ref 0–1)
BUN BLDV-MCNC: 125 MG/DL (ref 6–23)
CALCIUM SERPL-MCNC: 10.8 MG/DL (ref 8.6–10.2)
CHLORIDE BLD-SCNC: 90 MMOL/L (ref 98–107)
CO2: 22 MMOL/L (ref 22–29)
CREAT SERPL-MCNC: 8 MG/DL (ref 0.5–1)
EOSINOPHILS ABSOLUTE: 0 E9/L (ref 0.05–0.5)
EOSINOPHILS RELATIVE PERCENT: 0 % (ref 0–6)
GFR AFRICAN AMERICAN: 6
GFR NON-AFRICAN AMERICAN: 5 ML/MIN/1.73
GLUCOSE BLD-MCNC: 176 MG/DL (ref 74–99)
HCT VFR BLD CALC: 26.8 % (ref 34–48)
HEMOGLOBIN: 8.8 G/DL (ref 11.5–15.5)
LYMPHOCYTES ABSOLUTE: 2.17 E9/L (ref 1.5–4)
LYMPHOCYTES RELATIVE PERCENT: 6.1 % (ref 20–42)
MCH RBC QN AUTO: 31.3 PG (ref 26–35)
MCHC RBC AUTO-ENTMCNC: 32.8 % (ref 32–34.5)
MCV RBC AUTO: 95.4 FL (ref 80–99.9)
METAMYELOCYTES RELATIVE PERCENT: 0.9 % (ref 0–1)
MONOCYTES ABSOLUTE: 1.44 E9/L (ref 0.1–0.95)
MONOCYTES RELATIVE PERCENT: 3.5 % (ref 2–12)
NEUTROPHILS ABSOLUTE: 32.49 E9/L (ref 1.8–7.3)
NEUTROPHILS RELATIVE PERCENT: 89.5 % (ref 43–80)
NUCLEATED RED BLOOD CELLS: 0.9 /100 WBC
PDW BLD-RTO: 12.2 FL (ref 11.5–15)
PLATELET # BLD: 392 E9/L (ref 130–450)
PMV BLD AUTO: 11.1 FL (ref 7–12)
POTASSIUM SERPL-SCNC: 4.8 MMOL/L (ref 3.5–5)
PRO-BNP: 962 PG/ML (ref 0–125)
RBC # BLD: 2.81 E12/L (ref 3.5–5.5)
SODIUM BLD-SCNC: 137 MMOL/L (ref 132–146)
TOTAL PROTEIN: 6.6 G/DL (ref 6.4–8.3)
TROPONIN, HIGH SENSITIVITY: 39 NG/L (ref 0–9)
WBC # BLD: 36.1 E9/L (ref 4.5–11.5)

## 2021-10-30 PROCEDURE — 80048 BASIC METABOLIC PNL TOTAL CA: CPT

## 2021-10-30 PROCEDURE — 94664 DEMO&/EVAL PT USE INHALER: CPT

## 2021-10-30 PROCEDURE — 85025 COMPLETE CBC W/AUTO DIFF WBC: CPT

## 2021-10-30 PROCEDURE — 84484 ASSAY OF TROPONIN QUANT: CPT

## 2021-10-30 PROCEDURE — 36415 COLL VENOUS BLD VENIPUNCTURE: CPT

## 2021-10-30 PROCEDURE — 90935 HEMODIALYSIS ONE EVALUATION: CPT

## 2021-10-30 PROCEDURE — 80076 HEPATIC FUNCTION PANEL: CPT

## 2021-10-30 PROCEDURE — 71045 X-RAY EXAM CHEST 1 VIEW: CPT

## 2021-10-30 PROCEDURE — 83880 ASSAY OF NATRIURETIC PEPTIDE: CPT

## 2021-10-30 PROCEDURE — 6370000000 HC RX 637 (ALT 250 FOR IP): Performed by: EMERGENCY MEDICINE

## 2021-10-30 PROCEDURE — 99285 EMERGENCY DEPT VISIT HI MDM: CPT

## 2021-10-30 RX ORDER — IPRATROPIUM BROMIDE AND ALBUTEROL SULFATE 2.5; .5 MG/3ML; MG/3ML
1 SOLUTION RESPIRATORY (INHALATION) ONCE
Status: COMPLETED | OUTPATIENT
Start: 2021-10-30 | End: 2021-10-30

## 2021-10-30 RX ADMIN — IPRATROPIUM BROMIDE AND ALBUTEROL SULFATE 1 AMPULE: .5; 2.5 SOLUTION RESPIRATORY (INHALATION) at 14:59

## 2021-10-30 ASSESSMENT — ENCOUNTER SYMPTOMS
DIFFICULTY BREATHING: 0
SHORTNESS OF BREATH: 0
VOMITING: 0

## 2021-10-30 ASSESSMENT — PAIN SCALES - GENERAL: PAINLEVEL_OUTOF10: 0

## 2021-10-30 NOTE — ED NOTES
Per pt call her granddaughter poa and let her know she is here. Message left with her voice mail.      Venda Schilder, RN  10/30/21 [id number]

## 2021-10-30 NOTE — ED NOTES
Macho Cohen from dialysis called, she will be coming down to have pt on a 3hr tx in an hour.      Daria Barton RN  10/30/21 0641

## 2021-10-30 NOTE — ED PROVIDER NOTES
61-year-old female recently discharged on 4 L of oxygen with Covid presents to the emergency department after apparently having a syncopal episode at dialysis today. The patient thinks she fell asleep. Is under certain how much dialysis she received today. Patient has minimal complaints at this time she does have a history of COPD. She states no fevers chills nausea vomiting diarrhea abdominal pain or other complaints. The history is provided by the patient. Loss of Consciousness  Episode history:  Single  Most recent episode: Today  Duration:  1 minute  Timing:  Intermittent  Progression:  Waxing and waning  Chronicity:  New  Relieved by:  Nothing  Worsened by:  Nothing  Ineffective treatments:  None tried  Associated symptoms: no anxiety, no chest pain, no confusion, no diaphoresis, no difficulty breathing, no focal weakness, no headaches, no seizures, no shortness of breath, no vomiting and no weakness         Review of Systems   Constitutional: Negative for diaphoresis. Respiratory: Negative for shortness of breath. Cardiovascular: Positive for syncope. Negative for chest pain. Gastrointestinal: Negative for vomiting. Neurological: Positive for syncope. Negative for focal weakness, seizures, weakness and headaches. Psychiatric/Behavioral: Negative for confusion. Physical Exam  Constitutional:       Appearance: She is well-developed. HENT:      Head: Normocephalic and atraumatic. Eyes:      Extraocular Movements: Extraocular movements intact. Pupils: Pupils are equal, round, and reactive to light. Cardiovascular:      Rate and Rhythm: Normal rate and regular rhythm. Pulmonary:      Effort: Pulmonary effort is normal.      Breath sounds: Wheezing present. Comments: 100% on 4L  Abdominal:      General: Bowel sounds are normal.      Palpations: Abdomen is soft. Musculoskeletal:         General: Normal range of motion. Right lower leg: No tenderness. No edema. Left lower leg: No tenderness. No edema. Skin:     General: Skin is warm. Capillary Refill: Capillary refill takes less than 2 seconds. Neurological:      General: No focal deficit present. Mental Status: She is alert and oriented to person, place, and time. Procedures     MDM  Number of Diagnoses or Management Options  Elevated BUN  ESRD (end stage renal disease) ([facility] [address]  Vasovagal episode  Diagnosis management comments: Patient was seen and examined. She is resting comfortably on home oxygen alert oriented answering all questions appropriately. She recently was discharged with COVID-19 on 4 L of oxygen which she satting 100% on. Work-up revealed an elevated BUN and patient only received 40 minutes of her dialysis. I did reach out to patient's nephrologist Dr. Mauricio Rodriguez he actually came to the bedside and recommend patient receive a 3-hour treatment. She was transferred to dialysis which she had treatment return to the emergency department and was at we felt the patient could be safely discharged with outpatient follow-up. ED Course as of Oct 31 0614   Sat Oct 30, 2021   1336 Dialysis reports that patient received 40 minutes of treatment they thought she was unresponsive and had to wake her with a sternal rub. Patient when she woke up was mildly confused though this resolved within 10 minutes however they called emergency services to bring the patient to be evaluated. [CF]   26 Spoke with Dr. Mauricio Rodriguez the patient's nephrologist who recommends patient get dialysis given her elevated BUN but up patient for dialysis but believes patient can be discharged. [CF]   7001 Dr. Mauricio Rodriguez at bedside to evaluate patient. He will give patient 3-hour dialysis treatment with plan for discharge when completed. [CF]   2051 Returned from dialysis. Symptoms improved.   She will be discharged to follow-up as an outpatient    [LS]      ED Course User Index  [CF] Barb Zhu DO  [LS] Salas Nunn Onesimo Timmons MD       --------------------------------------------- PAST HISTORY ---------------------------------------------  Past Medical History:  has a past medical history of Atrial fibrillation ([facility] Bladder cancer ([facility] Cancer ([facility] CKD (chronic kidney disease), CKD (chronic kidney disease) stage 5, GFR less than 15 ml/min ([facility] Diabetes ([facility] Hypertension, Obesity, and Sleep apnea. Past Surgical History:  has a past surgical history that includes Cholecystectomy; Tubal ligation; Tonsillectomy; Colonoscopy; pr anastomosis,av,any site (Left, 9/18/2018); pr anastomosis,av,any site (Left, 11/14/2018); Dialysis fistula creation (Left, 1/22/2019); Cardiac catheterization (Left, 10/02/2008); Colonoscopy (N/A, 8/29/2019); Colonoscopy (8/29/2019); Dialysis fistula creation (Right, 12/7/2020); and Dialysis fistula creation (Right, 4/8/2021). Social History:  reports that she quit smoking about 8 months ago. Her smoking use included cigarettes. She has a 21.50 pack-year smoking history. She has never used smokeless tobacco. She reports previous alcohol use. She reports that she does not use drugs. Family History: family history includes Alcohol Abuse in her brother; Anxiety Disorder in her daughter; Bipolar Disorder in her paternal uncle; COPD in her mother; Cancer in her mother; Colon Cancer in her maternal grandfather; Dementia in her father; Depression in her father; Diabetes in her brother, father, and mother; Heart Disease in her father; High Blood Pressure in her father; Prostate Cancer in her father; Stroke in her father. The patients home medications have been reviewed.     Allergies: Adhesive tape and Penicillins    -------------------------------------------------- RESULTS -------------------------------------------------  Labs:  Results for orders placed or performed during the hospital encounter of 10/30/21   CBC Auto Differential   Result Value Ref Range    WBC 36.1 (H) 4.5 - 11.5 E9/L RBC 2.81 (L) 3.50 - 5.50 E12/L    Hemoglobin 8.8 (L) 11.5 - 15.5 g/dL    Hematocrit 26.8 (L) 34.0 - 48.0 %    MCV 95.4 80.0 - 99.9 fL    MCH 31.3 26.0 - 35.0 pg    MCHC 32.8 32.0 - 34.5 %    RDW 12.2 11.5 - 15.0 fL    Platelets 748 774 - 892 E9/L    MPV 11.1 7.0 - 12.0 fL    Neutrophils % 89.5 (H) 43.0 - 80.0 %    Lymphocytes % 6.1 (L) 20.0 - 42.0 %    Monocytes % 3.5 2.0 - 12.0 %    Eosinophils % 0.0 0.0 - 6.0 %    Basophils % 0.0 0.0 - 2.0 %    Neutrophils Absolute 32.49 (H) 1.80 - 7.30 E9/L    Lymphocytes Absolute 2.17 1.50 - 4.00 E9/L    Monocytes Absolute 1.44 (H) 0.10 - 0.95 E9/L    Eosinophils Absolute 0.00 (L) 0.05 - 0.50 E9/L    Basophils Absolute 0.00 0.00 - 0.20 E9/L    Metamyelocytes Relative 0.9 0.0 - 1.0 %    nRBC 0.9 /100 WBC    Anisocytosis 1+    Basic Metabolic Panel   Result Value Ref Range    Sodium 137 132 - 146 mmol/L    Potassium 4.8 3.5 - 5.0 mmol/L    Chloride 90 (L) 98 - 107 mmol/L    CO2 22 22 - 29 mmol/L    Anion Gap 25 (H) 7 - 16 mmol/L    Glucose 176 (H) 74 - 99 mg/dL     (HH) 6 - 23 mg/dL    CREATININE 8.0 (H) 0.5 - 1.0 mg/dL    GFR Non-African American 5 >=60 mL/min/1.73    GFR African American 6     Calcium 10.8 (H) 8.6 - 10.2 mg/dL   Hepatic Function Panel   Result Value Ref Range    Total Protein 6.6 6.4 - 8.3 g/dL    Albumin 3.5 3.5 - 5.2 g/dL    Alkaline Phosphatase 108 (H) 35 - 104 U/L    ALT 49 (H) 0 - 32 U/L    AST 25 0 - 31 U/L    Total Bilirubin 0.6 0.0 - 1.2 mg/dL    Bilirubin, Direct 0.2 0.0 - 0.3 mg/dL    Bilirubin, Indirect 0.4 0.0 - 1.0 mg/dL   Troponin   Result Value Ref Range    Troponin, High Sensitivity 39 (H) 0 - 9 ng/L   Brain Natriuretic Peptide   Result Value Ref Range    Pro- (H) 0 - 125 pg/mL       Radiology:  XR CHEST PORTABLE   Final Result   1. Decreasing pulmonary consolidation. 2. Suspect pulmonary venous hypertension, possible underlying emphysema.              ------------------------- NURSING NOTES AND VITALS REVIEWED ---------------------------  Date / Time Roomed:  10/30/2021 12:44 PM  ED Bed Assignment:  [id number]    The nursing notes within the ED encounter and vital signs as below have been reviewed. /69   Pulse 68   Temp 97.2 °F (36.2 °C) (Oral)   Resp 15   Ht 5' 6\" (1.676 m)   Wt 224 lb 3.3 oz (101.7 kg)   SpO2 94%   BMI 36.19 kg/m²   Oxygen Saturation Interpretation: Normal      ------------------------------------------ PROGRESS NOTES ------------------------------------------  I have spoken with the patient and discussed todays results, in addition to providing specific details for the plan of care and counseling regarding the diagnosis and prognosis. Their questions are answered at this time and they are agreeable with the plan. I discussed at length with them reasons for immediate return here for re evaluation. They will followup with their primary care physician by calling their office tomorrow. --------------------------------- ADDITIONAL PROVIDER NOTES ---------------------------------  At this time the patient is without objective evidence of an acute process requiring hospitalization or inpatient management. They have remained hemodynamically stable throughout their entire ED visit and are stable for discharge with outpatient follow-up. The plan has been discussed in detail and they are aware of the specific conditions for emergent return, as well as the importance of follow-up. Discharge Medication List as of 10/30/2021  8:35 PM          Diagnosis:  1. ESRD (end stage renal disease) ([facility] [address]    2. Vasovagal episode    3. Elevated BUN        Disposition:  Patient's disposition: Discharge to home  Patient's condition is stable.        Nadeem Schwartz DO  10/31/21 5686

## 2021-10-31 NOTE — ED NOTES
Bed: [address] [address]  Expected date:   Expected time:   Means of arrival:   Comments:   Partha Singh (9p)     Ashok Tay RN  10/30/21 2029

## 2021-11-01 ENCOUNTER — TELEPHONE (OUTPATIENT)
Dept: FAMILY MEDICINE CLINIC | Age: 66
End: 2021-11-01

## 2021-11-05 ENCOUNTER — TELEPHONE (OUTPATIENT)
Dept: FAMILY MEDICINE CLINIC | Age: 66
End: 2021-11-05

## 2021-11-05 NOTE — TELEPHONE ENCOUNTER
----- Message from Nancie Farley sent at 11/5/2021 11:50 AM EDT -----  Subject: Hospital Follow Up    QUESTIONS  What hospital was the Patient Discharged from? [facility]  Date of Discharge? 2021-10-30  Discharge Location? Home  Reason for hospitalization as patient stated? Ammonia and covid 19. What question does the patient have, if applicable?   ---------------------------------------------------------------------------  --------------  CALL BACK INFO  What is the best way for the office to contact you? OK to leave message on   voicemail  Preferred Call Back Phone Number? [phone]  ---------------------------------------------------------------------------  --------------  SCRIPT ANSWERS  Relationship to Patient? Other  Representative Name? Rosa  Additional information verified (besides Name and Date of Birth)? Address  (Patient requests to see provider urgently. )? No  (Has the patient been discharged from the hospital within 2 business days   AND does not have a Telephone Encounter  Follow Up From [address] [place] [address]   documented in [facility]?)? Yes  Do you have any questions for your primary care provider that need to be   answered prior to your appointment? (Use RN Triage if question pertains to   anything on the red flag list)? No  (Patient needs follow up visit after hospital discharge) Book first   available appointment within 7 days OF DISCHARGE, if no appt, proceed to   book the next available time slot within 14 days OF DISCHARGE AND Send   Message to Provider. [facility] Follow Up appointment cannot be booked   beyond 14 Days and should result in a Message to Provider. ?  No

## 2021-11-09 PROCEDURE — G0180 MD CERTIFICATION HHA PATIENT: HCPCS | Performed by: INTERNAL MEDICINE

## 2021-11-10 ENCOUNTER — OFFICE VISIT (OUTPATIENT)
Dept: FAMILY MEDICINE CLINIC | Age: 66
End: 2021-11-10
Payer: MEDICARE

## 2021-11-10 VITALS
WEIGHT: 239 LBS | SYSTOLIC BLOOD PRESSURE: 128 MMHG | BODY MASS INDEX: 37.51 KG/M2 | RESPIRATION RATE: 18 BRPM | HEART RATE: 64 BPM | OXYGEN SATURATION: 99 % | HEIGHT: 67 IN | TEMPERATURE: 97.4 F | DIASTOLIC BLOOD PRESSURE: 66 MMHG

## 2021-11-10 DIAGNOSIS — F51.05 INSOMNIA DUE TO MENTAL CONDITION: ICD-10-CM

## 2021-11-10 DIAGNOSIS — F41.1 GENERALIZED ANXIETY DISORDER: ICD-10-CM

## 2021-11-10 DIAGNOSIS — R53.1 WEAKNESS GENERALIZED: ICD-10-CM

## 2021-11-10 DIAGNOSIS — N18.5 CKD (CHRONIC KIDNEY DISEASE) STAGE 5, GFR LESS THAN 15 ML/MIN (HCC): ICD-10-CM

## 2021-11-10 DIAGNOSIS — J44.9 CHRONIC OBSTRUCTIVE PULMONARY DISEASE, UNSPECIFIED COPD TYPE (HCC): ICD-10-CM

## 2021-11-10 DIAGNOSIS — D51.9 ANEMIA DUE TO VITAMIN B12 DEFICIENCY, UNSPECIFIED B12 DEFICIENCY TYPE: ICD-10-CM

## 2021-11-10 DIAGNOSIS — F33.1 MODERATE EPISODE OF RECURRENT MAJOR DEPRESSIVE DISORDER (HCC): ICD-10-CM

## 2021-11-10 DIAGNOSIS — U07.1 PNEUMONIA DUE TO COVID-19 VIRUS: Primary | ICD-10-CM

## 2021-11-10 DIAGNOSIS — E55.9 VITAMIN D DEFICIENCY: ICD-10-CM

## 2021-11-10 DIAGNOSIS — I10 ESSENTIAL HYPERTENSION: ICD-10-CM

## 2021-11-10 DIAGNOSIS — E11.22 TYPE 2 DIABETES MELLITUS WITH STAGE 5 CHRONIC KIDNEY DISEASE NOT ON CHRONIC DIALYSIS, WITHOUT LONG-TERM CURRENT USE OF INSULIN (HCC): ICD-10-CM

## 2021-11-10 DIAGNOSIS — J12.82 PNEUMONIA DUE TO COVID-19 VIRUS: Primary | ICD-10-CM

## 2021-11-10 DIAGNOSIS — N18.5 TYPE 2 DIABETES MELLITUS WITH STAGE 5 CHRONIC KIDNEY DISEASE NOT ON CHRONIC DIALYSIS, WITHOUT LONG-TERM CURRENT USE OF INSULIN (HCC): ICD-10-CM

## 2021-11-10 PROCEDURE — 99214 OFFICE O/P EST MOD 30 MIN: CPT | Performed by: INTERNAL MEDICINE

## 2021-11-10 PROCEDURE — 1111F DSCHRG MED/CURRENT MED MERGE: CPT | Performed by: INTERNAL MEDICINE

## 2021-11-10 RX ORDER — TRAZODONE HYDROCHLORIDE 50 MG/1
50 TABLET ORAL NIGHTLY
Qty: 30 TABLET | Refills: 2 | Status: SHIPPED
Start: 2021-11-10 | End: 2022-02-10

## 2021-11-10 RX ORDER — FLASH GLUCOSE SCANNING READER
1 EACH MISCELLANEOUS
Qty: 2 EACH | Refills: 3 | Status: SHIPPED | OUTPATIENT
Start: 2021-11-10 | End: 2022-04-04

## 2021-11-10 RX ORDER — FLASH GLUCOSE SENSOR
1 KIT MISCELLANEOUS
Qty: 2 EACH | Refills: 3 | Status: SHIPPED | OUTPATIENT
Start: 2021-11-10 | End: 2021-12-10

## 2021-11-10 NOTE — PROGRESS NOTES
Post-Discharge Transitional Care Management Services or Hospital Follow Up      Jose Enrique Mcgee   YOB: 1955    Date of Office Visit:  11/10/2021  Date of Hospital Admission: 10/30/21  Date of Hospital Discharge: 10/30/21  Risk of hospital readmission (high >=14%.  Medium >=10%) :Readmission Risk Score: 21.9      Care management risk score Rising risk (score 2-5) and Complex Care (Scores >=6): 7     Non face to face  following discharge, date last encounter closed (first attempt may have been earlier): *No documented post hospital discharge outreach found in the last 14 days    Call initiated 2 business days of discharge: *No response recorded in the last 14 days    Patient Active Problem List   Diagnosis    Moderate episode of recurrent major depressive disorder ([facility] [address]    Generalized anxiety disorder    Insomnia due to mental condition    Anemia    Essential hypertension    CKD (chronic kidney disease) stage 5, GFR less than 15 ml/min ([facility])    Atrial fibrillation ([facility]    Pure hypercholesterolemia    Morbid obesity ([facility]    Chronic obstructive pulmonary disease ([facility] [address] [facility]    Obstructive sleep apnea    Exertional dyspnea    History of colon polyps    Family history of rectal cancer    Encounter regarding vascular access for dialysis for end-stage renal disease ([facility] [address]    COVID-19    Pneumonia due to COVID-19 virus     Allergies   Allergen Reactions    Adhesive Tape Rash    Penicillins Rash       Medications marked \"taking\" at this time  Outpatient Medications Marked as Taking for the 11/10/21 encounter (Office Visit) with Darius Bolanos, DO   Medication Sig Dispense Refill    Continuous Blood Gluc  (FREESTYLE PRICILLA 14 DAY READER) COLLETTE 1 each by Does not apply route every 14 days 2 each 3    Continuous Blood Gluc Sensor (FREESTYLE PRICILLA 14 DAY SENSOR) MISC 1 each by Does not apply route every 14 days 2 each 3    traZODone (DESYREL) 50 MG tablet Take 1 tablet by mouth nightly 30 tablet 2    blood glucose monitor kit and supplies Dispense sufficient amount for indicated testing frequency plus additional to accommodate PRN testing needs. Dispense all needed supplies to include: monitor, strips, lancing device, lancets, control solutions, alcohol swabs. 1 kit 0    Blood Pressure KIT Check blood pressure daily 1 kit 0    Spacer/Aero-Holding Chambers COLLETTE 1 Device by Does not apply route daily as needed (use with all inhaler medications) 1 each 0    blood glucose monitor kit and supplies Dispense sufficient amount for indicated testing frequency plus additional to accommodate PRN testing needs.  Dispense all needed supplies to include: monitor, strips, lancing device, lancets, control solutions, alcohol swabs, THAT IS INSURANCE APPROVED> 1 kit 0    metoprolol tartrate (LOPRESSOR) 50 MG tablet Take 50 mg by mouth 2 times daily      glipiZIDE (GLUCOTROL XL) 5 MG extended release tablet Take 10 mg by mouth daily      amLODIPine (NORVASC) 5 MG tablet TAKE 1 TABLET BY MOUTH DAILY 30 tablet 1    atorvastatin (LIPITOR) 40 MG tablet Take 1 tablet by mouth daily 30 tablet 3    vitamin D3 (CHOLECALCIFEROL) 25 MCG (1000 UT) TABS tablet Take 1 tablet by mouth daily 30 tablet 1    torsemide (DEMADEX) 20 MG tablet Take 2 tablets by mouth daily 60 tablet 2    sodium bicarbonate 650 MG tablet Take 650 mg by mouth 2 times daily       calcitRIOL (ROCALTROL) 0.25 MCG capsule Take 0.5 mcg by mouth Five times weekly Given Monday,Tuesday,Wednesday,Thursday,Friday      apixaban (ELIQUIS) 5 MG TABS tablet Take 1 tablet by mouth 2 times daily 180 tablet 3    propafenone (RYTHMOL SR) 325 MG extended release capsule Take 1 capsule by mouth 2 times daily 180 capsule 3    DULoxetine (CYMBALTA) 60 MG extended release capsule TAKE 1 CAPSULE BY MOUTH DAILY 30 capsule 2        Medications patient taking as of now reconciled against medications ordered at time of hospital discharge: Yes    Chief Complaint Patient presents with    Follow-Up from Hospital     questions concerning Diabetic medication, pt was in hospital from 10/18/2021 and due to Covid pnemonia was thrown into ESRD and requiring dialysis and during pt's first dialysis pt had either fell asleep or passed out and was sent to [facility] for evaluation and finished dialysis there    Diabetes     pt is asking for script for Dexcom blood glucose monitor scan system       History of Present illness - Follow up of Hospital diagnosis(es): Sepsis and acute hypoxic respiratory failure secondary to COVID-19 pneumonia, end-stage renal disease on hemodialysis    Inpatient course: Discharge summary reviewed- see chart. Patient with significant past medical history came to our hospital with chief complaint of worsening shortness of breath for last 2 weeks and was found to have acute hypoxemic respiratory failure requiring high flow oxygen as well as acute kidney injury on her chronic kidney disease stage V. In the ER, patient was found to have atrial fibrillation with heart rate episodes up to 150s but no persistent rapid ventricular rate.  She was positive for COVID-19.  Other significant finding was acute kidney injury with creatinine of 9.4, compared to her baseline of 5.2. Coral Enrrique BNP was also elevated to 4850.  ER physician discussed with nephrology who recommended low-dose bicarb drip and close management. She also required AIRVO in the ER with oxygen about 30 L.   Over the next 48 hours her renal function remained stable without requiring any dialysis.  She was seen by pulmonology and started on IV ceftriaxone and doxycycline to cover for community-acquired pneumonia.  Ceftriaxone and doxycycline discontinued on 10/21 by ID.  Patient was started on hemodialysis on 10/21.  Showed significant improvement in her volume overload state over the next 72 hours.     Patient requires home O2 at 4 L nasal cannula with diagnoses: Acute respiratory failure with hypoxia and pneumonia due to COVID-19 virus.     Because of steroids she was started on high-dose insulin with Lantus 24 units twice daily and NovoLog 10 units 3 times daily at the time of discharge along with sliding scale insulin. Sliding scale insulin instructions are added in patient discharge instruction. She will need to stay in close contact with primary care physician for down titration of insulin as soon as she finished her steroids in next 3 to 4 days.       Interval history/Current status:   Got home Oct 27th. She remains on supplemental nasal cannula oxygen which fluctuates between 2 to 4 L. Her granddaughter is helping her out significantly at home and tries to wean down her oxygen when possible. Patient is currently receiving physical therapy only about once weekly which does not seem to be enough to make any progress. She also reports being finished with occupational therapy remains having significant difficulty with ADLs. New order for home health PT and OT will be placed. Patient also in need of DME supplies such as a bedside commode and shower chair to assist the patient. Patient reports that she is dealing with depression as well. Her  passed away while she was in the hospital and she states that she has no motivation or desire to deal with the logistics of planning a  or even going to the  herself. She reports that she is not sleeping well at night and therefore is excessively fatigued throughout the day although has difficulty affect throughout the day as well. Is currently taking Cymbalta 60 mg daily and states that this previously worked very well to control her depression. She has been on trazodone in the past would be interested in trying this again. She also reports generalized frustration with brain fog and memory loss.   She states she is feeling extremely forgetful since being home from the hospital.  She does acknowledge that this is likely due to her hospitalization and infection with Covid noted that may not be permanent but states it is very frustrating to not be able to remember things or do things on her own. Patient's granddaughter is extremely helpful and provides a lot of the care at home. Patient would greatly benefit from both PT and OT to help increase her strength and mobility along with ability to perform ADLs on her own. Patient has finished her course of steroids and no longer is taking insulin. Her blood glucose levels to get checked multiple times per day and the last time she received any of her Lantus granddaughter noted that she became hypoglycemic within a couple hours. This was quickly corrected with orange juice and patient has not required insulin since. A comprehensive review of systems was negative except for what was noted in the HPI. Vitals:    11/10/21 1214   BP: 128/66   Pulse: 64   Resp: 18   Temp: 97.4 °F (36.3 °C)   TempSrc: Infrared   SpO2: 99%   Weight: 239 lb (108.4 kg)   Height: 5' 7\" (1.702 m)     Body mass index is 37.43 kg/m². Wt Readings from Last 3 Encounters:   11/10/21 239 lb (108.4 kg)   10/30/21 224 lb 3.3 oz (101.7 kg)   10/27/21 235 lb 3.7 oz (106.7 kg)     BP Readings from Last 3 Encounters:   11/10/21 128/66   10/30/21 115/69   10/27/21 (!) 106/53        Physical Exam:  General: Awake, alert, and oriented to person, place, time, and purpose, appears stated age and cooperative, No acute distress  Head: Normocephalic, atraumatic  Eyes: conjunctivae/corneas clear, EOM's intact. Mouth: Mucous membranes moist with no pharyngeal exudate or erythema  Neck: no JVD, no adenopathy, no carotid bruit, supple, symmetrical, trachea midline  Back: symmetric, ROM normal, No CVA tenderness. Lungs: Mildly diminished bilaterally, no rales rhonchi or wheezing heard.   Heart: regular rate and rhythm, S1, S2 normal, no murmur, click, rub or gallop  Abdomen: soft, non-tender; bowel sounds normal; no masses,  no organomegaly  Extremities: atraumatic, no cyanosis, no edema, 2+ pulses palpated in all 4 extremities, AV fistula present to left upper extremity  Skin: color, texture, turgor within normal limits. No rashes or lesions or normal  Neurologic: speech appropriate, moves all 4 extremities, normal muscle strength and tone, CN 2-12 grossly intact      Assessment/Plan:  1. Pneumonia due to COVID-19 virus    - NE DISCHARGE MEDS RECONCILED W/ CURRENT OUTPATIENT MED LIST  - [address] [address]    2. CKD (chronic kidney disease) stage 5, GFR less than 15 ml/min (HCC)    - NE DISCHARGE MEDS RECONCILED W/ CURRENT OUTPATIENT MED LIST    3. Essential hypertension    - NE DISCHARGE MEDS RECONCILED W/ CURRENT OUTPATIENT MED LIST    4. Chronic obstructive pulmonary disease, unspecified COPD type ([facility] [address] [address]    - NE DISCHARGE MEDS RECONCILED W/ CURRENT OUTPATIENT MED LIST  - [address] [address]    5. Type 2 diabetes mellitus with stage 5 chronic kidney disease not on chronic dialysis, without long-term current use of insulin ([facility] [address]    - [address]  - Continuous Blood Gluc  (FREESTYLE PRICILLA 14 DAY READER) COLLETTE; 1 each by Does not apply route every 14 days  Dispense: 2 each; Refill: 3  - Continuous Blood Gluc Sensor (FREESTYLE PRICILLA 14 DAY SENSOR) MISC; 1 each by Does not apply route every 14 days  Dispense: 2 each; Refill: 3    6. Weakness generalized    - DME Order for Bedside Commode as OP  - [address] [address]  - DME Order for Martina Bears as OP    7. Moderate episode of recurrent major depressive disorder [facility])    - External Referral To Counseling Services    8. Generalized anxiety disorder    - External Referral To Counseling Services    9. Anemia due to vitamin B12 deficiency, unspecified B12 deficiency type    - CBC Auto Differential; Future  - IRON AND TIBC; Future  - VITAMIN B12 & FOLATE; Future    10.  Vitamin D deficiency    - Vitamin D 25 Hydroxy; Future    11. Insomnia due to mental condition    - traZODone (DESYREL) 50 MG tablet; Take 1 tablet by mouth nightly  Dispense: 30 tablet;  Refill: 2        Medical Decision Making: high complexity    Sonya Chew DO  11/16/2021  8:13 AM

## 2021-11-12 ENCOUNTER — TELEPHONE (OUTPATIENT)
Dept: FAMILY MEDICINE CLINIC | Age: 66
End: 2021-11-12
Payer: MEDICARE

## 2021-11-12 DIAGNOSIS — A41.89 GRAM POSITIVE SEPTICEMIA (HCC): Primary | ICD-10-CM

## 2021-11-23 DIAGNOSIS — F33.1 MODERATE EPISODE OF RECURRENT MAJOR DEPRESSIVE DISORDER (HCC): ICD-10-CM

## 2021-11-23 RX ORDER — DULOXETIN HYDROCHLORIDE 60 MG/1
60 CAPSULE, DELAYED RELEASE ORAL DAILY
Qty: 30 CAPSULE | Refills: 3 | Status: SHIPPED
Start: 2021-11-23 | End: 2022-04-15

## 2021-11-23 RX ORDER — METOPROLOL TARTRATE 50 MG/1
TABLET, FILM COATED ORAL
Qty: 60 TABLET | Refills: 3 | Status: SHIPPED
Start: 2021-11-23 | End: 2022-04-15

## 2021-12-01 ENCOUNTER — TELEPHONE (OUTPATIENT)
Dept: ADMINISTRATIVE | Age: 66
End: 2021-12-01

## 2021-12-01 NOTE — TELEPHONE ENCOUNTER
Grand-daughter calling - she would like to schedule a HFU apt with Dr. Tor Monae (s/p COVID) ER with Sync[facility] on: 10/30/21 and prior admission dx: COVID-19/ SOB. Please return call with HFU apt/timing? Thank you.

## 2021-12-02 NOTE — TELEPHONE ENCOUNTER
Patient is due for her annual assessment but this could wait until I have office appointment available

## 2021-12-03 DIAGNOSIS — I10 ESSENTIAL HYPERTENSION: ICD-10-CM

## 2021-12-06 RX ORDER — BLOOD PRESSURE TEST KIT
KIT MISCELLANEOUS
Qty: 1 KIT | Refills: 0 | Status: SHIPPED
Start: 2021-12-06 | End: 2022-04-04 | Stop reason: SINTOL

## 2022-01-19 DIAGNOSIS — I10 ESSENTIAL HYPERTENSION: Chronic | ICD-10-CM

## 2022-01-20 RX ORDER — AMLODIPINE BESYLATE 5 MG/1
5 TABLET ORAL DAILY
Qty: 30 TABLET | Refills: 1 | Status: SHIPPED
Start: 2022-01-20 | End: 2022-04-15

## 2022-01-20 RX ORDER — TORSEMIDE 20 MG/1
40 TABLET ORAL DAILY
Qty: 60 TABLET | Refills: 1 | Status: SHIPPED
Start: 2022-01-20 | End: 2022-04-15

## 2022-01-20 RX ORDER — GLIPIZIDE 5 MG/1
TABLET, FILM COATED, EXTENDED RELEASE ORAL
Qty: 120 TABLET | Refills: 1 | Status: SHIPPED
Start: 2022-01-20 | End: 2022-04-15

## 2022-02-09 DIAGNOSIS — F51.05 INSOMNIA DUE TO MENTAL CONDITION: ICD-10-CM

## 2022-02-10 RX ORDER — TRAZODONE HYDROCHLORIDE 50 MG/1
50 TABLET ORAL NIGHTLY
Qty: 30 TABLET | Refills: 0 | Status: SHIPPED
Start: 2022-02-10 | End: 2022-04-15

## 2022-03-17 DIAGNOSIS — I10 ESSENTIAL HYPERTENSION: Chronic | ICD-10-CM

## 2022-03-17 RX ORDER — ATORVASTATIN CALCIUM 40 MG/1
40 TABLET, FILM COATED ORAL DAILY
Qty: 30 TABLET | Refills: 1 | Status: SHIPPED
Start: 2022-03-17 | End: 2022-05-19

## 2022-04-04 ENCOUNTER — OFFICE VISIT (OUTPATIENT)
Dept: FAMILY MEDICINE CLINIC | Age: 67
End: 2022-04-04
Payer: MEDICARE

## 2022-04-04 VITALS
OXYGEN SATURATION: 96 % | DIASTOLIC BLOOD PRESSURE: 64 MMHG | BODY MASS INDEX: 38.92 KG/M2 | HEIGHT: 67 IN | RESPIRATION RATE: 16 BRPM | HEART RATE: 76 BPM | SYSTOLIC BLOOD PRESSURE: 114 MMHG | WEIGHT: 248 LBS | TEMPERATURE: 97.6 F

## 2022-04-04 DIAGNOSIS — E66.01 SEVERE OBESITY (BMI 35.0-39.9) WITH COMORBIDITY (HCC): ICD-10-CM

## 2022-04-04 DIAGNOSIS — F41.1 GENERALIZED ANXIETY DISORDER: ICD-10-CM

## 2022-04-04 DIAGNOSIS — Z13.31 POSITIVE DEPRESSION SCREENING: ICD-10-CM

## 2022-04-04 DIAGNOSIS — F32.0 MILD MAJOR DEPRESSION (HCC): ICD-10-CM

## 2022-04-04 DIAGNOSIS — E11.22 TYPE 2 DIABETES MELLITUS WITH STAGE 5 CHRONIC KIDNEY DISEASE NOT ON CHRONIC DIALYSIS, WITHOUT LONG-TERM CURRENT USE OF INSULIN (HCC): Primary | ICD-10-CM

## 2022-04-04 DIAGNOSIS — F33.1 MODERATE EPISODE OF RECURRENT MAJOR DEPRESSIVE DISORDER (HCC): ICD-10-CM

## 2022-04-04 DIAGNOSIS — N18.5 TYPE 2 DIABETES MELLITUS WITH STAGE 5 CHRONIC KIDNEY DISEASE NOT ON CHRONIC DIALYSIS, WITHOUT LONG-TERM CURRENT USE OF INSULIN (HCC): Primary | ICD-10-CM

## 2022-04-04 LAB — HBA1C MFR BLD: 5.3 %

## 2022-04-04 PROCEDURE — G8417 CALC BMI ABV UP PARAM F/U: HCPCS | Performed by: INTERNAL MEDICINE

## 2022-04-04 PROCEDURE — 99214 OFFICE O/P EST MOD 30 MIN: CPT | Performed by: INTERNAL MEDICINE

## 2022-04-04 PROCEDURE — 3044F HG A1C LEVEL LT 7.0%: CPT | Performed by: INTERNAL MEDICINE

## 2022-04-04 PROCEDURE — 1123F ACP DISCUSS/DSCN MKR DOCD: CPT | Performed by: INTERNAL MEDICINE

## 2022-04-04 PROCEDURE — 2022F DILAT RTA XM EVC RTNOPTHY: CPT | Performed by: INTERNAL MEDICINE

## 2022-04-04 PROCEDURE — 1090F PRES/ABSN URINE INCON ASSESS: CPT | Performed by: INTERNAL MEDICINE

## 2022-04-04 PROCEDURE — 1036F TOBACCO NON-USER: CPT | Performed by: INTERNAL MEDICINE

## 2022-04-04 PROCEDURE — 83036 HEMOGLOBIN GLYCOSYLATED A1C: CPT | Performed by: INTERNAL MEDICINE

## 2022-04-04 PROCEDURE — 3017F COLORECTAL CA SCREEN DOC REV: CPT | Performed by: INTERNAL MEDICINE

## 2022-04-04 PROCEDURE — G8400 PT W/DXA NO RESULTS DOC: HCPCS | Performed by: INTERNAL MEDICINE

## 2022-04-04 PROCEDURE — 4040F PNEUMOC VAC/ADMIN/RCVD: CPT | Performed by: INTERNAL MEDICINE

## 2022-04-04 PROCEDURE — G8427 DOCREV CUR MEDS BY ELIG CLIN: HCPCS | Performed by: INTERNAL MEDICINE

## 2022-04-04 RX ORDER — BUSPIRONE HYDROCHLORIDE 5 MG/1
2.5 TABLET ORAL 2 TIMES DAILY
Qty: 30 TABLET | Refills: 5 | Status: SHIPPED
Start: 2022-04-04 | End: 2022-09-07

## 2022-04-04 RX ORDER — PRAMIPEXOLE DIHYDROCHLORIDE 0.12 MG/1
0.12 TABLET ORAL 3 TIMES DAILY
COMMUNITY
Start: 2022-04-01 | End: 2022-09-14 | Stop reason: SINTOL

## 2022-04-04 RX ORDER — PRAMIPEXOLE DIHYDROCHLORIDE 0.12 MG/1
0.12 TABLET ORAL 3 TIMES DAILY
COMMUNITY
End: 2022-04-04 | Stop reason: SDUPTHER

## 2022-04-04 ASSESSMENT — PATIENT HEALTH QUESTIONNAIRE - PHQ9
SUM OF ALL RESPONSES TO PHQ QUESTIONS 1-9: 19
3. TROUBLE FALLING OR STAYING ASLEEP: 3
2. FEELING DOWN, DEPRESSED OR HOPELESS: 3
7. TROUBLE CONCENTRATING ON THINGS, SUCH AS READING THE NEWSPAPER OR WATCHING TELEVISION: 3
6. FEELING BAD ABOUT YOURSELF - OR THAT YOU ARE A FAILURE OR HAVE LET YOURSELF OR YOUR FAMILY DOWN: 0
SUM OF ALL RESPONSES TO PHQ QUESTIONS 1-9: 19
4. FEELING TIRED OR HAVING LITTLE ENERGY: 3
9. THOUGHTS THAT YOU WOULD BE BETTER OFF DEAD, OR OF HURTING YOURSELF: 0
SUM OF ALL RESPONSES TO PHQ QUESTIONS 1-9: 19
8. MOVING OR SPEAKING SO SLOWLY THAT OTHER PEOPLE COULD HAVE NOTICED. OR THE OPPOSITE, BEING SO FIGETY OR RESTLESS THAT YOU HAVE BEEN MOVING AROUND A LOT MORE THAN USUAL: 2
SUM OF ALL RESPONSES TO PHQ QUESTIONS 1-9: 19
5. POOR APPETITE OR OVEREATING: 2
SUM OF ALL RESPONSES TO PHQ9 QUESTIONS 1 & 2: 6
10. IF YOU CHECKED OFF ANY PROBLEMS, HOW DIFFICULT HAVE THESE PROBLEMS MADE IT FOR YOU TO DO YOUR WORK, TAKE CARE OF THINGS AT HOME, OR GET ALONG WITH OTHER PEOPLE: 2
1. LITTLE INTEREST OR PLEASURE IN DOING THINGS: 3

## 2022-04-04 NOTE — PROGRESS NOTES
[facility]  [address] [place] [id number]  Dept: [phone]  Dept Fax: [phone]     NAME: Dominic Gibbs        :  1955        MRN:  [id number]    Chief Complaint   Patient presents with    Anxiety     Has had since she got out of the hospital in October but it is getting worse.  Diabetes     Due for A1C       Subjective     History of Present Illness  Dominic Gibbs is a 78 y.o. female who presents today for routine follow up and medication refill. Overall patient is doing much better since discharge from the hospital last fall. She does continue to have several symptoms related to both an extended hospitalization as well as a Covid infection. Symptoms including brain fog, generalized excessive fatigue, restless leg syndrome, and anxiety. She reports the anxiety is becoming worse and has prevented her from sleeping on several occasions. She also notes that she is having some sinus drainage recently as well. She notes that this is clear drainage and she usually notices it when she bends over forward. She has no other upper respiratory symptoms. Review of Systems  Please see HPI above. All bolded are positive.   Gen: fever, chills, fatigue, weakness, diaphoresis, unintentional weight change, brain fog  Head: headache, vision change, hearing loss  Chest: chest pain/heaviness, palpitations  Lungs: shortness of breath, wheezing, coughing, hemopt ysis  Abdomen: abdominal pain, nausea, vomiting, diarrhea, constipation, melena, hematochezia, hematemesis, loss of appetite  Extremities: lower extremity edema, myalgias, arthralgias  Urinary: dysuria, hematuria, weak flow, increase in frequency  Neurologic: lightheadedness, dizziness, confusion, syncope  Endocrine: polydipsia, polyuria, heat or cold intolerance  Psychiatric: depression, suicidal ideation, anxiety  Derm: Rashes, ulcers, burns    Past Medical Hx:  Past Medical History:   Diagnosis Date    [facility] fibrillation [facility]     Bladder cancer (HCC)     Cancer ([facility]     CKD (chronic kidney disease)     CKD (chronic kidney disease) stage 5, GFR less than 15 ml/min ([facility] [address] 11/6/2018    Diabetes ([facility]     Hypertension     Obesity     260 #    Sleep apnea        Past Surgical Hx:  Past Surgical History:   Procedure Laterality Date    CARDIAC CATHETERIZATION Left 10/02/2008    @ [facility]    CHOLECYSTECTOMY      COLONOSCOPY      COLONOSCOPY N/A 8/29/2019    mid ascending colon polyp bx/cauterization (inflammatory polyp), proximal transverse colon polyp bx/cauterized (tubular adenoma), descending colon polyp 80 cm from anus bx/cauterized (hyperplastic polyp), sigmoid colon polyp 35 cm from anus bx/cauterized (tubular adenoma), sigmoid colon polyps 30 cm from anus bx/cauterized (tubular adenoma), Dr. Ray King, [facility]    COLONOSCOPY  8/29/2019    mid ascending colon polyp bx/cauterization (inflammatory polyp), proximal transverse colon polyp bx/cauterized (tubular adenoma), descending colon polyp 80 cm from anus bx/cauterized (hyperplastic polyp), sigmoid colon polyp 35 cm from anus bx/cauterized (tubular adenoma), sigmoid colon polyps 30 cm from anus bx/cauterized (tubular adenoma), Dr. Ray King, [facility]    DIALYSIS FISTULA CREATION Left 1/22/2019    LIGATIION LEFT LEFT UPPER EXTREMITY OF AV FISTULA , EVACUATION HEMATOMA performed by Joan Gillespie MD at [address] Right 12/7/2020    AV FISTULA CREATION -- RIGHT ARM performed by Betito Curry MD at [address] Right 4/8/2021    AV FISTULA  REVISION RIGHT ARM performed by Betito Curry MD at [facility] [address] Left 9/18/2018    AV FISTULA  LEFT ARM performed by Joan Gillespie MD at [facility] [address] Left 11/14/2018    SUPERFICIALIZATION AV FISTULA  LEFT ARM , LIGATION TRIBUTORY AV FISTULA LEFT ARM performed by Joan Gillespie MD at [place] [address] TONSILLECTOMY      TUBAL LIGATION         Family Hx:  Family History   Problem Relation Age of Onset   [facility] Cancer Mother         bladder    Diabetes Mother     COPD Mother     Depression Father     Diabetes Father     Heart Disease Father     High Blood Pressure Father     Stroke Father     Prostate Cancer Father     Dementia Father     Alcohol Abuse Brother     Diabetes Brother     Bipolar Disorder Paternal Uncle     Anxiety Disorder Daughter     Colon Cancer Maternal Grandfather        Social Hx:  Social History     Tobacco Use    Smoking status: Former Smoker     Packs/day: 0.50     Years: 43.00     Pack years: 21.50     Types: Cigarettes     Quit date: 2021     Years since quittin.1    Smokeless tobacco: Never Used   Substance Use Topics    Alcohol use: Not Currently     Comment: 1 cup of coffee a day        Home Medications:  Current Outpatient Medications   Medication Sig Dispense Refill    pramipexole (MIRAPEX) 0.125 MG tablet Take 0.125 mg by mouth 3 times daily       busPIRone (BUSPAR) 5 MG tablet Take 0.5 tablets by mouth 2 times daily 30 tablet 5    atorvastatin (LIPITOR) 40 MG tablet Take 1 tablet by mouth daily 30 tablet 1    traZODone (DESYREL) 50 MG tablet Take 1 tablet by mouth nightly 30 tablet 0    torsemide (DEMADEX) 20 MG tablet Take 2 tablets by mouth daily 60 tablet 1    glipiZIDE (GLUCOTROL XL) 5 MG extended release tablet TAKE 2 TABLETS BY MOUTH TWICE A  tablet 1    amLODIPine (NORVASC) 5 MG tablet TAKE 1 TABLET BY MOUTH DAILY (Patient taking differently: Take 5 mg by mouth daily Taking twice daily) 30 tablet 1    DULoxetine (CYMBALTA) 60 MG extended release capsule TAKE 1 CAPSULE BY MOUTH DAILY 30 capsule 3    metoprolol tartrate (LOPRESSOR) 50 MG tablet TAKE 1 TABLET BY MOUTH TWICE A DAY 60 tablet 3    vitamin D3 (CHOLECALCIFEROL) 25 MCG (1000 UT) TABS tablet Take 1 tablet by mouth daily 30 tablet 1    calcitRIOL (ROCALTROL) 0.25 MCG capsule Take 0.5 mcg by mouth Five times weekly Given Monday,Tuesday,Wednesday,Thursday,Friday      apixaban (ELIQUIS) 5 MG TABS tablet Take 1 tablet by mouth 2 times daily 180 tablet 3    propafenone (RYTHMOL SR) 325 MG extended release capsule Take 1 capsule by mouth 2 times daily 180 capsule 3    sodium bicarbonate 650 MG tablet Take 650 mg by mouth 2 times daily  (Patient not taking: Reported on 4/4/2022)       No current facility-administered medications for this visit. Allergies: Allergies   Allergen Reactions    Adhesive Tape Rash    Penicillins Rash       Objective     Vitals:    04/04/22 1357 04/04/22 1412   BP: (!) 146/66 114/64   Pulse: 76    Resp: 16    Temp: 97.6 °F (36.4 °C)    TempSrc: Temporal    SpO2: 96%    Weight: 248 lb (112.5 kg)    Height: 5' 7\" (1.702 m)         Physical Exam  General: Awake, alert, and oriented to person, place, time, and purpose, appears stated age and cooperative, No acute distress  Head: Normocephalic, atraumatic  Eyes: conjunctivae/corneas clear, EOMI  Mouth: Mucous membranes moist with no pharyngeal exudate or erythema  Neck: no JVD, no adenopathy, no carotid bruit, supple, symmetrical, trachea midline  Back: symmetric, ROM normal, No CVA tenderness. Lungs: clear to auscultation bilaterally without wheezes, rales, or rhonchi  Heart: regular rate and rhythm, S1, S2 normal, no murmur, click, rub or gallop  Abdomen: soft, non-tender; bowel sounds normal; no masses,  no organomegaly  Extremities: atraumatic, no cyanosis, no edema  Skin: color, texture, turgor within normal limits.  No rashes or lesions   Neurologic:speech appropriate, moves all 4 extremities, normal muscle strength and tone, CN 2-12 grossly intact    Labs:  Lab Results   Component Value Date    WBC 36.1 (H) 10/30/2021    HGB 8.8 (L) 10/30/2021    HCT 26.8 (L) 10/30/2021     10/30/2021     10/30/2021    K 4.8 10/30/2021    CL 90 (L) 10/30/2021    CREATININE 8.0 (H) 10/30/2021     (HH) 10/30/2021    CO2 22 10/30/2021    GLUCOSE 176 (H) 10/30/2021    ALT 49 (H) 10/30/2021    AST 25 10/30/2021    INR 1.5 10/20/2021     Lab Results   Component Value Date    TSH 0.804 01/31/2019     Lab Results   Component Value Date    TRIG 211 (H) 11/06/2018     Lab Results   Component Value Date    HDL 37 11/06/2018     Lab Results   Component Value Date    LDLCALC 93 11/06/2018     Lab Results   Component Value Date    LABA1C 5.3 04/04/2022     Lab Results   Component Value Date    INR 1.5 10/20/2021    INR 1.0 04/08/2021    INR 1.0 12/07/2020    PROTIME 16.8 (H) 10/20/2021    PROTIME 11.3 04/08/2021    PROTIME 11.4 12/07/2020      *All recent labs were reviewed. Please see electronic chart for a more comprehensive set of labs    Radiology:  No results found. Assessment and Plan     Patient is a 78 y.o. female who presented to the office for follow up. Full problem list is as follows:  Patient Active Problem List   Diagnosis    Moderate episode of recurrent major depressive disorder (HCC)    Generalized anxiety disorder    Insomnia due to mental condition    Anemia    Essential hypertension    CKD (chronic kidney disease) stage 5, GFR less than 15 ml/min (HCC)    Atrial fibrillation (HCC)    Pure hypercholesterolemia    Morbid obesity (HCC)    Chronic obstructive pulmonary disease (HCC)    Obstructive sleep apnea    Exertional dyspnea    History of colon polyps    Family history of rectal cancer    Encounter regarding vascular access for dialysis for end-stage renal disease ([facility] [address]    COVID-19    Pneumonia due to COVID-19 virus       Carlyn Read was seen today for anxiety and diabetes.     Diagnoses and all orders for this visit:    Type 2 diabetes mellitus with stage 5 chronic kidney disease not on chronic dialysis, without long-term current use of insulin (HCC)  -     POCT glycosylated hemoglobin (Hb A1C)    Moderate episode of recurrent major depressive disorder (HCC)    Severe obesity (BMI 35.0-39. 9) with comorbidity (HCC)    Mild major depression (HCC)    Positive depression screening    Generalized anxiety disorder  -     busPIRone (BUSPAR) 5 MG tablet; Take 0.5 tablets by mouth 2 times daily    PHQ-9 score today: (PHQ-9 Total Score: 19), additional evaluation and assessment performed, follow-up plan includes but not limited to: Medication management and Referral to /Specialist  for evaluation and management. Overall, patient is doing much better despite her continued concerns. Most of her symptoms are known to be long COVID symptoms and this could very likely be what she is experiencing in addition to just a prolonged recovery from her hospitalization. Educational materials and/or home exercises printed for patient's review and were included in patient instructions on his/her After Visit Summary and given to patient at the end of visit. Counseled regarding above diagnosis, including possible risks and complications, especially if left uncontrolled. Counseled regarding the possible side effects, risks, benefits and alternatives to treatment; patient and/or guardian verbalizes understanding, agrees, feels comfortable with and wishes to proceed with above treatment plan. Advised patient to call [facility] new medication issues, and read all Rx info from pharmacy to assure aware of all possible risks and side effects of any medication before taking. Patient verbalizes understanding and agrees with above counseling, assessment and plan. All questions answered.     Ivana Keys,

## 2022-04-14 DIAGNOSIS — F51.05 INSOMNIA DUE TO MENTAL CONDITION: ICD-10-CM

## 2022-04-14 DIAGNOSIS — I10 ESSENTIAL HYPERTENSION: Chronic | ICD-10-CM

## 2022-04-14 DIAGNOSIS — F33.1 MODERATE EPISODE OF RECURRENT MAJOR DEPRESSIVE DISORDER (HCC): ICD-10-CM

## 2022-04-15 RX ORDER — AMLODIPINE BESYLATE 5 MG/1
5 TABLET ORAL DAILY
Qty: 30 TABLET | Refills: 3 | Status: SHIPPED
Start: 2022-04-15 | End: 2022-08-09

## 2022-04-15 RX ORDER — TORSEMIDE 20 MG/1
40 TABLET ORAL DAILY
Qty: 60 TABLET | Refills: 3 | Status: SHIPPED
Start: 2022-04-15 | End: 2022-08-09

## 2022-04-15 RX ORDER — METOPROLOL TARTRATE 50 MG/1
TABLET, FILM COATED ORAL
Qty: 60 TABLET | Refills: 3 | Status: SHIPPED
Start: 2022-04-15 | End: 2022-08-09

## 2022-04-15 RX ORDER — DULOXETIN HYDROCHLORIDE 60 MG/1
60 CAPSULE, DELAYED RELEASE ORAL DAILY
Qty: 30 CAPSULE | Refills: 3 | Status: SHIPPED
Start: 2022-04-15 | End: 2022-08-09

## 2022-04-15 RX ORDER — GLIPIZIDE 5 MG/1
TABLET, FILM COATED, EXTENDED RELEASE ORAL
Qty: 120 TABLET | Refills: 3 | Status: SHIPPED
Start: 2022-04-15 | End: 2022-08-09

## 2022-04-15 RX ORDER — TRAZODONE HYDROCHLORIDE 50 MG/1
50 TABLET ORAL NIGHTLY
Qty: 30 TABLET | Refills: 3 | Status: SHIPPED
Start: 2022-04-15 | End: 2022-08-09

## 2022-04-15 NOTE — TELEPHONE ENCOUNTER
Last Appointment:  4/4/2022  Future Appointments   Date Time Provider Abhishek Singh   6/3/2022 11:00 AM Nicolette Cobb  [address]

## 2022-05-19 DIAGNOSIS — I10 ESSENTIAL HYPERTENSION: Chronic | ICD-10-CM

## 2022-05-19 RX ORDER — ATORVASTATIN CALCIUM 40 MG/1
40 TABLET, FILM COATED ORAL DAILY
Qty: 30 TABLET | Refills: 5 | Status: SHIPPED
Start: 2022-05-19 | End: 2022-11-02

## 2022-05-19 NOTE — TELEPHONE ENCOUNTER
Last Appointment:  4/4/2022  Future Appointments   Date Time Provider Abhishek Singh   6/3/2022 11:00 AM Staci Reilly  [address]

## 2022-06-20 NOTE — TELEPHONE ENCOUNTER
Last Appointment:  4/4/2022  Future Appointments   Date Time Provider Abhishek Singh   6/28/2022 10:30 AM Patrick Henson  [address]

## 2022-06-21 RX ORDER — CALCITRIOL 0.25 UG/1
CAPSULE, LIQUID FILLED ORAL
Qty: 60 CAPSULE | Refills: 5 | Status: SHIPPED | OUTPATIENT
Start: 2022-06-21

## 2022-07-05 ENCOUNTER — TELEPHONE (OUTPATIENT)
Dept: SURGERY | Age: 67
End: 2022-07-05

## 2022-07-05 NOTE — TELEPHONE ENCOUNTER
----- Message from Heriberto Green MD sent at 9/3/2019  3:18 PM EDT -----  Regarding: Follow Up Colonoscopy  Patient had 3 tubular adenomas removed during colonoscopy 8/29/2019 so needs repeat colonoscopy in 3 years.   Tx    Electronically signed by Heriberto Green MD on 9/3/19 at 3:19 PM

## 2022-07-05 NOTE — TELEPHONE ENCOUNTER
MA made first attempt to contact patient to schedule appointment based on reminder for 3 year repeat colonoscopy consult w/ Dr Vince Neri. Patient did not answer so MA left  requesting return call to schedule.      Electronically signed by Renea Gregorio on 7/5/22 at 1:00 PM EDT

## 2022-08-09 DIAGNOSIS — I10 ESSENTIAL HYPERTENSION: Chronic | ICD-10-CM

## 2022-08-09 DIAGNOSIS — F51.05 INSOMNIA DUE TO MENTAL CONDITION: ICD-10-CM

## 2022-08-09 DIAGNOSIS — F33.1 MODERATE EPISODE OF RECURRENT MAJOR DEPRESSIVE DISORDER (HCC): ICD-10-CM

## 2022-08-09 RX ORDER — GLIPIZIDE 10 MG/1
10 TABLET, FILM COATED, EXTENDED RELEASE ORAL 2 TIMES DAILY
Qty: 60 TABLET | Refills: 5 | Status: SHIPPED
Start: 2022-08-09 | End: 2022-09-14 | Stop reason: SDUPTHER

## 2022-08-09 RX ORDER — METOPROLOL TARTRATE 50 MG/1
TABLET, FILM COATED ORAL
Qty: 60 TABLET | Refills: 5 | Status: SHIPPED | OUTPATIENT
Start: 2022-08-09

## 2022-08-09 RX ORDER — TORSEMIDE 20 MG/1
40 TABLET ORAL DAILY
Qty: 60 TABLET | Refills: 5 | Status: SHIPPED | OUTPATIENT
Start: 2022-08-09

## 2022-08-09 RX ORDER — AMLODIPINE BESYLATE 5 MG/1
5 TABLET ORAL DAILY
Qty: 30 TABLET | Refills: 5 | Status: SHIPPED | OUTPATIENT
Start: 2022-08-09

## 2022-08-09 RX ORDER — TRAZODONE HYDROCHLORIDE 50 MG/1
50 TABLET ORAL NIGHTLY
Qty: 30 TABLET | Refills: 5 | Status: SHIPPED | OUTPATIENT
Start: 2022-08-09 | End: 2022-09-14

## 2022-08-09 RX ORDER — DULOXETIN HYDROCHLORIDE 60 MG/1
60 CAPSULE, DELAYED RELEASE ORAL DAILY
Qty: 30 CAPSULE | Refills: 5 | Status: SHIPPED | OUTPATIENT
Start: 2022-08-09

## 2022-08-12 RX ORDER — APIXABAN 5 MG/1
TABLET, FILM COATED ORAL
Qty: 60 TABLET | Refills: 0 | OUTPATIENT
Start: 2022-08-12

## 2022-08-18 RX ORDER — APIXABAN 5 MG/1
TABLET, FILM COATED ORAL
Qty: 60 TABLET | Refills: 0 | Status: SHIPPED
Start: 2022-08-18 | End: 2022-09-14 | Stop reason: SDUPTHER

## 2022-09-07 DIAGNOSIS — F41.1 GENERALIZED ANXIETY DISORDER: ICD-10-CM

## 2022-09-07 RX ORDER — BUSPIRONE HYDROCHLORIDE 5 MG/1
2.5 TABLET ORAL 2 TIMES DAILY
Qty: 30 TABLET | Refills: 0 | Status: SHIPPED
Start: 2022-09-07 | End: 2022-10-10

## 2022-09-07 NOTE — TELEPHONE ENCOUNTER
Last Appointment:  4/4/2022  Future Appointments   Date Time Provider Abhishek Singh   9/14/2022  3:00 PM Darius Bolanos  [address]

## 2022-09-12 RX ORDER — PROPAFENONE HYDROCHLORIDE 325 MG/1
325 CAPSULE, EXTENDED RELEASE ORAL 2 TIMES DAILY
Qty: 60 CAPSULE | Refills: 3 | Status: SHIPPED | OUTPATIENT
Start: 2022-09-12

## 2022-09-12 RX ORDER — APIXABAN 5 MG/1
TABLET, FILM COATED ORAL
Qty: 60 TABLET | Refills: 0 | OUTPATIENT
Start: 2022-09-12

## 2022-09-14 ENCOUNTER — OFFICE VISIT (OUTPATIENT)
Dept: FAMILY MEDICINE CLINIC | Age: 67
End: 2022-09-14
Payer: MEDICARE

## 2022-09-14 VITALS
SYSTOLIC BLOOD PRESSURE: 98 MMHG | DIASTOLIC BLOOD PRESSURE: 49 MMHG | HEART RATE: 72 BPM | HEIGHT: 67 IN | BODY MASS INDEX: 39.24 KG/M2 | TEMPERATURE: 97.5 F | RESPIRATION RATE: 14 BRPM | OXYGEN SATURATION: 95 % | WEIGHT: 250 LBS

## 2022-09-14 DIAGNOSIS — F41.1 GENERALIZED ANXIETY DISORDER: ICD-10-CM

## 2022-09-14 DIAGNOSIS — E11.22 TYPE 2 DIABETES MELLITUS WITH STAGE 5 CHRONIC KIDNEY DISEASE NOT ON CHRONIC DIALYSIS, WITHOUT LONG-TERM CURRENT USE OF INSULIN (HCC): Primary | ICD-10-CM

## 2022-09-14 DIAGNOSIS — G25.81 RESTLESS LEG SYNDROME: ICD-10-CM

## 2022-09-14 DIAGNOSIS — F33.1 MODERATE EPISODE OF RECURRENT MAJOR DEPRESSIVE DISORDER (HCC): ICD-10-CM

## 2022-09-14 DIAGNOSIS — Z13.31 POSITIVE DEPRESSION SCREENING: ICD-10-CM

## 2022-09-14 DIAGNOSIS — Z91.81 AT HIGH RISK FOR FALLS: ICD-10-CM

## 2022-09-14 DIAGNOSIS — J44.9 CHRONIC OBSTRUCTIVE PULMONARY DISEASE, UNSPECIFIED COPD TYPE (HCC): ICD-10-CM

## 2022-09-14 DIAGNOSIS — N18.5 CKD (CHRONIC KIDNEY DISEASE) STAGE 5, GFR LESS THAN 15 ML/MIN (HCC): Chronic | ICD-10-CM

## 2022-09-14 DIAGNOSIS — I48.91 ATRIAL FIBRILLATION, UNSPECIFIED TYPE (HCC): ICD-10-CM

## 2022-09-14 DIAGNOSIS — N18.5 TYPE 2 DIABETES MELLITUS WITH STAGE 5 CHRONIC KIDNEY DISEASE NOT ON CHRONIC DIALYSIS, WITHOUT LONG-TERM CURRENT USE OF INSULIN (HCC): Primary | ICD-10-CM

## 2022-09-14 DIAGNOSIS — F51.05 INSOMNIA DUE TO MENTAL CONDITION: ICD-10-CM

## 2022-09-14 PROBLEM — N18.6 ENCOUNTER REGARDING VASCULAR ACCESS FOR DIALYSIS FOR END-STAGE RENAL DISEASE (HCC): Status: RESOLVED | Noted: 2021-04-08 | Resolved: 2022-09-14

## 2022-09-14 PROBLEM — Z99.2 ENCOUNTER REGARDING VASCULAR ACCESS FOR DIALYSIS FOR END-STAGE RENAL DISEASE (HCC): Status: RESOLVED | Noted: 2021-04-08 | Resolved: 2022-09-14

## 2022-09-14 LAB — HBA1C MFR BLD: 5.1 %

## 2022-09-14 PROCEDURE — 3023F SPIROM DOC REV: CPT | Performed by: INTERNAL MEDICINE

## 2022-09-14 PROCEDURE — 1123F ACP DISCUSS/DSCN MKR DOCD: CPT | Performed by: INTERNAL MEDICINE

## 2022-09-14 PROCEDURE — G8400 PT W/DXA NO RESULTS DOC: HCPCS | Performed by: INTERNAL MEDICINE

## 2022-09-14 PROCEDURE — G8417 CALC BMI ABV UP PARAM F/U: HCPCS | Performed by: INTERNAL MEDICINE

## 2022-09-14 PROCEDURE — 83036 HEMOGLOBIN GLYCOSYLATED A1C: CPT | Performed by: INTERNAL MEDICINE

## 2022-09-14 PROCEDURE — 99215 OFFICE O/P EST HI 40 MIN: CPT | Performed by: INTERNAL MEDICINE

## 2022-09-14 PROCEDURE — 3044F HG A1C LEVEL LT 7.0%: CPT | Performed by: INTERNAL MEDICINE

## 2022-09-14 PROCEDURE — 1036F TOBACCO NON-USER: CPT | Performed by: INTERNAL MEDICINE

## 2022-09-14 PROCEDURE — 3017F COLORECTAL CA SCREEN DOC REV: CPT | Performed by: INTERNAL MEDICINE

## 2022-09-14 PROCEDURE — 1090F PRES/ABSN URINE INCON ASSESS: CPT | Performed by: INTERNAL MEDICINE

## 2022-09-14 PROCEDURE — 2022F DILAT RTA XM EVC RTNOPTHY: CPT | Performed by: INTERNAL MEDICINE

## 2022-09-14 PROCEDURE — G8427 DOCREV CUR MEDS BY ELIG CLIN: HCPCS | Performed by: INTERNAL MEDICINE

## 2022-09-14 RX ORDER — GLIPIZIDE 5 MG/1
5 TABLET, FILM COATED, EXTENDED RELEASE ORAL DAILY
Qty: 90 TABLET | Refills: 1 | Status: SHIPPED | OUTPATIENT
Start: 2022-09-14 | End: 2022-12-13

## 2022-09-14 RX ORDER — SEVELAMER CARBONATE 800 MG/1
TABLET, FILM COATED ORAL
COMMUNITY
Start: 2022-09-12

## 2022-09-14 RX ORDER — MIRTAZAPINE 7.5 MG/1
7.5 TABLET, FILM COATED ORAL NIGHTLY
Qty: 30 TABLET | Refills: 5 | Status: SHIPPED | OUTPATIENT
Start: 2022-09-14 | End: 2022-10-14

## 2022-09-14 SDOH — ECONOMIC STABILITY: FOOD INSECURITY: WITHIN THE PAST 12 MONTHS, YOU WORRIED THAT YOUR FOOD WOULD RUN OUT BEFORE YOU GOT MONEY TO BUY MORE.: NEVER TRUE

## 2022-09-14 SDOH — ECONOMIC STABILITY: FOOD INSECURITY: WITHIN THE PAST 12 MONTHS, THE FOOD YOU BOUGHT JUST DIDN'T LAST AND YOU DIDN'T HAVE MONEY TO GET MORE.: NEVER TRUE

## 2022-09-14 ASSESSMENT — ANXIETY QUESTIONNAIRES
3. WORRYING TOO MUCH ABOUT DIFFERENT THINGS: 3
2. NOT BEING ABLE TO STOP OR CONTROL WORRYING: 3
5. BEING SO RESTLESS THAT IT IS HARD TO SIT STILL: 3
4. TROUBLE RELAXING: 3
7. FEELING AFRAID AS IF SOMETHING AWFUL MIGHT HAPPEN: 3
1. FEELING NERVOUS, ANXIOUS, OR ON EDGE: 3
6. BECOMING EASILY ANNOYED OR IRRITABLE: 2
GAD7 TOTAL SCORE: 20
IF YOU CHECKED OFF ANY PROBLEMS ON THIS QUESTIONNAIRE, HOW DIFFICULT HAVE THESE PROBLEMS MADE IT FOR YOU TO DO YOUR WORK, TAKE CARE OF THINGS AT HOME, OR GET ALONG WITH OTHER PEOPLE: EXTREMELY DIFFICULT

## 2022-09-14 ASSESSMENT — PATIENT HEALTH QUESTIONNAIRE - PHQ9
SUM OF ALL RESPONSES TO PHQ QUESTIONS 1-9: 24
SUM OF ALL RESPONSES TO PHQ9 QUESTIONS 1 & 2: 6
8. MOVING OR SPEAKING SO SLOWLY THAT OTHER PEOPLE COULD HAVE NOTICED. OR THE OPPOSITE, BEING SO FIGETY OR RESTLESS THAT YOU HAVE BEEN MOVING AROUND A LOT MORE THAN USUAL: 3
SUM OF ALL RESPONSES TO PHQ QUESTIONS 1-9: 24
7. TROUBLE CONCENTRATING ON THINGS, SUCH AS READING THE NEWSPAPER OR WATCHING TELEVISION: 3
SUM OF ALL RESPONSES TO PHQ QUESTIONS 1-9: 24
1. LITTLE INTEREST OR PLEASURE IN DOING THINGS: 3
9. THOUGHTS THAT YOU WOULD BE BETTER OFF DEAD, OR OF HURTING YOURSELF: 0
5. POOR APPETITE OR OVEREATING: 3
4. FEELING TIRED OR HAVING LITTLE ENERGY: 3
SUM OF ALL RESPONSES TO PHQ QUESTIONS 1-9: 24
3. TROUBLE FALLING OR STAYING ASLEEP: 3
10. IF YOU CHECKED OFF ANY PROBLEMS, HOW DIFFICULT HAVE THESE PROBLEMS MADE IT FOR YOU TO DO YOUR WORK, TAKE CARE OF THINGS AT HOME, OR GET ALONG WITH OTHER PEOPLE: 3
6. FEELING BAD ABOUT YOURSELF - OR THAT YOU ARE A FAILURE OR HAVE LET YOURSELF OR YOUR FAMILY DOWN: 3
2. FEELING DOWN, DEPRESSED OR HOPELESS: 3

## 2022-09-14 ASSESSMENT — COLUMBIA-SUICIDE SEVERITY RATING SCALE - C-SSRS
2. HAVE YOU ACTUALLY HAD ANY THOUGHTS OF KILLING YOURSELF?: NO
1. WITHIN THE PAST MONTH, HAVE YOU WISHED YOU WERE DEAD OR WISHED YOU COULD GO TO SLEEP AND NOT WAKE UP?: YES
6. HAVE YOU EVER DONE ANYTHING, STARTED TO DO ANYTHING, OR PREPARED TO DO ANYTHING TO END YOUR LIFE?: NO

## 2022-09-14 ASSESSMENT — SOCIAL DETERMINANTS OF HEALTH (SDOH): HOW HARD IS IT FOR YOU TO PAY FOR THE VERY BASICS LIKE FOOD, HOUSING, MEDICAL CARE, AND HEATING?: NOT HARD AT ALL

## 2022-09-14 NOTE — PROGRESS NOTES
[facility] PRIMARY CARE  [address] [place] [address]  [address] [address]  Dept: [phone]  Dept Fax: [phone]     NAME: Ulises Quevedo        :  1955        MRN:  [id number]    Chief Complaint   Patient presents with    Diabetes     Follow up    Knee Injury     Lt knee redness with scabbing  / hit corner of a brick when she fell x 3 weeks ago     Anxiety    Hallucinations     X 3 mos \"people keep coming in and out of the house\"        Subjective     History of Present Illness  Ulises Quevedo is a 78 y.o. female who presents today for routine follow up and medication refill. Patient states that she was having tremors and was started on Mirapex. She reports that the NP at her dialysis center wrote her initial Rx and told her it would need to be continued by her PCP. Mirapex can cause hallucinations and patient clearly describes hallucinations she has been having. She is not particularly bothered by the hallucination and states she is aware they are not real. She denies any thoughts of harming herself or others. She also has a paper Rx for calcitrol written out for her by the dialysis NP. She has not been able to get it filled as she does not ever go to the pharmacy, she gets mail order. Will try faxing Rx for her today. Review of Systems  Please see HPI above. All bolded are positive.   Gen: fever, chills, fatigue, weakness, diaphoresis, unintentional weight change  Head: headache, vision change, hearing loss  Chest: chest pain/heaviness, palpitations  Lungs: shortness of breath, wheezing, coughing, hemoptysis  Abdomen: abdominal pain, nausea, vomiting, diarrhea, constipation, melena, hematochezia, hematemesis, loss of appetite  Extremities: lower extremity edema, myalgias, arthralgias  Urinary: dysuria, hematuria, weak flow, increase in frequency  Neurologic: lightheadedness, dizziness, confusion, syncope  Endocrine: polydipsia, polyuria, heat or cold intolerance  Psychiatric: depression, suicidal ideation, anxiety, hallucinations  Derm: Rashes, ulcers, burns    Past Medical Hx:  Past Medical History:   Diagnosis Date    Atrial fibrillation ([address]     Bladder cancer ([address]     Cancer ([address]     CKD (chronic kidney disease)     CKD (chronic kidney disease) stage 5, GFR less than 15 ml/min ([address] 11/6/2018    Diabetes ([address]     Hypertension     Obesity     260 #    Sleep apnea        Past Surgical Hx:  Past Surgical History:   Procedure Laterality Date    CARDIAC CATHETERIZATION Left 10/02/2008    @ [facility]    CHOLECYSTECTOMY      COLONOSCOPY      COLONOSCOPY N/A 8/29/2019    mid ascending colon polyp bx/cauterization (inflammatory polyp), proximal transverse colon polyp bx/cauterized (tubular adenoma), descending colon polyp 80 cm from anus bx/cauterized (hyperplastic polyp), sigmoid colon polyp 35 cm from anus bx/cauterized (tubular adenoma), sigmoid colon polyps 30 cm from anus bx/cauterized (tubular adenoma), Dr. Vielka Price, [facility]    COLONOSCOPY  8/29/2019    mid ascending colon polyp bx/cauterization (inflammatory polyp), proximal transverse colon polyp bx/cauterized (tubular adenoma), descending colon polyp 80 cm from anus bx/cauterized (hyperplastic polyp), sigmoid colon polyp 35 cm from anus bx/cauterized (tubular adenoma), sigmoid colon polyps 30 cm from anus bx/cauterized (tubular adenoma), Dr. Vielka Price, [facility] FISTULA CREATION Left 1/22/2019    LIGATIION LEFT LEFT UPPER EXTREMITY OF AV FISTULA , EVACUATION HEMATOMA performed by Lavinia Weems MD at [address] [place] [address] [phone] Right 12/7/2020    AV FISTULA CREATION -- RIGHT ARM performed by Gerber Metz MD at [address] [place] [address] [phone] Right 4/8/2021    AV FISTULA  REVISION RIGHT ARM performed by Gerber Metz MD at [facility] Left 9/18/2018    AV FISTULA  LEFT ARM performed by Lavinia Weems MD at [address] [place] [address] Left 11/14/2018 SUPERFICIALIZATION AV FISTULA  LEFT ARM , LIGATION TRIBUTORY AV FISTULA LEFT ARM performed by Bettina Bennett MD at [address] [facility]         Family Hx:  Family History   Problem Relation Age of Onset    Cancer Mother         bladder    Diabetes Mother     COPD Mother     Depression Father     Diabetes Father     Heart Disease Father     High Blood Pressure Father     Stroke Father     Prostate Cancer Father     Dementia Father     Alcohol Abuse Brother     Diabetes Brother     Bipolar Disorder Paternal Uncle     Anxiety Disorder Daughter     Colon Cancer Maternal Grandfather        Social Hx:  Social History     Tobacco Use    Smoking status: Former     Packs/day: 0.50     Years: 43.00     Pack years: 21.50     Types: Cigarettes     Quit date: 2021     Years since quittin.5    Smokeless tobacco: Never   Substance Use Topics    Alcohol use: Not Currently     Comment: 1 cup of coffee a day        Home Medications:  Current Outpatient Medications   Medication Sig Dispense Refill    apixaban (ELIQUIS) 5 MG TABS tablet Take 1 tablet by mouth 2 times daily 90 tablet 2    glipiZIDE (GLUCOTROL XL) 5 MG extended release tablet Take 1 tablet by mouth daily 90 tablet 1    mirtazapine (REMERON) 7.5 MG tablet Take 1 tablet by mouth nightly 30 tablet 5    busPIRone (BUSPAR) 5 MG tablet Take 0.5 tablets by mouth 2 times daily 30 tablet 0    DULoxetine (CYMBALTA) 60 MG extended release capsule TAKE 1 CAPSULE BY MOUTH DAILY 30 capsule 5    metoprolol tartrate (LOPRESSOR) 50 MG tablet TAKE 1 TABLET BY MOUTH TWICE A DAY 60 tablet 5    amLODIPine (NORVASC) 5 MG tablet TAKE 1 TABLET BY MOUTH DAILY 30 tablet 5    traZODone (DESYREL) 50 MG tablet Take 1 tablet by mouth nightly 30 tablet 5    calcitRIOL (ROCALTROL) 0.25 MCG capsule TAKE 1 CAPSULE BY MOUTH DAILY MONDAY THROUGH FRIDAY 60 capsule 5    atorvastatin (LIPITOR) 40 MG tablet Take 1 tablet by mouth daily 30 tablet 5    sevelamer (RENVELA) 800 MG tablet       propafenone (RYTHMOL SR) 325 MG extended release capsule Take 1 capsule by mouth 2 times daily 60 capsule 3    torsemide (DEMADEX) 20 MG tablet Take 2 tablets by mouth daily 60 tablet 5    sodium bicarbonate 650 MG tablet Take 650 mg by mouth 2 times daily  (Patient not taking: No sig reported)       No current facility-administered medications for this visit. Allergies: Allergies   Allergen Reactions    Adhesive Tape Rash    Penicillins Rash       Objective     Vitals:    09/14/22 1452   BP: (!) 98/49   Pulse: 72   Resp: 14   Temp: 97.5 °F (36.4 °C)   TempSrc: Temporal   SpO2: 95%   Weight: 250 lb (113.4 kg)   Height: 5' 7\" (1.702 m)        Physical Exam  General: Awake, alert, and oriented to person, place, time, and purpose, appears stated age and cooperative, No acute distress  Head: Normocephalic, atraumatic  Eyes: conjunctivae/corneas clear, EOMI  Mouth: Mucous membranes moist with no pharyngeal exudate or erythema  Neck: no JVD, no adenopathy, no carotid bruit, supple, symmetrical, trachea midline  Back: symmetric, ROM normal, No CVA tenderness. Lungs: clear to auscultation bilaterally without wheezes, rales, or rhonchi  Heart: regular rate and rhythm, S1, S2 normal, no murmur, click, rub or gallop  Abdomen: soft, non-tender; bowel sounds normal; no masses,  no organomegaly  Extremities: atraumatic, no cyanosis, no edema  Skin: color, texture, turgor within normal limits.  No rashes or lesions   Neurologic:speech appropriate, moves all 4 extremities, normal muscle strength and tone, CN 2-12 grossly intact    Labs:  Lab Results   Component Value Date    WBC 36.1 (H) 10/30/2021    HGB 8.8 (L) 10/30/2021    HCT 26.8 (L) 10/30/2021     10/30/2021     10/30/2021    K 4.8 10/30/2021    CL 90 (L) 10/30/2021    CREATININE 8.0 (H) 10/30/2021     (HH) 10/30/2021    CO2 22 10/30/2021    GLUCOSE 176 (H) 10/30/2021    ALT 49 (H) 10/30/2021    AST 25 10/30/2021    INR 1.5 10/20/2021 Lab Results   Component Value Date    TSH 0.804 01/31/2019     Lab Results   Component Value Date    TRIG 211 (H) 11/06/2018     Lab Results   Component Value Date    HDL 37 11/06/2018     Lab Results   Component Value Date    LDLCALC 93 11/06/2018     Lab Results   Component Value Date    LABA1C 5.1 09/14/2022     Lab Results   Component Value Date    INR 1.5 10/20/2021    INR 1.0 04/08/2021    INR 1.0 12/07/2020    PROTIME 16.8 (H) 10/20/2021    PROTIME 11.3 04/08/2021    PROTIME 11.4 12/07/2020      *All recent labs were reviewed. Please see electronic chart for a more comprehensive set of labs    Radiology:  No results found. Assessment and Plan     Patient is a 78 y.o. female who presented to the office for follow up. Full problem list is as follows:  Patient Active Problem List   Diagnosis    Moderate episode of recurrent major depressive disorder (HCC)    Generalized anxiety disorder    Insomnia due to mental condition    Anemia    Essential hypertension    CKD (chronic kidney disease) stage 5, GFR less than 15 ml/min (HCC)    Atrial fibrillation ([address]    Pure hypercholesterolemia    Morbid obesity ([address]    Chronic obstructive pulmonary disease (HCC)    Obstructive sleep apnea    Exertional dyspnea    History of colon polyps    Family history of rectal cancer    COVID-19    Pneumonia due to COVID-19 virus       Layo Arreola was seen today for diabetes, knee injury, anxiety and hallucinations. Diagnoses and all orders for this visit:    Type 2 diabetes mellitus with stage 5 chronic kidney disease not on chronic dialysis, without long-term current use of insulin ([facility])  -     POCT glycosylated hemoglobin (Hb A1C)  -     glipiZIDE (GLUCOTROL XL) 5 MG extended release tablet; Take 1 tablet by mouth daily    Atrial fibrillation, unspecified type (HCC)  -     apixaban (ELIQUIS) 5 MG TABS tablet; Take 1 tablet by mouth 2 times daily    Restless leg syndrome  -     mirtazapine (REMERON) 7.5 MG tablet;  Take 1 tablet by mouth nightly    At high risk for falls    Chronic obstructive pulmonary disease, unspecified COPD type ([facility]    CKD (chronic kidney disease) stage 5, GFR less than 15 ml/min ([facility])    Moderate episode of recurrent major depressive disorder ([facility]  -     External Referral To Counseling Services    Generalized anxiety disorder  -     External Referral To Counseling Services    Insomnia due to mental condition    Positive depression screening    PHQ-9 score today: (PHQ-9 Total Score: 24), additional evaluation and assessment performed, follow-up plan includes but not limited to: Medication management and Referral to /Specialist  for evaluation and management. On the basis of positive falls risk screening, assessment and plan is as follows: patient declines any further evaluation/treatment for increased falls risk. Mirapex discontinued due to side effects of visual hallucinations. Patient was strongly recommended to find a counselor and psychiatrist to speak with. On this date, 9/14/22 I have spent 48 minutes reviewing previous notes, test results and face to face with the patient discussing the diagnosis and importance of compliance with the treatment plan as well as documenting on the day of the visit. Educational materials and/or home exercises printed for patient's review and were included in patient instructions on his/her After Visit Summary and given to patient at the end of visit. Counseled regarding above diagnosis, including possible risks and complications, especially if left uncontrolled. Counseled regarding the possible side effects, risks, benefits and alternatives to treatment; patient and/or guardian verbalizes understanding, agrees, feels comfortable with and wishes to proceed with above treatment plan.      Advised patient to call [facility] new medication issues, and read all Rx info from pharmacy to assure aware of all possible risks and side effects of any medication before taking. Patient verbalizes understanding and agrees with above counseling, assessment and plan. All questions answered.     Jose Enrique Castro, DO

## 2022-09-15 ENCOUNTER — TELEPHONE (OUTPATIENT)
Dept: FAMILY MEDICINE CLINIC | Age: 67
End: 2022-09-15

## 2022-09-15 RX ORDER — APIXABAN 5 MG/1
TABLET, FILM COATED ORAL
Qty: 60 TABLET | Refills: 0 | OUTPATIENT
Start: 2022-09-15

## 2022-09-15 NOTE — TELEPHONE ENCOUNTER
----- Message from Vilma Portillo sent at 9/15/2022  4:27 PM EDT -----  Subject: Message to Provider    QUESTIONS  Information for Provider? pt needs her paper work to be sent the the   [address] [place] [address] Rd. She needs an update on this paperwork  ---------------------------------------------------------------------------  --------------  [employer]  [phone]; OK to leave message on voicemail  ---------------------------------------------------------------------------  --------------  SCRIPT ANSWERS  Relationship to Patient?  Self

## 2022-10-08 DIAGNOSIS — F41.1 GENERALIZED ANXIETY DISORDER: ICD-10-CM

## 2022-10-10 RX ORDER — BUSPIRONE HYDROCHLORIDE 5 MG/1
2.5 TABLET ORAL 2 TIMES DAILY
Qty: 30 TABLET | Refills: 0 | Status: SHIPPED
Start: 2022-10-10 | End: 2022-11-02

## 2022-10-10 NOTE — TELEPHONE ENCOUNTER
Last Appointment:  9/14/2022  Future Appointments   Date Time Provider Abhishek Singh   11/14/2022  2:15 PM Sanjana Sandhu, [address] [facility]

## 2022-10-19 ENCOUNTER — TELEPHONE (OUTPATIENT)
Dept: FAMILY MEDICINE CLINIC | Age: 67
End: 2022-10-19

## 2022-10-19 DIAGNOSIS — G25.81 RESTLESS LEG SYNDROME: ICD-10-CM

## 2022-10-19 RX ORDER — PRAMIPEXOLE DIHYDROCHLORIDE 0.12 MG/1
0.12 TABLET ORAL 3 TIMES DAILY
Qty: 90 TABLET | Refills: 0 | Status: SHIPPED | OUTPATIENT
Start: 2022-10-19

## 2022-10-19 NOTE — TELEPHONE ENCOUNTER
Voice mail from patient. Her restless legs are getting bad again. Her mirapex was d/c'd at her last appointment. She said the hallucinations stopped before she stopped the mirapex. She stated she needs something for her legs because she has to sit in the dialysis chair for 3 hours. Please advise.

## 2022-11-01 DIAGNOSIS — I10 ESSENTIAL HYPERTENSION: Chronic | ICD-10-CM

## 2022-11-01 DIAGNOSIS — F41.1 GENERALIZED ANXIETY DISORDER: ICD-10-CM

## 2022-11-01 NOTE — TELEPHONE ENCOUNTER
Last Appointment:  9/14/2022  Future Appointments   Date Time Provider Abhishek Singh   11/14/2022  2:15 PM Brandy Childers, [address] [facility]

## 2022-11-02 RX ORDER — BUSPIRONE HYDROCHLORIDE 5 MG/1
2.5 TABLET ORAL 2 TIMES DAILY
Qty: 30 TABLET | Refills: 5 | Status: SHIPPED | OUTPATIENT
Start: 2022-11-02 | End: 2023-05-01

## 2022-11-02 RX ORDER — ATORVASTATIN CALCIUM 40 MG/1
40 TABLET, FILM COATED ORAL DAILY
Qty: 30 TABLET | Refills: 5 | Status: SHIPPED | OUTPATIENT
Start: 2022-11-02

## 2022-11-14 ENCOUNTER — OFFICE VISIT (OUTPATIENT)
Dept: FAMILY MEDICINE CLINIC | Age: 67
End: 2022-11-14
Payer: MEDICARE

## 2022-11-14 VITALS
HEART RATE: 75 BPM | DIASTOLIC BLOOD PRESSURE: 69 MMHG | SYSTOLIC BLOOD PRESSURE: 100 MMHG | HEIGHT: 67 IN | WEIGHT: 249 LBS | TEMPERATURE: 98.4 F | OXYGEN SATURATION: 94 % | BODY MASS INDEX: 39.08 KG/M2

## 2022-11-14 DIAGNOSIS — G25.81 RESTLESS LEG SYNDROME: ICD-10-CM

## 2022-11-14 DIAGNOSIS — N18.5 CKD (CHRONIC KIDNEY DISEASE) STAGE 5, GFR LESS THAN 15 ML/MIN (HCC): Primary | Chronic | ICD-10-CM

## 2022-11-14 DIAGNOSIS — R11.0 NAUSEA: ICD-10-CM

## 2022-11-14 DIAGNOSIS — I48.0 PAROXYSMAL ATRIAL FIBRILLATION (HCC): Chronic | ICD-10-CM

## 2022-11-14 PROCEDURE — 3017F COLORECTAL CA SCREEN DOC REV: CPT | Performed by: INTERNAL MEDICINE

## 2022-11-14 PROCEDURE — 1123F ACP DISCUSS/DSCN MKR DOCD: CPT | Performed by: INTERNAL MEDICINE

## 2022-11-14 PROCEDURE — 3074F SYST BP LT 130 MM HG: CPT | Performed by: INTERNAL MEDICINE

## 2022-11-14 PROCEDURE — G8427 DOCREV CUR MEDS BY ELIG CLIN: HCPCS | Performed by: INTERNAL MEDICINE

## 2022-11-14 PROCEDURE — 3078F DIAST BP <80 MM HG: CPT | Performed by: INTERNAL MEDICINE

## 2022-11-14 PROCEDURE — 99214 OFFICE O/P EST MOD 30 MIN: CPT | Performed by: INTERNAL MEDICINE

## 2022-11-14 PROCEDURE — G8400 PT W/DXA NO RESULTS DOC: HCPCS | Performed by: INTERNAL MEDICINE

## 2022-11-14 PROCEDURE — 1090F PRES/ABSN URINE INCON ASSESS: CPT | Performed by: INTERNAL MEDICINE

## 2022-11-14 PROCEDURE — G8484 FLU IMMUNIZE NO ADMIN: HCPCS | Performed by: INTERNAL MEDICINE

## 2022-11-14 PROCEDURE — G8417 CALC BMI ABV UP PARAM F/U: HCPCS | Performed by: INTERNAL MEDICINE

## 2022-11-14 PROCEDURE — 1036F TOBACCO NON-USER: CPT | Performed by: INTERNAL MEDICINE

## 2022-11-14 RX ORDER — MELATONIN
COMMUNITY
Start: 2022-11-01 | End: 2022-11-14

## 2022-11-14 RX ORDER — PRAMIPEXOLE DIHYDROCHLORIDE 0.12 MG/1
0.12 TABLET ORAL 3 TIMES DAILY
Qty: 90 TABLET | Refills: 5 | Status: SHIPPED | OUTPATIENT
Start: 2022-11-14

## 2022-11-14 RX ORDER — CINACALCET 90 MG/1
TABLET, FILM COATED ORAL
COMMUNITY
Start: 2022-11-01

## 2022-11-14 RX ORDER — PRAMIPEXOLE DIHYDROCHLORIDE 0.12 MG/1
0.12 TABLET ORAL 3 TIMES DAILY
Qty: 90 TABLET | Refills: 0 | OUTPATIENT
Start: 2022-11-14

## 2022-11-14 RX ORDER — ONDANSETRON 4 MG/1
4 TABLET, FILM COATED ORAL EVERY 12 HOURS PRN
Qty: 60 TABLET | Refills: 3 | Status: SHIPPED | OUTPATIENT
Start: 2022-11-14

## 2022-11-14 NOTE — PROGRESS NOTES
[facility] PRIMARY CARE  [address] [address]  Dept: [phone]  Dept Fax: [phone]     NAME: Alison Gleason        :  1955        MRN:  [id number]    Chief Complaint   Patient presents with    Follow-up     She has been having nausea and/or dry heaving, but she is feeling ok today. She though it was from dialysis but now she is not for sure. Subjective     History of Present Illness  Alison Gleason is a 78 y.o. female who presents today for routine follow up and medication refill. Mirapex was discontinued on  due to hallucinations and she was encouraged to find a counselor to speak with. She called in requesting to restart the mirapex on 10/19 as the hallucinations had completed resolved and her restless leg was worsening. She has restarted the mirapex, reports that it is working well and she has no hallucinations. Review of Systems  Please see HPI above. All bolded are positive.   Gen: fever, chills, fatigue, weakness, diaphoresis, unintentional weight change  Head: headache, vision change, hearing loss  Chest: chest pain/heaviness, palpitations  Lungs: shortness of breath, wheezing, coughing, hemoptysis  Abdomen: abdominal pain, nausea, vomiting, diarrhea, constipation, melena, hematochezia, hematemesis, loss of appetite  Extremities: lower extremity edema, myalgias, arthralgias  Urinary: dysuria, hematuria, weak flow, increase in frequency  Neurologic: lightheadedness, dizziness, confusion, syncope  Endocrine: polydipsia, polyuria, heat or cold intolerance  Psychiatric: depression, suicidal ideation, anxiety  Derm: Rashes, ulcers, burns    Past Medical Hx:  Past Medical History:   Diagnosis Date    Atrial fibrillation (HCC)     Bladder cancer (HCC)     Cancer ([facility]     CKD (chronic kidney disease)     CKD (chronic kidney disease) stage 5, GFR less than 15 ml/min (HCC) 2018    Diabetes ([facility]     Hypertension Obesity     260 #    Sleep apnea        Past Surgical Hx:  Past Surgical History:   Procedure Laterality Date    CARDIAC CATHETERIZATION Left 10/02/2008    @ [facility]    CHOLECYSTECTOMY      COLONOSCOPY      COLONOSCOPY N/A 8/29/2019    mid ascending colon polyp bx/cauterization (inflammatory polyp), proximal transverse colon polyp bx/cauterized (tubular adenoma), descending colon polyp 80 cm from anus bx/cauterized (hyperplastic polyp), sigmoid colon polyp 35 cm from anus bx/cauterized (tubular adenoma), sigmoid colon polyps 30 cm from anus bx/cauterized (tubular adenoma), Dr. Angelina Leonard, [facility]    COLONOSCOPY  8/29/2019    mid ascending colon polyp bx/cauterization (inflammatory polyp), proximal transverse colon polyp bx/cauterized (tubular adenoma), descending colon polyp 80 cm from anus bx/cauterized (hyperplastic polyp), sigmoid colon polyp 35 cm from anus bx/cauterized (tubular adenoma), sigmoid colon polyps 30 cm from anus bx/cauterized (tubular adenoma), Dr. Angelina Leonard, [facility]    DIALYSIS FISTULA CREATION Left 1/22/2019    LIGATIION LEFT LEFT UPPER EXTREMITY OF AV FISTULA , EVACUATION HEMATOMA performed by Khushbu Portillo MD at [address] [place] [address] [address] Right 12/7/2020    AV FISTULA CREATION -- RIGHT ARM performed by Ousmane Ruiz MD at [address] [place] [address] [address] Right 4/8/2021    AV FISTULA  REVISION RIGHT ARM performed by Ousmane Ruiz MD at [address] [place] [address] Left 9/18/2018    AV FISTULA  LEFT ARM performed by Khushbu Portillo MD at [address] [place] [address] Left 11/14/2018    SUPERFICIALIZATION AV FISTULA  LEFT ARM , LIGATION TRIBUTORY AV FISTULA LEFT ARM performed by Khushbu Portillo MD at [address] [place] [address]         Family Hx:  Family History   Problem Relation Age of Onset    Cancer Mother         bladder    Diabetes Mother     COPD Mother     Depression Father     Diabetes Father     Heart Disease Father     High Blood Pressure Father Stroke Father     Prostate Cancer Father     Dementia Father     Alcohol Abuse Brother     Diabetes Brother     Bipolar Disorder Paternal Uncle     Anxiety Disorder Daughter     Colon Cancer Maternal Grandfather        Social Hx:  Social History     Tobacco Use    Smoking status: Former     Packs/day: 0.50     Years: 43.00     Pack years: 21.50     Types: Cigarettes     Quit date: 2021     Years since quittin.7    Smokeless tobacco: Never   Substance Use Topics    Alcohol use: Not Currently     Comment: 1 cup of coffee a day        Home Medications:  Current Outpatient Medications   Medication Sig Dispense Refill    cinacalcet (SENSIPAR) 90 MG tablet       ondansetron (ZOFRAN) 4 MG tablet Take 1 tablet by mouth every 12 hours as needed for Nausea or Vomiting 60 tablet 3    pramipexole (MIRAPEX) 0.125 MG tablet Take 1 tablet by mouth 3 times daily 90 tablet 5    atorvastatin (LIPITOR) 40 MG tablet Take 1 tablet by mouth daily 30 tablet 5    busPIRone (BUSPAR) 5 MG tablet Take 0.5 tablets by mouth 2 times daily 30 tablet 5    apixaban (ELIQUIS) 5 MG TABS tablet Take 1 tablet by mouth 2 times daily 90 tablet 2    glipiZIDE (GLUCOTROL XL) 5 MG extended release tablet Take 1 tablet by mouth daily 90 tablet 1    mirtazapine (REMERON) 7.5 MG tablet Take 1 tablet by mouth nightly 30 tablet 5    propafenone (RYTHMOL SR) 325 MG extended release capsule Take 1 capsule by mouth 2 times daily 60 capsule 3    DULoxetine (CYMBALTA) 60 MG extended release capsule TAKE 1 CAPSULE BY MOUTH DAILY 30 capsule 5    metoprolol tartrate (LOPRESSOR) 50 MG tablet TAKE 1 TABLET BY MOUTH TWICE A DAY 60 tablet 5    amLODIPine (NORVASC) 5 MG tablet TAKE 1 TABLET BY MOUTH DAILY 30 tablet 5    torsemide (DEMADEX) 20 MG tablet Take 2 tablets by mouth daily 60 tablet 5    traZODone (DESYREL) 50 MG tablet Take 1 tablet by mouth nightly 30 tablet 5    calcitRIOL (ROCALTROL) 0.25 MCG capsule TAKE 1 CAPSULE BY MOUTH DAILY MONDAY THROUGH FRIDAY 60 capsule 5     No current facility-administered medications for this visit. Allergies: Allergies   Allergen Reactions    Adhesive Tape Rash    Penicillins Rash       Objective     Vitals:    11/14/22 1422   BP: 100/69   Pulse: 75   Temp: 98.4 °F (36.9 °C)   TempSrc: Oral   SpO2: 94%   Weight: 249 lb (112.9 kg)   Height: 5' 7\" (1.702 m)        Physical Exam  General: Awake, alert, and oriented to person, place, time, and purpose, appears stated age and cooperative, No acute distress  Head: Normocephalic, atraumatic  Eyes: conjunctivae/corneas clear, EOMI  Mouth: Mucous membranes moist with no pharyngeal exudate or erythema  Neck: no JVD, no adenopathy, no carotid bruit, supple, symmetrical, trachea midline  Back: symmetric, ROM normal, No CVA tenderness. Lungs: clear to auscultation bilaterally without wheezes, rales, or rhonchi  Heart: regular rate and rhythm, S1, S2 normal, no murmur, click, rub or gallop  Abdomen: soft, non-tender; bowel sounds normal; no masses,  no organomegaly  Extremities: atraumatic, no cyanosis, no edema  Skin: color, texture, turgor within normal limits.  No rashes or lesions   Neurologic:speech appropriate, moves all 4 extremities, normal muscle strength and tone, CN 2-12 grossly intact    Labs:  Lab Results   Component Value Date    WBC 36.1 (H) 10/30/2021    HGB 8.8 (L) 10/30/2021    HCT 26.8 (L) 10/30/2021     10/30/2021     10/30/2021    K 4.8 10/30/2021    CL 90 (L) 10/30/2021    CREATININE 8.0 (H) 10/30/2021     (HH) 10/30/2021    CO2 22 10/30/2021    GLUCOSE 176 (H) 10/30/2021    ALT 49 (H) 10/30/2021    AST 25 10/30/2021    INR 1.5 10/20/2021     Lab Results   Component Value Date    TSH 0.804 01/31/2019     Lab Results   Component Value Date    TRIG 211 (H) 11/06/2018     Lab Results   Component Value Date    HDL 37 11/06/2018     Lab Results   Component Value Date    LDLCALC 93 11/06/2018     Lab Results   Component Value Date    LABA1C 5.1 09/14/2022     Lab Results   Component Value Date    INR 1.5 10/20/2021    INR 1.0 04/08/2021    INR 1.0 12/07/2020    PROTIME 16.8 (H) 10/20/2021    PROTIME 11.3 04/08/2021    PROTIME 11.4 12/07/2020      *All recent labs were reviewed. Please see electronic chart for a more comprehensive set of labs    Radiology:  No results found. Assessment and Plan     Patient is a 78 y.o. female who presented to the office for follow up. Full problem list is as follows:  Patient Active Problem List   Diagnosis    Moderate episode of recurrent major depressive disorder (HCC)    Generalized anxiety disorder    Insomnia due to mental condition    Anemia    Essential hypertension    CKD (chronic kidney disease) stage 5, GFR less than 15 ml/min (HCC)    Atrial fibrillation ([address]    Pure hypercholesterolemia    Morbid obesity ([address]    Chronic obstructive pulmonary disease ([address]    Obstructive sleep apnea    Exertional dyspnea    History of colon polyps    Family history of rectal cancer    COVID-19    Pneumonia due to COVID-19 virus       Bret Do was seen today for follow-up. Diagnoses and all orders for this visit:    CKD (chronic kidney disease) stage 5, GFR less than 15 ml/min (HCC)  - stable, on dialysis     Paroxysmal atrial fibrillation (HCC)  - stable, continue on propafenone per cardiology   - anticoagulated on eliquis     Nausea  -     ondansetron (ZOFRAN) 4 MG tablet; Take 1 tablet by mouth every 12 hours as needed for Nausea or Vomiting    Restless leg syndrome  -     pramipexole (MIRAPEX) 0.125 MG tablet; Take 1 tablet by mouth 3 times daily  - stable, continue medication as above        Educational materials and/or home exercises printed for patient's review and were included in patient instructions on his/her After Visit Summary and given to patient at the end of visit. Counseled regarding above diagnosis, including possible risks and complications, especially if left uncontrolled.      Counseled regarding the possible side effects, risks, benefits and alternatives to treatment; patient and/or guardian verbalizes understanding, agrees, feels comfortable with and wishes to proceed with above treatment plan. Advised patient to call [facility] new medication issues, and read all Rx info from pharmacy to assure aware of all possible risks and side effects of any medication before taking. Patient verbalizes understanding and agrees with above counseling, assessment and plan. All questions answered.     Lilia Dougherty, DO

## 2022-11-22 NOTE — PROGRESS NOTES
Monitor showed patient had removed Sp02 probe from her finger. Patient found to have removed her gown off and pulled out her IV. Gown was bloody with clots and the iv site had stopped bleeding. Cleaned up pt and changed gown. [de-identified] : 11/22/2022: 68 y/o female f/u for right knee OA and L TKA. She states her left knee is doing well with occasional minor pain. She is 6 months s/p right knee MonoVisc injection which provided her about 5 months of relief. She would like to repeat the MonoVisc injection today. She remains active with HEP and biking. \par \par 9/27/21: 67y/o female presenting for evaluation of right knee osteoarthritis. L TKA in 2018 by Dr. Carrillo and has no complaints. Right knee has been receiving serial injections with reasonable relief (most recently Gel-One by Dr. Lopez in March). Currently taking only Tylenol for pain. Has been doing HEP in the form of cycling, core work, and leg lifts. No formal PT. Exercise tolerance about 3-4 blocks without cane. Would like to continue with right knee injections. Incidentally also complains of intermittent right leg numbness occasionally at nighttime with slight associated low back pain. \par \par PMH sig only for HTN.

## 2022-12-02 ENCOUNTER — APPOINTMENT (OUTPATIENT)
Dept: GENERAL RADIOLOGY | Age: 67
DRG: 689 | End: 2022-12-02
Payer: MEDICARE

## 2022-12-02 ENCOUNTER — HOSPITAL ENCOUNTER (INPATIENT)
Age: 67
LOS: 3 days | Discharge: HOME HEALTH | DRG: 689 | End: 2022-12-06
Attending: EMERGENCY MEDICINE | Admitting: INTERNAL MEDICINE
Payer: MEDICARE

## 2022-12-02 DIAGNOSIS — N30.00 ACUTE CYSTITIS WITHOUT HEMATURIA: ICD-10-CM

## 2022-12-02 DIAGNOSIS — Z99.2 ESRD ON HEMODIALYSIS (HCC): ICD-10-CM

## 2022-12-02 DIAGNOSIS — N18.6 ESRD ON HEMODIALYSIS (HCC): ICD-10-CM

## 2022-12-02 DIAGNOSIS — E16.2 HYPOGLYCEMIA: Primary | ICD-10-CM

## 2022-12-02 LAB
ADENOVIRUS BY PCR: NOT DETECTED
ANION GAP SERPL CALCULATED.3IONS-SCNC: 22 MMOL/L (ref 7–16)
BACTERIA: ABNORMAL /HPF
BASOPHILS ABSOLUTE: 0.08 E9/L (ref 0–0.2)
BASOPHILS RELATIVE PERCENT: 0.6 % (ref 0–2)
BILIRUBIN URINE: NEGATIVE
BLOOD, URINE: NEGATIVE
BORDETELLA PARAPERTUSSIS BY PCR: NOT DETECTED
BORDETELLA PERTUSSIS BY PCR: NOT DETECTED
BUN BLDV-MCNC: 49 MG/DL (ref 6–23)
CALCIUM SERPL-MCNC: 8.9 MG/DL (ref 8.6–10.2)
CHLAMYDOPHILIA PNEUMONIAE BY PCR: NOT DETECTED
CHLORIDE BLD-SCNC: 97 MMOL/L (ref 98–107)
CHP ED QC CHECK: NORMAL
CHP ED QC CHECK: NORMAL
CHP ED QC CHECK: YES
CLARITY: CLEAR
CO2: 24 MMOL/L (ref 22–29)
COLOR: YELLOW
CORONAVIRUS 229E BY PCR: NOT DETECTED
CORONAVIRUS HKU1 BY PCR: NOT DETECTED
CORONAVIRUS NL63 BY PCR: NOT DETECTED
CORONAVIRUS OC43 BY PCR: NOT DETECTED
CREAT SERPL-MCNC: 9.4 MG/DL (ref 0.5–1)
EKG ATRIAL RATE: 69 BPM
EKG P AXIS: 63 DEGREES
EKG P-R INTERVAL: 198 MS
EKG Q-T INTERVAL: 428 MS
EKG QRS DURATION: 88 MS
EKG QTC CALCULATION (BAZETT): 458 MS
EKG R AXIS: 1 DEGREES
EKG T AXIS: 23 DEGREES
EKG VENTRICULAR RATE: 69 BPM
EOSINOPHILS ABSOLUTE: 0.16 E9/L (ref 0.05–0.5)
EOSINOPHILS RELATIVE PERCENT: 1.3 % (ref 0–6)
GFR SERPL CREATININE-BSD FRML MDRD: 4 ML/MIN/1.73
GLUCOSE BLD-MCNC: 121 MG/DL
GLUCOSE BLD-MCNC: 165 MG/DL
GLUCOSE BLD-MCNC: 32 MG/DL (ref 74–99)
GLUCOSE BLD-MCNC: 82 MG/DL
GLUCOSE URINE: NEGATIVE MG/DL
HBA1C MFR BLD: 5.3 % (ref 4–5.6)
HCT VFR BLD CALC: 35.2 % (ref 34–48)
HEMOGLOBIN: 11.2 G/DL (ref 11.5–15.5)
HUMAN METAPNEUMOVIRUS BY PCR: NOT DETECTED
HUMAN RHINOVIRUS/ENTEROVIRUS BY PCR: DETECTED
IMMATURE GRANULOCYTES #: 0.12 E9/L
IMMATURE GRANULOCYTES %: 0.9 % (ref 0–5)
INFLUENZA A BY PCR: NOT DETECTED
INFLUENZA A BY PCR: NOT DETECTED
INFLUENZA B BY PCR: NOT DETECTED
INFLUENZA B BY PCR: NOT DETECTED
KETONES, URINE: NEGATIVE MG/DL
LEUKOCYTE ESTERASE, URINE: ABNORMAL
LYMPHOCYTES ABSOLUTE: 2.44 E9/L (ref 1.5–4)
LYMPHOCYTES RELATIVE PERCENT: 19.2 % (ref 20–42)
MAGNESIUM: 2.6 MG/DL (ref 1.6–2.6)
MCH RBC QN AUTO: 33.9 PG (ref 26–35)
MCHC RBC AUTO-ENTMCNC: 31.8 % (ref 32–34.5)
MCV RBC AUTO: 106.7 FL (ref 80–99.9)
METER GLUCOSE: 121 MG/DL (ref 74–99)
METER GLUCOSE: 121 MG/DL (ref 74–99)
METER GLUCOSE: 165 MG/DL (ref 74–99)
METER GLUCOSE: 82 MG/DL (ref 74–99)
METER GLUCOSE: <40 MG/DL (ref 74–99)
MONOCYTES ABSOLUTE: 1.14 E9/L (ref 0.1–0.95)
MONOCYTES RELATIVE PERCENT: 9 % (ref 2–12)
MYCOPLASMA PNEUMONIAE BY PCR: NOT DETECTED
NEUTROPHILS ABSOLUTE: 8.79 E9/L (ref 1.8–7.3)
NEUTROPHILS RELATIVE PERCENT: 69 % (ref 43–80)
NITRITE, URINE: NEGATIVE
PARAINFLUENZA VIRUS 1 BY PCR: NOT DETECTED
PARAINFLUENZA VIRUS 2 BY PCR: NOT DETECTED
PARAINFLUENZA VIRUS 3 BY PCR: NOT DETECTED
PARAINFLUENZA VIRUS 4 BY PCR: NOT DETECTED
PDW BLD-RTO: 13.5 FL (ref 11.5–15)
PH UA: 7 (ref 5–9)
PLATELET # BLD: 289 E9/L (ref 130–450)
PMV BLD AUTO: 10.5 FL (ref 7–12)
POTASSIUM SERPL-SCNC: 5.4 MMOL/L (ref 3.5–5)
PROTEIN UA: 100 MG/DL
RBC # BLD: 3.3 E12/L (ref 3.5–5.5)
RBC UA: ABNORMAL /HPF (ref 0–2)
RESPIRATORY SYNCYTIAL VIRUS BY PCR: NOT DETECTED
SARS-COV-2, NAAT: NOT DETECTED
SARS-COV-2, PCR: NOT DETECTED
SODIUM BLD-SCNC: 143 MMOL/L (ref 132–146)
SPECIFIC GRAVITY UA: 1.01 (ref 1–1.03)
UROBILINOGEN, URINE: 0.2 E.U./DL
WBC # BLD: 12.7 E9/L (ref 4.5–11.5)
WBC UA: >20 /HPF (ref 0–5)

## 2022-12-02 PROCEDURE — 71045 X-RAY EXAM CHEST 1 VIEW: CPT

## 2022-12-02 PROCEDURE — 94640 AIRWAY INHALATION TREATMENT: CPT

## 2022-12-02 PROCEDURE — 83735 ASSAY OF MAGNESIUM: CPT

## 2022-12-02 PROCEDURE — 83036 HEMOGLOBIN GLYCOSYLATED A1C: CPT

## 2022-12-02 PROCEDURE — 36415 COLL VENOUS BLD VENIPUNCTURE: CPT

## 2022-12-02 PROCEDURE — 85025 COMPLETE CBC W/AUTO DIFF WBC: CPT

## 2022-12-02 PROCEDURE — 2580000003 HC RX 258: Performed by: EMERGENCY MEDICINE

## 2022-12-02 PROCEDURE — G0378 HOSPITAL OBSERVATION PER HR: HCPCS

## 2022-12-02 PROCEDURE — 82962 GLUCOSE BLOOD TEST: CPT

## 2022-12-02 PROCEDURE — 0202U NFCT DS 22 TRGT SARS-COV-2: CPT

## 2022-12-02 PROCEDURE — 6370000000 HC RX 637 (ALT 250 FOR IP): Performed by: INTERNAL MEDICINE

## 2022-12-02 PROCEDURE — 87186 SC STD MICRODIL/AGAR DIL: CPT

## 2022-12-02 PROCEDURE — 99285 EMERGENCY DEPT VISIT HI MDM: CPT

## 2022-12-02 PROCEDURE — 87635 SARS-COV-2 COVID-19 AMP PRB: CPT

## 2022-12-02 PROCEDURE — 80074 ACUTE HEPATITIS PANEL: CPT

## 2022-12-02 PROCEDURE — 93005 ELECTROCARDIOGRAM TRACING: CPT | Performed by: EMERGENCY MEDICINE

## 2022-12-02 PROCEDURE — 5A1D70Z PERFORMANCE OF URINARY FILTRATION, INTERMITTENT, LESS THAN 6 HOURS PER DAY: ICD-10-PCS | Performed by: INTERNAL MEDICINE

## 2022-12-02 PROCEDURE — 87077 CULTURE AEROBIC IDENTIFY: CPT

## 2022-12-02 PROCEDURE — 96374 THER/PROPH/DIAG INJ IV PUSH: CPT

## 2022-12-02 PROCEDURE — 6360000002 HC RX W HCPCS: Performed by: EMERGENCY MEDICINE

## 2022-12-02 PROCEDURE — 81001 URINALYSIS AUTO W/SCOPE: CPT

## 2022-12-02 PROCEDURE — 6360000002 HC RX W HCPCS: Performed by: INTERNAL MEDICINE

## 2022-12-02 PROCEDURE — 87502 INFLUENZA DNA AMP PROBE: CPT

## 2022-12-02 PROCEDURE — 96375 TX/PRO/DX INJ NEW DRUG ADDON: CPT

## 2022-12-02 PROCEDURE — 93010 ELECTROCARDIOGRAM REPORT: CPT | Performed by: INTERNAL MEDICINE

## 2022-12-02 PROCEDURE — 90935 HEMODIALYSIS ONE EVALUATION: CPT

## 2022-12-02 PROCEDURE — 87088 URINE BACTERIA CULTURE: CPT

## 2022-12-02 PROCEDURE — 2500000003 HC RX 250 WO HCPCS: Performed by: EMERGENCY MEDICINE

## 2022-12-02 PROCEDURE — 80048 BASIC METABOLIC PNL TOTAL CA: CPT

## 2022-12-02 RX ORDER — MIRTAZAPINE 15 MG/1
7.5 TABLET, FILM COATED ORAL NIGHTLY
Status: DISCONTINUED | OUTPATIENT
Start: 2022-12-02 | End: 2022-12-06 | Stop reason: HOSPADM

## 2022-12-02 RX ORDER — TRAZODONE HYDROCHLORIDE 50 MG/1
50 TABLET ORAL NIGHTLY
Status: DISCONTINUED | OUTPATIENT
Start: 2022-12-02 | End: 2022-12-06 | Stop reason: HOSPADM

## 2022-12-02 RX ORDER — DULOXETIN HYDROCHLORIDE 60 MG/1
60 CAPSULE, DELAYED RELEASE ORAL DAILY
Status: DISCONTINUED | OUTPATIENT
Start: 2022-12-02 | End: 2022-12-06 | Stop reason: HOSPADM

## 2022-12-02 RX ORDER — METOPROLOL TARTRATE 50 MG/1
50 TABLET, FILM COATED ORAL 2 TIMES DAILY
Status: DISCONTINUED | OUTPATIENT
Start: 2022-12-02 | End: 2022-12-06 | Stop reason: HOSPADM

## 2022-12-02 RX ORDER — FLUTICASONE PROPIONATE 50 MCG
1 SPRAY, SUSPENSION (ML) NASAL DAILY
Status: DISCONTINUED | OUTPATIENT
Start: 2022-12-02 | End: 2022-12-06 | Stop reason: HOSPADM

## 2022-12-02 RX ORDER — SODIUM CHLORIDE 0.9 % (FLUSH) 0.9 %
5-40 SYRINGE (ML) INJECTION EVERY 12 HOURS SCHEDULED
Status: DISCONTINUED | OUTPATIENT
Start: 2022-12-02 | End: 2022-12-06 | Stop reason: HOSPADM

## 2022-12-02 RX ORDER — ONDANSETRON 2 MG/ML
4 INJECTION INTRAMUSCULAR; INTRAVENOUS EVERY 6 HOURS PRN
Status: DISCONTINUED | OUTPATIENT
Start: 2022-12-02 | End: 2022-12-06 | Stop reason: HOSPADM

## 2022-12-02 RX ORDER — DEXTROSE MONOHYDRATE 25 G/50ML
25 INJECTION, SOLUTION INTRAVENOUS PRN
Status: DISCONTINUED | OUTPATIENT
Start: 2022-12-02 | End: 2022-12-06 | Stop reason: HOSPADM

## 2022-12-02 RX ORDER — ACETAMINOPHEN 650 MG/1
650 SUPPOSITORY RECTAL EVERY 6 HOURS PRN
Status: DISCONTINUED | OUTPATIENT
Start: 2022-12-02 | End: 2022-12-06 | Stop reason: HOSPADM

## 2022-12-02 RX ORDER — ARFORMOTEROL TARTRATE 15 UG/2ML
15 SOLUTION RESPIRATORY (INHALATION) 2 TIMES DAILY
Status: DISCONTINUED | OUTPATIENT
Start: 2022-12-02 | End: 2022-12-06 | Stop reason: HOSPADM

## 2022-12-02 RX ORDER — POLYETHYLENE GLYCOL 3350 17 G/17G
17 POWDER, FOR SOLUTION ORAL DAILY PRN
Status: DISCONTINUED | OUTPATIENT
Start: 2022-12-02 | End: 2022-12-06 | Stop reason: HOSPADM

## 2022-12-02 RX ORDER — CINACALCET 30 MG/1
90 TABLET, FILM COATED ORAL DAILY
Status: DISCONTINUED | OUTPATIENT
Start: 2022-12-02 | End: 2022-12-06 | Stop reason: HOSPADM

## 2022-12-02 RX ORDER — TORSEMIDE 20 MG/1
40 TABLET ORAL DAILY
Status: DISCONTINUED | OUTPATIENT
Start: 2022-12-02 | End: 2022-12-06 | Stop reason: HOSPADM

## 2022-12-02 RX ORDER — BUDESONIDE 0.5 MG/2ML
0.5 INHALANT ORAL 2 TIMES DAILY
Status: DISCONTINUED | OUTPATIENT
Start: 2022-12-02 | End: 2022-12-06 | Stop reason: HOSPADM

## 2022-12-02 RX ORDER — IPRATROPIUM BROMIDE AND ALBUTEROL SULFATE 2.5; .5 MG/3ML; MG/3ML
1 SOLUTION RESPIRATORY (INHALATION)
Status: DISCONTINUED | OUTPATIENT
Start: 2022-12-02 | End: 2022-12-06 | Stop reason: HOSPADM

## 2022-12-02 RX ORDER — AMLODIPINE BESYLATE 5 MG/1
5 TABLET ORAL DAILY
Status: DISCONTINUED | OUTPATIENT
Start: 2022-12-02 | End: 2022-12-02

## 2022-12-02 RX ORDER — SODIUM CHLORIDE 9 MG/ML
INJECTION, SOLUTION INTRAVENOUS PRN
Status: DISCONTINUED | OUTPATIENT
Start: 2022-12-02 | End: 2022-12-06 | Stop reason: HOSPADM

## 2022-12-02 RX ORDER — SODIUM CHLORIDE 0.9 % (FLUSH) 0.9 %
5-40 SYRINGE (ML) INJECTION PRN
Status: DISCONTINUED | OUTPATIENT
Start: 2022-12-02 | End: 2022-12-06 | Stop reason: HOSPADM

## 2022-12-02 RX ORDER — ONDANSETRON 4 MG/1
4 TABLET, ORALLY DISINTEGRATING ORAL EVERY 8 HOURS PRN
Status: DISCONTINUED | OUTPATIENT
Start: 2022-12-02 | End: 2022-12-06 | Stop reason: HOSPADM

## 2022-12-02 RX ORDER — BUSPIRONE HYDROCHLORIDE 5 MG/1
2.5 TABLET ORAL 2 TIMES DAILY
Status: DISCONTINUED | OUTPATIENT
Start: 2022-12-02 | End: 2022-12-06 | Stop reason: HOSPADM

## 2022-12-02 RX ORDER — DEXTROSE MONOHYDRATE 100 MG/ML
INJECTION, SOLUTION INTRAVENOUS CONTINUOUS PRN
Status: DISCONTINUED | OUTPATIENT
Start: 2022-12-02 | End: 2022-12-06 | Stop reason: HOSPADM

## 2022-12-02 RX ORDER — ATORVASTATIN CALCIUM 40 MG/1
40 TABLET, FILM COATED ORAL DAILY
Status: DISCONTINUED | OUTPATIENT
Start: 2022-12-02 | End: 2022-12-06 | Stop reason: HOSPADM

## 2022-12-02 RX ORDER — PROPAFENONE HYDROCHLORIDE 325 MG/1
325 CAPSULE, EXTENDED RELEASE ORAL 2 TIMES DAILY
Status: DISCONTINUED | OUTPATIENT
Start: 2022-12-02 | End: 2022-12-06 | Stop reason: HOSPADM

## 2022-12-02 RX ORDER — CEFDINIR 300 MG/1
300 CAPSULE ORAL DAILY
Qty: 10 CAPSULE | Refills: 0 | Status: SHIPPED | OUTPATIENT
Start: 2022-12-02 | End: 2022-12-06 | Stop reason: HOSPADM

## 2022-12-02 RX ORDER — PRAMIPEXOLE DIHYDROCHLORIDE 0.12 MG/1
0.12 TABLET ORAL 3 TIMES DAILY
Status: DISCONTINUED | OUTPATIENT
Start: 2022-12-02 | End: 2022-12-06 | Stop reason: HOSPADM

## 2022-12-02 RX ORDER — CALCITRIOL 0.25 UG/1
0.25 CAPSULE, LIQUID FILLED ORAL
Status: DISCONTINUED | OUTPATIENT
Start: 2022-12-05 | End: 2022-12-06 | Stop reason: HOSPADM

## 2022-12-02 RX ORDER — LACTOBACILLUS RHAMNOSUS GG 10B CELL
1 CAPSULE ORAL DAILY
Status: DISCONTINUED | OUTPATIENT
Start: 2022-12-02 | End: 2022-12-06 | Stop reason: HOSPADM

## 2022-12-02 RX ORDER — ACETAMINOPHEN 325 MG/1
650 TABLET ORAL EVERY 6 HOURS PRN
Status: DISCONTINUED | OUTPATIENT
Start: 2022-12-02 | End: 2022-12-06 | Stop reason: HOSPADM

## 2022-12-02 RX ORDER — BENZONATATE 100 MG/1
100 CAPSULE ORAL 3 TIMES DAILY PRN
Status: DISCONTINUED | OUTPATIENT
Start: 2022-12-02 | End: 2022-12-06 | Stop reason: HOSPADM

## 2022-12-02 RX ORDER — HEPARIN SODIUM 10000 [USP'U]/ML
5000 INJECTION, SOLUTION INTRAVENOUS; SUBCUTANEOUS EVERY 8 HOURS SCHEDULED
Status: DISCONTINUED | OUTPATIENT
Start: 2022-12-02 | End: 2022-12-02

## 2022-12-02 RX ORDER — NICOTINE 21 MG/24HR
1 PATCH, TRANSDERMAL 24 HOURS TRANSDERMAL DAILY
Status: DISCONTINUED | OUTPATIENT
Start: 2022-12-02 | End: 2022-12-06 | Stop reason: HOSPADM

## 2022-12-02 RX ADMIN — IPRATROPIUM BROMIDE AND ALBUTEROL SULFATE 1 AMPULE: 2.5; .5 SOLUTION RESPIRATORY (INHALATION) at 21:05

## 2022-12-02 RX ADMIN — CEFTRIAXONE 1000 MG: 1 INJECTION, POWDER, FOR SOLUTION INTRAMUSCULAR; INTRAVENOUS at 07:36

## 2022-12-02 RX ADMIN — DEXTROSE MONOHYDRATE 25 G: 25 INJECTION, SOLUTION INTRAVENOUS at 05:16

## 2022-12-02 RX ADMIN — APIXABAN 5 MG: 5 TABLET, FILM COATED ORAL at 22:25

## 2022-12-02 RX ADMIN — Medication 1 CAPSULE: at 09:23

## 2022-12-02 RX ADMIN — BUDESONIDE 500 MCG: 0.5 SUSPENSION RESPIRATORY (INHALATION) at 21:05

## 2022-12-02 RX ADMIN — BENZONATATE 100 MG: 100 CAPSULE ORAL at 22:31

## 2022-12-02 RX ADMIN — BUSPIRONE HYDROCHLORIDE 2.5 MG: 5 TABLET ORAL at 22:25

## 2022-12-02 RX ADMIN — TRAZODONE HYDROCHLORIDE 50 MG: 50 TABLET ORAL at 22:26

## 2022-12-02 RX ADMIN — FLUTICASONE PROPIONATE 1 SPRAY: 50 SPRAY, METERED NASAL at 22:26

## 2022-12-02 RX ADMIN — ARFORMOTEROL TARTRATE 15 MCG: 15 SOLUTION RESPIRATORY (INHALATION) at 21:05

## 2022-12-02 RX ADMIN — PRAMIPEXOLE DIHYDROCHLORIDE 0.12 MG: 0.12 TABLET ORAL at 22:24

## 2022-12-02 ASSESSMENT — ENCOUNTER SYMPTOMS
ABDOMINAL PAIN: 0
RHINORRHEA: 0
COUGH: 1
NAUSEA: 0
COLOR CHANGE: 0
BLOOD IN STOOL: 0
BACK PAIN: 0
DIARRHEA: 1
SHORTNESS OF BREATH: 0
VOMITING: 0

## 2022-12-02 ASSESSMENT — PAIN DESCRIPTION - PAIN TYPE: TYPE: ACUTE PAIN

## 2022-12-02 ASSESSMENT — PAIN SCALES - GENERAL: PAINLEVEL_OUTOF10: 4

## 2022-12-02 ASSESSMENT — PAIN DESCRIPTION - LOCATION: LOCATION: BUTTOCKS

## 2022-12-02 ASSESSMENT — PAIN - FUNCTIONAL ASSESSMENT: PAIN_FUNCTIONAL_ASSESSMENT: NONE - DENIES PAIN

## 2022-12-02 NOTE — ED NOTES
[facility],Third Floor, [address] [place] [address]  12/02/22 [id number]

## 2022-12-02 NOTE — ED NOTES
Called dialysis pt has 30 minutes left before going to room upstairs.      Marco A Darden RN  12/02/22 [id number]

## 2022-12-02 NOTE — H&P
Hospitalist History & Physical      PCP: Alexia Ardon DO    Date of Service: Pt seen/examined on 12/2/2022 and is  admitted to Inpatient with expected LOS greater than two midnights due to medical therapy. Chief Complaint:  had concerns including Hypoglycemia ((Weakness, BGL for EMS was 57 gave oral glucose second BGL 51)). History Of Present Illness:    Ms. Melinda [place], a 78y.o. year old female  who  has a past medical history of Atrial fibrillation ([address] Bladder cancer ([address] Cancer ([address] CKD (chronic kidney disease), CKD (chronic kidney disease) stage 5, GFR less than 15 ml/min ([address] Diabetes ([address] Hypertension, Obesity, and Sleep apnea. Presented to the hospital with complaints generalized weakness, hypoglycemia and dysuria. The patient states that she normally sleeps on the couch and she states that she found herself on the floor and she could not getup at 3 am; she is unsure whether she fell. She states that she had slurred speech and called for her grand-daughter. They called 911 and the ambulance brought the patient to the ER. She denied any numbness or tingling in her hand and feet at this time. She denies any fever or chills. She admits to having a cough with white foamy sputum for the last few years. She denies any CP or SOB. No abd pain. Sh estates that she was having diarrhea x 2 days and did not go to dialysis- it has since resolved. She states that she does urinate and noticed that she was having dysuria x 1 week now. At home, EMS reported that the patient's blood sugar was 25; she states that ever since COVID 10/21 that she has a decreased appetite and that she only ate 2 hot pockets and took glipizide prior to bed. ROS: As per the HPI.     Past Medical History:   Diagnosis Date    Atrial fibrillation (HCC)     Bladder cancer (HCC)     Cancer (HCC)     CKD (chronic kidney disease)     CKD (chronic kidney disease) stage 5, GFR less than 15 ml/min ([address] 11/6/2018    Diabetes ([facility]     Hypertension     Obesity     260 #    Sleep apnea        Past Surgical History:   Procedure Laterality Date    CARDIAC CATHETERIZATION Left 10/02/2008    @ [facility]    CHOLECYSTECTOMY      COLONOSCOPY      COLONOSCOPY N/A 8/29/2019    mid ascending colon polyp bx/cauterization (inflammatory polyp), proximal transverse colon polyp bx/cauterized (tubular adenoma), descending colon polyp 80 cm from anus bx/cauterized (hyperplastic polyp), sigmoid colon polyp 35 cm from anus bx/cauterized (tubular adenoma), sigmoid colon polyps 30 cm from anus bx/cauterized (tubular adenoma), Dr. Rehan Mckeon, [facility] [place] [facility]    COLONOSCOPY  8/29/2019    mid ascending colon polyp bx/cauterization (inflammatory polyp), proximal transverse colon polyp bx/cauterized (tubular adenoma), descending colon polyp 80 cm from anus bx/cauterized (hyperplastic polyp), sigmoid colon polyp 35 cm from anus bx/cauterized (tubular adenoma), sigmoid colon polyps 30 cm from anus bx/cauterized (tubular adenoma), Dr. Rehan Mckeon, [facility] FISTULA CREATION Left 1/22/2019    LIGATIION LEFT LEFT UPPER EXTREMITY OF AV FISTULA , EVACUATION HEMATOMA performed by Melisa Cole MD at [address] [place] [address] [phone] Right 12/7/2020    AV FISTULA CREATION -- RIGHT ARM performed by Ulises Mchugh MD at [address] [place] [address] [phone] Right 4/8/2021    AV FISTULA  REVISION RIGHT ARM performed by Ulises Mchugh MD at [address] [place] [address] Left 9/18/2018    AV FISTULA  LEFT ARM performed by Melisa Cole MD at [address] [place] [address] Left 11/14/2018    SUPERFICIALIZATION AV FISTULA  LEFT ARM , LIGATION TRIBUTORY AV FISTULA LEFT ARM performed by Melisa Cole MD at [address] [place] [address]         Prior to Admission medications    Medication Sig Start Date End Date Taking?  Authorizing Provider   cefdinir (OMNICEF) 300 MG capsule Take 1 capsule by mouth daily for 10 days 12/2/22 12/12/22 Yes Rylee Dong MD   cinacalcet (SENSIPAR) 90 MG tablet  11/1/22   Historical Provider, MD   ondansetron (ZOFRAN) 4 MG tablet Take 1 tablet by mouth every 12 hours as needed for Nausea or Vomiting 11/14/22   Guillermo Burleson,    pramipexole (MIRAPEX) 0.125 MG tablet Take 1 tablet by mouth 3 times daily 11/14/22   Guillermo Burleson, DO   atorvastatin (LIPITOR) 40 MG tablet Take 1 tablet by mouth daily 11/2/22   Guillermo Burleson, DO   busPIRone (BUSPAR) 5 MG tablet Take 0.5 tablets by mouth 2 times daily 11/2/22 5/1/23  Guillermo Burleson,    apixaban (ELIQUIS) 5 MG TABS tablet Take 1 tablet by mouth 2 times daily 9/14/22   Guillermo Burleson,    glipiZIDE (GLUCOTROL XL) 5 MG extended release tablet Take 1 tablet by mouth daily 9/14/22 12/13/22  Guillermo Burleson,    mirtazapine (REMERON) 7.5 MG tablet Take 1 tablet by mouth nightly 9/14/22 4/14/23  Guillermo Burleson,    propafenone (RYTHMOL SR) 325 MG extended release capsule Take 1 capsule by mouth 2 times daily 9/12/22   Carlene Ferreira MD   DULoxetine (CYMBALTA) 60 MG extended release capsule TAKE 1 CAPSULE BY MOUTH DAILY 8/9/22   Guillermo Burleson,    metoprolol tartrate (LOPRESSOR) 50 MG tablet TAKE 1 TABLET BY MOUTH TWICE A DAY 8/9/22   Guillermo Burleson,    amLODIPine (NORVASC) 5 MG tablet TAKE 1 TABLET BY MOUTH DAILY 8/9/22   Guillermo Burleson,    torsemide (DEMADEX) 20 MG tablet Take 2 tablets by mouth daily 8/9/22   Guillermo Burleson,    traZODone (DESYREL) 50 MG tablet Take 1 tablet by mouth nightly 8/9/22 4/14/23  Guillermo Burleson,    calcitRIOL (ROCALTROL) 0.25 MCG capsule TAKE 1 CAPSULE BY MOUTH DAILY MONDAY THROUGH FRIDAY 6/21/22   Guillermo Burleson,          Allergies:  Adhesive tape and Penicillins    Social History:    TOBACCO:   reports that she quit smoking about 21 months ago. Her smoking use included cigarettes. She has a 21.50 pack-year smoking history.  She has never used smokeless tobacco.  ETOH:   reports that she does not currently use alcohol. Family History:    Reviewed in detail and negative for DM, CAD, Cancer, CVA. Positive as follows\"      Problem Relation Age of Onset    Cancer Mother         bladder    Diabetes Mother     COPD Mother     Depression Father     Diabetes Father     Heart Disease Father     High Blood Pressure Father     Stroke Father     Prostate Cancer Father     Dementia Father     Alcohol Abuse Brother     Diabetes Brother     Bipolar Disorder Paternal Uncle     Anxiety Disorder Daughter     Colon Cancer Maternal Grandfather        REVIEW OF SYSTEMS:   Pertinent positives as noted in the HPI. All other systems reviewed and negative. PHYSICAL EXAM:  BP (!) 146/83   Pulse 75   Temp 98.4 °F (36.9 °C)   Resp 16   Ht 5' 7\" (1.702 m)   Wt 249 lb (112.9 kg)   SpO2 93%   BMI 39.00 kg/m²   General appearance: No apparent distress, appears stated age and cooperative. HEENT: Normal cephalic, atraumatic without obvious deformity. Pupils equal, round, and reactive to light. Extra ocular muscles intact. Conjunctivae/corneas clear. Neck: Supple, with full range of motion. No jugular venous distention. Trachea midline. Respiratory:  Rhonchi at the bases   Cardiovascular:  S1, S2 are present   Abdomen:  Soft +BS NT/ND   Musculoskeletal: No clubbing, cyanosis, edema of bilateral lower extremities. Brisk capillary refill. Skin: Normal skin color. No rashes or lesions. Neurologic:  Neurovascularly intact without any focal sensory/motor deficits.  Cranial nerves: II-XII intact, grossly non-focal.  Psychiatric: Alert and oriented, thought content appropriate, normal insight    Reviewed EKG and CXR personally      CBC:   Recent Labs     12/02/22  0439   WBC 12.7*   RBC 3.30*   HGB 11.2*   HCT 35.2   .7*   RDW 13.5        BMP:   Recent Labs     12/02/22  0439      K 5.4*   CL 97*   CO2 24   BUN 49*   CREATININE 9.4*   MG 2.6     LFT:  No results for input(s): PROT, ALB, ALKPHOS, ALT, AST, BILITOT, AMYLASE, LIPASE in the last 72 hours. CE:  No results for input(s): Talat Choudahry in the last 72 hours. PT/INR: No results for input(s): INR, APTT in the last 72 hours. BNP: No results for input(s): BNP in the last 72 hours. ESR: No results found for: SEDRATE  CRP:   Lab Results   Component Value Date    CRP 2.7 (H) 10/21/2021     D Dimer: No results found for: DDIMER   Folate and B12:   Lab Results   Component Value Date    ABHGMPZK49 371 10/21/2019   ,   Lab Results   Component Value Date    FOLATE 9.6 10/21/2019     Lactic Acid: No results found for: LACTA  Thyroid Studies:   Lab Results   Component Value Date    TSH 0.804 01/31/2019       Oupatient labs:  Lab Results   Component Value Date    CHOL 172 11/06/2018    TRIG 211 (H) 11/06/2018    HDL 37 11/06/2018    LDLCALC 93 11/06/2018    TSH 0.804 01/31/2019    INR 1.5 10/20/2021    LABA1C 5.1 09/14/2022       Urinalysis:    Lab Results   Component Value Date/Time    NITRU Negative 12/02/2022 04:39 AM    WBCUA >20 12/02/2022 04:39 AM    BACTERIA MANY 12/02/2022 04:39 AM    RBCUA NONE 12/02/2022 04:39 AM    BLOODU Negative 12/02/2022 04:39 AM    SPECGRAV 1.010 12/02/2022 04:39 AM    GLUCOSEU Negative 12/02/2022 04:39 AM       Imaging:  XR CHEST PORTABLE    Result Date: 12/2/2022  EXAMINATION: ONE XRAY VIEW OF THE CHEST 12/2/2022 5:16 am COMPARISON: October 30, 2021 HISTORY: ORDERING SYSTEM PROVIDED HISTORY: cough TECHNOLOGIST PROVIDED HISTORY: Reason for exam:->cough What reading provider will be dictating this exam?->CRC FINDINGS: Normal cardiomediastinal silhouette. Lungs clear. No pneumothorax or effusion. Osseous thorax intact. No acute disease. RECOMMENDATION: Careful clinical correlation and follow up recommended.        ASSESSMENT:  78y.o. year old female  who  has a past medical history of Atrial fibrillation ([facility] Cancer ([facility] CKD (chronic kidney disease), CKD (chronic kidney disease) stage 5, GFR less than 15 ml/min ([facility] Diabetes ([facility] Hypertension, Obesity, and Sleep apnea. Presented to the hospital with complaints generalized weakness, hypoglycemia and dysuria. PLAN:  1.) Weakness and slurred speech most likely secondary to hypoglycemia and UTI. Symptoms have resolved. PT/ OT on consult. TSH And B 12 ordered. 2.) COPD exacerbation/Cough: Chest was negative. Patient currently vapes. Duo nebs/Pulmicort ordered. Smoker's cough. Respiratory swab ordered. 3.) UTI: urine cx ordered. C/w Rocephin. 4.) ESRD: Nephrology consult- pending. Usually goes Tuesday and Thursday as per her but skipped yesterday due to diarrhea. 5. Hypoglycemia: Due to poor intake and taking he diabetic medications. Hold glipizide. Check BS and hypoglycemic protocol is in place. 6.) Smoking cessation: nicoderm patch.   7.) Afib: on Eliqui- propafenone/metoprolol/Norvasc. 8.) HLD: c/w statins   9.) DVT proph: eliquis. Diet: No diet orders on file  Code Status: Prior  Surrogate decision maker confirmed with patient:   Extended Emergency Contact Information  Primary Emergency Contact: Anastasia Blizzard  Mobile Phone: [phone]  Relation: Child   needed? No  Secondary Emergency Contact: Rosa Blakely  Address: [address] [place] [address] Phone: [phone]  Mobile Phone: [phone]  Relation: Grandchild    DVT Prophylaxis: []Lovenox []Heparin []PCD [] [address] []Encouraged ambulation  Disposition: []Med/Surg [] Intermediate [] ICU/CCU  Admit status: [] Observation [] Inpatient     +++++++++++++++++++++++++++++++++++++++++++++++++  Ryan Rivera MD  +++++++++++++++++++++++++++++++++++++++++++++++++  NOTE: This report was transcribed using voice recognition software. Every effort was made to ensure accuracy; however, inadvertent computerized transcription errors may be present.

## 2022-12-02 NOTE — PROGRESS NOTES
Patient arrived to unit, placed on heart monitor, sign and held orders released. She is asking her for dinner, call placed to cafeteria for patient's meal tray. Admission database complete. Granddaughter is downstairs will bring up Rythmol medication since it is non-formulary.      Emilie Heredia RN, BSN

## 2022-12-02 NOTE — ED PROVIDER NOTES
ED PROVIDER NOTE    Chief Complaint   Patient presents with    Hypoglycemia     (Weakness, BGL for EMS was 57 gave oral glucose second BGL 51)       HPI:  12/2/22,   Time: 4:24 AM TANYA Weldon is a 78 y.o. female presenting to the ED for altered mental status. Acute onset tonight, persistent since onset, moderate in severity, no aggravating/alleviating factors. Got up to go to the bathroom but couldn't get off the couch. Hx of DM on glipizide, not on insulin. Has been sick the past few days with cough and diarrhea. No fever, chills, chest pain, shortness of breath, abdominal pain, nausea, vomiting. Decreased po intake. Normal urine output. EMS found patient to be hypoglycemic to 50s, gave oral glucose. Chart review: hx of afib, ESRD, DM, HTN    Review of Systems:     Review of Systems   Constitutional:  Positive for appetite change. Negative for chills and fever. HENT:  Negative for congestion and rhinorrhea. Eyes:  Negative for visual disturbance. Respiratory:  Positive for cough. Negative for shortness of breath. Cardiovascular:  Negative for chest pain. Gastrointestinal:  Positive for diarrhea. Negative for abdominal pain, blood in stool, nausea and vomiting. Genitourinary:  Negative for decreased urine volume and difficulty urinating. Musculoskeletal:  Negative for back pain and neck pain. Skin:  Negative for color change. Neurological:  Negative for dizziness, syncope, weakness, light-headedness, numbness and headaches.    Psychiatric/Behavioral:  Positive for confusion.        --------------------------------------------- PAST HISTORY ---------------------------------------------  Past Medical History:   Past Medical History:   Diagnosis Date    Atrial fibrillation (HCC)     Bladder cancer (HCC)     Cancer ([facility]     CKD (chronic kidney disease)     CKD (chronic kidney disease) stage 5, GFR less than 15 ml/min ([facility] 11/6/2018    Diabetes ([facility]     Hypertension Obesity     260 #    Sleep apnea        Past Surgical History:   Past Surgical History:   Procedure Laterality Date    CARDIAC CATHETERIZATION Left 10/02/2008    @ [facility]    CHOLECYSTECTOMY      COLONOSCOPY      COLONOSCOPY N/A 8/29/2019    mid ascending colon polyp bx/cauterization (inflammatory polyp), proximal transverse colon polyp bx/cauterized (tubular adenoma), descending colon polyp 80 cm from anus bx/cauterized (hyperplastic polyp), sigmoid colon polyp 35 cm from anus bx/cauterized (tubular adenoma), sigmoid colon polyps 30 cm from anus bx/cauterized (tubular adenoma), Dr. Linus Haynes, [facility]    COLONOSCOPY  8/29/2019    mid ascending colon polyp bx/cauterization (inflammatory polyp), proximal transverse colon polyp bx/cauterized (tubular adenoma), descending colon polyp 80 cm from anus bx/cauterized (hyperplastic polyp), sigmoid colon polyp 35 cm from anus bx/cauterized (tubular adenoma), sigmoid colon polyps 30 cm from anus bx/cauterized (tubular adenoma), Dr. Linus Haynes, [facility]    DIALYSIS FISTULA CREATION Left 1/22/2019    LIGATIION LEFT LEFT UPPER EXTREMITY OF AV FISTULA , EVACUATION HEMATOMA performed by Annamaria Mckeon MD at [address] [place] [address] [address] Right 12/7/2020    AV FISTULA CREATION -- RIGHT ARM performed by Jordyn Douglas MD at [address] [place] [address] [phone] Right 4/8/2021    AV FISTULA  REVISION RIGHT ARM performed by Jordyn Douglas MD at [address] [place] [address] Left 9/18/2018    AV FISTULA  LEFT ARM performed by Annamaria Mckeon MD at [address] [place] [address] Left 11/14/2018    SUPERFICIALIZATION AV FISTULA  LEFT ARM , LIGATION TRIBUTORY AV FISTULA LEFT ARM performed by Annamaria Mckeon MD at [address] [place] [address]         Social History:   Social History     Socioeconomic History    Marital status:      Spouse name: None    Number of children: None    Years of education: None    Highest education level: None   Tobacco Use    Smoking status: Former     Packs/day: 0.50     Years: 43.00     Pack years: 21.50     Types: Cigarettes     Quit date: 2021     Years since quittin.7    Smokeless tobacco: Never   Vaping Use    Vaping Use: Some days    Last attempt to quit: 2016    Substances: Always    Devices: Pre-filled or refillable cartridge, Refillable tank   Substance and Sexual Activity    Alcohol use: Not Currently     Comment: 1 cup of coffee a day     Drug use: Never   Social History Narrative    Drinks 1 cup of coffee daily. Social Determinants of Health     Financial Resource Strain: Low Risk     Difficulty of Paying Living Expenses: Not hard at all   Food Insecurity: No Food Insecurity    Worried About Running Out of Food in the Last Year: Never true    Ran Out of Food in the Last Year: Never true       Family History:   Family History   Problem Relation Age of Onset    Cancer Mother         bladder    Diabetes Mother     COPD Mother     Depression Father     Diabetes Father     Heart Disease Father     High Blood Pressure Father     Stroke Father     Prostate Cancer Father     Dementia Father     Alcohol Abuse Brother     Diabetes Brother     Bipolar Disorder Paternal Uncle     Anxiety Disorder Daughter     Colon Cancer Maternal Grandfather        The patients home medications have been reviewed. Allergies: Allergies   Allergen Reactions    Adhesive Tape Rash    Penicillins Rash           ---------------------------------------------------PHYSICAL EXAM--------------------------------------    BP (!) 146/83   Pulse 75   Temp 98.4 °F (36.9 °C)   Resp 16   Ht 5' 7\" (1.702 m)   Wt 249 lb (112.9 kg)   SpO2 93%   BMI 39.00 kg/m²     Physical Exam  Vitals and nursing note reviewed. Constitutional:       General: She is not in acute distress. Appearance: She is not toxic-appearing. HENT:      Mouth/Throat:      Mouth: Mucous membranes are dry. Eyes:      General: No scleral icterus.      Extraocular Movements: Extraocular movements intact. Pupils: Pupils are equal, round, and reactive to light. Cardiovascular:      Rate and Rhythm: Normal rate and regular rhythm. Pulses: Normal pulses. Heart sounds: Normal heart sounds. No murmur heard. Pulmonary:      Effort: Pulmonary effort is normal. No respiratory distress. Breath sounds: Normal breath sounds. No wheezing or rales. Abdominal:      General: There is no distension. Palpations: Abdomen is soft. Tenderness: There is no abdominal tenderness. Musculoskeletal:         General: No swelling or tenderness. Normal range of motion. Cervical back: Normal range of motion and neck supple. Skin:     General: Skin is warm and dry. Neurological:      Mental Status: She is alert and oriented to person, place, and time. Comments: Speech slurred. Strength 5/5 and sensation grossly intact to light touch and equal bilaterally throughout all extremities          -------------------------------------------------- RESULTS -------------------------------------------------  I have personally reviewed all laboratory and imaging results for this patient. Results are listed below.      LABS:  Labs Reviewed   BASIC METABOLIC PANEL - Abnormal; Notable for the following components:       Result Value    Potassium 5.4 (*)     Chloride 97 (*)     Anion Gap 22 (*)     Glucose 32 (*)     BUN 49 (*)     Creatinine 9.4 (*)     All other components within normal limits    Narrative:     CALL  Yang  H34 tel. ,  Chemistry results called to and read back by Pamela Duran RN, 12/02/2022  05:14, by SHILO   CBC WITH AUTO DIFFERENTIAL - Abnormal; Notable for the following components:    WBC 12.7 (*)     RBC 3.30 (*)     Hemoglobin 11.2 (*)     .7 (*)     MCHC 31.8 (*)     Lymphocytes % 19.2 (*)     Neutrophils Absolute 8.79 (*)     Monocytes Absolute 1.14 (*)     All other components within normal limits   URINALYSIS WITH MICROSCOPIC - Abnormal; Notable for the following components:    Protein,  (*)     Leukocyte Esterase, Urine LARGE (*)     WBC, UA >20 (*)     Bacteria, UA MANY (*)     All other components within normal limits   POCT GLUCOSE - Abnormal; Notable for the following components:    Meter Glucose <40 (*)     All other components within normal limits   POCT GLUCOSE - Abnormal; Notable for the following components:    Meter Glucose 121 (*)     All other components within normal limits   POCT GLUCOSE - Normal   POCT GLUCOSE - Normal   COVID-19, RAPID   RAPID INFLUENZA A/B ANTIGENS   MAGNESIUM    Narrative:     CALL  Yang  H34 tel. ,  Chemistry results called to and read back by Partha Ryder RN, 12/02/2022  05:14, by [facility]:  Interpreted personally and by Radiologist.  XR CHEST PORTABLE   Final Result   No acute disease. RECOMMENDATION:   Careful clinical correlation and follow up recommended. EKG:  This EKG is signed and interpreted by the EP. Normal sinus rhythm, vent rate 69bpm, normal axis and intervals, no acute injury pattern, no clinically significant change compared w/ prior EKG       ------------------------- NURSING NOTES AND VITALS REVIEWED ---------------------------   The nursing notes within the ED encounter and vital signs as below have been reviewed by myself. BP (!) 146/83   Pulse 75   Temp 98.4 °F (36.9 °C)   Resp 16   Ht 5' 7\" (1.702 m)   Wt 249 lb (112.9 kg)   SpO2 93%   BMI 39.00 kg/m²   Oxygen Saturation Interpretation: Normal    The patients available past medical records and past encounters were reviewed. ------------------------------ ED COURSE/MEDICAL DECISION MAKING----------------------  Medications   dextrose 50 % IV solution (25 g IntraVENous Given 12/2/22 2199)   cefTRIAXone (ROCEPHIN) 1,000 mg in sterile water 10 mL IV syringe (1,000 mg IntraVENous Given 12/2/22 1991)     Counseling:    The emergency provider has spoken with the patient and discussed todays results, in addition to providing specific details for the plan of care and counseling regarding the diagnosis and prognosis. Questions are answered at this time and they are agreeable with the plan. ED Course/Medical Decision Makin y.o. female here with hypoglycemia and altered mental status. Non-toxic appearing, afebrile, hemodynamically stable, and in no acute distress. Breathing comfortably on room air without respiratory distress. Neurovascularly intact throughout. Mental status improved w/ D50, however on reevaluation BG downtrending and patient has been missing dialysis due to diarrheal illness. Treated w/ IV abx and admitted in stable condition for further management.       --------------------------------- IMPRESSION AND DISPOSITION ---------------------------------    IMPRESSION  1. Hypoglycemia    2. Acute cystitis without hematuria    3. ESRD on hemodialysis ([facility]        DISPOSITION  Disposition: Admit to telemetry  Patient condition is stable    NOTE: This report was transcribed using voice recognition software.  Every effort was made to ensure accuracy; however, inadvertent computerized transcription errors may be present    Samir Rondon MD  Attending Emergency Physician         Samir Rondon MD  22 [id number]

## 2022-12-02 NOTE — FLOWSHEET NOTE
12/02/22 1818   Vital Signs   BP (!) 133/57   Temp 96.9 °F (36.1 °C)   Heart Rate 91   Resp 20   Weight   (cart)   Pain Assessment   Pain Assessment 0-10   Pain Level 4   Pain Type Acute pain   Pain Location Buttocks   Post-Hemodialysis Assessment   Post-Treatment Procedures Blood returned; Access bleeding time < 10 minutes   Machine Disinfection Process Heat Disinfect;Acid/Vinegar Clean;Exterior Machine Disinfection   Rinseback Volume (ml) 300 ml   Blood Volume Processed (Liters) 77.2 l/min   Dialyzer Clearance Moderately streaked   Duration of Treatment (minutes) 240 minutes   Heparin Amount Administered During Treatment (mL) 0 mL   Hemodialysis Intake (ml) 300 ml   Hemodialysis Output (ml) 2044 ml   NET Removed (ml) 1744   Tolerated Treatment Fair   Interventions Taken Ultrafiltration stopped   Patient Response to Treatment Uf turned off during treatment d/t pt c/o cramping. Bilateral Breath Sounds Diminished   Edema Right lower extremity; Left lower extremity   RLE Edema +1   LLE Edema +1   Physician Notified No   Time Off 9704   Patient Disposition Return to room

## 2022-12-03 LAB
ANION GAP SERPL CALCULATED.3IONS-SCNC: 15 MMOL/L (ref 7–16)
BASOPHILS ABSOLUTE: 0.06 E9/L (ref 0–0.2)
BASOPHILS RELATIVE PERCENT: 0.7 % (ref 0–2)
BUN BLDV-MCNC: 18 MG/DL (ref 6–23)
CALCIUM SERPL-MCNC: 9.9 MG/DL (ref 8.6–10.2)
CHLORIDE BLD-SCNC: 90 MMOL/L (ref 98–107)
CO2: 30 MMOL/L (ref 22–29)
CREAT SERPL-MCNC: 5.3 MG/DL (ref 0.5–1)
EOSINOPHILS ABSOLUTE: 0.14 E9/L (ref 0.05–0.5)
EOSINOPHILS RELATIVE PERCENT: 1.5 % (ref 0–6)
FERRITIN: 542 NG/ML
GFR SERPL CREATININE-BSD FRML MDRD: 8 ML/MIN/1.73
GLUCOSE BLD-MCNC: 46 MG/DL (ref 74–99)
HCT VFR BLD CALC: 33.8 % (ref 34–48)
HEMOGLOBIN: 10.6 G/DL (ref 11.5–15.5)
IMMATURE GRANULOCYTES #: 0.06 E9/L
IMMATURE GRANULOCYTES %: 0.7 % (ref 0–5)
LYMPHOCYTES ABSOLUTE: 3.45 E9/L (ref 1.5–4)
LYMPHOCYTES RELATIVE PERCENT: 38.1 % (ref 20–42)
MAGNESIUM: 2.1 MG/DL (ref 1.6–2.6)
MCH RBC QN AUTO: 34 PG (ref 26–35)
MCHC RBC AUTO-ENTMCNC: 31.4 % (ref 32–34.5)
MCV RBC AUTO: 108.3 FL (ref 80–99.9)
METER GLUCOSE: 167 MG/DL (ref 74–99)
METER GLUCOSE: 197 MG/DL (ref 74–99)
METER GLUCOSE: 61 MG/DL (ref 74–99)
METER GLUCOSE: 84 MG/DL (ref 74–99)
METER GLUCOSE: 96 MG/DL (ref 74–99)
MONOCYTES ABSOLUTE: 1.1 E9/L (ref 0.1–0.95)
MONOCYTES RELATIVE PERCENT: 12.2 % (ref 2–12)
NEUTROPHILS ABSOLUTE: 4.24 E9/L (ref 1.8–7.3)
NEUTROPHILS RELATIVE PERCENT: 46.8 % (ref 43–80)
PDW BLD-RTO: 13.9 FL (ref 11.5–15)
PHOSPHORUS: 6.6 MG/DL (ref 2.5–4.5)
PLATELET # BLD: 241 E9/L (ref 130–450)
PMV BLD AUTO: 9.5 FL (ref 7–12)
POTASSIUM SERPL-SCNC: 4.3 MMOL/L (ref 3.5–5)
RBC # BLD: 3.12 E12/L (ref 3.5–5.5)
SODIUM BLD-SCNC: 135 MMOL/L (ref 132–146)
WBC # BLD: 9.1 E9/L (ref 4.5–11.5)

## 2022-12-03 PROCEDURE — 96376 TX/PRO/DX INJ SAME DRUG ADON: CPT

## 2022-12-03 PROCEDURE — 83550 IRON BINDING TEST: CPT

## 2022-12-03 PROCEDURE — 84100 ASSAY OF PHOSPHORUS: CPT

## 2022-12-03 PROCEDURE — 83540 ASSAY OF IRON: CPT

## 2022-12-03 PROCEDURE — 94640 AIRWAY INHALATION TREATMENT: CPT

## 2022-12-03 PROCEDURE — 2060000000 HC ICU INTERMEDIATE R&B

## 2022-12-03 PROCEDURE — 82962 GLUCOSE BLOOD TEST: CPT

## 2022-12-03 PROCEDURE — 6370000000 HC RX 637 (ALT 250 FOR IP): Performed by: INTERNAL MEDICINE

## 2022-12-03 PROCEDURE — 6360000002 HC RX W HCPCS: Performed by: INTERNAL MEDICINE

## 2022-12-03 PROCEDURE — 90935 HEMODIALYSIS ONE EVALUATION: CPT

## 2022-12-03 PROCEDURE — 80048 BASIC METABOLIC PNL TOTAL CA: CPT

## 2022-12-03 PROCEDURE — 83735 ASSAY OF MAGNESIUM: CPT

## 2022-12-03 PROCEDURE — 2580000003 HC RX 258: Performed by: INTERNAL MEDICINE

## 2022-12-03 PROCEDURE — 82607 VITAMIN B-12: CPT

## 2022-12-03 PROCEDURE — 82728 ASSAY OF FERRITIN: CPT

## 2022-12-03 PROCEDURE — 36415 COLL VENOUS BLD VENIPUNCTURE: CPT

## 2022-12-03 PROCEDURE — 85025 COMPLETE CBC W/AUTO DIFF WBC: CPT

## 2022-12-03 RX ORDER — GUAIFENESIN 400 MG/1
400 TABLET ORAL 3 TIMES DAILY
Status: DISCONTINUED | OUTPATIENT
Start: 2022-12-03 | End: 2022-12-06 | Stop reason: HOSPADM

## 2022-12-03 RX ADMIN — PRAMIPEXOLE DIHYDROCHLORIDE 0.12 MG: 0.12 TABLET ORAL at 21:47

## 2022-12-03 RX ADMIN — APIXABAN 5 MG: 5 TABLET, FILM COATED ORAL at 21:47

## 2022-12-03 RX ADMIN — CEFTRIAXONE 1000 MG: 1 INJECTION, POWDER, FOR SOLUTION INTRAMUSCULAR; INTRAVENOUS at 07:17

## 2022-12-03 RX ADMIN — PRAMIPEXOLE DIHYDROCHLORIDE 0.12 MG: 0.12 TABLET ORAL at 10:03

## 2022-12-03 RX ADMIN — GUAIFENESIN 400 MG: 400 TABLET ORAL at 21:48

## 2022-12-03 RX ADMIN — SODIUM CHLORIDE, PRESERVATIVE FREE 10 ML: 5 INJECTION INTRAVENOUS at 10:01

## 2022-12-03 RX ADMIN — APIXABAN 5 MG: 5 TABLET, FILM COATED ORAL at 10:07

## 2022-12-03 RX ADMIN — Medication 1 CAPSULE: at 10:04

## 2022-12-03 RX ADMIN — TORSEMIDE 40 MG: 20 TABLET ORAL at 10:06

## 2022-12-03 RX ADMIN — METOPROLOL TARTRATE 50 MG: 50 TABLET ORAL at 21:47

## 2022-12-03 RX ADMIN — FLUTICASONE PROPIONATE 1 SPRAY: 50 SPRAY, METERED NASAL at 10:09

## 2022-12-03 RX ADMIN — CINACALCET HYDROCHLORIDE 90 MG: 30 TABLET, FILM COATED ORAL at 10:05

## 2022-12-03 RX ADMIN — IPRATROPIUM BROMIDE AND ALBUTEROL SULFATE 1 AMPULE: 2.5; .5 SOLUTION RESPIRATORY (INHALATION) at 20:39

## 2022-12-03 RX ADMIN — IPRATROPIUM BROMIDE AND ALBUTEROL SULFATE 1 AMPULE: 2.5; .5 SOLUTION RESPIRATORY (INHALATION) at 10:26

## 2022-12-03 RX ADMIN — BUSPIRONE HYDROCHLORIDE 2.5 MG: 5 TABLET ORAL at 10:06

## 2022-12-03 RX ADMIN — ARFORMOTEROL TARTRATE 15 MCG: 15 SOLUTION RESPIRATORY (INHALATION) at 10:27

## 2022-12-03 RX ADMIN — ACETAMINOPHEN 650 MG: 325 TABLET ORAL at 01:00

## 2022-12-03 RX ADMIN — BUDESONIDE 500 MCG: 0.5 SUSPENSION RESPIRATORY (INHALATION) at 20:38

## 2022-12-03 RX ADMIN — BUDESONIDE 500 MCG: 0.5 SUSPENSION RESPIRATORY (INHALATION) at 10:27

## 2022-12-03 RX ADMIN — DULOXETINE HYDROCHLORIDE 60 MG: 60 CAPSULE, DELAYED RELEASE ORAL at 10:06

## 2022-12-03 RX ADMIN — MIRTAZAPINE 7.5 MG: 15 TABLET, FILM COATED ORAL at 21:48

## 2022-12-03 RX ADMIN — BUSPIRONE HYDROCHLORIDE 2.5 MG: 5 TABLET ORAL at 21:47

## 2022-12-03 RX ADMIN — TRAZODONE HYDROCHLORIDE 50 MG: 50 TABLET ORAL at 21:48

## 2022-12-03 RX ADMIN — ATORVASTATIN CALCIUM 40 MG: 40 TABLET, FILM COATED ORAL at 10:07

## 2022-12-03 RX ADMIN — BENZONATATE 100 MG: 100 CAPSULE ORAL at 10:07

## 2022-12-03 RX ADMIN — ARFORMOTEROL TARTRATE 15 MCG: 15 SOLUTION RESPIRATORY (INHALATION) at 20:39

## 2022-12-03 RX ADMIN — IPRATROPIUM BROMIDE AND ALBUTEROL SULFATE 1 AMPULE: 2.5; .5 SOLUTION RESPIRATORY (INHALATION) at 10:27

## 2022-12-03 RX ADMIN — METOPROLOL TARTRATE 50 MG: 50 TABLET ORAL at 10:07

## 2022-12-03 NOTE — FLOWSHEET NOTE
12/03/22 1649   Vital Signs   BP (!) 118/51   Temp 97.4 °F (36.3 °C)   Heart Rate 81   Resp 20   Weight 249 lb 1.9 oz (113 kg)   Weight Method Bed scale   Percent Weight Change -1.74   Post-Hemodialysis Assessment   Post-Treatment Procedures Blood returned; Access bleeding time < 10 minutes   Machine Disinfection Process Acid/Vinegar Clean;Exterior Machine Disinfection;Bleach; Machine Absence of Bleach Machine;Verified Absence of Bleach in HCO3 Jug   Rinseback Volume (ml) 300 ml   Blood Volume Processed (Liters) 77.8 l/min   Dialyzer Clearance Clear   Duration of Treatment (minutes) 210 minutes   Hemodialysis Intake (ml) 300 ml   Hemodialysis Output (ml) 2300 ml   NET Removed (ml) 2000   Tolerated Treatment Good   Patient Response to Treatment tx terminated without complications. sites held and bandaged.   + bruit, + thrill   Time Off 1625   Patient Disposition Return to room

## 2022-12-03 NOTE — CONSULTS
[address]                  [address] [place],  [facility]                                  CONSULTATION    PATIENT NAME: Muriel Vasquez                    :        1955  MED REC NO:   [id number]                            ROOM:       7422  ACCOUNT NO:   [de-identified]                           ADMIT DATE: 2022  PROVIDER:     Keyana Casillas MD    CONSULT DATE:  2022    REASON FOR CONSULTATION:  End-stage renal disease. HISTORY OF PRESENT ILLNESS:  The patient is a 79-year-old female, known  to our service. She has history of end-stage renal disease and she  undergoes dialysis at [facility] dialysis unit. She presented to the hospital with chief complaint of low blood sugar. She is on dialysis Tuesday, Thursday, and Saturday, last dialysis was on  Tuesday. I did see her in the ED this morning. She was alert, oriented. She  appeared her baseline. She is telling me she found herself on the floor  and she was found to be significantly hypoglycemic and 911 was called. She reports no nausea, no vomiting, no diarrhea to me. REVIEW OF SYSTEMS:  A 12-point done, negative except for those mentioned  above. ALLERGIES:  Included ADHESIVE TAPE, PENICILLIN. PAST MEDICAL HISTORY:  Includes end-stage renal disease. PAST SURGICAL HISTORY:  Includes AV fistula insertion and colonoscopy. SOCIAL HISTORY:  No smoking. No drinking. FAMILY HISTORY:  Reviewed and noncontributory. MEDICATIONS AT HOME:  Include cinacalcet, Remeron, Cymbalta,  atorvastatin, Eliquis, Rythmol, metoprolol, Norvasc, Demadex. PHYSICAL EXAMINATION:  VITAL SIGNS:  Blood pressure 123/64, heart rate 87. HEAD:  Atraumatic, normocephalic. NECK:  Supple, symmetrical.  EYES:  Pupils round and reactive to light bilaterally. HEART:  Normal rate. ABDOMEN:  Soft, nontender. Bowel sounds active. No organomegaly. LUNGS:  Clear to auscultation.   SKIN: Dry.  PSYCHIATRIC:  Cooperative. NEUROLOGICAL:  Cranial nerves grossly intact, II through XII. BLOOD WORK:  Potassium 5.4, creatinine 9.4. Hemoglobin 11. ASSESSMENT:  1.  End-stage renal disease, on hemodialysis Tuesday, Thursday, and  Saturday, did miss her Thursday treatment, we will plan to proceed with  dialysis today. 2.  _____ related to hypoglycemia, seems to have resolved. She is on  glipizide long acting at home. Consideration to hold on glipizide _____  dose, would leave the decision to the primary team.  3.  Hypertension. Her blood pressure actually is on the low side. She  is on Norvasc 5 mg. She is also on metoprolol 50 mg b.i.d. along with  Demadex. I will go ahead and stop Norvasc and see how she feels off  Norvasc. She is to continue metoprolol. 4.  Mineral bone disease. Continue current dose of calcitriol,  adjustment to made while at the dialysis unit. 5.  Anemia of chronic disease. Hemoglobin of 12. No need for Retacrit. 6.  Urinary tract infection, being managed by the primary team.  7.  We will follow along with you.         Alondra Magana MD    D: 12/02/2022 15:34:50       T: 12/02/2022 16:22:59     [email or web address]  Job#: [id number]     Doc#: [id number]    CC:

## 2022-12-03 NOTE — PROGRESS NOTES
Hospitalist Progress Note      SYNOPSIS: Patient admitted on 2022 for SOB and hypoglycemia. SUBJECTIVE:    Patient seen and examined. Feeling better. Still with cough. Temp (24hrs), Av °F (36.1 °C), Min:96.7 °F (35.9 °C), Max:97.5 °F (36.4 °C)    DIET: ADULT DIET; Regular; Low Sodium (2 gm); Low Potassium (Less than 3000 mg/day)  CODE: Full Code    Intake/Output Summary (Last 24 hours) at 12/3/2022 1317  Last data filed at 12/3/2022 1045  Gross per 24 hour   Intake 420 ml   Output 2044 ml   Net -1624 ml       OBJECTIVE:    BP (!) 155/65   Pulse 86   Temp 97.5 °F (36.4 °C)   Resp 18   Ht 5' 7\" (1.702 m)   Wt 253 lb 8.5 oz (115 kg)   SpO2 96%   BMI 39.71 kg/m²     General appearance: No apparent distress, appears stated age and cooperative. HEENT:  Conjunctivae/corneas clear. Neck: Supple. No jugular venous distention. Respiratory: Improved inspiratory effort. Cardiovascular: Regular rate rhythm, normal S1-S2  Abdomen: Soft, nontender, nondistended  Musculoskeletal: No clubbing, cyanosis, no bilateral lower extremity edema. Brisk capillary refill. Skin:  No rashes  on visible skin  Neurologic: awake, alert and following commands     ASSESSMENT:    78y.o. year old female  who  has a past medical history of Atrial fibrillation ([address] Bladder cancer ([address] Cancer ([address] CKD (chronic kidney disease), CKD (chronic kidney disease) stage 5, GFR less than 15 ml/min ([address] Diabetes ([address] Hypertension, Obesity, and Sleep apnea. Presented to the hospital with complaints generalized weakness, hypoglycemia and dysuria. PLAN:  1.) Weakness and slurred speech most likely secondary to hypoglycemia and UTI. Symptoms have resolved. TSH And B 12 ordered. 2.) COPD exacerbation/Cough: Chest was negative. Patient currently vapes. Duo nebs/Pulmicort ordered. Smoker's cough. Respiratory swab shows: human rhinovirus. Mucinex ordered. 3.) UTI: urine cx ordered- pending. C/w Rocephin.    4.) ESRD: Nephrology consult- S/p dialysis yesterday. 5. Hypoglycemia: Due to poor intake and taking he diabetic medications. Hold glipizide. Check BS and hypoglycemic protocol is in place. BS low this morning- monitor- if there is still difficulties then will consider endocrine consult. 6.) Smoking cessation: nicoderm patch.   7.) Afib: on Eliquis- propafenone/metoprolol/Norvasc. 8.) HLD: c/w statins   9.) HTN: A little uncontrolled this morning; Will monitor- patient is a due a cardiac medication as well.   9.) DVT proph: eliquis      DISPOSITION: Home when medically clear.      Medications:  REVIEWED DAILY      Infusion Medications    dextrose      sodium chloride       Scheduled Medications    guaiFENesin  400 mg Oral TID    cefTRIAXone (ROCEPHIN) IV  1,000 mg IntraVENous Q24H    lactobacillus  1 capsule Oral Daily    sodium chloride flush  5-40 mL IntraVENous 2 times per day    nicotine  1 patch TransDERmal Daily    ipratropium-albuterol  1 ampule Inhalation Q4H WA    budesonide  0.5 mg Nebulization BID    apixaban  5 mg Oral BID    atorvastatin  40 mg Oral Daily    busPIRone  2.5 mg Oral BID    [START ON 12/5/2022] calcitRIOL  0.25 mcg Oral Once per day on Mon Tue Wed Thu Fri    torsemide  40 mg Oral Daily    metoprolol tartrate  50 mg Oral BID    propafenone  325 mg Oral BID    DULoxetine  60 mg Oral Daily    mirtazapine  7.5 mg Oral Nightly    cinacalcet  90 mg Oral Daily    traZODone  50 mg Oral Nightly    fluticasone  1 spray Each Nostril Daily    Arformoterol Tartrate  15 mcg Nebulization BID    pramipexole  0.125 mg Oral TID     PRN Meds: dextrose, glucose, dextrose bolus **OR** dextrose bolus, glucagon (rDNA), dextrose, sodium chloride flush, sodium chloride, ondansetron **OR** ondansetron, polyethylene glycol, acetaminophen **OR** acetaminophen, benzonatate        Labs:     Recent Labs     12/02/22  0439 12/03/22  0653   WBC 12.7* 9.1   HGB 11.2* 10.6*   HCT 35.2 33.8*    241       Recent Labs 12/02/22  0439 12/03/22  0653    135   K 5.4* 4.3   CL 97* 90*   CO2 24 30*   BUN 49* 18   CREATININE 9.4* 5.3*   CALCIUM 8.9 9.9   PHOS  --  6.6*       No results for input(s): PROT, ALB, ALKPHOS, ALT, AST, BILITOT, AMYLASE, LIPASE in the last 72 hours. No results for input(s): INR in the last 72 hours. No results for input(s): [place] in the last 72 hours. Chronic labs:    Lab Results   Component Value Date    CHOL 172 11/06/2018    TRIG 211 (H) 11/06/2018    HDL 37 11/06/2018    LDLCALC 93 11/06/2018    TSH 0.804 01/31/2019    INR 1.5 10/20/2021    LABA1C 5.3 12/02/2022       Radiology: REVIEWED DAILY    +++++++++++++++++++++++++++++++++++++++++++++++++  Anamaria Del Rosario MD  [facility] Physician - [address] [place], [place]  +++++++++++++++++++++++++++++++++++++++++++++++++  NOTE: This report was transcribed using voice recognition software. Every effort was made to ensure accuracy; however, inadvertent computerized transcription errors may be present.

## 2022-12-03 NOTE — PROGRESS NOTES
Occupational Therapy    OT order received and chart reviewed. Despite minor limp, pt observed to complete ADLs/functional mobility/transfers Mod I, demo'ing good balance/safety awareness. Pt reports no needed DME for home. Pt demonstrates no OT needs at this time. OT order discontinued. Please re-consult if there is a change in functional status.     RITO Gibbs, OTR/L [id number]

## 2022-12-04 LAB
ANION GAP SERPL CALCULATED.3IONS-SCNC: 14 MMOL/L (ref 7–16)
BASOPHILS ABSOLUTE: 0.07 E9/L (ref 0–0.2)
BASOPHILS RELATIVE PERCENT: 0.7 % (ref 0–2)
BUN BLDV-MCNC: 12 MG/DL (ref 6–23)
CALCIUM SERPL-MCNC: 9.9 MG/DL (ref 8.6–10.2)
CHLORIDE BLD-SCNC: 86 MMOL/L (ref 98–107)
CO2: 30 MMOL/L (ref 22–29)
CREAT SERPL-MCNC: 3.8 MG/DL (ref 0.5–1)
EOSINOPHILS ABSOLUTE: 0.11 E9/L (ref 0.05–0.5)
EOSINOPHILS RELATIVE PERCENT: 1.1 % (ref 0–6)
GFR SERPL CREATININE-BSD FRML MDRD: 12 ML/MIN/1.73
GLUCOSE BLD-MCNC: 101 MG/DL (ref 74–99)
HCT VFR BLD CALC: 30.4 % (ref 34–48)
HEMOGLOBIN: 9.8 G/DL (ref 11.5–15.5)
IMMATURE GRANULOCYTES #: 0.08 E9/L
IMMATURE GRANULOCYTES %: 0.8 % (ref 0–5)
IRON SATURATION: 29 % (ref 15–50)
IRON: 82 MCG/DL (ref 37–145)
LYMPHOCYTES ABSOLUTE: 2.95 E9/L (ref 1.5–4)
LYMPHOCYTES RELATIVE PERCENT: 29.2 % (ref 20–42)
MAGNESIUM: 2 MG/DL (ref 1.6–2.6)
MCH RBC QN AUTO: 34.1 PG (ref 26–35)
MCHC RBC AUTO-ENTMCNC: 32.2 % (ref 32–34.5)
MCV RBC AUTO: 105.9 FL (ref 80–99.9)
METER GLUCOSE: 116 MG/DL (ref 74–99)
METER GLUCOSE: 148 MG/DL (ref 74–99)
METER GLUCOSE: 173 MG/DL (ref 74–99)
METER GLUCOSE: 349 MG/DL (ref 74–99)
MONOCYTES ABSOLUTE: 1.18 E9/L (ref 0.1–0.95)
MONOCYTES RELATIVE PERCENT: 11.7 % (ref 2–12)
NEUTROPHILS ABSOLUTE: 5.73 E9/L (ref 1.8–7.3)
NEUTROPHILS RELATIVE PERCENT: 56.5 % (ref 43–80)
PDW BLD-RTO: 13.6 FL (ref 11.5–15)
PHOSPHORUS: 6 MG/DL (ref 2.5–4.5)
PLATELET # BLD: 249 E9/L (ref 130–450)
PMV BLD AUTO: 9.1 FL (ref 7–12)
POTASSIUM SERPL-SCNC: 4.4 MMOL/L (ref 3.5–5)
RBC # BLD: 2.87 E12/L (ref 3.5–5.5)
SODIUM BLD-SCNC: 130 MMOL/L (ref 132–146)
TOTAL IRON BINDING CAPACITY: 283 MCG/DL (ref 250–450)
TSH SERPL DL<=0.05 MIU/L-ACNC: 1.23 UIU/ML (ref 0.27–4.2)
VITAMIN B-12: 520 PG/ML (ref 211–946)
WBC # BLD: 10.1 E9/L (ref 4.5–11.5)

## 2022-12-04 PROCEDURE — 84100 ASSAY OF PHOSPHORUS: CPT

## 2022-12-04 PROCEDURE — 6370000000 HC RX 637 (ALT 250 FOR IP): Performed by: INTERNAL MEDICINE

## 2022-12-04 PROCEDURE — 82962 GLUCOSE BLOOD TEST: CPT

## 2022-12-04 PROCEDURE — 2580000003 HC RX 258: Performed by: INTERNAL MEDICINE

## 2022-12-04 PROCEDURE — 83735 ASSAY OF MAGNESIUM: CPT

## 2022-12-04 PROCEDURE — 94640 AIRWAY INHALATION TREATMENT: CPT

## 2022-12-04 PROCEDURE — 36415 COLL VENOUS BLD VENIPUNCTURE: CPT

## 2022-12-04 PROCEDURE — 84443 ASSAY THYROID STIM HORMONE: CPT

## 2022-12-04 PROCEDURE — 6360000002 HC RX W HCPCS: Performed by: INTERNAL MEDICINE

## 2022-12-04 PROCEDURE — 85025 COMPLETE CBC W/AUTO DIFF WBC: CPT

## 2022-12-04 PROCEDURE — 2060000000 HC ICU INTERMEDIATE R&B

## 2022-12-04 PROCEDURE — 80048 BASIC METABOLIC PNL TOTAL CA: CPT

## 2022-12-04 RX ORDER — LACTOBACILLUS RHAMNOSUS GG 10B CELL
1 CAPSULE ORAL DAILY
Qty: 10 CAPSULE | Refills: 0 | Status: CANCELLED | OUTPATIENT
Start: 2022-12-05 | End: 2022-12-15

## 2022-12-04 RX ORDER — PREDNISONE 20 MG/1
40 TABLET ORAL DAILY
Qty: 10 TABLET | Refills: 0 | Status: CANCELLED | OUTPATIENT
Start: 2022-12-04 | End: 2022-12-09

## 2022-12-04 RX ORDER — GLIPIZIDE 5 MG/1
2.5 TABLET ORAL
Status: DISCONTINUED | OUTPATIENT
Start: 2022-12-05 | End: 2022-12-05

## 2022-12-04 RX ORDER — PREDNISONE 20 MG/1
40 TABLET ORAL DAILY
Status: DISCONTINUED | OUTPATIENT
Start: 2022-12-04 | End: 2022-12-04

## 2022-12-04 RX ORDER — GLIPIZIDE 5 MG/1
2.5 TABLET ORAL
Qty: 15 TABLET | Refills: 0 | Status: CANCELLED | OUTPATIENT
Start: 2022-12-04 | End: 2023-01-03

## 2022-12-04 RX ORDER — PREDNISONE 20 MG/1
20 TABLET ORAL DAILY
Status: DISCONTINUED | OUTPATIENT
Start: 2022-12-05 | End: 2022-12-05

## 2022-12-04 RX ADMIN — APIXABAN 5 MG: 5 TABLET, FILM COATED ORAL at 21:16

## 2022-12-04 RX ADMIN — IPRATROPIUM BROMIDE AND ALBUTEROL SULFATE 1 AMPULE: 2.5; .5 SOLUTION RESPIRATORY (INHALATION) at 16:53

## 2022-12-04 RX ADMIN — ARFORMOTEROL TARTRATE 15 MCG: 15 SOLUTION RESPIRATORY (INHALATION) at 09:20

## 2022-12-04 RX ADMIN — BUDESONIDE 500 MCG: 0.5 SUSPENSION RESPIRATORY (INHALATION) at 09:20

## 2022-12-04 RX ADMIN — METOPROLOL TARTRATE 50 MG: 50 TABLET ORAL at 08:52

## 2022-12-04 RX ADMIN — SODIUM CHLORIDE, PRESERVATIVE FREE 10 ML: 5 INJECTION INTRAVENOUS at 08:56

## 2022-12-04 RX ADMIN — SODIUM CHLORIDE, PRESERVATIVE FREE 10 ML: 5 INJECTION INTRAVENOUS at 21:16

## 2022-12-04 RX ADMIN — TORSEMIDE 40 MG: 20 TABLET ORAL at 08:53

## 2022-12-04 RX ADMIN — IPRATROPIUM BROMIDE AND ALBUTEROL SULFATE 1 AMPULE: 2.5; .5 SOLUTION RESPIRATORY (INHALATION) at 09:20

## 2022-12-04 RX ADMIN — PRAMIPEXOLE DIHYDROCHLORIDE 0.12 MG: 0.12 TABLET ORAL at 08:52

## 2022-12-04 RX ADMIN — ATORVASTATIN CALCIUM 40 MG: 40 TABLET, FILM COATED ORAL at 08:51

## 2022-12-04 RX ADMIN — FLUTICASONE PROPIONATE 1 SPRAY: 50 SPRAY, METERED NASAL at 08:57

## 2022-12-04 RX ADMIN — ACETAMINOPHEN 650 MG: 325 TABLET ORAL at 23:41

## 2022-12-04 RX ADMIN — CINACALCET HYDROCHLORIDE 90 MG: 30 TABLET, FILM COATED ORAL at 08:53

## 2022-12-04 RX ADMIN — GUAIFENESIN 400 MG: 400 TABLET ORAL at 08:51

## 2022-12-04 RX ADMIN — PRAMIPEXOLE DIHYDROCHLORIDE 0.12 MG: 0.12 TABLET ORAL at 15:11

## 2022-12-04 RX ADMIN — CEFTRIAXONE 1000 MG: 1 INJECTION, POWDER, FOR SOLUTION INTRAMUSCULAR; INTRAVENOUS at 08:49

## 2022-12-04 RX ADMIN — PREDNISONE 40 MG: 20 TABLET ORAL at 15:11

## 2022-12-04 RX ADMIN — MIRTAZAPINE 7.5 MG: 15 TABLET, FILM COATED ORAL at 21:15

## 2022-12-04 RX ADMIN — APIXABAN 5 MG: 5 TABLET, FILM COATED ORAL at 08:51

## 2022-12-04 RX ADMIN — BUSPIRONE HYDROCHLORIDE 2.5 MG: 5 TABLET ORAL at 08:51

## 2022-12-04 RX ADMIN — Medication 1 CAPSULE: at 08:53

## 2022-12-04 RX ADMIN — GUAIFENESIN 400 MG: 400 TABLET ORAL at 21:16

## 2022-12-04 RX ADMIN — BUSPIRONE HYDROCHLORIDE 2.5 MG: 5 TABLET ORAL at 21:16

## 2022-12-04 RX ADMIN — PRAMIPEXOLE DIHYDROCHLORIDE 0.12 MG: 0.12 TABLET ORAL at 21:15

## 2022-12-04 RX ADMIN — GUAIFENESIN 400 MG: 400 TABLET ORAL at 15:11

## 2022-12-04 RX ADMIN — IPRATROPIUM BROMIDE AND ALBUTEROL SULFATE 1 AMPULE: 2.5; .5 SOLUTION RESPIRATORY (INHALATION) at 13:06

## 2022-12-04 RX ADMIN — METOPROLOL TARTRATE 50 MG: 50 TABLET ORAL at 21:16

## 2022-12-04 RX ADMIN — DULOXETINE HYDROCHLORIDE 60 MG: 60 CAPSULE, DELAYED RELEASE ORAL at 08:51

## 2022-12-04 RX ADMIN — TRAZODONE HYDROCHLORIDE 50 MG: 50 TABLET ORAL at 21:15

## 2022-12-04 ASSESSMENT — PAIN SCALES - GENERAL
PAINLEVEL_OUTOF10: 5
PAINLEVEL_OUTOF10: 0

## 2022-12-04 NOTE — PROGRESS NOTES
[facility] MD Ariella Melissa MD Saundra Saucer, MD Gilles Snipe, REX Naylor, HARRY Hunt CNP  Progress Note  12/3/2022    SUBJECTIVE:  We are following this patient for end-stage renal failure . 12/3: Seen on dialysis. Tolerating therapy. BP stable. BFR as prescribed. Symptomatic hypoglycemia towards the end of treatment. Otherwise treatment was unremarkable. Ongoing fatigue but no other complaint.       PROBLEM LIST:    Patient Active Problem List   Diagnosis    Moderate episode of recurrent major depressive disorder (HCC)    Generalized anxiety disorder    Insomnia due to mental condition    Anemia    Essential hypertension    CKD (chronic kidney disease) stage 5, GFR less than 15 ml/min (HCC)    Atrial fibrillation ([address]    Pure hypercholesterolemia    Morbid obesity ([address]    Chronic obstructive pulmonary disease ([address]    Obstructive sleep apnea    Exertional dyspnea    History of colon polyps    Family history of rectal cancer    COVID-19    Pneumonia due to COVID-19 virus    Hypoglycemia        PAST MEDICAL HISTORY:    Past Medical History:   Diagnosis Date    Atrial fibrillation ([address]     Bladder cancer ([address]     Cancer ([address]     CKD (chronic kidney disease)     CKD (chronic kidney disease) stage 5, GFR less than 15 ml/min ([address] 11/6/2018    Diabetes ([address]     Hypertension     Obesity     260 #    Sleep apnea        MEDS (scheduled):   guaiFENesin  400 mg Oral TID    cefTRIAXone (ROCEPHIN) IV  1,000 mg IntraVENous Q24H    lactobacillus  1 capsule Oral Daily    sodium chloride flush  5-40 mL IntraVENous 2 times per day    nicotine  1 patch TransDERmal Daily    ipratropium-albuterol  1 ampule Inhalation Q4H WA    budesonide  0.5 mg Nebulization BID    apixaban  5 mg Oral BID    atorvastatin  40 mg Oral Daily    busPIRone  2.5 mg Oral BID    [START ON 12/5/2022] calcitRIOL  0.25 mcg Oral Once per day on Mon Tue Wed Thu Fri    torsemide  40 mg Oral Daily    metoprolol tartrate  50 mg Oral BID    propafenone  325 mg Oral BID    DULoxetine  60 mg Oral Daily    mirtazapine  7.5 mg Oral Nightly    cinacalcet  90 mg Oral Daily    traZODone  50 mg Oral Nightly    fluticasone  1 spray Each Nostril Daily    Arformoterol Tartrate  15 mcg Nebulization BID    pramipexole  0.125 mg Oral TID       MEDS (infusions):   dextrose      sodium chloride         MEDS (prn):  dextrose, glucose, dextrose bolus **OR** dextrose bolus, glucagon (rDNA), dextrose, sodium chloride flush, sodium chloride, ondansetron **OR** ondansetron, polyethylene glycol, acetaminophen **OR** acetaminophen, benzonatate    DIET:    ADULT DIET; Regular; Low Sodium (2 gm); Low Potassium (Less than 3000 mg/day)      PHYSICAL EXAM:      Patient Vitals for the past 24 hrs:   BP Temp Pulse Resp SpO2 Weight   12/03/22 1719 137/61 97.1 °F (36.2 °C) 84 18 94 % --   12/03/22 1649 (!) 118/51 97.4 °F (36.3 °C) 81 20 -- 249 lb 1.9 oz (113 kg)   12/03/22 1600 (!) 122/57 -- 89 -- -- --   12/03/22 1530 (!) 116/57 -- 84 -- -- --   12/03/22 1501 (!) 112/58 -- 82 -- -- --   12/03/22 1430 (!) 122/56 -- 83 -- -- --   12/03/22 1400 (!) 107/42 -- 74 -- -- --   12/03/22 1330 130/62 -- 88 -- -- --   12/03/22 1258 (!) 155/65 -- 86 -- -- --   12/03/22 1248 (!) 146/76 97.5 °F (36.4 °C) 93 -- -- 253 lb 8.5 oz (115 kg)   12/03/22 0748 (!) 110/54 97 °F (36.1 °C) 91 18 96 % --   12/02/22 2045 (!) 111/47 (!) 96.7 °F (35.9 °C) 90 18 96 % --          Intake/Output Summary (Last 24 hours) at 12/3/2022 1940  Last data filed at 12/3/2022 1719  Gross per 24 hour   Intake 570 ml   Output 2300 ml   Net -1730 ml       Wt Readings from Last 3 Encounters:   12/03/22 249 lb 1.9 oz (113 kg)   11/14/22 249 lb (112.9 kg)   09/14/22 250 lb (113.4 kg)       General:  in no acute distress  HEENT: NC/AT, EOMI, sclera and conjunctiva are clear and anicteric.   Mucous membranes moist.  Cardiovascular: regular rate and rhythm, no murmurs, gallops, or rubs  Respiratory:  Clear, no rales, rhochi, or wheezes  Gastrointestinal: soft, nontender, nondistended, NABS  Ext: no cyanosis, clubbing, or edema bilateral lower extremities  Neuro: awake, alert, oriented x3. Moves all 4 extremities. Cranial nerves II through XII grossly intact. Skin: dry, no rash      DATA:      Recent Labs     12/02/22  0439 12/03/22  0653   WBC 12.7* 9.1   HGB 11.2* 10.6*   HCT 35.2 33.8*   .7* 108.3*    241     Recent Labs     12/02/22  0439 12/03/22  0653    135   K 5.4* 4.3   CL 97* 90*   CO2 24 30*   BUN 49* 18   CREATININE 9.4* 5.3*       Iron studies:  Lab Results   Component Value Date    FERRITIN 542 12/03/2022     Bone disease:  Lab Results   Component Value Date     (H) 06/17/2021    MG 2.1 12/03/2022    PHOS 6.6 (H) 12/03/2022         DIALYSIS ORDERS:    Reviewed      ASSESSMENT / RECOMMENDATIONS:     1. ESRD due to diabetic nephropathy   cont diffusive IHD support for solute and volume clearance per orders Tuesdays Thursdays and Saturdays  2. Anemia due to ESRD   Cont. epo and iv ferrlecit per orders  3. Secondary Hyperparathyroidism due to ESRD   Cont vitamin d analog  4. Hypertension:   Cont current tx  5.   Hypoglycemia   Management per primary team.  Recommend holding glipizide, at discharge change to short acting agents    Tootie Grimm MD, MD  12/3/2022

## 2022-12-04 NOTE — DISCHARGE SUMMARY
Discharge Summary    Admit date: 12/2/2022    Discharge date and time: 12/6/2022      Admitting Physician: Antonella Angeles MD     Consultants:  Nephrology    Discharge Diagnoses AND Hospital Course:  1.) Hypoglycemia   2.) Weakness   3.) COPD exacerbation   4.) Rhinovirus URI   5.) UTI   6.) ESRD   7.) Smoking cessation     78year-old female with a past medical history of Atrial fibrillation ([facility] Bladder cancer ([address] Cancer ([address] CKD (chronic kidney disease), CKD (chronic kidney disease) stage 5, GFR less than 15 ml/min ([address] Diabetes ([address] Hypertension, Obesity, and Sleep apnea. Presented to the hospital with complaints generalized weakness, hypoglycemia and dysuria. The patient states that she normally sleeps on the couch and she states that she found herself on the floor and she could not get-up at 3 am (secondary to generalized weakness); she is unsure whether she fell. She states that she had slurred speech and called for her grand-daughter. They called 911 and the ambulance brought the patient to the ER. She denied any numbness or tingling in her hand and feet at this time. She denies any fever or chills. She admits to having a cough with white foamy sputum for the last few years. She denies any CP or SOB. No abd pain. Sh estates that she was having diarrhea x 2 days and did not go to dialysis- it has since resolved. She states that she does urinate and noticed that she was having dysuria x 1 week now. At home, EMS reported that the patient's blood sugar was 25; she states that ever since COVID 10/21 that she has a decreased appetite and that she only ate 2 hot pockets and took glipizide prior to bed. The patient's glipizide was held and the patient was blood sugars started to improve. HbA1c was noted to be: 5.3. The patient's glipizide XL will be discontinued upon discharge.  The patient was asked to keep a journal of fasting blood sugars and her blood sugars prior to lunch and dinner as well as a bedtime blood sugar to show her PCP. She wa started on immediate release Glucotrol 2.5 mg with breakfast.     The patient was noted to have a UTI and she was given antibiotics to complete a 5 day course. E.coli. She was started on rocephin- 3 days and will finish a 5 day course with PO antibiotics. She was also noted to have a URI with rhinovirus and was given supportive treatment. She had a COPD exacerbation and she was treated with inhaled bronchodilators and anti-tussives. Will need Advair and a rescue inhaler upon D/c. She was seen by nephrology and was able to undergo dialysis during her hospital stay. Cleared by nephrology for D/c. Discharge Exam:  Vitals:    12/06/22 1127   BP: 129/62   Pulse: 71   Resp: 18   Temp: 97 °F (36.1 °C)   SpO2: 92%     General appearance: No apparent distress, appears stated age and cooperative. HEENT:  Conjunctivae/corneas clear. Neck: Supple. No jugular venous distention. Respiratory: Improved inspiratory effort. Cardiovascular: Regular rate rhythm, normal S1-S2  Abdomen: Soft, nontender, nondistended  Musculoskeletal: No clubbing, cyanosis, no bilateral lower extremity edema. Brisk capillary refill. Skin:  No rashes  on visible skin  Neurologic: awake, alert and following commands       Disposition: home  The patient's condition is improved. At this time the patient is without objective evidence of an acute process requiring continuing hospitalization or inpatient management. They are stable for discharge with outpatient follow-up. I have spoken with the patient and discussed the results of the current hospitalization, in addition to providing specific details for the plan of care and counseling regarding the diagnosis and prognosis. The plan has been discussed in detail and they are aware of the specific conditions for emergent return, as well as the importance of follow-up.   Their questions are answered at this time and they are agreeable with the plan for discharge to home. Patient Instructions:   Future Appointments   Date Time Provider Abhishek Singh   2/20/2023 12:30 PM Nikolas Tinajero  [address]     Please call you Primary care provider and see her within 1 week; please write down your fasting blood sugars and the Blood sugars prior to meals as well as a bedtime blood sugar to take to your primary care provider. Please F/u with scheduled dialysis sessions. Discharge Medications:     Medication List        START taking these medications      albuterol sulfate  (90 Base) MCG/ACT inhaler  Commonly known as: Ventolin HFA  Inhale 2 puffs into the lungs 4 times daily as needed for Wheezing     benzonatate 100 MG capsule  Commonly known as: TESSALON  Take 1 capsule by mouth 3 times daily as needed for Cough     fluticasone 50 MCG/ACT nasal spray  Commonly known as: FLONASE  1 spray by Each Nostril route daily as needed for Rhinitis     fluticasone-salmeterol 250-50 MCG/ACT Aepb diskus inhaler  Commonly known as: Advair Diskus  Inhale 1 puff into the lungs in the morning and 1 puff in the evening.      glipiZIDE 5 MG tablet  Commonly known as: GLUCOTROL  Take 0.5 tablets by mouth daily (with breakfast)  Replaces: glipiZIDE 5 MG extended release tablet     levoFLOXacin 250 MG tablet  Commonly known as: LEVAQUIN  Take 1 tablet by mouth every 48 hours for 1 dose  Start taking on: December 7, 2022            CONTINUE taking these medications      amLODIPine 5 MG tablet  Commonly known as: NORVASC  TAKE 1 TABLET BY MOUTH DAILY     apixaban 5 MG Tabs tablet  Commonly known as: Eliquis  Take 1 tablet by mouth 2 times daily     atorvastatin 40 MG tablet  Commonly known as: LIPITOR  Take 1 tablet by mouth daily     busPIRone 5 MG tablet  Commonly known as: BUSPAR  Take 0.5 tablets by mouth 2 times daily     calcitRIOL 0.25 MCG capsule  Commonly known as: ROCALTROL  TAKE 1 CAPSULE BY MOUTH DAILY MONDAY THROUGH FRIDAY     cinacalcet 90 MG tablet  Commonly known as: SENSIPAR     DULoxetine 60 MG extended release capsule  Commonly known as: CYMBALTA  TAKE 1 CAPSULE BY MOUTH DAILY     metoprolol tartrate 50 MG tablet  Commonly known as: LOPRESSOR  TAKE 1 TABLET BY MOUTH TWICE A DAY     mirtazapine 7.5 MG tablet  Commonly known as: REMERON  Take 1 tablet by mouth nightly     ondansetron 4 MG tablet  Commonly known as: ZOFRAN  Take 1 tablet by mouth every 12 hours as needed for Nausea or Vomiting     pramipexole 0.125 MG tablet  Commonly known as: MIRAPEX  Take 1 tablet by mouth 3 times daily     propafenone 325 MG extended release capsule  Commonly known as: RYTHMOL SR  Take 1 capsule by mouth 2 times daily     torsemide 20 MG tablet  Commonly known as: DEMADEX  Take 2 tablets by mouth daily     traZODone 50 MG tablet  Commonly known as: DESYREL  Take 1 tablet by mouth nightly            STOP taking these medications      glipiZIDE 5 MG extended release tablet  Commonly known as: GLUCOTROL XL  Replaced by: glipiZIDE 5 MG tablet               Where to Get Your Medications        These medications were sent to [address] [place] [address] [phone]  [address] [place] [address]      Phone: [phone]   albuterol sulfate  (90 Base) MCG/ACT inhaler  benzonatate 100 MG capsule  fluticasone 50 MCG/ACT nasal spray  fluticasone-salmeterol 250-50 MCG/ACT Aepb diskus inhaler  glipiZIDE 5 MG tablet  levoFLOXacin 250 MG tablet         Activity: activity as tolerated    Diet: diabetic diet and renal diet    Wound Care: none needed    Follow-up:    This patient is instructed to follow-up with her primary care physician. Patient is instructed to follow-up with the consults listed above as directed by them. They are instructed to resume home medications and take new medications as indicated in the list above.   If the patient has a recurrence of symptoms, they are instructed to go to the ED. Preparing for this patient's discharge, including paperwork, orders, instructions, and meeting with patient did require > 30 minutes.     Elise Ryan MD   12:40 PM  12/6/2022

## 2022-12-04 NOTE — PROGRESS NOTES
Hospitalist Progress Note      SYNOPSIS: Patient admitted on 2022 for SOB and hypoglycemia. SUBJECTIVE:    Patient seen and examined. Feeling better. Still with cough. Temp (24hrs), Av.1 °F (36.2 °C), Min:96.8 °F (36 °C), Max:97.4 °F (36.3 °C)    DIET: ADULT DIET; Regular; Low Sodium (2 gm); Low Potassium (Less than 3000 mg/day)  CODE: Full Code    Intake/Output Summary (Last 24 hours) at 2022 1409  Last data filed at 2022 1009  Gross per 24 hour   Intake 1080 ml   Output 2300 ml   Net -1220 ml         OBJECTIVE:    BP (!) 111/55   Pulse 72   Temp 97 °F (36.1 °C) (Temporal)   Resp 18   Ht 5' 7\" (1.702 m)   Wt 249 lb 1.9 oz (113 kg)   SpO2 94%   BMI 39.02 kg/m²     General appearance: No apparent distress, appears stated age and cooperative. HEENT:  Conjunctivae/corneas clear. Neck: Supple. No jugular venous distention. Respiratory: Improved inspiratory effort. Cardiovascular: Regular rate rhythm, normal S1-S2  Abdomen: Soft, nontender, nondistended  Musculoskeletal: No clubbing, cyanosis, no bilateral lower extremity edema. Brisk capillary refill. Skin:  No rashes  on visible skin  Neurologic: awake, alert and following commands     ASSESSMENT:    78y.o. year old female  who  has a past medical history of Atrial fibrillation ([address] Bladder cancer ([address] Cancer ([address] CKD (chronic kidney disease), CKD (chronic kidney disease) stage 5, GFR less than 15 ml/min ([address] Diabetes ([address] Hypertension, Obesity, and Sleep apnea. Presented to the hospital with complaints generalized weakness, hypoglycemia and dysuria. PLAN:  1.) Weakness and slurred speech most likely secondary to hypoglycemia and UTI. Symptoms have resolved. TSH And B 12 are normal.   2.) COPD exacerbation/Cough: Chest was negative. Patient currently vapes. Duo nebs/Pulmicort ordered. Smoker's cough. Respiratory swab shows: human rhinovirus. Mucinex ordered. Start prednisone 40 mg daily x 5 days.  Flutter valve.   3.) UTI: urine cx ordered- pending speciation; GNR. C/w Rocephin. 4.) ESRD: Nephrology consult- S/p dialysis yesterday. 5. Hypoglycemia: Due to poor intake and taking he diabetic medications. Hold glipizide. Check BS and hypoglycemic protocol is in place. BS improving. Plan for Glucotrol 2.5 daily with breakfast as an outpatient. 6.) Smoking cessation: nicoderm patch.   7.) Afib: on Eliquis- propafenone/metoprolol/Norvasc. 8.) HLD: c/w statins   9.) HTN: improved. 10.) DVT proph: eliquis    Most likely D/c in Am when speciation and sensitivities of UA are available. DISPOSITION: Home when medically clear.      Medications:  REVIEWED DAILY      Infusion Medications    dextrose      sodium chloride       Scheduled Medications    predniSONE  40 mg Oral Daily    guaiFENesin  400 mg Oral TID    cefTRIAXone (ROCEPHIN) IV  1,000 mg IntraVENous Q24H    lactobacillus  1 capsule Oral Daily    sodium chloride flush  5-40 mL IntraVENous 2 times per day    nicotine  1 patch TransDERmal Daily    ipratropium-albuterol  1 ampule Inhalation Q4H WA    budesonide  0.5 mg Nebulization BID    apixaban  5 mg Oral BID    atorvastatin  40 mg Oral Daily    busPIRone  2.5 mg Oral BID    [START ON 12/5/2022] calcitRIOL  0.25 mcg Oral Once per day on Mon Tue Wed Thu Fri    torsemide  40 mg Oral Daily    metoprolol tartrate  50 mg Oral BID    propafenone  325 mg Oral BID    DULoxetine  60 mg Oral Daily    mirtazapine  7.5 mg Oral Nightly    cinacalcet  90 mg Oral Daily    traZODone  50 mg Oral Nightly    fluticasone  1 spray Each Nostril Daily    Arformoterol Tartrate  15 mcg Nebulization BID    pramipexole  0.125 mg Oral TID     PRN Meds: dextrose, glucose, dextrose bolus **OR** dextrose bolus, glucagon (rDNA), dextrose, sodium chloride flush, sodium chloride, ondansetron **OR** ondansetron, polyethylene glycol, acetaminophen **OR** acetaminophen, benzonatate        Labs:     Recent Labs     12/02/22  9758 12/03/22  0653 12/04/22  0725   WBC 12.7* 9.1 10.1   HGB 11.2* 10.6* 9.8*   HCT 35.2 33.8* 30.4*    241 249         Recent Labs     12/02/22  0439 12/03/22  0653 12/04/22  0725    135 130*   K 5.4* 4.3 4.4   CL 97* 90* 86*   CO2 24 30* 30*   BUN 49* 18 12   CREATININE 9.4* 5.3* 3.8*   CALCIUM 8.9 9.9 9.9   PHOS  --  6.6* 6.0*         No results for input(s): PROT, ALB, ALKPHOS, ALT, AST, BILITOT, AMYLASE, LIPASE in the last 72 hours. No results for input(s): INR in the last 72 hours. No results for input(s): [place] in the last 72 hours. Chronic labs:    Lab Results   Component Value Date    CHOL 172 11/06/2018    TRIG 211 (H) 11/06/2018    HDL 37 11/06/2018    LDLCALC 93 11/06/2018    TSH 1.230 12/04/2022    INR 1.5 10/20/2021    LABA1C 5.3 12/02/2022       Radiology: REVIEWED DAILY    +++++++++++++++++++++++++++++++++++++++++++++++++  April MD Miguel Ángel  [facility] Physician - [address] [facility], [place]  +++++++++++++++++++++++++++++++++++++++++++++++++  NOTE: This report was transcribed using voice recognition software. Every effort was made to ensure accuracy; however, inadvertent computerized transcription errors may be present.

## 2022-12-04 NOTE — PROGRESS NOTES
Comprehensive Nutrition Assessment    Type and Reason for Visit:  Initial, Consult (poor intake/appetite)    Nutrition Recommendations/Plan:   Continue current diet. Recommend and start renal ONS (Nepro) Once daily to optimize intake and monitor. Nutrition Assessment:    Pt. admit d/t SOB and generalized weakness, slurred speech most likely 2/2 to hypoglycemia and UTI. Noted COPD exacerbation, +rhinovirus. PMH of bladder cancer, ESRD, COPD, and DM. PO intake appears to be improving with average intake of 50-75% and most recent intake of %. Will start ONS and monitor. Nutrition Related Findings:    -I/O (2.8L), A&Ox4, hyponatremia, hyperglycemia, trace edema, GI WDL, +BS, Wound Type: None       Current Nutrition Intake & Therapies:    Average Meal Intake: %, 51-75% (51-75% average, most recent intake %)  Average Supplements Intake: None Ordered  ADULT DIET; Regular; Low Sodium (2 gm); Low Potassium (Less than 3000 mg/day)    Anthropometric Measures:  Height: 5' 7\" (170.2 cm)  Ideal Body Weight (IBW): 135 lbs (61 kg)    Admission Body Weight: 253 lb 8.5 oz (115 kg) (12/03 First measured BS)  Current Body Weight: 249 lb 1.9 oz (113 kg) (12/03 BS), 184.5 % IBW.  Weight Source: Bed Scale  Current BMI (kg/m2): 39  Usual Body Weight:  (UTO d/t hx of ESRD and possible fluid shifts)     Weight Adjustment For: No Adjustment                 BMI Categories: Obese Class 2 (BMI 35.0 -39.9)    Nutrition Diagnosis:   Inadequate oral intake (improving) related to inadequate protein-energy intake as evidenced by intake 51-75%    Nutrition Interventions:   Food and/or Nutrient Delivery: Continue Current Diet, Start Oral Nutrition Supplement (nepro ONS once daily)  Nutrition Education/Counseling: Education not indicated  Coordination of Nutrition Care: Continue to monitor while inpatient       Goals:     Goals: PO intake 75% or greater, by next RD assessment       Nutrition Monitoring and Evaluation: Behavioral-Environmental Outcomes: None Identified  Food/Nutrient Intake Outcomes: Food and Nutrient Intake, Supplement Intake  Physical Signs/Symptoms Outcomes: Biochemical Data, GI Status, Fluid Status or Edema, Nutrition Focused Physical Findings, Skin, Weight    Discharge Planning:    No discharge needs at this time     Teressa Alejandro, [address] [place] [address]  Contact: ext [phone]

## 2022-12-05 LAB
ALBUMIN SERPL-MCNC: 3.7 G/DL (ref 3.5–5.2)
ALP BLD-CCNC: 140 U/L (ref 35–104)
ALT SERPL-CCNC: 20 U/L (ref 0–32)
ANION GAP SERPL CALCULATED.3IONS-SCNC: 21 MMOL/L (ref 7–16)
ANION GAP SERPL CALCULATED.3IONS-SCNC: 22 MMOL/L (ref 7–16)
AST SERPL-CCNC: 18 U/L (ref 0–31)
BASOPHILS ABSOLUTE: 0.02 E9/L (ref 0–0.2)
BASOPHILS RELATIVE PERCENT: 0.2 % (ref 0–2)
BILIRUB SERPL-MCNC: 0.3 MG/DL (ref 0–1.2)
BUN BLDV-MCNC: 25 MG/DL (ref 6–23)
BUN BLDV-MCNC: 28 MG/DL (ref 6–23)
CALCIUM SERPL-MCNC: 10.1 MG/DL (ref 8.6–10.2)
CALCIUM SERPL-MCNC: 10.5 MG/DL (ref 8.6–10.2)
CHLORIDE BLD-SCNC: 82 MMOL/L (ref 98–107)
CHLORIDE BLD-SCNC: 84 MMOL/L (ref 98–107)
CO2: 24 MMOL/L (ref 22–29)
CO2: 26 MMOL/L (ref 22–29)
CREAT SERPL-MCNC: 6.3 MG/DL (ref 0.5–1)
CREAT SERPL-MCNC: 6.4 MG/DL (ref 0.5–1)
EOSINOPHILS ABSOLUTE: 0 E9/L (ref 0.05–0.5)
EOSINOPHILS RELATIVE PERCENT: 0 % (ref 0–6)
GFR SERPL CREATININE-BSD FRML MDRD: 7 ML/MIN/1.73
GFR SERPL CREATININE-BSD FRML MDRD: 7 ML/MIN/1.73
GLUCOSE BLD-MCNC: 183 MG/DL (ref 74–99)
GLUCOSE BLD-MCNC: 294 MG/DL (ref 74–99)
HAV IGM SER IA-ACNC: NORMAL
HCT VFR BLD CALC: 29 % (ref 34–48)
HEMOGLOBIN: 9.5 G/DL (ref 11.5–15.5)
HEPATITIS B CORE IGM ANTIBODY: NORMAL
HEPATITIS B SURFACE ANTIGEN INTERPRETATION: NORMAL
HEPATITIS C ANTIBODY INTERPRETATION: NORMAL
IMMATURE GRANULOCYTES #: 0.11 E9/L
IMMATURE GRANULOCYTES %: 1 % (ref 0–5)
LYMPHOCYTES ABSOLUTE: 1.68 E9/L (ref 1.5–4)
LYMPHOCYTES RELATIVE PERCENT: 15.7 % (ref 20–42)
MAGNESIUM: 2.4 MG/DL (ref 1.6–2.6)
MCH RBC QN AUTO: 33.5 PG (ref 26–35)
MCHC RBC AUTO-ENTMCNC: 32.8 % (ref 32–34.5)
MCV RBC AUTO: 102.1 FL (ref 80–99.9)
METER GLUCOSE: 192 MG/DL (ref 74–99)
METER GLUCOSE: 258 MG/DL (ref 74–99)
METER GLUCOSE: 264 MG/DL (ref 74–99)
METER GLUCOSE: 269 MG/DL (ref 74–99)
MONOCYTES ABSOLUTE: 0.74 E9/L (ref 0.1–0.95)
MONOCYTES RELATIVE PERCENT: 6.9 % (ref 2–12)
NEUTROPHILS ABSOLUTE: 8.15 E9/L (ref 1.8–7.3)
NEUTROPHILS RELATIVE PERCENT: 76.2 % (ref 43–80)
ORGANISM: ABNORMAL
PDW BLD-RTO: 13.2 FL (ref 11.5–15)
PHOSPHORUS: 7.3 MG/DL (ref 2.5–4.5)
PLATELET # BLD: 255 E9/L (ref 130–450)
PMV BLD AUTO: 9.1 FL (ref 7–12)
POTASSIUM SERPL-SCNC: 4.1 MMOL/L (ref 3.5–5)
POTASSIUM SERPL-SCNC: 5.5 MMOL/L (ref 3.5–5)
RBC # BLD: 2.84 E12/L (ref 3.5–5.5)
SODIUM BLD-SCNC: 129 MMOL/L (ref 132–146)
SODIUM BLD-SCNC: 130 MMOL/L (ref 132–146)
TOTAL PROTEIN: 6.7 G/DL (ref 6.4–8.3)
URINE CULTURE, ROUTINE: ABNORMAL
URINE CULTURE, ROUTINE: ABNORMAL
WBC # BLD: 10.7 E9/L (ref 4.5–11.5)

## 2022-12-05 PROCEDURE — 6370000000 HC RX 637 (ALT 250 FOR IP): Performed by: INTERNAL MEDICINE

## 2022-12-05 PROCEDURE — A4216 STERILE WATER/SALINE, 10 ML: HCPCS | Performed by: INTERNAL MEDICINE

## 2022-12-05 PROCEDURE — C9113 INJ PANTOPRAZOLE SODIUM, VIA: HCPCS | Performed by: INTERNAL MEDICINE

## 2022-12-05 PROCEDURE — 2580000003 HC RX 258: Performed by: INTERNAL MEDICINE

## 2022-12-05 PROCEDURE — 94640 AIRWAY INHALATION TREATMENT: CPT

## 2022-12-05 PROCEDURE — 84100 ASSAY OF PHOSPHORUS: CPT

## 2022-12-05 PROCEDURE — 80053 COMPREHEN METABOLIC PANEL: CPT

## 2022-12-05 PROCEDURE — 36415 COLL VENOUS BLD VENIPUNCTURE: CPT

## 2022-12-05 PROCEDURE — 82962 GLUCOSE BLOOD TEST: CPT

## 2022-12-05 PROCEDURE — 80048 BASIC METABOLIC PNL TOTAL CA: CPT

## 2022-12-05 PROCEDURE — 83735 ASSAY OF MAGNESIUM: CPT

## 2022-12-05 PROCEDURE — 85025 COMPLETE CBC W/AUTO DIFF WBC: CPT

## 2022-12-05 PROCEDURE — 2060000000 HC ICU INTERMEDIATE R&B

## 2022-12-05 PROCEDURE — 6360000002 HC RX W HCPCS: Performed by: INTERNAL MEDICINE

## 2022-12-05 RX ORDER — CALCIUM CARBONATE 200(500)MG
500 TABLET,CHEWABLE ORAL ONCE
Status: DISCONTINUED | OUTPATIENT
Start: 2022-12-05 | End: 2022-12-06 | Stop reason: HOSPADM

## 2022-12-05 RX ORDER — LEVOFLOXACIN 250 MG/1
250 TABLET ORAL
Status: DISCONTINUED | OUTPATIENT
Start: 2022-12-05 | End: 2022-12-06 | Stop reason: HOSPADM

## 2022-12-05 RX ORDER — GLIPIZIDE 5 MG/1
2.5 TABLET ORAL
Status: DISCONTINUED | OUTPATIENT
Start: 2022-12-06 | End: 2022-12-06 | Stop reason: HOSPADM

## 2022-12-05 RX ORDER — PREDNISONE 1 MG/1
5 TABLET ORAL DAILY
Status: DISCONTINUED | OUTPATIENT
Start: 2022-12-06 | End: 2022-12-06

## 2022-12-05 RX ORDER — CALCIUM CARBONATE 200(500)MG
500 TABLET,CHEWABLE ORAL 3 TIMES DAILY PRN
Status: DISCONTINUED | OUTPATIENT
Start: 2022-12-05 | End: 2022-12-06 | Stop reason: HOSPADM

## 2022-12-05 RX ADMIN — MIRTAZAPINE 7.5 MG: 15 TABLET, FILM COATED ORAL at 21:43

## 2022-12-05 RX ADMIN — PRAMIPEXOLE DIHYDROCHLORIDE 0.12 MG: 0.12 TABLET ORAL at 21:43

## 2022-12-05 RX ADMIN — ATORVASTATIN CALCIUM 40 MG: 40 TABLET, FILM COATED ORAL at 09:38

## 2022-12-05 RX ADMIN — BUSPIRONE HYDROCHLORIDE 2.5 MG: 5 TABLET ORAL at 21:43

## 2022-12-05 RX ADMIN — PREDNISONE 20 MG: 20 TABLET ORAL at 09:38

## 2022-12-05 RX ADMIN — GUAIFENESIN 400 MG: 400 TABLET ORAL at 15:06

## 2022-12-05 RX ADMIN — GUAIFENESIN 400 MG: 400 TABLET ORAL at 09:37

## 2022-12-05 RX ADMIN — GUAIFENESIN 400 MG: 400 TABLET ORAL at 21:43

## 2022-12-05 RX ADMIN — GLIPIZIDE 2.5 MG: 5 TABLET ORAL at 06:18

## 2022-12-05 RX ADMIN — SODIUM CHLORIDE, PRESERVATIVE FREE 10 ML: 5 INJECTION INTRAVENOUS at 09:44

## 2022-12-05 RX ADMIN — PRAMIPEXOLE DIHYDROCHLORIDE 0.12 MG: 0.12 TABLET ORAL at 15:06

## 2022-12-05 RX ADMIN — SODIUM CHLORIDE 40 MG: 9 INJECTION INTRAMUSCULAR; INTRAVENOUS; SUBCUTANEOUS at 16:37

## 2022-12-05 RX ADMIN — METOPROLOL TARTRATE 50 MG: 50 TABLET ORAL at 09:37

## 2022-12-05 RX ADMIN — IPRATROPIUM BROMIDE AND ALBUTEROL SULFATE 1 AMPULE: 2.5; .5 SOLUTION RESPIRATORY (INHALATION) at 20:44

## 2022-12-05 RX ADMIN — CALCIUM CARBONATE (ANTACID) CHEW TAB 500 MG 500 MG: 500 CHEW TAB at 15:16

## 2022-12-05 RX ADMIN — TORSEMIDE 40 MG: 20 TABLET ORAL at 09:38

## 2022-12-05 RX ADMIN — APIXABAN 5 MG: 5 TABLET, FILM COATED ORAL at 21:43

## 2022-12-05 RX ADMIN — IPRATROPIUM BROMIDE AND ALBUTEROL SULFATE 1 AMPULE: 2.5; .5 SOLUTION RESPIRATORY (INHALATION) at 17:10

## 2022-12-05 RX ADMIN — ARFORMOTEROL TARTRATE 15 MCG: 15 SOLUTION RESPIRATORY (INHALATION) at 20:44

## 2022-12-05 RX ADMIN — SODIUM CHLORIDE, PRESERVATIVE FREE 10 ML: 5 INJECTION INTRAVENOUS at 09:36

## 2022-12-05 RX ADMIN — LEVOFLOXACIN 250 MG: 250 TABLET, FILM COATED ORAL at 11:21

## 2022-12-05 RX ADMIN — CEFTRIAXONE 1000 MG: 1 INJECTION, POWDER, FOR SOLUTION INTRAMUSCULAR; INTRAVENOUS at 06:17

## 2022-12-05 RX ADMIN — BUDESONIDE 500 MCG: 0.5 SUSPENSION RESPIRATORY (INHALATION) at 20:44

## 2022-12-05 RX ADMIN — BUDESONIDE 500 MCG: 0.5 SUSPENSION RESPIRATORY (INHALATION) at 09:19

## 2022-12-05 RX ADMIN — ARFORMOTEROL TARTRATE 15 MCG: 15 SOLUTION RESPIRATORY (INHALATION) at 09:19

## 2022-12-05 RX ADMIN — APIXABAN 5 MG: 5 TABLET, FILM COATED ORAL at 09:37

## 2022-12-05 RX ADMIN — SODIUM CHLORIDE, PRESERVATIVE FREE 10 ML: 5 INJECTION INTRAVENOUS at 21:44

## 2022-12-05 RX ADMIN — METOPROLOL TARTRATE 50 MG: 50 TABLET ORAL at 21:43

## 2022-12-05 RX ADMIN — IPRATROPIUM BROMIDE AND ALBUTEROL SULFATE 1 AMPULE: 2.5; .5 SOLUTION RESPIRATORY (INHALATION) at 12:52

## 2022-12-05 RX ADMIN — PRAMIPEXOLE DIHYDROCHLORIDE 0.12 MG: 0.12 TABLET ORAL at 09:37

## 2022-12-05 RX ADMIN — CINACALCET HYDROCHLORIDE 90 MG: 30 TABLET, FILM COATED ORAL at 09:37

## 2022-12-05 RX ADMIN — CALCITRIOL CAPSULES 0.25 MCG 0.25 MCG: 0.25 CAPSULE ORAL at 09:38

## 2022-12-05 RX ADMIN — BENZONATATE 100 MG: 100 CAPSULE ORAL at 15:06

## 2022-12-05 RX ADMIN — DULOXETINE HYDROCHLORIDE 60 MG: 60 CAPSULE, DELAYED RELEASE ORAL at 09:37

## 2022-12-05 RX ADMIN — TRAZODONE HYDROCHLORIDE 50 MG: 50 TABLET ORAL at 21:43

## 2022-12-05 RX ADMIN — Medication 1 CAPSULE: at 09:38

## 2022-12-05 RX ADMIN — BUSPIRONE HYDROCHLORIDE 2.5 MG: 5 TABLET ORAL at 09:37

## 2022-12-05 RX ADMIN — IPRATROPIUM BROMIDE AND ALBUTEROL SULFATE 1 AMPULE: 2.5; .5 SOLUTION RESPIRATORY (INHALATION) at 09:19

## 2022-12-05 NOTE — PROGRESS NOTES
[facility] Roberto A. Elinor Dakin, MD Beatrice Levans, MD Ben Hemp, MD Minnette Spring, REX Naylor, HARRY Cano CNP  Progress Note  12/5/2022    SUBJECTIVE:  We are following this patient for end-stage renal failure . 12/3: Seen on dialysis. Tolerating therapy. BP stable. BFR as prescribed. Symptomatic hypoglycemia towards the end of treatment. Otherwise treatment was unremarkable. Ongoing fatigue but no other complaint. 12/5: Seen while sitting up in bed. Wants to go home. Reports ongoing cough, but otherwise feels much better. She was started on oral Levaquin. Tolerated dialysis over the weekend. Blood sugars have improved.      PROBLEM LIST:    Patient Active Problem List   Diagnosis    Moderate episode of recurrent major depressive disorder (HCC)    Generalized anxiety disorder    Insomnia due to mental condition    Anemia    Essential hypertension    CKD (chronic kidney disease) stage 5, GFR less than 15 ml/min (HCC)    Atrial fibrillation ([address]    Pure hypercholesterolemia    Morbid obesity ([address]    Chronic obstructive pulmonary disease ([address]    Obstructive sleep apnea    Exertional dyspnea    History of colon polyps    Family history of rectal cancer    COVID-19    Pneumonia due to COVID-19 virus    Hypoglycemia        PAST MEDICAL HISTORY:    Past Medical History:   Diagnosis Date    Atrial fibrillation ([address]     Bladder cancer ([address]     Cancer ([address]     CKD (chronic kidney disease)     CKD (chronic kidney disease) stage 5, GFR less than 15 ml/min ([address] 11/6/2018    Diabetes ([address]     Hypertension     Obesity     260 #    Sleep apnea        MEDS (scheduled):   levoFLOXacin  250 mg Oral Q48H    [START ON 12/6/2022] glipiZIDE  2.5 mg Oral Daily with breakfast    calcium carbonate  500 mg Oral Once    [START ON 12/6/2022] predniSONE  5 mg Oral Daily    guaiFENesin  400 mg Oral TID    lactobacillus  1 capsule Oral Daily    sodium chloride flush  5-40 mL IntraVENous 2 times per day    nicotine  1 patch TransDERmal Daily    ipratropium-albuterol  1 ampule Inhalation Q4H WA    budesonide  0.5 mg Nebulization BID    apixaban  5 mg Oral BID    atorvastatin  40 mg Oral Daily    busPIRone  2.5 mg Oral BID    calcitRIOL  0.25 mcg Oral Once per day on Mon Tue Wed Thu Fri    torsemide  40 mg Oral Daily    metoprolol tartrate  50 mg Oral BID    propafenone  325 mg Oral BID    DULoxetine  60 mg Oral Daily    mirtazapine  7.5 mg Oral Nightly    cinacalcet  90 mg Oral Daily    traZODone  50 mg Oral Nightly    fluticasone  1 spray Each Nostril Daily    Arformoterol Tartrate  15 mcg Nebulization BID    pramipexole  0.125 mg Oral TID       MEDS (infusions):   dextrose      sodium chloride         MEDS (prn):  calcium carbonate, dextrose, glucose, dextrose bolus **OR** dextrose bolus, glucagon (rDNA), dextrose, sodium chloride flush, sodium chloride, ondansetron **OR** ondansetron, polyethylene glycol, acetaminophen **OR** acetaminophen, benzonatate    DIET:    ADULT DIET; Regular; Low Sodium (2 gm);  Low Potassium (Less than 3000 mg/day)  ADULT ORAL NUTRITION SUPPLEMENT; Breakfast; Renal Oral Supplement      PHYSICAL EXAM:      Patient Vitals for the past 24 hrs:   BP Temp Temp src Pulse Resp SpO2   12/05/22 1422 (!) 145/65 97.4 °F (36.3 °C) Temporal 87 17 96 %   12/05/22 1252 -- -- -- -- -- 96 %   12/05/22 0920 -- -- -- -- -- 95 %   12/05/22 0719 130/70 97.2 °F (36.2 °C) -- 88 16 94 %   12/04/22 2030 (!) 158/70 97.4 °F (36.3 °C) Temporal 88 16 94 %   12/04/22 1838 (!) 118/57 97.3 °F (36.3 °C) Oral 88 -- 96 %            Intake/Output Summary (Last 24 hours) at 12/5/2022 1659  Last data filed at 12/4/2022 1807  Gross per 24 hour   Intake 120 ml   Output --   Net 120 ml         Wt Readings from Last 3 Encounters:   12/03/22 249 lb 1.9 oz (113 kg)   11/14/22 249 lb (112.9 kg)   09/14/22 250 lb (113.4 kg)       General:  in no acute distress  HEENT: NC/AT, EOMI, sclera and conjunctiva are clear and anicteric. Mucous membranes moist.  Cardiovascular: regular rate and rhythm, no murmurs, gallops, or rubs  Respiratory:  Expiratory wheezes bilaterally. No rhonchi  Gastrointestinal: soft, nontender, nondistended, NABS  Ext: no cyanosis or clubbing. Trace edema bilateral lower extremities  Neuro: awake, alert, oriented x3. Moves all 4 extremities. Cranial nerves II through XII grossly intact. Skin: dry, no rash      DATA:      Recent Labs     12/03/22  0653 12/04/22  0725 12/05/22  0610   WBC 9.1 10.1 10.7   HGB 10.6* 9.8* 9.5*   HCT 33.8* 30.4* 29.0*   .3* 105.9* 102.1*    249 255       Recent Labs     12/04/22  0725 12/05/22  0610 12/05/22  1027   * 130* 129*   K 4.4 5.5* 4.1   CL 86* 84* 82*   CO2 30* 24 26   BUN 12 25* 28*   CREATININE 3.8* 6.3* 6.4*         Iron studies:  Lab Results   Component Value Date    FERRITIN 542 12/03/2022     Bone disease:  Lab Results   Component Value Date     (H) 06/17/2021    MG 2.4 12/05/2022    PHOS 7.3 (H) 12/05/2022         DIALYSIS ORDERS:    Reviewed      ASSESSMENT / RECOMMENDATIONS:     1. ESRD due to diabetic nephropathy   cont diffusive IHD support for solute and volume clearance per orders Tuesdays Thursdays and Saturdays  2. Anemia due to ESRD   Cont. epo and iv ferrlecit per orders  3. Secondary Hyperparathyroidism due to ESRD   Cont vitamin d analog  4. Hypertension:   Cont current tx  5.   Hypoglycemia   Management per primary team.  Recommend holding glipizide, at discharge change to short acting agents    Luis Daniel Valencia MD, MD  12/5/2022

## 2022-12-05 NOTE — CARE COORDINATION
Spoke with Pt by room phone d/t Droplet Isolation. Pt lives alone with a ramp and 1 small step to enter apartment. Granddaughter lives in the other apartment. Pt uses a Rolator and ome Oxygen ([address] [facility]). HD at [facility] on [address] [place]. PCP: Dr. William Blair. Pharmacy: [facility]. Iesha Donnelly will provide transport at discharge. [facility]. Awaiting recommendations from Therapy. Discharge Plan is to [facility] when medically stable. SW/CM to follow for discharge needs. Referral made to Fred Montesinos. Start of Care will be Wednesday December 7th for PT/OT.   Luda Freedman, L.S.W.  [phone]

## 2022-12-05 NOTE — PROGRESS NOTES
This patient is on medication that requires renal, weight, and/or indication dose adjustment. Date Body Weight IBW  Adjusted BW SCr  CrCl Dialysis status   12/5/2022 249 lb 1.9 oz (113 kg) Ideal body weight: 61.6 kg (135 lb 12.9 oz)  Adjusted ideal body weight: 82.2 kg (181 lb 2.1 oz) Serum creatinine: 6.3 mg/dL (H) 12/05/22 0610  Estimated creatinine clearance: 11 mL/min (A) HD       Pharmacy has dose-adjusted the following medication(s):    Date Previous Order Adjusted Order   12/5/2022 Levofloxacin 250 mg daily Levofloxacin 250 mg q48h       These changes were made per protocol according to the [facility] Clinical Guidance for Pharmacists. *Please note this dose may need readjusted if patient's condition changes. Please contact pharmacy with any questions regarding these changes.     [address], [facility]  12/5/2022  9:28 AM

## 2022-12-06 VITALS
TEMPERATURE: 97.1 F | SYSTOLIC BLOOD PRESSURE: 123 MMHG | RESPIRATION RATE: 20 BRPM | DIASTOLIC BLOOD PRESSURE: 76 MMHG | HEIGHT: 67 IN | BODY MASS INDEX: 39.48 KG/M2 | OXYGEN SATURATION: 94 % | WEIGHT: 251.54 LBS | HEART RATE: 76 BPM

## 2022-12-06 LAB
ANION GAP SERPL CALCULATED.3IONS-SCNC: 15 MMOL/L (ref 7–16)
BASOPHILS ABSOLUTE: 0.07 E9/L (ref 0–0.2)
BASOPHILS RELATIVE PERCENT: 0.5 % (ref 0–2)
BUN BLDV-MCNC: 12 MG/DL (ref 6–23)
CALCIUM SERPL-MCNC: 9.3 MG/DL (ref 8.6–10.2)
CHLORIDE BLD-SCNC: 89 MMOL/L (ref 98–107)
CO2: 30 MMOL/L (ref 22–29)
CREAT SERPL-MCNC: 2.8 MG/DL (ref 0.5–1)
EOSINOPHILS ABSOLUTE: 0.03 E9/L (ref 0.05–0.5)
EOSINOPHILS RELATIVE PERCENT: 0.2 % (ref 0–6)
GFR SERPL CREATININE-BSD FRML MDRD: 18 ML/MIN/1.73
GLUCOSE BLD-MCNC: 144 MG/DL (ref 74–99)
HCT VFR BLD CALC: 29.2 % (ref 34–48)
HEMOGLOBIN: 9.7 G/DL (ref 11.5–15.5)
IMMATURE GRANULOCYTES #: 0.17 E9/L
IMMATURE GRANULOCYTES %: 1.3 % (ref 0–5)
LYMPHOCYTES ABSOLUTE: 2.49 E9/L (ref 1.5–4)
LYMPHOCYTES RELATIVE PERCENT: 19.6 % (ref 20–42)
MAGNESIUM: 2.1 MG/DL (ref 1.6–2.6)
MCH RBC QN AUTO: 33.9 PG (ref 26–35)
MCHC RBC AUTO-ENTMCNC: 33.2 % (ref 32–34.5)
MCV RBC AUTO: 102.1 FL (ref 80–99.9)
METER GLUCOSE: 140 MG/DL (ref 74–99)
METER GLUCOSE: 188 MG/DL (ref 74–99)
METER GLUCOSE: 84 MG/DL (ref 74–99)
MONOCYTES ABSOLUTE: 0.96 E9/L (ref 0.1–0.95)
MONOCYTES RELATIVE PERCENT: 7.5 % (ref 2–12)
NEUTROPHILS ABSOLUTE: 9.01 E9/L (ref 1.8–7.3)
NEUTROPHILS RELATIVE PERCENT: 70.9 % (ref 43–80)
PDW BLD-RTO: 13.4 FL (ref 11.5–15)
PHOSPHORUS: 3.7 MG/DL (ref 2.5–4.5)
PLATELET # BLD: 296 E9/L (ref 130–450)
PMV BLD AUTO: 8.9 FL (ref 7–12)
POTASSIUM SERPL-SCNC: 3.5 MMOL/L (ref 3.5–5)
RBC # BLD: 2.86 E12/L (ref 3.5–5.5)
SODIUM BLD-SCNC: 134 MMOL/L (ref 132–146)
WBC # BLD: 12.7 E9/L (ref 4.5–11.5)

## 2022-12-06 PROCEDURE — 6370000000 HC RX 637 (ALT 250 FOR IP): Performed by: INTERNAL MEDICINE

## 2022-12-06 PROCEDURE — 82962 GLUCOSE BLOOD TEST: CPT

## 2022-12-06 PROCEDURE — 36415 COLL VENOUS BLD VENIPUNCTURE: CPT

## 2022-12-06 PROCEDURE — 83735 ASSAY OF MAGNESIUM: CPT

## 2022-12-06 PROCEDURE — 94640 AIRWAY INHALATION TREATMENT: CPT

## 2022-12-06 PROCEDURE — 80048 BASIC METABOLIC PNL TOTAL CA: CPT

## 2022-12-06 PROCEDURE — 90935 HEMODIALYSIS ONE EVALUATION: CPT

## 2022-12-06 PROCEDURE — 84100 ASSAY OF PHOSPHORUS: CPT

## 2022-12-06 PROCEDURE — 85025 COMPLETE CBC W/AUTO DIFF WBC: CPT

## 2022-12-06 RX ORDER — TRAMADOL HYDROCHLORIDE 50 MG/1
25 TABLET ORAL ONCE
Status: COMPLETED | OUTPATIENT
Start: 2022-12-06 | End: 2022-12-06

## 2022-12-06 RX ORDER — GLIPIZIDE 5 MG/1
2.5 TABLET ORAL
Qty: 15 TABLET | Refills: 0 | Status: SHIPPED | OUTPATIENT
Start: 2022-12-06 | End: 2023-01-05

## 2022-12-06 RX ORDER — BENZONATATE 100 MG/1
100 CAPSULE ORAL 3 TIMES DAILY PRN
Qty: 15 CAPSULE | Refills: 0 | Status: SHIPPED | OUTPATIENT
Start: 2022-12-06

## 2022-12-06 RX ORDER — FLUTICASONE PROPIONATE 50 MCG
1 SPRAY, SUSPENSION (ML) NASAL DAILY PRN
Qty: 16 G | Refills: 0 | Status: SHIPPED | OUTPATIENT
Start: 2022-12-06

## 2022-12-06 RX ORDER — ACETAMINOPHEN 325 MG/1
650 TABLET ORAL ONCE
Status: CANCELLED | OUTPATIENT
Start: 2022-12-06

## 2022-12-06 RX ORDER — ALBUTEROL SULFATE 90 UG/1
2 AEROSOL, METERED RESPIRATORY (INHALATION) 4 TIMES DAILY PRN
Qty: 54 G | Refills: 1 | Status: SHIPPED | OUTPATIENT
Start: 2022-12-06

## 2022-12-06 RX ORDER — LANOLIN ALCOHOL/MO/W.PET/CERES
200 CREAM (GRAM) TOPICAL ONCE
Status: COMPLETED | OUTPATIENT
Start: 2022-12-06 | End: 2022-12-06

## 2022-12-06 RX ORDER — LEVOFLOXACIN 250 MG/1
250 TABLET ORAL
Qty: 1 TABLET | Refills: 0 | Status: SHIPPED | OUTPATIENT
Start: 2022-12-07 | End: 2022-12-08

## 2022-12-06 RX ORDER — LANOLIN ALCOHOL/MO/W.PET/CERES
3 CREAM (GRAM) TOPICAL NIGHTLY PRN
Status: DISCONTINUED | OUTPATIENT
Start: 2022-12-06 | End: 2022-12-06 | Stop reason: HOSPADM

## 2022-12-06 RX ORDER — FLUTICASONE PROPIONATE AND SALMETEROL 250; 50 UG/1; UG/1
1 POWDER RESPIRATORY (INHALATION) EVERY 12 HOURS
Qty: 60 EACH | Refills: 3 | Status: SHIPPED | OUTPATIENT
Start: 2022-12-06

## 2022-12-06 RX ADMIN — GUAIFENESIN 400 MG: 400 TABLET ORAL at 16:30

## 2022-12-06 RX ADMIN — IPRATROPIUM BROMIDE AND ALBUTEROL SULFATE 1 AMPULE: 2.5; .5 SOLUTION RESPIRATORY (INHALATION) at 16:51

## 2022-12-06 RX ADMIN — BUSPIRONE HYDROCHLORIDE 2.5 MG: 5 TABLET ORAL at 12:33

## 2022-12-06 RX ADMIN — TRAMADOL HYDROCHLORIDE 25 MG: 50 TABLET ORAL at 15:10

## 2022-12-06 RX ADMIN — Medication 200 MG: at 15:09

## 2022-12-06 RX ADMIN — TORSEMIDE 40 MG: 20 TABLET ORAL at 12:33

## 2022-12-06 RX ADMIN — GUAIFENESIN 400 MG: 400 TABLET ORAL at 12:32

## 2022-12-06 RX ADMIN — IPRATROPIUM BROMIDE AND ALBUTEROL SULFATE 1 AMPULE: 2.5; .5 SOLUTION RESPIRATORY (INHALATION) at 12:28

## 2022-12-06 RX ADMIN — CINACALCET HYDROCHLORIDE 90 MG: 30 TABLET, FILM COATED ORAL at 12:32

## 2022-12-06 RX ADMIN — APIXABAN 5 MG: 5 TABLET, FILM COATED ORAL at 12:32

## 2022-12-06 RX ADMIN — Medication 1 CAPSULE: at 12:32

## 2022-12-06 RX ADMIN — PRAMIPEXOLE DIHYDROCHLORIDE 0.12 MG: 0.12 TABLET ORAL at 13:35

## 2022-12-06 RX ADMIN — GLIPIZIDE 2.5 MG: 5 TABLET ORAL at 12:32

## 2022-12-06 RX ADMIN — METOPROLOL TARTRATE 50 MG: 50 TABLET ORAL at 12:32

## 2022-12-06 RX ADMIN — PRAMIPEXOLE DIHYDROCHLORIDE 0.12 MG: 0.12 TABLET ORAL at 09:28

## 2022-12-06 RX ADMIN — DULOXETINE HYDROCHLORIDE 60 MG: 60 CAPSULE, DELAYED RELEASE ORAL at 12:32

## 2022-12-06 RX ADMIN — ATORVASTATIN CALCIUM 40 MG: 40 TABLET, FILM COATED ORAL at 12:33

## 2022-12-06 ASSESSMENT — PAIN DESCRIPTION - ORIENTATION: ORIENTATION: LEFT

## 2022-12-06 ASSESSMENT — PAIN DESCRIPTION - LOCATION: LOCATION: LEG

## 2022-12-06 ASSESSMENT — PAIN DESCRIPTION - DESCRIPTORS: DESCRIPTORS: CRAMPING;ACHING;DISCOMFORT;SHARP

## 2022-12-06 ASSESSMENT — PAIN SCALES - GENERAL
PAINLEVEL_OUTOF10: 8
PAINLEVEL_OUTOF10: 0

## 2022-12-06 NOTE — PROGRESS NOTES
Pt called me into room and was complaining of pain in the left leg.  I perfect served lEizabeth and she gave 1x dose of mag and Ultram

## 2022-12-06 NOTE — CARE COORDINATION
Discharge order is in. Spoke with Granddaughter by phone. She will  Pt between 5:30 - 6 pm. Informed RN of  time. Discharge Plan is to return home with [facility] with start of care on 12/07/22 for PT/OT.    NAHOMY Mckeon.  [phone]

## 2022-12-06 NOTE — FLOWSHEET NOTE
12/06/22 1053   Vital Signs   BP (!) 146/79   Temp 97.9 °F (36.6 °C)   Heart Rate 67   Resp 20   Weight 251 lb 8.7 oz (114.1 kg)   Percent Weight Change -1.89   Pain Assessment   Pain Assessment None - Denies Pain   Post-Hemodialysis Assessment   Post-Treatment Procedures Blood returned; Access bleeding time < 10 minutes   Machine Disinfection Process Exterior Machine Disinfection   Rinseback Volume (ml) 300 ml   Blood Volume Processed (Liters) 78.1 l/min   Dialyzer Clearance Lightly streaked   Duration of Treatment (minutes) 210 minutes   Heparin Amount Administered During Treatment (mL) 0 mL   Hemodialysis Intake (ml) 300 ml   Hemodialysis Output (ml) 2500 ml   NET Removed (ml) 2200   Tolerated Treatment Good   Patient Response to Treatment Tolerated well   Bilateral Breath Sounds Diminished   Edema None   Physician Notified No   Time Off 1031   Patient Disposition Return to room

## 2022-12-06 NOTE — PROGRESS NOTES
Hospitalist Progress Note      SYNOPSIS: Patient admitted on 2022 for SOB and hypoglycemia. SUBJECTIVE:    Patient seen and examined. Feeling better. Still with cough. Temp (24hrs), Av.2 °F (36.2 °C), Min:96.8 °F (36 °C), Max:97.4 °F (36.3 °C)    DIET: ADULT DIET; Regular; Low Sodium (2 gm); Low Potassium (Less than 3000 mg/day)  ADULT ORAL NUTRITION SUPPLEMENT; Breakfast; Renal Oral Supplement  CODE: Full Code  No intake or output data in the 24 hours ending 22      OBJECTIVE:    BP (!) 155/68   Pulse 89   Temp 96.8 °F (36 °C) (Temporal)   Resp 16   Ht 5' 7\" (1.702 m)   Wt 249 lb 1.9 oz (113 kg)   SpO2 93%   BMI 39.02 kg/m²     General appearance: No apparent distress, appears stated age and cooperative. HEENT:  Conjunctivae/corneas clear. Neck: Supple. No jugular venous distention. Respiratory: Improved inspiratory effort. Cardiovascular: Regular rate rhythm, normal S1-S2  Abdomen: Soft, nontender, nondistended  Musculoskeletal: No clubbing, cyanosis, no bilateral lower extremity edema. Brisk capillary refill. Skin:  No rashes  on visible skin  Neurologic: awake, alert and following commands     ASSESSMENT:    78y.o. year old female  who  has a past medical history of Atrial fibrillation ([address] Bladder cancer ([facility] Cancer ([address] CKD (chronic kidney disease), CKD (chronic kidney disease) stage 5, GFR less than 15 ml/min ([address] Diabetes ([address] Hypertension, Obesity, and Sleep apnea. Presented to the hospital with complaints generalized weakness, hypoglycemia and dysuria. PLAN:  1.) Weakness and slurred speech most likely secondary to hypoglycemia and UTI. Symptoms have resolved. TSH And B 12 are normal.   2.) COPD exacerbation/Cough: Chest was negative. Patient currently vapes. Duo nebs/Pulmicort ordered. Smoker's cough. Respiratory swab shows: human rhinovirus. Mucinex ordered. Prednisone Flutter valve.    3.) UTI: urine cx ordered-E.coli; GNR.- changed to levaquin. 4.) ESRD: Nephrology consult- S/p dialysis yesterday. 5. Hypoglycemia: Due to poor intake and taking he diabetic medications. Hold glipizide. Check BS and hypoglycemic protocol is in place. BS improving. Plan for Glucotrol 2.5 daily with breakfast as an outpatient. 6.) Smoking cessation: nicoderm patch.   7.) Afib: on Eliquis- propafenone/metoprolol/Norvasc. 8.) HLD: c/w statins   9.) HTN: improved. 10.) DVT proph: eliquis    DISPOSITION: Home when medically clear.      Medications:  REVIEWED DAILY      Infusion Medications    dextrose      sodium chloride       Scheduled Medications    levoFLOXacin  250 mg Oral Q48H    glipiZIDE  2.5 mg Oral Daily with breakfast    calcium carbonate  500 mg Oral Once    predniSONE  5 mg Oral Daily    guaiFENesin  400 mg Oral TID    lactobacillus  1 capsule Oral Daily    sodium chloride flush  5-40 mL IntraVENous 2 times per day    nicotine  1 patch TransDERmal Daily    ipratropium-albuterol  1 ampule Inhalation Q4H WA    budesonide  0.5 mg Nebulization BID    apixaban  5 mg Oral BID    atorvastatin  40 mg Oral Daily    busPIRone  2.5 mg Oral BID    calcitRIOL  0.25 mcg Oral Once per day on Mon Tue Wed Thu Fri    torsemide  40 mg Oral Daily    metoprolol tartrate  50 mg Oral BID    propafenone  325 mg Oral BID    DULoxetine  60 mg Oral Daily    mirtazapine  7.5 mg Oral Nightly    cinacalcet  90 mg Oral Daily    traZODone  50 mg Oral Nightly    fluticasone  1 spray Each Nostril Daily    Arformoterol Tartrate  15 mcg Nebulization BID    pramipexole  0.125 mg Oral TID     PRN Meds: calcium carbonate, dextrose, glucose, dextrose bolus **OR** dextrose bolus, glucagon (rDNA), dextrose, sodium chloride flush, sodium chloride, ondansetron **OR** ondansetron, polyethylene glycol, acetaminophen **OR** acetaminophen, benzonatate        Labs:     Recent Labs     12/03/22  0653 12/04/22  0725 12/05/22  0610   WBC 9.1 10.1 10.7   HGB 10.6* 9.8* 9.5*   HCT 33.8* 30.4* 29.0*  249 255         Recent Labs     12/03/22  0653 12/04/22  0725 12/05/22  0610 12/05/22  1027    130* 130* 129*   K 4.3 4.4 5.5* 4.1   CL 90* 86* 84* 82*   CO2 30* 30* 24 26   BUN 18 12 25* 28*   CREATININE 5.3* 3.8* 6.3* 6.4*   CALCIUM 9.9 9.9 10.5* 10.1   PHOS 6.6* 6.0* 7.3*  --          Recent Labs     12/05/22  0610   PROT 6.7   ALKPHOS 140*   ALT 20   AST 18   BILITOT 0.3       No results for input(s): INR in the last 72 hours. No results for input(s): Talat Choudhary in the last 72 hours. Chronic labs:    Lab Results   Component Value Date    CHOL 172 11/06/2018    TRIG 211 (H) 11/06/2018    HDL 37 11/06/2018    LDLCALC 93 11/06/2018    TSH 1.230 12/04/2022    INR 1.5 10/20/2021    LABA1C 5.3 12/02/2022       Radiology: REVIEWED DAILY    +++++++++++++++++++++++++++++++++++++++++++++++++  Tiki Perez MD  [employer] - [address], [place]  +++++++++++++++++++++++++++++++++++++++++++++++++  NOTE: This report was transcribed using voice recognition software. Every effort was made to ensure accuracy; however, inadvertent computerized transcription errors may be present.

## 2022-12-06 NOTE — PROGRESS NOTES
[facility] MD Griffin Mcintyre Postal, MD Valeria Estrella, REX Naylor, HARRY Lawson, REX  Progress Note  12/6/2022    SUBJECTIVE:  We are following this patient for end-stage renal failure . 12/3: Seen on dialysis. Tolerating therapy. BP stable. BFR as prescribed. Symptomatic hypoglycemia towards the end of treatment. Otherwise treatment was unremarkable. Ongoing fatigue but no other complaint. 12/5: Seen while sitting up in bed. Wants to go home. Reports ongoing cough, but otherwise feels much better. She was started on oral Levaquin. Tolerated dialysis over the weekend. Blood sugars have improved. 12/6: Seen while laying in bed. Reports that she walked to the doorway earlier and experienced a severe cramp in her left leg. She was given magnesium and half of a pain pill with slight relief. Had hemodialysis today and tolerated treatment without complications. 2.2 L removed on HD. She is supposed to be discharged later this evening.      PROBLEM LIST:    Patient Active Problem List   Diagnosis    Moderate episode of recurrent major depressive disorder (HCC)    Generalized anxiety disorder    Insomnia due to mental condition    Anemia    Essential hypertension    CKD (chronic kidney disease) stage 5, GFR less than 15 ml/min (HCC)    Atrial fibrillation ([address]    Pure hypercholesterolemia    Morbid obesity ([address]    Chronic obstructive pulmonary disease ([address]    Obstructive sleep apnea    Exertional dyspnea    History of colon polyps    Family history of rectal cancer    COVID-19    Pneumonia due to COVID-19 virus    Hypoglycemia        PAST MEDICAL HISTORY:    Past Medical History:   Diagnosis Date    Atrial fibrillation ([address]     Bladder cancer ([address]     Cancer ([address]     CKD (chronic kidney disease)     CKD (chronic kidney disease) stage 5, GFR less than 15 ml/min ([address] 11/6/2018    Diabetes ([address]     Hypertension     Obesity     260 # Sleep apnea        MEDS (scheduled):   levoFLOXacin  250 mg Oral Q48H    glipiZIDE  2.5 mg Oral Daily with breakfast    calcium carbonate  500 mg Oral Once    guaiFENesin  400 mg Oral TID    lactobacillus  1 capsule Oral Daily    sodium chloride flush  5-40 mL IntraVENous 2 times per day    nicotine  1 patch TransDERmal Daily    ipratropium-albuterol  1 ampule Inhalation Q4H WA    budesonide  0.5 mg Nebulization BID    apixaban  5 mg Oral BID    atorvastatin  40 mg Oral Daily    busPIRone  2.5 mg Oral BID    calcitRIOL  0.25 mcg Oral Once per day on Mon Tue Wed Thu Fri    torsemide  40 mg Oral Daily    metoprolol tartrate  50 mg Oral BID    propafenone  325 mg Oral BID    DULoxetine  60 mg Oral Daily    mirtazapine  7.5 mg Oral Nightly    cinacalcet  90 mg Oral Daily    traZODone  50 mg Oral Nightly    fluticasone  1 spray Each Nostril Daily    Arformoterol Tartrate  15 mcg Nebulization BID    pramipexole  0.125 mg Oral TID       MEDS (infusions):   dextrose      sodium chloride         MEDS (prn):  melatonin, calcium carbonate, dextrose, glucose, dextrose bolus **OR** dextrose bolus, glucagon (rDNA), dextrose, sodium chloride flush, sodium chloride, ondansetron **OR** ondansetron, polyethylene glycol, acetaminophen **OR** acetaminophen, benzonatate    DIET:    ADULT DIET; Regular; Low Sodium (2 gm);  Low Potassium (Less than 3000 mg/day)  ADULT ORAL NUTRITION SUPPLEMENT; Breakfast; Renal Oral Supplement      PHYSICAL EXAM:      Patient Vitals for the past 24 hrs:   BP Temp Temp src Pulse Resp SpO2 Weight   12/06/22 1653 -- -- -- -- -- 94 % --   12/06/22 1540 -- -- -- -- 20 -- --   12/06/22 1413 123/76 97.1 °F (36.2 °C) Temporal 76 24 92 % --   12/06/22 1127 129/62 97 °F (36.1 °C) Temporal 71 18 92 % --   12/06/22 1053 (!) 146/79 97.9 °F (36.6 °C) -- 67 20 -- 251 lb 8.7 oz (114.1 kg)   12/06/22 1000 (!) 143/75 -- -- 71 -- -- --   12/06/22 0930 (!) 140/80 -- -- 73 -- -- --   12/06/22 0859 (!) 150/70 -- -- 75 -- -- -- 12/06/22 0830 138/67 -- -- 71 -- -- --   12/06/22 0800 (!) 173/78 -- -- 76 -- -- --   12/06/22 0730 (!) 172/71 -- -- 76 -- -- --   12/06/22 0701 (!) 153/71 -- -- 78 -- -- --   12/06/22 0656 (!) 153/70 97.5 °F (36.4 °C) -- 79 18 -- 256 lb 6.3 oz (116.3 kg)   12/05/22 2046 -- -- -- -- -- 93 % --   12/05/22 2045 -- -- -- -- -- 93 % --   12/05/22 2044 -- -- -- -- -- 93 % --   12/05/22 2000 (!) 155/68 96.8 °F (36 °C) Temporal 89 16 95 % --   12/05/22 1913 135/70 97.2 °F (36.2 °C) -- 87 17 95 % --            Intake/Output Summary (Last 24 hours) at 12/6/2022 1836  Last data filed at 12/6/2022 1053  Gross per 24 hour   Intake 300 ml   Output 2500 ml   Net -2200 ml         Wt Readings from Last 3 Encounters:   12/06/22 251 lb 8.7 oz (114.1 kg)   11/14/22 249 lb (112.9 kg)   09/14/22 250 lb (113.4 kg)       General:  in no acute distress  HEENT: NC/AT, EOMI, sclera and conjunctiva are clear and anicteric. Mucous membranes moist.  Cardiovascular: regular rate and rhythm, no murmurs, gallops, or rubs  Respiratory:  Expiratory wheezes bilaterally. No rhonchi  Gastrointestinal: soft, nontender, nondistended, NABS  Ext: no cyanosis or clubbing. Trace edema bilateral lower extremities  Neuro: awake, alert, oriented x3. Moves all 4 extremities. Cranial nerves II through XII grossly intact.   Skin: dry, no rash      DATA:      Recent Labs     12/04/22  0725 12/05/22  0610 12/06/22  1105   WBC 10.1 10.7 12.7*   HGB 9.8* 9.5* 9.7*   HCT 30.4* 29.0* 29.2*   .9* 102.1* 102.1*    255 296       Recent Labs     12/05/22  0610 12/05/22  1027 12/06/22  1105   * 129* 134   K 5.5* 4.1 3.5   CL 84* 82* 89*   CO2 24 26 30*   BUN 25* 28* 12   CREATININE 6.3* 6.4* 2.8*         Iron studies:  Lab Results   Component Value Date    FERRITIN 542 12/03/2022     Bone disease:  Lab Results   Component Value Date     (H) 06/17/2021    MG 2.1 12/06/2022    PHOS 3.7 12/06/2022         DIALYSIS ORDERS:    Reviewed      ASSESSMENT / RECOMMENDATIONS:     1. ESRD due to diabetic nephropathy   cont diffusive IHD support for solute and volume clearance per orders Tuesdays Thursdays and Saturdays  2. Anemia due to ESRD   Cont. epo and iv ferrlecit per orders  3. Secondary Hyperparathyroidism due to ESRD   Cont vitamin d analog  4. Hypertension:   Cont current tx  5.   Hypoglycemia   Management per primary team.  Recommend holding glipizide, at discharge change to short acting agents    Shelbi Edwards MD, MD  12/6/2022

## 2022-12-07 ENCOUNTER — CARE COORDINATION (OUTPATIENT)
Dept: CARE COORDINATION | Age: 67
End: 2022-12-07

## 2022-12-07 NOTE — HOME CARE
PATIENT DISCHARGED WITH INCORRECT ORDERS. REFERRAL WAS GIVEN FOR PT/OT ONLY AND SN WAS ADDED AT DISCHARGE. [facility] WILL NEED NEW ORDERS ENTERED FOR PT/OT ONLY.    (Discharge Plan is to return home with [facility] with start of care on 12/07/22 for PT/OT.    NAHOMY Bhatt.  [phone] )    Chaya Ohara DURAN, [address] [facility] [address]

## 2022-12-07 NOTE — CARE COORDINATION
Care Transitions Outreach Attempt    Call within 2 business days of discharge: Yes   Attempted to reach patient for transitions of care follow up. Unable to reach patient. CTN left HIPAA compliant message requesting return call. CTN will attempt to reach again. START taking:  albuterol sulfate HFA (Ventolin HFA)  benzonatate (TESSALON)  fluticasone (FLONASE)  fluticasone-salmeterol (Advair Diskus)  glipiZIDE (GLUCOTROL)  This replaces a similar medication. See the full medication  list for instructions. levoFLOXacin [facility])  Start taking on: 2022  STOP taking:  glipiZIDE 5 MG extended release tablet (GLUCOTROL  XL)  Replaced by a similar medication. Patient: Loraine Miranda Patient : 1955 MRN: <[id number]>    Last Discharge  [address]       Date Complaint Diagnosis Description Type Department Provider    22 Hypoglycemia Hypoglycemia . .. ED to Hosp-Admission (Discharged) (ADMITTED) Keyana Casillas MD; Epi Molina MD              Was this an external facility discharge? No Discharge Facility: [facility]    Noted following upcoming appointments from discharge chart review:   [facility] follow up appointment(s):   Future Appointments   Date Time Provider Abhishek Singh   2023 12:30 PM Yulia Casarez  [address]     Non-[facility] follow up appointment(s):     [address] Transition Team  [phone]

## 2022-12-08 ENCOUNTER — CARE COORDINATION (OUTPATIENT)
Dept: CARE COORDINATION | Age: 67
End: 2022-12-08

## 2022-12-08 NOTE — CARE COORDINATION
Care Transitions Outreach Attempt    Call within 2 business days of discharge: Yes   Second attempt made to reach patient for transitions of care follow up. Unable to reach patient. CTN left HIPAA compliant message requesting return call. CTN will sign off. START taking:  albuterol sulfate HFA (Ventolin HFA)  benzonatate (TESSALON)  fluticasone (FLONASE)  fluticasone-salmeterol (Advair Diskus)  glipiZIDE (GLUCOTROL)  This replaces a similar medication. See the full medication  list for instructions. levoFLOXacin [facility])  Start taking on: 2022  STOP taking:  glipiZIDE 5 MG extended release tablet (GLUCOTROL  XL)  Replaced by a similar medication. Patient: Yari Guerra Patient : 1955 MRN: <[id number]>    Last Discharge  Street       Date Complaint Diagnosis Description Type Department Provider    22 Hypoglycemia Hypoglycemia . .. ED to Hosp-Admission (Discharged) (ADMITTED) Alek Storey MD; Raudel Waters MD              Was this an external facility discharge? No Discharge Facility: [facility]    Noted following upcoming appointments from discharge chart review:   [facility] follow up appointment(s):   Future Appointments   Date Time Provider Abhishek Singh   2022  1:00 PM Jalil Garcia, DO [address] [place] [address]   2023 12:30 PM Shantell Van, DO [address] [place] [address]     Non-[facility] follow up appointment(s):     [address] Transition Team  [phone]

## 2022-12-09 ENCOUNTER — TELEPHONE (OUTPATIENT)
Dept: FAMILY MEDICINE CLINIC | Age: 67
End: 2022-12-09

## 2022-12-09 NOTE — TELEPHONE ENCOUNTER
Home health PT has picked up catalino once a week the first week and twice a week for the following 3 weeks

## 2022-12-15 ENCOUNTER — COMMUNITY OUTREACH (OUTPATIENT)
Dept: FAMILY MEDICINE CLINIC | Age: 67
End: 2022-12-15

## 2022-12-15 RX ORDER — GLIPIZIDE 5 MG/1
2.5 TABLET ORAL
Qty: 45 TABLET | Refills: 3 | Status: SHIPPED | OUTPATIENT
Start: 2022-12-15 | End: 2023-03-15

## 2022-12-15 RX ORDER — GLIPIZIDE 5 MG/1
2.5 TABLET ORAL
Qty: 15 TABLET | Refills: 3 | OUTPATIENT
Start: 2022-12-15 | End: 2023-01-14

## 2022-12-15 NOTE — TELEPHONE ENCOUNTER
Last Appointment:  11/14/2022  Future Appointments   Date Time Provider Abhishek Singh   12/19/2022 11:30 AM Malina Espinosa  [address]   2/20/2023 12:30 PM Malina Espinosa DO [facility] [address]

## 2022-12-15 NOTE — TELEPHONE ENCOUNTER
Pt is in need of script sent for blister pack.   She only has one pill left    Last Appointment:  11/14/2022  Future Appointments   Date Time Provider Abhishek Singh   12/19/2022 11:30 AM Asim Khanna  [address]   2/20/2023 12:30 PM Asim Khanna  [address]

## 2022-12-19 ENCOUNTER — OFFICE VISIT (OUTPATIENT)
Dept: FAMILY MEDICINE CLINIC | Age: 67
End: 2022-12-19

## 2022-12-19 VITALS
BODY MASS INDEX: 40.21 KG/M2 | RESPIRATION RATE: 18 BRPM | SYSTOLIC BLOOD PRESSURE: 120 MMHG | HEIGHT: 67 IN | OXYGEN SATURATION: 96 % | TEMPERATURE: 98.7 F | DIASTOLIC BLOOD PRESSURE: 60 MMHG | WEIGHT: 256.2 LBS | HEART RATE: 76 BPM

## 2022-12-19 DIAGNOSIS — J44.9 CHRONIC OBSTRUCTIVE PULMONARY DISEASE, UNSPECIFIED COPD TYPE (HCC): Chronic | ICD-10-CM

## 2022-12-19 DIAGNOSIS — I10 ESSENTIAL HYPERTENSION: Chronic | ICD-10-CM

## 2022-12-19 DIAGNOSIS — Z09 HOSPITAL DISCHARGE FOLLOW-UP: Primary | ICD-10-CM

## 2022-12-19 DIAGNOSIS — E16.2 HYPOGLYCEMIA: ICD-10-CM

## 2022-12-19 DIAGNOSIS — N18.5 CKD (CHRONIC KIDNEY DISEASE) STAGE 5, GFR LESS THAN 15 ML/MIN (HCC): Chronic | ICD-10-CM

## 2022-12-19 PROBLEM — J12.82 PNEUMONIA DUE TO COVID-19 VIRUS: Status: RESOLVED | Noted: 2021-10-18 | Resolved: 2022-12-19

## 2022-12-19 PROBLEM — U07.1 PNEUMONIA DUE TO COVID-19 VIRUS: Status: RESOLVED | Noted: 2021-10-18 | Resolved: 2022-12-19

## 2022-12-19 NOTE — PROGRESS NOTES
Post-Discharge Transitional Care  Follow Up      Natalia Gilbert   YOB: 1955    Date of Office Visit:  12/19/2022  Date of Hospital Admission: 12/2/22  Date of Hospital Discharge: 12/6/22  Risk of hospital readmission (high >=14%. Medium >=10%) :Readmission Risk Score: 16.2      Care management risk score Rising risk (score 2-5) and Complex Care (Scores >=6): No Risk Score On File     Non face to face  following discharge, date last encounter closed (first attempt may have been earlier): 12/08/2022    Call initiated 2 business days of discharge: Yes    Chief Complaint   Patient presents with    Follow-Up from Hospital     Hypoglycemia and cystitis 12/2/2022    Congestion       ASSESSMENT/PLAN:   Hospital discharge follow-up  -     NH DISCHARGE MEDS RECONCILED W/ CURRENT OUTPATIENT MED LIST  CKD (chronic kidney disease) stage 5, GFR less than 15 ml/min ([facility])  -     NH DISCHARGE MEDS RECONCILED W/ CURRENT OUTPATIENT MED LIST  Chronic obstructive pulmonary disease, unspecified COPD type ([address]  -     NH DISCHARGE MEDS RECONCILED W/ CURRENT OUTPATIENT MED LIST  Hypoglycemia  -     NH DISCHARGE MEDS RECONCILED W/ CURRENT OUTPATIENT MED LIST  Essential hypertension  -     NH DISCHARGE MEDS RECONCILED W/ CURRENT OUTPATIENT MED LIST    Medical Decision Making: moderate complexity  Return if symptoms worsen or fail to improve. Subjective:   HPI:    Follow up of Hospital problems/diagnosis(es):   1.) Hypoglycemia   2.) Weakness   3.) COPD exacerbation   4.) Rhinovirus URI   5.) UTI   6.) ESRD   7.) Smoking cessation     Inpatient course: Discharge summary reviewed- see chart. 78year-old female with a past medical history of Atrial fibrillation ([address] Bladder cancer ([address] Cancer ([address] CKD (chronic kidney disease), CKD (chronic kidney disease) stage 5, GFR less than 15 ml/min ([address] Diabetes ([address] Hypertension, Obesity, and Sleep apnea.  Presented to the hospital with complaints generalized weakness, hypoglycemia and dysuria. The patient states that she normally sleeps on the couch and she states that she found herself on the floor and she could not get-up at 3 am (secondary to generalized weakness); she is unsure whether she fell. She states that she had slurred speech and called for her grand-daughter. They called 911 and the ambulance brought the patient to the ER. She denied any numbness or tingling in her hand and feet at this time. She denies any fever or chills. She admits to having a cough with white foamy sputum for the last few years. She denies any CP or SOB. No abd pain. Sh estates that she was having diarrhea x 2 days and did not go to dialysis- it has since resolved. She states that she does urinate and noticed that she was having dysuria x 1 week now. At home, EMS reported that the patient's blood sugar was 25; she states that ever since COVID 10/21 that she has a decreased appetite and that she only ate 2 hot pockets and took glipizide prior to bed. The patient's glipizide was held and the patient was blood sugars started to improve. HbA1c was noted to be: 5.3. The patient's glipizide XL will be discontinued upon discharge. The patient was asked to keep a journal of fasting blood sugars and her blood sugars prior to lunch and dinner as well as a bedtime blood sugar to show her PCP. She wa started on immediate release Glucotrol 2.5 mg with breakfast.      The patient was noted to have a UTI and she was given antibiotics to complete a 5 day course. E.coli. She was started on rocephin- 3 days and will finish a 5 day course with PO antibiotics. She was also noted to have a URI with rhinovirus and was given supportive treatment. She had a COPD exacerbation and she was treated with inhaled bronchodilators and anti-tussives. Will need Advair and a rescue inhaler upon D/c.        She was seen by nephrology and was able to undergo dialysis during her hospital stay.   Cleared by nephrology for D/c. Interval history/Current status: stable     Patient Active Problem List   Diagnosis    Moderate episode of recurrent major depressive disorder (HCC)    Generalized anxiety disorder    Insomnia due to mental condition    Anemia    Essential hypertension    CKD (chronic kidney disease) stage 5, GFR less than 15 ml/min (HCC)    Atrial fibrillation ([facility]    Pure hypercholesterolemia    Morbid obesity ([facility]    Chronic obstructive pulmonary disease ([facility]    Obstructive sleep apnea    Exertional dyspnea    History of colon polyps    Family history of rectal cancer    COVID-19    Hypoglycemia       Medications listed as ordered at the time of discharge from hospital     Medication List            Accurate as of December 19, 2022 12:08 PM. If you have any questions, ask your nurse or doctor.                 CONTINUE taking these medications      albuterol sulfate  (90 Base) MCG/ACT inhaler  Commonly known as: Ventolin HFA  Inhale 2 puffs into the lungs 4 times daily as needed for Wheezing     amLODIPine 5 MG tablet  Commonly known as: NORVASC  TAKE 1 TABLET BY MOUTH DAILY     apixaban 5 MG Tabs tablet  Commonly known as: Eliquis  Take 1 tablet by mouth 2 times daily     atorvastatin 40 MG tablet  Commonly known as: LIPITOR  Take 1 tablet by mouth daily     benzonatate 100 MG capsule  Commonly known as: TESSALON  Take 1 capsule by mouth 3 times daily as needed for Cough     busPIRone 5 MG tablet  Commonly known as: BUSPAR  Take 0.5 tablets by mouth 2 times daily     calcitRIOL 0.25 MCG capsule  Commonly known as: ROCALTROL  TAKE 1 CAPSULE BY MOUTH DAILY MONDAY THROUGH FRIDAY     cinacalcet 90 MG tablet  Commonly known as: SENSIPAR     DULoxetine 60 MG extended release capsule  Commonly known as: CYMBALTA  TAKE 1 CAPSULE BY MOUTH DAILY     fluticasone 50 MCG/ACT nasal spray  Commonly known as: FLONASE  1 spray by Each Nostril route daily as needed for Rhinitis fluticasone-salmeterol 250-50 MCG/ACT Aepb diskus inhaler  Commonly known as: Advair Diskus  Inhale 1 puff into the lungs in the morning and 1 puff in the evening. glipiZIDE 5 MG tablet  Commonly known as: GLUCOTROL  Take 0.5 tablets by mouth daily (with breakfast)     metoprolol tartrate 50 MG tablet  Commonly known as: LOPRESSOR  TAKE 1 TABLET BY MOUTH TWICE A DAY     mirtazapine 7.5 MG tablet  Commonly known as: REMERON  Take 1 tablet by mouth nightly     ondansetron 4 MG tablet  Commonly known as: ZOFRAN  Take 1 tablet by mouth every 12 hours as needed for Nausea or Vomiting     pramipexole 0.125 MG tablet  Commonly known as: MIRAPEX  Take 1 tablet by mouth 3 times daily     propafenone 325 MG extended release capsule  Commonly known as: RYTHMOL SR  Take 1 capsule by mouth 2 times daily     torsemide 20 MG tablet  Commonly known as: DEMADEX  Take 2 tablets by mouth daily     traZODone 50 MG tablet  Commonly known as: DESYREL  Take 1 tablet by mouth nightly                Medications marked \"taking\" at this time  Outpatient Medications Marked as Taking for the 12/19/22 encounter (Office Visit) with Marixa Whitney, DO   Medication Sig Dispense Refill    glipiZIDE (GLUCOTROL) 5 MG tablet Take 0.5 tablets by mouth daily (with breakfast) 45 tablet 3    fluticasone-salmeterol (ADVAIR DISKUS) 250-50 MCG/ACT AEPB diskus inhaler Inhale 1 puff into the lungs in the morning and 1 puff in the evening.  60 each 3    albuterol sulfate HFA (VENTOLIN HFA) 108 (90 Base) MCG/ACT inhaler Inhale 2 puffs into the lungs 4 times daily as needed for Wheezing 54 g 1    benzonatate (TESSALON) 100 MG capsule Take 1 capsule by mouth 3 times daily as needed for Cough 15 capsule 0    fluticasone (FLONASE) 50 MCG/ACT nasal spray 1 spray by Each Nostril route daily as needed for Rhinitis 16 g 0    cinacalcet (SENSIPAR) 90 MG tablet       ondansetron (ZOFRAN) 4 MG tablet Take 1 tablet by mouth every 12 hours as needed for Nausea or Vomiting 60 tablet 3    pramipexole (MIRAPEX) 0.125 MG tablet Take 1 tablet by mouth 3 times daily 90 tablet 5    atorvastatin (LIPITOR) 40 MG tablet Take 1 tablet by mouth daily 30 tablet 5    busPIRone (BUSPAR) 5 MG tablet Take 0.5 tablets by mouth 2 times daily 30 tablet 5    apixaban (ELIQUIS) 5 MG TABS tablet Take 1 tablet by mouth 2 times daily 90 tablet 2    mirtazapine (REMERON) 7.5 MG tablet Take 1 tablet by mouth nightly 30 tablet 5    propafenone (RYTHMOL SR) 325 MG extended release capsule Take 1 capsule by mouth 2 times daily 60 capsule 3    DULoxetine (CYMBALTA) 60 MG extended release capsule TAKE 1 CAPSULE BY MOUTH DAILY 30 capsule 5    metoprolol tartrate (LOPRESSOR) 50 MG tablet TAKE 1 TABLET BY MOUTH TWICE A DAY 60 tablet 5    amLODIPine (NORVASC) 5 MG tablet TAKE 1 TABLET BY MOUTH DAILY 30 tablet 5    torsemide (DEMADEX) 20 MG tablet Take 2 tablets by mouth daily 60 tablet 5    traZODone (DESYREL) 50 MG tablet Take 1 tablet by mouth nightly 30 tablet 5    calcitRIOL (ROCALTROL) 0.25 MCG capsule TAKE 1 CAPSULE BY MOUTH DAILY MONDAY THROUGH FRIDAY 60 capsule 5        Medications patient taking as of now reconciled against medications ordered at time of hospital discharge: Yes    A comprehensive review of systems was negative except for what was noted in the HPI. Objective:    /60   Pulse 76   Temp 98.7 °F (37.1 °C) (Oral)   Resp 18   Ht 5' 7\" (1.702 m)   Wt 256 lb 3.2 oz (116.2 kg)   SpO2 96%   BMI 40.13 kg/m²   Physical Exam:  General: Awake, alert, and oriented to person, place, time, and purpose, appears stated age and cooperative, No acute distress  Head: Normocephalic, atraumatic  Eyes: conjunctivae/corneas clear, EOM's intact. Neck: symmetrical, trachea midline  Back: symmetric, ROM normal, No CVA tenderness.   Lungs: clear to auscultation bilaterally without wheezes, rales, or rhonchi  Heart: regular rate and rhythm, S1, S2 normal, no murmur, click, rub or gallop  Abdomen: soft, non-tender; bowel sounds normal; no masses,  no organomegaly  Extremities: atraumatic, no cyanosis, no edema  Skin: color, texture, turgor within normal limits. No rashes or lesions or normal  Neurologic: speech appropriate, moves all 4 extremities, normal muscle strength and tone, CN 2-12 grossly intact        An electronic signature was used to authenticate this note.   --Iesha Gifford, DO

## 2022-12-28 DIAGNOSIS — I48.91 ATRIAL FIBRILLATION, UNSPECIFIED TYPE (HCC): ICD-10-CM

## 2022-12-29 RX ORDER — PROPAFENONE HYDROCHLORIDE 325 MG/1
325 CAPSULE, EXTENDED RELEASE ORAL 2 TIMES DAILY
Qty: 60 CAPSULE | Refills: 0 | OUTPATIENT
Start: 2022-12-29

## 2022-12-29 NOTE — TELEPHONE ENCOUNTER
Last Appointment:  12/19/2022  Future Appointments   Date Time Provider Abhishek Singh   2/20/2023 12:30 PM Mary Blankenship  [address]

## 2023-01-04 ENCOUNTER — TELEPHONE (OUTPATIENT)
Dept: FAMILY MEDICINE CLINIC | Age: 68
End: 2023-01-04
Payer: MEDICARE

## 2023-01-04 DIAGNOSIS — B34.8 DISEASE DUE TO PARAMYXOVIRUS: Primary | ICD-10-CM

## 2023-01-04 PROCEDURE — G0179 MD RECERTIFICATION HHA PT: HCPCS | Performed by: INTERNAL MEDICINE

## 2023-01-13 RX ORDER — PROPAFENONE HYDROCHLORIDE 325 MG/1
325 CAPSULE, EXTENDED RELEASE ORAL 2 TIMES DAILY
Qty: 60 CAPSULE | Refills: 0 | OUTPATIENT
Start: 2023-01-13

## 2023-01-16 ENCOUNTER — HOSPITAL ENCOUNTER (EMERGENCY)
Age: 68
Discharge: HOME | End: 2023-01-16
Payer: MEDICARE

## 2023-01-16 VITALS
SYSTOLIC BLOOD PRESSURE: 164 MMHG | OXYGEN SATURATION: 97 % | HEART RATE: 72 BPM | DIASTOLIC BLOOD PRESSURE: 70 MMHG | RESPIRATION RATE: 16 BRPM | TEMPERATURE: 97.2 F

## 2023-01-16 DIAGNOSIS — L02.91 ABSCESS: Primary | ICD-10-CM

## 2023-01-16 LAB
GLUCOSE BLD-MCNC: 106 MG/DL
METER GLUCOSE: 106 MG/DL (ref 74–99)

## 2023-01-16 PROCEDURE — 90714 TD VACC NO PRESV 7 YRS+ IM: CPT | Performed by: PHYSICIAN ASSISTANT

## 2023-01-16 PROCEDURE — 6360000002 HC RX W HCPCS: Performed by: PHYSICIAN ASSISTANT

## 2023-01-16 PROCEDURE — 90471 IMMUNIZATION ADMIN: CPT | Performed by: PHYSICIAN ASSISTANT

## 2023-01-16 PROCEDURE — 99284 EMERGENCY DEPT VISIT MOD MDM: CPT

## 2023-01-16 PROCEDURE — 82962 GLUCOSE BLOOD TEST: CPT

## 2023-01-16 RX ORDER — DOXYCYCLINE HYCLATE 100 MG
100 TABLET ORAL 2 TIMES DAILY
Qty: 20 TABLET | Refills: 0 | Status: SHIPPED | OUTPATIENT
Start: 2023-01-16 | End: 2023-01-26

## 2023-01-16 RX ADMIN — CLOSTRIDIUM TETANI TOXOID ANTIGEN (FORMALDEHYDE INACTIVATED) AND CORYNEBACTERIUM DIPHTHERIAE TOXOID ANTIGEN (FORMALDEHYDE INACTIVATED) 0.5 ML: 5; 2 INJECTION, SUSPENSION INTRAMUSCULAR at 17:06

## 2023-01-16 NOTE — ED PROVIDER NOTES
[facility] [address]  Department of Emergency Medicine   ED  Encounter Note  Admit Date/RoomTime: 2023  4:37 PM  ED Room: [place] [address]/  NAME: Eun Bloom  : 1955  MRN: [id number]    CHIEF COMPLAINT     Wound Check (Scratched by her puppy on Upper right quad abdominal area, area on right wrist, and back seen at PCP today concerns for MRSA, )    HISTORY OF PRESENT ILLNESS        Eun Bloom is a 78 y.o. female who presents to the ED by private vehicle for concerns for MRSA. Patient states that she was scratched by her dog in the right upper quadrant of the abdomen and on the dorsum of the right wrist and possibly on her back. Patient states that this happened last week. She states that she was not seen by her PCP but called the office and was concerned about MRSA. They told her to come to the ER. She states that the areas are red. She denies any drainage. No fevers or chills. She is on dialysis 3 times a week. Patient has not had a tetanus shot in the past 5 years. Nothing make her symptoms better or worse. She does not check her glucose at home. REVIEW OF SYSTEMS     Pertinent positives and negatives are stated within HPI, all other systems reviewed and are negative. Past Medical History:  has a past medical history of Atrial fibrillation ([address] Bladder cancer ([address] Cancer ([address] CKD (chronic kidney disease), CKD (chronic kidney disease) stage 5, GFR less than 15 ml/min ([address] Diabetes ([address] Hypertension, Obesity, and Sleep apnea. Surgical History:  has a past surgical history that includes Cholecystectomy; Tubal ligation; Tonsillectomy; Colonoscopy; pr arteriovenous anastomosis open direct (Left, 2018); pr arteriovenous anastomosis open direct (Left, 2018); Dialysis fistula creation (Left, 2019); Cardiac catheterization (Left, 10/02/2008); Colonoscopy (N/A, 2019); Colonoscopy (2019);  Dialysis fistula creation (Right, 12/7/2020); and Dialysis fistula creation (Right, 4/8/2021). Social History:  reports that she quit smoking about 22 months ago. Her smoking use included cigarettes. She has a 21.50 pack-year smoking history. She has never used smokeless tobacco. She reports that she does not currently use alcohol. She reports that she does not use drugs. Family History: family history includes Alcohol Abuse in her brother; Anxiety Disorder in her daughter; Bipolar Disorder in her paternal uncle; COPD in her mother; Cancer in her mother; Colon Cancer in her maternal grandfather; Dementia in her father; Depression in her father; Diabetes in her brother, father, and mother; Heart Disease in her father; High Blood Pressure in her father; Prostate Cancer in her father; Stroke in her father.      Allergies: Adhesive tape and Penicillins  CURRENT MEDICATIONS       Discharge Medication List as of 1/16/2023  4:49 PM        CONTINUE these medications which have NOT CHANGED    Details   apixaban (ELIQUIS) 5 MG TABS tablet Take 1 tablet by mouth 2 times daily, Disp-60 tablet, R-0Normal      glipiZIDE (GLUCOTROL) 5 MG tablet Take 0.5 tablets by mouth daily (with breakfast), Disp-45 tablet, R-3Normal      fluticasone-salmeterol (ADVAIR DISKUS) 250-50 MCG/ACT AEPB diskus inhaler Inhale 1 puff into the lungs in the morning and 1 puff in the evening., Disp-60 each, R-3Normal      albuterol sulfate HFA (VENTOLIN HFA) 108 (90 Base) MCG/ACT inhaler Inhale 2 puffs into the lungs 4 times daily as needed for Wheezing, Disp-54 g, R-1Normal      benzonatate (TESSALON) 100 MG capsule Take 1 capsule by mouth 3 times daily as needed for Cough, Disp-15 capsule, R-0Normal      fluticasone (FLONASE) 50 MCG/ACT nasal spray 1 spray by Each Nostril route daily as needed for Rhinitis, Disp-16 g, R-0Normal      cinacalcet (SENSIPAR) 90 MG tablet Historical Med      ondansetron (ZOFRAN) 4 MG tablet Take 1 tablet by mouth every 12 hours as needed for Nausea or Vomiting, Disp-60 tablet, R-3Normal      pramipexole (MIRAPEX) 0.125 MG tablet Take 1 tablet by mouth 3 times daily, Disp-90 tablet, R-5Normal      atorvastatin (LIPITOR) 40 MG tablet Take 1 tablet by mouth daily, Disp-30 tablet, R-5Normal      busPIRone (BUSPAR) 5 MG tablet Take 0.5 tablets by mouth 2 times daily, Disp-30 tablet, R-5Normal      mirtazapine (REMERON) 7.5 MG tablet Take 1 tablet by mouth nightly, Disp-30 tablet, R-5Normal      propafenone (RYTHMOL SR) 325 MG extended release capsule Take 1 capsule by mouth 2 times daily, Disp-60 capsule, R-3Normal      DULoxetine (CYMBALTA) 60 MG extended release capsule TAKE 1 CAPSULE BY MOUTH DAILY, Disp-30 capsule, R-5Normal      metoprolol tartrate (LOPRESSOR) 50 MG tablet TAKE 1 TABLET BY MOUTH TWICE A DAY, Disp-60 tablet, R-5Normal      amLODIPine (NORVASC) 5 MG tablet TAKE 1 TABLET BY MOUTH DAILY, Disp-30 tablet, R-5Normal      torsemide (DEMADEX) 20 MG tablet Take 2 tablets by mouth daily, Disp-60 tablet, R-5Normal      traZODone (DESYREL) 50 MG tablet Take 1 tablet by mouth nightly, Disp-30 tablet, R-5Normal      calcitRIOL (ROCALTROL) 0.25 MCG capsule TAKE 1 CAPSULE BY MOUTH DAILY MONDAY THROUGH FRIDAY, Disp-60 capsule, R-5Normal             SCREENINGS     [place] Coma Scale  Eye Opening: Spontaneous  Best Verbal Response: Oriented  Best Motor Response: Obeys commands  [place] Coma Scale Score: 15         CIWA Assessment  BP: (!) 164/70  Heart Rate: 72       PHYSICAL EXAM   Oxygen Saturation Interpretation: Normal on room air analysis. ED Triage Vitals   BP Temp Temp Source Heart Rate Resp SpO2 Height Weight   01/16/23 1632 01/16/23 1545 01/16/23 1545 01/16/23 1545 01/16/23 1632 01/16/23 1545 -- --   (!) 164/70 97.2 °F (36.2 °C) Temporal 81 16 91 %           Physical Exam  Constitutional/General: Alert and oriented x3, well appearing, non toxic  HEENT:  NC/NT. PERRLA,  Airway patent.   Neck: Supple, full ROM, non tender to palpation in the midline, no stridor, no crepitus, no meningeal signs  Respiratory: Lungs clear to auscultation bilaterally, no wheezes, rales, or rhonchi. Not in respiratory distress  CV:  Regular rate. Regular rhythm. No murmurs, gallops, or rubs. 2+ distal pulses  Chest: No chest wall tenderness  GI:  Abdomen Soft, Non tender, Non distended. +BS. No rebound, guarding, or rigidity. No pulsatile masses. Musculoskeletal: Moves all extremities x 4. Warm and well perfused, no clubbing, cyanosis, or edema. Capillary refill <3 seconds  Integument: skin warm and dry. No rashes. Patient has a 1.0 cm area to the right mid abdominal wall and dorsum of the right wrist.  They are mildly indurated. No fluctuance or sign of focal abscess formation to I&D at this time. There is no red streaking around it. The appears superficial in nature. Patient has no lesions of cellulitis or abscess to her back. Lymphatic: no lymphadenopathy noted  Neurologic: GCS 15, no focal deficits, symmetric strength 5/5 in the upper and lower extremities bilaterally  Psychiatric: Normal Affect    DIAGNOSTIC RESULTS   (All laboratory and radiology results have been personally reviewed by myself)  Labs:  Results for orders placed or performed during the hospital encounter of 01/16/23   POCT glucose   Result Value Ref Range    Glucose 106 mg/dL   POCT Glucose   Result Value Ref Range    Meter Glucose 106 (H) 74 - 99 mg/dL     Imaging: All Radiology results interpreted by Radiologist unless otherwise noted.   No orders to display       ED COURSE   Vitals:    Vitals:    01/16/23 1545 01/16/23 1632   BP:  (!) 164/70   Pulse: 81 72   Resp:  16   Temp: 97.2 °F (36.2 °C)    TempSrc: Temporal    SpO2: 91% 97%       Patient was given the following medications:  Medications   tetanus & diphtheria toxoids (adult) 5-2 LFU injection 0.5 mL (0.5 mLs IntraMUSCular Given 1/16/23 1706)          PROCEDURES   None      CONSULTS:  None  DIFFERENTIAL DX_MDM   MDM: Patient presents with Wound Check (Scratched by her puppy on Upper right quad abdominal area, area on right wrist, and back seen at PCP today concerns for MRSA, )    This is a 60-year-old female that presents to the ER today with concerns of MRSA. She has a small area to the dorsum of her right wrist and to the right mid abdominal wall that is erythematous and indurated. No fluctuance or sign of focal abscess formation to I&D at this time. They have not been draining. There is some mild erythema, induration, will treat with doxycycline at this time. We also updated her tetanus shot here today. We did consider sepsis, however, patient's vital signs are stable, she appears well and the infection seems limited at this time. Will discharge home on oral doxycycline. Patient is encouraged to return the ER for any worsening symptoms. Otherwise follow-up with PCP. Plan of Care/Counseling:  TOBIAS Roach reviewed today's visit with the patient in addition to providing specific details for the plan of care and counseling regarding the diagnosis and prognosis. Questions are answered at this time and are agreeable with the plan. ASSESSMENT     1. Abscess        DISPOSITION   Discharged home. Patient condition is good    NEW MEDICATIONS     Discharge Medication List as of 1/16/2023  4:49 PM        START taking these medications    Details   doxycycline hyclate (VIBRA-TABS) 100 MG tablet Take 1 tablet by mouth 2 times daily for 10 days, Disp-20 tablet, R-0Normal           Electronically signed by TOBIAS Roach   DD: 1/16/23  **This report was transcribed using voice recognition software. Every effort was made to ensure accuracy; however, inadvertent computerized transcription errors may be present.   END OF ED PROVIDER NOTE       Mickey Roach  01/16/23 6723

## 2023-01-28 DIAGNOSIS — I48.91 ATRIAL FIBRILLATION, UNSPECIFIED TYPE (HCC): ICD-10-CM

## 2023-01-30 RX ORDER — PROPAFENONE HYDROCHLORIDE 325 MG/1
325 CAPSULE, EXTENDED RELEASE ORAL 2 TIMES DAILY
Qty: 60 CAPSULE | Refills: 0 | OUTPATIENT
Start: 2023-01-30

## 2023-01-30 NOTE — TELEPHONE ENCOUNTER
Last Appointment:  12/19/2022  Future Appointments   Date Time Provider Abhishek Singh   2/20/2023 12:30 PM Raheem Bonilla  [address]

## 2023-02-20 ENCOUNTER — OFFICE VISIT (OUTPATIENT)
Dept: FAMILY MEDICINE CLINIC | Age: 68
End: 2023-02-20

## 2023-02-20 VITALS
SYSTOLIC BLOOD PRESSURE: 120 MMHG | TEMPERATURE: 97.5 F | BODY MASS INDEX: 43.92 KG/M2 | HEART RATE: 80 BPM | DIASTOLIC BLOOD PRESSURE: 60 MMHG | WEIGHT: 279.8 LBS | HEIGHT: 67 IN | OXYGEN SATURATION: 97 %

## 2023-02-20 DIAGNOSIS — F33.1 MODERATE EPISODE OF RECURRENT MAJOR DEPRESSIVE DISORDER (HCC): ICD-10-CM

## 2023-02-20 DIAGNOSIS — N18.5 TYPE 2 DIABETES MELLITUS WITH STAGE 5 CHRONIC KIDNEY DISEASE NOT ON CHRONIC DIALYSIS, WITHOUT LONG-TERM CURRENT USE OF INSULIN (HCC): ICD-10-CM

## 2023-02-20 DIAGNOSIS — Z99.2 ESRD ON HEMODIALYSIS (HCC): ICD-10-CM

## 2023-02-20 DIAGNOSIS — I48.0 PAROXYSMAL ATRIAL FIBRILLATION (HCC): ICD-10-CM

## 2023-02-20 DIAGNOSIS — N18.5 CKD (CHRONIC KIDNEY DISEASE) STAGE 5, GFR LESS THAN 15 ML/MIN (HCC): Chronic | ICD-10-CM

## 2023-02-20 DIAGNOSIS — E11.22 TYPE 2 DIABETES MELLITUS WITH STAGE 5 CHRONIC KIDNEY DISEASE NOT ON CHRONIC DIALYSIS, WITHOUT LONG-TERM CURRENT USE OF INSULIN (HCC): ICD-10-CM

## 2023-02-20 DIAGNOSIS — Z13.31 POSITIVE DEPRESSION SCREENING: ICD-10-CM

## 2023-02-20 DIAGNOSIS — F41.1 GENERALIZED ANXIETY DISORDER: ICD-10-CM

## 2023-02-20 DIAGNOSIS — Z12.11 COLON CANCER SCREENING: ICD-10-CM

## 2023-02-20 DIAGNOSIS — E66.01 OBESITY, CLASS III, BMI 40-49.9 (MORBID OBESITY) (HCC): ICD-10-CM

## 2023-02-20 DIAGNOSIS — J44.9 CHRONIC OBSTRUCTIVE PULMONARY DISEASE, UNSPECIFIED COPD TYPE (HCC): ICD-10-CM

## 2023-02-20 DIAGNOSIS — N18.6 ESRD ON HEMODIALYSIS (HCC): ICD-10-CM

## 2023-02-20 DIAGNOSIS — Z00.00 MEDICARE ANNUAL WELLNESS VISIT, SUBSEQUENT: Primary | ICD-10-CM

## 2023-02-20 DIAGNOSIS — J06.9 VIRAL URI: ICD-10-CM

## 2023-02-20 RX ORDER — BUSPIRONE HYDROCHLORIDE 5 MG/1
5 TABLET ORAL 2 TIMES DAILY
Qty: 30 TABLET | Refills: 5 | Status: SHIPPED | OUTPATIENT
Start: 2023-02-20 | End: 2023-08-19

## 2023-02-20 SDOH — ECONOMIC STABILITY: FOOD INSECURITY: WITHIN THE PAST 12 MONTHS, YOU WORRIED THAT YOUR FOOD WOULD RUN OUT BEFORE YOU GOT MONEY TO BUY MORE.: NEVER TRUE

## 2023-02-20 SDOH — ECONOMIC STABILITY: INCOME INSECURITY: HOW HARD IS IT FOR YOU TO PAY FOR THE VERY BASICS LIKE FOOD, HOUSING, MEDICAL CARE, AND HEATING?: NOT HARD AT ALL

## 2023-02-20 SDOH — ECONOMIC STABILITY: HOUSING INSECURITY
IN THE LAST 12 MONTHS, WAS THERE A TIME WHEN YOU DID NOT HAVE A STEADY PLACE TO SLEEP OR SLEPT IN A SHELTER (INCLUDING NOW)?: NO

## 2023-02-20 SDOH — ECONOMIC STABILITY: FOOD INSECURITY: WITHIN THE PAST 12 MONTHS, THE FOOD YOU BOUGHT JUST DIDN'T LAST AND YOU DIDN'T HAVE MONEY TO GET MORE.: NEVER TRUE

## 2023-02-20 ASSESSMENT — PATIENT HEALTH QUESTIONNAIRE - PHQ9
9. THOUGHTS THAT YOU WOULD BE BETTER OFF DEAD, OR OF HURTING YOURSELF: 0
SUM OF ALL RESPONSES TO PHQ QUESTIONS 1-9: 24
6. FEELING BAD ABOUT YOURSELF - OR THAT YOU ARE A FAILURE OR HAVE LET YOURSELF OR YOUR FAMILY DOWN: 3
5. POOR APPETITE OR OVEREATING: 3
SUM OF ALL RESPONSES TO PHQ9 QUESTIONS 1 & 2: 6
SUM OF ALL RESPONSES TO PHQ QUESTIONS 1-9: 24
10. IF YOU CHECKED OFF ANY PROBLEMS, HOW DIFFICULT HAVE THESE PROBLEMS MADE IT FOR YOU TO DO YOUR WORK, TAKE CARE OF THINGS AT HOME, OR GET ALONG WITH OTHER PEOPLE: 3
3. TROUBLE FALLING OR STAYING ASLEEP: 3
1. LITTLE INTEREST OR PLEASURE IN DOING THINGS: 3
SUM OF ALL RESPONSES TO PHQ QUESTIONS 1-9: 24
2. FEELING DOWN, DEPRESSED OR HOPELESS: 3
SUM OF ALL RESPONSES TO PHQ QUESTIONS 1-9: 24
8. MOVING OR SPEAKING SO SLOWLY THAT OTHER PEOPLE COULD HAVE NOTICED. OR THE OPPOSITE, BEING SO FIGETY OR RESTLESS THAT YOU HAVE BEEN MOVING AROUND A LOT MORE THAN USUAL: 3
7. TROUBLE CONCENTRATING ON THINGS, SUCH AS READING THE NEWSPAPER OR WATCHING TELEVISION: 3
4. FEELING TIRED OR HAVING LITTLE ENERGY: 3

## 2023-02-20 ASSESSMENT — COLUMBIA-SUICIDE SEVERITY RATING SCALE - C-SSRS
2. HAVE YOU ACTUALLY HAD ANY THOUGHTS OF KILLING YOURSELF?: NO
6. HAVE YOU EVER DONE ANYTHING, STARTED TO DO ANYTHING, OR PREPARED TO DO ANYTHING TO END YOUR LIFE?: NO
1. WITHIN THE PAST MONTH, HAVE YOU WISHED YOU WERE DEAD OR WISHED YOU COULD GO TO SLEEP AND NOT WAKE UP?: NO

## 2023-02-20 ASSESSMENT — LIFESTYLE VARIABLES
HOW MANY STANDARD DRINKS CONTAINING ALCOHOL DO YOU HAVE ON A TYPICAL DAY: PATIENT DOES NOT DRINK
HOW OFTEN DO YOU HAVE A DRINK CONTAINING ALCOHOL: NEVER

## 2023-02-20 NOTE — PATIENT INSTRUCTIONS
Call to get an appointment with cardiology        Preventing Falls: Care Instructions  Overview     Getting around your home safely can be a challenge if you have injuries or health problems that make it easy for you to fall. Loose rugs and furniture in walkways are among the dangers for many older people who have problems walking or who have poor eyesight. People who have conditions such as arthritis, osteoporosis, or dementia also have to be careful not to fall. You can make your home safer with a few simple measures. Follow-up care is a key part of your treatment and safety. Be sure to make and go to all appointments, and call your doctor if you are having problems. It's also a good idea to know your test results and keep a list of the medicines you take. How can you care for yourself at home? Taking care of yourself  Exercise regularly to improve your strength, muscle tone, and balance. Walk if you can. Swimming may be a good choice if you cannot walk easily. Have your vision and hearing checked each year or any time you notice a change. If you have trouble seeing and hearing, you might not be able to avoid objects and could lose your balance. Know the side effects of the medicines you take. Ask your doctor or pharmacist whether the medicines you take can affect your balance. Sleeping pills or sedatives can affect your balance. Limit the amount of alcohol you drink. Alcohol can impair your balance and other senses. Ask your doctor whether calluses or corns on your feet need to be removed. If you wear loose-fitting shoes because of calluses or corns, you can lose your balance and fall. Talk to your doctor if you have numbness in your feet. You may get dizzy if you do not drink enough water. To prevent dehydration, drink plenty of fluids. Choose water and other clear liquids.  If you have kidney, heart, or liver disease and have to limit fluids, talk with your doctor before you increase the amount of fluids you drink. Preventing falls at home  Remove raised doorway thresholds, throw rugs, and clutter. Repair loose carpet or raised areas in the floor. Move furniture and electrical cords to keep them out of walking paths. Use nonskid floor wax, and wipe up spills right away, especially on ceramic tile floors. If you use a walker or cane, put rubber tips on it. If you use crutches, clean the bottoms of them regularly with an abrasive pad, such as steel wool. Keep your house well lit, especially stairways, porches, and outside walkways. Use night-lights in areas such as hallways and bathrooms. Add extra light switches or use remote switches (such as switches that go on or off when you clap your hands) to make it easier to turn lights on if you have to get up during the night. Install sturdy handrails on stairways. Move items in your cabinets so that the things you use a lot are on the lower shelves (about waist level). Keep a cordless phone and a flashlight with new batteries by your bed. If possible, put a phone in each of the main rooms of your house, or carry a cell phone in case you fall and cannot reach a phone. Or, you can wear a device around your neck or wrist. You push a button that sends a signal for help. Wear low-heeled shoes that fit well and give your feet good support. Use footwear with nonskid soles. Check the heels and soles of your shoes for wear. Repair or replace worn heels or soles. Do not wear socks without shoes on smooth floors, such as wood. Walk on the grass when the sidewalks are slippery. If you live in an area that gets snow and ice in the winter, sprinkle salt on slippery steps and sidewalks. Or ask a family member or friend to do this for you. Preventing falls in the bath  Install grab bars and nonskid mats inside and outside your shower or tub and near the toilet and sinks. Use shower chairs and bath benches.   Use a hand-held shower head that will allow you to sit while showering. Get into a tub or shower by putting the weaker leg in first. Get out of a tub or shower with your strong side first.  Repair loose toilet seats and consider installing a raised toilet seat to make getting on and off the toilet easier. Keep your bathroom door unlocked while you are in the shower. Where can you learn more? Go to http://www.mcdermott.com/ and enter G117 to learn more about \"Preventing Falls: Care Instructions. \"  Current as of: May 4, 2022               Content Version: 13.5  © 3385-3728 [employer]. Care instructions adapted under license by [facility] ([facility]). If you have questions about a medical condition or this instruction, always ask your healthcare professional. [address] any warranty or liability for your use of this information. Learning About Mindfulness for Stress  What are mindfulness and stress? Stress is what you feel when you have to handle more than you are used to. A lot of things can cause stress. You may feel stress when you go on a job interview, take a test, or run a race. This kind of short-term stress is normal and even useful. It can help you if you need to work hard or react quickly. Stress also can last a long time. Long-term stress is caused by stressful situations or events. Examples of long-term stress include long-term health problems, ongoing problems at work, and conflicts in your family. Long-term stress can harm your health. Mindfulness is a focus only on things happening in the present moment. It's a process of purposefully paying attention to and being aware of your surroundings, your emotions, your thoughts, and how your body feels. You are aware of these things, but you aren't judging these experiences as \"good\" or \"bad. \" Mindfulness can help you learn to calm your mind and body to help you cope with illness, pain, and stress. How does mindfulness help to relieve stress?   Mindfulness can help quiet your mind and relax your body. Studies show that it can help some people sleep better, feel less anxious, and bring their blood pressure down. And it's been shown to help some people live and cope better with certain health problems like heart disease, depression, chronic pain, and cancer. How do you practice mindfulness? To be mindful is to pay attention, to be present, and to be accepting. When you're mindful, you do just one thing and you pay close attention to that one thing. For example, you may sit quietly and notice your emotions or how your food tastes and smells. When you're present, you focus on the things that are happening right now. You let go of your thoughts about the past and the future. When you dwell on the past or the future, you miss moments that can heal and strengthen you. You may miss moments like hearing a child laugh or seeing a friendly face when you think you're all alone. When you're accepting, you don't  the present moment. Instead you accept your thoughts and feelings as they come. You can practice anytime, anywhere, and in any way you choose. You can practice in many ways. Here are a few ideas:  While doing your chores, like washing the dishes, let your mind focus on what's in your hand. What does the dish feel like? Is the water warm or cold? Go outside and take a few deep breaths. What is the air like? Is it warm or cold? When you can, take some time at the start of your day to sit alone and think. Take a slow walk by yourself. Count your steps while you breathe in and out. Try yoga breathing exercises, stretches, and poses to strengthen and relax your muscles. At work, if you can, try to stop for a few moments each hour. Note how your body feels. Let yourself regroup and let your mind settle before you return to what you were doing. If you struggle with anxiety or \"worry thoughts,\" imagine your mind as a blue khanh and your worry thoughts as clouds.  Now imagine those worry thoughts floating across your mind's khanh. Just let them pass by as you watch. Follow-up care is a key part of your treatment and safety. Be sure to make and go to all appointments, and call your doctor if you are having problems. It's also a good idea to know your test results and keep a list of the medicines you take. Where can you learn more? Go to http://www.mcdermott.com/ and enter M676 to learn more about \"Learning About Mindfulness for Stress. \"  Current as of: February 9, 2022               Content Version: 13.5  © 3641-4595 [employer]. Care instructions adapted under license by [address] If you have questions about a medical condition or this instruction, always ask your healthcare professional. [address] any warranty or liability for your use of this information. Learning About Emotional Support  When do you need emotional support? You might find getting support from others helpful when you have a long-term health problem. Often people feel alone, confused, or scared when coping with an illness. But you aren't alone. Other people are going through the same thing you are and know how you feel. Talking with others about your feelings can help you feel better. Your family and friends can give you support. So can your doctor, a support group, or a [place]. If you have a support network, you will not feel as alone. You will learn new ways to deal with your situation, and you may try harder to overcome it. Where you can get support  Family and friends: They can help you cope by giving you comfort and encouragement. Counseling: Professional counseling can help you cope with situations that interfere with your life and cause stress. Counseling can help you understand and deal with your illness. Your doctor: Find a doctor you trust and feel comfortable with. Be open and honest about your fears and concerns.  Your doctor can help you get the right medical treatments, including counseling. Spiritual or Anabaptist groups: They can provide comfort and may be able to help you find counseling or other social support services. Social groups: They can help you meet new people and get involved in activities you enjoy. Community support groups: In a support group, you can talk to others who have dealt with the same problems or illness as you. You can encourage one another and learn ways to cope with tough emotions. How to find a support group  Ask your doctor, counselor, or other health professional for suggestions. Contact your local Taoist, Confucianist, Jehovah's witness, or other Anabaptist group. Ask your family and friends. Ask people who have the same health concerns. Go online. Forums and blogs let you read messages from others and leave your own messages. You can exchange stories, vent your frustrations, and ask and answer questions. Contact a city, state, or national group that provides support for your health concerns. Your library or community center may have a list of these groups. Or you can look for information online. Look for a support group that works for you. Ask yourself if you prefer structure and would like a , or if you would like a less formal group. Do you prefer face-to-face meetings? Or do you feel more secure in online chat rooms or forums? Supportive relationships  A supportive relationship includes emotional support such as love, trust, and understanding, as well as advice and concrete help, such as help managing your time. Reach out to others  Family and friends can help you. Ask them to:  Listen to you and give you encouragement. This can keep you from feeling hopeless or alone. Help with small daily tasks or with bigger problems. A helping hand can keep you from feeling overwhelmed. Help you manage a health problem. For example, ask them to go to doctor visits with you.  Your loved ones can offer support by being involved in your medical care. Respect your relationships  A good relationship is also a two-way street. You count on help from others, but they also count on you. Know your friends' limits. You don't have to see or call your friends every day. If you are going through a rough patch, ask friends if you can contact them outside of the usual boundaries. Don't always complain or talk about yourself. Know when it's time to stop talking and listen or just enjoy your friend's company. Know that good friends can be a bad influence. For example, if a friend encourages you to drink when you know it will harm you, you may want to end the friendship. Where can you learn more? Go to http://www.woods.com/ and enter G092 to learn more about \"Learning About Emotional Support. \"  Current as of: February 9, 2022               Content Version: 13.5  © 3909-0427 [employer]. Care instructions adapted under license by [facility] ([facility]). If you have questions about a medical condition or this instruction, always ask your healthcare professional. [address] any warranty or liability for your use of this information. Fatigue: Care Instructions  Your Care Instructions     Fatigue is a feeling of tiredness, exhaustion, or lack of energy. You may feel fatigue because of too much or not enough activity. It can also come from stress, lack of sleep, boredom, and poor diet. Many medical problems, such as viral infections, can cause fatigue. Emotional problems, especially depression, are often the cause of fatigue. Fatigue is most often a symptom of another problem. Treatment for fatigue depends on the cause. For example, if you have fatigue because you have a certain health problem, treating this problem also treats your fatigue. If depression or anxiety is the cause, treatment may help. Follow-up care is a key part of your treatment and safety.  Be sure to make and go to all appointments, and call your doctor if you are having problems. It's also a good idea to know your test results and keep a list of the medicines you take. How can you care for yourself at home? Get regular exercise. But don't overdo it. Go back and forth between rest and exercise. Get plenty of rest.  Eat a healthy diet. Do not skip meals, especially breakfast.  Reduce your use of caffeine, tobacco, and alcohol. Caffeine is most often found in coffee, tea, cola drinks, and chocolate. Limit medicines that can cause fatigue. This includes tranquilizers and cold and allergy medicines. When should you call for help? Watch closely for changes in your health, and be sure to contact your doctor if:    You have new symptoms such as fever or a rash. Your fatigue gets worse. You have been feeling down, depressed, or hopeless. Or you may have lost interest in things that you usually enjoy. You are not getting better as expected. Where can you learn more? Go to http://www.woods.com/ and enter C069 to learn more about \"Fatigue: Care Instructions. \"  Current as of: February 9, 2022               Content Version: 13.5  © 9787-7413 [employer]. Care instructions adapted under license by [facility] ([facility]). If you have questions about a medical condition or this instruction, always ask your healthcare professional. [place] [address] any warranty or liability for your use of this information. Learning About Emotional Support  When do you need emotional support? You might find getting support from others helpful when you have a long-term health problem. Often people feel alone, confused, or scared when coping with an illness. But you aren't alone. Other people are going through the same thing you are and know how you feel. Talking with others about your feelings can help you feel better. Your family and friends can give you support.  So can your doctor, a support group, or a Anglican. If you have a support network, you will not feel as alone. You will learn new ways to deal with your situation, and you may try harder to overcome it. Where you can get support  Family and friends: They can help you cope by giving you comfort and encouragement. Counseling: Professional counseling can help you cope with situations that interfere with your life and cause stress. Counseling can help you understand and deal with your illness. Your doctor: Find a doctor you trust and feel comfortable with. Be open and honest about your fears and concerns. Your doctor can help you get the right medical treatments, including counseling. Spiritual or Religion groups: They can provide comfort and may be able to help you find counseling or other social support services. Social groups: They can help you meet new people and get involved in activities you enjoy. Community support groups: In a support group, you can talk to others who have dealt with the same problems or illness as you. You can encourage one another and learn ways to cope with tough emotions. How to find a support group  Ask your doctor, counselor, or other health professional for suggestions. Contact your local Anglican, Baptism, Yazidism, or other Religion group. Ask your family and friends. Ask people who have the same health concerns. Go online. Forums and blogs let you read messages from others and leave your own messages. You can exchange stories, vent your frustrations, and ask and answer questions. Contact a city, state, or national group that provides support for your health concerns. Your library or community center may have a list of these groups. Or you can look for information online. Look for a support group that works for you. Ask yourself if you prefer structure and would like a , or if you would like a less formal group. Do you prefer face-to-face meetings?  Or do you feel more secure in online chat rooms or forums? Supportive relationships  A supportive relationship includes emotional support such as love, trust, and understanding, as well as advice and concrete help, such as help managing your time. Reach out to others  Family and friends can help you. Ask them to:  Listen to you and give you encouragement. This can keep you from feeling hopeless or alone. Help with small daily tasks or with bigger problems. A helping hand can keep you from feeling overwhelmed. Help you manage a health problem. For example, ask them to go to doctor visits with you. Your loved ones can offer support by being involved in your medical care. Respect your relationships  A good relationship is also a two-way street. You count on help from others, but they also count on you. Know your friends' limits. You don't have to see or call your friends every day. If you are going through a rough patch, ask friends if you can contact them outside of the usual boundaries. Don't always complain or talk about yourself. Know when it's time to stop talking and listen or just enjoy your friend's company. Know that good friends can be a bad influence. For example, if a friend encourages you to drink when you know it will harm you, you may want to end the friendship. Where can you learn more? Go to http://www.woods.com/ and enter G092 to learn more about \"Learning About Emotional Support. \"  Current as of: February 9, 2022               Content Version: 13.5  © 6988-8879 [employer]. Care instructions adapted under license by [facility] ([facility]). If you have questions about a medical condition or this instruction, always ask your healthcare professional. [address] any warranty or liability for your use of this information.       For more information on your local Area Agency on Aging or [place] on Aging please visit the appropriate web site below:    [place]: MobileCycles.pl    [place]: https://aging. [place].gov/    [facility] [facility]: https://aging.[place].gov/    [place]: InsuranceSquad.es           Learning About Stress  What is stress? Stress is what you feel when you have to handle more than you are used to. Stress is a fact of life for most people, and it affects everyone differently. What causes stress for you may not be stressful for someone else. A lot of things can cause stress. You may feel stress when you go on a job interview, take a test, or run a race. This kind of short-term stress is normal and even useful. It can help you if you need to work hard or react quickly. For example, stress can help you finish an important job on time. Stress also can last a long time. Long-term stress is caused by stressful situations or events. Examples of long-term stress include long-term health problems, ongoing problems at work, or conflicts in your family. Long-term stress can harm your health. How does stress affect your health? When you are stressed, your body responds as though you are in danger. It makes hormones that speed up your heart, make you breathe faster, and give you a burst of energy. This is called the fight-or-flight stress response. If the stress is over quickly, your body goes back to normal and no harm is done. But if stress happens too often or lasts too long, it can have bad effects. Long-term stress can make you more likely to get sick, and it can make symptoms of some diseases worse. If you tense up when you are stressed, you may develop neck, shoulder, or low back pain. Stress is linked to high blood pressure and heart disease. Stress also harms your emotional health. It can make you sevilla, tense, or depressed. Your relationships may suffer, and you may not do well at work or school. What can you do to manage stress? How to relax your mind   Write.  It may help to write about things that are bothering you. This helps you find out how much stress you feel and what is causing it. When you know this, you can find better ways to cope. Let your feelings out. Talk, laugh, cry, and express anger when you need to. Talking with friends, family, a counselor, or a member of the clergy about your feelings is a healthy way to relieve stress. Do something you enjoy. For example, listen to music or go to a movie. Practice your hobby or do volunteer work. Meditate. This can help you relax, because you are not worrying about what happened before or what may happen in the future. Do guided imagery. Imagine yourself in any setting that helps you feel calm. You can use audiotapes, books, or a teacher to guide you. How to relax your body   Do something active. Exercise or activity can help reduce stress. Walking is a great way to get started. Even everyday activities such as housecleaning or yard work can help. Do breathing exercises. For example:  From a standing position, bend forward from the waist with your knees slightly bent. Let your arms dangle close to the floor. Breathe in slowly and deeply as you return to a standing position. Roll up slowly and lift your head last.  Hold your breath for just a few seconds in the standing position. Breathe out slowly and bend forward from the waist.  Try yoga or claritza chi. These techniques combine exercise and meditation. You may need some training at first to learn them. What can you do to prevent stress? Manage your time. This helps you find time to do the things you want and need to do. Get enough sleep. Your body recovers from the stresses of the day while you are sleeping. Get support. Your family, friends, and community can make a difference in how you experience stress. Where can you learn more? Go to http://www.mcdermott.com/ and enter N032 to learn more about \"Learning About Stress. \"  Current as of: October 6, 2021               Content Version: 13.5  © 6235-9634 [employer]wise, [employer]. Care instructions adapted under license by [facility] ([facility]). If you have questions about a medical condition or this instruction, always ask your healthcare professional. [address] any warranty or liability for your use of this information. Learning About Being Active as an Older Adult  Why is being active important as you get older? Being active is one of the best things you can do for your health. And it's never too late to start. Being active--or getting active, if you aren't already--has definite benefits. It can:  Give you more energy,  Keep your mind sharp. Improve balance to reduce your risk of falls. Help you manage chronic illness with fewer medicines. No matter how old you are, how fit you are, or what health problems you have, there is a form of activity that will work for you. And the more physical activity you can do, the better your overall health will be. What kinds of activity can help you stay healthy? Being more active will make your daily activities easier. Physical activity includes planned exercise and things you do in daily life. There are four types of activity:  Aerobic. Doing aerobic activity makes your heart and lungs strong. Includes walking, dancing, and gardening. Aim for at least 2½ hours spread throughout the week. It improves your energy and can help you sleep better. Muscle-strengthening. This type of activity can help maintain muscle and strengthen bones. Includes climbing stairs, using resistance bands, and lifting or carrying heavy loads. Aim for at least twice a week. It can help protect the knees and other joints. Stretching. Stretching gives you better range of motion in joints and muscles. Includes upper arm stretches, calf stretches, and gentle yoga. Aim for at least twice a week, preferably after your muscles are warmed up from other activities.   It can help you function better in daily life. Balancing. This helps you stay coordinated and have good posture. Includes heel-to-toe walking, claritza chi, and certain types of yoga. Aim for at least 3 days a week. It can reduce your risk of falling. Even if you have a hard time meeting the recommendations, it's better to be more active than less active. All activity done in each category counts toward your weekly total. You'd be surprised how daily things like carrying groceries, keeping up with grandchildren, and taking the stairs can add up. What keeps you from being active? If you've had a hard time being more active, you're not alone. Maybe you remember being able to do more. Or maybe you've never thought of yourself as being active. It's frustrating when you can't do the things you want. Being more active can help. What's holding you back? Getting started. Have a goal, but break it into easy tasks. Small steps build into big accomplishments. Staying motivated. If you feel like skipping your activity, remember your goal. Maybe you want to move better and stay independent. Every activity gets you one step closer. Not feeling your best.  Start with 5 minutes of an activity you enjoy. Prove to yourself you can do it. As you get comfortable, increase your time. You may not be where you want to be. But you're in the process of getting there. Everyone starts somewhere. How can you find safe ways to stay active? Talk with your doctor about any physical challenges you're facing. Make a plan with your doctor if you have a health problem or aren't sure how to get started with activity. If you're already active, ask your doctor if there is anything you should change to stay safe as your body and health change. If you tend to feel dizzy after you take medicine, avoid activity at that time. Try being active before you take your medicine. This will reduce your risk of falls.   If you plan to be active at home, make sure to clear your space before you get started. Remove things like TV cords, coffee tables, and throw rugs. It's safest to have plenty of space to move freely. The key to getting more active is to take it slow and steady. Try to improve only a little bit at a time. Pick just one area to improve on at first. And if an activity hurts, stop and talk to your doctor. Where can you learn more? Go to http://www.mcdermott.com/ and enter P600 to learn more about \"Learning About Being Active as an Older Adult. \"  Current as of: October 10, 2022               Content Version: 13.5  © 4539-9058 [employer]. Care instructions adapted under license by [facility] ([facility]). If you have questions about a medical condition or this instruction, always ask your healthcare professional. [address] any warranty or liability for your use of this information. Hearing Loss: Care Instructions  Overview     Hearing loss is a sudden or slow decrease in how well you hear. It can range from mild to severe. Permanent hearing loss can occur with aging. It also can happen when you are exposed long-term to loud noise. Examples include listening to loud music, riding motorcycles, or being around other loud machines. Hearing loss can affect your work and home life. It can make you feel lonely or depressed. You may feel that you have lost your independence. But hearing aids and other devices can help you hear better and feel connected to others. Follow-up care is a key part of your treatment and safety. Be sure to make and go to all appointments, and call your doctor if you are having problems. It's also a good idea to know your test results and keep a list of the medicines you take. How can you care for yourself at home? Avoid loud noises whenever possible. This helps keep your hearing from getting worse. Always wear hearing protection around loud noises. Wear a hearing aid as directed.  See a professional who can help you pick a hearing aid that fits you. Have hearing tests as your doctor suggests. They can show whether your hearing has changed. Your hearing aid may need to be adjusted. Use other devices as needed. These may include:  Telephone amplifiers and hearing aids that can connect to a television, stereo, radio, or microphone. Devices that use lights or vibrations. These alert you to the doorbell, a ringing telephone, or a baby monitor. Television closed-captioning. This shows the words at the bottom of the screen. Most new TVs can do this. TTY (text telephone). This lets you type messages back and forth on the telephone instead of talking or listening. These devices are also called TDD. When messages are typed on the keyboard, they are sent over the phone line to a receiving TTY. The message is shown on a monitor. Use text messaging, social media, and email if it is hard for you to communicate by telephone. Try to learn a listening technique called speechreading. It is not lipreading. You pay attention to people's gestures, expressions, posture, and tone of voice. These clues can help you understand what a person is saying. Face the person you are talking to, and have them face you. Make sure the lighting is good. You need to see the other person's face clearly. Think about counseling if you need help to adjust to your hearing loss. When should you call for help? Watch closely for changes in your health, and be sure to contact your doctor if:    You think your hearing is getting worse. You have new symptoms, such as dizziness or nausea. Where can you learn more? Go to http://www.mcdermott.com/ and enter R798 to learn more about \"Hearing Loss: Care Instructions. \"  Current as of: May 4, 2022               Content Version: 13.5  © 6900-7498 [employer]. Care instructions adapted under license by [facility] ([facility]).  If you have questions about a medical condition or this instruction, always ask your healthcare professional. [place] [address] any warranty or liability for your use of this information. Learning About Vision Tests  What are vision tests? The four most common vision tests are visual acuity tests, refraction, visual field tests, and color vision tests. Visual acuity (sharpness) tests  These tests are used: To see if you need glasses or contact lenses. To monitor an eye problem. To check an eye injury. Visual acuity tests are done as part of routine exams. You may also have this test when you get your 's license or apply for some types of jobs. Visual field tests  These tests are used: To check for vision loss in any area of your range of vision. To screen for certain eye diseases. To look for nerve damage after a stroke, head injury, or other problem that could reduce blood flow to the brain. Refraction and color tests  A refraction test is done to find the right prescription for glasses and contact lenses. A color vision test is done to check for color blindness. Color vision is often tested as part of a routine exam. You may also have this test when you apply for a job where recognizing different colors is important, such as , electronics, or the [employer]. How are vision tests done? Visual acuity test   You cover one eye at a time. You read aloud from a wall chart across the room. You read aloud from a small card that you hold in your hand. Refraction   You look into a special device. The device puts lenses of different strengths in front of each eye to see how strong your glasses or contact lenses need to be. Visual field tests   Your doctor may have you look through special machines. Or your doctor may simply have you stare straight ahead while they move a finger into and out of your field of vision. Color vision test   You look at pieces of printed test patterns in various colors.  You say what number or symbol you see.  Your doctor may have you trace the number or symbol using a pointer. How do these tests feel? There is very little chance of having a problem from this test. If dilating drops are used for a vision test, they may make the eyes sting and cause a medicine taste in the mouth. Follow-up care is a key part of your treatment and safety. Be sure to make and go to all appointments, and call your doctor if you are having problems. It's also a good idea to know your test results and keep a list of the medicines you take. Where can you learn more? Go to http://www.mcdermott.com/ and enter G551 to learn more about \"Learning About Vision Tests. \"  Current as of: October 12, 2022               Content Version: 13.5  © 2006-2022 Healthwise, Incorporated. Care instructions adapted under license by [facility] ([facility]). If you have questions about a medical condition or this instruction, always ask your healthcare professional. [place] [address] any warranty or liability for your use of this information. Learning About Getting In and Out of a Bathtub Safely  Introduction  Many falls happen during bathing. All that water makes the bathroom a slippery place. You may no longer be able to step over the tub wall comfortably and safely. You might lean on things that aren't meant to support your weight, like a towel bar or the shower curtain. There are several types of aids that will help keep you safe. You can buy them at [employer] or home improvement stores or online. What tools can help you get in and out of a tub? Tub rail and grab bar  There are many types of bars and rails you can install on the walls next to your tub or on the edge of the tub. They will help keep you safe while you're getting in or out of the tub. It's important to have them permanently installed, rather than attached with suction. Transfer bench  There are several kinds of benches or seats to use in the bathtub.  One type sits in the tub and extends out over the side. You sit on the bench. Lift one leg at a time into the tub, sliding your body over as you do so. Another common tub bench sits inside the tub. To use this type you need to be able to safely step into the tub before you sit down. Nonskid mats  Water makes both the floor of the tub and the bathroom floor slippery. You can buy adhesive strips that stick to the floor of the tub. Or you can use a nonskid tub mat. Outside the tub, make sure you use a rug with a nonskid bottom. Don't use a plain towel. Hand-held shower faucet  With a hand-held faucet, you can get clean without having to lower yourself into the tub. Hang a shower curtain to keep water from spilling out onto the floor. Follow-up care is a key part of your treatment and safety. Be sure to make and go to all appointments, and call your doctor if you are having problems. It's also a good idea to know your test results and keep a list of the medicines you take. Current as of: March 9, 2022               Content Version: 13.5  © 5720-4616 Healthwise, [employer]. Care instructions adapted under license by [facility] ([facility]). If you have questions about a medical condition or this instruction, always ask your healthcare professional. [place] [address] any warranty or liability for your use of this information. Learning About Getting In and Out of a Car Safely  Introduction  If you have problems with mobility or balance, getting into a car may be difficult. It's easiest and safest to sit down in the car first and then move your legs into the car after you're seated. And if the ground is slick or icy, this method is safer for everyone. Try this:  When you get to the door, turn around so that you're facing away from the car. Reach back to hold on to something stable, such as the seat or the door frame. Sit down so that you are sitting sideways on the seat.  Be careful not to hit your head on the top of the door jamb. Slide around so that you're facing front, and lift your first leg in. Then lift your second leg in. To get out of the car, do the same steps in reverse order:  When the door is open, lift your first leg out the door and put your foot on the ground. As you do the same with your second leg, slide around so you are facing out the door. Use the seat or door frame for support as you lean forward and push yourself up to a standing position. If your car seat has fabric upholstery, you might find that it's hard to slide around. Try covering the seat with something to make it easier to slide on, like a piece of plastic or vinyl. Make sure it doesn't get in the way of your seat belt. If you still have trouble, ask your physical therapist or occupational therapist to show you the best way to get in and out of a car. They can also tell you about tools that can make this easier for you. What tools can help you get in and out of a car? There are a number of devices that can make getting in and out of the car easier. You can find them at medical supply or auto stores or online. And if you don't already have one, think about getting a disabled parking permit. Your doctor can help you. The doctor will fill out a form that you can take to the local licensing or tax office. These permits may be permanent or temporary. Grab bar and door strap  There are several types of handholds that you can install on the frame of your car door or beside the door. They can give you something to hold on to as you get in and out of the car seat. Swivel seat  A swivel seat is like a lazy Stacie Maxon or a turntable. You sit down facing sideways and then use it to turn forward as you pull your legs in. Seat belt extender  You may have a hard time with a normal seat belt. An extension may help you find and reach the end of the belt more easily. Follow-up care is a key part of your treatment and safety.  Be sure to make and go to all appointments, and call your doctor if you are having problems. It's also a good idea to know your test results and keep a list of the medicines you take. Where can you learn more? Go to http://www.woods.com/ and enter K991 to learn more about \"Learning About Getting In and Out of a Car Safely. \"  Current as of: March 9, 2022               Content Version: 13.5  © 2006-2022 [employer]. Care instructions adapted under license by [facility] ([facility]). If you have questions about a medical condition or this instruction, always ask your healthcare professional. [address] any warranty or liability for your use of this information. Learning About Activities of Daily Living  What are activities of daily living? Activities of daily living (ADLs) are the basic self-care tasks you do every day. As you age, and if you have health problems, you may find that it's harder to do these things for yourself. That's when you may need some help. Your doctor uses ADLs to measure how much help you need. Knowing what you can and can't do for yourself is an important first step to getting help. And when you have the help you need, you can stay as independent as possible. Your doctor will want to know if you are able to do tasks such as: Take a bath or shower without help. Go to the bathroom by yourself. Dress and undress without help. Shave, comb your hair, and brush teeth on your own. Get in and out of bed or a chair without help. Feed yourself without help. If you are having trouble doing basic self-care tasks, talk with your doctor. You may want to bring a caregiver or family member who can help the doctor understand your needs and abilities. How will a doctor assess your ADLs? Asking about ADLs is part of a routine health checkup your doctor will likely do as you age.  Your health check might be done in a doctor's office, in your home, or at a hospital. The goal is to find out if you are having any problems that could make your health problems worse or that make it unsafe for you to be on your own. To measure your ADLs, your doctor will ask how hard it is for you to do routine tasks. He or she may also want to know if you have changed the way you do a task because of a health problem. He or she may watch how you:  Walk back and forth. Keep your balance while you stand or walk. Move from sitting to standing or from a bed to a chair. Button or unbutton a shirt or sweater. Remove and put on your shoes. It's normal to feel a little worried or anxious if you find you can't do all the things you used to be able to do. Talking with your doctor about ADLs isn't a test that you either pass or fail. It's just a way to get more information about your health and safety. Follow-up care is a key part of your treatment and safety. Be sure to make and go to all appointments, and call your doctor if you are having problems. It's also a good idea to know your test results and keep a list of the medicines you take. Current as of: October 6, 2021               Content Version: 13.5  © 2006-2022 Healthwise, Incorporated. Care instructions adapted under license by [facility] ([facility]). If you have questions about a medical condition or this instruction, always ask your healthcare professional. [place] [address] any warranty or liability for your use of this information. Advance Directives: Care Instructions  Overview  An advance directive is a legal way to state your wishes at the end of your life. It tells your family and your doctor what to do if you can't say what you want. There are two main types of advance directives. You can change them any time your wishes change. Living will. This form tells your family and your doctor your wishes about life support and other treatment. The form is also called a declaration.   Medical power of . This form lets you name a person to make treatment decisions for you when you can't speak for yourself. This person is called a health care agent (health care proxy, health care surrogate). The form is also called a durable power of  for health care. If you do not have an advance directive, decisions about your medical care may be made by a family member, or by a doctor or a  who doesn't know you. It may help to think of an advance directive as a gift to the people who care for you. If you have one, they won't have to make tough decisions by themselves. For more information, including forms for your state, see the [address] website (www.caringinfo.org/planning/advance-directives/). Follow-up care is a key part of your treatment and safety. Be sure to make and go to all appointments, and call your doctor if you are having problems. It's also a good idea to know your test results and keep a list of the medicines you take. What should you include in an advance directive? Many states have a unique advance directive form. (It may ask you to address specific issues.) Or you might use a universal form that's approved by many states. If your form doesn't tell you what to address, it may be hard to know what to include in your advance directive. Use the questions below to help you get started. Who do you want to make decisions about your medical care if you are not able to? What life-support measures do you want if you have a serious illness that gets worse over time or can't be cured? What are you most afraid of that might happen? (Maybe you're afraid of having pain, losing your independence, or being kept alive by machines.)  Where would you prefer to die? (Your home? A hospital? A nursing home?)  Do you want to donate your organs when you die? Do you want certain Jainism practices performed before you die? When should you call for help?   Be sure to contact your doctor if you have any questions. Where can you learn more? Go to http://www.mcdermott.com/ and enter R264 to learn more about \"Advance Directives: Care Instructions. \"  Current as of: June 16, 2022               Content Version: 13.5  © 3517-8318 Healthwise, Incorporated. Care instructions adapted under license by [facility] ([facility]). If you have questions about a medical condition or this instruction, always ask your healthcare professional. [address] any warranty or liability for your use of this information. Starting a Weight Loss Plan: Care Instructions  Overview     If you're thinking about losing weight, it can be hard to know where to start. Your doctor can help you set up a weight loss plan that best meets your needs. You may want to take a class on nutrition or exercise, or you could join a weight loss support group. If you have questions about how to make changes to your eating or exercise habits, ask your doctor about seeing a registered dietitian or an exercise specialist.  It can be a big challenge to lose weight. But you don't have to make huge changes at once. Make small changes, and stick with them. When those changes become habit, add a few more changes. If you don't think you're ready to make changes right now, try to pick a date in the future. Make an appointment to see your doctor to discuss whether the time is right for you to start a plan. Follow-up care is a key part of your treatment and safety. Be sure to make and go to all appointments, and call your doctor if you are having problems. It's also a good idea to know your test results and keep a list of the medicines you take. How can you care for yourself at home? Set realistic goals. Many people expect to lose much more weight than is likely. A weight loss of 5% to 10% of your body weight may be enough to improve your health. Get family and friends involved to provide support.  Talk to them about why you are trying to lose weight, and ask them to help. They can help by participating in exercise and having meals with you, even if they may be eating something different. Find what works best for you. If you do not have time or do not like to cook, a program that offers meal replacement bars or shakes may be better for you. Or if you like to prepare meals, finding a plan that includes daily menus and recipes may be best.  Ask your doctor about other health professionals who can help you achieve your weight loss goals. A dietitian can help you make healthy changes in your diet. An exercise specialist or  can help you develop a safe and effective exercise program.  A counselor or psychiatrist can help you cope with issues such as depression, anxiety, or family problems that can make it hard to focus on weight loss. Consider joining a support group for people who are trying to lose weight. Your doctor can suggest groups in your area. Where can you learn more? Go to http://www.woods.com/ and enter U357 to learn more about \"Starting a Weight Loss Plan: Care Instructions. \"  Current as of: August 25, 2022               Content Version: 13.5  © 3425-8656 [employer]. Care instructions adapted under license by [facility] ([facility]). If you have questions about a medical condition or this instruction, always ask your healthcare professional. [address] any warranty or liability for your use of this information. A Healthy Heart: Care Instructions  Your Care Instructions     Coronary artery disease, also called heart disease, occurs when a substance called plaque builds up in the vessels that supply oxygen-rich blood to your heart muscle. This can narrow the blood vessels and reduce blood flow. A heart attack happens when blood flow is completely blocked. A high-fat diet, smoking, and other factors increase the risk of heart disease.   Your doctor has found that you have a chance of having heart disease. You can do lots of things to keep your heart healthy. It may not be easy, but you can change your diet, exercise more, and quit smoking. These steps really work to lower your chance of heart disease. Follow-up care is a key part of your treatment and safety. Be sure to make and go to all appointments, and call your doctor if you are having problems. It's also a good idea to know your test results and keep a list of the medicines you take. How can you care for yourself at home? Diet    Use less salt when you cook and eat. This helps lower your blood pressure. Taste food before salting. Add only a little salt when you think you need it. With time, your taste buds will adjust to less salt. Eat fewer snack items, fast foods, canned soups, and other high-salt, high-fat, processed foods. Read food labels and try to avoid saturated and trans fats. They increase your risk of heart disease by raising cholesterol levels. Limit the amount of solid fat-butter, margarine, and shortening-you eat. Use olive, peanut, or canola oil when you cook. Bake, broil, and steam foods instead of frying them. Eat a variety of fruit and vegetables every day. Dark green, deep orange, red, or yellow fruits and vegetables are especially good for you. Examples include spinach, carrots, peaches, and berries. Foods high in fiber can reduce your cholesterol and provide important vitamins and minerals. High-fiber foods include whole-grain cereals and breads, oatmeal, beans, brown rice, citrus fruits, and apples. Eat lean proteins. Heart-healthy proteins include seafood, lean meats and poultry, eggs, beans, peas, nuts, seeds, and soy products. Limit drinks and foods with added sugar. These include candy, desserts, and soda pop. Lifestyle changes    If your doctor recommends it, get more exercise. Walking is a good choice. Bit by bit, increase the amount you walk every day.  Try for at least 30 minutes on most days of the week. You also may want to swim, bike, or do other activities. Do not smoke. If you need help quitting, talk to your doctor about stop-smoking programs and medicines. These can increase your chances of quitting for good. Quitting smoking may be the most important step you can take to protect your heart. It is never too late to quit. Limit alcohol to 2 drinks a day for men and 1 drink a day for women. Too much alcohol can cause health problems. Manage other health problems such as diabetes, high blood pressure, and high cholesterol. If you think you may have a problem with alcohol or drug use, talk to your doctor. Medicines    Take your medicines exactly as prescribed. Call your doctor if you think you are having a problem with your medicine. If your doctor recommends aspirin, take the amount directed each day. Make sure you take aspirin and not another kind of pain reliever, such as acetaminophen (Tylenol). When should you call for help? Call 911 if you have symptoms of a heart attack. These may include:    Chest pain or pressure, or a strange feeling in the chest.     Sweating. Shortness of breath. Pain, pressure, or a strange feeling in the back, neck, jaw, or upper belly or in one or both shoulders or arms. Lightheadedness or sudden weakness. A fast or irregular heartbeat. After you call 911, the  may tell you to chew 1 adult-strength or 2 to 4 low-dose aspirin. Wait for an ambulance. Do not try to drive yourself. Watch closely for changes in your health, and be sure to contact your doctor if you have any problems. Where can you learn more? Go to http://www.mcdermott.com/ and enter F075 to learn more about \"A Healthy Heart: Care Instructions. \"  Current as of: September 7, 2022               Content Version: 13.5  © 6808-0997 Healthwise, Incorporated. Care instructions adapted under license by [facility] ([facility]).  If you have questions about a medical condition or this instruction, always ask your healthcare professional. [place] [address] any warranty or liability for your use of this information. Personalized Preventive Plan for Kash Locke - 2/20/2023  Medicare offers a range of preventive health benefits. Some of the tests and screenings are paid in full while other may be subject to a deductible, co-insurance, and/or copay. Some of these benefits include a comprehensive review of your medical history including lifestyle, illnesses that may run in your family, and various assessments and screenings as appropriate. After reviewing your medical record and screening and assessments performed today your provider may have ordered immunizations, labs, imaging, and/or referrals for you. A list of these orders (if applicable) as well as your Preventive Care list are included within your After Visit Summary for your review. Other Preventive Recommendations:    A preventive eye exam performed by an eye specialist is recommended every 1-2 years to screen for glaucoma; cataracts, macular degeneration, and other eye disorders. A preventive dental visit is recommended every 6 months. Try to get at least 150 minutes of exercise per week or 10,000 steps per day on a pedometer . Order or download the FREE \"Exercise & Physical Activity: Your Everyday Guide\" from [employer]. Call 5-165.667.1099 or search [employer] online. You need 2496-0872 mg of calcium and 8877-9222 IU of vitamin D per day. It is possible to meet your calcium requirement with diet alone, but a vitamin D supplement is usually necessary to meet this goal.  When exposed to the sun, use a sunscreen that protects against both UVA and UVB radiation with an SPF of 30 or greater. Reapply every 2 to 3 hours or after sweating, drying off with a towel, or swimming.   Always wear a seat belt when traveling in a car. Always wear a helmet when riding a bicycle or motorcycle.

## 2023-02-20 NOTE — PROGRESS NOTES
Medicare Annual Wellness Visit    Leticia Sen is here for Medicare AWV, Shortness of Breath, Cough, Congestion, Discuss Medications (Restless leg and anxiety medications are not working), and Nasal Congestion    Assessment & Plan   Medicare annual wellness visit, subsequent  Generalized anxiety disorder  -     busPIRone (BUSPAR) 5 MG tablet; Take 1 tablet by mouth 2 times daily, Disp-30 tablet, R-5Normal  ESRD on hemodialysis ([facility])  Type 2 diabetes mellitus with stage 5 chronic kidney disease not on chronic dialysis, without long-term current use of insulin ([facility])  Obesity, Class III, BMI 40-49.9 (morbid obesity) ([facility])  Chronic obstructive pulmonary disease, unspecified COPD type ([facility])  Moderate episode of recurrent major depressive disorder ([facility])  Paroxysmal atrial fibrillation ([facility])  CKD (chronic kidney disease) stage 5, GFR less than 15 ml/min ([facility])  Positive depression screening  Viral URI  Colon cancer screening  -     Maegan Rubio MD, General Surgery, [place]      Recommendations for Preventive Services Due: see orders and patient instructions/AVS.  Recommended screening schedule for the next 5-10 years is provided to the patient in written form: see Patient Instructions/AVS.     Return in 3 months (on 5/20/2023) for Medicare Annual Wellness Visit in 1 year. Subjective   The following acute and/or chronic problems were also addressed today:    Granddaughter with strep   Cough and congestion - no sore throat     Dr. Karla Colby nephrology - unsure when her next appointment is    Reports PT made her left leg worse     Has family support and doesn't want counseling for her anxiety     Patient's complete Health Risk Assessment and screening values have been reviewed and are found in Flowsheets. The following problems were reviewed today and where indicated follow up appointments were made and/or referrals ordered.     Positive Risk Factor Screenings with Interventions:    Fall Risk:  Do you feel unsteady or are you worried about falling? : (!) yes  2 or more falls in past year?: (!) yes  Fall with injury in past year?: (!) yes     Interventions:    Patient declines any further evaluation or treatment    Cognitive:       Clock Drawing Test (CDT): Normal       Total Score Interpretation: Abnormal Mini-Cog      Interventions:  Patient declines any further evaluation or treatment    Depression:  PHQ-2 Score: 6  PHQ-9 Total Score: 24    Interpretation:   1-4 = minimal  5-9 = mild  10-14 = moderate  15-19 = moderately severe  20-27 = severe  Interventions:  Patient declines any further evaluation or treatment  PHQ-9 score today: (PHQ-9 Total Score: 24), additional evaluation and assessment performed, follow-up plan includes but not limited to: Medication management and Referral to /Specialist  for evaluation and management. Self-assessment of health:   In general, how would you say your health is?: (!) Poor    Interventions:  Patient declines any further evaluation or treatment    General HRA Questions:  Select all that apply: (!) New or Increased Pain, Social Isolation, New or Increased Fatigue, Loneliness, Stress    Pain Interventions:  Patient declined any further interventions or treatment    Fatigue Interventions:  See AVS for additional education material    Loneliness Interventions:  See AVS for additional education material    Social Isolation Interventions:  See AVS for additional education material    Stress Interventions:  See AVS for additional education material      Social and Emotional Support:  Do you get the social and emotional support that you need?: (!) No  Interventions:  Patient declined any further interventions or treatment    Weight and Activity:  Physical Activity: Inactive    Days of Exercise per Week: 0 days    Minutes of Exercise per Session: 0 min     On average, how many days per week do you engage in moderate to strenuous exercise (like a brisk walk)?: 0 days  Have you lost any weight without trying in the past 3 months?: No  Body mass index: (!) 43.82      Inactivity Interventions:  Patient declined any further interventions or treatment  Obesity Interventions:  Patient declines any further evaluation or treatment            Vision Screen:  Do you have difficulty driving, watching TV, or doing any of your daily activities because of your eyesight?: (!) Yes  Have you had an eye exam within the past year?: Yes  No results found. Interventions:   Patient encouraged to make appointment with their eye specialist    Safety:  Do you have any tripping hazards - clutter in doorways, halls, or stairs?: (!) Yes  Do you have either shower bars, grab bars, non-slip mats or non-slip surfaces in your shower or bathtub?: (!) No  Interventions:  See AVS for additional education material    ADL's:   Patient reports needing help with:  Select all that apply: (!) Walking/Balance  Select all that apply: Tinnie Dover, Housekeeping, Banking/Finances, Food Preparation, Transportation  Interventions:  See AVS for additional education material     Tobacco Use:  Tobacco Use: Medium Risk    Smoking Tobacco Use: Former    Smokeless Tobacco Use: Never    Passive Exposure: Not on file     E-cigarette/Vaping       Questions Responses    E-cigarette/Vaping Use Current Some Day User    Start Date     Passive Exposure     Quit Date 1/1/16    Counseling Given     Comments Mid-Size or Vape Pen          Interventions:  Patient declined any further intervention or treatment                        Objective   Vitals:    02/20/23 1244   BP: 120/60   Pulse: 80   Temp: 97.5 °F (36.4 °C)   TempSrc: Temporal   SpO2: 97%   Weight: 279 lb 12.8 oz (126.9 kg)   Height: 5' 7\" (1.702 m)      Body mass index is 43.82 kg/m². Allergies   Allergen Reactions    Adhesive Tape Rash    Penicillins Rash     Prior to Visit Medications    Medication Sig Taking?  Authorizing Provider   busPIRone (BUSPAR) 5 MG tablet Take 1 tablet by mouth 2 times daily Yes Yaakov Barrow DO   apixaban (ELIQUIS) 5 MG TABS tablet Take 1 tablet by mouth 2 times daily Yes Yaakov Barrow DO   glipiZIDE (GLUCOTROL) 5 MG tablet Take 0.5 tablets by mouth daily (with breakfast) Yes Yaakov Barrow DO   fluticasone-salmeterol (ADVAIR DISKUS) 250-50 MCG/ACT AEPB diskus inhaler Inhale 1 puff into the lungs in the morning and 1 puff in the evening.  Yes Nidhi Hinojosa MD   albuterol sulfate HFA (VENTOLIN HFA) 108 (90 Base) MCG/ACT inhaler Inhale 2 puffs into the lungs 4 times daily as needed for Wheezing Yes Nidhi Hinojosa MD   benzonatate (TESSALON) 100 MG capsule Take 1 capsule by mouth 3 times daily as needed for Cough Yes Nidhi Hinojosa MD   fluticasone (FLONASE) 50 MCG/ACT nasal spray 1 spray by Each Nostril route daily as needed for Rhinitis Yes Nidhi Hinojosa MD   ondansetron (ZOFRAN) 4 MG tablet Take 1 tablet by mouth every 12 hours as needed for Nausea or Vomiting Yes Yaakov Barrow DO   pramipexole (MIRAPEX) 0.125 MG tablet Take 1 tablet by mouth 3 times daily Yes Yaakov Barrow DO   atorvastatin (LIPITOR) 40 MG tablet Take 1 tablet by mouth daily Yes Yaakov Barrow DO   mirtazapine (REMERON) 7.5 MG tablet Take 1 tablet by mouth nightly Yes Yaakov Barrow DO   DULoxetine (CYMBALTA) 60 MG extended release capsule TAKE 1 CAPSULE BY MOUTH DAILY Yes Yaakov Barrow DO   metoprolol tartrate (LOPRESSOR) 50 MG tablet TAKE 1 TABLET BY MOUTH TWICE A DAY Yes Shantell Ennis,    amLODIPine (NORVASC) 5 MG tablet TAKE 1 TABLET BY MOUTH DAILY Yes Yaakov Barrow DO   torsemide (DEMADEX) 20 MG tablet Take 2 tablets by mouth daily Yes Yaakov Barrow DO   traZODone (DESYREL) 50 MG tablet Take 1 tablet by mouth nightly Yes Yaaokv Barrow DO   calcitRIOL (ROCALTROL) 0.25 MCG capsule TAKE 1 CAPSULE BY MOUTH DAILY [address] Yes Yaakov Barrow, DO   cinacalcet (SENSIPAR) 90 MG tablet   Historical Provider, MD   propafenone (RYTHMOL SR) 325 MG extended release capsule Take 1 capsule by mouth 2 times daily  Patient not taking: Reported on 2/20/2023  Kiley West MD       [facility] (Including outside providers/suppliers regularly involved in providing care):   Patient Care Team:  Chapo Chung DO as PCP - General (Internal Medicine)  Chapo Chung DO as PCP - Empaneled Provider  Kiley West MD as Consulting Physician (Cardiology)     Reviewed and updated this visit:  Tobacco  Allergies  Meds  Med Hx  Surg Hx  Soc Hx  Fam Hx             Chapo Chung DO

## 2023-02-21 ENCOUNTER — TELEPHONE (OUTPATIENT)
Dept: SURGERY | Age: 68
End: 2023-02-21

## 2023-02-21 NOTE — TELEPHONE ENCOUNTER
First attempt made by MA to schedule pt with first available per referral. MA left detailed voicemail requesting return call.      Electronically signed by Delia Nolan MA on 2/21/2023 at 10:14 AM

## 2023-02-27 DIAGNOSIS — R11.0 NAUSEA: ICD-10-CM

## 2023-02-27 DIAGNOSIS — F51.05 INSOMNIA DUE TO MENTAL CONDITION: ICD-10-CM

## 2023-02-27 DIAGNOSIS — F33.1 MODERATE EPISODE OF RECURRENT MAJOR DEPRESSIVE DISORDER (HCC): ICD-10-CM

## 2023-02-27 DIAGNOSIS — G25.81 RESTLESS LEG SYNDROME: ICD-10-CM

## 2023-02-27 RX ORDER — METOPROLOL TARTRATE 50 MG/1
TABLET, FILM COATED ORAL
Qty: 60 TABLET | Refills: 2 | Status: SHIPPED | OUTPATIENT
Start: 2023-02-27

## 2023-02-27 RX ORDER — ONDANSETRON 4 MG/1
4 TABLET, FILM COATED ORAL EVERY 12 HOURS PRN
Qty: 60 TABLET | Refills: 2 | Status: SHIPPED | OUTPATIENT
Start: 2023-02-27

## 2023-02-27 RX ORDER — TRAZODONE HYDROCHLORIDE 50 MG/1
50 TABLET ORAL NIGHTLY
Qty: 30 TABLET | Refills: 2 | Status: SHIPPED | OUTPATIENT
Start: 2023-02-27 | End: 2023-03-29

## 2023-02-27 RX ORDER — MIRTAZAPINE 7.5 MG/1
7.5 TABLET, FILM COATED ORAL NIGHTLY
Qty: 30 TABLET | Refills: 2 | Status: SHIPPED | OUTPATIENT
Start: 2023-02-27 | End: 2023-03-29

## 2023-02-27 RX ORDER — DULOXETIN HYDROCHLORIDE 60 MG/1
60 CAPSULE, DELAYED RELEASE ORAL DAILY
Qty: 30 CAPSULE | Refills: 2 | Status: SHIPPED | OUTPATIENT
Start: 2023-02-27

## 2023-02-27 RX ORDER — TORSEMIDE 20 MG/1
40 TABLET ORAL DAILY
Qty: 60 TABLET | Refills: 2 | Status: SHIPPED | OUTPATIENT
Start: 2023-02-27

## 2023-03-15 DIAGNOSIS — I10 ESSENTIAL HYPERTENSION: Chronic | ICD-10-CM

## 2023-03-15 RX ORDER — AMLODIPINE BESYLATE 5 MG/1
5 TABLET ORAL DAILY
Qty: 30 TABLET | Refills: 0 | Status: SHIPPED | OUTPATIENT
Start: 2023-03-15

## 2023-03-15 NOTE — TELEPHONE ENCOUNTER
Last Appointment:  2/20/2023  Future Appointments   Date Time Provider Abhishek Samantha   3/16/2023  2:00 PM Ariel Swartz MD [facility]   4/17/2023  9:00 AM Ashli Higginbotham MD [facility]   5/22/2023 12:30 PM Shantell Moran [facility] [address]

## 2023-03-16 ENCOUNTER — OFFICE VISIT (OUTPATIENT)
Dept: SURGERY | Age: 68
End: 2023-03-16
Payer: MEDICARE

## 2023-03-16 VITALS
HEART RATE: 82 BPM | HEIGHT: 67 IN | BODY MASS INDEX: 42.22 KG/M2 | TEMPERATURE: 98 F | DIASTOLIC BLOOD PRESSURE: 61 MMHG | SYSTOLIC BLOOD PRESSURE: 133 MMHG | OXYGEN SATURATION: 95 % | WEIGHT: 269 LBS

## 2023-03-16 DIAGNOSIS — Z80.0 FAMILY HISTORY OF RECTAL CANCER: ICD-10-CM

## 2023-03-16 DIAGNOSIS — E11.22 TYPE 2 DIABETES MELLITUS WITH STAGE 5 CHRONIC KIDNEY DISEASE NOT ON CHRONIC DIALYSIS, WITHOUT LONG-TERM CURRENT USE OF INSULIN (HCC): Chronic | ICD-10-CM

## 2023-03-16 DIAGNOSIS — R11.0 CHRONIC NAUSEA: ICD-10-CM

## 2023-03-16 DIAGNOSIS — N18.5 TYPE 2 DIABETES MELLITUS WITH STAGE 5 CHRONIC KIDNEY DISEASE NOT ON CHRONIC DIALYSIS, WITHOUT LONG-TERM CURRENT USE OF INSULIN (HCC): Chronic | ICD-10-CM

## 2023-03-16 DIAGNOSIS — Z86.010 HISTORY OF COLON POLYPS: Primary | ICD-10-CM

## 2023-03-16 DIAGNOSIS — Z79.01 CHRONIC ANTICOAGULATION: ICD-10-CM

## 2023-03-16 PROBLEM — N18.6 ESRD ON HEMODIALYSIS (HCC): Chronic | Status: ACTIVE | Noted: 2023-02-20

## 2023-03-16 PROBLEM — D64.9 ANEMIA: Chronic | Status: ACTIVE | Noted: 2018-11-06

## 2023-03-16 PROBLEM — E66.01 MORBID OBESITY (HCC): Chronic | Status: ACTIVE | Noted: 2019-07-05

## 2023-03-16 PROBLEM — E16.2 HYPOGLYCEMIA: Status: RESOLVED | Noted: 2022-12-02 | Resolved: 2023-03-16

## 2023-03-16 PROBLEM — Z99.2 ESRD ON HEMODIALYSIS (HCC): Chronic | Status: ACTIVE | Noted: 2023-02-20

## 2023-03-16 PROCEDURE — 99212 OFFICE O/P EST SF 10 MIN: CPT | Performed by: SURGERY

## 2023-03-16 RX ORDER — SODIUM CHLORIDE 9 MG/ML
25 INJECTION, SOLUTION INTRAVENOUS PRN
OUTPATIENT
Start: 2023-03-16

## 2023-03-16 RX ORDER — SODIUM CHLORIDE 0.9 % (FLUSH) 0.9 %
10 SYRINGE (ML) INJECTION PRN
OUTPATIENT
Start: 2023-03-16

## 2023-03-16 RX ORDER — POLYETHYLENE GLYCOL 3350 17 G/17G
POWDER, FOR SOLUTION ORAL
Qty: 238 G | Refills: 0 | Status: SHIPPED | OUTPATIENT
Start: 2023-03-16

## 2023-03-16 RX ORDER — BISACODYL 5 MG
TABLET, DELAYED RELEASE (ENTERIC COATED) ORAL
Qty: 8 TABLET | Refills: 0 | Status: SHIPPED | OUTPATIENT
Start: 2023-03-16

## 2023-03-16 RX ORDER — SODIUM CHLORIDE 0.9 % (FLUSH) 0.9 %
10 SYRINGE (ML) INJECTION EVERY 12 HOURS SCHEDULED
OUTPATIENT
Start: 2023-03-16

## 2023-03-16 RX ORDER — SODIUM CHLORIDE 9 MG/ML
INJECTION, SOLUTION INTRAVENOUS CONTINUOUS
OUTPATIENT
Start: 2023-03-16

## 2023-03-16 NOTE — PATIENT INSTRUCTIONS
Dr. Zoë Cruz recommended colonoscopy with possible biopsy or polypectomy and he explained the risk, benefits, expected outcome, and alternatives to the procedure. Risks included but are not limited to bleeding, infection, respiratory distress, hypotension, and perforation of the colon. You understood and were in agreement. You will need to have someone bring you to the hospital and take you home because you will not be able to drive or work the rest of that day. Also, you need to have someone stay with you the rest of the day to make sure you do not develop any complications. [id number] [facility] TABLET PREP  COLON PREP FOR COLONOSCOPY OR COLON SURGERY    It is very important that you follow all of the instructions listed on this sheet carefully (they may be slightly different than the directions on the product that you purchase at the pharmacy) to ensure that your colon is adequately cleaned out or your risk of complications could be increased. 2 Days or More Before Endoscopy:  Obtain Miralax powder 238 gm (8.3 oz) bottle, Dulcolax tablets, and three 20 oz bottles of Gatorade from the pharmacy. Do not eat corn, tomatoes, peas or watermelon 5 days before procedure. Two days before the colonoscopy, pour all three 20 oz bottles of Gatorade into a large pitcher and add all 238 gm of Miralax to the pitcher then mix very well and re-mix every time pouring an 8 oz glass from the pitcher. Put the pitcher in the refrigerator to get cold  If you are on INSULIN or OTHER DIABETIC MEDICATIONS, then check with your primary care physician as to how to adjust your medication while on clear liquid diet and when nothing by mouth. 1 Day Before the Endoscopy:  No solid food - only clear liquids (soup, jello, or juice that you can see through with no solid food) for breakfast, lunch and supper.   DO NOT drink or eat anything that is red as it will turn the inside of the colon red and look like blood. 8:00 am Breakfast - all clear liquids  10:00 am Drink at least 8 oz of clear liquids. 12 Noon Lunch - all clear liquids and take 4 Dulcolax tablets followed immediately by at least 8 oz of clear liquids. 2:00 pm Drink 8 oz of the Gatorade/Miralax mix every 30 minutes x six glasses (should have 12 oz left)  6:00 pm Dinner - all clear liquids and take 4 Dulcolax tablets followed immediately by at least 8 oz of clear liquids. 8:00 pm Drink at least 8 oz of clear liquids. 10:00 pm Drink at least 8 oz of clear liquids. Can continue to take liquids until 12 midnight then nothing to eat or drink except as instructed below    Day of Endoscopy:  5 hours prior to scheduled time for colonoscopy, drink the last 12 oz of Gatorade with Miralax followed immediately by at least 8 oz of clear liquids. Then nothing to drink after that. If any blood pressure medications or heart medications are due in the morning, you should take them with a sip of water. Patient Information and Instructions for Colonoscopy         Definition of Colonoscopy   A colonoscopy is the visual exam of the rectum and colon (large intestine). The exam is done with a tool called a colonoscope. The colonoscope is a flexible tube with a tiny camera on the end. This instrument allows the doctor to view the inside of your rectum and colon. Sigmoidoscopy is a shorter scope that views only the last one third of the colon. Reasons for Colonoscopy   It is used to examine, diagnose, and treat problems in your large intestine. The procedure is most often done for the following reasons:    To determine the cause of abdominal pain, rectal bleeding, or a change in bowel habits   To detect and treat colon cancer or colon polyps   To obtain tissue samples for testing   To stop intestinal bleeding   Monitor response to treatment if you have inflammatory bowel disease     Possible Complications   Complications are rare, but no procedure is completely free of risk. If you are planning to have a colonoscopy, your doctor will review a list of possible complications, which may include:   Bleeding   Reaction to the sedation causing drop in your blood pressure or problems breathing  Perforation or puncture of the bowel     Factors that may increase the risk of complications include:   Pre-existing heart or kidney condition   Treatment with certain medicines, including aspirin and other drugs with anticoagulant or blood-thinning properties   Prior abdominal surgery or radiation treatments   Active colitis , diverticulitis , or other acute bowel disease   Previous treatment with radiation therapy     Be sure to discuss these risks with your doctor before the procedure. What to Expect   Prior to Procedure   Your doctor will likely do the following:   Physical exam   Health history   Review of medicines   Test your stool for hidden blood (called \"occult blood\")     Your colon must be completely clean before the procedure. Any stool left in the intestine will block the view. This preparation may start several days before the procedure. Follow your doctor's instructions. Leading up to your procedure:   Talk to your doctor about your medicines. You may be asked to stop taking some medicines up to one week before the procedure, like:   Anti-inflammatory drugs (e.g., aspirin )   Blood thinners like clopidogrel (Plavix) or warfarin (Coumadin)   Iron supplements or vitamins containing iron   The day or days before your procedure, go on a clear liquid diet (clear broth, clear juice, clear jello) with no red coloring  Do not eat or drink anything after midnight. Wear comfortable clothing. If you have diabetes, ask your doctor if you need to adjust your diabetes medicine on the day prior to your procedure and the day of your procedure. Arrange for a ride home after the procedure.      Anesthesia   You will receive intravenous sedation medicine for the procedure so you will not feel anything during the procedure. Description of the Procedure   You will lie on your left side with knees bent and drawn up toward your chest. The colonoscope will be slowly inserted through the rectum and into the bowel. The colonoscope will inject air into the colon. A small attached video camera will allow the doctor to view the colon's lining on a screen. The doctor will continue guiding the tool through the bowel and assess the lining. A tissue sample or polyps may be removed during the procedure. How Long Will It Take? Usually it takes about 30 to 45 minutes     Will It Hurt? Most people do not feel anything during the procedure and will not remember the procedure. After the procedure, gas pains and cramping are common. These pains should go away with the passing of gas. Post-procedure Care   If any tissue was removed: It will be sent to a lab to be examined. It may take 1-2 weeks for results. The doctor will usually give an initial report after the scope is removed. Other tests may be recommended. A small amount of bleeding may occur during the first few days after the procedure. When you return home after the procedure, be sure to follow your doctor's instructions, which may include:   Resume medicines as instructed by your doctor. Resume normal diet, unless directed otherwise by your doctor. The sedative will make you drowsy. Avoid driving, operating machinery, or making important decisions for the rest of the day. Rest for the remainder of the day. After arriving home, contact your doctor if any of the following occurs:   Bleeding from your rectum, notify your doctor if you pass a teaspoonful of blood or more.    Black, tarry stools   Severe abdominal pain   Hard, swollen abdomen   Signs of infection, including fever or chills   Inability to pass gas or stool   Coughing, shortness of breath, chest pain, severe nausea or vomiting     In case of an emergency, CALL 911 .

## 2023-03-16 NOTE — PROGRESS NOTES
History and Physical    Patient's Name/Date of Birth: Emili Living /1955, (80 y.o.), female    Date: March 16, 2023     Assessment/Plan:  History of Colon Polyps and Family History Rectal Cancer (Maternal Grandfather) - I recommended high risk screening colonoscopy with possible biopsy or polypectomy and explained the risk, benefits, expected outcome, and alternatives to the procedure. Risks included but are not limited to bleeding, infection, respiratory distress, hypotension, and perforation of the colon. The patient understands and is in agreement. Atrial Fibrillation on Long Term Anticoagulation - I recommended that the patient hold the Eliquis for 2 days prior to colonoscopy of approved by her ordering physician. Patient stated she is holding the Eliquis for 3 days prior to her upcoming peritoneal dialysis catheter insertion so she will hold Eliquis for 2 days prior to colonoscopy. Chronic Nausea - patient attributes this to her hemodialysis. This seems to be relatively well controlled on as needed Zofran. I discussed option of doing EGD at same time as above colonoscopy but patient does not feel that she needs this. Type II, Non-Insulin Dependent, Diabetes Mellitus - patient will need IV normal saline instead of Ringer's lactate for her colonoscopy procedure. Chronic Kidney Disease on Hemodialysis - she is scheduled for insertion of peritoneal dialysis catheter and subsequent conversion from hemodialysis peritoneal dialysis. Primary Hypertension - patient is on medication for this and her BP was normal today. Hypercholesterolemia  Obstructive Sleep Apnea    Chief Complaint   Patient presents with    Colonoscopy     Patient here today for colonoscopy consult. HPI:   Patient seen back in the office today for follow-up colonoscopy. She had a colonoscopy in 2009 in outside facility and no report is available. However, per the patient she had some colon polyps removed at that time.   I did her last colonoscopy on 2019 and she had 5 colon polyps removed 3 of which were tubular adenomas. Is recommended follow-up colonoscopy in 3 years. Patient is here to discuss follow-up colonoscopy. Patient states she has chronic loose stools. She will have anywhere from 1-5 bowel movements a day. Stools range from watery to loose to normal.  She denies any constipation. She denies any blood in her stool or blood when she wipes. She denies abdominal pain. Patient started on hemodialysis in 2021 and subsequently had nausea and vomiting treated with Zofran. She continues use Zofran as needed and has nausea but no vomiting. She denied any heartburn, indigestion, or acid reflux. Her family history significant for maternal grandfather  of rectal cancer. There is no other family history of colorectal cancer or polyps. Patient is scheduled for outpatient surgery on 2023 at [facility] surgical for insertion of peritoneal dialysis catheter.     Past Medical History:   Diagnosis Date    Atrial fibrillation ([address]     Bladder cancer ([address]     Cancer ([address]     CKD (chronic kidney disease)     CKD (chronic kidney disease) stage 5, GFR less than 15 ml/min ([address] 2018    Diabetes ([address]     Hypertension     Obesity     260 #    Sleep apnea        Past Surgical History:   Procedure Laterality Date    CARDIAC CATHETERIZATION Left 10/02/2008    @ [facility]    CHOLECYSTECTOMY      COLONOSCOPY      COLONOSCOPY N/A 2019    mid ascending colon polyp bx/cauterization (inflammatory polyp), proximal transverse colon polyp bx/cauterized (tubular adenoma), descending colon polyp 80 cm from anus bx/cauterized (hyperplastic polyp), sigmoid colon polyp 35 cm from anus bx/cauterized (tubular adenoma), sigmoid colon polyps 30 cm from anus bx/cauterized (tubular adenoma), Dr. Adonay Augustine, [facility]    COLONOSCOPY  2019    mid ascending colon polyp bx/cauterization (inflammatory polyp), proximal transverse colon polyp bx/cauterized (tubular adenoma), descending colon polyp 80 cm from anus bx/cauterized (hyperplastic polyp), sigmoid colon polyp 35 cm from anus bx/cauterized (tubular adenoma), sigmoid colon polyps 30 cm from anus bx/cauterized (tubular adenoma), Dr. Jean Marie Villa, [facility]    DIALYSIS FISTULA CREATION Left 1/22/2019    LIGATIION LEFT LEFT UPPER EXTREMITY OF AV FISTULA , EVACUATION HEMATOMA performed by Cindy Smith MD at [address] Right 12/7/2020    AV FISTULA CREATION -- RIGHT ARM performed by Jeanne Ann MD at [address] Right 4/8/2021    AV FISTULA  REVISION RIGHT ARM performed by Jeanne Ann MD at [address] [place] [address] OPEN DIRECT Left 9/18/2018    AV FISTULA  LEFT ARM performed by Cindy Smith MD at [place] [address] Left 11/14/2018    SUPERFICIALIZATION AV FISTULA  LEFT ARM , LIGATION TRIBUTORY AV FISTULA LEFT ARM performed by Cindy Smith MD at [address] [place] [address]         Current Outpatient Medications   Medication Sig Dispense Refill    amLODIPine (NORVASC) 5 MG tablet TAKE 1 TABLET BY MOUTH DAILY 30 tablet 0    ondansetron (ZOFRAN) 4 MG tablet Take 1 tablet by mouth every 12 hours as needed for Nausea or Vomiting 60 tablet 2    mirtazapine (REMERON) 7.5 MG tablet Take 1 tablet by mouth nightly 30 tablet 2    traZODone (DESYREL) 50 MG tablet Take 1 tablet by mouth nightly 30 tablet 2    torsemide (DEMADEX) 20 MG tablet Take 2 tablets by mouth daily 60 tablet 2    metoprolol tartrate (LOPRESSOR) 50 MG tablet TAKE 1 TABLET BY MOUTH TWICE A DAY 60 tablet 2    DULoxetine (CYMBALTA) 60 MG extended release capsule TAKE 1 CAPSULE BY MOUTH DAILY 30 capsule 2    busPIRone (BUSPAR) 5 MG tablet Take 1 tablet by mouth 2 times daily 30 tablet 5    apixaban (ELIQUIS) 5 MG TABS tablet Take 1 tablet by mouth 2 times daily 180 tablet 1    fluticasone-salmeterol (ADVAIR DISKUS) 250-50 MCG/ACT AEPB diskus inhaler Inhale 1 puff into the lungs in the morning and 1 puff in the evening. 60 each 3    albuterol sulfate HFA (VENTOLIN HFA) 108 (90 Base) MCG/ACT inhaler Inhale 2 puffs into the lungs 4 times daily as needed for Wheezing 54 g 1    benzonatate (TESSALON) 100 MG capsule Take 1 capsule by mouth 3 times daily as needed for Cough 15 capsule 0    fluticasone (FLONASE) 50 MCG/ACT nasal spray 1 spray by Each Nostril route daily as needed for Rhinitis 16 g 0    cinacalcet (SENSIPAR) 90 MG tablet       pramipexole (MIRAPEX) 0.125 MG tablet Take 1 tablet by mouth 3 times daily 90 tablet 5    atorvastatin (LIPITOR) 40 MG tablet Take 1 tablet by mouth daily 30 tablet 5    propafenone (RYTHMOL SR) 325 MG extended release capsule Take 1 capsule by mouth 2 times daily 60 capsule 3    calcitRIOL (ROCALTROL) 0.25 MCG capsule TAKE 1 CAPSULE BY MOUTH DAILY MONDAY THROUGH FRIDAY 60 capsule 5    glipiZIDE (GLUCOTROL) 5 MG tablet Take 0.5 tablets by mouth daily (with breakfast) 45 tablet 3     No current facility-administered medications for this visit. Allergies   Allergen Reactions    Adhesive Tape Rash    Penicillins Rash       Review of Systems  Non-contributory    Physical Exam:  Vitals:    03/16/23 1414   BP: 133/61   Pulse: 82   Temp: 98 °F (36.7 °C)   SpO2: 95%   Weight: 269 lb (122 kg)   Height: 5' 7\" (1.702 m)       Body mass index is 42.13 kg/m². Physical Exam  Constitutional:       General: She is not in acute distress. Appearance: She is well-developed. She is not diaphoretic. HENT:      Head: Normocephalic and atraumatic. Cardiovascular:      Rate and Rhythm: Normal rate. Rhythm irregular. Heart sounds: Normal heart sounds. No murmur heard. No friction rub. No gallop. Pulmonary:      Effort: Pulmonary effort is normal. No respiratory distress. Breath sounds: Normal breath sounds. No wheezing or rales.    Abdominal:      General: Bowel sounds are normal. There is no distension. Palpations: Abdomen is soft. There is no mass. Tenderness: There is no abdominal tenderness. There is no guarding or rebound. Hernia: There is no hernia in the ventral area, left inguinal area or right inguinal area. Comments: Infraumbilical surgical scar   Musculoskeletal:         General: No deformity. Normal range of motion. Cervical back: Normal range of motion. Skin:     General: Skin is warm and dry. Coloration: Skin is not pale. Findings: No erythema or rash. Neurological:      Mental Status: She is alert and oriented to person, place, and time.    Psychiatric:         Behavior: Behavior normal.         Judgment: Judgment normal.     Electronically signed by Albertina Walters MD on 3/16/23 at 2:45 PM EDT

## 2023-03-16 NOTE — Clinical Note
See my office note from today on your patient. Thanks!!   Electronically signed by Oseas Haas MD on 3/16/2023 at 5:26 PM

## 2023-03-23 ENCOUNTER — PREP FOR PROCEDURE (OUTPATIENT)
Dept: SURGERY | Age: 68
End: 2023-03-23

## 2023-03-23 ENCOUNTER — TELEPHONE (OUTPATIENT)
Dept: SURGERY | Age: 68
End: 2023-03-23

## 2023-03-23 PROBLEM — Z12.11 SCREENING FOR COLON CANCER: Status: ACTIVE | Noted: 2023-03-23

## 2023-03-23 NOTE — TELEPHONE ENCOUNTER
Patient is scheduled for Colonoscopy with   on 23 at 8:30 am with an arrival time of 7:30 am. Pt accepted date and time and verbalized understanding. Procedure letter mailed to patient as well. Prior Authorization Form:      DEMOGRAPHICS:                     Patient Name:  Darin Mendenhall  Patient :  1955            Insurance:  Payor: [facility] / Plan: Hollie Peal PLUS HMO / Product Type: *No Product type* /   Insurance ID Number:    Payer/Plan Subscr  Sex Relation Sub. Ins. ID Effective Group Num   1.  Mary Ellen Music MEDICA* [id number] Female Self [id number]                                   [address] [address]         DIAGNOSIS & PROCEDURE:                       Procedure/Operation: COLONOSCOPY           CPT Code: 97352    Diagnosis:  HISTORY OF COLON POLYPS    ICD10 Code: Z86.010    Location:  [facility]    Surgeon:  DR. Christoph Harley    SCHEDULING INFORMATION:                          Date: 23    Time: 8:30 AM              Anesthesia:  MAC/TIVA                                                       Status:  Outpatient        Special Comments:  N/A       Electronically signed by Alvin Duenas on 3/23/2023 at 4:07 PM

## 2023-03-28 DIAGNOSIS — I10 ESSENTIAL HYPERTENSION: Chronic | ICD-10-CM

## 2023-03-29 RX ORDER — AMLODIPINE BESYLATE 5 MG/1
5 TABLET ORAL DAILY
Qty: 90 TABLET | Refills: 1 | Status: SHIPPED
Start: 2023-03-29 | End: 2023-05-19 | Stop reason: SDUPTHER

## 2023-04-07 PROBLEM — I48.0 PAROXYSMAL ATRIAL FIBRILLATION (HCC): Status: ACTIVE | Noted: 2019-01-31

## 2023-04-18 ENCOUNTER — TELEPHONE (OUTPATIENT)
Dept: FAMILY MEDICINE CLINIC | Age: 68
End: 2023-04-18

## 2023-04-18 NOTE — TELEPHONE ENCOUNTER
----- Message from Brooklyn Tamez sent at 4/18/2023  8:40 AM EDT -----  Subject: Message to Provider    QUESTIONS  Information for Provider? Patient calling to ask if the JAIDEN paperwork for   her daughter has been faxed. Please call back with more information.   ---------------------------------------------------------------------------  --------------  [facility] INFO  [phone]; OK to leave message on voicemail  ---------------------------------------------------------------------------  --------------  SCRIPT ANSWERS  Relationship to Patient?  Self

## 2023-04-22 PROBLEM — Z12.11 SCREENING FOR COLON CANCER: Status: RESOLVED | Noted: 2023-03-23 | Resolved: 2023-04-22

## 2023-05-02 ENCOUNTER — OFFICE VISIT (OUTPATIENT)
Dept: FAMILY MEDICINE CLINIC | Age: 68
End: 2023-05-02

## 2023-05-02 VITALS
RESPIRATION RATE: 18 BRPM | HEART RATE: 78 BPM | HEIGHT: 67 IN | DIASTOLIC BLOOD PRESSURE: 46 MMHG | BODY MASS INDEX: 41.47 KG/M2 | TEMPERATURE: 97.7 F | OXYGEN SATURATION: 95 % | WEIGHT: 264.2 LBS | SYSTOLIC BLOOD PRESSURE: 118 MMHG

## 2023-05-02 DIAGNOSIS — G20 PARKINSON'S DISEASE (HCC): ICD-10-CM

## 2023-05-02 DIAGNOSIS — N18.6 ESRD ON HEMODIALYSIS (HCC): Chronic | ICD-10-CM

## 2023-05-02 DIAGNOSIS — N18.5 TYPE 2 DIABETES MELLITUS WITH STAGE 5 CHRONIC KIDNEY DISEASE NOT ON CHRONIC DIALYSIS, WITHOUT LONG-TERM CURRENT USE OF INSULIN (HCC): Chronic | ICD-10-CM

## 2023-05-02 DIAGNOSIS — Z99.2 ESRD ON HEMODIALYSIS (HCC): Chronic | ICD-10-CM

## 2023-05-02 DIAGNOSIS — E11.22 TYPE 2 DIABETES MELLITUS WITH STAGE 5 CHRONIC KIDNEY DISEASE NOT ON CHRONIC DIALYSIS, WITHOUT LONG-TERM CURRENT USE OF INSULIN (HCC): Chronic | ICD-10-CM

## 2023-05-02 DIAGNOSIS — J44.9 CHRONIC OBSTRUCTIVE PULMONARY DISEASE, UNSPECIFIED COPD TYPE (HCC): Primary | Chronic | ICD-10-CM

## 2023-05-02 PROBLEM — E11.9 TYPE 2 DIABETES MELLITUS, WITHOUT LONG-TERM CURRENT USE OF INSULIN (HCC): Chronic | Status: RESOLVED | Noted: 2018-11-06 | Resolved: 2023-05-02

## 2023-05-02 PROBLEM — G20.A1 PARKINSON'S DISEASE: Status: ACTIVE | Noted: 2023-05-02

## 2023-05-02 NOTE — PROGRESS NOTES
Lab Results   Component Value Date    TRIG 211 (H) 11/06/2018     Lab Results   Component Value Date    HDL 37 11/06/2018     Lab Results   Component Value Date    LDLCALC 93 11/06/2018     Lab Results   Component Value Date    LABA1C 5.3 12/02/2022     Lab Results   Component Value Date    INR 1.5 10/20/2021    INR 1.0 04/08/2021    INR 1.0 12/07/2020    PROTIME 16.8 (H) 10/20/2021    PROTIME 11.3 04/08/2021    PROTIME 11.4 12/07/2020      *All recent labs were reviewed. Please see electronic chart for a more comprehensive set of labs    Radiology:  No results found. Assessment and Plan     Patient is a 80 y.o. female who presented to the office for follow up. Full problem list is as follows:  Patient Active Problem List   Diagnosis    Moderate episode of recurrent major depressive disorder (HCC)    Generalized anxiety disorder    Insomnia due to mental condition    Anemia    Essential hypertension    Paroxysmal atrial fibrillation (HCC)    Pure hypercholesterolemia    Morbid obesity (HCC)    Chronic obstructive pulmonary disease (HCC)    Obstructive sleep apnea    Exertional dyspnea    History of colon polyps    Family history of rectal cancer    History of COVID-19    ESRD on hemodialysis ([address]    Type 2 diabetes mellitus with stage 5 chronic kidney disease not on chronic dialysis, without long-term current use of insulin (HCC)    Chronic anticoagulation    Parkinson's disease       Kim Valencia was seen today for other.     Diagnoses and all orders for this visit:    Chronic obstructive pulmonary disease, unspecified COPD type ([address]    Parkinson's disease    ESRD on hemodialysis ([address]    Type 2 diabetes mellitus with stage 5 chronic kidney disease not on chronic dialysis, without long-term current use of insulin ([address]      On this date, 5/2/23 I have spent over 60 minutes reviewing previous notes, test results and face to face with the patient discussing the diagnosis and importance of compliance with the

## 2023-05-12 DIAGNOSIS — F41.1 GENERALIZED ANXIETY DISORDER: ICD-10-CM

## 2023-05-12 DIAGNOSIS — G25.81 RESTLESS LEG SYNDROME: ICD-10-CM

## 2023-05-12 DIAGNOSIS — I10 ESSENTIAL HYPERTENSION: Chronic | ICD-10-CM

## 2023-05-12 RX ORDER — BUSPIRONE HYDROCHLORIDE 5 MG/1
TABLET ORAL
Qty: 30 TABLET | Refills: 3 | Status: SHIPPED | OUTPATIENT
Start: 2023-05-12

## 2023-05-12 RX ORDER — ATORVASTATIN CALCIUM 40 MG/1
40 TABLET, FILM COATED ORAL DAILY
Qty: 30 TABLET | Refills: 3 | Status: SHIPPED | OUTPATIENT
Start: 2023-05-12

## 2023-05-12 RX ORDER — PRAMIPEXOLE DIHYDROCHLORIDE 0.12 MG/1
0.12 TABLET ORAL 3 TIMES DAILY
Qty: 90 TABLET | Refills: 3 | Status: SHIPPED | OUTPATIENT
Start: 2023-05-12

## 2023-05-12 NOTE — TELEPHONE ENCOUNTER
Last Appointment:  5/2/2023  Future Appointments   Date Time Provider Abhishek Singh   5/22/2023 12:30 PM Marlene Jacques  [address]

## 2023-05-15 RX ORDER — FERRIC CITRATE 210 MG/1
TABLET, COATED ORAL
Qty: 180 TABLET | Refills: 0 | OUTPATIENT
Start: 2023-05-15

## 2023-05-19 DIAGNOSIS — F51.05 INSOMNIA DUE TO MENTAL CONDITION: ICD-10-CM

## 2023-05-19 DIAGNOSIS — F41.1 GENERALIZED ANXIETY DISORDER: ICD-10-CM

## 2023-05-19 DIAGNOSIS — F33.1 MODERATE EPISODE OF RECURRENT MAJOR DEPRESSIVE DISORDER (HCC): ICD-10-CM

## 2023-05-19 DIAGNOSIS — I48.91 ATRIAL FIBRILLATION, UNSPECIFIED TYPE (HCC): ICD-10-CM

## 2023-05-19 DIAGNOSIS — G25.81 RESTLESS LEG SYNDROME: ICD-10-CM

## 2023-05-19 DIAGNOSIS — I10 ESSENTIAL HYPERTENSION: Chronic | ICD-10-CM

## 2023-05-19 RX ORDER — ATORVASTATIN CALCIUM 40 MG/1
40 TABLET, FILM COATED ORAL DAILY
Qty: 90 TABLET | Refills: 1 | Status: SHIPPED | OUTPATIENT
Start: 2023-05-19

## 2023-05-19 RX ORDER — ALBUTEROL SULFATE 90 UG/1
2 AEROSOL, METERED RESPIRATORY (INHALATION) 4 TIMES DAILY PRN
Qty: 54 G | Refills: 1 | Status: SHIPPED | OUTPATIENT
Start: 2023-05-19

## 2023-05-19 RX ORDER — TORSEMIDE 20 MG/1
40 TABLET ORAL DAILY
Qty: 180 TABLET | Refills: 1 | Status: SHIPPED | OUTPATIENT
Start: 2023-05-19

## 2023-05-19 RX ORDER — FLUTICASONE PROPIONATE 50 MCG
1 SPRAY, SUSPENSION (ML) NASAL DAILY PRN
Qty: 48 G | Refills: 1 | Status: SHIPPED | OUTPATIENT
Start: 2023-05-19

## 2023-05-19 RX ORDER — GLIPIZIDE 5 MG/1
2.5 TABLET ORAL
Qty: 45 TABLET | Refills: 1 | Status: SHIPPED | OUTPATIENT
Start: 2023-05-19 | End: 2023-08-17

## 2023-05-19 RX ORDER — TORSEMIDE 20 MG/1
40 TABLET ORAL DAILY
Qty: 60 TABLET | Refills: 2 | Status: SHIPPED
Start: 2023-05-19 | End: 2023-05-19 | Stop reason: SDUPTHER

## 2023-05-19 RX ORDER — METOPROLOL TARTRATE 50 MG/1
50 TABLET, FILM COATED ORAL 2 TIMES DAILY
Qty: 60 TABLET | Refills: 2 | Status: SHIPPED
Start: 2023-05-19 | End: 2023-05-19 | Stop reason: SDUPTHER

## 2023-05-19 RX ORDER — BUSPIRONE HYDROCHLORIDE 5 MG/1
2.5 TABLET ORAL 2 TIMES DAILY
Qty: 90 TABLET | Refills: 1 | Status: SHIPPED | OUTPATIENT
Start: 2023-05-19

## 2023-05-19 RX ORDER — DULOXETIN HYDROCHLORIDE 60 MG/1
60 CAPSULE, DELAYED RELEASE ORAL DAILY
Qty: 90 CAPSULE | Refills: 1 | Status: SHIPPED | OUTPATIENT
Start: 2023-05-19

## 2023-05-19 RX ORDER — TRAZODONE HYDROCHLORIDE 50 MG/1
50 TABLET ORAL NIGHTLY
Qty: 90 TABLET | Refills: 1 | Status: SHIPPED | OUTPATIENT
Start: 2023-05-19 | End: 2023-06-18

## 2023-05-19 RX ORDER — DULOXETIN HYDROCHLORIDE 60 MG/1
60 CAPSULE, DELAYED RELEASE ORAL DAILY
Qty: 30 CAPSULE | Refills: 2 | Status: SHIPPED
Start: 2023-05-19 | End: 2023-05-19 | Stop reason: SDUPTHER

## 2023-05-19 RX ORDER — TRAZODONE HYDROCHLORIDE 50 MG/1
50 TABLET ORAL NIGHTLY
Qty: 30 TABLET | Refills: 2 | Status: SHIPPED
Start: 2023-05-19 | End: 2023-05-19 | Stop reason: SDUPTHER

## 2023-05-19 RX ORDER — AMLODIPINE BESYLATE 5 MG/1
5 TABLET ORAL DAILY
Qty: 90 TABLET | Refills: 1 | Status: SHIPPED | OUTPATIENT
Start: 2023-05-19

## 2023-05-19 RX ORDER — METOPROLOL TARTRATE 50 MG/1
50 TABLET, FILM COATED ORAL 2 TIMES DAILY
Qty: 180 TABLET | Refills: 1 | Status: SHIPPED | OUTPATIENT
Start: 2023-05-19

## 2023-05-19 RX ORDER — MIRTAZAPINE 7.5 MG/1
7.5 TABLET, FILM COATED ORAL NIGHTLY
Qty: 90 TABLET | Refills: 1 | Status: SHIPPED | OUTPATIENT
Start: 2023-05-19 | End: 2023-06-18

## 2023-05-19 RX ORDER — GLIPIZIDE 5 MG/1
2.5 TABLET ORAL
Qty: 45 TABLET | Refills: 3 | Status: SHIPPED
Start: 2023-05-19 | End: 2023-05-19 | Stop reason: SDUPTHER

## 2023-05-19 RX ORDER — MIRTAZAPINE 7.5 MG/1
7.5 TABLET, FILM COATED ORAL NIGHTLY
Qty: 30 TABLET | Refills: 2 | Status: SHIPPED
Start: 2023-05-19 | End: 2023-05-19 | Stop reason: SDUPTHER

## 2023-05-19 NOTE — TELEPHONE ENCOUNTER
Last Appointment:  5/2/2023  Future Appointments   Date Time Provider Abhishek Singh   5/22/2023 12:30 PM Noreen Hagan  [address]

## 2023-05-22 ENCOUNTER — OFFICE VISIT (OUTPATIENT)
Dept: FAMILY MEDICINE CLINIC | Age: 68
End: 2023-05-22
Payer: MEDICARE

## 2023-05-22 VITALS
WEIGHT: 284 LBS | DIASTOLIC BLOOD PRESSURE: 60 MMHG | TEMPERATURE: 97.7 F | HEIGHT: 67 IN | OXYGEN SATURATION: 96 % | BODY MASS INDEX: 44.57 KG/M2 | RESPIRATION RATE: 16 BRPM | SYSTOLIC BLOOD PRESSURE: 136 MMHG | HEART RATE: 93 BPM

## 2023-05-22 DIAGNOSIS — I10 ESSENTIAL HYPERTENSION: Chronic | ICD-10-CM

## 2023-05-22 DIAGNOSIS — N18.6 ESRD ON PERITONEAL DIALYSIS (HCC): ICD-10-CM

## 2023-05-22 DIAGNOSIS — N18.5 TYPE 2 DIABETES MELLITUS WITH STAGE 5 CHRONIC KIDNEY DISEASE NOT ON CHRONIC DIALYSIS, WITHOUT LONG-TERM CURRENT USE OF INSULIN (HCC): Primary | Chronic | ICD-10-CM

## 2023-05-22 DIAGNOSIS — J44.9 CHRONIC OBSTRUCTIVE PULMONARY DISEASE, UNSPECIFIED COPD TYPE (HCC): Chronic | ICD-10-CM

## 2023-05-22 DIAGNOSIS — Z99.2 ESRD ON PERITONEAL DIALYSIS (HCC): ICD-10-CM

## 2023-05-22 DIAGNOSIS — I48.0 PAROXYSMAL ATRIAL FIBRILLATION (HCC): ICD-10-CM

## 2023-05-22 DIAGNOSIS — E11.22 TYPE 2 DIABETES MELLITUS WITH STAGE 5 CHRONIC KIDNEY DISEASE NOT ON CHRONIC DIALYSIS, WITHOUT LONG-TERM CURRENT USE OF INSULIN (HCC): Primary | Chronic | ICD-10-CM

## 2023-05-22 DIAGNOSIS — R60.0 BILATERAL LEG EDEMA: ICD-10-CM

## 2023-05-22 LAB — HBA1C MFR BLD: 5.6 %

## 2023-05-22 PROCEDURE — 3044F HG A1C LEVEL LT 7.0%: CPT | Performed by: INTERNAL MEDICINE

## 2023-05-22 PROCEDURE — 3023F SPIROM DOC REV: CPT | Performed by: INTERNAL MEDICINE

## 2023-05-22 PROCEDURE — G8400 PT W/DXA NO RESULTS DOC: HCPCS | Performed by: INTERNAL MEDICINE

## 2023-05-22 PROCEDURE — 3075F SYST BP GE 130 - 139MM HG: CPT | Performed by: INTERNAL MEDICINE

## 2023-05-22 PROCEDURE — 2022F DILAT RTA XM EVC RTNOPTHY: CPT | Performed by: INTERNAL MEDICINE

## 2023-05-22 PROCEDURE — 1090F PRES/ABSN URINE INCON ASSESS: CPT | Performed by: INTERNAL MEDICINE

## 2023-05-22 PROCEDURE — G8417 CALC BMI ABV UP PARAM F/U: HCPCS | Performed by: INTERNAL MEDICINE

## 2023-05-22 PROCEDURE — 83036 HEMOGLOBIN GLYCOSYLATED A1C: CPT | Performed by: INTERNAL MEDICINE

## 2023-05-22 PROCEDURE — 1123F ACP DISCUSS/DSCN MKR DOCD: CPT | Performed by: INTERNAL MEDICINE

## 2023-05-22 PROCEDURE — 1036F TOBACCO NON-USER: CPT | Performed by: INTERNAL MEDICINE

## 2023-05-22 PROCEDURE — 3078F DIAST BP <80 MM HG: CPT | Performed by: INTERNAL MEDICINE

## 2023-05-22 PROCEDURE — 3017F COLORECTAL CA SCREEN DOC REV: CPT | Performed by: INTERNAL MEDICINE

## 2023-05-22 PROCEDURE — 99215 OFFICE O/P EST HI 40 MIN: CPT | Performed by: INTERNAL MEDICINE

## 2023-05-22 PROCEDURE — G8427 DOCREV CUR MEDS BY ELIG CLIN: HCPCS | Performed by: INTERNAL MEDICINE

## 2023-05-22 RX ORDER — FERRIC CITRATE 210 MG/1
TABLET, COATED ORAL 2 TIMES DAILY
COMMUNITY
Start: 2023-03-28

## 2023-05-22 RX ORDER — FLUTICASONE PROPIONATE AND SALMETEROL 250; 50 UG/1; UG/1
1 POWDER RESPIRATORY (INHALATION) EVERY 12 HOURS
Qty: 60 EACH | Refills: 3 | Status: SHIPPED | OUTPATIENT
Start: 2023-05-22

## 2023-05-22 NOTE — PROGRESS NOTES
screening exams and vaccinations. Advised patient regarding importance of keeping up with recommended health maintenance and to schedule as soon as possible if overdue, as this is important in assessing for undiagnosed pathology, especially cancer, as well as protecting against potentially harmful/life threatening disease. Patient verbalizes understanding and agrees with above counseling, assessment and plan. All questions answered.     Shakila Marcial, DO

## 2023-10-30 ENCOUNTER — TELEPHONE (OUTPATIENT)
Dept: CARDIOLOGY | Facility: CLINIC | Age: 68
End: 2023-10-30
Payer: MEDICARE

## 2023-10-30 ENCOUNTER — TELEPHONE (OUTPATIENT)
Dept: CARDIOLOGY | Facility: CLINIC | Age: 68
End: 2023-10-30

## 2023-10-30 DIAGNOSIS — I48.91 ATRIAL FIBRILLATION, UNSPECIFIED TYPE (MULTI): Primary | ICD-10-CM

## 2023-10-30 DIAGNOSIS — I47.10 PAROXYSMAL SVT (SUPRAVENTRICULAR TACHYCARDIA) (CMS-HCC): ICD-10-CM

## 2023-10-30 NOTE — TELEPHONE ENCOUNTER
Holter 3% AF and frequent PSVT.  Refer to Dr. Valentine for evaliuation of AAD Rx (prev took Propafenone) v. Ablation.

## 2023-10-31 ENCOUNTER — APPOINTMENT (OUTPATIENT)
Dept: VASCULAR SURGERY | Facility: CLINIC | Age: 68
End: 2023-10-31
Payer: MEDICARE

## 2023-11-01 ENCOUNTER — APPOINTMENT (OUTPATIENT)
Dept: CARDIOLOGY | Facility: CLINIC | Age: 68
End: 2023-11-01
Payer: MEDICARE

## 2023-11-06 ENCOUNTER — HOSPITAL ENCOUNTER (OUTPATIENT)
Dept: CARDIOLOGY | Facility: HOSPITAL | Age: 68
Discharge: HOME | End: 2023-11-06
Payer: MEDICARE

## 2023-11-06 DIAGNOSIS — I48.0 PAROXYSMAL ATRIAL FIBRILLATION (MULTI): ICD-10-CM

## 2023-11-06 PROCEDURE — 93248 EXT ECG>7D<15D REV&INTERPJ: CPT | Performed by: INTERNAL MEDICINE

## 2023-11-08 PROBLEM — N18.5 CKD (CHRONIC KIDNEY DISEASE) STAGE 5, GFR LESS THAN 15 ML/MIN (MULTI): Status: ACTIVE | Noted: 2020-12-07

## 2023-11-08 PROBLEM — R51.9 HEADACHE, UNSPECIFIED: Status: RESOLVED | Noted: 2023-08-30 | Resolved: 2023-11-08

## 2023-11-08 PROBLEM — E87.70 FLUID OVERLOAD, UNSPECIFIED: Status: ACTIVE | Noted: 2023-08-25

## 2023-11-08 PROBLEM — R06.09 EXERTIONAL DYSPNEA: Status: ACTIVE | Noted: 2019-07-05

## 2023-11-08 PROBLEM — N25.81 SECONDARY HYPERPARATHYROIDISM OF RENAL ORIGIN (MULTI): Status: ACTIVE | Noted: 2023-09-16

## 2023-11-08 PROBLEM — E78.00 PURE HYPERCHOLESTEROLEMIA: Status: ACTIVE | Noted: 2019-07-11

## 2023-11-08 PROBLEM — J12.82 PNEUMONIA DUE TO COVID-19 VIRUS: Status: ACTIVE | Noted: 2021-04-09

## 2023-11-08 PROBLEM — Z86.010 HISTORY OF COLON POLYPS: Status: ACTIVE | Noted: 2019-09-03

## 2023-11-08 PROBLEM — R52 PAIN, UNSPECIFIED: Status: ACTIVE | Noted: 2023-04-04

## 2023-11-08 PROBLEM — E78.2 MIXED HYPERLIPIDEMIA: Status: ACTIVE | Noted: 2023-11-08

## 2023-11-08 PROBLEM — E66.01 MORBID OBESITY (MULTI): Status: ACTIVE | Noted: 2019-07-05

## 2023-11-08 PROBLEM — G47.33 OBSTRUCTIVE SLEEP APNEA: Status: ACTIVE | Noted: 2019-07-11

## 2023-11-08 PROBLEM — E83.52 HYPERCALCEMIA: Status: ACTIVE | Noted: 2023-05-24

## 2023-11-08 PROBLEM — T78.2XXS ANAPHYLACTIC SHOCK, UNSPECIFIED, SEQUELA: Status: ACTIVE | Noted: 2023-04-24

## 2023-11-08 PROBLEM — D68.9 COAGULATION DEFECT, UNSPECIFIED (MULTI): Status: ACTIVE | Noted: 2023-09-21

## 2023-11-08 PROBLEM — R45.1 RESTLESSNESS AND AGITATION: Status: ACTIVE | Noted: 2023-04-04

## 2023-11-08 PROBLEM — E87.6 HYPOKALEMIA: Status: ACTIVE | Noted: 2023-09-21

## 2023-11-08 PROBLEM — U07.1 PNEUMONIA DUE TO COVID-19 VIRUS: Status: ACTIVE | Noted: 2021-04-09

## 2023-11-08 PROBLEM — J44.9 CHRONIC OBSTRUCTIVE PULMONARY DISEASE (MULTI): Status: ACTIVE | Noted: 2019-07-11

## 2023-11-08 PROBLEM — E11.22 TYPE 2 DIABETES MELLITUS WITH DIABETIC CHRONIC KIDNEY DISEASE (MULTI): Status: ACTIVE | Noted: 2023-04-04

## 2023-11-08 PROBLEM — Z86.0100 HISTORY OF COLON POLYPS: Status: ACTIVE | Noted: 2019-09-03

## 2023-11-08 PROBLEM — D64.9 ANEMIA: Status: ACTIVE | Noted: 2018-11-06

## 2023-11-08 PROBLEM — D50.9 IRON DEFICIENCY ANEMIA, UNSPECIFIED: Status: ACTIVE | Noted: 2023-04-21

## 2023-11-08 PROBLEM — N18.6 END STAGE RENAL DISEASE (MULTI): Status: ACTIVE | Noted: 2023-04-04

## 2023-11-08 PROBLEM — G47.37: Status: ACTIVE | Noted: 2023-04-04

## 2023-11-08 PROBLEM — C67.9 MALIGNANT NEOPLASM OF BLADDER, UNSPECIFIED (MULTI): Status: ACTIVE | Noted: 2023-04-04

## 2023-11-08 RX ORDER — PRAMIPEXOLE DIHYDROCHLORIDE 0.12 MG/1
1 TABLET ORAL 2 TIMES DAILY
Status: ON HOLD | COMMUNITY
Start: 2023-04-26 | End: 2024-02-29 | Stop reason: WASHOUT

## 2023-11-08 RX ORDER — CHOLECALCIFEROL (VITAMIN D3) 25 MCG
1 TABLET ORAL DAILY
COMMUNITY
Start: 2023-05-12

## 2023-11-08 RX ORDER — ATORVASTATIN CALCIUM 40 MG/1
40 TABLET, FILM COATED ORAL DAILY
COMMUNITY

## 2023-11-08 RX ORDER — FERRIC CITRATE 210 MG/1
TABLET, COATED ORAL
COMMUNITY
Start: 2023-05-10

## 2023-11-08 RX ORDER — ALBUTEROL SULFATE 90 UG/1
1 AEROSOL, METERED RESPIRATORY (INHALATION) EVERY 4 HOURS PRN
Status: ON HOLD | COMMUNITY
End: 2024-06-05 | Stop reason: ENTERED-IN-ERROR

## 2023-11-08 RX ORDER — BUSPIRONE HYDROCHLORIDE 5 MG/1
5 TABLET ORAL
COMMUNITY
Start: 2023-10-17

## 2023-11-08 RX ORDER — DULOXETIN HYDROCHLORIDE 30 MG/1
30 CAPSULE, DELAYED RELEASE ORAL DAILY
COMMUNITY
Start: 2023-04-26

## 2023-11-08 RX ORDER — APIXABAN 5 MG/1
1 TABLET, FILM COATED ORAL 2 TIMES DAILY
COMMUNITY
Start: 2017-07-27 | End: 2024-06-06 | Stop reason: HOSPADM

## 2023-11-08 RX ORDER — FLUTICASONE PROPIONATE 50 UG/1
2 SPRAY, METERED NASAL DAILY
COMMUNITY

## 2023-11-08 RX ORDER — TRAZODONE HYDROCHLORIDE 50 MG/1
50 TABLET ORAL DAILY PRN
Status: ON HOLD | COMMUNITY
Start: 2014-06-11 | End: 2024-02-29 | Stop reason: WASHOUT

## 2023-11-08 RX ORDER — MIRTAZAPINE 7.5 MG/1
1 TABLET, FILM COATED ORAL NIGHTLY
Status: ON HOLD | COMMUNITY
Start: 2023-05-10 | End: 2024-02-29 | Stop reason: WASHOUT

## 2023-11-08 RX ORDER — METOPROLOL TARTRATE 50 MG/1
1 TABLET ORAL 2 TIMES DAILY
COMMUNITY
Start: 2017-06-28 | End: 2024-03-07 | Stop reason: HOSPADM

## 2023-11-08 RX ORDER — GLIPIZIDE 5 MG/1
TABLET ORAL
Status: ON HOLD | COMMUNITY
Start: 2023-10-17 | End: 2024-02-29 | Stop reason: WASHOUT

## 2023-11-08 RX ORDER — TORSEMIDE 20 MG/1
20 TABLET ORAL 2 TIMES DAILY
COMMUNITY
Start: 2009-09-22

## 2023-11-08 NOTE — TELEPHONE ENCOUNTER
Patient contacted and verbalized understanding. Please call patient to schedule OV with Dr. Valentine. Thank you!

## 2023-11-13 ENCOUNTER — OFFICE VISIT (OUTPATIENT)
Dept: CARDIOLOGY | Facility: CLINIC | Age: 68
End: 2023-11-13
Payer: MEDICARE

## 2023-11-13 ENCOUNTER — APPOINTMENT (OUTPATIENT)
Dept: CARDIOLOGY | Facility: CLINIC | Age: 68
End: 2023-11-13
Payer: MEDICARE

## 2023-11-13 VITALS
WEIGHT: 256 LBS | HEIGHT: 67 IN | DIASTOLIC BLOOD PRESSURE: 62 MMHG | HEART RATE: 80 BPM | OXYGEN SATURATION: 98 % | BODY MASS INDEX: 40.18 KG/M2 | SYSTOLIC BLOOD PRESSURE: 142 MMHG

## 2023-11-13 DIAGNOSIS — I48.0 PAROXYSMAL ATRIAL FIBRILLATION (MULTI): Primary | ICD-10-CM

## 2023-11-13 PROCEDURE — 3066F NEPHROPATHY DOC TX: CPT | Performed by: INTERNAL MEDICINE

## 2023-11-13 PROCEDURE — 3078F DIAST BP <80 MM HG: CPT | Performed by: INTERNAL MEDICINE

## 2023-11-13 PROCEDURE — 3077F SYST BP >= 140 MM HG: CPT | Performed by: INTERNAL MEDICINE

## 2023-11-13 PROCEDURE — 99214 OFFICE O/P EST MOD 30 MIN: CPT | Performed by: INTERNAL MEDICINE

## 2023-11-13 PROCEDURE — 1159F MED LIST DOCD IN RCRD: CPT | Performed by: INTERNAL MEDICINE

## 2023-11-13 PROCEDURE — 3008F BODY MASS INDEX DOCD: CPT | Performed by: INTERNAL MEDICINE

## 2023-11-13 PROCEDURE — 99204 OFFICE O/P NEW MOD 45 MIN: CPT | Performed by: INTERNAL MEDICINE

## 2023-11-13 ASSESSMENT — ENCOUNTER SYMPTOMS
OCCASIONAL FEELINGS OF UNSTEADINESS: 0
PSYCHIATRIC NEGATIVE: 1
ENDOCRINE NEGATIVE: 1
MUSCULOSKELETAL NEGATIVE: 1
SHORTNESS OF BREATH: 1
LOSS OF SENSATION IN FEET: 0
GASTROINTESTINAL NEGATIVE: 1
EYES NEGATIVE: 1
DYSPNEA ON EXERTION: 1
NEUROLOGICAL NEGATIVE: 1
PALPITATIONS: 1
IRREGULAR HEARTBEAT: 1
DEPRESSION: 1

## 2023-11-13 NOTE — PROGRESS NOTES
STEFAN Soto is a 69 y/o female referred by Dr Kimble for evaluation of AF.      PMH includes DM, ESRD (on HD), HTN, DLD and pAF.      Treatment of her AF includes propafenone, metoprolol.    Symptoms of her AF include rapid heart rates and chest discomfort.      Pt recently wore a heart monitor with Dr Kimble after a FUV in September for episodes of symptomatic pAF.    Pts monitor showed an AF burden of 3.1% with the longest episode lasting 3 hr 41 mins.  Pt is currently managed on     14 day monitor 9/2023:  SR, avg HR 71 bpm, min HR 56 bpm, max  bpm, AF/AFL burden 3/1% (longest episode 3 hrs 41 mins), SVE burden 6.7%, 3131 SVT runs (longest 53 beats), PVC burden 0.01%    Nuc Stress 11/2023: cancelled 11/1 - to be rescheduled       Review of Systems   Constitutional: Positive for malaise/fatigue.   HENT: Negative.     Eyes: Negative.    Cardiovascular:  Positive for dyspnea on exertion, irregular heartbeat and palpitations.   Respiratory:  Positive for shortness of breath.    Endocrine: Negative.    Skin: Negative.    Musculoskeletal: Negative.    Gastrointestinal: Negative.    Genitourinary: Negative.    Neurological: Negative.    Psychiatric/Behavioral: Negative.         Objective   Constitutional:       Appearance: Healthy appearance. Not in distress.   Eyes:      Pupils: Pupils are equal, round, and reactive to light.   Neck:      Thyroid: Thyroid normal.      Vascular: JVD normal.   Pulmonary:      Effort: Pulmonary effort is normal.      Breath sounds: Normal breath sounds.   Musculoskeletal: Normal range of motion.      Cervical back: Normal range of motion and neck supple. Skin:     General: Skin is warm and moist.   Neurological:      General: No focal deficit present.      Mental Status: Alert and oriented to person, place and time.      Motor: Motor function is intact.      Gait: Gait is intact.       Assessment/Plan   WE DISCUSSED THE RISKS AND BENEFITS OF AADs vs. AF CATHETER ABLATION.  SHE  UNDERSTANDS AND WANTS TO PROCEED WITH ABLATION.    SCHEDULE AF ABLATION UNDER GENERAL ANESTHESIA      NOTE: SCHEDULE ON A TUE OR THU (NEEDS DIALYSIS ON THE DAY PRIOR)  HOLD ALL MEDS IN THE MORNING OF ABLATION INCLUDING ELIQUIS  CARTO SYSTEM - ANY EP LAB      MD Zana Izaguirre Master Clinician of Cardiovascular [place].   [facility] [place].   Director of Electrophysiology Center  Professor of Medicine.   [employer].

## 2023-11-22 DIAGNOSIS — I48.91 UNSPECIFIED ATRIAL FIBRILLATION (MULTI): Primary | ICD-10-CM

## 2023-12-05 DIAGNOSIS — I48.0 PAROXYSMAL ATRIAL FIBRILLATION (MULTI): Primary | ICD-10-CM

## 2024-02-09 DIAGNOSIS — I48.0 PAF (PAROXYSMAL ATRIAL FIBRILLATION) (MULTI): Primary | ICD-10-CM

## 2024-02-27 ENCOUNTER — LAB (OUTPATIENT)
Dept: LAB | Facility: LAB | Age: 69
End: 2024-02-27
Payer: MEDICARE

## 2024-02-27 DIAGNOSIS — I48.0 PAF (PAROXYSMAL ATRIAL FIBRILLATION) (MULTI): ICD-10-CM

## 2024-02-27 LAB
ANION GAP SERPL CALC-SCNC: 17 MMOL/L (ref 10–20)
BUN SERPL-MCNC: 32 MG/DL (ref 6–23)
CALCIUM SERPL-MCNC: 8.7 MG/DL (ref 8.6–10.3)
CHLORIDE SERPL-SCNC: 100 MMOL/L (ref 98–107)
CO2 SERPL-SCNC: 25 MMOL/L (ref 21–32)
CREAT SERPL-MCNC: 5.98 MG/DL (ref 0.5–1.05)
EGFRCR SERPLBLD CKD-EPI 2021: 7 ML/MIN/1.73M*2
ERYTHROCYTE [DISTWIDTH] IN BLOOD BY AUTOMATED COUNT: 13.8 % (ref 11.5–14.5)
GLUCOSE SERPL-MCNC: 153 MG/DL (ref 74–99)
HCT VFR BLD AUTO: 33.1 % (ref 36–46)
HGB BLD-MCNC: 9.9 G/DL (ref 12–16)
MCH RBC QN AUTO: 31.7 PG (ref 26–34)
MCHC RBC AUTO-ENTMCNC: 29.9 G/DL (ref 32–36)
MCV RBC AUTO: 106 FL (ref 80–100)
NRBC BLD-RTO: 0 /100 WBCS (ref 0–0)
PLATELET # BLD AUTO: 267 X10*3/UL (ref 150–450)
POTASSIUM SERPL-SCNC: 4.3 MMOL/L (ref 3.5–5.3)
RBC # BLD AUTO: 3.12 X10*6/UL (ref 4–5.2)
SODIUM SERPL-SCNC: 138 MMOL/L (ref 136–145)
WBC # BLD AUTO: 10 X10*3/UL (ref 4.4–11.3)

## 2024-02-27 PROCEDURE — 80048 BASIC METABOLIC PNL TOTAL CA: CPT

## 2024-02-27 PROCEDURE — 85027 COMPLETE CBC AUTOMATED: CPT

## 2024-02-27 PROCEDURE — 36415 COLL VENOUS BLD VENIPUNCTURE: CPT

## 2024-02-28 ENCOUNTER — ANESTHESIA EVENT (OUTPATIENT)
Dept: CARDIOLOGY | Facility: HOSPITAL | Age: 69
End: 2024-02-28
Payer: MEDICARE

## 2024-02-29 ENCOUNTER — ANESTHESIA (OUTPATIENT)
Dept: CARDIOLOGY | Facility: HOSPITAL | Age: 69
End: 2024-02-29
Payer: MEDICARE

## 2024-02-29 ENCOUNTER — HOSPITAL ENCOUNTER (OUTPATIENT)
Facility: HOSPITAL | Age: 69
Discharge: HOME | End: 2024-03-01
Attending: INTERNAL MEDICINE | Admitting: INTERNAL MEDICINE
Payer: MEDICARE

## 2024-02-29 ENCOUNTER — APPOINTMENT (OUTPATIENT)
Dept: CARDIOLOGY | Facility: HOSPITAL | Age: 69
End: 2024-02-29
Payer: MEDICARE

## 2024-02-29 DIAGNOSIS — I48.0 PAROXYSMAL ATRIAL FIBRILLATION (MULTI): ICD-10-CM

## 2024-02-29 PROCEDURE — 2500000002 HC RX 250 W HCPCS SELF ADMINISTERED DRUGS (ALT 637 FOR MEDICARE OP, ALT 636 FOR OP/ED): Performed by: STUDENT IN AN ORGANIZED HEALTH CARE EDUCATION/TRAINING PROGRAM

## 2024-02-29 PROCEDURE — C1732 CATH, EP, DIAG/ABL, 3D/VECT: HCPCS | Performed by: INTERNAL MEDICINE

## 2024-02-29 PROCEDURE — 2500000001 HC RX 250 WO HCPCS SELF ADMINISTERED DRUGS (ALT 637 FOR MEDICARE OP): Performed by: STUDENT IN AN ORGANIZED HEALTH CARE EDUCATION/TRAINING PROGRAM

## 2024-02-29 PROCEDURE — G0378 HOSPITAL OBSERVATION PER HR: HCPCS

## 2024-02-29 PROCEDURE — 7100000009 HC PHASE TWO TIME - INITIAL BASE CHARGE: Performed by: INTERNAL MEDICINE

## 2024-02-29 PROCEDURE — 2720000007 HC OR 272 NO HCPCS: Performed by: INTERNAL MEDICINE

## 2024-02-29 PROCEDURE — 7100000010 HC PHASE TWO TIME - EACH INCREMENTAL 1 MINUTE: Performed by: INTERNAL MEDICINE

## 2024-02-29 PROCEDURE — 93462 L HRT CATH TRNSPTL PUNCTURE: CPT | Performed by: INTERNAL MEDICINE

## 2024-02-29 PROCEDURE — C1766 INTRO/SHEATH,STRBLE,NON-PEEL: HCPCS | Performed by: INTERNAL MEDICINE

## 2024-02-29 PROCEDURE — 36620 INSERTION CATHETER ARTERY: CPT | Performed by: ANESTHESIOLOGY

## 2024-02-29 PROCEDURE — 93621 COMP EP EVL L PAC&REC C SINS: CPT | Performed by: INTERNAL MEDICINE

## 2024-02-29 PROCEDURE — 93662 INTRACARDIAC ECG (ICE): CPT | Performed by: INTERNAL MEDICINE

## 2024-02-29 PROCEDURE — 93613 INTRACARDIAC EPHYS 3D MAPG: CPT | Performed by: INTERNAL MEDICINE

## 2024-02-29 PROCEDURE — 93010 ELECTROCARDIOGRAM REPORT: CPT | Performed by: INTERNAL MEDICINE

## 2024-02-29 PROCEDURE — C1759 CATH, INTRA ECHOCARDIOGRAPHY: HCPCS | Performed by: INTERNAL MEDICINE

## 2024-02-29 PROCEDURE — 3700000001 HC GENERAL ANESTHESIA TIME - INITIAL BASE CHARGE: Performed by: INTERNAL MEDICINE

## 2024-02-29 PROCEDURE — 85347 COAGULATION TIME ACTIVATED: CPT | Performed by: INTERNAL MEDICINE

## 2024-02-29 PROCEDURE — C1731 CATH, EP, 20 OR MORE ELEC: HCPCS | Performed by: INTERNAL MEDICINE

## 2024-02-29 PROCEDURE — P9045 ALBUMIN (HUMAN), 5%, 250 ML: HCPCS | Mod: JZ

## 2024-02-29 PROCEDURE — 93657 TX L/R ATRIAL FIB ADDL: CPT | Performed by: INTERNAL MEDICINE

## 2024-02-29 PROCEDURE — 2500000004 HC RX 250 GENERAL PHARMACY W/ HCPCS (ALT 636 FOR OP/ED): Performed by: INTERNAL MEDICINE

## 2024-02-29 PROCEDURE — C1760 CLOSURE DEV, VASC: HCPCS | Performed by: INTERNAL MEDICINE

## 2024-02-29 PROCEDURE — 93005 ELECTROCARDIOGRAM TRACING: CPT | Mod: 59

## 2024-02-29 PROCEDURE — 2780000003 HC OR 278 NO HCPCS: Performed by: INTERNAL MEDICINE

## 2024-02-29 PROCEDURE — 93656 COMPRE EP EVAL ABLTJ ATR FIB: CPT | Performed by: INTERNAL MEDICINE

## 2024-02-29 PROCEDURE — 2500000005 HC RX 250 GENERAL PHARMACY W/O HCPCS

## 2024-02-29 PROCEDURE — 2500000004 HC RX 250 GENERAL PHARMACY W/ HCPCS (ALT 636 FOR OP/ED)

## 2024-02-29 PROCEDURE — 3700000002 HC GENERAL ANESTHESIA TIME - EACH INCREMENTAL 1 MINUTE: Performed by: INTERNAL MEDICINE

## 2024-02-29 PROCEDURE — 85347 COAGULATION TIME ACTIVATED: CPT

## 2024-02-29 RX ORDER — ONDANSETRON HYDROCHLORIDE 2 MG/ML
INJECTION, SOLUTION INTRAVENOUS AS NEEDED
Status: DISCONTINUED | OUTPATIENT
Start: 2024-02-29 | End: 2024-02-29

## 2024-02-29 RX ORDER — DULOXETIN HYDROCHLORIDE 30 MG/1
30 CAPSULE, DELAYED RELEASE ORAL DAILY
Status: DISCONTINUED | OUTPATIENT
Start: 2024-03-01 | End: 2024-03-01 | Stop reason: HOSPADM

## 2024-02-29 RX ORDER — METOPROLOL TARTRATE 25 MG/1
50 TABLET, FILM COATED ORAL 2 TIMES DAILY
Status: DISCONTINUED | OUTPATIENT
Start: 2024-02-29 | End: 2024-03-01 | Stop reason: HOSPADM

## 2024-02-29 RX ORDER — ATORVASTATIN CALCIUM 40 MG/1
40 TABLET, FILM COATED ORAL NIGHTLY
Status: DISCONTINUED | OUTPATIENT
Start: 2024-02-29 | End: 2024-03-01 | Stop reason: HOSPADM

## 2024-02-29 RX ORDER — ROCURONIUM BROMIDE 10 MG/ML
INJECTION, SOLUTION INTRAVENOUS AS NEEDED
Status: DISCONTINUED | OUTPATIENT
Start: 2024-02-29 | End: 2024-02-29

## 2024-02-29 RX ORDER — AMLODIPINE BESYLATE 5 MG/1
5 TABLET ORAL DAILY
Status: DISCONTINUED | OUTPATIENT
Start: 2024-03-01 | End: 2024-03-01 | Stop reason: HOSPADM

## 2024-02-29 RX ORDER — LABETALOL HYDROCHLORIDE 5 MG/ML
INJECTION, SOLUTION INTRAVENOUS AS NEEDED
Status: DISCONTINUED | OUTPATIENT
Start: 2024-02-29 | End: 2024-02-29

## 2024-02-29 RX ORDER — AMLODIPINE BESYLATE 5 MG/1
5 TABLET ORAL DAILY
COMMUNITY

## 2024-02-29 RX ORDER — LIDOCAINE HYDROCHLORIDE 20 MG/ML
INJECTION, SOLUTION INFILTRATION; PERINEURAL AS NEEDED
Status: DISCONTINUED | OUTPATIENT
Start: 2024-02-29 | End: 2024-02-29

## 2024-02-29 RX ORDER — PROPOFOL 10 MG/ML
INJECTION, EMULSION INTRAVENOUS AS NEEDED
Status: DISCONTINUED | OUTPATIENT
Start: 2024-02-29 | End: 2024-02-29

## 2024-02-29 RX ORDER — DOXEPIN HYDROCHLORIDE 75 MG/1
75 CAPSULE ORAL NIGHTLY
COMMUNITY

## 2024-02-29 RX ORDER — ALBUMIN HUMAN 50 G/1000ML
SOLUTION INTRAVENOUS AS NEEDED
Status: DISCONTINUED | OUTPATIENT
Start: 2024-02-29 | End: 2024-02-29

## 2024-02-29 RX ORDER — BUSPIRONE HYDROCHLORIDE 10 MG/1
5 TABLET ORAL DAILY
Status: DISCONTINUED | OUTPATIENT
Start: 2024-03-01 | End: 2024-03-01 | Stop reason: HOSPADM

## 2024-02-29 RX ORDER — ACETAMINOPHEN 325 MG/1
650 TABLET ORAL EVERY 4 HOURS PRN
Status: DISCONTINUED | OUTPATIENT
Start: 2024-02-29 | End: 2024-03-01

## 2024-02-29 RX ORDER — CINACALCET 30 MG/1
30 TABLET, FILM COATED ORAL DAILY
COMMUNITY

## 2024-02-29 RX ORDER — ACETAMINOPHEN 160 MG/5ML
650 SOLUTION ORAL EVERY 4 HOURS PRN
Status: DISCONTINUED | OUTPATIENT
Start: 2024-02-29 | End: 2024-03-01

## 2024-02-29 RX ORDER — PHENYLEPHRINE HCL IN 0.9% NACL 0.4MG/10ML
SYRINGE (ML) INTRAVENOUS AS NEEDED
Status: DISCONTINUED | OUTPATIENT
Start: 2024-02-29 | End: 2024-02-29

## 2024-02-29 RX ORDER — PANTOPRAZOLE SODIUM 40 MG/1
40 TABLET, DELAYED RELEASE ORAL 2 TIMES DAILY
Qty: 60 TABLET | Refills: 0 | Status: SHIPPED | OUTPATIENT
Start: 2024-02-29 | End: 2024-06-05

## 2024-02-29 RX ORDER — SODIUM CHLORIDE, SODIUM LACTATE, POTASSIUM CHLORIDE, CALCIUM CHLORIDE 600; 310; 30; 20 MG/100ML; MG/100ML; MG/100ML; MG/100ML
INJECTION, SOLUTION INTRAVENOUS CONTINUOUS PRN
Status: DISCONTINUED | OUTPATIENT
Start: 2024-02-29 | End: 2024-02-29

## 2024-02-29 RX ORDER — NORETHINDRONE AND ETHINYL ESTRADIOL 0.5-0.035
KIT ORAL AS NEEDED
Status: DISCONTINUED | OUTPATIENT
Start: 2024-02-29 | End: 2024-02-29

## 2024-02-29 RX ORDER — ALBUMIN HUMAN 250 G/1000ML
SOLUTION INTRAVENOUS CONTINUOUS PRN
Status: DISCONTINUED | OUTPATIENT
Start: 2024-02-29 | End: 2024-02-29

## 2024-02-29 RX ORDER — ACETAMINOPHEN 650 MG/1
650 SUPPOSITORY RECTAL EVERY 4 HOURS PRN
Status: DISCONTINUED | OUTPATIENT
Start: 2024-02-29 | End: 2024-03-01

## 2024-02-29 RX ORDER — PROTAMINE SULFATE 10 MG/ML
INJECTION, SOLUTION INTRAVENOUS AS NEEDED
Status: DISCONTINUED | OUTPATIENT
Start: 2024-02-29 | End: 2024-02-29

## 2024-02-29 RX ORDER — PHENYLEPHRINE 10 MG/250 ML(40 MCG/ML)IN 0.9 % SOD.CHLORIDE INTRAVENOUS
CONTINUOUS PRN
Status: DISCONTINUED | OUTPATIENT
Start: 2024-02-29 | End: 2024-02-29

## 2024-02-29 RX ORDER — HEPARIN SODIUM 1000 [USP'U]/ML
INJECTION, SOLUTION INTRAVENOUS; SUBCUTANEOUS AS NEEDED
Status: DISCONTINUED | OUTPATIENT
Start: 2024-02-29 | End: 2024-02-29 | Stop reason: HOSPADM

## 2024-02-29 RX ORDER — MIDAZOLAM HYDROCHLORIDE 1 MG/ML
INJECTION, SOLUTION INTRAMUSCULAR; INTRAVENOUS CONTINUOUS PRN
Status: DISCONTINUED | OUTPATIENT
Start: 2024-02-29 | End: 2024-02-29

## 2024-02-29 RX ORDER — HEPARIN SODIUM 10000 [USP'U]/100ML
INJECTION, SOLUTION INTRAVENOUS CONTINUOUS PRN
Status: DISCONTINUED | OUTPATIENT
Start: 2024-02-29 | End: 2024-02-29 | Stop reason: HOSPADM

## 2024-02-29 RX ORDER — ALBUTEROL SULFATE 90 UG/1
1 AEROSOL, METERED RESPIRATORY (INHALATION) EVERY 4 HOURS PRN
Status: DISCONTINUED | OUTPATIENT
Start: 2024-02-29 | End: 2024-03-01 | Stop reason: HOSPADM

## 2024-02-29 RX ORDER — PANTOPRAZOLE SODIUM 40 MG/1
40 TABLET, DELAYED RELEASE ORAL 2 TIMES DAILY
Status: DISCONTINUED | OUTPATIENT
Start: 2024-02-29 | End: 2024-03-01 | Stop reason: HOSPADM

## 2024-02-29 RX ORDER — FENTANYL CITRATE 50 UG/ML
INJECTION, SOLUTION INTRAMUSCULAR; INTRAVENOUS AS NEEDED
Status: DISCONTINUED | OUTPATIENT
Start: 2024-02-29 | End: 2024-02-29

## 2024-02-29 RX ADMIN — Medication 120 MCG: at 09:05

## 2024-02-29 RX ADMIN — ATORVASTATIN CALCIUM 40 MG: 40 TABLET, FILM COATED ORAL at 23:06

## 2024-02-29 RX ADMIN — EPHEDRINE SULFATE 5 MG: 50 INJECTION, SOLUTION INTRAVENOUS at 09:10

## 2024-02-29 RX ADMIN — ACETAMINOPHEN 650 MG: 325 TABLET ORAL at 12:44

## 2024-02-29 RX ADMIN — PROPOFOL 60 MG: 10 INJECTION, EMULSION INTRAVENOUS at 08:07

## 2024-02-29 RX ADMIN — Medication 120 MCG: at 09:11

## 2024-02-29 RX ADMIN — Medication 80 MCG: at 09:40

## 2024-02-29 RX ADMIN — PANTOPRAZOLE SODIUM 40 MG: 40 TABLET, DELAYED RELEASE ORAL at 21:20

## 2024-02-29 RX ADMIN — LABETALOL HYDROCHLORIDE 5 MG: 5 INJECTION, SOLUTION INTRAVENOUS at 10:57

## 2024-02-29 RX ADMIN — LABETALOL HYDROCHLORIDE 5 MG: 5 INJECTION, SOLUTION INTRAVENOUS at 11:05

## 2024-02-29 RX ADMIN — ROCURONIUM BROMIDE 10 MG: 10 INJECTION INTRAVENOUS at 10:12

## 2024-02-29 RX ADMIN — APIXABAN 5 MG: 5 TABLET, FILM COATED ORAL at 21:20

## 2024-02-29 RX ADMIN — SODIUM CHLORIDE, POTASSIUM CHLORIDE, SODIUM LACTATE AND CALCIUM CHLORIDE: 600; 310; 30; 20 INJECTION, SOLUTION INTRAVENOUS at 08:05

## 2024-02-29 RX ADMIN — Medication 120 MCG: at 10:28

## 2024-02-29 RX ADMIN — ROCURONIUM BROMIDE 50 MG: 10 INJECTION INTRAVENOUS at 08:07

## 2024-02-29 RX ADMIN — SUGAMMADEX 400 MG: 100 INJECTION, SOLUTION INTRAVENOUS at 10:48

## 2024-02-29 RX ADMIN — LIDOCAINE HYDROCHLORIDE 100 MG: 20 INJECTION, SOLUTION INFILTRATION; PERINEURAL at 08:07

## 2024-02-29 RX ADMIN — METOPROLOL TARTRATE 50 MG: 25 TABLET, FILM COATED ORAL at 21:20

## 2024-02-29 RX ADMIN — FENTANYL CITRATE 50 MCG: 50 INJECTION, SOLUTION INTRAMUSCULAR; INTRAVENOUS at 09:31

## 2024-02-29 RX ADMIN — ONDANSETRON 4 MG: 2 INJECTION INTRAMUSCULAR; INTRAVENOUS at 10:29

## 2024-02-29 RX ADMIN — LABETALOL HYDROCHLORIDE 10 MG: 5 INJECTION, SOLUTION INTRAVENOUS at 10:51

## 2024-02-29 RX ADMIN — EPHEDRINE SULFATE 5 MG: 50 INJECTION, SOLUTION INTRAVENOUS at 09:05

## 2024-02-29 RX ADMIN — Medication 80 MCG: at 09:57

## 2024-02-29 RX ADMIN — SODIUM CHLORIDE, SODIUM LACTATE, POTASSIUM CHLORIDE, CALCIUM CHLORIDE: 600; 310; 30; 20 INJECTION, SOLUTION INTRAVENOUS at 08:30

## 2024-02-29 RX ADMIN — Medication 0.2 MCG/KG/MIN: at 08:40

## 2024-02-29 RX ADMIN — ROCURONIUM BROMIDE 20 MG: 10 INJECTION INTRAVENOUS at 09:29

## 2024-02-29 RX ADMIN — DOXEPIN HYDROCHLORIDE 75 MG: 50 CAPSULE ORAL at 21:22

## 2024-02-29 RX ADMIN — ALBUMIN (HUMAN) 250 ML: 12.5 SOLUTION INTRAVENOUS at 09:22

## 2024-02-29 RX ADMIN — PROTAMINE SULFATE 30 MG: 10 INJECTION, SOLUTION INTRAVENOUS at 10:36

## 2024-02-29 RX ADMIN — ROCURONIUM BROMIDE 20 MG: 10 INJECTION INTRAVENOUS at 08:45

## 2024-02-29 SDOH — SOCIAL STABILITY: SOCIAL INSECURITY: DOES ANYONE TRY TO KEEP YOU FROM HAVING/CONTACTING OTHER FRIENDS OR DOING THINGS OUTSIDE YOUR HOME?: NO

## 2024-02-29 SDOH — SOCIAL STABILITY: SOCIAL INSECURITY: DO YOU FEEL UNSAFE GOING BACK TO THE PLACE WHERE YOU ARE LIVING?: NO

## 2024-02-29 SDOH — HEALTH STABILITY: MENTAL HEALTH: CURRENT SMOKER: 0

## 2024-02-29 SDOH — SOCIAL STABILITY: SOCIAL INSECURITY: ARE THERE ANY APPARENT SIGNS OF INJURIES/BEHAVIORS THAT COULD BE RELATED TO ABUSE/NEGLECT?: NO

## 2024-02-29 SDOH — SOCIAL STABILITY: SOCIAL INSECURITY: DO YOU FEEL ANYONE HAS EXPLOITED OR TAKEN ADVANTAGE OF YOU FINANCIALLY OR OF YOUR PERSONAL PROPERTY?: NO

## 2024-02-29 SDOH — SOCIAL STABILITY: SOCIAL INSECURITY: ARE YOU OR HAVE YOU BEEN THREATENED OR ABUSED PHYSICALLY, EMOTIONALLY, OR SEXUALLY BY ANYONE?: YES

## 2024-02-29 SDOH — SOCIAL STABILITY: SOCIAL INSECURITY: WERE YOU ABLE TO COMPLETE ALL THE BEHAVIORAL HEALTH SCREENINGS?: YES

## 2024-02-29 SDOH — SOCIAL STABILITY: SOCIAL INSECURITY: HAS ANYONE EVER THREATENED TO HURT YOUR FAMILY OR YOUR PETS?: YES

## 2024-02-29 SDOH — SOCIAL STABILITY: SOCIAL INSECURITY: ABUSE: ADULT

## 2024-02-29 SDOH — SOCIAL STABILITY: SOCIAL INSECURITY: HAVE YOU HAD THOUGHTS OF HARMING ANYONE ELSE?: NO

## 2024-02-29 ASSESSMENT — ACTIVITIES OF DAILY LIVING (ADL)
WALKS IN HOME: INDEPENDENT
BATHING: INDEPENDENT
TOILETING: INDEPENDENT
ASSISTIVE_DEVICE: WALKER
PATIENT'S MEMORY ADEQUATE TO SAFELY COMPLETE DAILY ACTIVITIES?: YES
HEARING - LEFT EAR: FUNCTIONAL
FEEDING YOURSELF: INDEPENDENT
JUDGMENT_ADEQUATE_SAFELY_COMPLETE_DAILY_ACTIVITIES: YES
LACK_OF_TRANSPORTATION: NO
HEARING - RIGHT EAR: FUNCTIONAL
ADEQUATE_TO_COMPLETE_ADL: YES
DRESSING YOURSELF: INDEPENDENT
GROOMING: INDEPENDENT

## 2024-02-29 ASSESSMENT — PAIN DESCRIPTION - LOCATION: LOCATION: BACK

## 2024-02-29 ASSESSMENT — COGNITIVE AND FUNCTIONAL STATUS - GENERAL
DAILY ACTIVITIY SCORE: 24
CLIMB 3 TO 5 STEPS WITH RAILING: A LITTLE
MOBILITY SCORE: 24
PATIENT BASELINE BEDBOUND: NO
DAILY ACTIVITIY SCORE: 24
WALKING IN HOSPITAL ROOM: A LITTLE
MOBILITY SCORE: 22

## 2024-02-29 ASSESSMENT — PAIN - FUNCTIONAL ASSESSMENT
PAIN_FUNCTIONAL_ASSESSMENT: 0-10
PAIN_FUNCTIONAL_ASSESSMENT: 0-10

## 2024-02-29 ASSESSMENT — LIFESTYLE VARIABLES
HOW OFTEN DO YOU HAVE A DRINK CONTAINING ALCOHOL: NEVER
AUDIT-C TOTAL SCORE: 0
AUDIT-C TOTAL SCORE: 0
HOW MANY STANDARD DRINKS CONTAINING ALCOHOL DO YOU HAVE ON A TYPICAL DAY: PATIENT DOES NOT DRINK
HOW OFTEN DO YOU HAVE 6 OR MORE DRINKS ON ONE OCCASION: NEVER
SKIP TO QUESTIONS 9-10: 1

## 2024-02-29 ASSESSMENT — PAIN SCALES - GENERAL
PAINLEVEL_OUTOF10: 0 - NO PAIN
PAINLEVEL_OUTOF10: 0 - NO PAIN
PAINLEVEL_OUTOF10: 2

## 2024-02-29 ASSESSMENT — COLUMBIA-SUICIDE SEVERITY RATING SCALE - C-SSRS
2. HAVE YOU ACTUALLY HAD ANY THOUGHTS OF KILLING YOURSELF?: NO
2. HAVE YOU ACTUALLY HAD ANY THOUGHTS OF KILLING YOURSELF?: NO
6. HAVE YOU EVER DONE ANYTHING, STARTED TO DO ANYTHING, OR PREPARED TO DO ANYTHING TO END YOUR LIFE?: NO
1. IN THE PAST MONTH, HAVE YOU WISHED YOU WERE DEAD OR WISHED YOU COULD GO TO SLEEP AND NOT WAKE UP?: NO
6. HAVE YOU EVER DONE ANYTHING, STARTED TO DO ANYTHING, OR PREPARED TO DO ANYTHING TO END YOUR LIFE?: NO

## 2024-02-29 ASSESSMENT — PATIENT HEALTH QUESTIONNAIRE - PHQ9
1. LITTLE INTEREST OR PLEASURE IN DOING THINGS: NOT AT ALL
2. FEELING DOWN, DEPRESSED OR HOPELESS: NOT AT ALL
SUM OF ALL RESPONSES TO PHQ9 QUESTIONS 1 & 2: 0

## 2024-02-29 NOTE — DISCHARGE INSTRUCTIONS
INSTRUCTIONS AFTER ABLATION PROCEDURE:    * You will need to continue blood thinner (Eliquis) until instructed otherwise. It is important not to interrupt blood thinner for any reason (other than an emergency) during the first 30 days after ablation.    * You will be on Pantoprazole (a heartburn medicine) for 4 weeks to protect the esophagus as it can become irritated with ablation. It is very important that you take this medication.    * All other medications will generally remain the same unless you are told otherwise.  Resume taking your home medications today (including blood thinner) as listed on the discharge instructions.    * In the first week post-ablation you should take it easy. No heavy lifting or heavy exercise, no treadmill. You can use the stairs if needed but go slowly and minimize the number of times up and down.    * Some minor bruising is common at each groin access site with minor soreness as if you had banged the area. Bruising may occasionally be seen to extend down the leg. This is normal as is an occasional small quarter sized bump in the area. If larger swelling or more significant pain occurs at the area, please contact the office or go the nearest Emergency Room.    * You may have some minor chest pain for the next week or so. The pain will often worsen with a deep breath and be better when leaning forward. This is pericardial chest pain from the ablation and is generally not of concern. It should resolve within a week although it might increase for a day or so after the ablation.    * If you develop unexplained fevers exceeding 100 degrees anytime within the first 3 weeks post-ablation, you need to contact the office. Low grade fevers of around 99 degrees are common in the first day or so post-ablation.    * Atrial fibrillation (AFib) can recur in all patients who undergo this ablation for up to 4-8 weeks post-ablation. The ablation itself can cause inflammation (pericarditis) in the  atria and this can cause AFib. Some patients will actually experience an increased amount of atrial arrhythmia early after ablation. Approximately 1/3 of patients will have this early recurrence of AFib. Medications should be continued and your heart rate controlled. Nothing else needs be done initially except waiting as in many cases these episodes of AFib will prove self limited.    * Continue to follow up with your primary care physician, primary cardiologist, and any other specialists you normally see.    * No driving for 2 days post procedure (IF you were driving prior to procedure)    *Diet: Heart healthy    Call Provider If:  Breathing faster than normal.     Fever of 100.4 F (38 C) or higher.     Chills.     Any new concerning symptoms.     Passing out.     Patient Instructions, Next 24 hours:  DO NOT drive a car, operate machinery or power tools.  It is recommended that a responsible adult be with you for the first 24 hours.     DO NOT drink any alcoholic drinks or take any non-prescriptive medications that contain alcohol for the first 24 hours.     DO NOT make any important decisions for the first 24 hours.    Activity:  You are advised to go directly home from the hospital.     DO NOT lift anything heavier than 10 pounds for one week, this allows for proper healing of the groin.     No excessive exercise or treadmill use for one week. You may walk and do stairs, slowly.     No sexual activities for 24 hours after you arrive home.    Wound Care:  If slight bleeding should occur at groin site, lie down and have someone apply firm pressure just above the puncture site for 5 minutes.  If it continues or is profuse, call 911. Always notify your doctor if bleeding occurs.     Keep site clean and dry. Let air dry or you may use a simple bandaid.     Gently cleanse the puncture site in your groin with soap and water only.     You may experience some tenderness, bruising or minimal inflammation.  If you have any  concerns, you may contact the EP Lab or if any of these symptoms become excessive, contact your electrophysiologist or go to the emergency room.     No tub baths, soaking, hot tubs, or swimming for one week.     May shower the next day after your procedure.    Other Instructions:  If you have any questions about the effects of the sedative drugs or groin care, call the physician who performed your procedure.    FOLLOW UP:  1) Primary care physician 2 weeks--call to schedule    2) Rosemarie Nicole CNP ( Electrophysiology) 1 month after ablation as scheduled

## 2024-02-29 NOTE — ANESTHESIA PREPROCEDURE EVALUATION
Patient: Rosa Soto    Procedure Information       Date/Time: 02/29/24 0730    Procedure: Ablation A-Fib Persistant - CARTO/ANY EP LAB  GENERAL ANESTHESIA WHOLE CASE    Location: [facility] STEREO / Virtual [facility] MAT 3529 Cardiac Cath Lab    Providers: Adam Valentine MD            Relevant Problems   Cardiovascular   (+) Atrial fibrillation (CMS/HCC)   (+) Essential hypertension   (+) Mixed hyperlipidemia   (+) Pure hypercholesterolemia      Endocrine   (+) Morbid obesity (CMS/HCC)   (+) Secondary hyperparathyroidism of renal origin (CMS/HCC)   (+) Type 2 diabetes mellitus with diabetic chronic kidney disease (CMS/HCC)      /Renal   (+) CKD (chronic kidney disease) stage 5, GFR less than 15 ml/min (CMS/HCC)   (+) End stage renal disease (CMS/[facility])      Neuro/Psych   (+) Generalized anxiety disorder   (+) Moderate episode of recurrent major depressive disorder (CMS/[facility])      Pulmonary   (+) Chronic obstructive pulmonary disease (CMS/[facility])   (+) Exertional dyspnea   (+) Obstructive sleep apnea   (+) Pneumonia due to COVID-19 virus      Hematology   (+) Anemia   (+) Coagulation defect, unspecified (CMS/HCC)   (+) Iron deficiency anemia, unspecified      Musculoskeletal   (+) Lumbosacral spondylosis without myelopathy      Infectious Disease   (+) Pneumonia due to COVID-19 virus       Clinical information reviewed:               There were no vitals filed for this visit.    Past Surgical History:   Procedure Laterality Date    US GUIDED ABDOMINAL PARACENTESIS  8/31/2023    US GUIDED ABDOMINAL PARACENTESIS 8/31/2023 POR US     No past medical history on file.  No current facility-administered medications for this encounter.    Current Outpatient Medications:     albuterol 90 mcg/actuation inhaler, Inhale 1 puff every 4 hours if needed for shortness of breath., Disp: , Rfl:     atorvastatin (Lipitor) 40 mg tablet, Take 1 tablet (40 mg) by mouth once daily., Disp: , Rfl:     Auryxia 210 mg iron tablet, Take 2 tablets (420  mg) by mouth., Disp: , Rfl:     busPIRone (Buspar) 5 mg tablet, 1 tablet (5 mg) once daily., Disp: , Rfl:     cholecalciferol (Vitamin D-3) 25 MCG (1000 UT) tablet, Take 25 tablets (625 mcg) by mouth once daily., Disp: , Rfl:     DULoxetine (Cymbalta) 60 mg DR capsule, Take 1 capsule (60 mg) by mouth once daily., Disp: , Rfl:     Eliquis 5 mg tablet, Take 1 tablet (5 mg) by mouth twice a day., Disp: , Rfl:     Flonase Allergy Relief 50 mcg/actuation nasal spray, Administer 2 sprays into each nostril once daily., Disp: , Rfl:     glipiZIDE (Glucotrol) 5 mg tablet, , Disp: , Rfl:     metoprolol tartrate (Lopressor) 50 mg tablet, Take 1 tablet by mouth twice a day., Disp: , Rfl:     mirtazapine (Remeron) 7.5 mg tablet, Take 1 tablet (7.5 mg) by mouth once daily at bedtime., Disp: , Rfl:     pramipexole (Mirapex) 0.125 mg tablet, Take 1 tablet (0.125 mg) by mouth twice a day., Disp: , Rfl:     torsemide (Demadex) 20 mg tablet, Take 1 tablet (20 mg) by mouth once daily., Disp: , Rfl:     traZODone (Desyrel) 50 mg tablet, Take 1 tablet (50 mg) by mouth once daily as needed., Disp: , Rfl:   Prior to Admission medications    Medication Sig Start Date End Date Taking? Authorizing Provider   albuterol 90 mcg/actuation inhaler Inhale 1 puff every 4 hours if needed for shortness of breath.    Historical Provider, MD   atorvastatin (Lipitor) 40 mg tablet Take 1 tablet (40 mg) by mouth once daily.    Historical Provider, MD   Auryxia 210 mg iron tablet Take 2 tablets (420 mg) by mouth. 5/10/23   Historical Provider, MD   busPIRone (Buspar) 5 mg tablet 1 tablet (5 mg) once daily. 10/17/23   Historical Provider, MD   cholecalciferol (Vitamin D-3) 25 MCG (1000 UT) tablet Take 25 tablets (625 mcg) by mouth once daily. 5/12/23   Historical Provider, MD   DULoxetine (Cymbalta) 60 mg DR capsule Take 1 capsule (60 mg) by mouth once daily. 4/26/23   Historical Provider, MD   Eliquis 5 mg tablet Take 1 tablet (5 mg) by mouth twice a day.  "7/27/17   Historical Provider, MD   Flonase Allergy Relief 50 mcg/actuation nasal spray Administer 2 sprays into each nostril once daily.    Historical Provider, MD   glipiZIDE (Glucotrol) 5 mg tablet  10/17/23   Historical Provider, MD   metoprolol tartrate (Lopressor) 50 mg tablet Take 1 tablet by mouth twice a day. 6/28/17   Historical Provider, MD   mirtazapine (Remeron) 7.5 mg tablet Take 1 tablet (7.5 mg) by mouth once daily at bedtime. 5/10/23   Historical Provider, MD   pramipexole (Mirapex) 0.125 mg tablet Take 1 tablet (0.125 mg) by mouth twice a day. 4/26/23   Historical Provider, MD   torsemide (Demadex) 20 mg tablet Take 1 tablet (20 mg) by mouth once daily. 9/22/09   Historical Provider, MD   traZODone (Desyrel) 50 mg tablet Take 1 tablet (50 mg) by mouth once daily as needed. 6/11/14   Historical Provider, MD     Allergies   Allergen Reactions    Penicillins Anaphylaxis    Codeine GI Upset    Ropinirole Hallucinations    Adhesive Rash     Social History     Tobacco Use    Smoking status: Unknown    Smokeless tobacco: Not on file   Substance Use Topics    Alcohol use: Not Currently         Chemistry    Lab Results   Component Value Date/Time     02/27/2024 1312    K 4.3 02/27/2024 1312     02/27/2024 1312    CO2 25 02/27/2024 1312    BUN 32 (H) 02/27/2024 1312    CREATININE 5.98 (H) 02/27/2024 1312    Lab Results   Component Value Date/Time    CALCIUM 8.7 02/27/2024 1312    ALKPHOS 136 08/30/2023 1717    AST 14 08/30/2023 1717    ALT 9 08/30/2023 1717    BILITOT 0.4 08/30/2023 1717          Lab Results   Component Value Date/Time    WBC 10.0 02/27/2024 1312    HGB 9.9 (L) 02/27/2024 1312    HCT 33.1 (L) 02/27/2024 1312     02/27/2024 1312     No results found for: \"PROTIME\", \"PTT\", \"INR\"  No results found for this or any previous visit (from the past 4464 hour(s)).  No results found for this or any previous visit from the past 1095 days.     NPO Detail:  No data recorded     Physical " Exam    Airway  Mallampati: II  TM distance: >3 FB  Neck ROM: full     Cardiovascular   Rhythm: irregular     Dental     Comments: Poor dentition.  Multiple missing and chipped teeth but none loose per pt   Pulmonary   (+) decreased breath sounds     Abdominal   (+) obese             Anesthesia Plan    History of general anesthesia?: yes  History of complications of general anesthesia?: no    ASA 3     general     The patient is not a current smoker.  Education provided regarding risk of obstructive sleep apnea.  Anesthetic plan and risks discussed with patient.  Use of blood products discussed with patient who consented to blood products.    Plan discussed with CRNA.

## 2024-02-29 NOTE — ANESTHESIA PROCEDURE NOTES
Peripheral IV  Date/Time: 2/29/2024 8:29 AM  Inserted by: MATTHIAS Dunn-RAMIRO (RAMIRO [facility])    Placement  Needle size: 18 G  Laterality: left  Location: hand  Local anesthetic: none  Site prep: alcohol  Technique: anatomical landmarks  Attempts: 1

## 2024-02-29 NOTE — H&P
History Of Present Illness:    Rosa Soto is a 69 y.o. female presenting with AF.     Last Recorded Vitals:  Vitals:    02/29/24 1355 02/29/24 1400 02/29/24 1429 02/29/24 1515   BP:    144/57   Pulse:    78   Resp:    18   Temp:    36.9 °C (98.4 °F)   SpO2: (!) 88% (!) 85% 96% 97%   Weight:           Last Labs:  CBC - 2/27/2024:  1:12 PM  10.0 9.9 267    33.1      CMP - 2/27/2024:  1:12 PM  8.7 6.4 14 --- 0.4   8.4 3.3 9 136      PTT - No results in last year.  _   _ _     Troponin I   Date/Time Value Ref Range Status   09/07/2023 07:16 PM CANCELED       Comment:     .  Less than 99th percentile of normal range cutoff-  Female and children under 18 years old <14 ng/L; Male <21 ng/L: Negative  Repeat testing should be performed if clinically indicated.   .  Female and children under 18 years old 14-50 ng/L; Male 21-50 ng/L:  Consistent with possible cardiac damage and possible increased clinical   risk. Serial measurements may help to assess extent of myocardial damage.   .  >50 ng/L: Consistent with cardiac damage, increased clinical risk and  myocardial infarction. Serial measurements may help assess extent of   myocardial damage.   .   NOTE: Children less than 1 year old may have higher baseline troponin   levels and results should be interpreted in conjunction with the overall   clinical context.   .  NOTE: Troponin I testing is performed using a different   testing methodology at [facility] than at other   [facility] hospitals. Direct result comparisons should only   be made within the same method.    Result canceled by the ancillary.     09/07/2023 11:09 AM 21 (H) 0 - 13 ng/L Final     Comment:     .  Less than 99th percentile of normal range cutoff-  Female and children under 18 years old <14 ng/L; Male <21 ng/L: Negative  Repeat testing should be performed if clinically indicated.   .  Female and children under 18 years old 14-50 ng/L; Male 21-50 ng/L:  Consistent with possible cardiac damage and  possible increased clinical   risk. Serial measurements may help to assess extent of myocardial damage.   .  >50 ng/L: Consistent with cardiac damage, increased clinical risk and  myocardial infarction. Serial measurements may help assess extent of   myocardial damage.   .   NOTE: Children less than 1 year old may have higher baseline troponin   levels and results should be interpreted in conjunction with the overall   clinical context.   .  NOTE: Troponin I testing is performed using a different   testing methodology at [facility] than at other   [facility]. Direct result comparisons should only   be made within the same method.     09/07/2023 10:07 AM 22 (H) 0 - 13 ng/L Final     Comment:     .  Less than 99th percentile of normal range cutoff-  Female and children under 18 years old <14 ng/L; Male <21 ng/L: Negative  Repeat testing should be performed if clinically indicated.   .  Female and children under 18 years old 14-50 ng/L; Male 21-50 ng/L:  Consistent with possible cardiac damage and possible increased clinical   risk. Serial measurements may help to assess extent of myocardial damage.   .  >50 ng/L: Consistent with cardiac damage, increased clinical risk and  myocardial infarction. Serial measurements may help assess extent of   myocardial damage.   .   NOTE: Children less than 1 year old may have higher baseline troponin   levels and results should be interpreted in conjunction with the overall   clinical context.   .  NOTE: Troponin I testing is performed using a different   testing methodology at [facility] than at other   [facility]. Direct result comparisons should only   be made within the same method.       Hemoglobin A1C   Date/Time Value Ref Range Status   04/23/2018 03:10 PM 5.5 % Final     Comment:          Diagnosis of Diabetes-Adults   Non-Diabetic: < or = 5.6%   Increased risk for developing diabetes: 5.7-6.4%   Diagnostic of diabetes: > or =  "6.5%  .       Monitoring of Diabetes                Age (y)     Therapeutic Goal (%)   Adults:          >18           <7.0   Pediatrics:    13-18           <7.5                   7-12           <8.0                   0- 6            7.5-8.5   American Diabetes Association. Diabetes Care 33(S1), Jan 2010.       VLDL   Date/Time Value Ref Range Status   04/23/2018 03:10 PM 33 0 - 40 mg/dL Final      Last I/O:  No intake/output data recorded.    Past Cardiology Tests (Last 3 Years):  EKG:  No results found for this or any previous visit from the past 1095 days.    Echo:  No results found for this or any previous visit from the past 1095 days.    Ejection Fractions:  No results found for: \"EF\"  Cath:  No results found for this or any previous visit from the past 1095 days.    Stress Test:  No results found for this or any previous visit from the past 1095 days.    Cardiac Imaging:  No results found for this or any previous visit from the past 1095 days.      Past Medical History:  She has no past medical history on file.    Past Surgical History:  She has a past surgical history that includes US guided abdominal paracentesis (8/31/2023).      Social History:  She reports that she does not currently use alcohol. No history on file for tobacco use and drug use.    Family History:  No family history on file.     Allergies:  Penicillins, Codeine, Ropinirole, and Adhesive    Inpatient Medications:  Scheduled medications   Medication Dose Route Frequency     PRN medications   Medication    [MAR Hold] acetaminophen    Or    [MAR Hold] acetaminophen    Or    [MAR Hold] acetaminophen     Continuous Medications   Medication Dose Last Rate     Outpatient Medications:  Current Outpatient Medications   Medication Instructions    albuterol 90 mcg/actuation inhaler 1 puff, inhalation, Every 4 hours PRN    amLODIPine (NORVASC) 5 mg, oral, Daily    atorvastatin (LIPITOR) 40 mg, oral, Daily    Auryxia 210 mg iron tablet TAKE 2 TABLETS BY " MOUTH THREE TIMES A DAY WITH MEALS. SWALLOW WHOLE, DO NOT CHEW OR CRUSH MEDICATION    busPIRone (BUSPAR) 5 mg, oral, Daily RT    cholecalciferol (Vitamin D-3) 25 MCG (1000 UT) tablet 1 tablet, oral, Daily    cinacalcet (SENSIPAR) 30 mg, oral, Daily, Take with food or shortly afer a meal. Swallow tablet whole; do not break or divide.    doxepin (SINEQUAN) 75 mg, oral, Nightly    DULoxetine (CYMBALTA) 30 mg, oral, Daily    Eliquis 5 mg tablet 1 tablet, oral, 2 times daily    Flonase Allergy Relief 50 mcg/actuation nasal spray 2 sprays, Each Nostril, Daily    metoprolol tartrate (Lopressor) 50 mg tablet 1 tablet, oral, 2 times daily    pantoprazole (PROTONIX) 40 mg, oral, 2 times daily, Do not crush, chew, or split.    torsemide (DEMADEX) 20 mg, oral, 2 times daily       Physical Exam:  Physical Exam  Constitutional:       General: She is not in acute distress.     Appearance: Normal appearance. She is not toxic-appearing.   HENT:      Head: Normocephalic and atraumatic.      Mouth/Throat:      Mouth: Mucous membranes are moist.      Pharynx: Oropharynx is clear.   Eyes:      Extraocular Movements: Extraocular movements intact.      Conjunctiva/sclera: Conjunctivae normal.      Pupils: Pupils are equal, round, and reactive to light.   Cardiovascular:      Rate and Rhythm: Normal rate and regular rhythm.      Heart sounds: No murmur heard.     No friction rub. No gallop.   Pulmonary:      Effort: Pulmonary effort is normal. No respiratory distress.      Breath sounds: Normal breath sounds. No wheezing or rales.   Abdominal:      General: Abdomen is flat. Bowel sounds are normal. There is no distension.      Tenderness: There is no abdominal tenderness. There is no guarding.   Musculoskeletal:         General: No swelling. Normal range of motion.      Cervical back: Normal range of motion.   Skin:     General: Skin is warm and dry.      Capillary Refill: Capillary refill takes less than 2 seconds.   Neurological:       General: No focal deficit present.      Mental Status: She is alert and oriented to person, place, and time. Mental status is at baseline.   Psychiatric:         Mood and Affect: Mood normal.         Behavior: Behavior normal.         Thought Content: Thought content normal.         Judgment: Judgment normal.            Assessment/Plan   AF  RFA, GA       Code Status:  Full Code    I spent 30 minutes in the professional and overall care of this patient.        Quentin Shirley MD

## 2024-02-29 NOTE — ANESTHESIA PROCEDURE NOTES
Arterial Line:    Date/Time: 2/29/2024 8:29 AM    Staffing  Performed: LAVELL   Authorized by: Tyrel Baeza MD    Performed by: MATTHIAS Dunn-CRNA    An arterial line was placed. Procedure performed using surface landmarks.in the OR for the following indication(s): continuous blood pressure monitoring and blood sampling needed.    A 20 gauge (size), 1 and 3/4 inch (length), Angiocath (type) catheter was placed into the Right radial artery, secured by Tegaderm,   Seldinger technique not used.  Events:  patient tolerated procedure well with no complications.      Additional notes:  Sterile technique maintained

## 2024-02-29 NOTE — ANESTHESIA PROCEDURE NOTES
Airway  Date/Time: 2/29/2024 8:10 AM  Urgency: elective    Airway not difficult    Staffing  Performed: SRNA   Authorized by: Tyrel Baeza MD    Performed by: MATTHIAS Dunn-RAMIRO  Patient location during procedure: OR    Indications and Patient Condition  Indications for airway management: anesthesia and airway protection  Spontaneous ventilation: present  Sedation level: deep  Preoxygenated: yes  Patient position: ramp  MILS maintained throughout  Mask difficulty assessment: 2 - vent by mask + OA or adjuvant +/- NMBA  Planned trial extubation    Final Airway Details  Final airway type: endotracheal airway      Successful airway: ETT  Cuffed: yes   Successful intubation technique: video laryngoscopy  Facilitating devices/methods: intubating stylet  Endotracheal tube insertion site: oral  Blade: Lexi  Blade size: #3  ETT size (mm): 7.0  Cormack-Lehane Classification: grade I - full view of glottis  Placement verified by: chest auscultation and capnometry   Measured from: lips  ETT to lips (cm): 21  Number of attempts at approach: 1  Ventilation between attempts: none  Number of other approaches attempted: 0

## 2024-02-29 NOTE — ANESTHESIA POSTPROCEDURE EVALUATION
Patient: Rosa Soto    Procedure Summary       Date: 02/29/24 Room / Location: [facility] STEREO / Virtual [facility] MAT 3529 Cardiac Cath Lab    Anesthesia Start: 0756 Anesthesia Stop: 1146    Procedure: Ablation A-Fib Persistant Diagnosis:       Paroxysmal atrial fibrillation (CMS/HCC)      (Paroxysmal atrial fibrillation (CMS/HCC) [I48.0])    Providers: Adam Valentine MD Responsible Provider: JAEL Dunn    Anesthesia Type: general ASA Status: 3            Anesthesia Type: general    Vitals Value Taken Time   /72 02/29/24 1146   Temp 36 02/29/24 1146   Pulse 70 02/29/24 1146   Resp 16 02/29/24 1146   SpO2 100 02/29/24 1146       Anesthesia Post Evaluation    Patient location during evaluation: PACU  Patient participation: complete - patient participated  Level of consciousness: awake and alert  Pain management: satisfactory to patient  Airway patency: patent  Two or more strategies used to mitigate risk of obstructive sleep apnea  Cardiovascular status: acceptable and blood pressure returned to baseline  Respiratory status: acceptable and face mask  Hydration status: acceptable  Postoperative Nausea and Vomiting: none        There were no known notable events for this encounter.

## 2024-02-29 NOTE — PROGRESS NOTES
Pharmacy Medication History Review    Rosa Soto is a 69 y.o. female admitted for Atrial fibrillation (CMS/[facility]). Pharmacy reviewed the patient's [id number] medications and allergies for accuracy.    The list below reflects the updated PTA list. Comments regarding how patient may be taking medications differently can be found in the Admit Orders Activity  Prior to Admission Medications   Prescriptions Last Dose Informant Patient Reported?   Auryxia 210 mg iron tablet 2/28/2024 Self Yes   Sig: TAKE 2 TABLETS BY MOUTH THREE TIMES A DAY WITH MEALS. SWALLOW WHOLE, DO NOT CHEW OR CRUSH MEDICATION   DULoxetine (Cymbalta) 30 mg DR capsule 2/28/2024 Self Yes   Sig: Take 1 capsule (30 mg) by mouth once daily.   Eliquis 5 mg tablet 2/28/2024 Self Yes   Sig: Take 1 tablet (5 mg) by mouth twice a day.   Flonase Allergy Relief 50 mcg/actuation nasal spray Past Week Self Yes   Sig: Administer 2 sprays into each nostril once daily.   albuterol 90 mcg/actuation inhaler Unknown Self Yes   Sig: Inhale 1 puff every 4 hours if needed for shortness of breath.   amLODIPine (Norvasc) 5 mg tablet 2/28/2024 Self Yes   Sig: Take 1 tablet (5 mg) by mouth once daily.   atorvastatin (Lipitor) 40 mg tablet 2/28/2024 Self Yes   Sig: Take 1 tablet (40 mg) by mouth once daily.   busPIRone (Buspar) 5 mg tablet 2/28/2024 Self Yes   Sig: Take 1 tablet (5 mg) by mouth once daily.   cholecalciferol (Vitamin D-3) 25 MCG (1000 UT) tablet 2/28/2024 Self Yes   Sig: Take 1 tablet (25 mcg) by mouth once daily.   cinacalcet (Sensipar) 30 mg tablet 2/28/2024 Self Yes   Sig: Take 1 tablet (30 mg) by mouth once daily. Take with food or shortly afer a meal. Swallow tablet whole; do not break or divide.   doxepin (SINEquan) 75 mg capsule 2/28/2024 Self Yes   Sig: Take 1 capsule (75 mg) by mouth once daily at bedtime.   metoprolol tartrate (Lopressor) 50 mg tablet 2/28/2024 Self Yes   Sig: Take 1 tablet by mouth twice a day.   torsemide (Demadex) 20 mg  tablet 2/28/2024 Self Yes   Sig: Take 1 tablet (20 mg) by mouth 2 times a day.      Facility-Administered Medications: None        The list below reflects the updated allergy list. Please review each documented allergy for additional clarification and justification.  Allergies  Reviewed by Sterling HENNING RN on 2/29/2024        Severity Reactions Comments    Penicillins High Anaphylaxis     Codeine Medium GI Upset     Ropinirole Not Specified Hallucinations     Adhesive Low Rash             Patient declines M2B at discharge. Pharmacy has been updated to [facility] in [place].    Sources used to complete the med history include out patient fill history, OARRS, and patient interview      Below are additional concerns with the patient's PTA list.  Patient is no longer taking Trazodone, Glipizide, Mirtazapine, or Mirapex    Rob Atwood PharmD  Transitions of Care Pharmacist  [facility]  Please reach out via Secure Chat for questions, or if no response call [facility] or [facility]

## 2024-03-01 ENCOUNTER — APPOINTMENT (OUTPATIENT)
Dept: CARDIOLOGY | Facility: HOSPITAL | Age: 69
End: 2024-03-01
Payer: MEDICARE

## 2024-03-01 VITALS
HEIGHT: 67 IN | OXYGEN SATURATION: 91 % | BODY MASS INDEX: 40 KG/M2 | DIASTOLIC BLOOD PRESSURE: 57 MMHG | WEIGHT: 254.85 LBS | RESPIRATION RATE: 20 BRPM | HEART RATE: 79 BPM | SYSTOLIC BLOOD PRESSURE: 142 MMHG | TEMPERATURE: 97.9 F

## 2024-03-01 PROBLEM — I48.91 A-FIB (MULTI): Status: ACTIVE | Noted: 2024-03-01

## 2024-03-01 LAB
ATRIAL RATE: 76 BPM
ATRIAL RATE: 82 BPM
P AXIS: 55 DEGREES
P AXIS: 77 DEGREES
P OFFSET: 178 MS
P OFFSET: 190 MS
P ONSET: 124 MS
P ONSET: 133 MS
PR INTERVAL: 176 MS
PR INTERVAL: 196 MS
Q ONSET: 221 MS
Q ONSET: 222 MS
QRS COUNT: 13 BEATS
QRS COUNT: 13 BEATS
QRS DURATION: 88 MS
QRS DURATION: 90 MS
QT INTERVAL: 404 MS
QT INTERVAL: 406 MS
QTC CALCULATION(BAZETT): 454 MS
QTC CALCULATION(BAZETT): 474 MS
QTC FREDERICIA: 436 MS
QTC FREDERICIA: 450 MS
R AXIS: 19 DEGREES
R AXIS: 46 DEGREES
T AXIS: 60 DEGREES
T AXIS: 64 DEGREES
T OFFSET: 423 MS
T OFFSET: 425 MS
VENTRICULAR RATE: 76 BPM
VENTRICULAR RATE: 82 BPM

## 2024-03-01 PROCEDURE — 99239 HOSP IP/OBS DSCHRG MGMT >30: CPT | Performed by: PHYSICIAN ASSISTANT

## 2024-03-01 PROCEDURE — 2500000001 HC RX 250 WO HCPCS SELF ADMINISTERED DRUGS (ALT 637 FOR MEDICARE OP): Performed by: STUDENT IN AN ORGANIZED HEALTH CARE EDUCATION/TRAINING PROGRAM

## 2024-03-01 PROCEDURE — 93005 ELECTROCARDIOGRAM TRACING: CPT

## 2024-03-01 PROCEDURE — 93010 ELECTROCARDIOGRAM REPORT: CPT | Performed by: INTERNAL MEDICINE

## 2024-03-01 PROCEDURE — 7100000011 HC EXTENDED STAY RECOVERY HOURLY - NURSING UNIT

## 2024-03-01 PROCEDURE — G0378 HOSPITAL OBSERVATION PER HR: HCPCS

## 2024-03-01 PROCEDURE — 2500000002 HC RX 250 W HCPCS SELF ADMINISTERED DRUGS (ALT 637 FOR MEDICARE OP, ALT 636 FOR OP/ED): Mod: MUE | Performed by: STUDENT IN AN ORGANIZED HEALTH CARE EDUCATION/TRAINING PROGRAM

## 2024-03-01 RX ORDER — ACETAMINOPHEN 325 MG/1
650 TABLET ORAL EVERY 6 HOURS PRN
Status: DISCONTINUED | OUTPATIENT
Start: 2024-03-01 | End: 2024-03-01 | Stop reason: HOSPADM

## 2024-03-01 RX ADMIN — DULOXETINE HYDROCHLORIDE 30 MG: 30 CAPSULE, DELAYED RELEASE ORAL at 08:03

## 2024-03-01 RX ADMIN — AMLODIPINE BESYLATE 5 MG: 5 TABLET ORAL at 08:03

## 2024-03-01 RX ADMIN — PANTOPRAZOLE SODIUM 40 MG: 40 TABLET, DELAYED RELEASE ORAL at 08:03

## 2024-03-01 RX ADMIN — METOPROLOL TARTRATE 50 MG: 25 TABLET, FILM COATED ORAL at 08:03

## 2024-03-01 RX ADMIN — ACETAMINOPHEN 650 MG: 325 TABLET ORAL at 01:52

## 2024-03-01 RX ADMIN — APIXABAN 5 MG: 5 TABLET, FILM COATED ORAL at 08:03

## 2024-03-01 ASSESSMENT — COGNITIVE AND FUNCTIONAL STATUS - GENERAL
MOBILITY SCORE: 24
DAILY ACTIVITIY SCORE: 24

## 2024-03-01 ASSESSMENT — PAIN SCALES - GENERAL: PAINLEVEL_OUTOF10: 0 - NO PAIN

## 2024-03-01 NOTE — DISCHARGE SUMMARY
"Discharge Diagnosis  Atrial fibrillation s/p ablation 2/29/24    Test Results Pending At Discharge: none    Hospital Course  67yo F referred by Dr Ney Kimble for AF. PMH of HTN, DLD, DM2, ESRD (on HD), longtime tobacco use (quit 2 years ago, used since age 12), normal EF by Echo 2018, and symptomatic pAF on Eliquis w/ recent AF burden 3.1% by monitor. Pt presented 2/29/24 for planned AF ablation by Dr Valentine. Note pt has had Echo and nuc stress test (both ordered fall 2023 by Dr Kimble) that were not done; pt states family illness made her postpone these.    Pt underwent AF ablation (PVI + adjuvant lesions) without complication on 2/29 (see procedure report). Pt was to go home same day but had Right femoral venous access site oozing and temporary O2 requirement. O2 was weaned off overnight and SpO2 is 94% on room air the morning of discharge. Pt stayed overnight on telemetry; tele was benign with NSR, occasional PAC. On day of discharge, pt ate breakfast, VSS, pt had voided (makes some urine) and ambulated without issues. RFV access sites remained stable. Post ablation EKGs were reviewed and were without acute changes c/t prior tracing. Case was d/w EP attending and EP fellow Quentin Shirley MD who also saw pt 3/1 and who agreed pt was stable and appropriate for discharge home. Per fellow, no labs needed prior to discharge. Post procedure written instructions were reviewed with pt and all questions answered. Start pantoprazole for 1 month post ablation for esophageal prophylaxis. Pt was advised to F/u with PCP in 2 weeks (she will call for appt) EP F/u 1 month with Rosemarie Nicole CNP as scheduled. Pt states she sometimes skips dialysis before the weekend as it \"wipes her out\"; pt was advised to go to her dialysis session as scheduled.     Pertinent Physical Exam At Time of Discharge  Gen-A+O x 3  Skin-W+D  Lungs-adequate air exchange without wheezes, rales, or rhonchi  CV-RRR  Abd-obese, soft, NT/ND, +BS  Extrem-no " LE edema; RFV access site with mild ecchymosis; no bleeding or hematoma.  Neuro-BUTLER  Psych-appropriate mood and affect    Home Medications   START taking these medications     pantoprazole 40 mg EC tablet; Commonly known as: ProtoNix; Take 1 tablet   (40 mg) by mouth 2 times a day for 1 month post ablation     CONTINUE taking these medications     albuterol 90 mcg/actuation inhaler   amLODIPine 5 mg tablet; Commonly known as: Norvasc   atorvastatin 40 mg tablet; Commonly known as: Lipitor   Auryxia 210 mg iron tablet; Generic drug: ferric citrate   busPIRone 5 mg tablet; Commonly known as: Buspar   cholecalciferol 25 MCG (1000 UT) tablet; Commonly known as: Vitamin D-3   cinacalcet 30 mg tablet; Commonly known as: Sensipar   doxepin 75 mg capsule; Commonly known as: SINEquan   DULoxetine 30 mg DR capsule; Commonly known as: Cymbalta   Eliquis 5 mg tablet; Generic drug: apixaban every 12 hours   Flonase Allergy Relief 50 mcg/actuation nasal spray; Generic drug:   fluticasone   metoprolol tartrate 50 mg tablet; Commonly known as: Lopressor   torsemide 20 mg tablet; Commonly known as: Demadex       Outpatient Follow-Up  Future Appointments   Date Time Provider Department Center   3/28/2024  9:00 AM Rosemarie C Meder, APRN-CNP [id number]     Greater than 30 min were spent on medication education, post ablation teaching, and coordination of discharge    VALENTIN Amaya, PAMicahC, [facility]  Inpatient Cardiac Electrophysiology

## 2024-03-01 NOTE — PROGRESS NOTES
Rosa Soto is a 69 y.o. female on day 0 of admission presenting with Atrial fibrillation (CMS/HCC).      DC Planning:  Went in and met with the pt, confirmed demographics.    Home Care choice for home going needs.   No home going needs anticipated for this pt at this time.       KIAH ARMSTRONG

## 2024-03-02 ENCOUNTER — HOSPITAL ENCOUNTER (INPATIENT)
Facility: HOSPITAL | Age: 69
LOS: 4 days | Discharge: HOME | DRG: 280 | End: 2024-03-07
Attending: EMERGENCY MEDICINE | Admitting: INTERNAL MEDICINE
Payer: MEDICARE

## 2024-03-02 ENCOUNTER — APPOINTMENT (OUTPATIENT)
Dept: RADIOLOGY | Facility: HOSPITAL | Age: 69
DRG: 280 | End: 2024-03-02
Payer: MEDICARE

## 2024-03-02 ENCOUNTER — APPOINTMENT (OUTPATIENT)
Dept: CARDIOLOGY | Facility: HOSPITAL | Age: 69
DRG: 280 | End: 2024-03-02
Payer: MEDICARE

## 2024-03-02 DIAGNOSIS — I48.11 LONGSTANDING PERSISTENT ATRIAL FIBRILLATION (MULTI): ICD-10-CM

## 2024-03-02 DIAGNOSIS — I50.31 ACUTE DIASTOLIC HEART FAILURE (MULTI): ICD-10-CM

## 2024-03-02 DIAGNOSIS — R06.00 DYSPNEA, UNSPECIFIED: ICD-10-CM

## 2024-03-02 DIAGNOSIS — I21.4 NSTEMI (NON-ST ELEVATED MYOCARDIAL INFARCTION) (MULTI): Primary | ICD-10-CM

## 2024-03-02 LAB
ANION GAP SERPL CALC-SCNC: 22 MMOL/L (ref 10–20)
APTT PPP: 35 SECONDS (ref 27–38)
BASOPHILS # BLD AUTO: 0.06 X10*3/UL (ref 0–0.1)
BASOPHILS NFR BLD AUTO: 0.4 %
BNP SERPL-MCNC: 643 PG/ML (ref 0–99)
BUN SERPL-MCNC: 55 MG/DL (ref 6–23)
CALCIUM SERPL-MCNC: 7.9 MG/DL (ref 8.6–10.3)
CARDIAC TROPONIN I PNL SERPL HS: 980 NG/L (ref 0–13)
CHLORIDE SERPL-SCNC: 104 MMOL/L (ref 98–107)
CO2 SERPL-SCNC: 19 MMOL/L (ref 21–32)
CREAT SERPL-MCNC: 9.04 MG/DL (ref 0.5–1.05)
EGFRCR SERPLBLD CKD-EPI 2021: 4 ML/MIN/1.73M*2
EOSINOPHIL # BLD AUTO: 0.16 X10*3/UL (ref 0–0.7)
EOSINOPHIL NFR BLD AUTO: 1 %
ERYTHROCYTE [DISTWIDTH] IN BLOOD BY AUTOMATED COUNT: 14.2 % (ref 11.5–14.5)
FLUAV RNA RESP QL NAA+PROBE: NOT DETECTED
FLUBV RNA RESP QL NAA+PROBE: NOT DETECTED
GLUCOSE SERPL-MCNC: 178 MG/DL (ref 74–99)
HCT VFR BLD AUTO: 30.4 % (ref 36–46)
HGB BLD-MCNC: 9.5 G/DL (ref 12–16)
IMM GRANULOCYTES # BLD AUTO: 0.09 X10*3/UL (ref 0–0.7)
IMM GRANULOCYTES NFR BLD AUTO: 0.6 % (ref 0–0.9)
LYMPHOCYTES # BLD AUTO: 1.75 X10*3/UL (ref 1.2–4.8)
LYMPHOCYTES NFR BLD AUTO: 10.9 %
MCH RBC QN AUTO: 32.8 PG (ref 26–34)
MCHC RBC AUTO-ENTMCNC: 31.3 G/DL (ref 32–36)
MCV RBC AUTO: 105 FL (ref 80–100)
MONOCYTES # BLD AUTO: 1.39 X10*3/UL (ref 0.1–1)
MONOCYTES NFR BLD AUTO: 8.7 %
NEUTROPHILS # BLD AUTO: 12.55 X10*3/UL (ref 1.2–7.7)
NEUTROPHILS NFR BLD AUTO: 78.4 %
NRBC BLD-RTO: 0 /100 WBCS (ref 0–0)
PLATELET # BLD AUTO: 254 X10*3/UL (ref 150–450)
POTASSIUM SERPL-SCNC: 5.2 MMOL/L (ref 3.5–5.3)
RBC # BLD AUTO: 2.9 X10*6/UL (ref 4–5.2)
SARS-COV-2 RNA RESP QL NAA+PROBE: NOT DETECTED
SODIUM SERPL-SCNC: 140 MMOL/L (ref 136–145)
WBC # BLD AUTO: 16 X10*3/UL (ref 4.4–11.3)

## 2024-03-02 PROCEDURE — 99291 CRITICAL CARE FIRST HOUR: CPT | Performed by: EMERGENCY MEDICINE

## 2024-03-02 PROCEDURE — 71045 X-RAY EXAM CHEST 1 VIEW: CPT | Performed by: RADIOLOGY

## 2024-03-02 PROCEDURE — 83880 ASSAY OF NATRIURETIC PEPTIDE: CPT | Performed by: EMERGENCY MEDICINE

## 2024-03-02 PROCEDURE — 36415 COLL VENOUS BLD VENIPUNCTURE: CPT | Performed by: EMERGENCY MEDICINE

## 2024-03-02 PROCEDURE — 93005 ELECTROCARDIOGRAM TRACING: CPT

## 2024-03-02 PROCEDURE — 96374 THER/PROPH/DIAG INJ IV PUSH: CPT

## 2024-03-02 PROCEDURE — 94640 AIRWAY INHALATION TREATMENT: CPT

## 2024-03-02 PROCEDURE — 80048 BASIC METABOLIC PNL TOTAL CA: CPT | Performed by: EMERGENCY MEDICINE

## 2024-03-02 PROCEDURE — 85730 THROMBOPLASTIN TIME PARTIAL: CPT | Performed by: EMERGENCY MEDICINE

## 2024-03-02 PROCEDURE — 87636 SARSCOV2 & INF A&B AMP PRB: CPT | Performed by: EMERGENCY MEDICINE

## 2024-03-02 PROCEDURE — 2500000004 HC RX 250 GENERAL PHARMACY W/ HCPCS (ALT 636 FOR OP/ED): Performed by: EMERGENCY MEDICINE

## 2024-03-02 PROCEDURE — 84484 ASSAY OF TROPONIN QUANT: CPT | Performed by: EMERGENCY MEDICINE

## 2024-03-02 PROCEDURE — 2500000002 HC RX 250 W HCPCS SELF ADMINISTERED DRUGS (ALT 637 FOR MEDICARE OP, ALT 636 FOR OP/ED): Performed by: EMERGENCY MEDICINE

## 2024-03-02 PROCEDURE — 85025 COMPLETE CBC W/AUTO DIFF WBC: CPT | Performed by: EMERGENCY MEDICINE

## 2024-03-02 PROCEDURE — 71045 X-RAY EXAM CHEST 1 VIEW: CPT

## 2024-03-02 RX ORDER — HEPARIN SODIUM 5000 [USP'U]/ML
2000-4000 INJECTION, SOLUTION INTRAVENOUS; SUBCUTANEOUS EVERY 4 HOURS PRN
Status: DISCONTINUED | OUTPATIENT
Start: 2024-03-02 | End: 2024-03-06

## 2024-03-02 RX ORDER — HEPARIN SODIUM 5000 [USP'U]/ML
4000 INJECTION, SOLUTION INTRAVENOUS; SUBCUTANEOUS ONCE
Status: COMPLETED | OUTPATIENT
Start: 2024-03-02 | End: 2024-03-02

## 2024-03-02 RX ORDER — IPRATROPIUM BROMIDE AND ALBUTEROL SULFATE 2.5; .5 MG/3ML; MG/3ML
3 SOLUTION RESPIRATORY (INHALATION) ONCE
Status: COMPLETED | OUTPATIENT
Start: 2024-03-02 | End: 2024-03-02

## 2024-03-02 RX ORDER — HEPARIN SODIUM 10000 [USP'U]/100ML
0-4000 INJECTION, SOLUTION INTRAVENOUS CONTINUOUS
Status: DISCONTINUED | OUTPATIENT
Start: 2024-03-02 | End: 2024-03-06

## 2024-03-02 RX ADMIN — HEPARIN SODIUM 4000 UNITS: 5000 INJECTION INTRAVENOUS; SUBCUTANEOUS at 23:41

## 2024-03-02 RX ADMIN — IPRATROPIUM BROMIDE AND ALBUTEROL SULFATE 3 ML: .5; 3 SOLUTION RESPIRATORY (INHALATION) at 21:55

## 2024-03-02 RX ADMIN — HEPARIN SODIUM 1000 UNITS/HR: 10000 INJECTION, SOLUTION INTRAVENOUS at 23:42

## 2024-03-02 ASSESSMENT — COLUMBIA-SUICIDE SEVERITY RATING SCALE - C-SSRS
2. HAVE YOU ACTUALLY HAD ANY THOUGHTS OF KILLING YOURSELF?: NO
6. HAVE YOU EVER DONE ANYTHING, STARTED TO DO ANYTHING, OR PREPARED TO DO ANYTHING TO END YOUR LIFE?: NO
1. IN THE PAST MONTH, HAVE YOU WISHED YOU WERE DEAD OR WISHED YOU COULD GO TO SLEEP AND NOT WAKE UP?: NO

## 2024-03-02 ASSESSMENT — LIFESTYLE VARIABLES
HAVE PEOPLE ANNOYED YOU BY CRITICIZING YOUR DRINKING: NO
HAVE YOU EVER FELT YOU SHOULD CUT DOWN ON YOUR DRINKING: NO
EVER FELT BAD OR GUILTY ABOUT YOUR DRINKING: NO
EVER HAD A DRINK FIRST THING IN THE MORNING TO STEADY YOUR NERVES TO GET RID OF A HANGOVER: NO

## 2024-03-02 ASSESSMENT — PAIN DESCRIPTION - LOCATION: LOCATION: ABDOMEN

## 2024-03-02 ASSESSMENT — PAIN DESCRIPTION - PAIN TYPE: TYPE: ACUTE PAIN

## 2024-03-02 ASSESSMENT — PAIN - FUNCTIONAL ASSESSMENT: PAIN_FUNCTIONAL_ASSESSMENT: 0-10

## 2024-03-02 ASSESSMENT — PAIN SCALES - GENERAL: PAINLEVEL_OUTOF10: 4

## 2024-03-02 NOTE — Clinical Note
[facility], JL 4.0, 100C.

## 2024-03-02 NOTE — Clinical Note
Sheath was exchanged in the right femoral artery with ACCESS KIT, S-STEPHANIE MINI, 4FR 10CM 0.018IN 40CM, NT/PT, ECHO ENHANCE NEEDLE.

## 2024-03-03 ENCOUNTER — APPOINTMENT (OUTPATIENT)
Dept: DIALYSIS | Facility: HOSPITAL | Age: 69
End: 2024-03-03
Payer: MEDICARE

## 2024-03-03 PROBLEM — I21.4 NSTEMI (NON-ST ELEVATED MYOCARDIAL INFARCTION) (MULTI): Status: ACTIVE | Noted: 2024-03-03

## 2024-03-03 LAB
ALBUMIN SERPL BCP-MCNC: 3.4 G/DL (ref 3.4–5)
ALP SERPL-CCNC: 182 U/L (ref 33–136)
ALT SERPL W P-5'-P-CCNC: 11 U/L (ref 7–45)
ANION GAP SERPL CALC-SCNC: 19 MMOL/L (ref 10–20)
APPEARANCE UR: CLEAR
AST SERPL W P-5'-P-CCNC: 17 U/L (ref 9–39)
BASOPHILS # BLD AUTO: 0.05 X10*3/UL (ref 0–0.1)
BASOPHILS NFR BLD AUTO: 0.4 %
BILIRUB SERPL-MCNC: 0.4 MG/DL (ref 0–1.2)
BILIRUB UR STRIP.AUTO-MCNC: NEGATIVE MG/DL
BUN SERPL-MCNC: 57 MG/DL (ref 6–23)
CALCIUM SERPL-MCNC: 7.5 MG/DL (ref 8.6–10.3)
CARDIAC TROPONIN I PNL SERPL HS: 1030 NG/L (ref 0–13)
CARDIAC TROPONIN I PNL SERPL HS: 904 NG/L (ref 0–13)
CARDIAC TROPONIN I PNL SERPL HS: 938 NG/L (ref 0–13)
CHLORIDE SERPL-SCNC: 103 MMOL/L (ref 98–107)
CHOLEST SERPL-MCNC: 101 MG/DL (ref 0–199)
CHOLESTEROL/HDL RATIO: 3.2
CO2 SERPL-SCNC: 20 MMOL/L (ref 21–32)
COLOR UR: ABNORMAL
CREAT SERPL-MCNC: 9.6 MG/DL (ref 0.5–1.05)
EGFRCR SERPLBLD CKD-EPI 2021: 4 ML/MIN/1.73M*2
EOSINOPHIL # BLD AUTO: 0.14 X10*3/UL (ref 0–0.7)
EOSINOPHIL NFR BLD AUTO: 1 %
ERYTHROCYTE [DISTWIDTH] IN BLOOD BY AUTOMATED COUNT: 14.2 % (ref 11.5–14.5)
GLUCOSE BLD MANUAL STRIP-MCNC: 159 MG/DL (ref 74–99)
GLUCOSE BLD MANUAL STRIP-MCNC: 234 MG/DL (ref 74–99)
GLUCOSE SERPL-MCNC: 128 MG/DL (ref 74–99)
GLUCOSE UR STRIP.AUTO-MCNC: ABNORMAL MG/DL
HCT VFR BLD AUTO: 24.9 % (ref 36–46)
HDLC SERPL-MCNC: 31.5 MG/DL
HGB BLD-MCNC: 8 G/DL (ref 12–16)
IMM GRANULOCYTES # BLD AUTO: 0.06 X10*3/UL (ref 0–0.7)
IMM GRANULOCYTES NFR BLD AUTO: 0.4 % (ref 0–0.9)
KETONES UR STRIP.AUTO-MCNC: NEGATIVE MG/DL
LDLC SERPL CALC-MCNC: 38 MG/DL
LEUKOCYTE ESTERASE UR QL STRIP.AUTO: ABNORMAL
LYMPHOCYTES # BLD AUTO: 2.44 X10*3/UL (ref 1.2–4.8)
LYMPHOCYTES NFR BLD AUTO: 17.9 %
MAGNESIUM SERPL-MCNC: 1.85 MG/DL (ref 1.6–2.4)
MCH RBC QN AUTO: 33.3 PG (ref 26–34)
MCHC RBC AUTO-ENTMCNC: 32.1 G/DL (ref 32–36)
MCV RBC AUTO: 104 FL (ref 80–100)
MONOCYTES # BLD AUTO: 1.56 X10*3/UL (ref 0.1–1)
MONOCYTES NFR BLD AUTO: 11.4 %
NEUTROPHILS # BLD AUTO: 9.4 X10*3/UL (ref 1.2–7.7)
NEUTROPHILS NFR BLD AUTO: 68.9 %
NITRITE UR QL STRIP.AUTO: NEGATIVE
NON HDL CHOLESTEROL: 70 MG/DL (ref 0–149)
NRBC BLD-RTO: 0 /100 WBCS (ref 0–0)
PH UR STRIP.AUTO: 7 [PH]
PLATELET # BLD AUTO: 217 X10*3/UL (ref 150–450)
POTASSIUM SERPL-SCNC: 4.9 MMOL/L (ref 3.5–5.3)
PROT SERPL-MCNC: 6.3 G/DL (ref 6.4–8.2)
PROT UR STRIP.AUTO-MCNC: ABNORMAL MG/DL
RBC # BLD AUTO: 2.4 X10*6/UL (ref 4–5.2)
RBC # UR STRIP.AUTO: ABNORMAL /UL
RBC #/AREA URNS AUTO: ABNORMAL /HPF
SODIUM SERPL-SCNC: 137 MMOL/L (ref 136–145)
SP GR UR STRIP.AUTO: 1.01
SQUAMOUS #/AREA URNS AUTO: ABNORMAL /HPF
TRIGL SERPL-MCNC: 158 MG/DL (ref 0–149)
UFH PPP CHRO-ACNC: 0.5 IU/ML
UFH PPP CHRO-ACNC: 0.6 IU/ML
UFH PPP CHRO-ACNC: 1.1 IU/ML
UFH PPP CHRO-ACNC: 1.6 IU/ML
UROBILINOGEN UR STRIP.AUTO-MCNC: <2 MG/DL
VLDL: 32 MG/DL (ref 0–40)
WBC # BLD AUTO: 13.7 X10*3/UL (ref 4.4–11.3)
WBC #/AREA URNS AUTO: ABNORMAL /HPF

## 2024-03-03 PROCEDURE — 83036 HEMOGLOBIN GLYCOSYLATED A1C: CPT | Performed by: STUDENT IN AN ORGANIZED HEALTH CARE EDUCATION/TRAINING PROGRAM

## 2024-03-03 PROCEDURE — 82947 ASSAY GLUCOSE BLOOD QUANT: CPT

## 2024-03-03 PROCEDURE — 80053 COMPREHEN METABOLIC PANEL: CPT | Performed by: STUDENT IN AN ORGANIZED HEALTH CARE EDUCATION/TRAINING PROGRAM

## 2024-03-03 PROCEDURE — 85025 COMPLETE CBC W/AUTO DIFF WBC: CPT | Performed by: STUDENT IN AN ORGANIZED HEALTH CARE EDUCATION/TRAINING PROGRAM

## 2024-03-03 PROCEDURE — 8010000001 HC DIALYSIS - HEMODIALYSIS PER DAY

## 2024-03-03 PROCEDURE — 99223 1ST HOSP IP/OBS HIGH 75: CPT | Performed by: INTERNAL MEDICINE

## 2024-03-03 PROCEDURE — 5A1D70Z PERFORMANCE OF URINARY FILTRATION, INTERMITTENT, LESS THAN 6 HOURS PER DAY: ICD-10-PCS | Performed by: INTERNAL MEDICINE

## 2024-03-03 PROCEDURE — 83735 ASSAY OF MAGNESIUM: CPT | Performed by: STUDENT IN AN ORGANIZED HEALTH CARE EDUCATION/TRAINING PROGRAM

## 2024-03-03 PROCEDURE — 2500000001 HC RX 250 WO HCPCS SELF ADMINISTERED DRUGS (ALT 637 FOR MEDICARE OP): Performed by: INTERNAL MEDICINE

## 2024-03-03 PROCEDURE — 2500000002 HC RX 250 W HCPCS SELF ADMINISTERED DRUGS (ALT 637 FOR MEDICARE OP, ALT 636 FOR OP/ED): Mod: MUE | Performed by: INTERNAL MEDICINE

## 2024-03-03 PROCEDURE — 2500000004 HC RX 250 GENERAL PHARMACY W/ HCPCS (ALT 636 FOR OP/ED): Performed by: STUDENT IN AN ORGANIZED HEALTH CARE EDUCATION/TRAINING PROGRAM

## 2024-03-03 PROCEDURE — 87340 HEPATITIS B SURFACE AG IA: CPT | Mod: PORLAB | Performed by: INTERNAL MEDICINE

## 2024-03-03 PROCEDURE — 2500000005 HC RX 250 GENERAL PHARMACY W/O HCPCS: Performed by: INTERNAL MEDICINE

## 2024-03-03 PROCEDURE — 2060000001 HC INTERMEDIATE ICU ROOM DAILY

## 2024-03-03 PROCEDURE — 83718 ASSAY OF LIPOPROTEIN: CPT | Performed by: STUDENT IN AN ORGANIZED HEALTH CARE EDUCATION/TRAINING PROGRAM

## 2024-03-03 PROCEDURE — 2500000002 HC RX 250 W HCPCS SELF ADMINISTERED DRUGS (ALT 637 FOR MEDICARE OP, ALT 636 FOR OP/ED): Performed by: STUDENT IN AN ORGANIZED HEALTH CARE EDUCATION/TRAINING PROGRAM

## 2024-03-03 PROCEDURE — 36415 COLL VENOUS BLD VENIPUNCTURE: CPT | Performed by: STUDENT IN AN ORGANIZED HEALTH CARE EDUCATION/TRAINING PROGRAM

## 2024-03-03 PROCEDURE — 85520 HEPARIN ASSAY: CPT | Mod: MUE | Performed by: INTERNAL MEDICINE

## 2024-03-03 PROCEDURE — 86706 HEP B SURFACE ANTIBODY: CPT | Mod: PORLAB | Performed by: INTERNAL MEDICINE

## 2024-03-03 PROCEDURE — 84484 ASSAY OF TROPONIN QUANT: CPT | Performed by: STUDENT IN AN ORGANIZED HEALTH CARE EDUCATION/TRAINING PROGRAM

## 2024-03-03 PROCEDURE — 85520 HEPARIN ASSAY: CPT | Performed by: STUDENT IN AN ORGANIZED HEALTH CARE EDUCATION/TRAINING PROGRAM

## 2024-03-03 PROCEDURE — 81001 URINALYSIS AUTO W/SCOPE: CPT | Performed by: INTERNAL MEDICINE

## 2024-03-03 PROCEDURE — 2500000001 HC RX 250 WO HCPCS SELF ADMINISTERED DRUGS (ALT 637 FOR MEDICARE OP): Performed by: STUDENT IN AN ORGANIZED HEALTH CARE EDUCATION/TRAINING PROGRAM

## 2024-03-03 RX ORDER — DEXTROSE 50 % IN WATER (D50W) INTRAVENOUS SYRINGE
25
Status: DISCONTINUED | OUTPATIENT
Start: 2024-03-03 | End: 2024-03-07 | Stop reason: HOSPADM

## 2024-03-03 RX ORDER — FLUTICASONE PROPIONATE 50 MCG
2 SPRAY, SUSPENSION (ML) NASAL DAILY
Status: DISCONTINUED | OUTPATIENT
Start: 2024-03-03 | End: 2024-03-07 | Stop reason: HOSPADM

## 2024-03-03 RX ORDER — TALC
3 POWDER (GRAM) TOPICAL DAILY
Status: DISCONTINUED | OUTPATIENT
Start: 2024-03-03 | End: 2024-03-07 | Stop reason: HOSPADM

## 2024-03-03 RX ORDER — BISACODYL 5 MG
10 TABLET, DELAYED RELEASE (ENTERIC COATED) ORAL DAILY PRN
Status: DISCONTINUED | OUTPATIENT
Start: 2024-03-03 | End: 2024-03-07 | Stop reason: HOSPADM

## 2024-03-03 RX ORDER — NAPROXEN SODIUM 220 MG/1
81 TABLET, FILM COATED ORAL DAILY
Status: DISCONTINUED | OUTPATIENT
Start: 2024-03-04 | End: 2024-03-07 | Stop reason: HOSPADM

## 2024-03-03 RX ORDER — ONDANSETRON 4 MG/1
4 TABLET, FILM COATED ORAL EVERY 8 HOURS PRN
Status: DISCONTINUED | OUTPATIENT
Start: 2024-03-03 | End: 2024-03-07 | Stop reason: HOSPADM

## 2024-03-03 RX ORDER — METOPROLOL TARTRATE 50 MG/1
50 TABLET ORAL DAILY
Status: DISCONTINUED | OUTPATIENT
Start: 2024-03-03 | End: 2024-03-04

## 2024-03-03 RX ORDER — INSULIN LISPRO 100 [IU]/ML
0-10 INJECTION, SOLUTION INTRAVENOUS; SUBCUTANEOUS
Status: DISCONTINUED | OUTPATIENT
Start: 2024-03-03 | End: 2024-03-07 | Stop reason: HOSPADM

## 2024-03-03 RX ORDER — ATORVASTATIN CALCIUM 40 MG/1
40 TABLET, FILM COATED ORAL DAILY
Status: DISCONTINUED | OUTPATIENT
Start: 2024-03-03 | End: 2024-03-07 | Stop reason: HOSPADM

## 2024-03-03 RX ORDER — NAPROXEN SODIUM 220 MG/1
324 TABLET, FILM COATED ORAL ONCE
Status: COMPLETED | OUTPATIENT
Start: 2024-03-03 | End: 2024-03-03

## 2024-03-03 RX ORDER — DOXEPIN HYDROCHLORIDE 25 MG/1
75 CAPSULE ORAL NIGHTLY
Status: DISCONTINUED | OUTPATIENT
Start: 2024-03-03 | End: 2024-03-07 | Stop reason: HOSPADM

## 2024-03-03 RX ORDER — DEXTROSE MONOHYDRATE 100 MG/ML
0.3 INJECTION, SOLUTION INTRAVENOUS ONCE AS NEEDED
Status: DISCONTINUED | OUTPATIENT
Start: 2024-03-03 | End: 2024-03-07 | Stop reason: HOSPADM

## 2024-03-03 RX ORDER — DILTIAZEM HCL/D5W 125 MG/125
5-15 PLASTIC BAG, INJECTION (ML) INTRAVENOUS CONTINUOUS
Status: DISCONTINUED | OUTPATIENT
Start: 2024-03-03 | End: 2024-03-05

## 2024-03-03 RX ORDER — AMLODIPINE BESYLATE 5 MG/1
5 TABLET ORAL DAILY
Status: DISCONTINUED | OUTPATIENT
Start: 2024-03-03 | End: 2024-03-07 | Stop reason: HOSPADM

## 2024-03-03 RX ORDER — ALBUTEROL SULFATE 90 UG/1
1 AEROSOL, METERED RESPIRATORY (INHALATION) EVERY 4 HOURS PRN
Status: DISCONTINUED | OUTPATIENT
Start: 2024-03-03 | End: 2024-03-07 | Stop reason: HOSPADM

## 2024-03-03 RX ORDER — ONDANSETRON HYDROCHLORIDE 2 MG/ML
4 INJECTION, SOLUTION INTRAVENOUS EVERY 8 HOURS PRN
Status: DISCONTINUED | OUTPATIENT
Start: 2024-03-03 | End: 2024-03-07 | Stop reason: HOSPADM

## 2024-03-03 RX ORDER — POLYETHYLENE GLYCOL 3350 17 G/17G
17 POWDER, FOR SOLUTION ORAL DAILY
Status: DISCONTINUED | OUTPATIENT
Start: 2024-03-03 | End: 2024-03-07 | Stop reason: HOSPADM

## 2024-03-03 RX ORDER — GUAIFENESIN 600 MG/1
600 TABLET, EXTENDED RELEASE ORAL EVERY 12 HOURS PRN
Status: DISCONTINUED | OUTPATIENT
Start: 2024-03-03 | End: 2024-03-07 | Stop reason: HOSPADM

## 2024-03-03 RX ORDER — PANTOPRAZOLE SODIUM 40 MG/1
40 TABLET, DELAYED RELEASE ORAL 2 TIMES DAILY
Status: DISCONTINUED | OUTPATIENT
Start: 2024-03-03 | End: 2024-03-07 | Stop reason: HOSPADM

## 2024-03-03 RX ORDER — DULOXETIN HYDROCHLORIDE 30 MG/1
30 CAPSULE, DELAYED RELEASE ORAL DAILY
Status: DISCONTINUED | OUTPATIENT
Start: 2024-03-03 | End: 2024-03-07 | Stop reason: HOSPADM

## 2024-03-03 RX ORDER — ACETAMINOPHEN 325 MG/1
650 TABLET ORAL EVERY 4 HOURS PRN
Status: DISCONTINUED | OUTPATIENT
Start: 2024-03-03 | End: 2024-03-07 | Stop reason: HOSPADM

## 2024-03-03 RX ADMIN — PANTOPRAZOLE SODIUM 40 MG: 40 TABLET, DELAYED RELEASE ORAL at 20:19

## 2024-03-03 RX ADMIN — ASPIRIN 81 MG CHEWABLE TABLET 324 MG: 81 TABLET CHEWABLE at 00:50

## 2024-03-03 RX ADMIN — FLUTICASONE PROPIONATE 2 SPRAY: 50 SPRAY, METERED NASAL at 09:00

## 2024-03-03 RX ADMIN — ACETAMINOPHEN 650 MG: 325 TABLET ORAL at 20:19

## 2024-03-03 RX ADMIN — DULOXETINE HYDROCHLORIDE 30 MG: 30 CAPSULE, DELAYED RELEASE ORAL at 09:31

## 2024-03-03 RX ADMIN — HEPARIN SODIUM 800 UNITS/HR: 10000 INJECTION, SOLUTION INTRAVENOUS at 07:11

## 2024-03-03 RX ADMIN — AMLODIPINE BESYLATE 5 MG: 5 TABLET ORAL at 09:32

## 2024-03-03 RX ADMIN — ATORVASTATIN CALCIUM 40 MG: 40 TABLET, FILM COATED ORAL at 09:31

## 2024-03-03 RX ADMIN — DOXEPIN HYDROCHLORIDE 75 MG: 25 CAPSULE ORAL at 20:19

## 2024-03-03 RX ADMIN — ACETAMINOPHEN 650 MG: 325 TABLET ORAL at 05:14

## 2024-03-03 RX ADMIN — DOXEPIN HYDROCHLORIDE 75 MG: 25 CAPSULE ORAL at 04:02

## 2024-03-03 RX ADMIN — Medication 3 L/MIN: at 19:00

## 2024-03-03 RX ADMIN — PANTOPRAZOLE SODIUM 40 MG: 40 TABLET, DELAYED RELEASE ORAL at 09:31

## 2024-03-03 RX ADMIN — Medication 5 MG/HR: at 19:27

## 2024-03-03 RX ADMIN — METOPROLOL TARTRATE 50 MG: 50 TABLET, FILM COATED ORAL at 09:32

## 2024-03-03 SDOH — SOCIAL STABILITY: SOCIAL INSECURITY: ABUSE: ADULT

## 2024-03-03 SDOH — SOCIAL STABILITY: SOCIAL INSECURITY: DO YOU FEEL ANYONE HAS EXPLOITED OR TAKEN ADVANTAGE OF YOU FINANCIALLY OR OF YOUR PERSONAL PROPERTY?: NO

## 2024-03-03 SDOH — SOCIAL STABILITY: SOCIAL INSECURITY: DOES ANYONE TRY TO KEEP YOU FROM HAVING/CONTACTING OTHER FRIENDS OR DOING THINGS OUTSIDE YOUR HOME?: NO

## 2024-03-03 SDOH — SOCIAL STABILITY: SOCIAL INSECURITY: HAS ANYONE EVER THREATENED TO HURT YOUR FAMILY OR YOUR PETS?: NO

## 2024-03-03 SDOH — SOCIAL STABILITY: SOCIAL INSECURITY: HAVE YOU HAD THOUGHTS OF HARMING ANYONE ELSE?: NO

## 2024-03-03 SDOH — SOCIAL STABILITY: SOCIAL INSECURITY: WERE YOU ABLE TO COMPLETE ALL THE BEHAVIORAL HEALTH SCREENINGS?: YES

## 2024-03-03 SDOH — SOCIAL STABILITY: SOCIAL INSECURITY: ARE YOU OR HAVE YOU BEEN THREATENED OR ABUSED PHYSICALLY, EMOTIONALLY, OR SEXUALLY BY ANYONE?: NO

## 2024-03-03 SDOH — SOCIAL STABILITY: SOCIAL INSECURITY: ARE THERE ANY APPARENT SIGNS OF INJURIES/BEHAVIORS THAT COULD BE RELATED TO ABUSE/NEGLECT?: NO

## 2024-03-03 SDOH — SOCIAL STABILITY: SOCIAL INSECURITY: DO YOU FEEL UNSAFE GOING BACK TO THE PLACE WHERE YOU ARE LIVING?: NO

## 2024-03-03 ASSESSMENT — PATIENT HEALTH QUESTIONNAIRE - PHQ9
2. FEELING DOWN, DEPRESSED OR HOPELESS: NOT AT ALL
SUM OF ALL RESPONSES TO PHQ9 QUESTIONS 1 & 2: 0
1. LITTLE INTEREST OR PLEASURE IN DOING THINGS: NOT AT ALL

## 2024-03-03 ASSESSMENT — COGNITIVE AND FUNCTIONAL STATUS - GENERAL
WALKING IN HOSPITAL ROOM: A LITTLE
DAILY ACTIVITIY SCORE: 24
MOBILITY SCORE: 20
PATIENT BASELINE BEDBOUND: NO
MOVING TO AND FROM BED TO CHAIR: A LITTLE
MOBILITY SCORE: 24
CLIMB 3 TO 5 STEPS WITH RAILING: A LITTLE
DAILY ACTIVITIY SCORE: 24
TOILETING: A LITTLE
MOVING TO AND FROM BED TO CHAIR: A LITTLE
WALKING IN HOSPITAL ROOM: A LITTLE
MOBILITY SCORE: 20
DAILY ACTIVITIY SCORE: 22
DRESSING REGULAR LOWER BODY CLOTHING: A LITTLE
CLIMB 3 TO 5 STEPS WITH RAILING: A LITTLE
STANDING UP FROM CHAIR USING ARMS: A LITTLE
STANDING UP FROM CHAIR USING ARMS: A LITTLE

## 2024-03-03 ASSESSMENT — ACTIVITIES OF DAILY LIVING (ADL)
TOILETING: INDEPENDENT
JUDGMENT_ADEQUATE_SAFELY_COMPLETE_DAILY_ACTIVITIES: YES
GROOMING: INDEPENDENT
HEARING - LEFT EAR: FUNCTIONAL
ADEQUATE_TO_COMPLETE_ADL: YES
HEARING - RIGHT EAR: FUNCTIONAL
BATHING: INDEPENDENT
LACK_OF_TRANSPORTATION: YES
ASSISTIVE_DEVICE: WALKER
PATIENT'S MEMORY ADEQUATE TO SAFELY COMPLETE DAILY ACTIVITIES?: YES
DRESSING YOURSELF: INDEPENDENT
FEEDING YOURSELF: INDEPENDENT
WALKS IN HOME: INDEPENDENT

## 2024-03-03 ASSESSMENT — PAIN - FUNCTIONAL ASSESSMENT
PAIN_FUNCTIONAL_ASSESSMENT: 0-10

## 2024-03-03 ASSESSMENT — LIFESTYLE VARIABLES
HOW OFTEN DO YOU HAVE 6 OR MORE DRINKS ON ONE OCCASION: NEVER
HOW OFTEN DO YOU HAVE A DRINK CONTAINING ALCOHOL: NEVER
AUDIT-C TOTAL SCORE: 0
HOW MANY STANDARD DRINKS CONTAINING ALCOHOL DO YOU HAVE ON A TYPICAL DAY: PATIENT DOES NOT DRINK
AUDIT-C TOTAL SCORE: 0
SKIP TO QUESTIONS 9-10: 1

## 2024-03-03 ASSESSMENT — PAIN SCALES - GENERAL
PAINLEVEL_OUTOF10: 2
PAINLEVEL_OUTOF10: 3
PAINLEVEL_OUTOF10: 0 - NO PAIN
PAINLEVEL_OUTOF10: 0 - NO PAIN
PAINLEVEL_OUTOF10: 1

## 2024-03-03 ASSESSMENT — PAIN DESCRIPTION - DESCRIPTORS: DESCRIPTORS: TIGHTNESS

## 2024-03-03 ASSESSMENT — PAIN DESCRIPTION - LOCATION: LOCATION: HEAD

## 2024-03-03 NOTE — ED PROVIDER NOTES
HPI   Chief Complaint   Patient presents with   • Shortness of Breath     Pt had recent ablation; started feeling SOB & feverish starting this evening; felt too weak to walk to the bathroom,; pt put herself on 2L NC at home; stating at 99% when squad arrived.       Patient presents with shortness of breath.  She states that got worse this evening.  She was walking to the bathroom when she noted she was more short of breath.  She is having some chest pain with this.  She denies any leg swelling.  No fevers but has had recent chills.  She had a recent cardiac ablation performed 2 days ago by Dr. Valentine.  She spent the night because she was requiring oxygen after the procedure and was weaned off overnight and was 94% on room air the morning of her discharge.  She has a history of atrial fibrillation on Eliquis, hypertension, diabetes, ESRD on dialysis.  She does not normally wear home oxygen.  Her last dialysis session was 3 days ago.  She did not get it yesterday because she was in the hospital.                          Rima Coma Scale Score: 15                  Patient History   No past medical history on file.  Past Surgical History:   Procedure Laterality Date   • US GUIDED ABDOMINAL PARACENTESIS  8/31/2023    US GUIDED ABDOMINAL PARACENTESIS 8/31/2023 POR US     No family history on file.  Social History     Tobacco Use   • Smoking status: Former     Types: Cigarettes     Quit date: 2/4/2021     Years since quitting: 3.0   • Smokeless tobacco: Not on file   Substance Use Topics   • Alcohol use: Not Currently   • Drug use: Not on file       Physical Exam   ED Triage Vitals   Temperature Heart Rate Respirations BP   03/02/24 2148 03/02/24 2148 03/02/24 2148 03/02/24 2148   36.9 °C (98.4 °F) (!) 108 20 173/79      Pulse Ox Temp Source Heart Rate Source Patient Position   03/02/24 2145 03/02/24 2148 -- --   (!) 91 % Temporal        BP Location FiO2 (%)     -- --             Physical Exam  Vitals and nursing note  reviewed.   Constitutional:       Appearance: Normal appearance.   HENT:      Head: Normocephalic and atraumatic.      Mouth/Throat:      Mouth: Mucous membranes are moist.   Eyes:      Extraocular Movements: Extraocular movements intact.      Pupils: Pupils are equal, round, and reactive to light.   Cardiovascular:      Rate and Rhythm: Normal rate and regular rhythm.      Heart sounds: No murmur heard.  Pulmonary:      Effort: Tachypnea present. No respiratory distress.   Abdominal:      General: There is no distension.      Palpations: Abdomen is soft.      Tenderness: There is no abdominal tenderness.   Musculoskeletal:         General: No tenderness or deformity. Normal range of motion.      Cervical back: Neck supple.      Right lower leg: No edema.      Left lower leg: No edema.   Skin:     General: Skin is warm and dry.      Findings: No lesion or rash.   Neurological:      General: No focal deficit present.      Mental Status: She is alert and oriented to person, place, and time.      Sensory: No sensory deficit.      Motor: No weakness.   Psychiatric:         Behavior: Behavior normal.       Labs Reviewed   TROPONIN SERIES- (INITIAL, 1 HR)    Narrative:     The following orders were created for panel order Troponin I Series, High Sensitivity (0, 1 HR).  Procedure                               Abnormality         Status                     ---------                               -----------         ------                     Troponin I, High Sensiti...[[id number]]                                                   Please view results for these tests on the individual orders.   B-TYPE NATRIURETIC PEPTIDE   BASIC METABOLIC PANEL   CBC WITH AUTO DIFFERENTIAL   SARS-COV-2 AND INFLUENZA A/B PCR   SERIAL TROPONIN-INITIAL     XR chest 1 view    (Results Pending)     ED Course & [facility]   ED Course as of 03/02/24 2349   Sat Mar 02, 2024   2254 Pulse Ox(!): 91 % [JL]      ED Course User Index  [JL] Gianni Franks MD          Diagnoses as of 03/02/24 2349   NSTEMI (non-ST elevated myocardial infarction) (CMS/[facility])       Medical Decision Making  Differentials include pneumonia, CHF, fluid overload, influenza, COVID, arrhythmia, ACS.  Imaging studies, if performed, were independently reviewed and interpreted by myself and confirmed by radiologist. EKG(s), if performed, were interpreted by myself.  Patient an EKG on arrival that was sinus tachycardia at 101.  No acute ischemic changes.  QTc 453, WY interval 195.  Laboratory studies were significant for WBC count of 16.  Hemoglobin is 9.5.  Creatinine is 9.04 consistent with her ESRD.  Her potassium is normal at 5.2.  Troponin is 980.  BNP is 643.  Chest x-ray does show some mild pulmonary vascular congestion.  My concern is that she may be having an NSTEMI.  Although she did have a recent ablation, I feel that this troponin is higher than what would be expected.  Her last troponin was 22 about 5 months ago.  Her COVID and influenza testing are negative as well.  I will start her on a heparin infusion.  Her EKG again had no ischemic changes.  At this time I feel she requires admission to the hospital.  Discussed with Dr. Martini for admission.        Procedure  Critical Care    Performed by: Gianni Franks MD  Authorized by: Gianni Franks MD    Critical care provider statement:     Critical care time (minutes):  34    Critical care time was exclusive of:  Separately billable procedures and treating other patients    Critical care was necessary to treat or prevent imminent or life-threatening deterioration of the following conditions:  Cardiac failure    Critical care was time spent personally by me on the following activities:  Ordering and performing treatments and interventions, ordering and review of laboratory studies, ordering and review of radiographic studies, re-evaluation of patient's condition, evaluation of patient's response to treatment and examination of patient    Care  discussed with: admitting provider         Gianni Franks MD  03/02/24 0221

## 2024-03-03 NOTE — H&P
[facility]    Hospital Medicine History & Physical     Assessment & Plan     ASSESSMENT    69F with hx of pAF on Eliquis, HTN, and ESRD HD MWF presents with shortness of breath and found to have acute hypoxic respiratory failure in the setting of hypervolemia after missing a dialysis session although also with impressive troponin elevation and concurrent chest pressure concerning for NSTEMI.    PLAN    Acute Hypoxic Respiratory Failure  Hypervolemia 2/2 ESRD HD MWF  -Missed 03/02 dialysis session due to being hospitalized  -CXR with significant cardiomegaly and vascular congestion  PLAN  -Nephrology consultation for inpatient dialysis    NSTEMI  -Unclear if type I or type II event (type II in setting of recent ablation and pathology above) but is having cp so will treat as type I  PLAN  -S/p ASA 325mg x1, ASA 81mg every day thereafter  -Heparin ggt  -Home BB and Statin  -Echocardiogram  -NPO for cardiology consultation    pAF s/p Recent Ablation  -Completed at [facility]  -EKG this adm with NSR  -Home BB  -Heparin ggt in place of Eliquis    Other Issues  -Essential HTN: Home medications  -Morbid Obesity BMI 42: Complicates all aspects of care  -Leukocytosis: Suspect stress response in the setting of the above, no signs of infection currently will get UA    DVT Prophy  -Heparin ggt    Disposition  -[facility]      John Martini MD    History of Present Illness     Rosa Soto is a 69 y.o. with hx of pAF on Eliquis, HTN, and ESRD HD MWF presents with shortness of breath.  Patient recently was hospitalized at [facility] for an ablation procedure.  Postop course complicated by bleeding from groin introducer site that has now stopped.  Starting having mild substernal chest pain on the day of discharge (03/02).  Was discharged in the morning but was unable to make it home on time for dialysis so missed her dialysis session this day.  Today started experiencing shortness of breath and worsening  substernal chest pain especially with exertion.  Says that she can hardly walk across the room without having to stop to catch her breath.  Denies cough.  Denies fevers or chills.  No history of CAD.  In the ED, needing 2 L O2 to keep sats greater than 90%.  Other VSS.  Troponin 980.  EKG with NSR, no ischemic changes. WBC 16.  CXR with evidence of cardiomegaly and vascular congestion.  Started on heparin drip and admitted to medicine.    Review of Systems     A Comprehensive greater than 10 system review of systems was carried out.  Pertinent positives and negatives are noted above.  Otherwise negative for contributory information.     Past Medical History     Past Medical History:   Diagnosis Date    Benign essential HTN     ESRD (end stage renal disease) (CMS/[facility])      Medications     No current facility-administered medications on file prior to encounter.     Current Outpatient Medications on File Prior to Encounter   Medication Sig Dispense Refill    albuterol 90 mcg/actuation inhaler Inhale 1 puff every 4 hours if needed for shortness of breath.      amLODIPine (Norvasc) 5 mg tablet Take 1 tablet (5 mg) by mouth once daily.      atorvastatin (Lipitor) 40 mg tablet Take 1 tablet (40 mg) by mouth once daily.      Auryxia 210 mg iron tablet TAKE 2 TABLETS BY MOUTH THREE TIMES A DAY WITH MEALS. SWALLOW WHOLE, DO NOT CHEW OR CRUSH MEDICATION      busPIRone (Buspar) 5 mg tablet Take 1 tablet (5 mg) by mouth once daily.      cholecalciferol (Vitamin D-3) 25 MCG (1000 UT) tablet Take 1 tablet (25 mcg) by mouth once daily.      cinacalcet (Sensipar) 30 mg tablet Take 1 tablet (30 mg) by mouth once daily. Take with food or shortly afer a meal. Swallow tablet whole; do not break or divide.      doxepin (SINEquan) 75 mg capsule Take 1 capsule (75 mg) by mouth once daily at bedtime.      DULoxetine (Cymbalta) 30 mg DR capsule Take 1 capsule (30 mg) by mouth once daily.      Eliquis 5 mg tablet Take 1 tablet (5 mg) by mouth  "twice a day.      Flonase Allergy Relief 50 mcg/actuation nasal spray Administer 2 sprays into each nostril once daily.      metoprolol tartrate (Lopressor) 50 mg tablet Take 1 tablet by mouth twice a day.      pantoprazole (ProtoNix) 40 mg EC tablet Take 1 tablet (40 mg) by mouth 2 times a day. Do not crush, chew, or split. 60 tablet 0    torsemide (Demadex) 20 mg tablet Take 1 tablet (20 mg) by mouth 2 times a day.      [DISCONTINUED] glipiZIDE (Glucotrol) 5 mg tablet       [DISCONTINUED] mirtazapine (Remeron) 7.5 mg tablet Take 1 tablet (7.5 mg) by mouth once daily at bedtime.      [DISCONTINUED] pramipexole (Mirapex) 0.125 mg tablet Take 1 tablet (0.125 mg) by mouth twice a day.      [DISCONTINUED] traZODone (Desyrel) 50 mg tablet Take 1 tablet (50 mg) by mouth once daily as needed.       Past Surgical History     Past Surgical History:   Procedure Laterality Date    US GUIDED ABDOMINAL PARACENTESIS  8/31/2023    US GUIDED ABDOMINAL PARACENTESIS 8/31/2023 POR US     Family History   Reviewed and non-contributory to presenting complaints    Allergies     Allergies   Allergen Reactions    Penicillins Anaphylaxis    Codeine GI Upset    Ropinirole Hallucinations    Adhesive Rash     Social History     Social History     Tobacco Use    Smoking status: Former     Types: Cigarettes     Quit date: 2/4/2021     Years since quitting: 3.0    Smokeless tobacco: Not on file   Substance Use Topics    Alcohol use: Not Currently     Physical Exam   Blood pressure 160/58, pulse 94, temperature 36.9 °C (98.4 °F), temperature source Temporal, resp. rate 20, height 1.702 m (5' 7\"), weight 122 kg (269 lb 13.5 oz), SpO2 96 %.    General: Ill appearing, cooperative with exam, in NAD.  HEENT: Atraumatic. No erythema in posterior pharynx.  Lymph: No cervical or inguinal lymphadenopathy.  Cardiac: RRR. No murmurs.  Lungs: CTAB. Nl WOB.  Abd: Non-tender. No rebound or gaurding. Nl bowel sounds.  Ext: No edema. 2+ pulses.  Skin: No rashes, " abrasions, or contusions.  Psych: A&Ox3. Nl affect.  Neuro: 5/5 strength. Sensation intact.    Labs & Imaging     Reviewed and Pertinent results discussed in assessment and plan.

## 2024-03-03 NOTE — PROGRESS NOTES
I have reviewed Dr. Martini's assessment and plan as documented in his 03/03/24 H/P.        Tyrel Cheema MD PhD

## 2024-03-03 NOTE — CONSULTS
Nephrology Consult Note                                                                                                                                         Inpatient consult to Nephrology  Consult performed by: Berlin Wan MD  Consult ordered by: Renita Lopez PA-C      Inpatient consult to Social Work and TCC  Consult performed by: Berlin Wan MD  Consult ordered by: Renita Lopez PA-C      Inpatient consult to Cardiology  Consult performed by: Berlin Wan MD  Consult ordered by: Renita Lopez PA-C                                                                                                               HPI  Patient is a 69 y.o. female who is admitted to hospital with complaints of  SOB. Nephrology consulted in view of ESRD. Rosa Soto is a 69 y.o. with hx of pAF on Eliquis, HTN, and ESRD HD MWF presents with shortness of breath.   factors for KRISTINA  Hypotension no  Contrast no  At risk medications no    Past Medical History:   Diagnosis Date    Benign essential HTN     ESRD (end stage renal disease) (CMS/[facility]ly [facility])       Social History     Socioeconomic History    Marital status:      Spouse name: Not on file    Number of children: Not on file    Years of education: Not on file    Highest education level: Not on file   Occupational History    Not on file   Tobacco Use    Smoking status: Former     Types: Cigarettes     Quit date: 2/4/2021     Years since quitting: 3.0    Smokeless tobacco: Not on file   Substance and Sexual Activity    Alcohol use: Not Currently    Drug use: Not on file    Sexual activity: Not on file   Other Topics Concern    Not on file   Social History Narrative    Not on file     Social Determinants of Health     Financial Resource Strain: Low Risk  (3/3/2024)    Overall Financial Resource Strain (CARDIA)     Difficulty of Paying Living  Expenses: Not very hard   Food Insecurity: Not on file   Transportation Needs: Unmet Transportation Needs (3/3/2024)    PRAPARE - Transportation     Lack of Transportation (Medical): Yes     Lack of Transportation (Non-Medical): Yes   Physical Activity: Not on file   Stress: Not on file   Social Connections: Not on file   Intimate Partner Violence: Not on file   Housing Stability: Low Risk  (3/3/2024)    Housing Stability Vital Sign     Unable to Pay for Housing in the Last Year: No     Number of Places Lived in the Last Year: 2     Unstable Housing in the Last Year: No      No family history on file.   No current facility-administered medications on file prior to encounter.     Current Outpatient Medications on File Prior to Encounter   Medication Sig Dispense Refill    albuterol 90 mcg/actuation inhaler Inhale 1 puff every 4 hours if needed for shortness of breath.      amLODIPine (Norvasc) 5 mg tablet Take 1 tablet (5 mg) by mouth once daily.      atorvastatin (Lipitor) 40 mg tablet Take 1 tablet (40 mg) by mouth once daily.      Auryxia 210 mg iron tablet TAKE 2 TABLETS BY MOUTH THREE TIMES A DAY WITH MEALS. SWALLOW WHOLE, DO NOT CHEW OR CRUSH MEDICATION      busPIRone (Buspar) 5 mg tablet Take 1 tablet (5 mg) by mouth once daily.      cholecalciferol (Vitamin D-3) 25 MCG (1000 UT) tablet Take 1 tablet (25 mcg) by mouth once daily.      cinacalcet (Sensipar) 30 mg tablet Take 1 tablet (30 mg) by mouth once daily. Take with food or shortly afer a meal. Swallow tablet whole; do not break or divide.      doxepin (SINEquan) 75 mg capsule Take 1 capsule (75 mg) by mouth once daily at bedtime.      DULoxetine (Cymbalta) 30 mg DR capsule Take 1 capsule (30 mg) by mouth once daily.      Eliquis 5 mg tablet Take 1 tablet (5 mg) by mouth twice a day.      Flonase Allergy Relief 50 mcg/actuation nasal spray Administer 2 sprays into each nostril once daily.      metoprolol tartrate (Lopressor) 50 mg tablet Take 1 tablet by  "mouth twice a day.      pantoprazole (ProtoNix) 40 mg EC tablet Take 1 tablet (40 mg) by mouth 2 times a day. Do not crush, chew, or split. 60 tablet 0    torsemide (Demadex) 20 mg tablet Take 1 tablet (20 mg) by mouth 2 times a day.      [DISCONTINUED] glipiZIDE (Glucotrol) 5 mg tablet       [DISCONTINUED] mirtazapine (Remeron) 7.5 mg tablet Take 1 tablet (7.5 mg) by mouth once daily at bedtime.      [DISCONTINUED] pramipexole (Mirapex) 0.125 mg tablet Take 1 tablet (0.125 mg) by mouth twice a day.      [DISCONTINUED] traZODone (Desyrel) 50 mg tablet Take 1 tablet (50 mg) by mouth once daily as needed.        Scheduled medications  amLODIPine, 5 mg, oral, Daily  [START ON 3/4/2024] aspirin, 81 mg, oral, Daily  atorvastatin, 40 mg, oral, Daily  doxepin, 75 mg, oral, Nightly  DULoxetine, 30 mg, oral, Daily  fluticasone, 2 spray, Each Nostril, Daily  insulin lispro, 0-10 Units, subcutaneous, Before meals & nightly  melatonin, 3 mg, oral, Daily  metoprolol tartrate, 50 mg, oral, Daily  pantoprazole, 40 mg, oral, BID  perflutren protein A microsphere, 0.5 mL, intravenous, Once in imaging  polyethylene glycol, 17 g, oral, Daily      Continuous medications  heparin, 0-4,000 Units/hr, Last Rate: 800 Units/hr (03/03/24 0711)      PRN medications  PRN medications: acetaminophen, albuterol, bisacodyl, dextrose 10 % in water (D10W), dextrose, glucagon, guaiFENesin, heparin, ondansetron **OR** ondansetron     Review of systems  as per HPI otherwise 10 point review systems negative    /70 (BP Location: Left arm, Patient Position: Lying)   Pulse 85   Temp 36.4 °C (97.5 °F) (Temporal)   Resp 17   Ht 1.702 m (5' 7\")   Wt 122 kg (268 lb 11.9 oz)   SpO2 95%   BMI 42.09 kg/m²     Input / Output:  24 HR:   Intake/Output Summary (Last 24 hours) at 3/3/2024 0949  Last data filed at 3/3/2024 0920  Gross per 24 hour   Intake 140 ml   Output 275 ml   Net -135 ml       Physical Exam   Alert and oriented x 3, NAD  EOMI  OP " clear  Neck: supple, + JVD  CV: RRR without m/r/g  Lungs: coarse BS  Abd: soft NT/ND +BS  Ext: no lower extremity edema   Neuro: grossly intact  Skin: no rashes    Results from last 7 days   Lab Units 03/03/24  0305 03/02/24 2214 02/27/24  1312   SODIUM mmol/L 137 140 138   POTASSIUM mmol/L 4.9 5.2 4.3   CHLORIDE mmol/L 103 104 100   CO2 mmol/L 20* 19* 25   BUN mg/dL 57* 55* 32*   CREATININE mg/dL 9.60* 9.04* 5.98*   GLUCOSE mg/dL 128* 178* 153*   CALCIUM mg/dL 7.5* 7.9* 8.7        Results from last 7 days   Lab Units 03/03/24  0305   SODIUM mmol/L 137   POTASSIUM mmol/L 4.9   CHLORIDE mmol/L 103   CO2 mmol/L 20*   BUN mg/dL 57*   CREATININE mg/dL 9.60*   CALCIUM mg/dL 7.5*   PROTEIN TOTAL g/dL 6.3*   BILIRUBIN TOTAL mg/dL 0.4   ALK PHOS U/L 182*   ALT U/L 11   AST U/L 17   GLUCOSE mg/dL 128*      Results from last 7 days   Lab Units 03/03/24  0305   MAGNESIUM mg/dL 1.85      Results from last 7 days   Lab Units 03/03/24  0305 03/02/24 2214 02/27/24  1312   WBC AUTO x10*3/uL 13.7* 16.0* 10.0   HEMOGLOBIN g/dL 8.0* 9.5* 9.9*   HEMATOCRIT % 24.9* 30.4* 33.1*   PLATELETS AUTO x10*3/uL 217 254 267        XR chest 1 view   Final Result   Cardiomegaly with mild pulmonary vascular congestion.        Mild blunting of the left costophrenic angle may relate to small   effusion and atelectasis. Superimposed infection not excluded.   Continued radiographic follow-up to resolution is advised.        MACRO:   None        Signed by: Randi Case 3/2/2024 10:20 PM   Dictation workstation:   [id number]           Assessment:   68-year-old female with a history of ESRD on HD MWF missed HD since Wednesday now has SOB and r/o NSTEMI.     ESRD-     -patient has been on MWF hemodialysis missed HD on Friday now admitted  due to volume overload  -HD today       Anemia of ESRD  -Patient on SANIA as outpatient we will continue to titrate as outpatient    STEMI  -Heparin ggt       pAF s/p Recent Ablation  -Completed at [facility]             Recommendations:     -HD  today and tomorrow to catch up MWF        Recommendations:       Please message me through EPIC chat with any questions or concerns.     Berlin Wan MD  3/3/2024  9:49 AM         [facility]    [address], [address]   [place] [address]  Office: [phone]

## 2024-03-03 NOTE — PROGRESS NOTES
Report from Sending RN:    Report From: Mar Keith  Recent Surgery of Procedure: No  Baseline Level of Consciousness (LOC): A&Ox4  Oxygen Use: Yes  Type: 3L NC  Diabetic: Yes  Last BP Med Given Day of Dialysis: See EMR  Last Pain Med Given: See EMR  Lab Tests to be Obtained with Dialysis: No  Blood Transfusion to be Given During Dialysis: No  Available IV Access: Yes  Medications to be Administered During Dialysis: No  Continuous IV Infusion Running: Yes  Restraints on Currently or in the Last 24 Hours: No  Hand-Off Communication: Okay to come up to dialysis

## 2024-03-03 NOTE — PROGRESS NOTES
Report to Receiving RN:    Report To: Mar Keith RN  Time Report Called: [id number]  Hand-Off Communication: 1.5L removed per pt request; AVF clean and dry, Clean gauze and tape applied and free of blood; Pt to be transported down by this HD tech to floor.  Complications During Treatment: No  Ultrafiltration Treatment: No  Medications Administered During Dialysis: No  Blood Products Administered During Dialysis: No  Labs Sent During Dialysis: Yes  Heparin Drip Rate Changes: No    Electronic Signatures:  Viola Cosby OCDT   Last Updated: 6:52 PM by VIOLA COSBY

## 2024-03-04 ENCOUNTER — APPOINTMENT (OUTPATIENT)
Dept: CARDIOLOGY | Facility: HOSPITAL | Age: 69
DRG: 280 | End: 2024-03-04
Payer: MEDICARE

## 2024-03-04 ENCOUNTER — APPOINTMENT (OUTPATIENT)
Dept: DIALYSIS | Facility: HOSPITAL | Age: 69
End: 2024-03-04
Payer: MEDICARE

## 2024-03-04 LAB
ANION GAP SERPL CALC-SCNC: 14 MMOL/L (ref 10–20)
AORTIC VALVE MEAN GRADIENT: 6 MMHG
AORTIC VALVE PEAK VELOCITY: 1.69 M/S
AV PEAK GRADIENT: 11.4 MMHG
AVA (PEAK VEL): 2.29 CM2
AVA (VTI): 2.09 CM2
BUN SERPL-MCNC: 23 MG/DL (ref 6–23)
CALCIUM SERPL-MCNC: 8.6 MG/DL (ref 8.6–10.3)
CHLORIDE SERPL-SCNC: 99 MMOL/L (ref 98–107)
CO2 SERPL-SCNC: 29 MMOL/L (ref 21–32)
CREAT SERPL-MCNC: 4.98 MG/DL (ref 0.5–1.05)
EGFRCR SERPLBLD CKD-EPI 2021: 9 ML/MIN/1.73M*2
EJECTION FRACTION APICAL 4 CHAMBER: 61.6
EJECTION FRACTION: 63 %
ERYTHROCYTE [DISTWIDTH] IN BLOOD BY AUTOMATED COUNT: 14.6 % (ref 11.5–14.5)
EST. AVERAGE GLUCOSE BLD GHB EST-MCNC: 140 MG/DL
GLUCOSE BLD MANUAL STRIP-MCNC: 150 MG/DL (ref 74–99)
GLUCOSE BLD MANUAL STRIP-MCNC: 255 MG/DL (ref 74–99)
GLUCOSE SERPL-MCNC: 135 MG/DL (ref 74–99)
HBA1C MFR BLD: 6.5 %
HBV SURFACE AB SER-ACNC: <3.1 MIU/ML
HBV SURFACE AG SERPL QL IA: NONREACTIVE
HCT VFR BLD AUTO: 24.3 % (ref 36–46)
HGB BLD-MCNC: 7.7 G/DL (ref 12–16)
LEFT ATRIUM VOLUME AREA LENGTH INDEX BSA: 37.8 ML/M2
LEFT VENTRICLE INTERNAL DIMENSION DIASTOLE: 5.44 CM (ref 3.5–6)
LEFT VENTRICULAR OUTFLOW TRACT DIAMETER: 2 CM
MCH RBC QN AUTO: 32.6 PG (ref 26–34)
MCHC RBC AUTO-ENTMCNC: 31.7 G/DL (ref 32–36)
MCV RBC AUTO: 103 FL (ref 80–100)
MITRAL VALVE E/A RATIO: 1.81
MITRAL VALVE E/E' RATIO: 23.09
NRBC BLD-RTO: 0 /100 WBCS (ref 0–0)
PLATELET # BLD AUTO: 216 X10*3/UL (ref 150–450)
POTASSIUM SERPL-SCNC: 4.2 MMOL/L (ref 3.5–5.3)
RBC # BLD AUTO: 2.36 X10*6/UL (ref 4–5.2)
RIGHT VENTRICLE FREE WALL PEAK S': 12.6 CM/S
RIGHT VENTRICLE PEAK SYSTOLIC PRESSURE: 45.8 MMHG
SODIUM SERPL-SCNC: 138 MMOL/L (ref 136–145)
TRICUSPID ANNULAR PLANE SYSTOLIC EXCURSION: 2.3 CM
UFH PPP CHRO-ACNC: 0.3 IU/ML
WBC # BLD AUTO: 12.6 X10*3/UL (ref 4.4–11.3)

## 2024-03-04 PROCEDURE — 93306 TTE W/DOPPLER COMPLETE: CPT

## 2024-03-04 PROCEDURE — 2060000001 HC INTERMEDIATE ICU ROOM DAILY

## 2024-03-04 PROCEDURE — 2500000004 HC RX 250 GENERAL PHARMACY W/ HCPCS (ALT 636 FOR OP/ED): Performed by: STUDENT IN AN ORGANIZED HEALTH CARE EDUCATION/TRAINING PROGRAM

## 2024-03-04 PROCEDURE — 2500000001 HC RX 250 WO HCPCS SELF ADMINISTERED DRUGS (ALT 637 FOR MEDICARE OP): Performed by: STUDENT IN AN ORGANIZED HEALTH CARE EDUCATION/TRAINING PROGRAM

## 2024-03-04 PROCEDURE — 80048 BASIC METABOLIC PNL TOTAL CA: CPT | Performed by: INTERNAL MEDICINE

## 2024-03-04 PROCEDURE — 85027 COMPLETE CBC AUTOMATED: CPT | Performed by: INTERNAL MEDICINE

## 2024-03-04 PROCEDURE — 93306 TTE W/DOPPLER COMPLETE: CPT | Performed by: INTERNAL MEDICINE

## 2024-03-04 PROCEDURE — 82947 ASSAY GLUCOSE BLOOD QUANT: CPT

## 2024-03-04 PROCEDURE — 2500000001 HC RX 250 WO HCPCS SELF ADMINISTERED DRUGS (ALT 637 FOR MEDICARE OP): Performed by: INTERNAL MEDICINE

## 2024-03-04 PROCEDURE — 99233 SBSQ HOSP IP/OBS HIGH 50: CPT | Performed by: INTERNAL MEDICINE

## 2024-03-04 PROCEDURE — 2500000002 HC RX 250 W HCPCS SELF ADMINISTERED DRUGS (ALT 637 FOR MEDICARE OP, ALT 636 FOR OP/ED): Performed by: INTERNAL MEDICINE

## 2024-03-04 PROCEDURE — 85520 HEPARIN ASSAY: CPT | Performed by: STUDENT IN AN ORGANIZED HEALTH CARE EDUCATION/TRAINING PROGRAM

## 2024-03-04 PROCEDURE — 2500000002 HC RX 250 W HCPCS SELF ADMINISTERED DRUGS (ALT 637 FOR MEDICARE OP, ALT 636 FOR OP/ED): Performed by: STUDENT IN AN ORGANIZED HEALTH CARE EDUCATION/TRAINING PROGRAM

## 2024-03-04 PROCEDURE — 36415 COLL VENOUS BLD VENIPUNCTURE: CPT | Performed by: STUDENT IN AN ORGANIZED HEALTH CARE EDUCATION/TRAINING PROGRAM

## 2024-03-04 PROCEDURE — 2500000001 HC RX 250 WO HCPCS SELF ADMINISTERED DRUGS (ALT 637 FOR MEDICARE OP): Performed by: CLINICAL NURSE SPECIALIST

## 2024-03-04 PROCEDURE — 8010000001 HC DIALYSIS - HEMODIALYSIS PER DAY

## 2024-03-04 PROCEDURE — 99232 SBSQ HOSP IP/OBS MODERATE 35: CPT | Performed by: CLINICAL NURSE SPECIALIST

## 2024-03-04 RX ORDER — METOPROLOL TARTRATE 50 MG/1
50 TABLET ORAL ONCE
Status: COMPLETED | OUTPATIENT
Start: 2024-03-04 | End: 2024-03-04

## 2024-03-04 RX ORDER — METOPROLOL TARTRATE 50 MG/1
100 TABLET ORAL 2 TIMES DAILY
Status: DISCONTINUED | OUTPATIENT
Start: 2024-03-04 | End: 2024-03-07 | Stop reason: HOSPADM

## 2024-03-04 RX ADMIN — AMLODIPINE BESYLATE 5 MG: 5 TABLET ORAL at 08:32

## 2024-03-04 RX ADMIN — METOPROLOL TARTRATE 100 MG: 50 TABLET, FILM COATED ORAL at 22:06

## 2024-03-04 RX ADMIN — PANTOPRAZOLE SODIUM 40 MG: 40 TABLET, DELAYED RELEASE ORAL at 08:32

## 2024-03-04 RX ADMIN — PANTOPRAZOLE SODIUM 40 MG: 40 TABLET, DELAYED RELEASE ORAL at 22:05

## 2024-03-04 RX ADMIN — ASPIRIN 81 MG CHEWABLE TABLET 81 MG: 81 TABLET CHEWABLE at 08:32

## 2024-03-04 RX ADMIN — DOXEPIN HYDROCHLORIDE 75 MG: 25 CAPSULE ORAL at 22:06

## 2024-03-04 RX ADMIN — ATORVASTATIN CALCIUM 40 MG: 40 TABLET, FILM COATED ORAL at 08:32

## 2024-03-04 RX ADMIN — Medication 10 MG/HR: at 06:45

## 2024-03-04 RX ADMIN — PERFLUTREN 8 ML OF DILUTION: 6.52 INJECTION, SUSPENSION INTRAVENOUS at 15:21

## 2024-03-04 RX ADMIN — ACETAMINOPHEN 650 MG: 325 TABLET ORAL at 17:16

## 2024-03-04 RX ADMIN — METOPROLOL TARTRATE 50 MG: 50 TABLET, FILM COATED ORAL at 09:00

## 2024-03-04 RX ADMIN — Medication 3 MG: at 22:06

## 2024-03-04 RX ADMIN — DULOXETINE HYDROCHLORIDE 30 MG: 30 CAPSULE, DELAYED RELEASE ORAL at 08:32

## 2024-03-04 RX ADMIN — HEPARIN SODIUM 1800 UNITS/HR: 10000 INJECTION, SOLUTION INTRAVENOUS at 17:17

## 2024-03-04 RX ADMIN — ACETAMINOPHEN 650 MG: 325 TABLET ORAL at 03:32

## 2024-03-04 ASSESSMENT — PAIN - FUNCTIONAL ASSESSMENT
PAIN_FUNCTIONAL_ASSESSMENT: NO/DENIES PAIN
PAIN_FUNCTIONAL_ASSESSMENT: 0-10

## 2024-03-04 ASSESSMENT — COGNITIVE AND FUNCTIONAL STATUS - GENERAL
MOBILITY SCORE: 22
WALKING IN HOSPITAL ROOM: A LITTLE
CLIMB 3 TO 5 STEPS WITH RAILING: A LITTLE
DAILY ACTIVITIY SCORE: 24

## 2024-03-04 ASSESSMENT — PAIN SCALES - GENERAL
PAINLEVEL_OUTOF10: 0 - NO PAIN
PAINLEVEL_OUTOF10: 4
PAINLEVEL_OUTOF10: 0 - NO PAIN
PAINLEVEL_OUTOF10: 6

## 2024-03-04 ASSESSMENT — PAIN DESCRIPTION - LOCATION: LOCATION: HEAD

## 2024-03-04 NOTE — POST-PROCEDURE NOTE
Report to Receiving RN:    Report To: MADY SMITH  Time Report Called: [id number]  Hand-Off Communication: LAST BP ,PT ALERT,2 LITERS REMOVED  Complications During Treatment: No  Ultrafiltration Treatment: Yes, & HD  Medications Administered During Dialysis: No  Blood Products Administered During Dialysis: No  Labs Sent During Dialysis: No  Heparin Drip Rate Changes: Yes    Electronic Signatures    Last Updated: 1:42 PM by NITISH BRADY

## 2024-03-04 NOTE — PROGRESS NOTES
Physical Therapy                 Therapy Communication Note    Patient Name: Rosa Soto  MRN: [id number]  Today's Date: 3/4/2024     Discipline: Physical Therapy    Missed Visit Reason: Missed Visit Reason: Other (Comment) (Patient presents with ability to complete functional transfers, bed mobility, and ambulation in her room without assist (other than IV pole). No PT needs at this time. D/C PT.)    Missed Time: Attempt    Comment:

## 2024-03-04 NOTE — PROGRESS NOTES
Occupational Therapy                 Therapy Communication Note    Patient Name: Rosa Soto  MRN: [id number]  Today's Date: 3/4/2024     Discipline: Occupational Therapy    Missed Visit Reason: Other (Comment) (Pt. demo. supervised ADLs and amb. in room without difficulty. No O.T. needs.  Discharge O.T.)

## 2024-03-04 NOTE — PROGRESS NOTES
"Rosa Soto is a 69 y.o. female on day 1 of admission presenting with NSTEMI (non-ST elevated myocardial infarction) (CMS/HCC).    Subjective   Rosa Soto is a 69 y.o. with hx of pAF on Eliquis, HTN, and ESRD HD MWF presents with shortness of breath.  Patient recently was [place] [facility] [place].  Postop course complicated by bleeding from groin introducer site that has now stopped.  Starting having mild substernal chest pain on the day of discharge (03/02).  Was discharged in the morning but was unable to make it home on time for dialysis so missed her dialysis session this day.  Today started experiencing shortness of breath and worsening substernal chest pain especially with exertion.  Says that she can hardly walk across the room without having to stop to catch her breath.  Denies cough.  Denies fevers or chills.  No history of CAD.  In the ED, needing 2 L O2 to keep sats greater than 90%.  Other VSS.  Troponin 980.  EKG with NSR, no ischemic changes. WBC 16.  CXR with evidence of cardiomegaly and vascular congestion.  Started on heparin drip and admitted to medicine.       3/4: She went into RVR overnight requiring a diltiazem infusion. She was back in NSR this AM and metoprolol was increased by the cardiology team to try to wean her off the diltiazem.          Objective     General: Not Ill appearing, cooperative with exam, in NAD.  HEENT: Atraumatic. No erythema in posterior pharynx.  Lymph: No cervical or inguinal lymphadenopathy.  Cardiac: RRR. No murmurs.  Lungs: CTAB. Nl WOB.  Abd: Non-tender. No rebound or gaurding. Nl bowel sounds.  Ext: No edema. 2+ pulses.  Skin: No rashes, abrasions, or contusions.  Psych: A&Ox3. Nl affect.  Neuro: 5/5 strength. Sensation intact.    Last Recorded Vitals  Blood pressure 126/71, pulse 81, temperature 36.7 °C (98.1 °F), temperature source Temporal, resp. rate 16, height 1.702 m (5' 7.01\"), weight 125 kg (274 lb 11.1 oz), SpO2 96 " %.  Intake/Output last 3 Shifts:  I/O last 3 completed shifts:  In: 1387.6 (11.1 mL/kg) [P.O.:100; I.V.:887.6 (7.1 mL/kg); Other:400]  Out: 3875 (31.1 mL/kg) [Urine:475 (0.1 mL/kg/hr); Other:3400]  Weight: 124.6 kg     Relevant Results           This patient currently has cardiac telemetry ordered; if you would like to modify or discontinue the telemetry order, click here to go to the orders activity to modify/discontinue the order.                 Assessment/Plan   Acute Hypoxic Respiratory Failure  Hypervolemia 2/2 ESRD HD MWF  -Missed 03/02 dialysis session due to being hospitalized  -CXR on presentation with significant cardiomegaly and vascular congestion  -Sx much improved after dialysis and she is now on RA   -Nephrology consultation for inpatient dialysis     NSTEMI  -Unclear if type I or type II event (type II in setting of recent ablation and pathology above) but is having cp so will treat as type I  PLAN  -S/p ASA 325mg x1, ASA 81mg every day thereafter  -Heparin ggt  -Home BB (dose increased, see below) and Statin  -Echocardiogram     pAF s/p Recent Ablation  [place] [facility] [place]on the evening of 3/3 she went into RVR overnight requiring a diltiazem infusion. She was back in NSR on 3/4 and metoprolol was increased by the cardiology team to try to wean her off the diltiazem.      Other Issues  -Essential HTN: Home medications (metoprolol dose increased this admission)   -Morbid Obesity BMI 42: Complicates all aspects of care  -Leukocytosis: Suspect stress response in the setting of the above     DVT Ppx  -Heparin ggt      Tyrel Cheema MD PhD

## 2024-03-04 NOTE — PROGRESS NOTES
Nutrition Initial Assessment:   Nutrition Assessment    Reason for Assessment: Admission nursing screening    Medical history per chart:   69F with hx of pAF on Eliquis, HTN, and ESRD HD MWF presents with shortness of breath and found to have acute hypoxic respiratory failure in the setting of hypervolemia after missing a dialysis session although also with impressive troponin elevation and concurrent chest pressure concerning for NSTEMI.     3/4: Patient seen in AM. She reports typically her appetite is variable at home. She typically consumes 1 meals per day at home and a protein bar. She reports she is eating 3 meals per day here consuming at least 50%.  Patient noted to have consumed 100% breakfast.  ONS was offered, but she declined. Encouraged protein intakes at meals.  Skin intact. She denies the need for diet information at this time. Ha1c 6.5. Patient would benefit referral to OP Nutrition therapy for renal and diabetic information. Contact number was provided with diabetic written information.     Current Diet: Adult diet Cardiac; 70 gm fat; 2 - 3 grams Sodium    Nutrition Related Findings:   Oral Symptoms: none Teeth: Missing teeth   GI symptoms: no GI issues at this time.   BM:    Wound Type: none (nursing/wound notes provide further details)  Edema: No  Food allergies: NKFA.    Nutrition Significant Labs:  Results from last 7 days   Lab Units 03/04/24  0352 03/03/24  0305 03/02/24  2214   GLUCOSE mg/dL 135* 128* 178*   SODIUM mmol/L 138 137 140   POTASSIUM mmol/L 4.2 4.9 5.2   CHLORIDE mmol/L 99 103 104   CO2 mmol/L 29 20* 19*   BUN mg/dL 23 57* 55*   CREATININE mg/dL 4.98* 9.60* 9.04*   EGFR mL/min/1.73m*2 9* 4* 4*   CALCIUM mg/dL 8.6 7.5* 7.9*   MAGNESIUM mg/dL  --  1.85  --      Lab Results   Component Value Date    HGBA1C 6.5 (H) 03/03/2024    HGBA1C 5.5 04/23/2018     Results from last 7 days   Lab Units 03/04/24  0733 03/03/24  1909 03/03/24  1127   POCT GLUCOSE mg/dL 150* 159* 234*  "      Anthropometrics:  Height: 170.2 cm (5' 7.01\")   Weight: 125 kg (274 lb 11.1 oz)   BMI (Calculated): 43.01             Weight History:   Wt Readings from Last 10 Encounters:   03/04/24 125 kg (274 lb 11.1 oz)   02/29/24 116 kg (254 lb 13.6 oz)   11/13/23 116 kg (256 lb)        Weight Change %:  Weight History / % Weight Change: UBW: 254 lbs, weight gain noted related to fluid. CBW: 274 lbs 3/4        Significant Weight Gain: Fluid related    Nutrition Focused Physical Exam Findings:  Subcutaneous Fat Loss:   Orbital Fat Pads: Well nourshed (slightly bulging fat pads)  Buccal Fat Pads: Well nourished (full, rounded cheeks)  Triceps: Well nourished (ample fat tissue)  Muscle Wasting:  Temporalis: Well nourished (well-defined muscle)  Pectoralis (Clavicular Region): Well nourished (clavicle not visible)  Deltoid/Trapezius: Well nourished (rounded appearance at arm, shoulder, neck)  Interosseous: Well nourished (muscle bulges)  Edema:  Edema: +1 trace, +2 mild  Edema Location: BLW  Physical Findings:  Hair: Negative  Eyes: Negative  Mouth: Negative  Nails: Negative  Skin: Negative    Estimated Needs:   Total Energy Estimated Needs (kCal): 1600 kCal  Method for Estimating Needs: 25 kcal/kg IBW 63.6 kg  Total Protein Estimated Needs (g): 76 g  Method for Estimating Needs: 1.2 g/kg IBW 63.6 kg  Total Fluid Estimated Needs (mL):  (other)  Method for Estimating Needs: per MD        Nutrition Diagnosis   Nutrition Diagnosis:  Malnutrition Diagnosis  Patient has Malnutrition Diagnosis: No    Nutrition Diagnosis  Patient has Nutrition Diagnosis: Yes  Diagnosis Status (1): New  Nutrition Diagnosis 1: Increased nutrient needs  Related to (1): increased demand for nutrient (protein)  As Evidenced by (1): ESRD on HD       Nutrition Interventions/Recommendations   Nutrition Interventions and Recommendations:        Nutrition Prescription:  Individualized Nutrition Prescription Provided for : Continue current diet. Patient " declines ONS, encourage protein intakes with meals. Obtain post dialysis weights        Nutrition Interventions:   Food and/or Nutrient Delivery Interventions  Interventions: Meals and snacks  Goal: consume > 50%         Nutrition Education:   Education Documentation  No documentation found.      Nutrition Counseling  Counseling Strategies: Other (Comment)  Goal: Encouraged protein intakes at meal, conusme three meals per day-try to consume breakfast       Nutrition Monitoring and Evaluation   Monitoring/Evaluation:   Food/Nutrient Related History Monitoring  Monitoring and Evaluation Plan: Energy intake  Energy Intake: Estimated energy intake  Criteria: meet > 75%    Body Composition/Growth/Weight History  Monitoring and Evaluation Plan: Weight  Weight: Estimated dry weight  Criteria: maitain weight minus fluid    Biochemical Data, Medical Tests and Procedures  Monitoring and Evaluation Plan: Electrolyte/renal panel  Electrolyte and Renal Panel: Other (Comment) (nutrient pertinent)  Criteria: wnl                 Time Spent/Follow-up Reminder:   Follow Up  Time Spent (min): 45 minutes  Follow up: Provided information on outpatient nutrition therapy services  Last Date of Nutrition Visit: 03/04/24  Follow up Comment: 3/7-3/8

## 2024-03-04 NOTE — PROGRESS NOTES
Subjective Data:  Today, patient is sitting up in bed, she reports her shortness of breath is much improved since she had dialysis. Chest pain in only present with deep inspiration and she reports this is also significantly better. Denies dizziness or lightheadedness. Patient noted to go into Afib RVR yesterday and started on Diltiazem gtt. Monitor this morning demonstrates Afib with rates in the 90s on Diltiazem gtt at 10 mg/hr.     Overnight Events:    Afib RVR, on diltiazem gtt.      Objective Data:  Last Recorded Vitals:  Vitals:    03/03/24 2041 03/03/24 2325 03/04/24 0333 03/04/24 0735   BP:  170/71 172/72 126/71   BP Location:    Left arm   Patient Position:    Sitting   Pulse: 99 90 80 100   Resp:  18 16 16   Temp:  35.9 °C (96.7 °F) 35.6 °C (96 °F) 36.6 °C (97.8 °F)   TempSrc:    Temporal   SpO2:  92% 96% 96%   Weight:   125 kg (274 lb 11.1 oz)    Height:           Last Labs:  CBC - 3/4/2024:  3:52 AM  12.6 7.7 216    24.3      CMP - 3/4/2024:  3:52 AM  8.6 6.3 17 --- 0.4   8.4 3.4 11 182      PTT - 3/2/2024: 11:40 PM  _   _ 35     TROPHS   Date/Time Value Ref Range Status   03/03/2024 03:05  0 - 13 ng/L Final     Comment:     Previous result verified on 3/2/2024 2316 on specimen/case [id number] called with component [facility] for procedure Troponin I, High Sensitivity, Initial with value 980 ng/L.   03/03/2024 12:50 AM 1,030 0 - 13 ng/L Final     Comment:     Previous result verified on 3/2/2024 2316 on specimen/case [id number] called with component [facility] for procedure Troponin I, High Sensitivity, Initial with value 980 ng/L.   03/02/2024 11:24  0 - 13 ng/L Final     Comment:     Previous result verified on 3/2/2024 2316 on specimen/case [id number] called with component [facility] for procedure Troponin I, High Sensitivity, Initial with value 980 ng/L.     BNP   Date/Time Value Ref Range Status   03/02/2024 10:14  0 - 99 pg/mL Final     HGBA1C   Date/Time Value Ref Range Status  "  03/03/2024 03:05 AM 6.5 see below % Final   04/23/2018 03:10 PM 5.5 % Final     Comment:          Diagnosis of Diabetes-Adults   Non-Diabetic: < or = 5.6%   Increased risk for developing diabetes: 5.7-6.4%   Diagnostic of diabetes: > or = 6.5%  .       Monitoring of Diabetes                Age (y)     Therapeutic Goal (%)   Adults:          >18           <7.0   Pediatrics:    13-18           <7.5                   7-12           <8.0                   0- 6            7.5-8.5   American Diabetes Association. Diabetes Care 33(S1), Jan 2010.       LDLCALC   Date/Time Value Ref Range Status   03/03/2024 03:05 AM 38 <=99 mg/dL Final     Comment:                                 Near   Borderline      AGE      Desirable  Optimal    High     High     Very High     0-19 Y     0 - 109     ---    110-129   >/= 130     ----    20-24 Y     0 - 119     ---    120-159   >/= 160     ----      >24 Y     0 -  99   100-129  130-159   160-189     >/=190       VLDL   Date/Time Value Ref Range Status   03/03/2024 03:05 AM 32 0 - 40 mg/dL Final   04/23/2018 03:10 PM 33 0 - 40 mg/dL Final      Last I/O:  I/O last 3 completed shifts:  In: 1387.6 (11.1 mL/kg) [P.O.:100; I.V.:887.6 (7.1 mL/kg); Other:400]  Out: 3875 (31.1 mL/kg) [Urine:475 (0.1 mL/kg/hr); Other:3400]  Weight: 124.6 kg     Past Cardiology Tests (Last 3 Years):  EKG:  ECG 12 Lead 03/02/2024 (Preliminary)      Electrocardiogram - Day of Discharge 03/01/2024      Electrocardiogram - Post Ablation 02/29/2024    Echo:  No results found for this or any previous visit from the past 1095 days.    Ejection Fractions:  No results found for: \"EF\"  Cath:  No results found for this or any previous visit from the past 1095 days.    Stress Test:  No results found for this or any previous visit from the past 1095 days.    Cardiac Imaging:  No results found for this or any previous visit from the past 1095 days.      Inpatient Medications:  Scheduled medications   Medication Dose Route " Frequency    amLODIPine  5 mg oral Daily    aspirin  81 mg oral Daily    atorvastatin  40 mg oral Daily    doxepin  75 mg oral Nightly    DULoxetine  30 mg oral Daily    fluticasone  2 spray Each Nostril Daily    insulin lispro  0-10 Units subcutaneous Before meals & nightly    melatonin  3 mg oral Daily    metoprolol tartrate  100 mg oral BID    metoprolol tartrate  50 mg oral Once    pantoprazole  40 mg oral BID    perflutren protein A microsphere  0.5 mL intravenous Once in imaging    polyethylene glycol  17 g oral Daily     PRN medications   Medication    acetaminophen    albuterol    bisacodyl    dextrose 10 % in water (D10W)    dextrose    glucagon    guaiFENesin    heparin    ondansetron    Or    ondansetron    oxygen     Continuous Medications   Medication Dose Last Rate    dilTIAZem  5-15 mg/hr 10 mg/hr (03/04/24 0645)    heparin  0-4,000 Units/hr 800 Units/hr (03/03/24 2029)       Physical Exam:  Physical Exam  Vitals reviewed.   Constitutional:       Appearance: Normal appearance. She is obese.   HENT:      Head: Normocephalic.      Mouth/Throat:      Mouth: Mucous membranes are moist.   Cardiovascular:      Rate and Rhythm: Normal rate. Rhythm irregularly irregular.      Pulses: Normal pulses.      Heart sounds: Normal heart sounds. No murmur heard.     No friction rub. No gallop.      Comments: Rt AV fistula in upper arm, +B/+T  Pulmonary:      Effort: Pulmonary effort is normal.      Breath sounds: Rales (bibasilar) present. No wheezing or rhonchi.   Abdominal:      General: Bowel sounds are normal.      Palpations: Abdomen is soft.   Musculoskeletal:         General: Normal range of motion.      Cervical back: Normal range of motion.      Right lower leg: Edema (trace) present.      Left lower leg: Edema (trace) present.   Skin:     General: Skin is warm and dry.   Neurological:      General: No focal deficit present.      Mental Status: She is alert and oriented to person, place, and time.    Psychiatric:         Mood and Affect: Mood normal.         Behavior: Behavior normal.         Thought Content: Thought content normal.         Judgment: Judgment normal.           Assessment/Plan   1-acute heart failure-patient presenting with acute heart failure after recent ablation and after missing dialysis session Friday.  She is having some atypical chest pain which seems more pleuritic or due to pericarditis with elevated cardiac enzymes.  Recommend starting with an echocardiogram.   There could be multiple possible explanations of her symptomatology however we would have low threshold for performing cardiac catheterization given high pretest probability of significant coronary artery disease and multiple cardiovascular risk factors and acute presentation with heart failure.  Continue heparin drip for now.  Dialysis per nephrology.     I have notified Dr. Valentine.    3/4/24: Patients symptoms have significantly improved after dialysis. Reports that her chest pain is only present with inspiration and has significantly improved. Shortness of breath is around baseline per patient. Echo is ordered and pending. Continue on Heparin gtt for now until echo reviewed. Volume removal with HD.     2-paroxysmal atrial fibrillation-maintaining sinus rhythm post ablation.  Since we are holding Eliquis she is being bridged with heparin.    3/4/24: Patient went into Afib RVR overnight and started on Diltiazem gtt. Will increase her metoprolol from 50 mg daily to 100 mg BID to try and wean off Dilt gtt. Continue on Heparin gtt.          Code Status:  Full Code        Amelia Edwards, MATTHIAS-CNP

## 2024-03-04 NOTE — PROGRESS NOTES
"      Nephrology Progress Note      Nephrology following for ESRD on HD MWF.     Events over night:   Went into Afib RVR yesterday and starting on diltiazem gtt.  Patient reports her breathing is improved after dialysis yesterday.  Will have another round of dialysis today.    /71 (BP Location: Left arm, Patient Position: Sitting)   Pulse 100   Temp 36.6 °C (97.8 °F) (Temporal)   Resp 16   Ht 1.702 m (5' 7\")   Wt 125 kg (274 lb 11.1 oz)   SpO2 96%   BMI 43.02 kg/m²     Input / Output:  24 HR:   Intake/Output Summary (Last 24 hours) at 3/4/2024 1005  Last data filed at 3/4/2024 0400  Gross per 24 hour   Intake 1247.61 ml   Output 3600 ml   Net -2352.39 ml       Physical Exam   Alert and oriented x 3 NAD  Neck: no JVD  CV: Irregularly irregular  Lungs: CTA bilaterally  Abd: soft, NT, ND   Ext: trace bilateral lower extremity edema    Scheduled medications  amLODIPine, 5 mg, oral, Daily  aspirin, 81 mg, oral, Daily  atorvastatin, 40 mg, oral, Daily  doxepin, 75 mg, oral, Nightly  DULoxetine, 30 mg, oral, Daily  fluticasone, 2 spray, Each Nostril, Daily  insulin lispro, 0-10 Units, subcutaneous, Before meals & nightly  melatonin, 3 mg, oral, Daily  metoprolol tartrate, 100 mg, oral, BID  pantoprazole, 40 mg, oral, BID  perflutren protein A microsphere, 0.5 mL, intravenous, Once in imaging  polyethylene glycol, 17 g, oral, Daily      Continuous medications  dilTIAZem, 5-15 mg/hr, Last Rate: Stopped (03/04/24 0947)  heparin, 0-4,000 Units/hr, Last Rate: 800 Units/hr (03/03/24 2029)      PRN medications  PRN medications: acetaminophen, albuterol, bisacodyl, dextrose 10 % in water (D10W), dextrose, glucagon, guaiFENesin, heparin, ondansetron **OR** ondansetron, oxygen   Results from last 7 days   Lab Units 03/04/24  0352 03/03/24  0305   SODIUM mmol/L 138 137   POTASSIUM mmol/L 4.2 4.9   CHLORIDE mmol/L 99 103   CO2 mmol/L 29 20*   BUN mg/dL 23 57*   CREATININE mg/dL 4.98* 9.60*   CALCIUM mg/dL 8.6 7.5* "   PROTEIN TOTAL g/dL  --  6.3*   BILIRUBIN TOTAL mg/dL  --  0.4   ALK PHOS U/L  --  182*   ALT U/L  --  11   AST U/L  --  17   GLUCOSE mg/dL 135* 128*      Results from last 7 days   Lab Units 03/03/24  0305   MAGNESIUM mg/dL 1.85      Results from last 7 days   Lab Units 03/04/24  0352 03/03/24  0305 03/02/24  2214   WBC AUTO x10*3/uL 12.6* 13.7* 16.0*   HEMOGLOBIN g/dL 7.7* 8.0* 9.5*   HEMATOCRIT % 24.3* 24.9* 30.4*   PLATELETS AUTO x10*3/uL 216 217 254        Assessment & Plan:     Assessment:   68-year-old female with a history of ESRD on HD MWF missed HD since Wednesday now has SOB and r/o NSTEMI.     ESRD  -patient has been on MWF hemodialysis missed HD on Friday now admitted due to volume overload  - Access: RUE AVF     Anemia of ESRD  -Patient on SANIA as outpatient we will continue to titrate as outpatient     NSTEMI  -Heparin ggt     pAF s/p Recent Ablation  -[place] at [facility] [place]    Acute Heart Failure  - cardiology following  - echo pending  - symptoms improved after dialysis        Recommendations:      -HD yesterday and again today to catch up then continue MWF schedule      Please message me through [employer] chat with any questions or concerns.     BOBBY Shafer IV  3/4/2024  10:05 AM     [facility]    [address], [address]   [place] [address]  Office: [phone]

## 2024-03-04 NOTE — PROGRESS NOTES
Attempted to meet with patient for discharge planning assessment. Patient is currently off the floor for testing/procedure. Chart reviewed. Patient lives at home with their spouse. PT/OT evaluated patient today and cleared her for discharge with no further therapy needed. Anticipate patient will return home at discharge. TCC following and will attempt to meet with patient once they have returned to the floor.

## 2024-03-05 ENCOUNTER — APPOINTMENT (OUTPATIENT)
Dept: DIALYSIS | Facility: HOSPITAL | Age: 69
End: 2024-03-05
Payer: MEDICARE

## 2024-03-05 PROBLEM — I21.4 NSTEMI (NON-ST ELEVATED MYOCARDIAL INFARCTION) (MULTI): Status: ACTIVE | Noted: 2024-03-02

## 2024-03-05 PROBLEM — I50.31 ACUTE DIASTOLIC HEART FAILURE (MULTI): Status: ACTIVE | Noted: 2024-03-02

## 2024-03-05 PROBLEM — I21.4 NSTEMI (NON-ST ELEVATED MYOCARDIAL INFARCTION) (MULTI): Status: RESOLVED | Noted: 2024-03-03 | Resolved: 2024-03-05

## 2024-03-05 LAB
ABO GROUP (TYPE) IN BLOOD: NORMAL
ANION GAP SERPL CALC-SCNC: 14 MMOL/L (ref 10–20)
ANTIBODY SCREEN: NORMAL
BLOOD EXPIRATION DATE: NORMAL
BLOOD EXPIRATION DATE: NORMAL
BUN SERPL-MCNC: 29 MG/DL (ref 6–23)
CALCIUM SERPL-MCNC: 8.6 MG/DL (ref 8.6–10.3)
CHLORIDE SERPL-SCNC: 99 MMOL/L (ref 98–107)
CO2 SERPL-SCNC: 28 MMOL/L (ref 21–32)
CREAT SERPL-MCNC: 4.11 MG/DL (ref 0.5–1.05)
DISPENSE STATUS: NORMAL
DISPENSE STATUS: NORMAL
EGFRCR SERPLBLD CKD-EPI 2021: 11 ML/MIN/1.73M*2
ERYTHROCYTE [DISTWIDTH] IN BLOOD BY AUTOMATED COUNT: 14.5 % (ref 11.5–14.5)
GLUCOSE BLD MANUAL STRIP-MCNC: 162 MG/DL (ref 74–99)
GLUCOSE BLD MANUAL STRIP-MCNC: 165 MG/DL (ref 74–99)
GLUCOSE BLD MANUAL STRIP-MCNC: 188 MG/DL (ref 74–99)
GLUCOSE BLD MANUAL STRIP-MCNC: 208 MG/DL (ref 74–99)
GLUCOSE SERPL-MCNC: 149 MG/DL (ref 74–99)
HCT VFR BLD AUTO: 23 % (ref 36–46)
HGB BLD-MCNC: 7.3 G/DL (ref 12–16)
MCH RBC QN AUTO: 32.7 PG (ref 26–34)
MCHC RBC AUTO-ENTMCNC: 31.7 G/DL (ref 32–36)
MCV RBC AUTO: 103 FL (ref 80–100)
NRBC BLD-RTO: 0 /100 WBCS (ref 0–0)
PLATELET # BLD AUTO: 240 X10*3/UL (ref 150–450)
POTASSIUM SERPL-SCNC: 4.2 MMOL/L (ref 3.5–5.3)
PRODUCT BLOOD TYPE: 600
PRODUCT BLOOD TYPE: 6200
PRODUCT CODE: NORMAL
PRODUCT CODE: NORMAL
RBC # BLD AUTO: 2.23 X10*6/UL (ref 4–5.2)
RH FACTOR (ANTIGEN D): NORMAL
SODIUM SERPL-SCNC: 137 MMOL/L (ref 136–145)
UFH PPP CHRO-ACNC: 0.3 IU/ML
UNIT ABO: NORMAL
UNIT ABO: NORMAL
UNIT NUMBER: NORMAL
UNIT NUMBER: NORMAL
UNIT RH: NORMAL
UNIT RH: NORMAL
UNIT VOLUME: 286
UNIT VOLUME: 350
WBC # BLD AUTO: 9.4 X10*3/UL (ref 4.4–11.3)
XM INTEP: NORMAL
XM INTEP: NORMAL

## 2024-03-05 PROCEDURE — 36415 COLL VENOUS BLD VENIPUNCTURE: CPT | Performed by: INTERNAL MEDICINE

## 2024-03-05 PROCEDURE — 82947 ASSAY GLUCOSE BLOOD QUANT: CPT

## 2024-03-05 PROCEDURE — 8010000001 HC DIALYSIS - HEMODIALYSIS PER DAY

## 2024-03-05 PROCEDURE — 82374 ASSAY BLOOD CARBON DIOXIDE: CPT | Performed by: INTERNAL MEDICINE

## 2024-03-05 PROCEDURE — 99233 SBSQ HOSP IP/OBS HIGH 50: CPT | Performed by: INTERNAL MEDICINE

## 2024-03-05 PROCEDURE — P9016 RBC LEUKOCYTES REDUCED: HCPCS

## 2024-03-05 PROCEDURE — 2500000001 HC RX 250 WO HCPCS SELF ADMINISTERED DRUGS (ALT 637 FOR MEDICARE OP): Performed by: CLINICAL NURSE SPECIALIST

## 2024-03-05 PROCEDURE — 85027 COMPLETE CBC AUTOMATED: CPT | Performed by: INTERNAL MEDICINE

## 2024-03-05 PROCEDURE — 2500000004 HC RX 250 GENERAL PHARMACY W/ HCPCS (ALT 636 FOR OP/ED): Performed by: STUDENT IN AN ORGANIZED HEALTH CARE EDUCATION/TRAINING PROGRAM

## 2024-03-05 PROCEDURE — 86920 COMPATIBILITY TEST SPIN: CPT

## 2024-03-05 PROCEDURE — 2500000001 HC RX 250 WO HCPCS SELF ADMINISTERED DRUGS (ALT 637 FOR MEDICARE OP): Performed by: STUDENT IN AN ORGANIZED HEALTH CARE EDUCATION/TRAINING PROGRAM

## 2024-03-05 PROCEDURE — 85520 HEPARIN ASSAY: CPT | Performed by: STUDENT IN AN ORGANIZED HEALTH CARE EDUCATION/TRAINING PROGRAM

## 2024-03-05 PROCEDURE — 2500000002 HC RX 250 W HCPCS SELF ADMINISTERED DRUGS (ALT 637 FOR MEDICARE OP, ALT 636 FOR OP/ED): Performed by: INTERNAL MEDICINE

## 2024-03-05 PROCEDURE — 2060000001 HC INTERMEDIATE ICU ROOM DAILY

## 2024-03-05 PROCEDURE — 36430 TRANSFUSION BLD/BLD COMPNT: CPT

## 2024-03-05 PROCEDURE — P9040 RBC LEUKOREDUCED IRRADIATED: HCPCS

## 2024-03-05 PROCEDURE — 2500000001 HC RX 250 WO HCPCS SELF ADMINISTERED DRUGS (ALT 637 FOR MEDICARE OP): Performed by: INTERNAL MEDICINE

## 2024-03-05 PROCEDURE — 86901 BLOOD TYPING SEROLOGIC RH(D): CPT | Performed by: INTERNAL MEDICINE

## 2024-03-05 RX ORDER — METOPROLOL TARTRATE 100 MG/1
100 TABLET ORAL 2 TIMES DAILY
Qty: 60 TABLET | Refills: 11 | Status: SHIPPED | OUTPATIENT
Start: 2024-03-05 | End: 2025-03-05

## 2024-03-05 RX ADMIN — ATORVASTATIN CALCIUM 40 MG: 40 TABLET, FILM COATED ORAL at 09:49

## 2024-03-05 RX ADMIN — METOPROLOL TARTRATE 100 MG: 50 TABLET, FILM COATED ORAL at 21:14

## 2024-03-05 RX ADMIN — AMLODIPINE BESYLATE 5 MG: 5 TABLET ORAL at 09:49

## 2024-03-05 RX ADMIN — FLUTICASONE PROPIONATE 2 SPRAY: 50 SPRAY, METERED NASAL at 09:58

## 2024-03-05 RX ADMIN — Medication 3 MG: at 21:14

## 2024-03-05 RX ADMIN — DULOXETINE HYDROCHLORIDE 30 MG: 30 CAPSULE, DELAYED RELEASE ORAL at 09:49

## 2024-03-05 RX ADMIN — PANTOPRAZOLE SODIUM 40 MG: 40 TABLET, DELAYED RELEASE ORAL at 21:14

## 2024-03-05 RX ADMIN — DOXEPIN HYDROCHLORIDE 75 MG: 25 CAPSULE ORAL at 21:15

## 2024-03-05 RX ADMIN — METOPROLOL TARTRATE 100 MG: 50 TABLET, FILM COATED ORAL at 09:49

## 2024-03-05 RX ADMIN — HEPARIN SODIUM 1800 UNITS/HR: 10000 INJECTION, SOLUTION INTRAVENOUS at 11:44

## 2024-03-05 RX ADMIN — ASPIRIN 81 MG CHEWABLE TABLET 81 MG: 81 TABLET CHEWABLE at 09:49

## 2024-03-05 RX ADMIN — PANTOPRAZOLE SODIUM 40 MG: 40 TABLET, DELAYED RELEASE ORAL at 09:49

## 2024-03-05 ASSESSMENT — COGNITIVE AND FUNCTIONAL STATUS - GENERAL
DAILY ACTIVITIY SCORE: 24
CLIMB 3 TO 5 STEPS WITH RAILING: A LITTLE
MOBILITY SCORE: 22
WALKING IN HOSPITAL ROOM: A LITTLE

## 2024-03-05 ASSESSMENT — PAIN - FUNCTIONAL ASSESSMENT
PAIN_FUNCTIONAL_ASSESSMENT: 0-10

## 2024-03-05 ASSESSMENT — PAIN SCALES - GENERAL
PAINLEVEL_OUTOF10: 0 - NO PAIN

## 2024-03-05 NOTE — DISCHARGE SUMMARY
DISCHARGE SUMMARY     Discharge Diagnosis  NSTEMI (non-ST elevated myocardial infarction) (CMS/[facility])    Issues Requiring Follow-Up  -Patient appears to have failed ablation, cardiology and likely EP F/U     This discharge took greater than 35 minutes.    Test Results Pending At Discharge  Pending Labs       No current pending labs.            Hospital Course   Rosa Soto is a 69 y.o. with hx of pAF on Eliquis, HTN, and ESRD HD MWF presents with shortness of breath.  Patient recently was hospitalized at [facility] for an ablation procedure.  Postop course complicated by bleeding from groin introducer site that has now stopped.  Starting having mild substernal chest pain on the day of discharge (03/02).  Was discharged in the morning but was unable to make it home on time for dialysis so missed her dialysis session this day.  Today started experiencing shortness of breath and worsening substernal chest pain especially with exertion.  Says that she can hardly walk across the room without having to stop to catch her breath.  Denies cough.  Denies fevers or chills.  No history of CAD.  In the ED, needing 2 L O2 to keep sats greater than 90%.  Other VSS.  Troponin 980.  EKG with NSR, no ischemic changes. WBC 16.  CXR with evidence of cardiomegaly and vascular congestion.  Started on heparin drip and admitted to medicine.       3/4: She went into RVR overnight requiring a diltiazem infusion. She was back in NSR this AM and metoprolol was increased by the cardiology team to try to wean her off the diltiazem.         Pertinent Physical Exam At Time of Discharge    Acute Hypoxic Respiratory Failure  Hypervolemia 2/2 ESRD HD MWF  -Missed 03/02 dialysis session due to being hospitalized  -CXR on presentation with significant cardiomegaly and vascular congestion  -Sx much improved after dialysis and she is now on RA   -Nephrology consultation for inpatient dialysis     NSTEMI  -Likely type II event (type II in setting of recent  ablation and pathology above)   PLAN  -Echocardiogram with normal EF and no rWMAs      pAF s/p Recent Ablation  -[place] [facility] [place]  -on the evening of 3/3 she went into RVR overnight requiring a diltiazem infusion. She was back in NSR on 3/4 and metoprolol was increased by the cardiology team (50 mg BID to 100 mg BID)  to try to wean her off the diltiazem.      Other Issues  -Essential HTN: Home medications (metoprolol dose increased this admission)   -Morbid Obesity BMI 42: Complicates all aspects of care  -Leukocytosis: Suspect stress response in the setting of the above/resolved    Home Medications     Medication List      CHANGE how you take these medications     metoprolol tartrate 100 mg tablet; Commonly known as: Lopressor; Take 1   tablet by mouth 2 times a day.; What changed: medication strength, how   much to take, when to take this     CONTINUE taking these medications     albuterol 90 mcg/actuation inhaler   amLODIPine 5 mg tablet; Commonly known as: Norvasc   atorvastatin 40 mg tablet; Commonly known as: Lipitor   Auryxia 210 mg iron tablet; Generic drug: ferric citrate   busPIRone 5 mg tablet; Commonly known as: Buspar   cholecalciferol 25 MCG (1000 UT) tablet; Commonly known as: Vitamin D-3   cinacalcet 30 mg tablet; Commonly known as: Sensipar   doxepin 75 mg capsule; Commonly known as: SINEquan   DULoxetine 30 mg DR capsule; Commonly known as: Cymbalta   Eliquis 5 mg tablet; Generic drug: apixaban   Flonase Allergy Relief 50 mcg/actuation nasal spray; Generic drug:   fluticasone   pantoprazole 40 mg EC tablet; Commonly known as: ProtoNix; Take 1 tablet   (40 mg) by mouth 2 times a day. Do not crush, chew, or split.   torsemide 20 mg tablet; Commonly known as: Demadex       Outpatient Follow-Up  No follow-ups on file.     Tyrel Cheema MD PhD  3/5/2024  7:59 AM

## 2024-03-05 NOTE — PROGRESS NOTES
Overnight course and subjective:     Rosa Soto is a 69 y.o. female here for heart failure.  Had missed dialysis session and recent ablation.  Yesterday went into atrial fibrillation briefly today she is in sinus rhythm.  Echo with normal LV function.  No chest pain..    Last Recorded Vitals:  Vitals:    03/1955 03/04/24 2326 03/05/24 0322 03/05/24 0818   BP: 138/55 148/72 155/74 161/83   BP Location: Left arm Left arm Left arm Left arm   Patient Position: Lying Sitting Lying Sitting   Pulse: 90 (!) 113 80 84   Resp: 16 18 18 20   Temp: 36.7 °C (98.1 °F) 35.8 °C (96.5 °F) 37 °C (98.6 °F) 36.9 °C (98.4 °F)   TempSrc: Temporal Temporal Temporal Temporal   SpO2: 91% 92% 93% 91%   Weight:       Height:           Last Labs:  CBC - 3/5/2024:  5:06 AM  9.4 7.3 240    23.0      CMP - 3/5/2024:  5:06 AM  8.6 6.3 17 --- 0.4   8.4 3.4 11 182      PTT - 3/2/2024: 11:40 PM  _   _ 35     Troponin I, High Sensitivity   Date/Time Value Ref Range Status   03/03/2024 03:05  (HH) 0 - 13 ng/L Final     Comment:     Previous result verified on 3/2/2024 2316 on specimen/case [id number] called with component TRPHS for procedure Troponin I, High Sensitivity, Initial with value 980 ng/L.   03/03/2024 12:50 AM 1,030 (HH) 0 - 13 ng/L Final     Comment:     Previous result verified on 3/2/2024 2316 on specimen/case [id number] called with component TRPHS for procedure Troponin I, High Sensitivity, Initial with value 980 ng/L.   03/02/2024 11:24  (HH) 0 - 13 ng/L Final     Comment:     Previous result verified on 3/2/2024 2316 on specimen/case [id number] called with component TRPHS for procedure Troponin I, High Sensitivity, Initial with value 980 ng/L.     BNP   Date/Time Value Ref Range Status   03/02/2024 10:14  (H) 0 - 99 pg/mL Final     Hemoglobin A1C   Date/Time Value Ref Range Status   03/03/2024 03:05 AM 6.5 (H) see below % Final   04/23/2018 03:10 PM 5.5 % Final     Comment:          Diagnosis  of Diabetes-Adults   Non-Diabetic: < or = 5.6%   Increased risk for developing diabetes: 5.7-6.4%   Diagnostic of diabetes: > or = 6.5%  .       Monitoring of Diabetes                Age (y)     Therapeutic Goal (%)   Adults:          >18           <7.0   Pediatrics:    13-18           <7.5                   7-12           <8.0                   0- 6            7.5-8.5   American Diabetes Association. Diabetes Care 33(S1), Jan 2010.       LDL Calculated   Date/Time Value Ref Range Status   03/03/2024 03:05 AM 38 <=99 mg/dL Final     Comment:                                 Near   Borderline      AGE      Desirable  Optimal    High     High     Very High     0-19 Y     0 - 109     ---    110-129   >/= 130     ----    20-24 Y     0 - 119     ---    120-159   >/= 160     ----      >24 Y     0 -  99   100-129  130-159   160-189     >/=190       VLDL   Date/Time Value Ref Range Status   03/03/2024 03:05 AM 32 0 - 40 mg/dL Final   04/23/2018 03:10 PM 33 0 - 40 mg/dL Final      Last I/O:  I/O last 3 completed shifts:  In: 2247.6 (18 mL/kg) [P.O.:600; I.V.:1247.6 (10 mL/kg); Other:400]  Out: 4600 (36.9 mL/kg) [Urine:200 (0 mL/kg/hr); Other:4400]  Weight: 124.6 kg     Past Cardiology Tests (Last 3 Years):  EKG:  ECG 12 Lead 03/02/2024 (Preliminary)      Electrocardiogram - Day of Discharge 03/01/2024      Electrocardiogram - Post Ablation 02/29/2024    Echo:  Transthoracic Echo (TTE) Complete 03/04/2024    Ejection Fractions:  EF   Date/Time Value Ref Range Status   03/04/2024 03:30 PM 63 %      Cath:  No results found for this or any previous visit from the past 1095 days.    Stress Test:  No results found for this or any previous visit from the past 1095 days.    Cardiac Imaging:  No results found for this or any previous visit from the past 1095 days.    CTSCAN  Transthoracic Echo (TTE) Complete    Result Date: 3/4/2024              [address] [address]      Phone  [phone] Fax [phone] TRANSTHORACIC ECHOCARDIOGRAM REPORT  Patient Name:      TYREL CONTRERAS         Reading Physician:    [id number] Bismark Dolan MD Study Date:        3/4/2024              Ordering Provider:    [id number] RADHA                                                                TRINYMARLENE MRN/PID:           [id number]              Fellow: Accession#:        [id number]          Nurse:                Lesley Edwards RN Date of Birth/Age: 1955 / 69 years  Sonographer:          Tammy Ledesma RDCS Gender:            F                     Additional Staff: Height:            170.18 cm             Admit Date:           3/2/2024 Weight:            124.29 kg             Admission Status:     Inpatient -                                                                Routine BSA / BMI:         2.31 m2 / 42.91 kg/m2 [facility] Blood Pressure: 126 /71 mmHg Study Type:    TRANSTHORACIC ECHO (TTE) COMPLETE Diagnosis/ICD: Non ST elevation (NSTEMI) myocardial infarction-I21.4 Indication:    NSTEMI CPT Codes:     Echo Complete w Full Doppler-[id number] Patient History: Pertinent History: A-Fib and HTN. Study Detail: The following Echo studies were performed: 2D, M-Mode, Doppler and               color flow. Definity used as a contrast agent for endocardial               border definition. Total contrast used for this procedure was 2 mL               via IV push.  PHYSICIAN INTERPRETATION: Left Ventricle: Left ventricular systolic function is normal, with an estimated ejection fraction of 60-65%. There are no regional wall motion abnormalities. The left ventricular cavity size is normal. Spectral Doppler shows a pseudonormal pattern of left ventricular diastolic filling. There are elevated left atrial and left ventricular end diastolic pressures. Left Atrium: The left  atrium is moderately dilated. Right Ventricle: The right ventricle is normal in size. There is normal right ventricular global systolic function. Right Atrium: The right atrium is normal in size. Aortic Valve: The aortic valve appears structurally normal. There is no evidence of aortic valve regurgitation. The peak instantaneous gradient of the aortic valve is 11.4 mmHg. The mean gradient of the aortic valve is 6.0 mmHg. Mitral Valve: The mitral valve is normal in structure. There is trace mitral valve regurgitation. Tricuspid Valve: The tricuspid valve is structurally normal. There is mild tricuspid regurgitation. The Doppler estimated RVSP is mild to moderately elevated at 45.8 mmHg. Pulmonic Valve: The pulmonic valve is not well visualized. There is no indication of pulmonic valve regurgitation. Pericardium: There is no pericardial effusion noted. Aorta: The aortic root is normal.  CONCLUSIONS:  1. Left ventricular systolic function is normal with a 60-65% estimated ejection fraction.  2. Spectral Doppler shows a pseudonormal pattern of left ventricular diastolic filling.  3. There are elevated left atrial and left ventricular end diastolic pressures.  4. The left atrium is moderately dilated.  5. Mild to moderately elevated right ventricular systolic pressure. QUANTITATIVE DATA SUMMARY: 2D MEASUREMENTS:                          Normal Ranges: Ao Root d:     2.70 cm   (2.0-3.7cm) LAs:           4.30 cm   (2.7-4.0cm) IVSd:          0.96 cm   (0.6-1.1cm) LVPWd:         1.17 cm   (0.6-1.1cm) LVIDd:         5.44 cm   (3.9-5.9cm) LVIDs:         4.00 cm LV Mass Index: 98.3 g/m2 LV % FS        26.5 % LA VOLUME:                               Normal Ranges: LA Vol A4C:        68.3 ml    (22+/-6mL/m2) LA Vol A2C:        87.7 ml LA Vol BP:         87.4 ml LA Vol Index A4C:  29.5ml/m2 LA Vol Index A2C:  38.0 ml/m2 LA Vol Index BP:   37.8 ml/m2 LA Area A4C:       23.9 cm2 LA Area A2C:       24.0 cm2 LA Major Axis A4C: 7.1 cm  LA Major Axis A2C: 5.6 cm RA VOLUME BY A/L METHOD:                       Normal Ranges: RA Area A4C: 14.6 cm2 AORTA MEASUREMENTS:                      Normal Ranges: Ao Sinus, d: 2.70 cm (2.1-3.5cm) Ao STJ, d:   2.08 cm (1.7-3.4cm) Asc Ao, d:   3.60 cm (2.1-3.4cm) LV SYSTOLIC FUNCTION BY 2D PLANIMETRY (MOD):                     Normal Ranges: EF-A4C View: 61.6 % (>=55%) EF-A2C View: 64.7 % EF-Biplane:  63.0 % LV DIASTOLIC FUNCTION:                           Normal Ranges: MV Peak E:    1.27 m/s    (0.7-1.2 m/s) MV Peak A:    0.70 m/s    (0.42-0.7 m/s) E/A Ratio:    1.81        (1.0-2.2) MV e'         0.06 m/s    (>8.0) MV lateral e' 0.05 m/s MV medial e'  0.06 m/s MV A Dur:     103.00 msec E/e' Ratio:   23.09       (<8.0) MITRAL VALVE:                 Normal Ranges: MV DT: 190 msec (150-240msec) AORTIC VALVE:                                    Normal Ranges: AoV Vmax:                1.69 m/s  (<=1.7m/s) AoV Peak P.4 mmHg (<20mmHg) AoV Mean P.0 mmHg  (1.7-11.5mmHg) LVOT Max Gee:            1.23 m/s  (<=1.1m/s) AoV VTI:                 35.70 cm  (18-25cm) LVOT VTI:                23.70 cm LVOT Diameter:           2.00 cm   (1.8-2.4cm) AoV Area, VTI:           2.09 cm2  (2.5-5.5cm2) AoV Area,Vmax:           2.29 cm2  (2.5-4.5cm2) AoV Dimensionless Index: 0.66  RIGHT VENTRICLE: RV Basal 3.57 cm RV Mid   2.62 cm RV Major 7.2 cm TAPSE:   23.3 mm RV s'    0.13 m/s TRICUSPID VALVE/RVSP:                             Normal Ranges: Peak TR Velocity: 3.27 m/s RV Syst Pressure: 45.8 mmHg (< 30mmHg) IVC Diam:         1.33 cm  [id number] Bismark Dolan MD Electronically signed on 3/4/2024 at 4:05:13 PM  ** Final **     ECG 12 Lead    Result Date: 3/4/2024  Sinus tachycardia    XR chest 1 view    Result Date: 3/2/2024  Interpreted By:  Randi Case, STUDY: XR CHEST 1 VIEW;  3/2/2024 10:16 pm   INDICATION: Signs/Symptoms:sob.   COMPARISON: Chest x-ray 2023   ACCESSION NUMBER(S): [id number]    ORDERING CLINICIAN: KARENA VEGA   FINDINGS:     CARDIOMEDIASTINAL SILHOUETTE: Cardiac silhouette is enlarged. Mild pulmonary vascular congestion   LUNGS: Mild blunting of the left costophrenic angle may relate to small effusion and atelectasis. No pneumothorax.   ABDOMEN: No remarkable upper abdominal findings.   BONES: No acute osseous abnormality.       Cardiomegaly with mild pulmonary vascular congestion.   Mild blunting of the left costophrenic angle may relate to small effusion and atelectasis. Superimposed infection not excluded. Continued radiographic follow-up to resolution is advised.   MACRO: None   Signed by: Randi Case 3/2/2024 10:20 PM Dictation workstation:   [id number]    Electrocardiogram - Post Ablation    Result Date: 3/1/2024  Normal sinus rhythm Normal ECG Confirmed by Slava Gray ([id number]) on 3/1/2024 4:54:04 PM    Electrocardiogram - Day of Discharge    Result Date: 3/1/2024  Sinus rhythm Nonspecific T wave abnormality Borderline ECG  Poor data quality, interpretation may be adversely affected Confirmed by Slava Gray ([id number]) on 3/1/2024 4:53:56 PM    Electrophysiology procedure    Result Date: 2/29/2024  Atrial Fibrillation ablation Procedures Atrial Fibrillation ablation (27863), LA Pacing and recording (41717), 3D Mapping (32744), Intracardiac Echocardiogram (94859), Transseptal Catheterization (85160), Ultrasound Guided vascular access (88520), Patient history: Please refer to the detailed history and physical on the patient's medical chart. Procedure narrative: The procedure was performed under general anesthesia (administered and monitored by anesthesia attending and CRNA). Anesthesia sedated and intubated the patient without any acute complications.  Radial arterial line was placed for continuous hemodynamic monitoring. 1. The right femoral vein was accessed x 3 using the modified Seldinger technique. 3 sheaths were inserted. 2. The right common femoral vein was evaluated with  ultrasound, it was normal in size and anatomy.  The vein was accessed using ultrasound guidance and an 18G Cook needle, with direct visualization of the needle at all times. Over the guidewires 3 sheaths - 8F, 8.5F, and 8.5Fr - were inserted into the right femoral vein.   3. Under fluoroscopic guidance an intracardiac echocardiogram catheter (ICE) was introduced into the right atrium.  The right atrium, left atrium, left atrial appendage, RV were evaluated.  No obvious structural abnormalities were noted.  Initial imaging revealed baseline small pericardial effusion.  4. Via the 8Fr femoral venous access a DuoDecapolar catheter by [employer] was introduced and placed into the distal coronary sinus and high right atrium. 5. The 8.5Fr right femoral vein access was then switched to a deflectable sheath (Medium curl by [employer]) and positioned appropriately over an exchange wire cephalad to the site of transeptal puncture. 6. A heparin bolus and drip was initiated with intermittent monitoring of ACT values, ensuring therapeutic anticoagulation throughout the case. Transseptal catheterization: Under fluoroscopic and ICE guidance the RF Ligonier needle was introduced into the Powtoon deflectable sheath and single transseptal catheterization was performed using the [employer] RF generator. 3D mappin. A LA 3D electroanatomic voltage mapping was created with the Biosense PentaRay catheter.  There were 4 pulmonary veins. There was no significant low voltage areas suggestive of fibrotic remodeling. 2. The PentaRay mapping catheter was removed.  3. The RF ablation catheter Biosense ST/SF was then introduced via the sheath into the left atrium.  Ablation: 1. During ablation esophageal temperature monitoring was utilized throughout the procedure.   2. RF was delivered at a power of 45W for 5-10s. Visitags were used to identify the ablation sites. 3. PV antral isolation was achieved by wide area circumferential ablation (WACA) for each PV.  RF was also delivered across the shell of the ipsilateral PVs. 4. PV entrance and exit block was confirmed.  4/4 Pulmonary veins were isolated. 5. Adjuvant RF applications were delivered at 35W at the lateral mitral annulus region, the superior and inferior aspects of the septal mitral annulus, at the low aspect of the brittaney terminalis (producing local automaticity inducing bursts of AF), at the septal aspect of the SVC-RA junction and at 25W in the CS in the vicinity of the vein of Harpreet region take off. At this time all catheters were pulled back into the IVC. Protamine was given.  ACT was maintained with a combination of heparin boluses +/- infusion. End-of-case: 1. We made a final evaluation for pericardial effusion using the ICE catheter. Baseline stable effusion was noted at the end of the procedure. 2. Sheaths were removed and hemostasis was obtained at the right femoral venous access sites with Vascade MVP.  3. The patient was successfully extubated and was able to move all 4 extremities spontaneously and follow commands. The patient was then moved to PACU/holding for recovery. Summary: 1. Successful radiofrequency catheter ablation for Paroxysmal Atrial fibrillation Recommendation: 1. The patient will be tentatively discharged home today, following 2-3 hrs bed rest and subsequent ambulation, providing the recovery parameters are appropriate. 2. Resume oral anti coagulation @ 7-9pm tonight. Please DO NOT hold blood thinner unless emergency without asking your doctor. 3. Start PPI 40mg BID x30 days. 4. Resume rest of home medications. Patient Instructions: 1. No driving, alcohol intake or making legal decisions for 24 hours. 2. Remove band-aid/tegaderm in 1 day. 3. No heavy lifting, strenuous exercise for 2 weeks 4. Please call our office if you notice any groin discharge or swelling or fever. 5. For cardiology electrophysiology emergencies (such as severe heart racing, bleeding, severe groin  pain/swelling, high fever, wound discharge, stroke symptoms, or recent severe chest pain) call the emergency number. See complete procedural log and parameters.       Inpatient Medications:  Scheduled medications   Medication Dose Route Frequency    amLODIPine  5 mg oral Daily    aspirin  81 mg oral Daily    atorvastatin  40 mg oral Daily    doxepin  75 mg oral Nightly    DULoxetine  30 mg oral Daily    fluticasone  2 spray Each Nostril Daily    insulin lispro  0-10 Units subcutaneous Before meals & nightly    melatonin  3 mg oral Daily    metoprolol tartrate  100 mg oral BID    pantoprazole  40 mg oral BID    perflutren protein A microsphere  0.5 mL intravenous Once in imaging    polyethylene glycol  17 g oral Daily     PRN medications   Medication    acetaminophen    albuterol    bisacodyl    dextrose 10 % in water (D10W)    dextrose    glucagon    guaiFENesin    heparin    ondansetron    Or    ondansetron    oxygen     Continuous Medications   Medication Dose Last Rate    heparin  0-4,000 Units/hr 1,800 Units/hr (03/04/24 1717)     Outpatient Medications:  Current Outpatient Medications   Medication Instructions    albuterol 90 mcg/actuation inhaler 1 puff, inhalation, Every 4 hours PRN    amLODIPine (NORVASC) 5 mg, oral, Daily    atorvastatin (LIPITOR) 40 mg, oral, Daily    Auryxia 210 mg iron tablet TAKE 2 TABLETS BY MOUTH THREE TIMES A DAY WITH MEALS. SWALLOW WHOLE, DO NOT CHEW OR CRUSH MEDICATION    busPIRone (BUSPAR) 5 mg, oral, Daily RT    cholecalciferol (Vitamin D-3) 25 MCG (1000 UT) tablet 1 tablet, oral, Daily    cinacalcet (SENSIPAR) 30 mg, oral, Daily, Take with food or shortly afer a meal. Swallow tablet whole; do not break or divide.    doxepin (SINEQUAN) 75 mg, oral, Nightly    DULoxetine (CYMBALTA) 30 mg, oral, Daily    Eliquis 5 mg tablet 1 tablet, oral, 2 times daily    Flonase Allergy Relief 50 mcg/actuation nasal spray 2 sprays, Each Nostril, Daily    metoprolol tartrate (Lopressor) 50 mg  tablet 1 tablet, oral, 2 times daily    metoprolol tartrate (LOPRESSOR) 100 mg, oral, 2 times daily    pantoprazole (PROTONIX) 40 mg, oral, 2 times daily, Do not crush, chew, or split.    torsemide (DEMADEX) 20 mg, oral, 2 times daily       Physical Exam  Vitals reviewed.   Constitutional:       Appearance: Normal appearance.   Neck:      Vascular: No carotid bruit or JVD.   Cardiovascular:      Rate and Rhythm: Normal rate and regular rhythm.      Pulses: Normal pulses.      Heart sounds: Normal heart sounds, S1 normal and S2 normal.   Pulmonary:      Effort: Pulmonary effort is normal. No respiratory distress.      Breath sounds: No wheezing or rales.   Abdominal:      General: Abdomen is flat.      Palpations: Abdomen is soft.   Musculoskeletal:      Right lower leg: No edema.      Left lower leg: No edema.   Skin:     General: Skin is warm.   Neurological:      Mental Status: She is alert and oriented to person, place, and time. Mental status is at baseline.   Psychiatric:         Mood and Affect: Mood normal.         Behavior: Behavior normal.           Assessment/Plan   1-acute diastolic heart failure-patient presenting with acute heart failure after recent ablation and after missing dialysis session Friday.  She is having some atypical chest pain which seems more pleuritic or due to pericarditis with elevated cardiac enzymes.  Given high pretest probably due of significant coronary artery disease, ischemic evaluation is recommended.  We discussed risk-benefit alternatives of left heart catheterization.  Alternative is stress MPI.  She wants to proceed with left heart cath will order.    I have notified Dr. Valentine.     2-paroxysmal atrial fibrillation-maintaining sinus rhythm post ablation, although did have recurrence of A-fib briefly yesterday was on Cardizem drip normal sinus rhythm.  Patient had a lot of scar tissue noted during the ablation procedure.  May need temporary amiodarone if there is recurrence  of atrial fibrillation..  Since we are holding Eliquis she is being bridged with heparin.  Peripheral IV 03/02/24 20 G Left Forearm (Active)   Site Assessment Clean;Dry;Intact 03/04/24 2100   Dressing Status Clean;Dry;Occlusive 03/04/24 2100   Number of days: 3       Hemodialysis Arteriovenous Fistula 03/03/24 Right Upper arm (Active)   Site Assessment Clean;Dry;Intact 03/04/24 2100   Dressing Status Other (Comment) 03/04/24 2100   Number of days: 2       Code Status:  Full Code        Bismark Dolan MD  3/5/2024  9:32 AM

## 2024-03-05 NOTE — PROCEDURES
Report from Sending RN:    Report From: Dina Sands RN  Recent Surgery of Procedure: Yes, Ablasion of heart 2/29/2024  Baseline Level of Consciousness (LOC): x4  Oxygen Use: No  Type: RA  Diabetic: Yes  Last BP Med Given Day of Dialysis: See MAR  Last Pain Med Given: See MAR  Lab Tests to be Obtained with Dialysis: No  Blood Transfusion to be Given During Dialysis: Yes  Available IV Access: Yes  Medications to be Administered During Dialysis: Yes, Heparin drip  Continuous IV Infusion Running: Yes  Restraints on Currently or in the Last 24 Hours: No  Hand-Off Communication:

## 2024-03-05 NOTE — PROGRESS NOTES
Rosa Soto is a 69 y.o. female on day 2 of admission presenting with NSTEMI (non-ST elevated myocardial infarction) (CMS/HCC).    Subjective   Rosa Soto is a 69 y.o. with hx of pAF on Eliquis, HTN, and ESRD HD MWF presents with shortness of breath.  Patient recently was hospitalized at [facility] for an ablation procedure.  Postop course complicated by bleeding from groin introducer site that has now stopped.  Starting having mild substernal chest pain on the day of discharge (03/02).  Was discharged in the morning but was unable to make it home on time for dialysis so missed her dialysis session this day.  Today started experiencing shortness of breath and worsening substernal chest pain especially with exertion.  Says that she can hardly walk across the room without having to stop to catch her breath.  Denies cough.  Denies fevers or chills.  No history of CAD.  In the ED, needing 2 L O2 to keep sats greater than 90%.  Other VSS.  Troponin 980.  EKG with NSR, no ischemic changes. WBC 16.  CXR with evidence of cardiomegaly and vascular congestion.  Started on heparin drip and admitted to medicine.       3/4: She went into RVR overnight requiring a diltiazem infusion. She was back in NSR this AM and metoprolol was increased by the cardiology team to try to wean her off the diltiazem.     3/5: She is without complaint. Cardiology team plans for [facility] with coronary angiogram today.          Objective     General: Not Ill appearing, cooperative with exam, in NAD.  HEENT: Atraumatic. No erythema in posterior pharynx.  Lymph: No cervical or inguinal lymphadenopathy.  Cardiac: irregularly irregular rhythm. No murmurs.  Lungs: CTAB. Nl WOB.  Abd: Non-tender. No rebound or gaurding. Nl bowel sounds.  Ext: No edema. 2+ pulses.  Skin: No rashes, abrasions, or contusions.  Psych: A&Ox3. Nl affect.  Neuro: 5/5 strength. Sensation intact.    Last Recorded Vitals  Blood pressure 126/53, pulse 71, temperature 35.6 °C (96.1 °F),  "temperature source Temporal, resp. rate 18, height 1.702 m (5' 7.01\"), weight 125 kg (274 lb 11.1 oz), SpO2 91 %.  Intake/Output last 3 Shifts:  I/O last 3 completed shifts:  In: 2247.6 (18 mL/kg) [P.O.:600; I.V.:1247.6 (10 mL/kg); Other:400]  Out: 4600 (36.9 mL/kg) [Urine:200 (0 mL/kg/hr); Other:4400]  Weight: 124.6 kg     Relevant Results           This patient currently has cardiac telemetry ordered; if you would like to modify or discontinue the telemetry order, click here to go to the orders activity to modify/discontinue the order.                 Assessment/Plan   Acute Hypoxic Respiratory Failure  Hypervolemia 2/2 ESRD HD MWF  -Missed 03/02 dialysis session due to being hospitalized  -CXR on presentation with significant cardiomegaly and vascular congestion  -Sx much improved after dialysis and she is now on RA   -Nephrology consultation for inpatient dialysis     NSTEMI  -Likely type II event (type II in setting of recent ablation and hypoxia from above)   -Echocardiogram with normal EF and no rWMAs   -cardiology planning [facility] with coronary angiogram tomorrow   -Dr Ochoa requested patients Hbg be above 8.5 g/dL for the procedure so we will transfuse to that goal. Dr Guzman will dialyze her today to avoid volume overload from blood products      pAF s/p Recent Ablation  -Completed at [facility]  -on the evening of 3/3 she went into RVR overnight requiring a diltiazem infusion. Metoprolol was increased by the cardiology team (50 mg BID to 100 mg BID) to wean her off the diltiazem.      Other Issues  -Essential HTN: Home medications (metoprolol dose increased this admission)   -Morbid Obesity BMI 42: Complicates all aspects of care  -Leukocytosis: Suspect stress response in the setting of the above/resolved     DVT Ppx  -Heparin ggt      Tyrel Cheema MD PhD      "

## 2024-03-05 NOTE — SIGNIFICANT EVENT
Cardiology has now decided to move forward with inpatient LHC with coronary angiogram. This information was relayed to me at around 10 AM today. I had discharged patient before this so the delay in DC is due to the the apparent need for inpatient LHC with coronary angiogram. Dr Ochoa asked me to transfuse patient to a goal Hbg  of 8.5 g/dL. Patient will likely require two units to achieve this and we will run as slowly as possible so as not to volume overload her again. I have spoken to Dr Guzman to put a dialysis plan in place so we can transfuse safely and get a dialysis session after her cath.

## 2024-03-05 NOTE — PROGRESS NOTES
"      Nephrology Progress Note      Nephrology following for ESRD on HD MWF.     Events over night:   There are plans for transfusing 2 units PRBC  I suggested it be done on dialysis today.  Patient first refused dialysis but I did talk to her and she agreed to.  Left heart cath planned for tomorrow afternoon.  UF 2 L with hemodialysis today  Daughters were both at bedside to answer questions.    /67   Pulse 86   Temp 36.6 °C (97.9 °F) (Temporal)   Resp 18   Ht 1.702 m (5' 7.01\")   Wt 125 kg (274 lb 11.1 oz)   SpO2 98%   BMI 43.01 kg/m²     Input / Output:  24 HR:   Intake/Output Summary (Last 24 hours) at 3/5/2024 1730  Last data filed at 3/5/2024 1603  Gross per 24 hour   Intake 2586 ml   Output 2701 ml   Net -115 ml         Physical Exam   Alert and oriented x 3 NAD  Neck: no JVD  CV: Irregularly irregular  Lungs: CTA bilaterally  Abd: soft, NT, ND   Ext: trace bilateral lower extremity edema    Scheduled medications  amLODIPine, 5 mg, oral, Daily  aspirin, 81 mg, oral, Daily  atorvastatin, 40 mg, oral, Daily  doxepin, 75 mg, oral, Nightly  DULoxetine, 30 mg, oral, Daily  fluticasone, 2 spray, Each Nostril, Daily  insulin lispro, 0-10 Units, subcutaneous, Before meals & nightly  melatonin, 3 mg, oral, Daily  metoprolol tartrate, 100 mg, oral, BID  pantoprazole, 40 mg, oral, BID  perflutren protein A microsphere, 0.5 mL, intravenous, Once in imaging  polyethylene glycol, 17 g, oral, Daily      Continuous medications  heparin, 0-4,000 Units/hr, Last Rate: 1,800 Units/hr (03/05/24 1144)      PRN medications  PRN medications: acetaminophen, albuterol, bisacodyl, dextrose 10 % in water (D10W), dextrose, glucagon, guaiFENesin, heparin, ondansetron **OR** ondansetron, oxygen   Results from last 7 days   Lab Units 03/05/24  0506 03/04/24  0352 03/03/24  0305   SODIUM mmol/L 137   < > 137   POTASSIUM mmol/L 4.2   < > 4.9   CHLORIDE mmol/L 99   < > 103   CO2 mmol/L 28   < > 20*   BUN mg/dL 29*   < > 57* "   CREATININE mg/dL 4.11*   < > 9.60*   CALCIUM mg/dL 8.6   < > 7.5*   PROTEIN TOTAL g/dL  --   --  6.3*   BILIRUBIN TOTAL mg/dL  --   --  0.4   ALK PHOS U/L  --   --  182*   ALT U/L  --   --  11   AST U/L  --   --  17   GLUCOSE mg/dL 149*   < > 128*    < > = values in this interval not displayed.        Results from last 7 days   Lab Units 03/03/24  0305   MAGNESIUM mg/dL 1.85        Results from last 7 days   Lab Units 03/05/24  0506 03/04/24  0352 03/03/24  0305   WBC AUTO x10*3/uL 9.4 12.6* 13.7*   HEMOGLOBIN g/dL 7.3* 7.7* 8.0*   HEMATOCRIT % 23.0* 24.3* 24.9*   PLATELETS AUTO x10*3/uL 240 216 217          Assessment & Plan:     Assessment:   68-year-old female with a history of ESRD on HD MWF missed HD since Wednesday now has SOB and r/o NSTEMI.     ESRD  -patient has been on MWF hemodialysis missed HD on Friday now admitted due to volume overload  - Access: RUE AVF     Anemia of ESRD  -Patient on SANIA as outpatient we will continue to titrate as outpatient     NSTEMI  -Heparin ggt     pAF s/p Recent Ablation  -Completed at [facility]    Acute Heart Failure  - cardiology following  - echo pending  - symptoms improved after dialysis        Recommendations:      -Hemodialysis today to get blood transfused  -Hemodialysis tomorrow for short treatment and then left heart cath afterwards then patient will be on track      Please message me through [employer] chat with any questions or concerns.     Lorena Guzman DO  3/5/2024  5:30 PM     [facility]    [address], [address]   [place] [address]  Office: [phone]

## 2024-03-05 NOTE — CARE PLAN
The patient's goals for the shift include      The clinical goals for the shift include pt will remain free from injury throughout shift

## 2024-03-05 NOTE — PROCEDURES
Report to Receiving RN:    Report To: Dina Sands RN  Time Report Called: [id number]  Hand-Off Communication: Pt tolerated tx well. Removed 2.2L Post vitals: 159/82 (112) - 96.7*F - Unable to weigh d/t bed stuck on angle screen  Complications During Treatment: No  Ultrafiltration Treatment: No  Medications Administered During Dialysis: No  Blood Products Administered During Dialysis: Yes  Labs Sent During Dialysis: No  Heparin Drip Rate Changes: No    Electronic Signatures:  Signed Amber Schuler OCDT     Signed Dina Sands RN       Last Updated: 4:10 PM by AMBER SCHULER

## 2024-03-06 ENCOUNTER — APPOINTMENT (OUTPATIENT)
Dept: DIALYSIS | Facility: HOSPITAL | Age: 69
End: 2024-03-06
Payer: MEDICARE

## 2024-03-06 LAB
ANION GAP SERPL CALC-SCNC: 15 MMOL/L (ref 10–20)
BUN SERPL-MCNC: 28 MG/DL (ref 6–23)
CALCIUM SERPL-MCNC: 9.3 MG/DL (ref 8.6–10.3)
CHLORIDE SERPL-SCNC: 98 MMOL/L (ref 98–107)
CO2 SERPL-SCNC: 28 MMOL/L (ref 21–32)
CREAT SERPL-MCNC: 3.68 MG/DL (ref 0.5–1.05)
EGFRCR SERPLBLD CKD-EPI 2021: 13 ML/MIN/1.73M*2
ERYTHROCYTE [DISTWIDTH] IN BLOOD BY AUTOMATED COUNT: 17.2 % (ref 11.5–14.5)
GLUCOSE BLD MANUAL STRIP-MCNC: 143 MG/DL (ref 74–99)
GLUCOSE BLD MANUAL STRIP-MCNC: 192 MG/DL (ref 74–99)
GLUCOSE BLD MANUAL STRIP-MCNC: 197 MG/DL (ref 74–99)
GLUCOSE SERPL-MCNC: 141 MG/DL (ref 74–99)
HCT VFR BLD AUTO: 28.2 % (ref 36–46)
HGB BLD-MCNC: 9.2 G/DL (ref 12–16)
MCH RBC QN AUTO: 31.6 PG (ref 26–34)
MCHC RBC AUTO-ENTMCNC: 32.6 G/DL (ref 32–36)
MCV RBC AUTO: 97 FL (ref 80–100)
NRBC BLD-RTO: 0 /100 WBCS (ref 0–0)
PLATELET # BLD AUTO: 279 X10*3/UL (ref 150–450)
POTASSIUM SERPL-SCNC: 4.1 MMOL/L (ref 3.5–5.3)
RBC # BLD AUTO: 2.91 X10*6/UL (ref 4–5.2)
SODIUM SERPL-SCNC: 137 MMOL/L (ref 136–145)
UFH PPP CHRO-ACNC: 0.4 IU/ML
WBC # BLD AUTO: 10.8 X10*3/UL (ref 4.4–11.3)

## 2024-03-06 PROCEDURE — 2500000002 HC RX 250 W HCPCS SELF ADMINISTERED DRUGS (ALT 637 FOR MEDICARE OP, ALT 636 FOR OP/ED): Performed by: NURSE PRACTITIONER

## 2024-03-06 PROCEDURE — 2550000001 HC RX 255 CONTRASTS: Performed by: INTERNAL MEDICINE

## 2024-03-06 PROCEDURE — 85520 HEPARIN ASSAY: CPT | Performed by: STUDENT IN AN ORGANIZED HEALTH CARE EDUCATION/TRAINING PROGRAM

## 2024-03-06 PROCEDURE — 2720000007 HC OR 272 NO HCPCS: Performed by: INTERNAL MEDICINE

## 2024-03-06 PROCEDURE — 2500000001 HC RX 250 WO HCPCS SELF ADMINISTERED DRUGS (ALT 637 FOR MEDICARE OP): Performed by: NURSE PRACTITIONER

## 2024-03-06 PROCEDURE — 2500000004 HC RX 250 GENERAL PHARMACY W/ HCPCS (ALT 636 FOR OP/ED): Performed by: INTERNAL MEDICINE

## 2024-03-06 PROCEDURE — 99153 MOD SED SAME PHYS/QHP EA: CPT | Performed by: INTERNAL MEDICINE

## 2024-03-06 PROCEDURE — 2500000001 HC RX 250 WO HCPCS SELF ADMINISTERED DRUGS (ALT 637 FOR MEDICARE OP): Performed by: STUDENT IN AN ORGANIZED HEALTH CARE EDUCATION/TRAINING PROGRAM

## 2024-03-06 PROCEDURE — 36415 COLL VENOUS BLD VENIPUNCTURE: CPT | Performed by: INTERNAL MEDICINE

## 2024-03-06 PROCEDURE — 2780000003 HC OR 278 NO HCPCS: Performed by: INTERNAL MEDICINE

## 2024-03-06 PROCEDURE — 2060000001 HC INTERMEDIATE ICU ROOM DAILY

## 2024-03-06 PROCEDURE — C1760 CLOSURE DEV, VASC: HCPCS | Performed by: INTERNAL MEDICINE

## 2024-03-06 PROCEDURE — B2111ZZ FLUOROSCOPY OF MULTIPLE CORONARY ARTERIES USING LOW OSMOLAR CONTRAST: ICD-10-PCS | Performed by: INTERNAL MEDICINE

## 2024-03-06 PROCEDURE — 2500000001 HC RX 250 WO HCPCS SELF ADMINISTERED DRUGS (ALT 637 FOR MEDICARE OP): Performed by: INTERNAL MEDICINE

## 2024-03-06 PROCEDURE — 93458 L HRT ARTERY/VENTRICLE ANGIO: CPT | Performed by: INTERNAL MEDICINE

## 2024-03-06 PROCEDURE — 82947 ASSAY GLUCOSE BLOOD QUANT: CPT

## 2024-03-06 PROCEDURE — 80048 BASIC METABOLIC PNL TOTAL CA: CPT | Performed by: INTERNAL MEDICINE

## 2024-03-06 PROCEDURE — 2500000001 HC RX 250 WO HCPCS SELF ADMINISTERED DRUGS (ALT 637 FOR MEDICARE OP): Performed by: CLINICAL NURSE SPECIALIST

## 2024-03-06 PROCEDURE — 99152 MOD SED SAME PHYS/QHP 5/>YRS: CPT | Performed by: INTERNAL MEDICINE

## 2024-03-06 PROCEDURE — 4A023N7 MEASUREMENT OF CARDIAC SAMPLING AND PRESSURE, LEFT HEART, PERCUTANEOUS APPROACH: ICD-10-PCS | Performed by: INTERNAL MEDICINE

## 2024-03-06 PROCEDURE — C1894 INTRO/SHEATH, NON-LASER: HCPCS | Performed by: INTERNAL MEDICINE

## 2024-03-06 PROCEDURE — 2500000004 HC RX 250 GENERAL PHARMACY W/ HCPCS (ALT 636 FOR OP/ED): Performed by: STUDENT IN AN ORGANIZED HEALTH CARE EDUCATION/TRAINING PROGRAM

## 2024-03-06 PROCEDURE — G0269 OCCLUSIVE DEVICE IN VEIN ART: HCPCS | Mod: TC | Performed by: INTERNAL MEDICINE

## 2024-03-06 PROCEDURE — 85347 COAGULATION TIME ACTIVATED: CPT

## 2024-03-06 PROCEDURE — 2500000005 HC RX 250 GENERAL PHARMACY W/O HCPCS: Performed by: INTERNAL MEDICINE

## 2024-03-06 PROCEDURE — 85027 COMPLETE CBC AUTOMATED: CPT | Performed by: INTERNAL MEDICINE

## 2024-03-06 RX ORDER — MIDAZOLAM HYDROCHLORIDE 1 MG/ML
INJECTION, SOLUTION INTRAMUSCULAR; INTRAVENOUS AS NEEDED
Status: DISCONTINUED | OUTPATIENT
Start: 2024-03-06 | End: 2024-03-06 | Stop reason: HOSPADM

## 2024-03-06 RX ORDER — FENTANYL CITRATE 50 UG/ML
INJECTION, SOLUTION INTRAMUSCULAR; INTRAVENOUS AS NEEDED
Status: DISCONTINUED | OUTPATIENT
Start: 2024-03-06 | End: 2024-03-06 | Stop reason: HOSPADM

## 2024-03-06 RX ORDER — LIDOCAINE HYDROCHLORIDE 20 MG/ML
INJECTION, SOLUTION INFILTRATION; PERINEURAL AS NEEDED
Status: DISCONTINUED | OUTPATIENT
Start: 2024-03-06 | End: 2024-03-06 | Stop reason: HOSPADM

## 2024-03-06 RX ORDER — TRAZODONE HYDROCHLORIDE 50 MG/1
25 TABLET ORAL NIGHTLY PRN
Status: DISCONTINUED | OUTPATIENT
Start: 2024-03-06 | End: 2024-03-07 | Stop reason: HOSPADM

## 2024-03-06 RX ORDER — SODIUM CHLORIDE 9 MG/ML
1000 INJECTION, SOLUTION INTRAVENOUS ONCE AS NEEDED
Status: DISCONTINUED | OUTPATIENT
Start: 2024-03-06 | End: 2024-03-07 | Stop reason: HOSPADM

## 2024-03-06 RX ADMIN — DULOXETINE HYDROCHLORIDE 30 MG: 30 CAPSULE, DELAYED RELEASE ORAL at 09:21

## 2024-03-06 RX ADMIN — PANTOPRAZOLE SODIUM 40 MG: 40 TABLET, DELAYED RELEASE ORAL at 20:16

## 2024-03-06 RX ADMIN — Medication 3 MG: at 20:19

## 2024-03-06 RX ADMIN — HEPARIN SODIUM 1800 UNITS/HR: 10000 INJECTION, SOLUTION INTRAVENOUS at 02:27

## 2024-03-06 RX ADMIN — PANTOPRAZOLE SODIUM 40 MG: 40 TABLET, DELAYED RELEASE ORAL at 09:20

## 2024-03-06 RX ADMIN — METOPROLOL TARTRATE 100 MG: 50 TABLET, FILM COATED ORAL at 21:12

## 2024-03-06 RX ADMIN — DOXEPIN HYDROCHLORIDE 75 MG: 25 CAPSULE ORAL at 20:16

## 2024-03-06 RX ADMIN — TRAZODONE HYDROCHLORIDE 25 MG: 50 TABLET ORAL at 02:27

## 2024-03-06 RX ADMIN — ACETAMINOPHEN 650 MG: 325 TABLET ORAL at 06:46

## 2024-03-06 RX ADMIN — ASPIRIN 81 MG CHEWABLE TABLET 81 MG: 81 TABLET CHEWABLE at 09:20

## 2024-03-06 RX ADMIN — ATORVASTATIN CALCIUM 40 MG: 40 TABLET, FILM COATED ORAL at 09:21

## 2024-03-06 RX ADMIN — AMLODIPINE BESYLATE 5 MG: 5 TABLET ORAL at 09:20

## 2024-03-06 RX ADMIN — METOPROLOL TARTRATE 100 MG: 50 TABLET, FILM COATED ORAL at 09:20

## 2024-03-06 ASSESSMENT — PAIN DESCRIPTION - DESCRIPTORS: DESCRIPTORS: ACHING

## 2024-03-06 ASSESSMENT — PAIN SCALES - GENERAL
PAINLEVEL_OUTOF10: 0 - NO PAIN
PAINLEVEL_OUTOF10: 0 - NO PAIN
PAINLEVEL_OUTOF10: 2
PAINLEVEL_OUTOF10: 2

## 2024-03-06 ASSESSMENT — PAIN - FUNCTIONAL ASSESSMENT
PAIN_FUNCTIONAL_ASSESSMENT: CPOT (CRITICAL CARE PAIN OBSERVATION TOOL)
PAIN_FUNCTIONAL_ASSESSMENT: 0-10
PAIN_FUNCTIONAL_ASSESSMENT: 0-10

## 2024-03-06 NOTE — PROGRESS NOTES
"      Nephrology Progress Note      Nephrology following for ESRD on HD MWF.     Events over night:   Patient seen after returning from left heart cath, no stents were placed.  Patient resting and complains of a headache.    /65   Pulse 74   Temp 36.4 °C (97.5 °F)   Resp 15   Ht 1.702 m (5' 7.01\")   Wt 125 kg (274 lb 11.1 oz)   SpO2 94%   BMI 43.01 kg/m²     Input / Output:  24 HR:   Intake/Output Summary (Last 24 hours) at 3/6/2024 1217  Last data filed at 3/6/2024 1128  Gross per 24 hour   Intake 2136 ml   Output 2710 ml   Net -574 ml         Physical Exam   Alert and oriented x 3 NAD  Neck: no JVD  CV: Irregularly irregular  Lungs: CTA bilaterally  Abd: soft, NT, ND   Ext: trace bilateral lower extremity edema    Scheduled medications  amLODIPine, 5 mg, oral, Daily  aspirin, 81 mg, oral, Daily  atorvastatin, 40 mg, oral, Daily  doxepin, 75 mg, oral, Nightly  DULoxetine, 30 mg, oral, Daily  fluticasone, 2 spray, Each Nostril, Daily  insulin lispro, 0-10 Units, subcutaneous, Before meals & nightly  melatonin, 3 mg, oral, Daily  metoprolol tartrate, 100 mg, oral, BID  pantoprazole, 40 mg, oral, BID  perflutren protein A microsphere, 0.5 mL, intravenous, Once in imaging  polyethylene glycol, 17 g, oral, Daily      Continuous medications  heparin, 0-4,000 Units/hr, Last Rate: Stopped (03/06/24 1038)      PRN medications  PRN medications: acetaminophen, albuterol, bisacodyl, dextrose 10 % in water (D10W), dextrose, glucagon, guaiFENesin, heparin, ondansetron **OR** ondansetron, oxygen, traZODone   Results from last 7 days   Lab Units 03/06/24  0444 03/04/24  0352 03/03/24  0305   SODIUM mmol/L 137   < > 137   POTASSIUM mmol/L 4.1   < > 4.9   CHLORIDE mmol/L 98   < > 103   CO2 mmol/L 28   < > 20*   BUN mg/dL 28*   < > 57*   CREATININE mg/dL 3.68*   < > 9.60*   CALCIUM mg/dL 9.3   < > 7.5*   PROTEIN TOTAL g/dL  --   --  6.3*   BILIRUBIN TOTAL mg/dL  --   --  0.4   ALK PHOS U/L  --   --  182*   ALT U/L  --   " --  11   AST U/L  --   --  17   GLUCOSE mg/dL 141*   < > 128*    < > = values in this interval not displayed.        Results from last 7 days   Lab Units 03/03/24  0305   MAGNESIUM mg/dL 1.85        Results from last 7 days   Lab Units 03/06/24  0444 03/05/24  0506 03/04/24  0352   WBC AUTO x10*3/uL 10.8 9.4 12.6*   HEMOGLOBIN g/dL 9.2* 7.3* 7.7*   HEMATOCRIT % 28.2* 23.0* 24.3*   PLATELETS AUTO x10*3/uL 279 240 216          Assessment & Plan:     Assessment:   68-year-old female with a history of ESRD on HD MWF missed HD since Wednesday now has SOB and r/o NSTEMI.     ESRD  -patient has been on MWF hemodialysis missed HD on Friday now admitted due to volume overload  - Access: RUE AVF     Anemia of ESRD  -Patient on SANIA as outpatient we will continue to titrate as outpatient     NSTEMI  -Heparin ggt     pAF s/p Recent Ablation  -Completed at [facility]    Acute Heart Failure  - cardiology following  - symptoms improved after dialysis  - left heart cath on 3/6, no interventions     Recommendations:   -Hemodialysis yesterday with blood transfused  -We will hold off on dialysis today and do dialysis first thing tomorrow morning  - Then resume normal MWF schedule      Please message me through [employer] chat with any questions or concerns.     Bobbi Velez  3/6/2024  12:17 PM     [facility]    [address], [address]   [place] [address]  Office: [phone]

## 2024-03-06 NOTE — DISCHARGE INSTRUCTIONS
If you have any questions, please call the Cath Lab Nurse Practitioner Zenamarco antonio Cruz at [phone] and leave a message. She will return your call the same day if calling before 3 PM, M-F. If you call on the weekend you can expect a call back on Monday morning. You may also call the Cath Lab at [phone] M-F, 7-3:30 with any questions. Weekends and after hours please call your cardiologist office number to reach a physician on call. The heart group office number is [phone]           CARDIAC CATHETERIZATION DISCHARGE INSTRUCTIONS     FOR SUDDEN AND SEVERE CHEST PAIN, SHORTNESS OF BREATH, EXCESSIVE BLEEDING, SIGNS OF STROKE, OR CHANGES IN MENTAL STATUS YOU SHOULD CALL 911 IMMEDIATELY.     If your provider has prescribed aspirin and/or clopidogrel (Plavix), or prasugrel (Effient), or ticagrelor (Brilinta), DO NOT STOP THESE MEDICATIONS for any reason without talking to your cardiologist first. If any of these were prescribed, you must take them every day without missing a single dose. If you are getting low on these medications, contact your provider immediately for a refill.     FOR NEXT 24 HOURS  - Upon discharge, you should return home and rest for the remainder of the day and evening. You do not have to stay on bed rest but should not be very active.  It is recommended a responsible adult be with you for the first 24 hours after the procedure.    - No driving for 24 hours after procedure. Please arrange for someone to drive you home from the hospital today.     - Do not drive, operate machinery, or use power tools for 24 hours after your procedure.     - Do not make any legal decisions for 24 hours after your procedure.     - Do not drink alcoholic beverages for 24 hours after your procedure.    WOUND CARE   *FOR FEMORAL (LEG) ACCESS*  ·      Avoid heavy lifting (over 10 pounds) for 7 days, squatting or excessive bending for 2 days, and strenuous exercise for 7 days.  ·      No submerged bathing, swimming, or  hot tubs for the next 7 days, or until fully healed.  ·      Avoid sexual activity for 3-4 days until any groin discomfort has ceased.     *FOR RADIAL (WRIST) ACCESS*  ·      No lifting more than 5 pounds or excessive use of the wrist for 24 hours - for example, treat your wrist as if it is sprained.  ·      Do not engage in vigorous activities (tennis, golf, bowling, weights) for at least 48 hours after the procedure.  ·      Do not submerge the wrist for 7 days after the procedure.  ·      You should expect mild tingling in your hand and tenderness at the puncture site for up to 3 days.    - The transparent dressing should be removed from the site 24 hours after the procedure.  Wash the site gently with soap and water. Rinse well and pat dry. Keep the area clean and dry. You may apply a Band-Aid to the site. Avoid lotions, ointments, or powders until fully healed.     - You may shower the day after your procedure.      - It is normal to notice a small bruise around the puncture site and/or a small grape sized or smaller lump. Any large bruising or large lump warrants a call to the office.     - If bleeding should occur, lay down and apply pressure to the affected area for 10 minutes.  If the bleeding stops notify your physician.  If there is a large amount of bleeding or spurting of blood CALL 911 immediately.  DO NOT drive yourself to the hospital.    - You may experience some tenderness, bruising or minimal inflammation.  If you have any concerns, you may contact the Cath Lab or if any of these symptoms become excessive, contact your cardiologist or go to the emergency room.     OTHER INSTRUCTIONS  - You may take acetaminophen (Tylenol) as directed for discomfort.  If pain is not relieved with acetaminophen (Tylenol), contact your doctor.    - If you notice or experience any of the following, you should notify your doctor or seek medical attention  Chest pain or discomfort  Change in mental status or weakness in  extremities.  Dizziness, light headedness, or feeling faint.  Change in the site where the procedure was performed, such as bleeding or an increased area of bruising or swelling.  Tingling, numbness, pain, or coolness in the leg/arm beyond the site where the procedure was performed.  Signs of infection (i.e. shaking chills, temperature > 100 degrees Fahrenheit, warmth, redness) in the leg/arm area where the procedure was performed.  Changes in urination   Bloody or black stools  Vomiting blood  Severe nose bleeds  Any excessive bleeding    - If you DO NOT have an appointment with your cardiologist within 2-4 weeks following your procedure, please contact their office.      Important ways to reduce your risk of coronary artery disease by healthy diet, maintaining a healthy weight, exercising regularly and stop using tobacco products.     Mediterranean Diet    About this topic  This is a heart healthy diet. It is based on widely used foods and cooking styles from many countries around the [place]. The main pattern for the diet is more plant foods and monounsaturated fats, or good fats, like olive oil. Protein in this diet comes from seafood, legumes, nuts, seeds, and poultry and eggs in lowered amounts. You will also eat more whole grains, vegetables, and fruits and moderate amounts of alcohol are also included. This diet has less red meats, dairy products, and processed foods.    What will the results be?  Your diet will have less saturated fat, cholesterol, calories, sodium, and added sugars. Your diet will be higher in fiber. This will help to keep your blood sugar steady. This diet lowers the chance of heart disease and other health problems.  What lifestyle changes are needed?  If you do not often eat this way, you will need to change your eating habits. Be sure to get regular exercise. It is believed to help the health benefits of this diet.  What changes to diet are needed?  You may need to limit the  "amount of red meat and processed foods in your diet. Ask your dietitian for help planning meals that are right for you.  What foods are good to eat?  Plenty of fish and other seafood  Fresh, frozen, or canned fruits and vegetables  Nuts and nut butters and dried beans, lentils, or peas  Foods high in fiber like whole grains and whole grain products  Olive oil (good fat), peanut or canola oil, margarine, or spreads that list vegetable oil as the first ingredient and do not contain trans fat or partially hydrogenated oil  Small amounts of poultry and eggs  What foods should be limited or avoided?  Red meats  Sweets, desserts, and processed foods  Butter, oils, and fats that contain trans fats or are hydrogenated or partially hydrogenated  Gravies and sauces  What problems could happen?  Your weight may rise because your diet will be higher in fat from olive oil and nuts.  You may have lower iron levels. Be sure to eat foods rich in iron. Also, eat foods rich in vitamin C. This will help your body take in iron.  You may have lower calcium levels because you are eating less dairy products. Ask your doctor if you need to take a calcium supplement.  Wine is often thought of as part of a Mediterranean diet. It is not needed and you may choose not to include it. Avoid wine if you are prone to alcohol abuse or are pregnant. Also, avoid it if you are at risk for breast cancer, have liver problems, or have other illnesses that make it important for you to not have alcohol.  When do I need to call the doctor?  If you have any concerns about your diet     Health risks of obesity    The Basics  Written by the doctors and editors at [facility]  What does it mean to have obesity? -- Doctors use a calculation called \"body mass index,\" or \"BMI,\" to decide whether a person is underweight, at a healthy weight, overweight, or has obesity.  Your BMI will tell you which category you are in based on your weight and height (figure 1):  ?If " "your BMI is between 25 and 29.9, you are overweight.  ?If your BMI is 30 or greater, you have obesity.  Obesity is a problem, because it increases the risks of many different health problems. It can also make it hard for you to move, breathe, and do other things that people who are at a healthy weight can do easily.  What are the health risks of obesity? -- Having obesity increases a person's risk of developing many health problems. Here are just a few examples:  ?Diabetes  ?High blood pressure  ?High cholesterol  ?Heart disease (including heart attacks)  ?Stroke  ?Sleep apnea (a disorder in which you stop breathing for short periods while asleep)  ?Asthma  ?Cancer  Does having obesity shorten a person's life? -- Yes. Studies show that people with obesity die younger than people who are a healthy weight. They also show that the risk of death goes up the heavier a person is. The degree of increased risk depends on how long the person has had obesity, and on what other medical problems they have.  People with \"central obesity\" might also be at risk of dying younger. Central obesity means carrying extra weight in the belly area, even if the BMI is normal.  Should I see an expert? -- Yes. If you are overweight or have obesity, you can talk to your doctor or nurse. They might have suggestions for healthy ways to lose weight. It can also help to work with a dietitian (food and nutrition expert). A dietitian can help you choose healthy foods and plan meals.  Are there medical treatments that can help me lose weight? -- Yes. There are medicines and surgery to help with weight loss. But those treatments are only for people who have not been able to lose weight through lifestyle changes such as diet and exercise. Also, weight loss treatments do not take the place of diet and exercise. People who have those treatments must also change how they eat and how active they are.  What can I do to prevent the problems caused by " obesity? -- The best thing that you can do is lose weight. But even if weight loss is not possible, you can improve your health and lower your risk if you:  ?Become more active - Many types of physical activity can help, including walking. You can start with a few minutes a day and add more as you get stronger and build up your endurance. Anything that gets your body moving is good for you.  ?Improve your diet - It is healthy to have regular meal times, eat smaller portions, and not skip meals. Limit sweets, and avoid processed foods. Try to eat more vegetables and fruits instead.  ?Quit smoking (if you smoke) - Some people start eating more after they stop smoking, so try to make healthy food choices. Even if it increases your appetite, quitting smoking is still one of the best things that you can do to improve your health.  ?Limit alcohol - For females, drink no more than 1 drink a day. For males, drink no more than 2 drinks a day.  What causes obesity? -- The thing that increases a person's risk the most is having an unhealthy lifestyle. Most people develop obesity because they eat too much, eat unhealthy foods, and move too little. That's especially true of people who watch too much TV. But there are also other things that seem to increase the risk of obesity that many people do not know about. Here are some things that might affect a person's chance of developing obesity:  ?Mother's habits during and after pregnancy - People who eat a lot of calories, have diabetes, or smoke during pregnancy have a higher chance of having babies who have obesity as adults. Also, babies who drink formula might be more likely than  babies to develop obesity later in life.  ?Habits and weight gain during childhood - Children or teens who are overweight or have obesity are more likely to become have obesity as an adult.  ?Sleeping too little - People who do not get enough sleep are more likely to develop obesity than  "people who sleep enough.  ?Taking certain medicines - Long-term use of certain medicines, such as some medicines to treat depression, can cause weight gain. If you are concerned that 1 of your medicines might be making you gain weight, talk to your doctor or nurse. They might be able to switch you to a different medicine instead.  ?Certain hormonal conditions - Some hormonal problems can increase the risk of developing obesity. For example, a condition called \"polycystic ovary syndrome\" can cause weight gain, along with other symptoms like irregular periods.  What if I want to get pregnant? -- If you are overweight or have obesity, it might be harder to get pregnant. For males, obesity can also cause sex problems, like having trouble getting or keeping an erection. This is more likely if you also have high blood pressure or diabetes.  What if my child has obesity? -- In children, obesity has many of the same risks as it does in adults. For example, it can increase the risk of diabetes, high blood pressure, asthma, and sleep apnea. It can also cause added problems related to childhood. For example, obesity can make children grow faster than normal and cause girls to go through puberty earlier than usual.  All topics are updated as new evidence becomes available and our peer review process is complete.  This topic retrieved from [employer] on: Jan 11, 2024.  Topic 71761 Version 17.0  Release: 31.6.4 - C32.10  © 2024 [employer], [employer]. and/or its affiliates. All rights reserved.  figure 1: Your body mass index (BMI)        If you use tobacco products please review   Quitting smoking    The Basics  Written by the doctors and editors at [employer]  What are the benefits of quitting smoking? -- Quitting smoking can dramatically improve your health and help you live longer. It lowers your risk of heart disease, lung disease, kidney failure, cancer, infection, stomach problems, and diabetes.  Quitting smoking can also lower your " "chances of getting osteoporosis, a condition that makes your bones weak.  Quitting is not easy for most people, and it might take several tries to completely quit. But help and support are available. Quitting smoking will improve your health no matter how old you are, even if you have smoked for a long time.  What should I do if I want to quit smoking? -- It's a good idea to start by talking with your doctor or nurse. It is possible to quit on your own, without help. But getting help greatly increases your chances of quitting successfully.  When you are ready to quit, you will make a plan to:  ?Set a quit date.  ?Tell your family and friends that you plan to quit.  ?Plan ahead for the challenges you will face, such as cigarette cravings.  ?Remove cigarettes from your home, car, and work.  How can my doctor or nurse help? -- Your doctor or nurse can give you advice on the best way to quit. They can also give you medicines to:  ?Reduce your craving for cigarettes  ?Reduce your \"withdrawal\" symptoms (symptoms that happen when you stop smoking)  Your doctor or nurse can also help you find a counselor to talk to. For most people who are trying to quit smoking, it works best to use both medicines and counseling.  You can also get help from a free phone line (9-057-QUITNOW, or 1-620.900.1485) or go online to www.smokefree.gov.  What are the symptoms of withdrawal? -- When you stop smoking, you might have symptoms such as:  ?Trouble sleeping  ?Feeling irritable, anxious, or restless  ?Getting frustrated or angry  ?Having trouble thinking clearly  These symptoms can be hard to deal with, which is why it can be so hard to quit. But medicines can help.  Some people who stop smoking become temporarily depressed. Some people need treatment for depression, such as counseling or medicines or both. People with depression might:  ?No longer enjoy or care about doing the things they used to like to do  ?Feel sad, down, hopeless, " nervous, or cranky most of the day, almost every day  ?Lose or gain weight  ?Sleep too much or too little  ?Feel tired or like they have no energy  ?Feel guilty or like they are worth nothing  ?Forget things or feel confused  ?Move and speak more slowly than usual  ?Act restless or have trouble staying still  ?Think about death or suicide  If you think you might be depressed, tell your doctor or nurse right away. They can talk to you about your symptoms and recommend treatment if needed.  Get help right away if you are thinking of hurting or killing yourself! -- Sometimes, people with depression think of hurting or killing themselves. If you ever feel like you might hurt yourself, help is available:  ?In the , contact the [employer] [facility] Crisis Lifeline:  To speak to someone, call or text [employer].  To talk to someone online, go to www.enymotion/chat.  ?Call your doctor or nurse and tell them that it is an emergency.  ?Call for an ambulance (in the [place] and [place], call 9-1-1).  ?Go to the emergency department at your local hospital.  If you think your partner might have depression, or if you are worried that they might hurt themselves, get them help right away.  How does counseling work? -- A counselor can help you figure out:  ?What triggers you to want to smoke, and how to handle these situations  ?How to resist cravings  ?What you can do differently if you have tried to quit before  You can meet with a counselor in 1-on-1 sessions or as part of a group. There are other ways to get counseling, too, such as over the phone, through text messaging, or online.  How do medicines help you stop smoking? -- Different medicines work in different ways:  ?Nicotine replacement therapy - Nicotine is the main drug in cigarettes, and the reason they are addictive. These medicines reduce your body's craving for nicotine. They also help with withdrawal symptoms.  There are different forms of nicotine replacement, including skin  "patches, lozenges, gum, nasal sprays, and inhalers. Most can be bought without a prescription. Also, health insurance might cover some or all of the cost.  It often helps to use 2 forms of nicotine replacement. For example, you might wear a patch all the time, plus use gum or lozenges when you get a craving to smoke.  ?Varenicline - Varenicline (brand names: Chantix, Champix) is a prescription medicine that reduces withdrawal symptoms and cigarette cravings. Varenicline can increase the effects of alcohol in some people. It's a good idea to limit drinking while you're taking it, at least until you know how it affects you.  Even if you are not yet ready to commit to a quit date, varenicline can help reduce cravings. This can make it easier to quit when you are ready.  ?Bupropion - Bupropion (sample brand names: Wellbutrin, Zyban) is a prescription medicine that reduces your desire to smoke. It is also available in a generic version, which is cheaper than the brand name medicines. Doctors do not usually prescribe bupropion for people with seizures or who have had seizures in the past.  It might also be helpful to combine nicotine replacement with bupropion or varenicline. In some cases, a person might even take both bupropion and varenicline. Your doctor or nurse can help you figure out the best combination for you.  What about e-cigarettes? -- Sometimes people wonder if using electronic cigarettes, or \"e-cigarettes,\" can help them quit smoking. Using e-cigarettes is also called \"vaping.\" Doctors do not recommend e-cigarettes in place of using of medicines and counseling. That's because e-cigarettes still contain nicotine as well as other substances that might be harmful. It's also not clear how they can affect a person's health in the long term.  What if I am pregnant and I smoke? -- If you are pregnant, it's really important for the health of your baby that you quit. Ask your doctor what options you have, and what " is safest for your baby.  What if I have already smoked for a long time? -- The longer you have smoked, the higher your chances are of having health problems. But it is never too late to quit smoking. It helps your health even if you are older or have smoked for many years. The best thing you can do to lower your chance of having a health problem caused by smoking is to quit.  Will I gain weight if I quit? -- You might gain a few pounds. This can be frustrating for some people. But it's important to remember that you are improving your health by quitting smoking. You can help prevent gaining a lot of weight by staying active and eating a healthy diet.  What if I am not able to quit? -- If you don't quit on your first try, or if you quit but then start smoking again, don't give up hope. Lots of people have to try more than once before they are able to completely quit.  It might help to try to understand why quitting did not work. There might be something you can do differently when you try again. It can help to figure out which situations make you want to smoke, so you can avoid them.  What else can I do to improve my chances of quitting? -- You can:  ?Get regular exercise. Any type of physical activity, even gentle forms of movement, is good for your health. Physical activity can also help reduce stress.  ?Stay away from people who smoke and places that make you want to smoke. If people close to you smoke, ask them to quit with you or avoid smoking around you.  ?Carry gum, hard candy, or something to put in your mouth. If you get a craving for a cigarette, try 1 of these instead.  ?Don't give up, even if you start smoking again. It takes most people a few tries before they succeed.  All topics are updated as new evidence becomes available and our peer review process is complete.

## 2024-03-06 NOTE — PROGRESS NOTES
Rosa Soto is a 69 y.o. female on day 3 of admission presenting with NSTEMI (non-ST elevated myocardial infarction) (CMS/HCC).    Subjective   Rosa Soto is a 69 y.o. with hx of pAF on Eliquis, HTN, and ESRD HD MWF presents with shortness of breath.  [place] [facility] [place].  Postop course complicated by bleeding from groin introducer site that has now stopped.  Starting having mild substernal chest pain on the day of discharge (03/02).  Was discharged in the morning but was unable to make it home on time for dialysis so missed her dialysis session this day.  Today started experiencing shortness of breath and worsening substernal chest pain especially with exertion.  Says that she can hardly walk across the room without having to stop to catch her breath.  Denies cough.  Denies fevers or chills.  No history of CAD.  In the ED, needing 2 L O2 to keep sats greater than 90%.  Other VSS.  Troponin 980.  EKG with NSR, no ischemic changes. WBC 16.  CXR with evidence of cardiomegaly and vascular congestion.  Started on heparin drip and admitted to medicine.       3/4: She went into RVR overnight requiring a diltiazem infusion. She was back in NSR this AM and metoprolol was increased by the cardiology team to try to wean her off the diltiazem.     3/5: She is without complaint. Cardiology team plans for [facility] with coronary angiogram today.     3/6: She is without complaint. LHC with mild nonobstructive CAD         Objective     General: Not Ill appearing, cooperative with exam, in NAD.  HEENT: Atraumatic. No erythema in posterior pharynx.  Lymph: No cervical or inguinal lymphadenopathy.  Cardiac: irregularly irregular rhythm. No murmurs.  Lungs: CTAB. Nl WOB.  Abd: Non-tender. No rebound or gaurding. Nl bowel sounds.  Ext: No edema. 2+ pulses.  Skin: No rashes, abrasions, or contusions.  Psych: A&Ox3. Nl affect.  Neuro: 5/5 strength. Sensation intact.    Last Recorded  "Vitals  Blood pressure 147/72, pulse 68, temperature 35.9 °C (96.7 °F), temperature source Temporal, resp. rate 16, height 1.702 m (5' 7.01\"), weight 125 kg (274 lb 11.1 oz), SpO2 96 %.  Intake/Output last 3 Shifts:  I/O last 3 completed shifts:  In: 2936 (23.6 mL/kg) [P.O.:800; I.V.:800 (6.4 mL/kg); Blood:636; Other:700]  Out: 2701 (21.7 mL/kg) [Other:2701]  Weight: 124.6 kg     Relevant Results           This patient currently has cardiac telemetry ordered; if you would like to modify or discontinue the telemetry order, click here to go to the orders activity to modify/discontinue the order.                 Assessment/Plan   Acute Hypoxic Respiratory Failure  Hypervolemia 2/2 ESRD HD MWF  -Missed 03/02 dialysis session due to being hospitalized  -CXR on presentation with significant cardiomegaly and vascular congestion  -Sx much improved after dialysis and she is now on RA   -Nephrology consultation for inpatient dialysis     NSTEMI  -Likely type II event (type II in setting of recent ablation and hypoxia from above)   -Echocardiogram with normal EF and no rWMAs   -LHC with mild nonobstructive CAD     pAF s/p Recent Ablation  [place] [facility] [place]on the evening of 3/3 she went into RVR overnight requiring a diltiazem infusion. Metoprolol was increased by the cardiology team (50 mg BID to 100 mg BID) to wean her off the diltiazem.      Other Issues  -Essential HTN: Home medications (metoprolol dose increased this admission)   -Morbid Obesity BMI 42: Complicates all aspects of care  -Leukocytosis: Suspect stress response in the setting of the above/resolved     DVT Ppx  -Heparin ggt      Tyrel Cheema MD PhD      "

## 2024-03-06 NOTE — CARE PLAN
The patient's goals for the shift include      The clinical goals for the shift include patient will remain free from injusry this shift.      Problem: Pain  Goal: Performs ADL's with improved pain control throughout shift  Outcome: Progressing     Problem: Fall/Injury  Goal: Be free from injury by end of the shift  Outcome: Progressing     Problem: ACS/CP/NSTEMI/STEMI  Goal: Chest pain managed (free from pain or at acceptable level)  Outcome: Progressing  Goal: Lab values return to normal range  Outcome: Progressing  Goal: Promote self management  Outcome: Progressing  Goal: Serial ECG will return to baseline  Outcome: Progressing     Problem: Nutrition  Goal: Oral intake greater than 50%  Outcome: Progressing     Problem: Diabetes  Goal: Maintain glucose levels >70mg/dl to <250mg/dl throughout shift  Outcome: Progressing     Problem: Discharge Planning  Goal: Discharge to home or other facility with appropriate resources  Outcome: Progressing

## 2024-03-06 NOTE — PRE-SEDATION DOCUMENTATION
Sedation Plan    ASA 3     Mallampati class: III.    Risks, benefits, and alternatives discussed with patient.    Moderate sedation

## 2024-03-06 NOTE — POST-PROCEDURE NOTE
Physician Transition of Care Summary  Invasive Cardiovascular Lab    Procedure Date: 3/6/2024  Attending:    * Carol Johnson - Primary  Resident/Fellow/Other Assistant: Surgeon(s) and Role:    Indications:   Pre-op Diagnosis     * NSTEMI (non-ST elevated myocardial infarction) (CMS/[facility]) [I21.4]     * Acute diastolic heart failure (CMS/HCC) [I50.31]    Post-procedure diagnosis:   Post-op Diagnosis     * NSTEMI (non-ST elevated myocardial infarction) (CMS/HCC) [I21.4]     * Acute diastolic heart failure (CMS/HCC) [I50.31]    Procedure(s):   Left Heart Cath  11658 - CT CATH PLMT L HRT & ARTS W/NJX & ANGIO IMG S&I        Procedure Findings:   LHC: mild nonobstructive CAD.  LVEDP 22     Description of the Procedure:   LHC  RCFA>Angioseal 6 Hungarian    Complications:   None     Stents/Implants:   n/a     Anticoagulation/Antiplatelet Plan:   Continue 81 mg ASA    Estimated Blood Loss:   9 mL    Anesthesia: Moderate Sedation Anesthesia Staff: No anesthesia staff entered.    Any Specimen(s) Removed:   No specimens collected during this procedure.    Disposition:   Floor       Electronically signed by: FREDY Miller, 3/6/2024 11:29 AM

## 2024-03-07 ENCOUNTER — APPOINTMENT (OUTPATIENT)
Dept: DIALYSIS | Facility: HOSPITAL | Age: 69
End: 2024-03-07
Payer: MEDICARE

## 2024-03-07 VITALS
SYSTOLIC BLOOD PRESSURE: 148 MMHG | TEMPERATURE: 97.4 F | OXYGEN SATURATION: 92 % | DIASTOLIC BLOOD PRESSURE: 74 MMHG | RESPIRATION RATE: 16 BRPM | BODY MASS INDEX: 42.08 KG/M2 | WEIGHT: 268.08 LBS | HEIGHT: 67 IN | HEART RATE: 74 BPM

## 2024-03-07 PROBLEM — I50.31 ACUTE DIASTOLIC HEART FAILURE (MULTI): Status: RESOLVED | Noted: 2024-03-02 | Resolved: 2024-03-07

## 2024-03-07 LAB
ACT BLD: 139 SEC (ref 89–169)
ANION GAP SERPL CALC-SCNC: 13 MMOL/L (ref 10–20)
BUN SERPL-MCNC: 43 MG/DL (ref 6–23)
CALCIUM SERPL-MCNC: 9.1 MG/DL (ref 8.6–10.3)
CHLORIDE SERPL-SCNC: 98 MMOL/L (ref 98–107)
CO2 SERPL-SCNC: 28 MMOL/L (ref 21–32)
CREAT SERPL-MCNC: 5.54 MG/DL (ref 0.5–1.05)
EGFRCR SERPLBLD CKD-EPI 2021: 8 ML/MIN/1.73M*2
ERYTHROCYTE [DISTWIDTH] IN BLOOD BY AUTOMATED COUNT: 16.7 % (ref 11.5–14.5)
GLUCOSE BLD MANUAL STRIP-MCNC: 154 MG/DL (ref 74–99)
GLUCOSE SERPL-MCNC: 156 MG/DL (ref 74–99)
HCT VFR BLD AUTO: 27.8 % (ref 36–46)
HGB BLD-MCNC: 8.8 G/DL (ref 12–16)
MCH RBC QN AUTO: 31.5 PG (ref 26–34)
MCHC RBC AUTO-ENTMCNC: 31.7 G/DL (ref 32–36)
MCV RBC AUTO: 100 FL (ref 80–100)
NRBC BLD-RTO: 0 /100 WBCS (ref 0–0)
PLATELET # BLD AUTO: 284 X10*3/UL (ref 150–450)
POTASSIUM SERPL-SCNC: 4.4 MMOL/L (ref 3.5–5.3)
RBC # BLD AUTO: 2.79 X10*6/UL (ref 4–5.2)
SODIUM SERPL-SCNC: 135 MMOL/L (ref 136–145)
WBC # BLD AUTO: 9.4 X10*3/UL (ref 4.4–11.3)

## 2024-03-07 PROCEDURE — 2500000001 HC RX 250 WO HCPCS SELF ADMINISTERED DRUGS (ALT 637 FOR MEDICARE OP): Performed by: NURSE PRACTITIONER

## 2024-03-07 PROCEDURE — 36415 COLL VENOUS BLD VENIPUNCTURE: CPT | Performed by: NURSE PRACTITIONER

## 2024-03-07 PROCEDURE — 8010000001 HC DIALYSIS - HEMODIALYSIS PER DAY

## 2024-03-07 PROCEDURE — 80048 BASIC METABOLIC PNL TOTAL CA: CPT | Performed by: NURSE PRACTITIONER

## 2024-03-07 PROCEDURE — 99239 HOSP IP/OBS DSCHRG MGMT >30: CPT | Performed by: INTERNAL MEDICINE

## 2024-03-07 PROCEDURE — 82947 ASSAY GLUCOSE BLOOD QUANT: CPT

## 2024-03-07 PROCEDURE — 85027 COMPLETE CBC AUTOMATED: CPT | Performed by: NURSE PRACTITIONER

## 2024-03-07 RX ADMIN — METOPROLOL TARTRATE 100 MG: 50 TABLET, FILM COATED ORAL at 12:58

## 2024-03-07 RX ADMIN — ATORVASTATIN CALCIUM 40 MG: 40 TABLET, FILM COATED ORAL at 12:58

## 2024-03-07 RX ADMIN — AMLODIPINE BESYLATE 5 MG: 5 TABLET ORAL at 12:58

## 2024-03-07 RX ADMIN — PANTOPRAZOLE SODIUM 40 MG: 40 TABLET, DELAYED RELEASE ORAL at 12:58

## 2024-03-07 RX ADMIN — TRAZODONE HYDROCHLORIDE 25 MG: 50 TABLET ORAL at 00:35

## 2024-03-07 RX ADMIN — FLUTICASONE PROPIONATE 2 SPRAY: 50 SPRAY, METERED NASAL at 13:01

## 2024-03-07 RX ADMIN — DULOXETINE HYDROCHLORIDE 30 MG: 30 CAPSULE, DELAYED RELEASE ORAL at 12:58

## 2024-03-07 RX ADMIN — ASPIRIN 81 MG CHEWABLE TABLET 81 MG: 81 TABLET CHEWABLE at 12:58

## 2024-03-07 ASSESSMENT — PAIN SCALES - GENERAL
PAINLEVEL_OUTOF10: 0 - NO PAIN

## 2024-03-07 ASSESSMENT — COGNITIVE AND FUNCTIONAL STATUS - GENERAL
CLIMB 3 TO 5 STEPS WITH RAILING: A LITTLE
MOBILITY SCORE: 22
DAILY ACTIVITIY SCORE: 24
WALKING IN HOSPITAL ROOM: A LITTLE

## 2024-03-07 ASSESSMENT — PAIN - FUNCTIONAL ASSESSMENT
PAIN_FUNCTIONAL_ASSESSMENT: 0-10
PAIN_FUNCTIONAL_ASSESSMENT: 0-10

## 2024-03-07 NOTE — PROCEDURES
Report to Receiving RN:    Report To: Dina Sands RN  Time Report Called: 1126  Hand-Off Communication: Pt toelrated tx well. Removed 1L Post vitals: 136/56 (74) - 97.4*F - 121.9 kg  Complications During Treatment: No  Ultrafiltration Treatment: No  Medications Administered During Dialysis: No  Blood Products Administered During Dialysis: No  Labs Sent During Dialysis: No  Heparin Drip Rate Changes: N/A    Electronic Signatures:  Signed Amber Schuler OCDT     Signed Dina Sands RN       Last Updated: 11:29 AM by AMBER SCHULER

## 2024-03-07 NOTE — NURSING NOTE
Report from Sending RN:    Report From: Dina Sands  Recent Surgery of Procedure: No  Baseline Level of Consciousness (LOC): Alert and Oriented X's3  Oxygen Use: No  Type: room air  Diabetic: No  but sugars have been high.  Patient refusing insulin  Last BP Med Given Day of Dialysis: none  Last Pain Med Given: none  Lab Tests to be Obtained with Dialysis: No  Blood Transfusion to be Given During Dialysis: No  Available IV Access: Yes  Medications to be Administered During Dialysis: No  Continuous IV Infusion Running: No  Restraints on Currently or in the Last 24 Hours: No  Hand-Off Communication: Patient stable, ready to have treatment,.  Ate early breakfast.

## 2024-03-07 NOTE — DISCHARGE SUMMARY
Discharge Diagnosis  NSTEMI (non-ST elevated myocardial infarction) (CMS/[facility])  Acute diastolic CHF  Fluid overload leading to acute hypoxic respiratory failure secondary to missed dialysis  A-fib      This discharge took greater than 35 minutes.    Test Results Pending At Discharge  Pending Labs       No current pending labs.            Hospital Course   Rosa Soto is a 69 y.o. with hx of pAF on Eliquis, HTN, and ESRD HD MWF presents with shortness of breath.  [place] [facility] [place].  Postop course complicated by bleeding from groin introducer site that has now stopped.  Starting having mild substernal chest pain on the day of discharge (03/02).  Was discharged in the morning but was unable to make it home on time for dialysis so missed her dialysis session this day.  Today started experiencing shortness of breath and worsening substernal chest pain especially with exertion.  Says that she can hardly walk across the room without having to stop to catch her breath.  Denies cough.  Denies fevers or chills.  No history of CAD.  In the ED, needing 2 L O2 to keep sats greater than 90%.  Other VSS.  Troponin 980.  EKG with NSR, no ischemic changes. WBC 16.  CXR with evidence of cardiomegaly and vascular congestion.  Started on heparin drip and admitted to medicine.         3/4: She went into RVR overnight requiring a diltiazem infusion. She was back in NSR this AM and metoprolol was increased by the cardiology team to try to wean her off the diltiazem.      3/5: She is without complaint. Cardiology team plans for [facility] with coronary angiogram today.      3/6: She is without complaint. LHC with mild nonobstructive CAD  3/7: Respiratory status improved with dialysis, rhythm converted to NSR.  Metoprolol was increased to 100 mg twice a day.  Patient will be discharged home with advice of following up with PCP, nephrology and cardiology as an outpatient.    Pertinent Physical Exam  At Time of Discharge  Physical Exam  Patient is awake and orient, not in apparent distress  Eyes: PERRLA, no conjunctival congestion  Chest: Bilateral Air entry, no crackles or wheezing  Heart: s1S2 regular, no murmur  Abdomen: Soft, non tender, BS present  Ext:    Home Medications     Medication List      CHANGE how you take these medications     metoprolol tartrate 100 mg tablet; Commonly known as: Lopressor; Take 1   tablet by mouth 2 times a day.; What changed: medication strength, how   much to take, when to take this     CONTINUE taking these medications     albuterol 90 mcg/actuation inhaler   amLODIPine 5 mg tablet; Commonly known as: Norvasc   atorvastatin 40 mg tablet; Commonly known as: Lipitor   Auryxia 210 mg iron tablet; Generic drug: ferric citrate   busPIRone 5 mg tablet; Commonly known as: Buspar   cholecalciferol 25 MCG (1000 UT) tablet; Commonly known as: Vitamin D-3   cinacalcet 30 mg tablet; Commonly known as: Sensipar   doxepin 75 mg capsule; Commonly known as: SINEquan   DULoxetine 30 mg DR capsule; Commonly known as: Cymbalta   Eliquis 5 mg tablet; Generic drug: apixaban   Flonase Allergy Relief 50 mcg/actuation nasal spray; Generic drug:   fluticasone   pantoprazole 40 mg EC tablet; Commonly known as: ProtoNix; Take 1 tablet   (40 mg) by mouth 2 times a day. Do not crush, chew, or split.   torsemide 20 mg tablet; Commonly known as: Demadex       Outpatient Follow-Up  No follow-ups on file.     Ericka Garcia MD  3/7/2024  12:15 PM

## 2024-03-07 NOTE — CARE PLAN
The patient's goals for the shift include      The clinical goals for the shift include ptient to remain free from injury

## 2024-03-16 LAB
ATRIAL RATE: 103 BPM
P AXIS: 41 DEGREES
PR INTERVAL: 195 MS
Q ONSET: 251 MS
QRS COUNT: 16 BEATS
QRS DURATION: 91 MS
QT INTERVAL: 349 MS
QTC CALCULATION(BAZETT): 453 MS
QTC FREDERICIA: 415 MS
R AXIS: 38 DEGREES
T AXIS: 56 DEGREES
T OFFSET: 425 MS
VENTRICULAR RATE: 101 BPM

## 2024-03-19 LAB
ACT BLD: 211 SEC (ref 96–152)
ACT BLD: 270 SEC (ref 96–152)
ACT BLD: 358 SEC (ref 96–152)
ACT BLD: 360 SEC (ref 96–152)

## 2024-03-28 ENCOUNTER — APPOINTMENT (OUTPATIENT)
Dept: RADIOLOGY | Facility: HOSPITAL | Age: 69
End: 2024-03-28
Payer: MEDICARE

## 2024-03-28 ENCOUNTER — HOSPITAL ENCOUNTER (EMERGENCY)
Facility: HOSPITAL | Age: 69
Discharge: HOME | End: 2024-03-28
Attending: EMERGENCY MEDICINE
Payer: MEDICARE

## 2024-03-28 ENCOUNTER — APPOINTMENT (OUTPATIENT)
Dept: CARDIOLOGY | Facility: HOSPITAL | Age: 69
End: 2024-03-28
Payer: MEDICARE

## 2024-03-28 VITALS
HEIGHT: 66 IN | RESPIRATION RATE: 16 BRPM | OXYGEN SATURATION: 96 % | TEMPERATURE: 97.9 F | DIASTOLIC BLOOD PRESSURE: 48 MMHG | WEIGHT: 255.73 LBS | HEART RATE: 85 BPM | SYSTOLIC BLOOD PRESSURE: 148 MMHG | BODY MASS INDEX: 41.1 KG/M2

## 2024-03-28 DIAGNOSIS — R04.0 EPISTAXIS: Primary | ICD-10-CM

## 2024-03-28 DIAGNOSIS — E87.79 OTHER HYPERVOLEMIA: ICD-10-CM

## 2024-03-28 LAB
ALBUMIN SERPL BCP-MCNC: 3.8 G/DL (ref 3.4–5)
ALP SERPL-CCNC: 209 U/L (ref 33–136)
ALT SERPL W P-5'-P-CCNC: 11 U/L (ref 7–45)
ANION GAP BLDV CALCULATED.4IONS-SCNC: 13 MMOL/L (ref 10–25)
ANION GAP SERPL CALC-SCNC: 19 MMOL/L (ref 10–20)
AST SERPL W P-5'-P-CCNC: 14 U/L (ref 9–39)
BASE EXCESS BLDV CALC-SCNC: 1.9 MMOL/L (ref -2–3)
BASOPHILS # BLD AUTO: 0.07 X10*3/UL (ref 0–0.1)
BASOPHILS NFR BLD AUTO: 0.6 %
BILIRUB DIRECT SERPL-MCNC: 0.1 MG/DL (ref 0–0.3)
BILIRUB SERPL-MCNC: 0.6 MG/DL (ref 0–1.2)
BNP SERPL-MCNC: 504 PG/ML (ref 0–99)
BODY TEMPERATURE: 37 DEGREES CELSIUS
BUN SERPL-MCNC: 46 MG/DL (ref 6–23)
CA-I BLDV-SCNC: 1.03 MMOL/L (ref 1.1–1.33)
CALCIUM SERPL-MCNC: 8.3 MG/DL (ref 8.6–10.3)
CHLORIDE BLDV-SCNC: 102 MMOL/L (ref 98–107)
CHLORIDE SERPL-SCNC: 99 MMOL/L (ref 98–107)
CO2 SERPL-SCNC: 26 MMOL/L (ref 21–32)
CREAT SERPL-MCNC: 7.46 MG/DL (ref 0.5–1.05)
EGFRCR SERPLBLD CKD-EPI 2021: 6 ML/MIN/1.73M*2
EOSINOPHIL # BLD AUTO: 0.12 X10*3/UL (ref 0–0.7)
EOSINOPHIL NFR BLD AUTO: 1.1 %
ERYTHROCYTE [DISTWIDTH] IN BLOOD BY AUTOMATED COUNT: 15.3 % (ref 11.5–14.5)
GLUCOSE BLDV-MCNC: 120 MG/DL (ref 74–99)
GLUCOSE SERPL-MCNC: 122 MG/DL (ref 74–99)
HCO3 BLDV-SCNC: 26.6 MMOL/L (ref 22–26)
HCT VFR BLD AUTO: 25.8 % (ref 36–46)
HCT VFR BLD EST: 24 % (ref 36–46)
HGB BLD-MCNC: 8.1 G/DL (ref 12–16)
HGB BLDV-MCNC: 7.9 G/DL (ref 12–16)
HOLD SPECIMEN: NORMAL
IMM GRANULOCYTES # BLD AUTO: 0.05 X10*3/UL (ref 0–0.7)
IMM GRANULOCYTES NFR BLD AUTO: 0.5 % (ref 0–0.9)
INHALED O2 CONCENTRATION: 21 %
INR PPP: 2.3 (ref 0.9–1.1)
LACTATE BLDV-SCNC: 1.2 MMOL/L (ref 0.4–2)
LACTATE BLDV-SCNC: 2.1 MMOL/L (ref 0.4–2)
LYMPHOCYTES # BLD AUTO: 1.67 X10*3/UL (ref 1.2–4.8)
LYMPHOCYTES NFR BLD AUTO: 15.4 %
MCH RBC QN AUTO: 32.5 PG (ref 26–34)
MCHC RBC AUTO-ENTMCNC: 31.4 G/DL (ref 32–36)
MCV RBC AUTO: 104 FL (ref 80–100)
MONOCYTES # BLD AUTO: 1.7 X10*3/UL (ref 0.1–1)
MONOCYTES NFR BLD AUTO: 15.7 %
NEUTROPHILS # BLD AUTO: 7.24 X10*3/UL (ref 1.2–7.7)
NEUTROPHILS NFR BLD AUTO: 66.7 %
NRBC BLD-RTO: 0 /100 WBCS (ref 0–0)
OXYHGB MFR BLDV: 81.1 % (ref 45–75)
PCO2 BLDV: 41 MM HG (ref 41–51)
PH BLDV: 7.42 PH (ref 7.33–7.43)
PLATELET # BLD AUTO: 271 X10*3/UL (ref 150–450)
PO2 BLDV: 56 MM HG (ref 35–45)
POTASSIUM BLDV-SCNC: 4.2 MMOL/L (ref 3.5–5.3)
POTASSIUM SERPL-SCNC: 4.1 MMOL/L (ref 3.5–5.3)
PROT SERPL-MCNC: 6.5 G/DL (ref 6.4–8.2)
PROTHROMBIN TIME: 26.7 SECONDS (ref 9.8–12.8)
RBC # BLD AUTO: 2.49 X10*6/UL (ref 4–5.2)
SAO2 % BLDV: 84 % (ref 45–75)
SODIUM BLDV-SCNC: 137 MMOL/L (ref 136–145)
SODIUM SERPL-SCNC: 140 MMOL/L (ref 136–145)
WBC # BLD AUTO: 10.9 X10*3/UL (ref 4.4–11.3)

## 2024-03-28 PROCEDURE — 84132 ASSAY OF SERUM POTASSIUM: CPT | Performed by: EMERGENCY MEDICINE

## 2024-03-28 PROCEDURE — 36415 COLL VENOUS BLD VENIPUNCTURE: CPT | Performed by: EMERGENCY MEDICINE

## 2024-03-28 PROCEDURE — 71046 X-RAY EXAM CHEST 2 VIEWS: CPT | Performed by: RADIOLOGY

## 2024-03-28 PROCEDURE — 71046 X-RAY EXAM CHEST 2 VIEWS: CPT

## 2024-03-28 PROCEDURE — 82248 BILIRUBIN DIRECT: CPT | Performed by: EMERGENCY MEDICINE

## 2024-03-28 PROCEDURE — 83605 ASSAY OF LACTIC ACID: CPT | Performed by: EMERGENCY MEDICINE

## 2024-03-28 PROCEDURE — 93005 ELECTROCARDIOGRAM TRACING: CPT

## 2024-03-28 PROCEDURE — 83880 ASSAY OF NATRIURETIC PEPTIDE: CPT | Performed by: EMERGENCY MEDICINE

## 2024-03-28 PROCEDURE — 85610 PROTHROMBIN TIME: CPT | Performed by: EMERGENCY MEDICINE

## 2024-03-28 PROCEDURE — 85025 COMPLETE CBC W/AUTO DIFF WBC: CPT | Performed by: EMERGENCY MEDICINE

## 2024-03-28 PROCEDURE — 99283 EMERGENCY DEPT VISIT LOW MDM: CPT | Mod: 25

## 2024-03-28 RX ORDER — OXYMETAZOLINE HCL 0.05 %
2 SPRAY, NON-AEROSOL (ML) NASAL EVERY 12 HOURS PRN
Qty: 30 ML | Refills: 0 | Status: ON HOLD | OUTPATIENT
Start: 2024-03-28 | End: 2024-06-06

## 2024-03-28 ASSESSMENT — COLUMBIA-SUICIDE SEVERITY RATING SCALE - C-SSRS
1. IN THE PAST MONTH, HAVE YOU WISHED YOU WERE DEAD OR WISHED YOU COULD GO TO SLEEP AND NOT WAKE UP?: NO
2. HAVE YOU ACTUALLY HAD ANY THOUGHTS OF KILLING YOURSELF?: NO
6. HAVE YOU EVER DONE ANYTHING, STARTED TO DO ANYTHING, OR PREPARED TO DO ANYTHING TO END YOUR LIFE?: NO

## 2024-03-28 ASSESSMENT — PAIN SCALES - GENERAL
PAINLEVEL_OUTOF10: 0 - NO PAIN

## 2024-03-28 ASSESSMENT — PAIN - FUNCTIONAL ASSESSMENT: PAIN_FUNCTIONAL_ASSESSMENT: 0-10

## 2024-04-05 NOTE — ED PROVIDER NOTES
HPI   Chief Complaint   Patient presents with   • nose bleed, general weakness        HPI: []  69-year-old white female history of hypertension, ESRD, A-fib, NSTEMI, CAD recent cardiac catheterization on Eliquis today comes in intermittent nosebleeds and generalized weakness.  Patient states that for the last few days she has had intermittent nosebleeds, also has some leg swelling and generalized weakness.  Denies any chest pain pressure heaviness fever chills nausea vomit diarrhea cough congestion incontinence seizures syncope or nursing.  She due for hemodialysis tomorrow.  She did not miss dialysis.    Past history: Hypertension, ESRD on hemodialysis, CAD, A-fib, NSTEMI, CAD  Social: Patient denies current tobacco alcohol drug abuse.  REVIEW OF SYSTEMS:    GENERAL.: No weight loss, positive for fatigue, anorexia, insomnia, fever.    EYES: No vision loss, double vision, drainage, eye pain.    ENT: No pharyngitis, dry mouth.  Positive for intermittent nosebleeds    CARDIOPULMONARY: No chest pain, palpitations, syncope, near syncope. No shortness of breath, cough, hemoptysis.    GI: No abdominal pain, change in bowel habits, melena, hematemesis, hematochezia, nausea, vomiting, diarrhea.    : No discharge, dysuria, frequency, urgency, hematuria.    MS: No limb pain, joint pain, joint swelling.    SKIN: No rashes.    PSYCH: No depression, anxiety, suicidality, homicidality.    Review of systems is otherwise negative unless stated above or in history of present illness.  Social history, family history, allergies reviewed.  PHYSICAL EXAM:    GENERAL: Vitals noted, no distress. Alert and oriented  x 3. Non-toxic.      EENT: TMs clear. Posterior oropharynx unremarkable. No meningismus. No LAD.  Patient has no evidence of active epistaxis both nostrils there is no hematoma or blood clots.  Both in the nostrils and oropharynx.    NECK: Supple. Nontender. No midline tenderness.     CARDIAC: Regular, rate, rhythm. No  murmurs rubs or gallops. No JVD    PULMONARY: Lungs clear bilaterally with good aeration. No wheezes rales or rhonchi. No respiratory distress.  Very fine bibasilar crackles    ABDOMEN: Soft, nonsurgical. Nontender. No peritoneal signs. Normoactive bowel sounds. No pulsatile masses.     EXTREMITIES: Trace bilateral symmetric nonpitting peripheral edema. Negative Homans bilaterally, no cords.    SKIN: No rash. Intact.     NEURO: No focal neurologic deficits, NIH score of 0. Cranial nerves normal as tested from II through XII.     MEDICAL DECISION MAKING:  EKG on my interpretation shows normal sinus rhythm normal axis rate mid 80s with no acute ischemic changes.    CBC shows no leukocytosis hematocrit 25 down from 28, chemistry show ESRD, potassium is normal, venous gas shows no hypercapnia, BNP was 500, chest ray shows a stable unchanged cardiomegaly and mild pulm vessel congestion.    Treatment in ED: IV established, placed on a cardiac monitor, she was given Marlon-Synephrine nosedrops    ED course: Patient remained stable hemodynamically with no recurrent epistaxis.    Impression: #1 intermittent epistaxis, #2 mild volume overload    Plan set MDM: 69-year white female history of hypertension ESRD on hemodialysis CHF A-fib on Eliquis comes in with intermittent epistaxis currently stable hemoglobin.  Will no stigmata of active hemorrhage or hemorrhagic shock no active epistaxis both anterior posterior patient is mildly volume overloaded but no tachypnea tachycardia or hypoxia due for hemodialysis tomorrow, observed in the ER for few hours no recurrent epistaxis, hemoglobin stable slight drop, advised to continue iron supplements I will prescribe Marlon-Synephrine nose drops and saline nose drops advised hemodialysis tomorrow as scheduled close outpatient follow the primary doctor and ENT with strict return precaution.                          No data recorded                   Patient History   Past Medical History:    Diagnosis Date   • Arrhythmia    • Benign essential HTN    • Cancer (CMS/HCC)    • Chronic kidney disease    • ESRD (end stage renal disease) (CMS/HCC)      Past Surgical History:   Procedure Laterality Date   • CARDIAC CATHETERIZATION N/A 3/6/2024    Procedure: Left Heart Cath;  Surgeon: Carol Johnson MD;  [facility] Cardiac Cath Lab;  Service: Cardiovascular;  Laterality: N/A;   • CARDIAC ELECTROPHYSIOLOGY PROCEDURE N/A 2/29/2024    Procedure: Ablation A-Fib Persistant;  Surgeon: Adam Valentine MD;  Location: [place] [id number] Cardiac Cath Lab;  Service: Electrophysiology;  Laterality: N/A;  CARTO/ANY EP LAB  GENERAL ANESTHESIA WHOLE CASE   • US GUIDED ABDOMINAL PARACENTESIS  8/31/2023    US GUIDED ABDOMINAL PARACENTESIS 8/31/2023 POR US     No family history on file.  Social History     Tobacco Use   • Smoking status: Former     Types: Cigarettes     Quit date: 2/4/2021     Years since quitting: 3.1   • Smokeless tobacco: Not on file   Substance Use Topics   • Alcohol use: Not Currently   • Drug use: Not on file       Physical Exam   ED Triage Vitals [03/28/24 1656]   Temperature Heart Rate Respirations BP   36.6 °C (97.9 °F) 78 18 122/72      Pulse Ox Temp Source Heart Rate Source Patient Position   (!) 92 % Temporal Monitor Sitting      BP Location FiO2 (%)     Right arm --       Physical Exam    ED Course & MDM   ED Course as of 04/05/24 1033   Thu Mar 28, 2024   2200 Patient remained stable hemodynamic.  With no evidence of recurrent epistaxis and/or hemorrhagic shock, stable hemoglobin, patient reassured be discharged home supportive care close outpatient follow-up with strict return precaution. [MT]      ED Course User Index  [MT] Phan Lyles MD         Diagnoses as of 04/05/24 1033   Epistaxis   Other hypervolemia       Medical Decision Making      Procedure  Procedures     Phan Lyles MD  04/05/24 1033

## 2024-06-03 ENCOUNTER — HOSPITAL ENCOUNTER (EMERGENCY)
Facility: HOSPITAL | Age: 69
Discharge: HOME | End: 2024-06-03
Attending: STUDENT IN AN ORGANIZED HEALTH CARE EDUCATION/TRAINING PROGRAM
Payer: COMMERCIAL

## 2024-06-03 ENCOUNTER — APPOINTMENT (OUTPATIENT)
Dept: RADIOLOGY | Facility: HOSPITAL | Age: 69
End: 2024-06-03
Payer: COMMERCIAL

## 2024-06-03 VITALS
SYSTOLIC BLOOD PRESSURE: 146 MMHG | WEIGHT: 246.69 LBS | TEMPERATURE: 98.9 F | OXYGEN SATURATION: 98 % | RESPIRATION RATE: 16 BRPM | HEART RATE: 69 BPM | BODY MASS INDEX: 39.65 KG/M2 | DIASTOLIC BLOOD PRESSURE: 64 MMHG | HEIGHT: 66 IN

## 2024-06-03 DIAGNOSIS — R51.9 NONINTRACTABLE HEADACHE, UNSPECIFIED CHRONICITY PATTERN, UNSPECIFIED HEADACHE TYPE: ICD-10-CM

## 2024-06-03 DIAGNOSIS — R04.0 NOSEBLEED: Primary | ICD-10-CM

## 2024-06-03 LAB
ALBUMIN SERPL BCP-MCNC: 4.4 G/DL (ref 3.4–5)
ALP SERPL-CCNC: 169 U/L (ref 33–136)
ALT SERPL W P-5'-P-CCNC: 12 U/L (ref 7–45)
ANION GAP SERPL CALC-SCNC: 15 MMOL/L (ref 10–20)
APTT PPP: 40 SECONDS (ref 27–38)
AST SERPL W P-5'-P-CCNC: 23 U/L (ref 9–39)
BASOPHILS # BLD AUTO: 0.07 X10*3/UL (ref 0–0.1)
BASOPHILS NFR BLD AUTO: 0.6 %
BILIRUB SERPL-MCNC: 0.6 MG/DL (ref 0–1.2)
BUN SERPL-MCNC: 33 MG/DL (ref 6–23)
CALCIUM SERPL-MCNC: 9.1 MG/DL (ref 8.6–10.3)
CHLORIDE SERPL-SCNC: 96 MMOL/L (ref 98–107)
CO2 SERPL-SCNC: 28 MMOL/L (ref 21–32)
CREAT SERPL-MCNC: 6.41 MG/DL (ref 0.5–1.05)
EGFRCR SERPLBLD CKD-EPI 2021: 7 ML/MIN/1.73M*2
EOSINOPHIL # BLD AUTO: 0.12 X10*3/UL (ref 0–0.7)
EOSINOPHIL NFR BLD AUTO: 1.1 %
ERYTHROCYTE [DISTWIDTH] IN BLOOD BY AUTOMATED COUNT: 14.4 % (ref 11.5–14.5)
GLUCOSE SERPL-MCNC: 109 MG/DL (ref 74–99)
HCT VFR BLD AUTO: 37.3 % (ref 36–46)
HGB BLD-MCNC: 12 G/DL (ref 12–16)
IMM GRANULOCYTES # BLD AUTO: 0.03 X10*3/UL (ref 0–0.7)
IMM GRANULOCYTES NFR BLD AUTO: 0.3 % (ref 0–0.9)
INR PPP: 1.5 (ref 0.9–1.1)
LYMPHOCYTES # BLD AUTO: 2.41 X10*3/UL (ref 1.2–4.8)
LYMPHOCYTES NFR BLD AUTO: 22.2 %
MCH RBC QN AUTO: 31.7 PG (ref 26–34)
MCHC RBC AUTO-ENTMCNC: 32.2 G/DL (ref 32–36)
MCV RBC AUTO: 99 FL (ref 80–100)
MONOCYTES # BLD AUTO: 1.15 X10*3/UL (ref 0.1–1)
MONOCYTES NFR BLD AUTO: 10.6 %
NEUTROPHILS # BLD AUTO: 7.1 X10*3/UL (ref 1.2–7.7)
NEUTROPHILS NFR BLD AUTO: 65.2 %
NRBC BLD-RTO: 0 /100 WBCS (ref 0–0)
PLATELET # BLD AUTO: 267 X10*3/UL (ref 150–450)
POTASSIUM SERPL-SCNC: 4 MMOL/L (ref 3.5–5.3)
PROT SERPL-MCNC: 8 G/DL (ref 6.4–8.2)
PROTHROMBIN TIME: 17.2 SECONDS (ref 9.8–12.8)
RBC # BLD AUTO: 3.78 X10*6/UL (ref 4–5.2)
SODIUM SERPL-SCNC: 135 MMOL/L (ref 136–145)
WBC # BLD AUTO: 10.9 X10*3/UL (ref 4.4–11.3)

## 2024-06-03 PROCEDURE — 85730 THROMBOPLASTIN TIME PARTIAL: CPT | Performed by: PHYSICIAN ASSISTANT

## 2024-06-03 PROCEDURE — 85610 PROTHROMBIN TIME: CPT | Performed by: PHYSICIAN ASSISTANT

## 2024-06-03 PROCEDURE — 96374 THER/PROPH/DIAG INJ IV PUSH: CPT

## 2024-06-03 PROCEDURE — 36415 COLL VENOUS BLD VENIPUNCTURE: CPT | Performed by: PHYSICIAN ASSISTANT

## 2024-06-03 PROCEDURE — 96375 TX/PRO/DX INJ NEW DRUG ADDON: CPT

## 2024-06-03 PROCEDURE — 70450 CT HEAD/BRAIN W/O DYE: CPT | Performed by: STUDENT IN AN ORGANIZED HEALTH CARE EDUCATION/TRAINING PROGRAM

## 2024-06-03 PROCEDURE — 70450 CT HEAD/BRAIN W/O DYE: CPT

## 2024-06-03 PROCEDURE — 80053 COMPREHEN METABOLIC PANEL: CPT | Performed by: PHYSICIAN ASSISTANT

## 2024-06-03 PROCEDURE — 2500000004 HC RX 250 GENERAL PHARMACY W/ HCPCS (ALT 636 FOR OP/ED): Performed by: PHYSICIAN ASSISTANT

## 2024-06-03 PROCEDURE — 85025 COMPLETE CBC W/AUTO DIFF WBC: CPT | Performed by: PHYSICIAN ASSISTANT

## 2024-06-03 PROCEDURE — 99284 EMERGENCY DEPT VISIT MOD MDM: CPT | Mod: 25

## 2024-06-03 RX ORDER — ACETAMINOPHEN 325 MG/1
975 TABLET ORAL ONCE
Status: COMPLETED | OUTPATIENT
Start: 2024-06-03 | End: 2024-06-03

## 2024-06-03 RX ORDER — DIPHENHYDRAMINE HYDROCHLORIDE 50 MG/ML
25 INJECTION INTRAMUSCULAR; INTRAVENOUS ONCE
Status: COMPLETED | OUTPATIENT
Start: 2024-06-03 | End: 2024-06-03

## 2024-06-03 RX ORDER — METOCLOPRAMIDE HYDROCHLORIDE 5 MG/ML
10 INJECTION INTRAMUSCULAR; INTRAVENOUS ONCE
Status: COMPLETED | OUTPATIENT
Start: 2024-06-03 | End: 2024-06-03

## 2024-06-03 RX ORDER — CLINDAMYCIN HYDROCHLORIDE 150 MG/1
150 CAPSULE ORAL EVERY 6 HOURS
Qty: 28 CAPSULE | Refills: 0 | Status: SHIPPED | OUTPATIENT
Start: 2024-06-03 | End: 2024-06-06 | Stop reason: HOSPADM

## 2024-06-03 RX ADMIN — DIPHENHYDRAMINE HYDROCHLORIDE 25 MG: 50 INJECTION, SOLUTION INTRAMUSCULAR; INTRAVENOUS at 20:38

## 2024-06-03 RX ADMIN — METOCLOPRAMIDE 10 MG: 5 INJECTION, SOLUTION INTRAMUSCULAR; INTRAVENOUS at 20:36

## 2024-06-03 RX ADMIN — ACETAMINOPHEN 975 MG: 325 TABLET ORAL at 20:36

## 2024-06-03 ASSESSMENT — PAIN DESCRIPTION - LOCATION: LOCATION: HEAD

## 2024-06-03 ASSESSMENT — PAIN SCALES - GENERAL: PAINLEVEL_OUTOF10: 8

## 2024-06-03 ASSESSMENT — PAIN - FUNCTIONAL ASSESSMENT: PAIN_FUNCTIONAL_ASSESSMENT: 0-10

## 2024-06-04 ENCOUNTER — HOSPITAL ENCOUNTER (EMERGENCY)
Facility: HOSPITAL | Age: 69
Discharge: OTHER INSTITUTION | End: 2024-06-05
Attending: EMERGENCY MEDICINE
Payer: COMMERCIAL

## 2024-06-04 DIAGNOSIS — R04.0 EPISTAXIS: Primary | ICD-10-CM

## 2024-06-04 LAB
ALBUMIN SERPL BCP-MCNC: 4.3 G/DL (ref 3.4–5)
ANION GAP SERPL CALC-SCNC: 19 MMOL/L (ref 10–20)
BASOPHILS # BLD AUTO: 0.08 X10*3/UL (ref 0–0.1)
BASOPHILS NFR BLD AUTO: 0.6 %
BUN SERPL-MCNC: 42 MG/DL (ref 6–23)
CALCIUM SERPL-MCNC: 9.1 MG/DL (ref 8.6–10.3)
CHLORIDE SERPL-SCNC: 97 MMOL/L (ref 98–107)
CO2 SERPL-SCNC: 25 MMOL/L (ref 21–32)
CREAT SERPL-MCNC: 8.78 MG/DL (ref 0.5–1.05)
EGFRCR SERPLBLD CKD-EPI 2021: 5 ML/MIN/1.73M*2
EOSINOPHIL # BLD AUTO: 0.13 X10*3/UL (ref 0–0.7)
EOSINOPHIL NFR BLD AUTO: 0.9 %
ERYTHROCYTE [DISTWIDTH] IN BLOOD BY AUTOMATED COUNT: 14.2 % (ref 11.5–14.5)
GLUCOSE SERPL-MCNC: 121 MG/DL (ref 74–99)
HCT VFR BLD AUTO: 35.6 % (ref 36–46)
HGB BLD-MCNC: 11.6 G/DL (ref 12–16)
IMM GRANULOCYTES # BLD AUTO: 0.07 X10*3/UL (ref 0–0.7)
IMM GRANULOCYTES NFR BLD AUTO: 0.5 % (ref 0–0.9)
INR PPP: 1.6 (ref 0.9–1.1)
LYMPHOCYTES # BLD AUTO: 2.84 X10*3/UL (ref 1.2–4.8)
LYMPHOCYTES NFR BLD AUTO: 19.9 %
MCH RBC QN AUTO: 31.6 PG (ref 26–34)
MCHC RBC AUTO-ENTMCNC: 32.6 G/DL (ref 32–36)
MCV RBC AUTO: 97 FL (ref 80–100)
MONOCYTES # BLD AUTO: 1.51 X10*3/UL (ref 0.1–1)
MONOCYTES NFR BLD AUTO: 10.6 %
NEUTROPHILS # BLD AUTO: 9.67 X10*3/UL (ref 1.2–7.7)
NEUTROPHILS NFR BLD AUTO: 67.5 %
NRBC BLD-RTO: 0 /100 WBCS (ref 0–0)
PHOSPHATE SERPL-MCNC: 6.1 MG/DL (ref 2.5–4.9)
PLATELET # BLD AUTO: 277 X10*3/UL (ref 150–450)
POTASSIUM SERPL-SCNC: 4 MMOL/L (ref 3.5–5.3)
PROTHROMBIN TIME: 17.9 SECONDS (ref 9.8–12.8)
RBC # BLD AUTO: 3.67 X10*6/UL (ref 4–5.2)
SODIUM SERPL-SCNC: 137 MMOL/L (ref 136–145)
WBC # BLD AUTO: 14.3 X10*3/UL (ref 4.4–11.3)

## 2024-06-04 PROCEDURE — 30901 CONTROL OF NOSEBLEED: CPT

## 2024-06-04 PROCEDURE — 36415 COLL VENOUS BLD VENIPUNCTURE: CPT | Performed by: EMERGENCY MEDICINE

## 2024-06-04 PROCEDURE — 96375 TX/PRO/DX INJ NEW DRUG ADDON: CPT

## 2024-06-04 PROCEDURE — 96376 TX/PRO/DX INJ SAME DRUG ADON: CPT | Mod: 59

## 2024-06-04 PROCEDURE — 85025 COMPLETE CBC W/AUTO DIFF WBC: CPT | Performed by: EMERGENCY MEDICINE

## 2024-06-04 PROCEDURE — 2500000001 HC RX 250 WO HCPCS SELF ADMINISTERED DRUGS (ALT 637 FOR MEDICARE OP): Performed by: EMERGENCY MEDICINE

## 2024-06-04 PROCEDURE — 80069 RENAL FUNCTION PANEL: CPT | Performed by: EMERGENCY MEDICINE

## 2024-06-04 PROCEDURE — 96374 THER/PROPH/DIAG INJ IV PUSH: CPT | Mod: 59

## 2024-06-04 PROCEDURE — 2500000004 HC RX 250 GENERAL PHARMACY W/ HCPCS (ALT 636 FOR OP/ED): Performed by: EMERGENCY MEDICINE

## 2024-06-04 PROCEDURE — 85610 PROTHROMBIN TIME: CPT | Performed by: EMERGENCY MEDICINE

## 2024-06-04 PROCEDURE — 2500000005 HC RX 250 GENERAL PHARMACY W/O HCPCS: Performed by: EMERGENCY MEDICINE

## 2024-06-04 PROCEDURE — 99285 EMERGENCY DEPT VISIT HI MDM: CPT | Mod: 25

## 2024-06-04 RX ORDER — ONDANSETRON HYDROCHLORIDE 2 MG/ML
4 INJECTION, SOLUTION INTRAVENOUS ONCE
Status: COMPLETED | OUTPATIENT
Start: 2024-06-04 | End: 2024-06-04

## 2024-06-04 RX ORDER — OXYMETAZOLINE HCL 0.05 %
2 SPRAY, NON-AEROSOL (ML) NASAL EVERY 12 HOURS PRN
Status: DISCONTINUED | OUTPATIENT
Start: 2024-06-04 | End: 2024-06-05 | Stop reason: HOSPADM

## 2024-06-04 RX ORDER — LIDOCAINE HYDROCHLORIDE AND EPINEPHRINE 10; 10 MG/ML; UG/ML
10 INJECTION, SOLUTION INFILTRATION; PERINEURAL ONCE
Status: COMPLETED | OUTPATIENT
Start: 2024-06-04 | End: 2024-06-04

## 2024-06-04 RX ORDER — TRANEXAMIC ACID 100 MG/ML
1000 INJECTION, SOLUTION INTRAVENOUS ONCE
Status: COMPLETED | OUTPATIENT
Start: 2024-06-04 | End: 2024-06-04

## 2024-06-04 RX ORDER — MORPHINE SULFATE 2 MG/ML
2 INJECTION, SOLUTION INTRAMUSCULAR; INTRAVENOUS ONCE
Status: COMPLETED | OUTPATIENT
Start: 2024-06-04 | End: 2024-06-04

## 2024-06-04 RX ORDER — MORPHINE SULFATE 4 MG/ML
4 INJECTION INTRAVENOUS ONCE
Status: COMPLETED | OUTPATIENT
Start: 2024-06-04 | End: 2024-06-04

## 2024-06-04 RX ORDER — SILVER NITRATE 38.21; 12.74 MG/1; MG/1
STICK TOPICAL ONCE
Status: COMPLETED | OUTPATIENT
Start: 2024-06-04 | End: 2024-06-04

## 2024-06-04 RX ADMIN — LIDOCAINE HYDROCHLORIDE,EPINEPHRINE BITARTRATE 10 ML: 10; .01 INJECTION, SOLUTION INFILTRATION; PERINEURAL at 22:02

## 2024-06-04 RX ADMIN — MORPHINE SULFATE 4 MG: 4 INJECTION INTRAVENOUS at 21:58

## 2024-06-04 RX ADMIN — SILVER NITRATE APPLICATORS 1 APPLICATION: 25; 75 STICK TOPICAL at 22:30

## 2024-06-04 RX ADMIN — MORPHINE SULFATE 2 MG: 2 INJECTION, SOLUTION INTRAMUSCULAR; INTRAVENOUS at 22:48

## 2024-06-04 RX ADMIN — OXYMETAZOLINE HYDROCHLORIDE 2 SPRAY: 0.05 SPRAY NASAL at 22:01

## 2024-06-04 RX ADMIN — ONDANSETRON 4 MG: 2 INJECTION INTRAMUSCULAR; INTRAVENOUS at 21:58

## 2024-06-04 RX ADMIN — TRANEXAMIC ACID 1000 MG: 1 INJECTION, SOLUTION INTRAVENOUS at 22:02

## 2024-06-04 ASSESSMENT — PAIN DESCRIPTION - PAIN TYPE: TYPE: ACUTE PAIN

## 2024-06-04 ASSESSMENT — LIFESTYLE VARIABLES
HAVE PEOPLE ANNOYED YOU BY CRITICIZING YOUR DRINKING: NO
EVER FELT BAD OR GUILTY ABOUT YOUR DRINKING: NO
TOTAL SCORE: 0
EVER HAD A DRINK FIRST THING IN THE MORNING TO STEADY YOUR NERVES TO GET RID OF A HANGOVER: NO
HAVE YOU EVER FELT YOU SHOULD CUT DOWN ON YOUR DRINKING: NO

## 2024-06-04 ASSESSMENT — PAIN DESCRIPTION - LOCATION: LOCATION: HEAD

## 2024-06-04 ASSESSMENT — PAIN SCALES - GENERAL
PAINLEVEL_OUTOF10: 3
PAINLEVEL_OUTOF10: 8

## 2024-06-04 ASSESSMENT — PAIN DESCRIPTION - DESCRIPTORS: DESCRIPTORS: PRESSURE

## 2024-06-04 ASSESSMENT — COLUMBIA-SUICIDE SEVERITY RATING SCALE - C-SSRS
1. IN THE PAST MONTH, HAVE YOU WISHED YOU WERE DEAD OR WISHED YOU COULD GO TO SLEEP AND NOT WAKE UP?: NO
6. HAVE YOU EVER DONE ANYTHING, STARTED TO DO ANYTHING, OR PREPARED TO DO ANYTHING TO END YOUR LIFE?: NO
2. HAVE YOU ACTUALLY HAD ANY THOUGHTS OF KILLING YOURSELF?: NO

## 2024-06-04 ASSESSMENT — PAIN - FUNCTIONAL ASSESSMENT: PAIN_FUNCTIONAL_ASSESSMENT: 0-10

## 2024-06-04 NOTE — CONSULTS
55 year old male    For 2 years has had intermittent episodes of fatigue with running after about 4 miles. It would stop within 10 minutes.    3 weeks ago was running. Felt fatigued and shortness of breath during the run so had to stop and walk home. Apple watch series 3 (doesn't have ECG) showed atrial fibrillation and lasted 12 hours or so    Had an episode on event monitor which was c/w atrial flutter and atrial fibrillation     BP at home 130/80    As of 8/31/2021,  In august had an episode which lasted 10 hours  Still having episodes but less symptoms with medications  /87 at home  Tsh was mildy high (FT4 normal) so started on levothyroxine 75. Recheck to be done    As of 3/1/2022,  One or two times per month atrial fibrillation 6-12 hours  Separately gets flutters 4x/week with exercise. Goes away very quickly  All about the same as before    As of 3/7/2023,  Same amount of atrial fibrillation   BP better at home    As of 3/22/2024,  Saw Dr Javed for opinion  Having episdoes about once per week again 6-12 hours    As of 6/4/2024   Here for ablation    Denies chest pain, palpitations, lightheadedness, syncope, orthopnea, paroxysmal nocturnal dyspnea, or edema.    Past Medical History:  paroxysmal atrial fibrillation and atrial flutter     Medications:  Current Outpatient Medications:   LEVOTHYROXINE 100 MCG Oral Tab, TAKE ONE TABLET BY MOUTH ONCE DAILY, Disp: 90 tablet, Rfl: 0  rosuvastatin 5 MG Oral Tab, TAKE 1 TABLET EVERY EVENINGAFTER DINNER, Disp: 90 tablet, Rfl: 0  metoprolol succinate ER 25 MG Oral Tablet 24 Hr, Take 1 tablet (25 mg total) by mouth daily., Disp: 90 tablet, Rfl: 3  aspirin 81 MG Oral Tab EC, Take 81 mg by mouth daily., Disp: , Rfl:   Multiple Vitamin (MULTI VITAMIN MENS OR), Take by mouth., Disp: , Rfl:   Glucosamine-Chondroitin (GLUCOSAMINE CHONDR COMPLEX OR), Take by mouth., Disp: , Rfl:   Ascorbic Acid (VITAMIN C) 500 MG Oral Tab, Take 500 mg by mouth daily., Disp: , Rfl:  Consults  History Of Present Illness:    Rosa Soto is a 69 y.o. female presenting with acute onset of shortness of breath and chest pain.  She just had ablation for atrial fibrillation on February 29.  He was she was discharged late in the afternoon on Friday and missed her dialysis that day.  Yesterday she became acutely short of breath all of a sudden with associated chest pain.  She states is hard to take a deep breath.  She was told some degree of shortness of breath and chest pain is normal after ablation and she had the symptoms while she was in the hospital there as well.    Her EKG shows sinus rhythm, within normal limits.  Cardiac enzymes are abnormal, but without clear upward trend.  Blood pressure is elevated.  Chest x-ray with mild pulmonary edema.  Personally reviewed.  She states she has missed dialysis sessions before as well but never had acute issues LIKE THIS.      Her primary cardiologist had ordered a stress test and echocardiogram in October 2023 but she never got around to completing this.  Last Recorded Vitals:  Vitals:    03/03/24 0117 03/03/24 0330 03/03/24 0733 03/03/24 1137   BP: 164/71 170/68 142/70 155/74   BP Location:   Left arm Left arm   Patient Position:   Lying Sitting   Pulse: 84 89 85 76   Resp: 18 16 17 18   Temp: 37 °C (98.6 °F) 36.7 °C (98 °F) 36.4 °C (97.5 °F) 36.9 °C (98.4 °F)   TempSrc:   Temporal Temporal   SpO2: 94% 95% 95% 97%   Weight: 122 kg (268 lb 11.9 oz)      Height:           Last Labs:  CBC - 3/3/2024:  3:05 AM  13.7 8.0 217    24.9      CMP - 3/3/2024:  3:05 AM  7.5 6.3 17 --- 0.4   8.4 3.4 11 182      PTT - 3/2/2024: 11:40 PM  _   _ 35     Troponin I, High Sensitivity   Date/Time Value Ref Range Status   03/03/2024 03:05  (HH) 0 - 13 ng/L Final     Comment:     Previous result verified on 3/2/2024 2316 on specimen/case [id number] called with component [facility] for procedure Troponin I, High Sensitivity, Initial with value 980 ng/L.   03/03/2024      Physical:  Ht 5' 8\" (1.727 m)  Wt 154 lb (69.9 kg)  BMI 23.42 kg/m²   GENERAL: well developed, well nourished, in no apparent distress  EYES: sclera anicteric  HEENT: normocephalic  NECK: no JVD, no carotid bruits  RESPIRATORY: clear to auscultation  CARDIOVASCULAR: S1, S2 normal, RRR; no S3, no S4; no click; no murmur  ABDOMEN: normal active BS, nondistended  EXTREMITIES: no cyanosis, clubbing or edema, peripheral pulses intact    Data:  ECG: 3/7/2023: SR 59  EC2021: SR 66  Echo: 2021: EF 60-65, mild LAE  Event monitor: 2021: paroxysmal atrial flutter or atrial fibrillation   FT4: 2021: normal    Impression:  paroxysmal atrial fibrillation and atrial flutter     Plan:  Discussed options including rate control, anti-arrhythmic drugs, and catheter ablation.  CHADS-Vasc=0. asa 81mg daily. 3 weeks of anticoagulation prior to ablation  He would like to proceed with an ablation. Discussed CTA and possible DU prior. Discussed at least 2m of uninterrupted anticoagulation afterwards. The risks (including, but not limited to, hematoma, vascular damage, pneumothorax, tamponade, need for a pacemaker, phrenic nerve injury, myocardial infarction, stroke, and death), benefits (less or no atrial fibrillation), and alternatives (rate control, anti-arrhythmic drugs) of the procedure were discussed. The patient understands and will let us know  He didn't want to get a sleep study  Toprol xl 25mg daily    12:50 AM 1,030 (HH) 0 - 13 ng/L Final     Comment:     Previous result verified on 3/2/2024 2316 on specimen/case [id number] called with component [facility] for procedure Troponin I, High Sensitivity, Initial with value 980 ng/L.   03/02/2024 11:24  (HH) 0 - 13 ng/L Final     Comment:     Previous result verified on 3/2/2024 2316 on specimen/case [id number] called with component [facility] for procedure Troponin I, High Sensitivity, Initial with value 980 ng/L.     BNP   Date/Time Value Ref Range Status   03/02/2024 10:14  (H) 0 - 99 pg/mL Final     Hemoglobin A1C   Date/Time Value Ref Range Status   04/23/2018 03:10 PM 5.5 % Final     Comment:          Diagnosis of Diabetes-Adults   Non-Diabetic: < or = 5.6%   Increased risk for developing diabetes: 5.7-6.4%   Diagnostic of diabetes: > or = 6.5%  .       Monitoring of Diabetes                Age (y)     Therapeutic Goal (%)   Adults:          >18           <7.0   Pediatrics:    13-18           <7.5                   7-12           <8.0                   0- 6            7.5-8.5   American Diabetes Association. Diabetes Care 33(S1), Jan 2010.       LDL Calculated   Date/Time Value Ref Range Status   03/03/2024 03:05 AM 38 <=99 mg/dL Final     Comment:                                 Near   Borderline      AGE      Desirable  Optimal    High     High     Very High     0-19 Y     0 - 109     ---    110-129   >/= 130     ----    20-24 Y     0 - 119     ---    120-159   >/= 160     ----      >24 Y     0 -  99   100-129  130-159   160-189     >/=190       VLDL   Date/Time Value Ref Range Status   03/03/2024 03:05 AM 32 0 - 40 mg/dL Final   04/23/2018 03:10 PM 33 0 - 40 mg/dL Final      Last I/O:  I/O last 3 completed shifts:  In: 140 (1.1 mL/kg) [P.O.:100; I.V.:40 (0.3 mL/kg)]  Out: 125 (1 mL/kg) [Urine:125 (0 mL/kg/hr)]  Weight: 121.9 kg     Past Cardiology Tests (Last 3 Years):  EKG:  Electrocardiogram - Day of Discharge 03/01/2024      Electrocardiogram -  "Post Ablation 02/29/2024    Echo:  No results found for this or any previous visit from the past 1095 days.    Ejection Fractions:  No results found for: \"EF\"  Cath:  No results found for this or any previous visit from the past 1095 days.    Stress Test:  No results found for this or any previous visit from the past 1095 days.    Cardiac Imaging:  No results found for this or any previous visit from the past 1095 days.      Past Medical History:  She has a past medical history of Benign essential HTN and ESRD (end stage renal disease) (CMS/HCC).    Past Surgical History:  She has a past surgical history that includes US guided abdominal paracentesis (8/31/2023).      Social History:  She reports that she quit smoking about 3 years ago. Her smoking use included cigarettes. She does not have any smokeless tobacco history on file. She reports that she does not currently use alcohol. No history on file for drug use.    Family History:  No family history on file.     Allergies:  Penicillins, Codeine, Ropinirole, and Adhesive    Inpatient Medications:  Scheduled medications   Medication Dose Route Frequency    amLODIPine  5 mg oral Daily    [START ON 3/4/2024] aspirin  81 mg oral Daily    atorvastatin  40 mg oral Daily    doxepin  75 mg oral Nightly    DULoxetine  30 mg oral Daily    fluticasone  2 spray Each Nostril Daily    insulin lispro  0-10 Units subcutaneous Before meals & nightly    melatonin  3 mg oral Daily    metoprolol tartrate  50 mg oral Daily    pantoprazole  40 mg oral BID    perflutren protein A microsphere  0.5 mL intravenous Once in imaging    polyethylene glycol  17 g oral Daily     PRN medications   Medication    acetaminophen    albuterol    bisacodyl    dextrose 10 % in water (D10W)    dextrose    glucagon    guaiFENesin    heparin    ondansetron    Or    ondansetron     Continuous Medications   Medication Dose Last Rate    heparin  0-4,000 Units/hr 800 Units/hr (03/03/24 0711)     Outpatient " Medications:  Current Outpatient Medications   Medication Instructions    albuterol 90 mcg/actuation inhaler 1 puff, inhalation, Every 4 hours PRN    amLODIPine (NORVASC) 5 mg, oral, Daily    atorvastatin (LIPITOR) 40 mg, oral, Daily    Auryxia 210 mg iron tablet TAKE 2 TABLETS BY MOUTH THREE TIMES A DAY WITH MEALS. SWALLOW WHOLE, DO NOT CHEW OR CRUSH MEDICATION    busPIRone (BUSPAR) 5 mg, oral, Daily RT    cholecalciferol (Vitamin D-3) 25 MCG (1000 UT) tablet 1 tablet, oral, Daily    cinacalcet (SENSIPAR) 30 mg, oral, Daily, Take with food or shortly afer a meal. Swallow tablet whole; do not break or divide.    doxepin (SINEQUAN) 75 mg, oral, Nightly    DULoxetine (CYMBALTA) 30 mg, oral, Daily    Eliquis 5 mg tablet 1 tablet, oral, 2 times daily    Flonase Allergy Relief 50 mcg/actuation nasal spray 2 sprays, Each Nostril, Daily    metoprolol tartrate (Lopressor) 50 mg tablet 1 tablet, oral, 2 times daily    pantoprazole (PROTONIX) 40 mg, oral, 2 times daily, Do not crush, chew, or split.    torsemide (DEMADEX) 20 mg, oral, 2 times daily       Physical Exam:  Physical Exam  Vitals reviewed.   Constitutional:       Appearance: Normal appearance.   Neck:      Vascular: No carotid bruit or JVD.   Cardiovascular:      Rate and Rhythm: Normal rate and regular rhythm.      Pulses: Normal pulses.      Heart sounds: Normal heart sounds, S1 normal and S2 normal.   Pulmonary:      Effort: Pulmonary effort is normal. No respiratory distress.      Breath sounds: No wheezing or rales.   Abdominal:      General: Abdomen is flat.      Palpations: Abdomen is soft.   Musculoskeletal:      Right lower leg: No edema.      Left lower leg: No edema.   Skin:     General: Skin is warm.   Neurological:      Mental Status: She is alert and oriented to person, place, and time. Mental status is at baseline.   Psychiatric:         Mood and Affect: Mood normal.         Behavior: Behavior normal.           Assessment/Plan   1-acute heart  failure-patient presenting with acute heart failure after recent ablation and after missing dialysis session Friday.  She is having some atypical chest pain which seems more pleuritic or due to pericarditis with elevated cardiac enzymes.  Recommend starting with an echocardiogram.   There could be multiple possible explanations of her symptomatology however we would have low threshold for performing cardiac catheterization given high pretest probability of significant coronary artery disease and multiple cardiovascular risk factors and acute presentation with heart failure.  Continue heparin drip for now.  Dialysis per nephrology.    I have notified Dr. Valentine.    2-paroxysmal atrial fibrillation-maintaining sinus rhythm post ablation.  Since we are holding Eliquis she is being bridged with heparin.      Peripheral IV 03/02/24 20 G Left Forearm (Active)   Site Assessment Clean;Dry;Intact 03/03/24 0900   Dressing Status Clean;Dry 03/03/24 0900   Number of days: 1       Hemodialysis Arteriovenous Fistula 03/03/24 Right Upper arm (Active)   Site Assessment Clean;Dry;Bleeding 03/03/24 0900   Number of days: 0       Code Status:  Full Code        Bismark Dolan MD

## 2024-06-04 NOTE — DISCHARGE INSTRUCTIONS
Please follow-up with your primary care provider 1 to 2 days, or return to the emergency department immediately with any worsening symptoms.    If any medications were prescribed please take them as instructed.    Please take antibiotics as prescribed.

## 2024-06-04 NOTE — ED PROVIDER NOTES
EMERGENCY MEDICINE EVALUATION NOTE    History of Present Illness     Chief Complaint:   Chief Complaint   Patient presents with    Headache     Epitaxsis and headache since 1130/1200 today, bleeding curently stopped.        HPI: Rosa Soto is a 69 y.o. female presents with a chief complaint of nosebleed and headache.  She states that the headache started earlier today after she got home from dialysis.  She states he woke up on the couch about 4:00 and noted that she was having some bleeding from her nose.  She states it started in the left side and when she started holding it started coming on the right side and a little bit went down her throat.  Patient states that she is anticoagulated on Eliquis but she cannot get it to stop at home.  Patient denies any history of any nosebleeds.  Patient denies any injury to the nose.  She states that she still has a headache currently please denies any change in her vision, nausea, or vomiting.    Previous History     Past Medical History:   Diagnosis Date    Arrhythmia     Benign essential HTN     Cancer (Multi)     Chronic kidney disease     ESRD (end stage renal disease) (Multi)      Past Surgical History:   Procedure Laterality Date    CARDIAC CATHETERIZATION N/A 3/6/2024    Procedure: Left Heart Cath;  Surgeon: Carol Johnson MD;  Location: [facility] Cardiac Cath Lab;  Service: Cardiovascular;  Laterality: N/A;    CARDIAC ELECTROPHYSIOLOGY PROCEDURE N/A 2/29/2024    Procedure: Ablation A-Fib Persistant;  Surgeon: Adam Valentine MD;  Location: [place] [address] Cardiac Cath Lab;  Service: Electrophysiology;  Laterality: N/A;  CARTO/ANY EP LAB  GENERAL ANESTHESIA WHOLE CASE    US GUIDED ABDOMINAL PARACENTESIS  8/31/2023    US GUIDED ABDOMINAL PARACENTESIS 8/31/2023 POR US     Social History     Tobacco Use    Smoking status: Former     Current packs/day: 0.00     Types: Cigarettes     Quit date: 2/4/2021     Years since quitting: 3.3   Vaping Use    Vaping status: Every Day    Substance Use Topics    Alcohol use: Not Currently    Drug use: Never     No family history on file.  Allergies   Allergen Reactions    Penicillins Anaphylaxis    Codeine GI Upset    Ropinirole Hallucinations    Adhesive Rash     Current Outpatient Medications   Medication Instructions    albuterol 90 mcg/actuation inhaler 1 puff, inhalation, Every 4 hours PRN    amLODIPine (NORVASC) 5 mg, oral, Daily    atorvastatin (LIPITOR) 40 mg, oral, Daily    Auryxia 210 mg iron tablet TAKE 2 TABLETS BY MOUTH THREE TIMES A DAY WITH MEALS. SWALLOW WHOLE, DO NOT CHEW OR CRUSH MEDICATION    busPIRone (BUSPAR) 5 mg, oral, Daily RT    cholecalciferol (Vitamin D-3) 25 MCG (1000 UT) tablet 1 tablet, oral, Daily    cinacalcet (SENSIPAR) 30 mg, oral, Daily, Take with food or shortly afer a meal. Swallow tablet whole; do not break or divide.    clindamycin (CLEOCIN) 150 mg, oral, Every 6 hours    doxepin (SINEQUAN) 75 mg, oral, Nightly    DULoxetine (CYMBALTA) 30 mg, oral, Daily    Eliquis 5 mg tablet 1 tablet, oral, 2 times daily    Flonase Allergy Relief 50 mcg/actuation nasal spray 2 sprays, Each Nostril, Daily    metoprolol tartrate (LOPRESSOR) 100 mg, oral, 2 times daily    oxymetazoline (Afrin) 0.05 % nasal spray 2 sprays, Each Nostril, Every 12 hours PRN, Do not use for more than 3 days.    pantoprazole (PROTONIX) 40 mg, oral, 2 times daily, Do not crush, chew, or split.    sodium chloride (Ocean) 0.65 % nasal spray 1 spray, Each Nostril, As needed    torsemide (DEMADEX) 20 mg, oral, 2 times daily       Physical Exam     Appearance: Alert, oriented , cooperative     Skin: Intact,  dry skin, no lesions, rash, petechiae or purpura.      Eyes: PERRLA, EOMs intact,  Conjunctiva pink      ENT: Hearing grossly intact. Pharynx clear, but does have just a small amount of blood in posterior pharynx.  Patient does have blood around bilateral naris.  There appeared to be worse from left nare than right.     Neck: Supple. Trachea at  midline.      Pulmonary: Clear bilaterally. No rales, rhonchi or wheezing. No accessory muscle use or stridor.     Cardiac: Normal rate and rhythm without murmur     Abdomen: Soft, nontender, active bowel sounds.     Musculoskeletal: Full range of motion.      Neurological:Cranial nerves II through XII are grossly intact, normal sensation, no weakness, no focal findings identified.     Results     Labs Reviewed   CBC WITH AUTO DIFFERENTIAL - Abnormal       Result Value    WBC 10.9      nRBC 0.0      RBC 3.78 (*)     Hemoglobin 12.0      Hematocrit 37.3      MCV 99      MCH 31.7      MCHC 32.2      RDW 14.4      Platelets 267      Neutrophils % 65.2      Immature Granulocytes %, Automated 0.3      Lymphocytes % 22.2      Monocytes % 10.6      Eosinophils % 1.1      Basophils % 0.6      Neutrophils Absolute 7.10      Immature Granulocytes Absolute, Automated 0.03      Lymphocytes Absolute 2.41      Monocytes Absolute 1.15 (*)     Eosinophils Absolute 0.12      Basophils Absolute 0.07     COMPREHENSIVE METABOLIC PANEL - Abnormal    Glucose 109 (*)     Sodium 135 (*)     Potassium 4.0      Chloride 96 (*)     Bicarbonate 28      Anion Gap 15      Urea Nitrogen 33 (*)     Creatinine 6.41 (*)     eGFR 7 (*)     Calcium 9.1      Albumin 4.4      Alkaline Phosphatase 169 (*)     Total Protein 8.0      AST 23      Bilirubin, Total 0.6      ALT 12     PROTIME-INR - Abnormal    Protime 17.2 (*)     INR 1.5 (*)    APTT - Abnormal    aPTT 40 (*)     Narrative:     The APTT is no longer used for monitoring Unfractionated Heparin Therapy. For monitoring Heparin Therapy, use the Heparin Assay.     CT head wo IV contrast   Final Result   1. Air-hemorrhage levels in the maxillary sinuses, blood products in   the nasopharynx, and blood products within the left aspect of the   nasal passages.        2. No acute intracranial abnormality.        MACRO:   None.        Signed by: Aric Arroyo 6/3/2024 10:22 PM   Dictation workstation:   " [id number]            ED Course & Medical Decision Making     Medications   diphenhydrAMINE (BENADryl) injection 25 mg (25 mg intravenous Given 6/3/24 2038)   metoclopramide (Reglan) injection 10 mg (10 mg intravenous Given 6/3/24 2036)   acetaminophen (Tylenol) tablet 975 mg (975 mg oral Given 6/3/24 2036)     Heart Rate:  [68]   Temperature:  [36.6 °C (97.8 °F)]   Respirations:  [18]   BP: (149)/(62)   Height:  [167.6 cm (5' 6\")]   Weight:  [112 kg (246 lb 11.1 oz)]   Pulse Ox:  [93 %]    ED Course as of 06/03/24 2250 Mon Jun 03, 2024 1955 Patient had 7.5 cm packing placed in left nostril.  Hemostasis appeared to be obtained after clot was coughed up.  About 3 mL of air was placed in packing [CJ]   2020 Reevaluated patient at this time there is no significant bleeding currently. [CJ]   2130 Patient reevaluated by things at this time.  Patient still is not experiencing any bleeding.  Plan of care discussed with patient if the CT was negative patient be discharged home. [CJ]   2246 Updated patient and family on the results of the CT.  There was a little blood in the left sinus but that was to be expected.  There is still no bleeding around the packing of the nose.  Patient has no blood in the posterior pharynx anymore.  Patient's hemoglobin was stable.  Plan of care was discussed with them they will be discharged at this time to follow-up with ENT.  Patient be given Augmentin at home to prevent any further infection.  They are encouraged return the emergency room immediately with any rebleeding or worsening symptoms.  Patient seen and evaluated by attending physician who agrees with plan of care. [CJ]   2249 Patient was allergic to penicillin so patient will be given clindamycin instead. [CJ]      ED Course User Index  [CJ] Dong Darnell PA-C         Diagnoses as of 06/03/24 2250   Nosebleed   Nonintractable headache, unspecified chronicity pattern, unspecified headache type       Procedures "   Procedures    Diagnosis     1. Nosebleed    2. Nonintractable headache, unspecified chronicity pattern, unspecified headache type        Disposition   Discharged    ED Prescriptions       Medication Sig Dispense Start Date End Date Auth. Provider    clindamycin (Cleocin) 150 mg capsule Take 1 capsule (150 mg) by mouth every 6 hours for 7 days. 28 capsule 6/3/2024 6/10/2024 Dong Darnell PA-C            Disclaimer: This note was dictated by speech recognition. Minor errors in transcription may be present. Please call if questions.       Dong Darnell PA-C  06/03/24 0295

## 2024-06-05 ENCOUNTER — APPOINTMENT (OUTPATIENT)
Dept: DIALYSIS | Facility: HOSPITAL | Age: 69
End: 2024-06-05
Payer: COMMERCIAL

## 2024-06-05 ENCOUNTER — HOSPITAL ENCOUNTER (OUTPATIENT)
Facility: HOSPITAL | Age: 69
Setting detail: OBSERVATION
Discharge: HOME | End: 2024-06-06
Attending: EMERGENCY MEDICINE | Admitting: NURSE PRACTITIONER
Payer: COMMERCIAL

## 2024-06-05 VITALS
OXYGEN SATURATION: 96 % | DIASTOLIC BLOOD PRESSURE: 76 MMHG | HEART RATE: 71 BPM | WEIGHT: 246.91 LBS | TEMPERATURE: 98 F | BODY MASS INDEX: 39.85 KG/M2 | SYSTOLIC BLOOD PRESSURE: 144 MMHG | RESPIRATION RATE: 14 BRPM

## 2024-06-05 DIAGNOSIS — R04.0 BLEEDING NOSE: ICD-10-CM

## 2024-06-05 DIAGNOSIS — R04.0 EPISTAXIS: Primary | ICD-10-CM

## 2024-06-05 PROBLEM — E11.9 DM (DIABETES MELLITUS), TYPE 2 (MULTI): Status: ACTIVE | Noted: 2023-09-03

## 2024-06-05 LAB
ABO GROUP (TYPE) IN BLOOD: NORMAL
ANION GAP SERPL CALC-SCNC: 20 MMOL/L (ref 10–20)
ANTIBODY SCREEN: NORMAL
APTT PPP: 37 SECONDS (ref 27–38)
BUN SERPL-MCNC: 50 MG/DL (ref 6–23)
CALCIUM SERPL-MCNC: 9.3 MG/DL (ref 8.6–10.6)
CHLORIDE SERPL-SCNC: 94 MMOL/L (ref 98–107)
CO2 SERPL-SCNC: 30 MMOL/L (ref 21–32)
CREAT SERPL-MCNC: 9.85 MG/DL (ref 0.5–1.05)
EGFRCR SERPLBLD CKD-EPI 2021: 4 ML/MIN/1.73M*2
ERYTHROCYTE [DISTWIDTH] IN BLOOD BY AUTOMATED COUNT: 14.2 % (ref 11.5–14.5)
EST. AVERAGE GLUCOSE BLD GHB EST-MCNC: 108 MG/DL
GLUCOSE SERPL-MCNC: 141 MG/DL (ref 74–99)
HBA1C MFR BLD: 5.4 %
HBV SURFACE AB SER-ACNC: >1000 MIU/ML
HBV SURFACE AG SERPL QL IA: NONREACTIVE
HCT VFR BLD AUTO: 31.3 % (ref 36–46)
HGB BLD-MCNC: 10.3 G/DL (ref 12–16)
INR PPP: 1.5 (ref 0.9–1.1)
MCH RBC QN AUTO: 32.1 PG (ref 26–34)
MCHC RBC AUTO-ENTMCNC: 32.9 G/DL (ref 32–36)
MCV RBC AUTO: 98 FL (ref 80–100)
NRBC BLD-RTO: 0 /100 WBCS (ref 0–0)
PLATELET # BLD AUTO: 258 X10*3/UL (ref 150–450)
POTASSIUM SERPL-SCNC: 4.2 MMOL/L (ref 3.5–5.3)
PROTHROMBIN TIME: 16.9 SECONDS (ref 9.8–12.8)
RBC # BLD AUTO: 3.21 X10*6/UL (ref 4–5.2)
RH FACTOR (ANTIGEN D): NORMAL
SODIUM SERPL-SCNC: 140 MMOL/L (ref 136–145)
WBC # BLD AUTO: 10.9 X10*3/UL (ref 4.4–11.3)

## 2024-06-05 PROCEDURE — 99285 EMERGENCY DEPT VISIT HI MDM: CPT

## 2024-06-05 PROCEDURE — 99222 1ST HOSP IP/OBS MODERATE 55: CPT | Performed by: NURSE PRACTITIONER

## 2024-06-05 PROCEDURE — 87340 HEPATITIS B SURFACE AG IA: CPT | Performed by: INTERNAL MEDICINE

## 2024-06-05 PROCEDURE — 36415 COLL VENOUS BLD VENIPUNCTURE: CPT | Performed by: INTERNAL MEDICINE

## 2024-06-05 PROCEDURE — 2500000001 HC RX 250 WO HCPCS SELF ADMINISTERED DRUGS (ALT 637 FOR MEDICARE OP): Performed by: PHYSICIAN ASSISTANT

## 2024-06-05 PROCEDURE — 82607 VITAMIN B-12: CPT | Performed by: INTERNAL MEDICINE

## 2024-06-05 PROCEDURE — 86706 HEP B SURFACE ANTIBODY: CPT | Performed by: INTERNAL MEDICINE

## 2024-06-05 PROCEDURE — 80048 BASIC METABOLIC PNL TOTAL CA: CPT | Performed by: EMERGENCY MEDICINE

## 2024-06-05 PROCEDURE — 99222 1ST HOSP IP/OBS MODERATE 55: CPT | Performed by: OTOLARYNGOLOGY

## 2024-06-05 PROCEDURE — G0378 HOSPITAL OBSERVATION PER HR: HCPCS

## 2024-06-05 PROCEDURE — 90937 HEMODIALYSIS REPEATED EVAL: CPT

## 2024-06-05 PROCEDURE — 36415 COLL VENOUS BLD VENIPUNCTURE: CPT | Performed by: PHYSICIAN ASSISTANT

## 2024-06-05 PROCEDURE — 83036 HEMOGLOBIN GLYCOSYLATED A1C: CPT | Performed by: NURSE PRACTITIONER

## 2024-06-05 PROCEDURE — 99285 EMERGENCY DEPT VISIT HI MDM: CPT | Performed by: PHYSICIAN ASSISTANT

## 2024-06-05 PROCEDURE — 2500000001 HC RX 250 WO HCPCS SELF ADMINISTERED DRUGS (ALT 637 FOR MEDICARE OP): Performed by: NURSE PRACTITIONER

## 2024-06-05 PROCEDURE — 85610 PROTHROMBIN TIME: CPT | Performed by: PHYSICIAN ASSISTANT

## 2024-06-05 PROCEDURE — 2500000001 HC RX 250 WO HCPCS SELF ADMINISTERED DRUGS (ALT 637 FOR MEDICARE OP)

## 2024-06-05 PROCEDURE — 85027 COMPLETE CBC AUTOMATED: CPT | Performed by: EMERGENCY MEDICINE

## 2024-06-05 PROCEDURE — 8010000001 HC DIALYSIS - HEMODIALYSIS PER DAY

## 2024-06-05 PROCEDURE — 86901 BLOOD TYPING SEROLOGIC RH(D): CPT | Performed by: EMERGENCY MEDICINE

## 2024-06-05 RX ORDER — FOLIC ACID/VIT B COMPLEX AND C 0.8 MG
1 TABLET ORAL DAILY
COMMUNITY
Start: 2024-01-24

## 2024-06-05 RX ORDER — AMLODIPINE BESYLATE 5 MG/1
5 TABLET ORAL DAILY
Status: DISCONTINUED | OUTPATIENT
Start: 2024-06-05 | End: 2024-06-06 | Stop reason: HOSPADM

## 2024-06-05 RX ORDER — GLIPIZIDE 5 MG/1
5 TABLET ORAL
Status: ON HOLD | COMMUNITY
Start: 2024-03-11 | End: 2024-06-05 | Stop reason: ENTERED-IN-ERROR

## 2024-06-05 RX ORDER — OXYMETAZOLINE HCL 0.05 %
2 SPRAY, NON-AEROSOL (ML) NASAL ONCE
Status: COMPLETED | OUTPATIENT
Start: 2024-06-05 | End: 2024-06-05

## 2024-06-05 RX ORDER — CINACALCET 30 MG/1
30 TABLET, FILM COATED ORAL DAILY
Status: DISCONTINUED | OUTPATIENT
Start: 2024-06-06 | End: 2024-06-06 | Stop reason: HOSPADM

## 2024-06-05 RX ORDER — POLYETHYLENE GLYCOL 3350 17 G/17G
17 POWDER, FOR SOLUTION ORAL DAILY PRN
Status: DISCONTINUED | OUTPATIENT
Start: 2024-06-05 | End: 2024-06-06 | Stop reason: HOSPADM

## 2024-06-05 RX ORDER — LORAZEPAM 0.5 MG/1
0.5 TABLET ORAL ONCE
Status: COMPLETED | OUTPATIENT
Start: 2024-06-05 | End: 2024-06-05

## 2024-06-05 RX ORDER — TORSEMIDE 20 MG/1
20 TABLET ORAL
Status: DISCONTINUED | OUTPATIENT
Start: 2024-06-05 | End: 2024-06-06

## 2024-06-05 RX ORDER — INSULIN LISPRO 100 [IU]/ML
0-5 INJECTION, SOLUTION INTRAVENOUS; SUBCUTANEOUS
Status: DISCONTINUED | OUTPATIENT
Start: 2024-06-05 | End: 2024-06-06 | Stop reason: HOSPADM

## 2024-06-05 RX ORDER — DULOXETIN HYDROCHLORIDE 30 MG/1
30 CAPSULE, DELAYED RELEASE ORAL DAILY
Status: DISCONTINUED | OUTPATIENT
Start: 2024-06-06 | End: 2024-06-06 | Stop reason: HOSPADM

## 2024-06-05 RX ORDER — ACETAMINOPHEN 325 MG/1
650 TABLET ORAL EVERY 4 HOURS PRN
Status: DISCONTINUED | OUTPATIENT
Start: 2024-06-05 | End: 2024-06-06 | Stop reason: HOSPADM

## 2024-06-05 RX ORDER — BUSPIRONE HYDROCHLORIDE 5 MG/1
5 TABLET ORAL DAILY
Status: DISCONTINUED | OUTPATIENT
Start: 2024-06-06 | End: 2024-06-06 | Stop reason: HOSPADM

## 2024-06-05 RX ORDER — METOPROLOL TARTRATE 50 MG/1
100 TABLET ORAL 2 TIMES DAILY
Status: DISCONTINUED | OUTPATIENT
Start: 2024-06-05 | End: 2024-06-06 | Stop reason: HOSPADM

## 2024-06-05 RX ORDER — OXYMETAZOLINE HCL 0.05 %
SPRAY, NON-AEROSOL (ML) NASAL
Status: COMPLETED
Start: 2024-06-05 | End: 2024-06-05

## 2024-06-05 RX ORDER — MIRTAZAPINE 15 MG/1
7.5 TABLET, FILM COATED ORAL NIGHTLY
Status: DISCONTINUED | OUTPATIENT
Start: 2024-06-05 | End: 2024-06-06

## 2024-06-05 RX ORDER — OXYMETAZOLINE HCL 0.05 %
2 SPRAY, NON-AEROSOL (ML) NASAL 3 TIMES DAILY
Status: DISCONTINUED | OUTPATIENT
Start: 2024-06-05 | End: 2024-06-06 | Stop reason: HOSPADM

## 2024-06-05 RX ORDER — ATORVASTATIN CALCIUM 40 MG/1
40 TABLET, FILM COATED ORAL NIGHTLY
Status: DISCONTINUED | OUTPATIENT
Start: 2024-06-05 | End: 2024-06-06 | Stop reason: HOSPADM

## 2024-06-05 RX ORDER — DEXTROSE 50 % IN WATER (D50W) INTRAVENOUS SYRINGE
25
Status: DISCONTINUED | OUTPATIENT
Start: 2024-06-05 | End: 2024-06-06 | Stop reason: HOSPADM

## 2024-06-05 RX ORDER — ALBUTEROL SULFATE 90 UG/1
1 AEROSOL, METERED RESPIRATORY (INHALATION) EVERY 4 HOURS PRN
Status: DISCONTINUED | OUTPATIENT
Start: 2024-06-05 | End: 2024-06-06 | Stop reason: HOSPADM

## 2024-06-05 RX ORDER — B-COMPLEX WITH VITAMIN C
1 TABLET ORAL DAILY
Status: DISCONTINUED | OUTPATIENT
Start: 2024-06-06 | End: 2024-06-06 | Stop reason: HOSPADM

## 2024-06-05 RX ORDER — MIRTAZAPINE 7.5 MG/1
7.5 TABLET, FILM COATED ORAL NIGHTLY
Status: ON HOLD | COMMUNITY
End: 2024-06-05 | Stop reason: ENTERED-IN-ERROR

## 2024-06-05 RX ORDER — CHOLECALCIFEROL (VITAMIN D3) 25 MCG
1000 TABLET ORAL DAILY
Status: DISCONTINUED | OUTPATIENT
Start: 2024-06-06 | End: 2024-06-06 | Stop reason: HOSPADM

## 2024-06-05 RX ORDER — DEXTROSE 50 % IN WATER (D50W) INTRAVENOUS SYRINGE
12.5
Status: DISCONTINUED | OUTPATIENT
Start: 2024-06-05 | End: 2024-06-06 | Stop reason: HOSPADM

## 2024-06-05 RX ADMIN — NASAL DECONGESTANT 2 SPRAY: 0.05 SPRAY NASAL at 13:40

## 2024-06-05 RX ADMIN — ACETAMINOPHEN 650 MG: 325 TABLET ORAL at 23:30

## 2024-06-05 RX ADMIN — ACETAMINOPHEN 650 MG: 325 TABLET ORAL at 18:29

## 2024-06-05 RX ADMIN — ATORVASTATIN CALCIUM 40 MG: 40 TABLET, FILM COATED ORAL at 23:29

## 2024-06-05 RX ADMIN — METOPROLOL TARTRATE 100 MG: 50 TABLET, FILM COATED ORAL at 23:29

## 2024-06-05 RX ADMIN — LORAZEPAM 0.5 MG: 0.5 TABLET ORAL at 13:51

## 2024-06-05 RX ADMIN — Medication 2 SPRAY: at 13:40

## 2024-06-05 RX ADMIN — NASAL DECONGESTANT 2 SPRAY: 0.05 SPRAY NASAL at 13:10

## 2024-06-05 RX ADMIN — SALINE NASAL SPRAY 1 SPRAY: 1.5 SOLUTION NASAL at 14:12

## 2024-06-05 ASSESSMENT — COLUMBIA-SUICIDE SEVERITY RATING SCALE - C-SSRS
2. HAVE YOU ACTUALLY HAD ANY THOUGHTS OF KILLING YOURSELF?: NO
1. IN THE PAST MONTH, HAVE YOU WISHED YOU WERE DEAD OR WISHED YOU COULD GO TO SLEEP AND NOT WAKE UP?: NO
6. HAVE YOU EVER DONE ANYTHING, STARTED TO DO ANYTHING, OR PREPARED TO DO ANYTHING TO END YOUR LIFE?: NO

## 2024-06-05 ASSESSMENT — LIFESTYLE VARIABLES
EVER HAD A DRINK FIRST THING IN THE MORNING TO STEADY YOUR NERVES TO GET RID OF A HANGOVER: NO
EVER FELT BAD OR GUILTY ABOUT YOUR DRINKING: NO
HAVE YOU EVER FELT YOU SHOULD CUT DOWN ON YOUR DRINKING: NO
HAVE PEOPLE ANNOYED YOU BY CRITICIZING YOUR DRINKING: NO
TOTAL SCORE: 0

## 2024-06-05 ASSESSMENT — PAIN - FUNCTIONAL ASSESSMENT
PAIN_FUNCTIONAL_ASSESSMENT: 0-10

## 2024-06-05 ASSESSMENT — ENCOUNTER SYMPTOMS
LIGHT-HEADEDNESS: 0
CHILLS: 0
DIARRHEA: 0
HEADACHES: 1
DIZZINESS: 0
NERVOUS/ANXIOUS: 1
SHORTNESS OF BREATH: 0
COUGH: 0
DIFFICULTY URINATING: 0
ABDOMINAL PAIN: 0
FEVER: 0
VOMITING: 0
NAUSEA: 0

## 2024-06-05 ASSESSMENT — PAIN DESCRIPTION - DESCRIPTORS: DESCRIPTORS: ACHING

## 2024-06-05 ASSESSMENT — PAIN SCALES - GENERAL
PAINLEVEL_OUTOF10: 3
PAINLEVEL_OUTOF10: 3
PAINLEVEL_OUTOF10: 0 - NO PAIN
PAINLEVEL_OUTOF10: 0 - NO PAIN
PAINLEVEL_OUTOF10: 3

## 2024-06-05 ASSESSMENT — PAIN DESCRIPTION - LOCATION: LOCATION: HEAD

## 2024-06-05 ASSESSMENT — PAIN DESCRIPTION - ORIENTATION: ORIENTATION: OTHER (COMMENT)

## 2024-06-05 NOTE — H&P
History Of Present Illness  Rosa Soto is a 69 y.o. female with a past medical history of bladder cancer, HFpEF (last EF 60-65% 3/4/24), SACHA, COPD, HLD, NSTEMI (3/2024), ESRD on HD (MWF), HTN, T2DM (last HgA1c 6.5% 3/5/24), and pAFib on Eliquis (s/p ablation 2/29/24). Pt presented to [facility] as a transfer from [facility] for ENT consult d/t recurrent epistaxis. Pt initially presented to OSH on 6/3 due to epistaxis that started after dialysis and hemostasis was achieved after nasal packing and pt coughing up clot per documentation from ED visit on this date. Pt given prescription for Clindamycin 150 mg q6h x7 days and instructed to return to ED if her nose starts to bleed again. Pt presented to ED of OSH a second time with recurrent epistaxis on 6/4 that started around 0800. Decision made to transfer pt to [facility] for further evaluation. Upon arrival to ED pt was seen by ENT.  Per documentation from ENT: very small area slowly bleeding from the anterior septal mucosa noted just anterior to a large area of previously cauterized mucosa. 1 cm x 3 cm section of Surgicel was folded and placed over the bleeding area.  Afrin was then sprayed liberally into the left nasal cavity. The patient was observed for 15-20 minutes to ensure hemostasis. Labs obtained on admit notable for anemia (Hgb 10.3) and impaired renal function. CTH showed  air-hemorrhage levels in the maxillary sinuses, blood products in the nasopharynx, and blood products within the left aspect of the nasal passages.  Nephrology consulted d/t need for dialysis and pt will receive dialysis today. Pt resting when seen in the ED. Pt very anxious on exam due to concern that epistaxis will start again. Pt updated on plan of care and what would happen if her nose started to bleed again. Pt admitted to medicine service under observation for epistaxis.     Past Medical History  Past Medical History:   Diagnosis Date    Arrhythmia     Benign essential HTN     Cancer  (Multi)     Chronic kidney disease     ESRD (end stage renal disease) (Multi)        Surgical History  Past Surgical History:   Procedure Laterality Date    CARDIAC CATHETERIZATION N/A 3/6/2024    Procedure: Left Heart Cath;  Surgeon: Carol Johnson MD;  Location: [facility];  Service: Cardiovascular;  Laterality: N/A;    CARDIAC ELECTROPHYSIOLOGY PROCEDURE N/A 2/29/2024    Procedure: Ablation A-Fib Persistant;  Surgeon: Adam Valentine MD;  Location: [place] [address] Cardiac Cath Lab;  Service: Electrophysiology;  Laterality: N/A;  CARTO/ANY EP LAB  GENERAL ANESTHESIA WHOLE CASE    US GUIDED ABDOMINAL PARACENTESIS  8/31/2023    US GUIDED ABDOMINAL PARACENTESIS 8/31/2023 [facility]        Social History  She reports that she quit smoking about 3 years ago. Her smoking use included cigarettes. She does not have any smokeless tobacco history on file. She reports that she does not currently use alcohol. She reports that she does not use drugs.    Family History  No family history on file.     Allergies  Penicillins, Codeine, Ropinirole, and Adhesive    Review of Systems   Constitutional:  Negative for chills and fever.   HENT:  Positive for nosebleeds. Negative for congestion.    Respiratory:  Negative for cough and shortness of breath.    Cardiovascular:  Negative for chest pain.   Gastrointestinal:  Negative for abdominal pain, diarrhea, nausea and vomiting.   Genitourinary:  Negative for difficulty urinating.   Neurological:  Positive for headaches. Negative for dizziness and light-headedness.   Psychiatric/Behavioral:  The patient is nervous/anxious.         Physical Exam  Constitutional:       General: She is not in acute distress.     Appearance: She is not ill-appearing.   HENT:      Head: Normocephalic.      Nose:      Comments: Nasal packing L nare     Mouth/Throat:      Mouth: Mucous membranes are dry.   Eyes:      Extraocular Movements: Extraocular movements intact.   Cardiovascular:      Rate and  "Rhythm: Normal rate.      Pulses: Normal pulses.      Heart sounds: Normal heart sounds.   Pulmonary:      Effort: Pulmonary effort is normal. No respiratory distress.      Breath sounds: Normal breath sounds.   Abdominal:      General: Bowel sounds are normal.      Palpations: Abdomen is soft.   Musculoskeletal:         General: Normal range of motion.      Cervical back: Normal range of motion.   Skin:     General: Skin is warm and dry.      Comments: ОЛЕГ DRISCOLL   Neurological:      Mental Status: She is alert and oriented to person, place, and time.   Psychiatric:      Comments: anxious          Last Recorded Vitals  Blood pressure 150/77, pulse 78, temperature 36.8 °C (98.2 °F), temperature source Temporal, resp. rate 16, height 1.651 m (5' 5\"), weight 112 kg (246 lb), SpO2 96%.    Relevant Results  CT head wo IV contrast    Result Date: 6/3/2024  Interpreted By:  Aric Arroyo, STUDY: CT HEAD WO IV CONTRAST;  6/3/2024 9:36 pm   INDICATION: Signs/Symptoms:Headache and nosebleed.   COMPARISON: None.   ACCESSION NUMBER(S): [id number]   ORDERING CLINICIAN: FROILAN TRACEY   TECHNIQUE: Noncontrast axial CT images of head were obtained with coronal and sagittal reconstructed images.   FINDINGS: BRAIN PARENCHYMA:  No acute intraparenchymal hemorrhage or parenchymal evidence of acute large territory ischemic infarct. No mass-effect. Gray-white matter distinction is preserved.   VENTRICLES and EXTRA-AXIAL SPACES: Borderline low hanging cerebellar tonsils. No acute extra-axial or intraventricular hemorrhage. No effacement of cerebral sulci. Ventricles and sulci are age-concordant.   PARANASAL SINUSES/MASTOIDS: There are air-hemorrhage levels in the maxillary sinuses, blood products in the nasopharynx, and blood products within the left aspect of the nasal passages.   CALVARIUM/ORBITS:  No skull fracture.  The orbits and globes are intact to the extent visualized.   EXTRACRANIAL SOFT TISSUES: No discernible abnormality.   "     1. Air-hemorrhage levels in the maxillary sinuses, blood products in the nasopharynx, and blood products within the left aspect of the nasal passages.   2. No acute intracranial abnormality.   MACRO: None.   Signed by: Aric Arroyo 6/3/2024 10:22 PM Dictation workstation:   [id number]      Results for orders placed or performed during the hospital encounter of 06/05/24 (from the past 24 hour(s))   CBC   Result Value Ref Range    WBC 10.9 4.4 - 11.3 x10*3/uL    nRBC 0.0 0.0 - 0.0 /100 WBCs    RBC 3.21 (L) 4.00 - 5.20 x10*6/uL    Hemoglobin 10.3 (L) 12.0 - 16.0 g/dL    Hematocrit 31.3 (L) 36.0 - 46.0 %    MCV 98 80 - 100 fL    MCH 32.1 26.0 - 34.0 pg    MCHC 32.9 32.0 - 36.0 g/dL    RDW 14.2 11.5 - 14.5 %    Platelets 258 150 - 450 x10*3/uL   Basic metabolic panel   Result Value Ref Range    Glucose 141 (H) 74 - 99 mg/dL    Sodium 140 136 - 145 mmol/L    Potassium 4.2 3.5 - 5.3 mmol/L    Chloride 94 (L) 98 - 107 mmol/L    Bicarbonate 30 21 - 32 mmol/L    Anion Gap 20 10 - 20 mmol/L    Urea Nitrogen 50 (H) 6 - 23 mg/dL    Creatinine 9.85 (H) 0.50 - 1.05 mg/dL    eGFR 4 (L) >60 mL/min/1.73m*2    Calcium 9.3 8.6 - 10.6 mg/dL   Coagulation Screen   Result Value Ref Range    Protime 16.9 (H) 9.8 - 12.8 seconds    INR 1.5 (H) 0.9 - 1.1    aPTT 37 27 - 38 seconds      Lab Results   Component Value Date    HGBA1C 6.5 (H) 03/03/2024      ED Medication Administration from 06/05/2024 1237 to 06/05/2024 1708         Date/Time Order Dose Route Action Action by     06/05/2024 1310 EDT oxymetazoline (Afrin) 0.05 % nasal spray 2 spray 2 spray Each Nostril Given MANNY Yadav     06/05/2024 1340 EDT oxymetazoline (Afrin) 0.05 % nasal spray 2 spray 2 spray Each Nostril Given Vicenta R     06/05/2024 1351 EDT LORazepam (Ativan) tablet 0.5 mg 0.5 mg oral Given Vicenta R     06/05/2024 1412 EDT sodium chloride (Ocean) 0.65 % nasal spray 1 spray 1 spray Each Nostril Given MANNY Yadav     06/05/2024 1657 EDT apixaban (Eliquis) tablet 5 mg --  oral Held by provider REYNA Soraino     06/05/2024 2100 EDT apixaban (Eliquis) tablet 5 mg -- oral Dose Auto Held (none)     06/06/2024 0900 EDT apixaban (Eliquis) tablet 5 mg -- oral Dose Auto Held (none)     06/06/2024 2100 EDT apixaban (Eliquis) tablet 5 mg -- oral Dose Auto Held (none)     06/07/2024 0900 EDT apixaban (Eliquis) tablet 5 mg -- oral Dose Auto Held (none)     06/07/2024 2100 EDT apixaban (Eliquis) tablet 5 mg -- oral Dose Auto Held (none)     06/08/2024 0900 EDT apixaban (Eliquis) tablet 5 mg -- oral Dose Auto Held (none)     06/08/2024 2100 EDT apixaban (Eliquis) tablet 5 mg -- oral Dose Auto Held (none)     06/09/2024 0900 EDT apixaban (Eliquis) tablet 5 mg -- oral Dose Auto Held (none)     06/09/2024 2100 EDT apixaban (Eliquis) tablet 5 mg -- oral Dose Auto Held (none)           Scheduled medications  amLODIPine, 5 mg, oral, Daily  [Held by provider] apixaban, 5 mg, oral, BID  atorvastatin, 40 mg, oral, Nightly  [START ON 6/6/2024] B complex-vitamin C, 1 tablet, oral, Daily  [START ON 6/6/2024] busPIRone, 5 mg, oral, Daily  [START ON 6/6/2024] cholecalciferol, 1,000 Units, oral, Daily  [START ON 6/6/2024] cinacalcet, 30 mg, oral, Daily  doxepin, 75 mg, oral, Nightly  [START ON 6/6/2024] DULoxetine, 30 mg, oral, Daily  insulin lispro, 0-5 Units, subcutaneous, TID  metoprolol tartrate, 100 mg, oral, BID  mirtazapine, 7.5 mg, oral, Nightly  oxymetazoline, 2 spray, Each Nostril, TID  sodium chloride, 5 spray, Each Nostril, 4x daily  torsemide, 20 mg, oral, BID AC      Continuous medications     PRN medications  PRN medications: acetaminophen, albuterol, dextrose, dextrose, glucagon, glucagon, polyethylene glycol       Assessment/Plan   Principal Problem:    Epistaxis    #epistaxis  #anemia  - ENT consulted; appreciate recs  - Afrin 3x daily to both nares for 3 days  - Nasal saline 5 sprays 4x daily for 10 days  - If patient has bleeding spray afrin liberally into both nares and hold pressure for 15  minutes, repeat this 3x  - BP control  - Packing is absorbable and does not require antibiotic prophylaxis  - trend Hgb, CBC ordered for 2100 and AM lab   - Hgb 10.3 on admit, Hgb 11.6 in 6/4, and Hgb 12.0 on 6/3 but per chart review baseline Hgb~8-9  - holding home dose of Eliquis 5 mg PO BID     #ESRD on HD (MWF) via RUE AVF  - renal diet  - Nephrology consulted for HD  - renally dose meds and avoid nephrotoxic agents  - continue home dose of Renal MVI and Cincalet 30 mg PO every day    #HTN  #HFpEF (last EF 60-65% 3/4/24)  - last /77  - continue to monitor BP  - strict I&O, DW  - continue home dose of Norvasc 5 mg PO every day, Lopressor 100 mg PO BID, and Torsemide 20 mg PO BID    #T2DM (HgA1c 6.5%)  - accucheck ACHS  - mild SSI (0-5 units)  - hypoglycemia order set  - will resume oral antiglycemic agent on discharge    #pAfib s/p ablation 2/29  - holding home dose of Eliquis 5 mg PO BID d/t epistaxis  - continue home dose of Lopressor 100 mg PO BID    #SACHA  - s/p Ativan 0.5 mg x1 dose  - continue home dose of Buspar 5 mg PO every day, Cymbalta 30 mg PO every day, and Doxepin 75 mg PO QD    Dispo:admit to RNF. Plan per above.      I spent 60 minutes in the professional and overall care of this patient.      Eve Soriano, APRN-CNP

## 2024-06-05 NOTE — PROGRESS NOTES
I received Rosa Soto in signout from outgoing provider.  Please see the previous note for all HPI, PE and MDM up to the time of signout at 1500.    In brief Rosa Soto is an 69 y.o. female presenting for epistaxis in the setting of A-fib with Eliquis use.  Chief Complaint   Patient presents with    Epistaxis (Nose Bleed)     Pt came in via EMS complaining of recurrent nose bleed that started after her recent dialysis treatment. Nose was packed at OSH and pt was discharged but started bleeding again. Pt is on blood thinners. A&Ox3, RA.    .  At the time of signout we were awaiting: Reevaluation by ENT attending    I examined the patient at time of assumption of care:  Patient hemodynamically stable with no active bleeding.  No signs of malperfusion.  Patient awake alert and oriented in no apparent distress.      During my care patient was reevaluated by the ENT attending and was found to be hemostatic they recommended observation admit.  Patient was not a candidate for CDU admission because of her need for dialysis.  Dialysis was arranged on the inpatient side and should be performed today.  Patient was admitted to the medicine service for overnight observation after discussion with hospitalist service and ENT.      Patient seen and discussed with attending of record.    Vijay De Jesus MD

## 2024-06-05 NOTE — CONSULTS
ENT DEPARTMENT CONSULTATION NOTE  Name: Rosa Soto  MRN: [id number]  : 1955  Consulting Attending: Jeffy  Reason for Consult: Epistaxis    History of Present Illness  The patient is a 69 y.o. female who presented to [facility] as a transfer from [place] on 2024 for epistaxis.    6/3/2024 the patient underwent dialysis and on return to her house while sitting on the couch she noticed a brisk left-sided nosebleed.  The epistaxis did not resolve with pressure.  It eventually self resolved was started again for which she then presented to the [facility] ED.    She is on Eliquis.  She does not have a history of nosebleeds.  She does not have a history of clotting disorders.  She has no history of head and neck surgery.    Her history is only significant for bladder cancer s/p resection and end-stage renal disease on hemodialysis 3 times a week.    ENT was consulted for epistaxis.    Review of Systems  14 point review of systems completed and all negative except as noted in HPI.    Past Medical History  Past Medical History:   Diagnosis Date    Arrhythmia     Benign essential HTN     Cancer (Multi)     Chronic kidney disease     ESRD (end stage renal disease) (Multi)        Past Surgical History  Past Surgical History:   Procedure Laterality Date    CARDIAC CATHETERIZATION N/A 3/6/2024    Procedure: Left Heart Cath;  Surgeon: Carol Johnson MD;  Location: [facility] Cardiac Cath Lab;  Service: Cardiovascular;  Laterality: N/A;    CARDIAC ELECTROPHYSIOLOGY PROCEDURE N/A 2024    Procedure: Ablation A-Fib Persistant;  Surgeon: Adam Valentine MD;  Location: [place] [address] Cardiac Cath Lab;  Service: Electrophysiology;  Laterality: N/A;  C[facility]O/ANY EP LAB  GENERAL ANESTHESIA WHOLE CASE    US GUIDED ABDOMINAL PARACENTESIS  2023    US GUIDED ABDOMINAL PARACENTESIS 2023 [facility]       Allergies  Allergies   Allergen Reactions    Penicillins Anaphylaxis    Codeine GI Upset    Ropinirole Hallucinations    Adhesive  Rash       Medications    Current Facility-Administered Medications:     oxymetazoline (Afrin) nasal spray  - Omnicell Override Pull, , , ,     sodium chloride (Ocean) 0.65 % nasal spray 1 spray, 1 spray, Each Nostril, 4x daily PRN, Barbara Nichole PA-C    Current Outpatient Medications:     albuterol 90 mcg/actuation inhaler, Inhale 1 puff every 4 hours if needed for shortness of breath., Disp: , Rfl:     amLODIPine (Norvasc) 5 mg tablet, Take 1 tablet (5 mg) by mouth once daily., Disp: , Rfl:     atorvastatin (Lipitor) 40 mg tablet, Take 1 tablet (40 mg) by mouth once daily., Disp: , Rfl:     Auryxia 210 mg iron tablet, TAKE 2 TABLETS BY MOUTH THREE TIMES A DAY WITH MEALS. SWALLOW WHOLE, DO NOT CHEW OR CRUSH MEDICATION, Disp: , Rfl:     busPIRone (Buspar) 5 mg tablet, Take 1 tablet (5 mg) by mouth once daily., Disp: , Rfl:     cholecalciferol (Vitamin D-3) 25 MCG (1000 UT) tablet, Take 1 tablet (25 mcg) by mouth once daily., Disp: , Rfl:     cinacalcet (Sensipar) 30 mg tablet, Take 1 tablet (30 mg) by mouth once daily. Take with food or shortly afer a meal. Swallow tablet whole; do not break or divide., Disp: , Rfl:     clindamycin (Cleocin) 150 mg capsule, Take 1 capsule (150 mg) by mouth every 6 hours for 7 days., Disp: 28 capsule, Rfl: 0    doxepin (SINEquan) 75 mg capsule, Take 1 capsule (75 mg) by mouth once daily at bedtime., Disp: , Rfl:     DULoxetine (Cymbalta) 30 mg DR capsule, Take 1 capsule (30 mg) by mouth once daily., Disp: , Rfl:     Eliquis 5 mg tablet, Take 1 tablet (5 mg) by mouth twice a day., Disp: , Rfl:     Flonase Allergy Relief 50 mcg/actuation nasal spray, Administer 2 sprays into each nostril once daily., Disp: , Rfl:     metoprolol tartrate (Lopressor) 100 mg tablet, Take 1 tablet by mouth 2 times a day., Disp: 60 tablet, Rfl: 11    oxymetazoline (Afrin) 0.05 % nasal spray, Administer 2 sprays into each nostril every 12 hours if needed for congestion for up to 2 days. Do not use for  more than 3 days., Disp: 30 mL, Rfl: 0    pantoprazole (ProtoNix) 40 mg EC tablet, Take 1 tablet (40 mg) by mouth 2 times a day. Do not crush, chew, or split., Disp: 60 tablet, Rfl: 0    sodium chloride (Ocean) 0.65 % nasal spray, Administer 1 spray into each nostril if needed for congestion., Disp: 30 mL, Rfl: 0    torsemide (Demadex) 20 mg tablet, Take 1 tablet (20 mg) by mouth 2 times a day., Disp: , Rfl:     Family History  No family history on file.    Social History  Social History     Socioeconomic History    Marital status:      Spouse name: Not on file    Number of children: Not on file    Years of education: Not on file    Highest education level: Not on file   Occupational History    Not on file   Tobacco Use    Smoking status: Former     Current packs/day: 0.00     Types: Cigarettes     Quit date: 2/4/2021     Years since quitting: 3.3    Smokeless tobacco: Not on file   Vaping Use    Vaping status: Every Day   Substance and Sexual Activity    Alcohol use: Not Currently    Drug use: Never    Sexual activity: Not on file   Other Topics Concern    Not on file   Social History Narrative    Not on file     Social Determinants of Health     Financial Resource Strain: Low Risk  (3/3/2024)    Overall Financial Resource Strain (CARDIA)     Difficulty of Paying Living Expenses: Not very hard   Food Insecurity: Not on file   Transportation Needs: Unmet Transportation Needs (3/3/2024)    PRAPARE - Transportation     Lack of Transportation (Medical): Yes     Lack of Transportation (Non-Medical): Yes   Physical Activity: Not on file   Stress: Not on file   Social Connections: Not on file   Intimate Partner Violence: Not on file   Housing Stability: Low Risk  (3/3/2024)    Housing Stability Vital Sign     Unable to Pay for Housing in the Last Year: No     Number of Places Lived in the Last Year: 2     Unstable Housing in the Last Year: No       Vital Signs  Heart Rate:  [70-78]   Temperature:  [36.7 °C (98  "°F)-36.8 °C (98.2 °F)]   Respirations:  [14-18]   BP: (129-153)/(54-77)   Height:  [165.1 cm (5' 5\")]   Weight:  [112 kg (246 lb)-112 kg (246 lb 14.6 oz)]   Pulse Ox:  [95 %-96 %]       Physical Examination  GEN: The patient appears stated age in no acute distress, patient is nervous  VOICE: No dysphonia, volume is appropriate, no pitch/voice breaks  RESP: Unlabored on room air with no audible stertor or stridor  CV: Clinically well perfused   EYES: EOM grossly intact with no scleral icterus  NEURO: Alert and oriented with no focal deficits and CN II-XII symmetric and intact bilaterally  HEAD: Scalp is normocephalic and atraumatic  FACE: No abrasions or lacerations, no maxillary or mandibular stepoffs  SAL: No parotid or submandibular masses or tenderness to palpation and clear saliva expressed from ducts  EARS: Normal external ears, external auditory canals, and TMs to otoscopy, normal hearing to spoken voice  NOSE: External nose appears normal, septal deviation to the left, left-sided low-level mucosal ooze of the septum, area of previous cauterization evident on mid/posterior left septal wall, right nare without blood/bleeding, healthy right-sided nasal mucosa.  OC: Normal lips, normal buccal mucosa, normal alveolar ridge, normal floor of mouth, normal tongue, normal hard palate, normal retromolar trigone  OP: Tonsils 1+, normal pharyngeal walls, normal soft palate, streaks of blood dripping down posterior oropharynx, no significant clot, no active bleeding  NECK: Trachea midline, no significant lymphadenopathy    Laboratory and Data   Latest Reference Range & Units 06/04/24 21:56   LEUKOCYTES (10*3/UL) IN BLOOD BY AUTOMATED COUNT, Congolese 4.4 - 11.3 x10*3/uL 14.3 (H)   nRBC 0.0 - 0.0 /100 WBCs 0.0   ERYTHROCYTES (10*6/UL) IN BLOOD BY AUTOMATED COUNT, Congolese 4.00 - 5.20 x10*6/uL 3.67 (L)   HEMOGLOBIN 12.0 - 16.0 g/dL 11.6 (L)   HEMATOCRIT 36.0 - 46.0 % 35.6 (L)   MCV 80 - 100 fL 97   MCH 26.0 - 34.0 pg 31.6 "   MCHC 32.0 - 36.0 g/dL 32.6   RED CELL DISTRIBUTION WIDTH 11.5 - 14.5 % 14.2   PLATELETS (10*3/UL) IN BLOOD AUTOMATED COUNT, Indonesian 150 - 450 x10*3/uL 277   NEUTROPHILS/100 LEUKOCYTES IN BLOOD BY AUTOMATED COUNT, Indonesian 40.0 - 80.0 % 67.5   Immature Granulocytes %, Automated 0.0 - 0.9 % 0.5   Lymphocytes % 13.0 - 44.0 % 19.9   Monocytes % 2.0 - 10.0 % 10.6   Eosinophils % 0.0 - 6.0 % 0.9   Basophils % 0.0 - 2.0 % 0.6   NEUTROPHILS (10*3/UL) IN BLOOD BY AUTOMATED COUNT, Indonesian 1.20 - 7.70 x10*3/uL 9.67 (H)   Immature Granulocytes Absolute, Automated 0.00 - 0.70 x10*3/uL 0.07   Lymphocytes Absolute 1.20 - 4.80 x10*3/uL 2.84   Monocytes Absolute 0.10 - 1.00 x10*3/uL 1.51 (H)   Eosinophils Absolute 0.00 - 0.70 x10*3/uL 0.13   Basophils Absolute 0.00 - 0.10 x10*3/uL 0.08   (H): Data is abnormally high  (L): Data is abnormally low     Latest Reference Range & Units 06/05/24 13:04   INR 0.9 - 1.1  1.5 (H)   Protime 9.8 - 12.8 seconds 16.9 (H)   aPTT 27 - 38 seconds 37   (H): Data is abnormally high     Latest Reference Range & Units 06/04/24 21:56   GLUCOSE 74 - 99 mg/dL 121 (H)   SODIUM 136 - 145 mmol/L 137   POTASSIUM 3.5 - 5.3 mmol/L 4.0   CHLORIDE 98 - 107 mmol/L 97 (L)   Bicarbonate 21 - 32 mmol/L 25   Anion Gap 10 - 20 mmol/L 19   Blood Urea Nitrogen 6 - 23 mg/dL 42 (H)   Creatinine 0.50 - 1.05 mg/dL 8.78 (H)   EGFR >60 mL/min/1.73m*2 5 (L)   Calcium 8.6 - 10.3 mg/dL 9.1   PHOSPHORUS 2.5 - 4.9 mg/dL 6.1 (H)   Albumin 3.4 - 5.0 g/dL 4.3   (H): Data is abnormally high  (L): Data is abnormally low    Procedure Note: Control of Epistaxis  Verbal informed consent was obtained from the patient/patient's guardian. Examination of the nose revealed bleeding from the left anterior septum.  A very small blood clot was seen in the posterior oropharynx tracking superiorly and was removed.  Very minimal blood clots were seen in the left nare and suctioned. Afrin was sprayed liberally into both nasal cavities and pressure  was held for 15 minutes.  A very small area slowly bleeding from the anterior septal mucosa noted just anterior to a large area of previously cauterized mucosa.  A 1 cm x 3 cm section of Surgicel was folded and placed over the bleeding area.  Afrin was then sprayed liberally into the left nasal cavity.  The patient was observed for 15-20 minutes to ensure hemostasis. The patient tolerated the procedure well.      Assessment  Rosa Soto is a 69 y.o. female with a past medical history significant for bladder cancer s/p resection, A-fib on Eliquis, and ESRD on HD 3 times a week who presents for epistaxis.  Epistaxis was managed as noted above.    Recommendations  --Afrin 3x daily to both nares for 3 days  -If patient has bleeding spray afrin liberally into both nares and hold pressure for 15 minutes, repeat this 3x  -Nasal saline 5 sprays 4x daily for 10 days  -BP control  -Surgicel is absorbable and does not require antibiotic prophylaxis    Seen, discussed, evaluated with Dr. Lyon.    Marcelino Sanchez MD  Resident, PGY-1  Otolaryngology - Head & Neck Surgery    ENT Consult Pager: [phone]  Head and Neck Phone: [phone]  ENT Peds Pager: [phone]  ENT Subspecialty Team: [facility]     Patient was seen and examined.  At the time of my evaluation she did not have any active bleeding.  There is absorbable packing near the left-sided septum    Tank Lyon MD

## 2024-06-05 NOTE — ED PROVIDER NOTES
Emergency Department Encounter  [facility] EMERGENCY MEDICINE    Patient: Rosa Soto  MRN: [id number]  : 1955  Date of Evaluation: 2024  ED Provider: Barbara Nichole PA-C      Chief Complaint       Chief Complaint   Patient presents with    Epistaxis (Nose Bleed)     Pt came in via EMS complaining of recurrent nose bleed that started after her recent dialysis treatment. Nose was packed at OSH and pt was discharged but started bleeding again. Pt is on blood thinners. A&Ox3, RA.      HPI    Rosa Soto is a 69 y.o. female who presents to the emergency department presenting for ENT evaluation. Pt transferred here from [place] for ENT for recurrent epistaxis. PMH Afib on eliquis, was given her home dose in hospital prior to transfer. Epistaxis has been occurring intermittently over the past few days. No inciting trauma. Currently arrives without active bleeding or packing in place. Denies any associated shortness of breath, headache or dizziness, abdominal pain, nausea or vomiting, numbness or tingling, weakness or syncope.  Has never had epistaxis like this in the past. Denies any active bleeding anywhere else, including blood in the urine or stool.    ROS:     Review of Systems  14 systems reviewed and otherwise acutely negative except as in the HPI.    Past History     Past Medical History:   Diagnosis Date    Arrhythmia     Benign essential HTN     Cancer (Multi)     Chronic kidney disease     ESRD (end stage renal disease) (Multi)      Past Surgical History:   Procedure Laterality Date    CARDIAC CATHETERIZATION N/A 3/6/2024    Procedure: Left Heart Cath;  Surgeon: Carol Johnson MD;  Location: [facility]note 3[facility] Cardiac Cath Lab;  Service: Cardiovascular;  Laterality: N/A;    CARDIAC ELECTROPHYSIOLOGY PROCEDURE N/A 2024    Procedure: Ablation A-Fib Persistant;  Surgeon: Adam Valentine MD;  Location: [place] [id number] Cardiac Cath Lab;  Service: Electrophysiology;  Laterality: N/A;   CARTO/ANY EP LAB  GENERAL ANESTHESIA WHOLE CASE    US GUIDED ABDOMINAL PARACENTESIS  8/31/2023    US GUIDED ABDOMINAL PARACENTESIS 8/31/2023 POR US     Social History     Socioeconomic History    Marital status:      Spouse name: Not on file    Number of children: Not on file    Years of education: Not on file    Highest education level: Not on file   Occupational History    Not on file   Tobacco Use    Smoking status: Former     Current packs/day: 0.00     Types: Cigarettes     Quit date: 2/4/2021     Years since quitting: 3.3    Smokeless tobacco: Not on file   Vaping Use    Vaping status: Every Day   Substance and Sexual Activity    Alcohol use: Not Currently    Drug use: Never    Sexual activity: Not on file   Other Topics Concern    Not on file   Social History Narrative    Not on file     Social Determinants of Health     Financial Resource Strain: Low Risk  (3/3/2024)    Overall Financial Resource Strain (CARDIA)     Difficulty of Paying Living Expenses: Not very hard   Food Insecurity: Not on file   Transportation Needs: Unmet Transportation Needs (3/3/2024)    PRAPARE - Transportation     Lack of Transportation (Medical): Yes     Lack of Transportation (Non-Medical): Yes   Physical Activity: Not on file   Stress: Not on file   Social Connections: Not on file   Intimate Partner Violence: Not on file   Housing Stability: Low Risk  (3/3/2024)    Housing Stability Vital Sign     Unable to Pay for Housing in the Last Year: No     Number of Places Lived in the Last Year: 2     Unstable Housing in the Last Year: No       Medications/Allergies     Previous Medications    ALBUTEROL 90 MCG/ACTUATION INHALER    Inhale 1 puff every 4 hours if needed for shortness of breath.    AMLODIPINE (NORVASC) 5 MG TABLET    Take 1 tablet (5 mg) by mouth once daily.    ATORVASTATIN (LIPITOR) 40 MG TABLET    Take 1 tablet (40 mg) by mouth once daily.    AURYXIA 210 MG IRON TABLET    TAKE 2 TABLETS BY MOUTH THREE TIMES A DAY  WITH MEALS. SWALLOW WHOLE, DO NOT CHEW OR CRUSH MEDICATION    BUSPIRONE (BUSPAR) 5 MG TABLET    Take 1 tablet (5 mg) by mouth once daily.    CHOLECALCIFEROL (VITAMIN D-3) 25 MCG (1000 UT) TABLET    Take 1 tablet (25 mcg) by mouth once daily.    CINACALCET (SENSIPAR) 30 MG TABLET    Take 1 tablet (30 mg) by mouth once daily. Take with food or shortly afer a meal. Swallow tablet whole; do not break or divide.    CLINDAMYCIN (CLEOCIN) 150 MG CAPSULE    Take 1 capsule (150 mg) by mouth every 6 hours for 7 days.    DOXEPIN (SINEQUAN) 75 MG CAPSULE    Take 1 capsule (75 mg) by mouth once daily at bedtime.    DULOXETINE (CYMBALTA) 30 MG DR CAPSULE    Take 1 capsule (30 mg) by mouth once daily.    ELIQUIS 5 MG TABLET    Take 1 tablet (5 mg) by mouth twice a day.    FLONASE ALLERGY RELIEF 50 MCG/ACTUATION NASAL SPRAY    Administer 2 sprays into each nostril once daily.    METOPROLOL TARTRATE (LOPRESSOR) 100 MG TABLET    Take 1 tablet by mouth 2 times a day.    OXYMETAZOLINE (AFRIN) 0.05 % NASAL SPRAY    Administer 2 sprays into each nostril every 12 hours if needed for congestion for up to 2 days. Do not use for more than 3 days.    PANTOPRAZOLE (PROTONIX) 40 MG EC TABLET    Take 1 tablet (40 mg) by mouth 2 times a day. Do not crush, chew, or split.    SODIUM CHLORIDE (OCEAN) 0.65 % NASAL SPRAY    Administer 1 spray into each nostril if needed for congestion.    TORSEMIDE (DEMADEX) 20 MG TABLET    Take 1 tablet (20 mg) by mouth 2 times a day.     Allergies   Allergen Reactions    Penicillins Anaphylaxis    Codeine GI Upset    Ropinirole Hallucinations    Adhesive Rash        Physical Exam       ED Triage Vitals [06/05/24 1244]   Temperature Heart Rate Respirations BP   36.8 °C (98.2 °F) 78 16 150/77      Pulse Ox Temp Source Heart Rate Source Patient Position   96 % Temporal -- --      BP Location FiO2 (%)     -- 21 %         Physical Exam    Physical Exam:     VS: As documented in the triage note and EMR flowsheet from this  "visit were reviewed.    Appearance: Alert, oriented, cooperative, in no acute distress. Well nourished & well hydrated.    Skin:  Warm, intact and dry.     ENT: Hearing grossly intact. Nares patent, mucus membranes dry. Dried blood to left nare. Pharynx clear, uvula midline and non-edematous. No drooling, dysphagia or trismus. Voice non-muffled.    Neck: Supple, without meningismus. No lymphadenopathy.    Pulmonary: Clear bilaterally with good chest wall excursion. No rales, rhonchi or wheezing. No accessory muscle use or stridor.     Cardiac: Normal S1, S2.    Abdomen: Soft, nontender, active bowel sounds. No rebound or guarding.    Musculoskeletal: Spontaneously moving all extremities without limitation. Extremities warm and well-perfused, capillary refill less than 2 seconds. Pulses full and equal.    Neurological:  Cranial nerves II through XII are grossly intact.      Diagnostics   Labs:  Labs Reviewed   CBC - Abnormal       Result Value    WBC 10.9      nRBC 0.0      RBC 3.21 (*)     Hemoglobin 10.3 (*)     Hematocrit 31.3 (*)     MCV 98      MCH 32.1      MCHC 32.9      RDW 14.2      Platelets 258     BASIC METABOLIC PANEL - Abnormal    Glucose 141 (*)     Sodium 140      Potassium 4.2      Chloride 94 (*)     Bicarbonate 30      Anion Gap 20      Urea Nitrogen 50 (*)     Creatinine 9.85 (*)     eGFR 4 (*)     Calcium 9.3     COAGULATION SCREEN - Abnormal    Protime 16.9 (*)     INR 1.5 (*)     aPTT 37      Narrative:     The APTT is no longer used for monitoring Unfractionated Heparin Therapy. For monitoring Heparin Therapy, use the Heparin Assay.   TYPE AND SCREEN         ED Course   Visit Vitals  /77   Pulse 78   Temp 36.8 °C (98.2 °F) (Temporal)   Resp 16   Ht 1.651 m (5' 5\")   Wt 112 kg (246 lb)   SpO2 96%   BMI 40.94 kg/m²   OB Status Postmenopausal   Smoking Status Former   BSA 2.27 m²     Medications   sodium chloride (Ocean) 0.65 % nasal spray 1 spray (1 spray Each Nostril Given 6/5/24 1412) "   oxymetazoline (Afrin) 0.05 % nasal spray 2 spray (2 sprays Each Nostril Given 6/5/24 1340)   LORazepam (Ativan) tablet 0.5 mg (0.5 mg oral Given 6/5/24 1351)       Medical Decision Making   Chart review performed, ENT consulted and pending their evaluation.   Pt did have an episode of recurrent epistaxis to left nare, pressure applied. Stable BP, resolving positional lightheadedness. Staffed with supervising physician, Valentino Bardales MD, who agrees with workup and treatment plan. Stable Hgb, given PO ativan 0.5mg for anxiolysis to facilitate ENT exam. Currently hemostatic, will obs and await final ENT recommendations.      Final Impression      1. Epistaxis          DISPOSITION  Disposition: signed out pending final ENT re-evaluation  Patient condition is: Stable    Comment: Please note this report has been produced using speech recognition software and may contain errors related to that system including errors in grammar, punctuation, and spelling, as well as words and phrases that may be inappropriate.  If there are any questions or concerns please feel free to contact the dictating provider for clarification.    JERRY Calle PA-C  06/05/24 6197

## 2024-06-05 NOTE — PROGRESS NOTES
Emergency Medicine Transition of Care Note.    I received Rosa Soto in signout from Dr. cheema.  Please see the previous ED provider note for all HPI, PE and MDM up to the time of signout. This is in addition to the primary record.    In brief Rosa Soto is an 69 y.o. female presenting for   Chief Complaint   Patient presents with    Epistaxis (Nose Bleed)        Diagnoses as of 06/05/24 0837   Epistaxis       Medical Decision Making  The patient started bleeding again around 8:00 this morning.  The patient is trickling some blood out of the left nares.  She is not actively hemorrhaging.  I offered packing but the patient is refusing because it is too painful.  I attempted to transfer her to [facility] but they are not accepting transfers due to capacity.  I discussed with [place] [facility] and the patient will be transferred there for further care.        Final diagnoses:   [R04.0] Epistaxis           Procedure  Procedures    Gianni Franks MD

## 2024-06-05 NOTE — NURSING NOTE
Report from Sending RN:    Report From: REINA Cardozo  Recent Surgery of Procedure: No  Baseline Level of Consciousness (LOC): A/O X 4  Oxygen Use: No  Type: N/A  Diabetic: No  Last BP Med Given Day of Dialysis: none  Last Pain Med Given: none  Lab Tests to be Obtained with Dialysis: No  Blood Transfusion to be Given During Dialysis: No  Available IV Access: Yes  Medications to be Administered During Dialysis: No  Continuous IV Infusion Running: No  Restraints on Currently or in the Last 24 Hours: No  Hand-Off Communication: full code, no isolation precautions, ED admit, presented to the ED with a nose bleed, last set of vitals 140/72 HR 70 O2 sat 98% on RA  Dialysis Catheter Dressing: pt has a fistula  Last Dressing Change: pt has a fustula

## 2024-06-05 NOTE — ED TRIAGE NOTES
REOCCURING NOSE BLEED. PT SEEN HERE [facility] TODAY. PT HAS PACKING IN LEFT NARE. LEAKING AROUND IT. PT ALSO C/O HEAD PRESSURE.

## 2024-06-05 NOTE — ED PROVIDER NOTES
Rosa Soto is a 69 y.o. patient presenting to the ED for epistaxis.  The patient was seen in the ER yesterday for the same.  At that time she had Rhino Rocket placed.  She states she did not have any bleeding around this until this afternoon.  Since then she has had persistent bleeding both in the back of her throat as well as from the front of her nose.  She does have a constant pressure that she rates as severe.  She is anticoagulated on Eliquis and is a dialysis patient.  Neck scheduled dialysis is tomorrow.    Additional History Obtained from: daughter at bedside  Limitations to History: none  ------------------------------------------------------------------------------------------------------------------------------------------  Physical Exam:  Appearance: Alert, cooperative.   Skin: Warm, dry, appropriate color for ethnicity.  Eyes: Cornea clear. No scleral icterus or injection.   ENT: Rhinorocket in left nare with blood dripping around this and blood in the posterior oropharynx.  There is additionally blood coming from the right nare.  I suspect this is coming from the left side however is unclear.  Pulmonary: No accessory muscle use or stridor. Clear lung sounds bilaterally without rhonchi or wheezing.   Cardiac: Heart sounds regular without murmur. B/L radial pulses full and symmetric.   Musculoskeletal: No gross deformities.   Neurological: Face symmetrical. Voice clear. Appropriately conversant.   Psychiatric: Appropriate mood and affect.    Medical Decision Making:  Chronic Medical Conditions Significantly Affecting Care:  has a past medical history of Arrhythmia, Benign essential HTN, Cancer (Multi), Chronic kidney disease, and ESRD (end stage renal disease) (Multi).    She has no past medical history of CHF (congestive heart failure) (Multi).    Social Determinants of Health Significantly Affecting Care: none identified.    Differential Diagnosis Considered but not limited to: Epistaxis.  The  patient is not tachycardic to suggest significant blood loss anemia.      External Records Reviewed:   I reviewed recent and relevant outside records including: ED visit from yesterday    Independent Interpretation of Studies: The following studies were ordered as part of the emergency department work up and independently interpreted by me.     CBC with slight leukocytosis to 14 and hemoglobin of 11.6.  Globin is downtrending from 12.0 yesterday.  Normal platelets.  INR is 1.6.  Renal function panel shows normal electrolytes.  Abnormal BUN and creatinine consistent with the patient's known dialysis status.    On presentation the patient has slow oozing from the anterior aspect of her left nare as well as blood in the posterior oropharynx.  The balloon on the Rhino Rocket was minimally inflated.  Air was added however the patient did not have any improvement in the bleeding.  She does complain of additional pressure in her head similar to her yesterday.    The Rhino Rocket was deflated and removed.  The patient has active bleeding from the left nare.  This was treated with Afrin followed by lidocaine.  A clamp was applied.  When the clamp was removed she still had some bleeding.  Additional TXA was applied topically.  Clamp was reapplied.      This process was repeated with additional lidocaine followed by TXA followed by clamping after the patient blew blood clots out of her nose.  She additionally was given ice water to gargle.    On repeat evaluation the patient no longer has active bleeding from the left nare.  There is an area on the superior nasal septum adjacent to the inferior turbinate where it appears that bleeding was coming from.  This was cauterized using silver nitrate.    After cauterization the patient did not have further bleeding.    The patient and family are concerned that she lives at home alone and gets very anxious when she develops bleeding.  We did discuss that there is no ENT coverage at  [facility].  I did offer to contact [facility] or [facility] as [facility] hospitals.  She would like to pursue transfer for observation/ENT evaluation.    [facility] and [facility] both have wait list for transfer is coming in.  On reevaluation the patient continues to not have any bleeding.  I discussed options to pursue transfer to [place] versus observing the patient in the ER at [facility].  I did discuss that her nose may bleed again, however timing of bleeding would be difficult to predict.    After much discussion they would prefer to stay in the ER at [facility] as tomorrow the patient will be able to be with family he can take her to [facility] should she develop any further bleeding.    Patient without any further bleeding. She was discharged in stable condition with follow up and return precautions.     Diagnoses as of 06/05/24 0413   Epistaxis            Shwetha Swanson,   06/12/24 0920

## 2024-06-06 VITALS
HEIGHT: 65 IN | OXYGEN SATURATION: 95 % | TEMPERATURE: 98.4 F | DIASTOLIC BLOOD PRESSURE: 63 MMHG | WEIGHT: 246 LBS | HEART RATE: 77 BPM | SYSTOLIC BLOOD PRESSURE: 137 MMHG | BODY MASS INDEX: 40.98 KG/M2 | RESPIRATION RATE: 17 BRPM

## 2024-06-06 LAB
ALBUMIN SERPL BCP-MCNC: 3.9 G/DL (ref 3.4–5)
ANION GAP SERPL CALC-SCNC: 19 MMOL/L (ref 10–20)
BUN SERPL-MCNC: 24 MG/DL (ref 6–23)
CALCIUM SERPL-MCNC: 9.4 MG/DL (ref 8.6–10.6)
CHLORIDE SERPL-SCNC: 95 MMOL/L (ref 98–107)
CO2 SERPL-SCNC: 27 MMOL/L (ref 21–32)
CREAT SERPL-MCNC: 6.01 MG/DL (ref 0.5–1.05)
EGFRCR SERPLBLD CKD-EPI 2021: 7 ML/MIN/1.73M*2
ERYTHROCYTE [DISTWIDTH] IN BLOOD BY AUTOMATED COUNT: 14.4 % (ref 11.5–14.5)
ERYTHROCYTE [DISTWIDTH] IN BLOOD BY AUTOMATED COUNT: 14.4 % (ref 11.5–14.5)
GLUCOSE BLD MANUAL STRIP-MCNC: 114 MG/DL (ref 74–99)
GLUCOSE BLD MANUAL STRIP-MCNC: 152 MG/DL (ref 74–99)
GLUCOSE SERPL-MCNC: 128 MG/DL (ref 74–99)
HCT VFR BLD AUTO: 29.8 % (ref 36–46)
HCT VFR BLD AUTO: 34.1 % (ref 36–46)
HGB BLD-MCNC: 10.6 G/DL (ref 12–16)
HGB BLD-MCNC: 9.3 G/DL (ref 12–16)
IRON SATN MFR SERPL: 24 % (ref 25–45)
IRON SERPL-MCNC: 62 UG/DL (ref 35–150)
MAGNESIUM SERPL-MCNC: 1.98 MG/DL (ref 1.6–2.4)
MCH RBC QN AUTO: 31.4 PG (ref 26–34)
MCH RBC QN AUTO: 32 PG (ref 26–34)
MCHC RBC AUTO-ENTMCNC: 31.1 G/DL (ref 32–36)
MCHC RBC AUTO-ENTMCNC: 31.2 G/DL (ref 32–36)
MCV RBC AUTO: 101 FL (ref 80–100)
MCV RBC AUTO: 103 FL (ref 80–100)
NRBC BLD-RTO: 0 /100 WBCS (ref 0–0)
NRBC BLD-RTO: 0 /100 WBCS (ref 0–0)
PHOSPHATE SERPL-MCNC: 5.4 MG/DL (ref 2.5–4.9)
PLATELET # BLD AUTO: 218 X10*3/UL (ref 150–450)
PLATELET # BLD AUTO: 279 X10*3/UL (ref 150–450)
POTASSIUM SERPL-SCNC: 3.8 MMOL/L (ref 3.5–5.3)
RBC # BLD AUTO: 2.96 X10*6/UL (ref 4–5.2)
RBC # BLD AUTO: 3.31 X10*6/UL (ref 4–5.2)
SODIUM SERPL-SCNC: 137 MMOL/L (ref 136–145)
TIBC SERPL-MCNC: 256 UG/DL (ref 240–445)
UIBC SERPL-MCNC: 194 UG/DL (ref 110–370)
VIT B12 SERPL-MCNC: 408 PG/ML (ref 211–911)
WBC # BLD AUTO: 8.4 X10*3/UL (ref 4.4–11.3)
WBC # BLD AUTO: 9.4 X10*3/UL (ref 4.4–11.3)

## 2024-06-06 PROCEDURE — 2500000002 HC RX 250 W HCPCS SELF ADMINISTERED DRUGS (ALT 637 FOR MEDICARE OP, ALT 636 FOR OP/ED): Performed by: NURSE PRACTITIONER

## 2024-06-06 PROCEDURE — 83735 ASSAY OF MAGNESIUM: CPT | Performed by: NURSE PRACTITIONER

## 2024-06-06 PROCEDURE — 36415 COLL VENOUS BLD VENIPUNCTURE: CPT | Performed by: NURSE PRACTITIONER

## 2024-06-06 PROCEDURE — 82947 ASSAY GLUCOSE BLOOD QUANT: CPT | Mod: 91

## 2024-06-06 PROCEDURE — G0378 HOSPITAL OBSERVATION PER HR: HCPCS

## 2024-06-06 PROCEDURE — 2500000001 HC RX 250 WO HCPCS SELF ADMINISTERED DRUGS (ALT 637 FOR MEDICARE OP): Performed by: NURSE PRACTITIONER

## 2024-06-06 PROCEDURE — 80069 RENAL FUNCTION PANEL: CPT | Performed by: NURSE PRACTITIONER

## 2024-06-06 PROCEDURE — 83540 ASSAY OF IRON: CPT | Performed by: INTERNAL MEDICINE

## 2024-06-06 PROCEDURE — 85027 COMPLETE CBC AUTOMATED: CPT | Mod: 91 | Performed by: NURSE PRACTITIONER

## 2024-06-06 PROCEDURE — 99239 HOSP IP/OBS DSCHRG MGMT >30: CPT | Performed by: INTERNAL MEDICINE

## 2024-06-06 RX ORDER — ACETAMINOPHEN 325 MG/1
975 TABLET ORAL EVERY 8 HOURS PRN
Start: 2024-06-06

## 2024-06-06 RX ORDER — TORSEMIDE 20 MG/1
20 TABLET ORAL DAILY
Status: DISCONTINUED | OUTPATIENT
Start: 2024-06-07 | End: 2024-06-06

## 2024-06-06 RX ORDER — OXYMETAZOLINE HCL 0.05 %
2 SPRAY, NON-AEROSOL (ML) NASAL EVERY 12 HOURS PRN
Refills: 0
Start: 2024-06-06 | End: 2024-06-06 | Stop reason: HOSPADM

## 2024-06-06 RX ORDER — TORSEMIDE 20 MG/1
20 TABLET ORAL
Status: DISCONTINUED | OUTPATIENT
Start: 2024-06-06 | End: 2024-06-06 | Stop reason: HOSPADM

## 2024-06-06 RX ORDER — OXYMETAZOLINE HCL 0.05 %
2 SPRAY, NON-AEROSOL (ML) NASAL 2 TIMES DAILY PRN
Qty: 30 ML | Refills: 0 | Status: SHIPPED | OUTPATIENT
Start: 2024-06-06

## 2024-06-06 RX ORDER — ALBUTEROL SULFATE 90 UG/1
1 AEROSOL, METERED RESPIRATORY (INHALATION) EVERY 4 HOURS PRN
Qty: 18 G | Refills: 11 | Status: SHIPPED | OUTPATIENT
Start: 2024-06-06

## 2024-06-06 RX ADMIN — NASAL DECONGESTANT 2 SPRAY: 0.05 SPRAY NASAL at 10:51

## 2024-06-06 RX ADMIN — CINACALCET 30 MG: 30 TABLET, FILM COATED ORAL at 08:34

## 2024-06-06 RX ADMIN — METOPROLOL TARTRATE 100 MG: 50 TABLET, FILM COATED ORAL at 08:34

## 2024-06-06 RX ADMIN — BUSPIRONE HYDROCHLORIDE 5 MG: 5 TABLET ORAL at 08:34

## 2024-06-06 RX ADMIN — AMLODIPINE BESYLATE 5 MG: 5 TABLET ORAL at 08:34

## 2024-06-06 RX ADMIN — Medication 1000 UNITS: at 08:34

## 2024-06-06 RX ADMIN — SALINE NASAL SPRAY 5 SPRAY: 1.5 SOLUTION NASAL at 13:03

## 2024-06-06 RX ADMIN — TORSEMIDE 20 MG: 20 TABLET ORAL at 07:33

## 2024-06-06 RX ADMIN — DULOXETINE HYDROCHLORIDE 30 MG: 30 CAPSULE, DELAYED RELEASE ORAL at 08:34

## 2024-06-06 RX ADMIN — Medication 1 TABLET: at 08:34

## 2024-06-06 RX ADMIN — INSULIN LISPRO 1 UNITS: 100 INJECTION, SOLUTION INTRAVENOUS; SUBCUTANEOUS at 08:34

## 2024-06-06 SDOH — SOCIAL STABILITY: SOCIAL INSECURITY: DO YOU FEEL ANYONE HAS EXPLOITED OR TAKEN ADVANTAGE OF YOU FINANCIALLY OR OF YOUR PERSONAL PROPERTY?: NO

## 2024-06-06 SDOH — SOCIAL STABILITY: SOCIAL INSECURITY: HAS ANYONE EVER THREATENED TO HURT YOUR FAMILY OR YOUR PETS?: NO

## 2024-06-06 SDOH — SOCIAL STABILITY: SOCIAL INSECURITY: ARE THERE ANY APPARENT SIGNS OF INJURIES/BEHAVIORS THAT COULD BE RELATED TO ABUSE/NEGLECT?: NO

## 2024-06-06 SDOH — SOCIAL STABILITY: SOCIAL INSECURITY: ARE YOU OR HAVE YOU BEEN THREATENED OR ABUSED PHYSICALLY, EMOTIONALLY, OR SEXUALLY BY ANYONE?: NO

## 2024-06-06 SDOH — SOCIAL STABILITY: SOCIAL INSECURITY: ABUSE: ADULT

## 2024-06-06 SDOH — SOCIAL STABILITY: SOCIAL INSECURITY: DO YOU FEEL UNSAFE GOING BACK TO THE PLACE WHERE YOU ARE LIVING?: NO

## 2024-06-06 SDOH — SOCIAL STABILITY: SOCIAL INSECURITY: HAVE YOU HAD ANY THOUGHTS OF HARMING ANYONE ELSE?: NO

## 2024-06-06 SDOH — SOCIAL STABILITY: SOCIAL INSECURITY: HAVE YOU HAD THOUGHTS OF HARMING ANYONE ELSE?: NO

## 2024-06-06 SDOH — SOCIAL STABILITY: SOCIAL INSECURITY: DOES ANYONE TRY TO KEEP YOU FROM HAVING/CONTACTING OTHER FRIENDS OR DOING THINGS OUTSIDE YOUR HOME?: NO

## 2024-06-06 SDOH — SOCIAL STABILITY: SOCIAL INSECURITY: WERE YOU ABLE TO COMPLETE ALL THE BEHAVIORAL HEALTH SCREENINGS?: YES

## 2024-06-06 ASSESSMENT — COGNITIVE AND FUNCTIONAL STATUS - GENERAL
DAILY ACTIVITIY SCORE: 24
PATIENT BASELINE BEDBOUND: NO
MOBILITY SCORE: 24
MOBILITY SCORE: 24
DAILY ACTIVITIY SCORE: 24

## 2024-06-06 ASSESSMENT — PATIENT HEALTH QUESTIONNAIRE - PHQ9
1. LITTLE INTEREST OR PLEASURE IN DOING THINGS: NOT AT ALL
SUM OF ALL RESPONSES TO PHQ9 QUESTIONS 1 & 2: 0
2. FEELING DOWN, DEPRESSED OR HOPELESS: NOT AT ALL

## 2024-06-06 ASSESSMENT — ACTIVITIES OF DAILY LIVING (ADL)
LACK_OF_TRANSPORTATION: NO
PATIENT'S MEMORY ADEQUATE TO SAFELY COMPLETE DAILY ACTIVITIES?: YES
JUDGMENT_ADEQUATE_SAFELY_COMPLETE_DAILY_ACTIVITIES: YES
HEARING - LEFT EAR: FUNCTIONAL
GROOMING: INDEPENDENT
FEEDING YOURSELF: INDEPENDENT
DRESSING YOURSELF: INDEPENDENT
TOILETING: INDEPENDENT
HEARING - RIGHT EAR: FUNCTIONAL
WALKS IN HOME: INDEPENDENT
BATHING: INDEPENDENT
ADEQUATE_TO_COMPLETE_ADL: YES

## 2024-06-06 ASSESSMENT — PAIN SCALES - GENERAL: PAINLEVEL_OUTOF10: 0 - NO PAIN

## 2024-06-06 NOTE — CARE PLAN
The patient's goals for the shift include  rest    The clinical goals for the shift include  HGB WNL      Problem: Pain  Goal: My pain/discomfort is manageable  Outcome: Progressing     Problem: Safety  Goal: Patient will be injury free during hospitalization  Outcome: Progressing     Problem: Safety  Goal: I will remain free of falls  Outcome: Progressing     Problem: Daily Care  Goal: Daily care needs are met  Outcome: Progressing

## 2024-06-06 NOTE — NURSING NOTE
Report to Receiving RN:    Report To: My [place] RN)  Time Report Called: 21:07 pm  Hand-Off Communication: post vs 126/77; HR 85; fluid removal 2 liters  Complications During Treatment: Yes, c/o of headache 3/10  Ultrafiltration Treatment: No  Medications Administered During Dialysis: Yes, Tylenol 650 mg 16:34 pm  Blood Products Administered During Dialysis: No  Labs Sent During Dialysis: Yes, hepatitis B antigen/antibody  Heparin Drip Rate Changes: No  Dialysis Catheter Dressing: fistula  Last Dressing Change: n/a    Electronic Signatures:   (Signed Abby Chang RN)   Authored:    (Signed )   Authored:     Last Updated: 9:00 PM by ABBY CHANG

## 2024-06-06 NOTE — PROGRESS NOTES
06/06/24 1053   Discharge Planning   Living Arrangements Alone   Support Systems Children   Assistance Needed Pt would like assistance with receiving delivered meals and obtaining assistance with cleaning.   Type of Residence Private residence   Who is requesting discharge planning? Patient   Home or Post Acute Services Other (Comment)  (Pt states she was active for palliative care.)   Patient expects to be discharged to: Home   Does the patient need discharge transport arranged? No  (Pt's son in law will provide.)   RoundTrip coordination needed? No   Has discharge transport been arranged? No   Financial Resource Strain   How hard is it for you to pay for the very basics like food, housing, medical care, and heating? Not hard   Housing Stability   In the last 12 months, was there a time when you were not able to pay the mortgage or rent on time? N   In the last 12 months, was there a time when you did not have a steady place to sleep or slept in a shelter (including now)? N   Transportation Needs   In the past 12 months, has lack of transportation kept you from medical appointments or from getting medications? no   In the past 12 months, has lack of transportation kept you from meetings, work, or from getting things needed for daily living? No     Assessment Note:  Met with pt and introduced myself as care coordinator and member of the Care Transitions team for discharge planning.   Pt feels safe at home with support of family.  Pt's family provides transport to [facility]. Referral sent to [facility] to assist with receiving delivered meals and someone to assist with house cleaning.  Pt's address, phone number and contact information was verified.  Pt does not have any other questions/concerns at this time.     Previous Home Care: None. Pt was active for palliative care but cannot recall the name of the company.  DME: Rollator, oxygen-PRN(per pt, not covered by insurance, so planning to return- [place] ordered).   Glucometer, shower chair.  Pharmacy: [facility] [address]  Falls: Denies  PCP:  Amanuel Monreal (last visit 2 months).   [facility] [address] at 1030am. Pt's dtr and grand-dtr provide transport.    Dorothea Mcneill MSN, RN-BC  Transitional Care Coordinator (TCC)  [phone]

## 2024-06-06 NOTE — PROGRESS NOTES
DISCHARGE MEDICATION REVIEW BY PHARMACY     NAME:  Rosa Soto   MRN:  [id number]   SERVICE DATE: 6/6/2024   SERVICE TIME:  2:16 PM       The following discharge medications were reviewed by a pharmacist: Yes  The following recommendations were made:  JAY CAN BE RESUMED 6/11  Dispo: home       Medication List      START taking these medications     acetaminophen 325 mg tablet; Commonly known as: Tylenol; Take 3 tablets   (975 mg) by mouth every 8 hours if needed (breakthrough pain).     CHANGE how you take these medications     oxymetazoline 0.05 % nasal spray; Commonly known as: Afrin; Administer 2   sprays into each nostril 2 times a day as needed (nose bleeds) for up to 8   doses. Do not use for more than 3 days.; What changed: when to take this,   reasons to take this   * sodium chloride 0.65 % nasal spray; Commonly known as: Ocean;   Administer 3 sprays into each nostril 4 times a day for 10 days.; What   changed: how much to take, when to take this, reasons to take this   * sodium chloride 0.65 % nasal spray; Commonly known as: Ocean;   Administer 3 sprays into each nostril 4 times a day for 10 days.; What   changed: You were already taking a medication with the same name, and this   prescription was added. Make sure you understand how and when to take   each.  * This list has 2 medication(s) that are the same as other medications   prescribed for you. Read the directions carefully, and ask your doctor or   other care provider to review them with you.     CONTINUE taking these medications     albuterol 90 mcg/actuation inhaler; Inhale 1 puff every 4 hours if   needed for shortness of breath.   amLODIPine 5 mg tablet; Commonly known as: Norvasc   atorvastatin 40 mg tablet; Commonly known as: Lipitor   Auryxia 210 mg iron tablet; Generic drug: ferric citrate   busPIRone 5 mg tablet; Commonly known as: Buspar   cholecalciferol 25 MCG (1000 UT) tablet; Commonly known as: Vitamin D-3   cinacalcet 30 mg tablet;  Commonly known as: Sensipar   doxepin 75 mg capsule; Commonly known as: SINEquan   DULoxetine 30 mg DR capsule; Commonly known as: Cymbalta   Flonase Allergy Relief 50 mcg/actuation nasal spray; Generic drug:   fluticasone   metoprolol tartrate 100 mg tablet; Commonly known as: Lopressor; Take 1   tablet by mouth 2 times a day.   Nephro-Gloria 0.8 mg tablet; Generic drug: B complex-vitamin C-folic acid   pantoprazole 40 mg EC tablet; Commonly known as: ProtoNix; Take 1 tablet   (40 mg) by mouth 2 times a day. Do not crush, chew, or split.   torsemide 20 mg tablet; Commonly known as: Demadex     STOP taking these medications     clindamycin 150 mg capsule; Commonly known as: Cleocin   Eliquis 5 mg tablet; Generic drug: apixaban       January Vega, PharmD, [facility]  [facility] Pharmacist

## 2024-06-06 NOTE — CARE PLAN
The patient's goals for the shift include  pt will remain free from nosebleeds      The clinical goals for the shift include  pt will remain hds throughout shift  Problem: Pain  Goal: My pain/discomfort is manageable  Outcome: Progressing     Problem: Safety  Goal: Patient will be injury free during hospitalization  Outcome: Progressing  Goal: I will remain free of falls  Outcome: Progressing     Problem: Daily Care  Goal: Daily care needs are met  Outcome: Progressing     Problem: Psychosocial Needs  Goal: Demonstrates ability to cope with hospitalization/illness  Outcome: Progressing  Goal: Collaborate with me, my family, and caregiver to identify my specific goals  Outcome: Progressing

## 2024-06-06 NOTE — DISCHARGE INSTRUCTIONS
Afrin 3x daily to both nares for 3 days  If patient has bleeding spray afrin liberally into both nares and hold pressure for 15 minutes, repeat this 3x  Nasal saline 3 sprays 4x daily for 10 days  Surgicel is absorbable and does not require antibiotic prophylaxis  You can resume the eliquis in 5 days (6/11/24).  If you have recurrent bleeding then you will need to discuss with your cardiologist the possibility of stopping the eliquis

## 2024-06-06 NOTE — PROGRESS NOTES
Pharmacy Medication History Review    Rosa Soto is a 69 y.o. female admitted for Epistaxis. Pharmacy reviewed the patient's [id number] medications and allergies for accuracy.    The list below reflects the updated PTA list. Comments regarding how patient may be taking medications differently can be found in the Admit Orders Activity    Prior to Admission Medications   Prescriptions Last Dose Informant   Auryxia 210 mg iron tablet 6/4/2024 Self   Sig: TAKE 2 TABLETS BY MOUTH THREE TIMES A DAY WITH MEALS. SWALLOW WHOLE, DO NOT CHEW OR CRUSH MEDICATION   DULoxetine (Cymbalta) 30 mg DR capsule 6/4/2024 Self   Sig: Take 1 capsule (30 mg) by mouth once daily.   Eliquis 5 mg tablet 6/4/2024 Self   Sig: Take 1 tablet (5 mg) by mouth twice a day.   Flonase Allergy Relief 50 mcg/actuation nasal spray 6/4/2024 Self   Sig: Administer 2 sprays into each nostril once daily.   Nephro-Gloria 0.8 mg tablet  Self   Sig: Take 1 tablet by mouth once daily.   amLODIPine (Norvasc) 5 mg tablet 6/4/2024 at patient request refills Self   Sig: Take 1 tablet (5 mg) by mouth once daily.   atorvastatin (Lipitor) 40 mg tablet 6/4/2024 at patient request refills Self   Sig: Take 1 tablet (40 mg) by mouth once daily.   busPIRone (Buspar) 5 mg tablet 6/4/2024 Self   Sig: Take 1 tablet (5 mg) by mouth once daily.   cholecalciferol (Vitamin D-3) 25 MCG (1000 UT) tablet 6/4/2024 Self   Sig: Take 1 tablet (25 mcg) by mouth once daily.   cinacalcet (Sensipar) 30 mg tablet 6/4/2024 at patient request refills Self   Sig: Take 1 tablet (30 mg) by mouth once daily. Take with food or shortly afer a meal. Swallow tablet whole; do not break or divide.   clindamycin (Cleocin) 150 mg capsule Unknown at patient plans to start this medicaiton at discharge Self   Sig: Take 1 capsule (150 mg) by mouth every 6 hours for 7 days.   doxepin (SINEquan) 75 mg capsule Past Month at patient request refills Self   Sig: Take 1 capsule (75 mg) by mouth once daily at  bedtime.   metoprolol tartrate (Lopressor) 100 mg tablet 6/4/2024 Self   Sig: Take 1 tablet by mouth 2 times a day.   oxymetazoline (Afrin) 0.05 % nasal spray 6/4/2024 at patient request refills Self   Sig: Administer 2 sprays into each nostril every 12 hours if needed for congestion for up to 2 days. Do not use for more than 3 days.   pantoprazole (ProtoNix) 40 mg EC tablet 6/4/2024 at patient request refills Self   Sig: Take 1 tablet (40 mg) by mouth 2 times a day. Do not crush, chew, or split.   sodium chloride (Ocean) 0.65 % nasal spray Not Taking Self   Sig: Administer 1 spray into each nostril if needed for congestion.   Patient not taking: Reported on 6/5/2024   torsemide (Demadex) 20 mg tablet 6/4/2024 Self   Sig: Take 1 tablet (20 mg) by mouth 2 times a day.      Facility-Administered Medications: None        The list below reflects the updated allergy list. Please review each documented allergy for additional clarification and justification.  Allergies  Reviewed by Cezar Stewart on 6/5/2024        Severity Reactions Comments    Penicillins High Anaphylaxis     Codeine Medium GI Upset     Ropinirole Not Specified Hallucinations     Adhesive Low Rash             Patient declines M2B at discharge. Pharmacy has been updated to n/a.    Sources used to complete the med history include   Allergy list epic   Allergy list sure scripts   *oarrs ( none recent )  Patient interview   6/4/2024 [facility] dialysis summary note   3/7/2024 discharge summary ( mary Garcia )    Below are additional concerns with the patient's PTA list.  Patient is a good historian patient knows medication name dose and frequency  Patient goes to dialysis on Monday Wednesday and Friday   Patient last noted fill for Auryxia 210 mg ( 12/19/2023 ) - patient states that she gets this medication from [facility] pharmacy   Patient denies trazodone 50 mg this medication has a recent fill history of 2/24/2024 d: 90 qty:  90   Patient potentially confused about medication indication - patient states that she switches between doxepin 30 mg and torsemide 20 mg - patent states that she takes old supply of doxepin 30 mg when she is out of torsemide 20 mg - patients most recent fill is for torsemide 20 mg 3/11/2024 ds: 90 qty: 90       Oarrs   8/14/2023 hydrocodone-acetaminophen 5-325 mg qty: 5 ds: 5  10/28/2023 oxycodone-acetaminophen 5-325 mg qty: 15 ds: 3       Cezar Stewart [facility]  Transitions of Care Pharmacy Technician  [facility]  Please reach out via [employer] Secure Chat for questions, or if no response call [phone] or [employer] “Med[employer]”

## 2024-06-06 NOTE — NURSING NOTE
1414: Discharge instructions given. IV removed. Awaiting transport.     1434: Patient discharged with all belongings.

## 2024-06-07 NOTE — CARE PLAN
Patient was discharge, Will follow up via phone. To help assist with resources regarding home delivered meals and home cleaning assistance. Will follow up 6/10/2024    Community Resource Name:   Phone Number:   Staff Member:      Discussed the following topics on behalf of the patient:  []  Behavioral Health Assistance     []  Case Management  []   Assistance  []  Digital Equity Assistance  []  Dental Health Assistance  []  Education Assistance  []  Employment Assistance  []  Financial Strain Relief Assistance  [x]  Food Insecurity Assistance  []  Healthcare Coverage Assistance  []  Housing Stability Assistance  []  IP Violence Relief Assistance  []  Legal Assistance  []  Physical Activity Assistance  []  Social Connection Assistance  []  Stress Relief Assistance   []  Substance Abuse Assistance  []  Transportation Assistance  []  Utility Assistance  [x]  Other: Senior cleaning services.]    Next Steps:   Will follow up 6/10/24      Poonam Andersen LCSW

## 2024-06-10 NOTE — CARE PLAN
CHW attempted to reach out to patient via phone, CHW left voicemail. Chw will follow up with patient to help resources regarding meals on wheels, and home cleaning needs.      Community Resource Name:   Phone Number:   Staff Member:      Discussed the following topics on behalf of the patient:  []  Behavioral Health Assistance     []  Case Management  []   Assistance  []  Digital Equity Assistance  []  Dental Health Assistance  []  Education Assistance  []  Employment Assistance  []  Financial Strain Relief Assistance  [x]  Food Insecurity Assistance  []  Healthcare Coverage Assistance  []  Housing Stability Assistance  []  IP Violence Relief Assistance  []  Legal Assistance  []  Physical Activity Assistance  [x]  Social Connection Assistance  []  Stress Relief Assistance   []  Substance Abuse Assistance  []  Transportation Assistance  []  Utility Assistance  []  Other: [insert comment here]    Next Steps:         Poonam Andersen LCSW

## 2024-06-12 ENCOUNTER — TELEPHONE (OUTPATIENT)
Dept: CARDIOLOGY | Facility: CLINIC | Age: 69
End: 2024-06-12
Payer: COMMERCIAL

## 2024-06-12 NOTE — DISCHARGE SUMMARY
"Discharge Diagnosis  Epistaxis  ESRD  Paroxysmal atrial fibrillation     Hospital Course  Rosa Soto is a 69 y.o. female with a past medical history of bladder cancer, HFpEF (last EF 60-65% 3/4/24), SACHA, COPD, HLD, NSTEMI (3/2024), ESRD on HD (MWF), HTN, T2DM (last HgA1c 6.5% 3/5/24), and pAFib on Eliquis (s/p ablation 2/29/24). Pt presented to [facility] as a transfer from [facility] for ENT consult d/t recurrent epistaxis. Pt initially presented to OSH on 6/3 due to epistaxis that started after dialysis and hemostasis was achieved after nasal packing and pt coughing up clot per documentation from ED visit on this date. Pt given prescription for Clindamycin 150 mg q6h x7 days and instructed to return to ED if her nose starts to bleed again. Pt presented to ED of OSH a second time with recurrent epistaxis on 6/4 that started around 0800. Decision made to transfer pt to [facility] for further evaluation. Upon arrival to ED pt was seen by ENT.  Per documentation from ENT: very small area slowly bleeding from the anterior septal mucosa noted just anterior to a large area of previously cauterized mucosa. 1 cm x 3 cm section of Surgicel was folded and placed over the bleeding area.  Afrin was then sprayed liberally into the left nasal cavity. The patient was observed for 15-20 minutes to ensure hemostasis. Labs obtained on admit notable for anemia (Hgb 10.3) and impaired renal function. CTH showed  air-hemorrhage levels in the maxillary sinuses, blood products in the nasopharynx, and blood products within the left aspect of the nasal passages.  Nephrology consulted d/t need for dialysis and pt will receive dialysis today. Patient was monitor for 24 hours with no recurrence bleeding. The risks and benefits with eliquis were discussed to include recurrent bleeding.  Patient stated \"will follow up with cardiologist to evaluate the continue need of eliquis and will resume eliquis after their discussion\"       Stable for discharge "     Pertinent Physical Exam At Time of Discharge  Physical Exam  Constitutional:  sitting in bed with no distress  HEENT: moist oral mucosa  Neck: No JVD  Lungs: Clear to auscultation bilaterally, no wheezing, no rhonchi   Cardiac: regular rate and rhythym, S1 and S2 present, no rubs, no murmurs, no gallops  Abdomen: bowel sounds present, non tender, non distended  Ext: No cyanosis, no clubbing, no edema        Home Medications     Medication List      START taking these medications     acetaminophen 325 mg tablet; Commonly known as: Tylenol; Take 3 tablets   (975 mg) by mouth every 8 hours if needed (breakthrough pain).     CHANGE how you take these medications     oxymetazoline 0.05 % nasal spray; Commonly known as: Afrin; Administer 2   sprays into each nostril 2 times a day as needed (nose bleeds) for up to 8   doses. Do not use for more than 3 days.; What changed: when to take this,   reasons to take this   * sodium chloride 0.65 % nasal spray; Commonly known as: Ocean;   Administer 3 sprays into each nostril 4 times a day for 10 days.; What   changed: how much to take, when to take this, reasons to take this   * sodium chloride 0.65 % nasal spray; Commonly known as: Ocean;   Administer 3 sprays into each nostril 4 times a day for 10 days.; What   changed: You were already taking a medication with the same name, and this   prescription was added. Make sure you understand how and when to take   each.  * This list has 2 medication(s) that are the same as other medications   prescribed for you. Read the directions carefully, and ask your doctor or   other care provider to review them with you.     CONTINUE taking these medications     albuterol 90 mcg/actuation inhaler; Inhale 1 puff every 4 hours if   needed for shortness of breath.   amLODIPine 5 mg tablet; Commonly known as: Norvasc   atorvastatin 40 mg tablet; Commonly known as: Lipitor   Auryxia 210 mg iron tablet; Generic drug: ferric citrate   busPIRone 5  mg tablet; Commonly known as: Buspar   cholecalciferol 25 MCG (1000 UT) tablet; Commonly known as: Vitamin D-3   cinacalcet 30 mg tablet; Commonly known as: Sensipar   doxepin 75 mg capsule; Commonly known as: SINEquan   DULoxetine 30 mg DR capsule; Commonly known as: Cymbalta   Flonase Allergy Relief 50 mcg/actuation nasal spray; Generic drug:   fluticasone   metoprolol tartrate 100 mg tablet; Commonly known as: Lopressor; Take 1   tablet by mouth 2 times a day.   Nephro-Gloria 0.8 mg tablet; Generic drug: B complex-vitamin C-folic acid   pantoprazole 40 mg EC tablet; Commonly known as: ProtoNix; Take 1 tablet   (40 mg) by mouth 2 times a day. Do not crush, chew, or split.   torsemide 20 mg tablet; Commonly known as: Demadex     STOP taking these medications     clindamycin 150 mg capsule; Commonly known as: Cleocin   Eliquis 5 mg tablet; Generic drug: apixaban       Outpatient Follow-Up  Future Appointments   Date Time Provider Department Center   6/26/2024  3:00 PM MATTHIAS Tyson-CNP [id number] [place]       >35 minutes spent coordinating the care of the patient     Sangita Downing DO

## 2024-06-20 ENCOUNTER — APPOINTMENT (OUTPATIENT)
Dept: CARDIOLOGY | Facility: CLINIC | Age: 69
End: 2024-06-20
Payer: COMMERCIAL

## 2024-06-25 ENCOUNTER — OFFICE VISIT (OUTPATIENT)
Dept: CARDIOLOGY | Facility: CLINIC | Age: 69
End: 2024-06-25
Payer: COMMERCIAL

## 2024-06-25 VITALS
DIASTOLIC BLOOD PRESSURE: 61 MMHG | WEIGHT: 246 LBS | HEIGHT: 66 IN | BODY MASS INDEX: 39.53 KG/M2 | SYSTOLIC BLOOD PRESSURE: 130 MMHG | HEART RATE: 73 BPM

## 2024-06-25 DIAGNOSIS — I50.32 CHRONIC DIASTOLIC HEART FAILURE (MULTI): ICD-10-CM

## 2024-06-25 DIAGNOSIS — I48.0 PAROXYSMAL ATRIAL FIBRILLATION (MULTI): Primary | ICD-10-CM

## 2024-06-25 PROCEDURE — 1159F MED LIST DOCD IN RCRD: CPT | Performed by: NURSE PRACTITIONER

## 2024-06-25 PROCEDURE — 1160F RVW MEDS BY RX/DR IN RCRD: CPT | Performed by: NURSE PRACTITIONER

## 2024-06-25 PROCEDURE — 3008F BODY MASS INDEX DOCD: CPT | Performed by: NURSE PRACTITIONER

## 2024-06-25 PROCEDURE — 3075F SYST BP GE 130 - 139MM HG: CPT | Performed by: NURSE PRACTITIONER

## 2024-06-25 PROCEDURE — 99215 OFFICE O/P EST HI 40 MIN: CPT | Performed by: NURSE PRACTITIONER

## 2024-06-25 PROCEDURE — 3078F DIAST BP <80 MM HG: CPT | Performed by: NURSE PRACTITIONER

## 2024-06-25 PROCEDURE — 3048F LDL-C <100 MG/DL: CPT | Performed by: NURSE PRACTITIONER

## 2024-06-25 PROCEDURE — 3044F HG A1C LEVEL LT 7.0%: CPT | Performed by: NURSE PRACTITIONER

## 2024-06-25 NOTE — PROGRESS NOTES
Chief Complaint:   Hospital Follow Up     History Of Present Illness:    Rosa Soto is a 69 y.o. female here for hospital follow up. She has a PMH of paroxysmal atrial fibrillation, which a [id number] score of 4.     Presented to ED, on 6/3, with nose bleed. Started post dialysis. Rhino Rocket placed. She returned to the ED the next day with another nose bleed. Bleeding resolved with cauterization. She was transferred from [facility] to [facility] for ENT consult.     Patient underwent RFA on 2/29 with Dr. Valentine. She then presented to ED on 3/2/24 with acute diastolic heart failure AEB volume overload and subsequent respiratory failure 2/2 miss dialysis. While hospitalized she went into afib rvr. Started IV diltiazem. Converted to NSR and diltiazem was weaned off. Underwent LHC, on 3/6, which showed mild nonobstructive CAD. Patient has not followed up with us or EP since RFA.     Her weights have been stable. Denies SOB.   No further nose bleeds.    Cath (Normal Coronaries) - 10/2/2008    Past Medical History:  She has a past medical history of Arrhythmia, Benign essential HTN, Cancer (Multi), Chronic kidney disease, and ESRD (end stage renal disease) (Multi).    She has no past medical history of CHF (congestive heart failure) (Multi).    Past Surgical History:  She has a past surgical history that includes US guided abdominal paracentesis (8/31/2023); Cardiac catheterization (N/A, 3/6/2024); and Cardiac electrophysiology procedure (N/A, 2/29/2024).      Social History:  She reports that she quit smoking about 3 years ago. Her smoking use included cigarettes. She does not have any smokeless tobacco history on file. She reports that she does not currently use alcohol. She reports that she does not use drugs.    Family History:  No family history on file.  Allergies:  Penicillins, Codeine, Ropinirole, and Adhesive    Review of Systems  All pertinent systems have been reviewed and are negative except for what is stated  "in the history of present illness.    All other systems have been reviewed and are negative and noncontributory to this patient's current ailments.     Visit Vitals  /61 (BP Location: Left arm, Patient Position: Sitting, BP Cuff Size: Adult)   Pulse 73   Ht 1.676 m (5' 6\")   Wt 112 kg (246 lb)   BMI 39.71 kg/m²   OB Status Postmenopausal   Smoking Status Former   BSA 2.28 m²     Last Labs:  CBC -  Lab Results   Component Value Date    WBC 9.4 06/06/2024    HGB 10.6 (L) 06/06/2024    HCT 34.1 (L) 06/06/2024     (H) 06/06/2024     06/06/2024       CMP -  Lab Results   Component Value Date    CALCIUM 9.4 06/06/2024    PHOS 5.4 (H) 06/06/2024    PROT 8.0 06/03/2024    ALBUMIN 3.9 06/06/2024    AST 23 06/03/2024    ALT 12 06/03/2024    ALKPHOS 169 (H) 06/03/2024    BILITOT 0.6 06/03/2024    BUN 24 (H) 06/06/2024    CREATININE 6.01 (H) 06/06/2024       LIPID PANEL -   Lab Results   Component Value Date    CHOL 101 03/03/2024    TRIG 158 (H) 03/03/2024    HDL 31.5 03/03/2024    CHHDL 3.2 03/03/2024    LDLF 58 04/23/2018    VLDL 32 03/03/2024    NHDL 70 03/03/2024       RENAL FUNCTION PANEL -   Lab Results   Component Value Date    GLUCOSE 128 (H) 06/06/2024     06/06/2024    K 3.8 06/06/2024    CL 95 (L) 06/06/2024    CO2 27 06/06/2024    ANIONGAP 19 06/06/2024    BUN 24 (H) 06/06/2024    CREATININE 6.01 (H) 06/06/2024    CALCIUM 9.4 06/06/2024    PHOS 5.4 (H) 06/06/2024    ALBUMIN 3.9 06/06/2024        Lab Results   Component Value Date     (H) 03/28/2024    HGBA1C 5.4 06/05/2024         Objective   Vitals reviewed.   Constitutional:       Appearance: Healthy appearance. Not in distress.   Eyes:      Conjunctiva/sclera: Conjunctivae normal.   Neck:      Vascular: No JVR. JVD normal.   Pulmonary:      Effort: Pulmonary effort is normal.      Breath sounds: Normal breath sounds. No wheezing. No rhonchi. No rales.   Chest:      Chest wall: Not tender to palpatation.   Cardiovascular:      PMI " at left midclavicular line. Normal rate. Regular rhythm. Normal S1. Normal S2.       Murmurs: There is no murmur.      No gallop.  No click. No rub.   Pulses:     Intact distal pulses.   Edema:     Peripheral edema absent.   Abdominal:      General: Bowel sounds are normal.      Palpations: Abdomen is soft.      Tenderness: There is no abdominal tenderness.   Musculoskeletal: Normal range of motion.         General: No tenderness. Skin:     General: Skin is warm and dry.   Neurological:      General: No focal deficit present.      Mental Status: Alert and oriented to person, place and time.   Psychiatric:         Attention and Perception: Attention normal.         Mood and Affect: Mood normal.       Assessment/Plan   Diagnoses and all orders for this visit:  Paroxysmal atrial fibrillation (Multi)  - maintaining NSR post RFA  - no further nose bleeds  - we discussed watchman she does not want to pursue  - she will continue nasal saline and afrin  - we will continue Eliquis    Chronic diastolic heart failure (Multi)  - euvolemic  - 2/2 missing dialysis post RFA    Follow up in 4 months      Current Outpatient Medications:     acetaminophen (Tylenol) 325 mg tablet, Take 3 tablets (975 mg) by mouth every 8 hours if needed (breakthrough pain)., Disp: , Rfl:     albuterol 90 mcg/actuation inhaler, Inhale 1 puff every 4 hours if needed for shortness of breath., Disp: 18 g, Rfl: 11    amLODIPine (Norvasc) 5 mg tablet, Take 1 tablet (5 mg) by mouth once daily., Disp: , Rfl:     apixaban (Eliquis) 5 mg tablet, Take 1 tablet (5 mg) by mouth 2 times a day., Disp: , Rfl:     atorvastatin (Lipitor) 40 mg tablet, Take 1 tablet (40 mg) by mouth once daily., Disp: , Rfl:     Auryxia 210 mg iron tablet, TAKE 2 TABLETS BY MOUTH THREE TIMES A DAY WITH MEALS. SWALLOW WHOLE, DO NOT CHEW OR CRUSH MEDICATION, Disp: , Rfl:     busPIRone (Buspar) 5 mg tablet, Take 1 tablet (5 mg) by mouth once daily., Disp: , Rfl:     cholecalciferol  (Vitamin D-3) 25 MCG (1000 UT) tablet, Take 1 tablet (25 mcg) by mouth once daily., Disp: , Rfl:     cinacalcet (Sensipar) 30 mg tablet, Take 1 tablet (30 mg) by mouth once daily. Take with food or shortly afer a meal. Swallow tablet whole; do not break or divide., Disp: , Rfl:     doxepin (SINEquan) 75 mg capsule, Take 1 capsule (75 mg) by mouth once daily at bedtime., Disp: , Rfl:     DULoxetine (Cymbalta) 30 mg DR capsule, Take 1 capsule (30 mg) by mouth once daily., Disp: , Rfl:     Flonase Allergy Relief 50 mcg/actuation nasal spray, Administer 2 sprays into each nostril once daily., Disp: , Rfl:     metoprolol tartrate (Lopressor) 100 mg tablet, Take 1 tablet by mouth 2 times a day., Disp: 60 tablet, Rfl: 11    Nephro-Gloria 0.8 mg tablet, Take 1 tablet by mouth once daily., Disp: , Rfl:     oxymetazoline (Afrin) 0.05 % nasal spray, Administer 2 sprays into each nostril 2 times a day as needed (nose bleeds) for up to 8 doses. Do not use for more than 3 days., Disp: 30 mL, Rfl: 0    torsemide (Demadex) 20 mg tablet, Take 1 tablet (20 mg) by mouth 2 times a day., Disp: , Rfl:     Exclusive of any other services or procedures performed, I, Caroline DANIEL, spent 40 minutes in duration for this visit today.  This time consisted of chart review, obtaining history, and/or performing the exam as documented above, as well as, documenting the clinical information for the encounter in the electronic record, discussing treatment options, plans, and/or goals with patient, family, and/or caregiver, refilling medications, updating the electronic record, ordering medicines, lab work, imaging, referrals, and/or procedures as documented above and communicating with other [facility] professionals. I have discussed the results of laboratory, radiology, and cardiology studies with the patient and their family/caregiver.

## 2024-06-26 ENCOUNTER — APPOINTMENT (OUTPATIENT)
Dept: OTOLARYNGOLOGY | Facility: CLINIC | Age: 69
End: 2024-06-26
Payer: COMMERCIAL

## 2024-08-08 ENCOUNTER — HOME HEALTH ADMISSION (OUTPATIENT)
Dept: HOME HEALTH SERVICES | Facility: HOME HEALTH | Age: 69
End: 2024-08-08
Payer: COMMERCIAL

## 2024-08-08 ENCOUNTER — DOCUMENTATION (OUTPATIENT)
Dept: HOME HEALTH SERVICES | Facility: HOME HEALTH | Age: 69
End: 2024-08-08
Payer: COMMERCIAL

## 2024-08-08 NOTE — HH CARE COORDINATION
Home Care received a Referral for Nursing, Physical Therapy, and Occupational Therapy. We have processed the referral for a Start of Care on 08/10-08/11.     If you have any questions or concerns, please feel free to contact us at [phone]. Follow the prompts, enter your five digit zip code, and you will be directed to your care team on [facility].

## 2024-08-25 ENCOUNTER — APPOINTMENT (OUTPATIENT)
Dept: CARDIOLOGY | Facility: HOSPITAL | Age: 69
End: 2024-08-25
Payer: COMMERCIAL

## 2024-08-25 ENCOUNTER — HOSPITAL ENCOUNTER (OUTPATIENT)
Facility: HOSPITAL | Age: 69
Setting detail: OBSERVATION
End: 2024-08-25
Attending: EMERGENCY MEDICINE | Admitting: INTERNAL MEDICINE
Payer: COMMERCIAL

## 2024-08-25 ENCOUNTER — APPOINTMENT (OUTPATIENT)
Dept: RADIOLOGY | Facility: HOSPITAL | Age: 69
End: 2024-08-25
Payer: COMMERCIAL

## 2024-08-25 VITALS
HEART RATE: 76 BPM | WEIGHT: 263.4 LBS | OXYGEN SATURATION: 97 % | SYSTOLIC BLOOD PRESSURE: 147 MMHG | TEMPERATURE: 98.3 F | HEIGHT: 67 IN | DIASTOLIC BLOOD PRESSURE: 71 MMHG | BODY MASS INDEX: 41.34 KG/M2 | RESPIRATION RATE: 18 BRPM

## 2024-08-25 DIAGNOSIS — I50.9 ACUTE ON CHRONIC CONGESTIVE HEART FAILURE, UNSPECIFIED HEART FAILURE TYPE (MULTI): Primary | ICD-10-CM

## 2024-08-25 PROBLEM — I50.43 CHF (CONGESTIVE HEART FAILURE), NYHA CLASS I, ACUTE ON CHRONIC, COMBINED (MULTI): Status: ACTIVE | Noted: 2024-08-25

## 2024-08-25 LAB
ALBUMIN SERPL BCP-MCNC: 3.8 G/DL (ref 3.4–5)
ALP SERPL-CCNC: 120 U/L (ref 33–136)
ALT SERPL W P-5'-P-CCNC: 13 U/L (ref 7–45)
ANION GAP SERPL CALC-SCNC: 22 MMOL/L (ref 10–20)
APPEARANCE UR: CLEAR
AST SERPL W P-5'-P-CCNC: 16 U/L (ref 9–39)
BACTERIA #/AREA URNS AUTO: ABNORMAL /HPF
BASOPHILS # BLD AUTO: 0.1 X10*3/UL (ref 0–0.1)
BASOPHILS NFR BLD AUTO: 0.6 %
BILIRUB SERPL-MCNC: 0.8 MG/DL (ref 0–1.2)
BILIRUB UR STRIP.AUTO-MCNC: NEGATIVE MG/DL
BNP SERPL-MCNC: 930 PG/ML (ref 0–99)
BUN SERPL-MCNC: 77 MG/DL (ref 6–23)
CALCIUM SERPL-MCNC: 8.8 MG/DL (ref 8.6–10.3)
CARDIAC TROPONIN I PNL SERPL HS: 16 NG/L (ref 0–13)
CARDIAC TROPONIN I PNL SERPL HS: 16 NG/L (ref 0–13)
CHLORIDE SERPL-SCNC: 98 MMOL/L (ref 98–107)
CO2 SERPL-SCNC: 20 MMOL/L (ref 21–32)
COLOR UR: ABNORMAL
CREAT SERPL-MCNC: 12.26 MG/DL (ref 0.5–1.05)
EGFRCR SERPLBLD CKD-EPI 2021: 3 ML/MIN/1.73M*2
EOSINOPHIL # BLD AUTO: 0.14 X10*3/UL (ref 0–0.7)
EOSINOPHIL NFR BLD AUTO: 0.8 %
ERYTHROCYTE [DISTWIDTH] IN BLOOD BY AUTOMATED COUNT: 16 % (ref 11.5–14.5)
GLUCOSE BLD MANUAL STRIP-MCNC: 142 MG/DL (ref 74–99)
GLUCOSE BLD MANUAL STRIP-MCNC: 229 MG/DL (ref 74–99)
GLUCOSE SERPL-MCNC: 211 MG/DL (ref 74–99)
GLUCOSE UR STRIP.AUTO-MCNC: ABNORMAL MG/DL
HCT VFR BLD AUTO: 33.5 % (ref 36–46)
HGB BLD-MCNC: 10.5 G/DL (ref 12–16)
IMM GRANULOCYTES # BLD AUTO: 0.07 X10*3/UL (ref 0–0.7)
IMM GRANULOCYTES NFR BLD AUTO: 0.4 % (ref 0–0.9)
KETONES UR STRIP.AUTO-MCNC: NEGATIVE MG/DL
LACTATE SERPL-SCNC: 1.5 MMOL/L (ref 0.4–2)
LEUKOCYTE ESTERASE UR QL STRIP.AUTO: NEGATIVE
LYMPHOCYTES # BLD AUTO: 1.74 X10*3/UL (ref 1.2–4.8)
LYMPHOCYTES NFR BLD AUTO: 10 %
MAGNESIUM SERPL-MCNC: 2.26 MG/DL (ref 1.6–2.4)
MCH RBC QN AUTO: 33 PG (ref 26–34)
MCHC RBC AUTO-ENTMCNC: 31.3 G/DL (ref 32–36)
MCV RBC AUTO: 105 FL (ref 80–100)
MONOCYTES # BLD AUTO: 1.65 X10*3/UL (ref 0.1–1)
MONOCYTES NFR BLD AUTO: 9.5 %
NEUTROPHILS # BLD AUTO: 13.71 X10*3/UL (ref 1.2–7.7)
NEUTROPHILS NFR BLD AUTO: 78.7 %
NITRITE UR QL STRIP.AUTO: NEGATIVE
NRBC BLD-RTO: 0 /100 WBCS (ref 0–0)
PH UR STRIP.AUTO: 7 [PH]
PLATELET # BLD AUTO: 280 X10*3/UL (ref 150–450)
POTASSIUM SERPL-SCNC: 4.8 MMOL/L (ref 3.5–5.3)
PROT SERPL-MCNC: 7.2 G/DL (ref 6.4–8.2)
PROT UR STRIP.AUTO-MCNC: ABNORMAL MG/DL
RBC # BLD AUTO: 3.18 X10*6/UL (ref 4–5.2)
RBC # UR STRIP.AUTO: ABNORMAL /UL
RBC #/AREA URNS AUTO: ABNORMAL /HPF
SODIUM SERPL-SCNC: 135 MMOL/L (ref 136–145)
SP GR UR STRIP.AUTO: 1.01
SQUAMOUS #/AREA URNS AUTO: ABNORMAL /HPF
UROBILINOGEN UR STRIP.AUTO-MCNC: NORMAL MG/DL
WBC # BLD AUTO: 17.4 X10*3/UL (ref 4.4–11.3)
WBC #/AREA URNS AUTO: ABNORMAL /HPF

## 2024-08-25 PROCEDURE — 83735 ASSAY OF MAGNESIUM: CPT | Performed by: NURSE PRACTITIONER

## 2024-08-25 PROCEDURE — 96374 THER/PROPH/DIAG INJ IV PUSH: CPT

## 2024-08-25 PROCEDURE — 71045 X-RAY EXAM CHEST 1 VIEW: CPT | Performed by: STUDENT IN AN ORGANIZED HEALTH CARE EDUCATION/TRAINING PROGRAM

## 2024-08-25 PROCEDURE — G0378 HOSPITAL OBSERVATION PER HR: HCPCS

## 2024-08-25 PROCEDURE — 83036 HEMOGLOBIN GLYCOSYLATED A1C: CPT | Performed by: NURSE PRACTITIONER

## 2024-08-25 PROCEDURE — 93005 ELECTROCARDIOGRAM TRACING: CPT

## 2024-08-25 PROCEDURE — 81001 URINALYSIS AUTO W/SCOPE: CPT | Performed by: NURSE PRACTITIONER

## 2024-08-25 PROCEDURE — 36415 COLL VENOUS BLD VENIPUNCTURE: CPT | Performed by: NURSE PRACTITIONER

## 2024-08-25 PROCEDURE — 80053 COMPREHEN METABOLIC PANEL: CPT | Performed by: NURSE PRACTITIONER

## 2024-08-25 PROCEDURE — 83605 ASSAY OF LACTIC ACID: CPT | Performed by: NURSE PRACTITIONER

## 2024-08-25 PROCEDURE — 99223 1ST HOSP IP/OBS HIGH 75: CPT | Performed by: NURSE PRACTITIONER

## 2024-08-25 PROCEDURE — 99285 EMERGENCY DEPT VISIT HI MDM: CPT | Mod: 25

## 2024-08-25 PROCEDURE — 85025 COMPLETE CBC W/AUTO DIFF WBC: CPT | Performed by: NURSE PRACTITIONER

## 2024-08-25 PROCEDURE — 2500000001 HC RX 250 WO HCPCS SELF ADMINISTERED DRUGS (ALT 637 FOR MEDICARE OP): Performed by: NURSE PRACTITIONER

## 2024-08-25 PROCEDURE — 2500000004 HC RX 250 GENERAL PHARMACY W/ HCPCS (ALT 636 FOR OP/ED)

## 2024-08-25 PROCEDURE — 71045 X-RAY EXAM CHEST 1 VIEW: CPT

## 2024-08-25 PROCEDURE — 84484 ASSAY OF TROPONIN QUANT: CPT | Performed by: NURSE PRACTITIONER

## 2024-08-25 PROCEDURE — 82947 ASSAY GLUCOSE BLOOD QUANT: CPT | Mod: 59

## 2024-08-25 PROCEDURE — 83880 ASSAY OF NATRIURETIC PEPTIDE: CPT | Performed by: NURSE PRACTITIONER

## 2024-08-25 RX ORDER — ACETAMINOPHEN 325 MG/1
TABLET ORAL
Status: COMPLETED
Start: 2024-08-25 | End: 2024-08-25

## 2024-08-25 RX ORDER — FUROSEMIDE 10 MG/ML
INJECTION INTRAMUSCULAR; INTRAVENOUS
Status: COMPLETED
Start: 2024-08-25 | End: 2024-08-25

## 2024-08-25 RX ORDER — DOXEPIN HYDROCHLORIDE 100 MG/1
100 CAPSULE ORAL NIGHTLY
COMMUNITY

## 2024-08-25 RX ORDER — DEXTROSE 50 % IN WATER (D50W) INTRAVENOUS SYRINGE
12.5
Status: DISCONTINUED | OUTPATIENT
Start: 2024-08-25 | End: 2024-08-27 | Stop reason: HOSPADM

## 2024-08-25 RX ORDER — DEXTROSE 50 % IN WATER (D50W) INTRAVENOUS SYRINGE
25
Status: DISCONTINUED | OUTPATIENT
Start: 2024-08-25 | End: 2024-08-27 | Stop reason: HOSPADM

## 2024-08-25 RX ORDER — ONDANSETRON 4 MG/1
4 TABLET, ORALLY DISINTEGRATING ORAL EVERY 8 HOURS PRN
Status: DISCONTINUED | OUTPATIENT
Start: 2024-08-25 | End: 2024-08-27 | Stop reason: HOSPADM

## 2024-08-25 RX ORDER — ONDANSETRON HYDROCHLORIDE 2 MG/ML
4 INJECTION, SOLUTION INTRAVENOUS EVERY 8 HOURS PRN
Status: DISCONTINUED | OUTPATIENT
Start: 2024-08-25 | End: 2024-08-27 | Stop reason: HOSPADM

## 2024-08-25 RX ORDER — FUROSEMIDE 10 MG/ML
80 INJECTION INTRAMUSCULAR; INTRAVENOUS ONCE
Status: COMPLETED | OUTPATIENT
Start: 2024-08-25 | End: 2024-08-25

## 2024-08-25 RX ORDER — ESCITALOPRAM OXALATE 10 MG/1
10 TABLET ORAL DAILY
COMMUNITY

## 2024-08-25 RX ORDER — POLYETHYLENE GLYCOL 3350 17 G/17G
17 POWDER, FOR SOLUTION ORAL DAILY
Status: DISCONTINUED | OUTPATIENT
Start: 2024-08-25 | End: 2024-08-27 | Stop reason: HOSPADM

## 2024-08-25 RX ORDER — INSULIN LISPRO 100 [IU]/ML
0-10 INJECTION, SOLUTION INTRAVENOUS; SUBCUTANEOUS
Status: DISCONTINUED | OUTPATIENT
Start: 2024-08-25 | End: 2024-08-27 | Stop reason: HOSPADM

## 2024-08-25 RX ORDER — METOPROLOL TARTRATE 50 MG/1
100 TABLET ORAL 2 TIMES DAILY
Status: DISCONTINUED | OUTPATIENT
Start: 2024-08-25 | End: 2024-08-27 | Stop reason: HOSPADM

## 2024-08-25 RX ADMIN — METOPROLOL TARTRATE 100 MG: 50 TABLET, FILM COATED ORAL at 20:40

## 2024-08-25 RX ADMIN — FUROSEMIDE 80 MG: 10 INJECTION, SOLUTION INTRAMUSCULAR; INTRAVENOUS at 16:28

## 2024-08-25 RX ADMIN — APIXABAN 5 MG: 5 TABLET, FILM COATED ORAL at 20:40

## 2024-08-25 RX ADMIN — ACETAMINOPHEN 650 MG: 325 TABLET ORAL at 16:28

## 2024-08-25 RX ADMIN — FUROSEMIDE 80 MG: 10 INJECTION INTRAMUSCULAR; INTRAVENOUS at 16:28

## 2024-08-25 SDOH — SOCIAL STABILITY: SOCIAL INSECURITY: DO YOU FEEL UNSAFE GOING BACK TO THE PLACE WHERE YOU ARE LIVING?: NO

## 2024-08-25 SDOH — SOCIAL STABILITY: SOCIAL INSECURITY: ARE THERE ANY APPARENT SIGNS OF INJURIES/BEHAVIORS THAT COULD BE RELATED TO ABUSE/NEGLECT?: NO

## 2024-08-25 SDOH — SOCIAL STABILITY: SOCIAL INSECURITY: HAS ANYONE EVER THREATENED TO HURT YOUR FAMILY OR YOUR PETS?: NO

## 2024-08-25 SDOH — SOCIAL STABILITY: SOCIAL INSECURITY: DOES ANYONE TRY TO KEEP YOU FROM HAVING/CONTACTING OTHER FRIENDS OR DOING THINGS OUTSIDE YOUR HOME?: NO

## 2024-08-25 SDOH — SOCIAL STABILITY: SOCIAL INSECURITY: ARE YOU OR HAVE YOU BEEN THREATENED OR ABUSED PHYSICALLY, EMOTIONALLY, OR SEXUALLY BY ANYONE?: NO

## 2024-08-25 SDOH — SOCIAL STABILITY: SOCIAL INSECURITY: ABUSE: ADULT

## 2024-08-25 SDOH — SOCIAL STABILITY: SOCIAL INSECURITY: DO YOU FEEL ANYONE HAS EXPLOITED OR TAKEN ADVANTAGE OF YOU FINANCIALLY OR OF YOUR PERSONAL PROPERTY?: NO

## 2024-08-25 SDOH — SOCIAL STABILITY: SOCIAL INSECURITY: HAVE YOU HAD THOUGHTS OF HARMING ANYONE ELSE?: NO

## 2024-08-25 SDOH — SOCIAL STABILITY: SOCIAL INSECURITY: HAVE YOU HAD ANY THOUGHTS OF HARMING ANYONE ELSE?: NO

## 2024-08-25 ASSESSMENT — PATIENT HEALTH QUESTIONNAIRE - PHQ9
SUM OF ALL RESPONSES TO PHQ9 QUESTIONS 1 & 2: 0
2. FEELING DOWN, DEPRESSED OR HOPELESS: NOT AT ALL
1. LITTLE INTEREST OR PLEASURE IN DOING THINGS: NOT AT ALL

## 2024-08-25 ASSESSMENT — COGNITIVE AND FUNCTIONAL STATUS - GENERAL
TOILETING: A LITTLE
DAILY ACTIVITIY SCORE: 21
PATIENT BASELINE BEDBOUND: NO
HELP NEEDED FOR BATHING: A LITTLE
DRESSING REGULAR LOWER BODY CLOTHING: A LITTLE
DRESSING REGULAR UPPER BODY CLOTHING: A LITTLE
DAILY ACTIVITIY SCORE: 19
MOBILITY SCORE: 21
CLIMB 3 TO 5 STEPS WITH RAILING: A LITTLE
STANDING UP FROM CHAIR USING ARMS: A LITTLE
CLIMB 3 TO 5 STEPS WITH RAILING: A LITTLE
PERSONAL GROOMING: A LITTLE
MOBILITY SCORE: 23
WALKING IN HOSPITAL ROOM: A LITTLE
HELP NEEDED FOR BATHING: A LITTLE
DRESSING REGULAR LOWER BODY CLOTHING: A LITTLE
TOILETING: A LITTLE

## 2024-08-25 ASSESSMENT — PAIN DESCRIPTION - LOCATION
LOCATION: CHEST
LOCATION: HEAD

## 2024-08-25 ASSESSMENT — ENCOUNTER SYMPTOMS
FATIGUE: 0
COLOR CHANGE: 0
APNEA: 0
VOMITING: 0
EYE PAIN: 0
NAUSEA: 0
CHOKING: 0
COUGH: 0
BLOOD IN STOOL: 0
TREMORS: 0
HEADACHES: 0
ABDOMINAL PAIN: 0
SORE THROAT: 0
DYSPHORIC MOOD: 0
MYALGIAS: 0
LIGHT-HEADEDNESS: 0
NUMBNESS: 0
WEAKNESS: 0
SINUS PAIN: 0
UNEXPECTED WEIGHT CHANGE: 0
DIZZINESS: 0
DYSURIA: 0
SPEECH DIFFICULTY: 0
WHEEZING: 0
FEVER: 0
ARTHRALGIAS: 0
NECK STIFFNESS: 0
POLYPHAGIA: 0
HALLUCINATIONS: 0
SHORTNESS OF BREATH: 1
BACK PAIN: 0
ADENOPATHY: 0
DIFFICULTY URINATING: 0
EYE DISCHARGE: 0
DIARRHEA: 0
EYE ITCHING: 0
ABDOMINAL DISTENTION: 0
CONFUSION: 0
SLEEP DISTURBANCE: 0
POLYDIPSIA: 0
PHOTOPHOBIA: 0
NERVOUS/ANXIOUS: 0
SEIZURES: 0
FREQUENCY: 0
NECK PAIN: 0
BRUISES/BLEEDS EASILY: 0
TROUBLE SWALLOWING: 0
CHILLS: 1
CONSTIPATION: 0
PALPITATIONS: 0
FLANK PAIN: 0
HEMATURIA: 0
WOUND: 0
CHEST TIGHTNESS: 0
VOICE CHANGE: 0
APPETITE CHANGE: 0

## 2024-08-25 ASSESSMENT — PAIN SCALES - GENERAL
PAINLEVEL_OUTOF10: 4
PAINLEVEL_OUTOF10: 5 - MODERATE PAIN
PAINLEVEL_OUTOF10: 0 - NO PAIN

## 2024-08-25 ASSESSMENT — ACTIVITIES OF DAILY LIVING (ADL)
HEARING - LEFT EAR: FUNCTIONAL
JUDGMENT_ADEQUATE_SAFELY_COMPLETE_DAILY_ACTIVITIES: YES
BATHING: NEEDS ASSISTANCE
FEEDING YOURSELF: NEEDS ASSISTANCE
HEARING - RIGHT EAR: FUNCTIONAL
LACK_OF_TRANSPORTATION: NO
DRESSING YOURSELF: NEEDS ASSISTANCE
JUDGMENT_ADEQUATE_SAFELY_COMPLETE_DAILY_ACTIVITIES: YES
DRESSING YOURSELF: NEEDS ASSISTANCE
ASSISTIVE_DEVICE: WALKER
TOILETING: NEEDS ASSISTANCE
HEARING - LEFT EAR: FUNCTIONAL
WALKS IN HOME: NEEDS ASSISTANCE
ASSISTIVE_DEVICE: WALKER
ADEQUATE_TO_COMPLETE_ADL: YES
WALKS IN HOME: NEEDS ASSISTANCE
PATIENT'S MEMORY ADEQUATE TO SAFELY COMPLETE DAILY ACTIVITIES?: YES
ADEQUATE_TO_COMPLETE_ADL: YES
HEARING - RIGHT EAR: FUNCTIONAL
TOILETING: NEEDS ASSISTANCE
BATHING: NEEDS ASSISTANCE
FEEDING YOURSELF: NEEDS ASSISTANCE
GROOMING: NEEDS ASSISTANCE
PATIENT'S MEMORY ADEQUATE TO SAFELY COMPLETE DAILY ACTIVITIES?: YES
GROOMING: NEEDS ASSISTANCE

## 2024-08-25 ASSESSMENT — LIFESTYLE VARIABLES
SKIP TO QUESTIONS 9-10: 1
HAVE PEOPLE ANNOYED YOU BY CRITICIZING YOUR DRINKING: NO
TOTAL SCORE: 0
HOW OFTEN DO YOU HAVE 6 OR MORE DRINKS ON ONE OCCASION: NEVER
HOW OFTEN DO YOU HAVE A DRINK CONTAINING ALCOHOL: NEVER
EVER FELT BAD OR GUILTY ABOUT YOUR DRINKING: NO
HAVE YOU EVER FELT YOU SHOULD CUT DOWN ON YOUR DRINKING: NO
AUDIT-C TOTAL SCORE: 0
SUBSTANCE_ABUSE_PAST_12_MONTHS: NO
PRESCIPTION_ABUSE_PAST_12_MONTHS: NO
EVER HAD A DRINK FIRST THING IN THE MORNING TO STEADY YOUR NERVES TO GET RID OF A HANGOVER: NO
HOW MANY STANDARD DRINKS CONTAINING ALCOHOL DO YOU HAVE ON A TYPICAL DAY: PATIENT DOES NOT DRINK
AUDIT-C TOTAL SCORE: 0

## 2024-08-25 ASSESSMENT — PAIN DESCRIPTION - ORIENTATION: ORIENTATION: MID

## 2024-08-25 ASSESSMENT — PAIN - FUNCTIONAL ASSESSMENT
PAIN_FUNCTIONAL_ASSESSMENT: 0-10
PAIN_FUNCTIONAL_ASSESSMENT: 0-10

## 2024-08-25 ASSESSMENT — PAIN DESCRIPTION - DESCRIPTORS: DESCRIPTORS: PRESSURE

## 2024-08-25 NOTE — H&P
History Obtained From: Patient    History Of Present Illness:  Rosa Soto is a 69 y.o. female with PMHx s/f diastolic heart failure atrial fibrillation, chronic anticoagulation with Eliquis, end-stage renal disease on hemodialysis Monday Wednesday Friday with Dr. Sloan's group presenting with complaints of dyspnea on exertion orthopnea.  The patient has end-stage renal disease is on dialysis as previously stated she does produce urine takes torsemide.  She states that she has been adherent to her medication regime.  She does not weigh herself on a daily basis and was last dialyzed on Wednesday.  She missed her dialysis session on Friday.  Patient states that she feels that she has become more short of breath with activity she has complaints of orthopnea.  She does not really notice any increase in lower extremity edema and is quite shocked that she could actually be retaining any fluid.  She has felt warm and cold from time to time but denies any fevers denies any cough any sputum production any dysuria abdominal pain diarrhea any new rashes or sores.  In the emergency department patient's temperature 98.1 heart rate 87 respiratory rate 18 blood pressure 182/80 SpO2 97% on room air.Chemistry panel significant for sodium 135 glucose 211 bicarb 20 anion gap 22 BUN 77 creatinine 12.26 liver enzymes are within normal limits lactate is 1.5 BN peptide 930 troponin 16 with repeat troponin of 16.  CBC showing leukocytosis with white blood cell count 17.4 hemoglobin 10.5 hematocrit 33.5 platelets 280.  Chest x-ray showing cardiomegaly and findings concerning for congestive heart failure with mild to moderate interstitial edema.  Bibasilar airspace opacities are also noted possible trace bilateral pleural effusions.  Patient was given 80 mg of IV furosemide and referred for admission due to volume overload and congestive heart failure exacerbation.          ED Course:  ED Course as of 08/25/24 1732   Sun Aug 25, 2024   7090  Patient is found to have leukocytosis at 17.4 and anemia with anemia is baseline for the patient. [RB]   1622 Patient was ambulated with a pulse ox became extremely dyspneic and desatted to 86%.  Decision to admit due to fluid overload. [RB]   1625 Patient does still make urine.  80 of Lasix is ordered at this time.  IMS was paged. [RB]      ED Course User Index  [RB] Clare S Tomer, APRN-CNP         Diagnoses as of 08/25/24 0622   Acute on chronic congestive heart failure, unspecified heart failure type (Multi)     Relevant Results  Results for orders placed or performed during the hospital encounter of 08/25/24 (from the past 24 hour(s))   CBC and Auto Differential   Result Value Ref Range    WBC 17.4 (H) 4.4 - 11.3 x10*3/uL    nRBC 0.0 0.0 - 0.0 /100 WBCs    RBC 3.18 (L) 4.00 - 5.20 x10*6/uL    Hemoglobin 10.5 (L) 12.0 - 16.0 g/dL    Hematocrit 33.5 (L) 36.0 - 46.0 %     (H) 80 - 100 fL    MCH 33.0 26.0 - 34.0 pg    MCHC 31.3 (L) 32.0 - 36.0 g/dL    RDW 16.0 (H) 11.5 - 14.5 %    Platelets 280 150 - 450 x10*3/uL    Neutrophils % 78.7 40.0 - 80.0 %    Immature Granulocytes %, Automated 0.4 0.0 - 0.9 %    Lymphocytes % 10.0 13.0 - 44.0 %    Monocytes % 9.5 2.0 - 10.0 %    Eosinophils % 0.8 0.0 - 6.0 %    Basophils % 0.6 0.0 - 2.0 %    Neutrophils Absolute 13.71 (H) 1.20 - 7.70 x10*3/uL    Immature Granulocytes Absolute, Automated 0.07 0.00 - 0.70 x10*3/uL    Lymphocytes Absolute 1.74 1.20 - 4.80 x10*3/uL    Monocytes Absolute 1.65 (H) 0.10 - 1.00 x10*3/uL    Eosinophils Absolute 0.14 0.00 - 0.70 x10*3/uL    Basophils Absolute 0.10 0.00 - 0.10 x10*3/uL   Comprehensive metabolic panel   Result Value Ref Range    Glucose 211 (H) 74 - 99 mg/dL    Sodium 135 (L) 136 - 145 mmol/L    Potassium 4.8 3.5 - 5.3 mmol/L    Chloride 98 98 - 107 mmol/L    Bicarbonate 20 (L) 21 - 32 mmol/L    Anion Gap 22 (H) 10 - 20 mmol/L    Urea Nitrogen 77 (H) 6 - 23 mg/dL    Creatinine 12.26 (H) 0.50 - 1.05 mg/dL    eGFR 3 (L) >60  mL/min/1.73m*2    Calcium 8.8 8.6 - 10.3 mg/dL    Albumin 3.8 3.4 - 5.0 g/dL    Alkaline Phosphatase 120 33 - 136 U/L    Total Protein 7.2 6.4 - 8.2 g/dL    AST 16 9 - 39 U/L    Bilirubin, Total 0.8 0.0 - 1.2 mg/dL    ALT 13 7 - 45 U/L   Magnesium   Result Value Ref Range    Magnesium 2.26 1.60 - 2.40 mg/dL   Lactate   Result Value Ref Range    Lactate 1.5 0.4 - 2.0 mmol/L   B-Type Natriuretic Peptide   Result Value Ref Range     (H) 0 - 99 pg/mL   Troponin I, High Sensitivity, Initial   Result Value Ref Range    Troponin I, High Sensitivity 16 (H) 0 - 13 ng/L   Troponin, High Sensitivity, 1 Hour   Result Value Ref Range    Troponin I, High Sensitivity 16 (H) 0 - 13 ng/L      XR chest 1 view    Result Date: 8/25/2024  Interpreted By:  Charu Corona, STUDY: XR CHEST 1 VIEW; ;  8/25/2024 3:35 pm   INDICATION: Signs/Symptoms:cp; sob.   COMPARISON: Chest radiograph dated 03/28/2024   ACCESSION NUMBER(S): [id number]   ORDERING CLINICIAN: TROY GRAVES   FINDINGS: Cardiac silhouette is diffusely enlarged. There are prominent interstitial markings in bilateral lungs with interstitial airspace opacities in bilateral mid to lower lung zones. There is obliteration of bilateral costophrenic angles and possibility of small bilateral pleural effusions can not be excluded. No large pneumothorax within limits of portable technique. No acute osseous findings.       1. Cardiomegaly with findings concerning for congestive heart failure with mild-to-moderate interstitial edema. Recommend correlation with fluid status and cardiac function. 2. Bibasilar airspace opacities are also likely favored to be related to pulmonary edema, however developing pneumonia can also be considered in the differential in appropriate clinical setting. 3. Possible trace bilateral pleural effusions.   MACRO: None   Signed by: Charu Corona 8/25/2024 4:09 PM Dictation workstation:   [id number]     Scheduled medications:  insulin lispro,  0-10 Units, subcutaneous, TID      Continuous medications:     PRN medications:  PRN medications: dextrose, dextrose, glucagon, glucagon      Past Medical History  She has a past medical history of Arrhythmia, Benign essential HTN, Cancer (Multi), Chronic kidney disease, and ESRD (end stage renal disease) (Multi).    She has no past medical history of CHF (congestive heart failure) (Multi).    Surgical History  She has a past surgical history that includes US guided abdominal paracentesis (8/31/2023); Cardiac catheterization (N/A, 3/6/2024); and Cardiac electrophysiology procedure (N/A, 2/29/2024).     Social History  She reports that she quit smoking about 3 years ago. Her smoking use included cigarettes. She does not have any smokeless tobacco history on file. She reports that she does not currently use alcohol. She reports that she does not use drugs.    Family History  No family history on file.     Allergies  Penicillins, Codeine, Ropinirole, and Adhesive    Code Status  Full Code     Review of Systems   Constitutional:  Positive for chills. Negative for appetite change, fatigue, fever and unexpected weight change.   HENT:  Negative for congestion, ear discharge, ear pain, mouth sores, nosebleeds, sinus pain, sore throat, trouble swallowing and voice change.    Eyes:  Negative for photophobia, pain, discharge, itching and visual disturbance.   Respiratory:  Positive for shortness of breath. Negative for apnea, cough, choking, chest tightness and wheezing.    Cardiovascular:  Negative for chest pain, palpitations and leg swelling.        Dyspnea on exertion  Orthopnea   Gastrointestinal:  Negative for abdominal distention, abdominal pain, blood in stool, constipation, diarrhea, nausea and vomiting.   Endocrine: Negative for cold intolerance, heat intolerance, polydipsia, polyphagia and polyuria.   Genitourinary:  Negative for decreased urine volume, difficulty urinating, dysuria, flank pain, frequency, hematuria  and urgency.   Musculoskeletal:  Negative for arthralgias, back pain, gait problem, myalgias, neck pain and neck stiffness.   Skin:  Negative for color change, pallor and wound.   Allergic/Immunologic: Negative for food allergies and immunocompromised state.   Neurological:  Negative for dizziness, tremors, seizures, syncope, speech difficulty, weakness, light-headedness, numbness and headaches.   Hematological:  Negative for adenopathy. Does not bruise/bleed easily.   Psychiatric/Behavioral:  Negative for confusion, dysphoric mood, hallucinations, sleep disturbance and suicidal ideas. The patient is not nervous/anxious.        Last Recorded Vitals  BP (!) 182/80   Pulse 87   Temp 36.7 °C (98.1 °F)   Resp 18   Wt 113 kg (250 lb)   SpO2 97%      Physical Exam  Vitals reviewed.   Constitutional:       General: She is not in acute distress.     Appearance: She is obese.   HENT:      Head: Normocephalic and atraumatic.      Right Ear: External ear normal.      Left Ear: External ear normal.      Nose: Nose normal.      Mouth/Throat:      Mouth: Mucous membranes are moist.      Pharynx: Oropharynx is clear.   Eyes:      General: No scleral icterus.     Extraocular Movements: Extraocular movements intact.      Conjunctiva/sclera: Conjunctivae normal.      Pupils: Pupils are equal, round, and reactive to light.   Neck:      Vascular: No carotid bruit.   Cardiovascular:      Rate and Rhythm: Normal rate. Rhythm irregular.      Pulses: Normal pulses.      Heart sounds: No murmur heard.  Pulmonary:      Effort: Pulmonary effort is normal. No respiratory distress.      Breath sounds: Normal breath sounds. No wheezing, rhonchi or rales.   Chest:      Chest wall: No tenderness.   Abdominal:      General: Bowel sounds are normal. There is no distension.      Palpations: Abdomen is soft. There is no mass.      Tenderness: There is no abdominal tenderness. There is no left CVA tenderness or rebound.   Musculoskeletal:          General: No swelling or deformity. Normal range of motion.      Cervical back: Normal range of motion.      Right lower leg: No edema.      Left lower leg: No edema.   Skin:     General: Skin is warm and dry.      Capillary Refill: Capillary refill takes less than 2 seconds.      Findings: No rash.   Neurological:      General: No focal deficit present.      Mental Status: She is alert and oriented to person, place, and time.      Cranial Nerves: No cranial nerve deficit.      Motor: No weakness.   Psychiatric:         Mood and Affect: Mood normal.         Behavior: Behavior normal.         Assessment/Plan   Assessment & Plan  CHF (congestive heart failure), NYHA class I, acute on chronic, combined (Multi)    A-fib (Multi)    Anemia    CKD (chronic kidney disease) stage 5, GFR less than 15 ml/min (Multi)    DM (diabetes mellitus), type 2 (Multi)    Fluid overload, unspecified    Acute on chronic diastolic CHF vs Pure volume overload  Likely related to diet, intake and missing dialysis  Echo done 5-6 mos ago normal LVEF  Given Lasix in the ED  Will place on fluid restriction, consult nephrology for dialysis  Continue home medications through reconciliation    Leukocytosis  Has felt warm and cold  No cough or sputum no fever  Will check U/A and blood cultures, pro calcitonin level    Atrial fibrillation  Continue rate control  Continue anticoagulation    DM2  Blood glucose > 200  Typically diet controlled  Check A1C cover w sliding scale    Anemia chronic kidney disease  Appears to be stable we will continue to monitor and ongoing treatments per nephrology  No new Assessment & Plan notes have been filed under this hospital service since the last note was generated.  Service: Internal Medicine          Collin Sanchez, APRN-CNP    Dragon dictation software was used to dictate this note and thus there may be minor errors in translation/transcription including garbled speech or misspellings. Please contact for  clarification if needed.

## 2024-08-25 NOTE — ED PROVIDER NOTES
"HPI   Chief Complaint   Patient presents with    Shortness of Breath     Dialysis pt last had a treatment on wendesday. Pt is short of breath on exertion. Pt is usually on room air, today is on 3 L.     Chest Pain     On inspiration    Nausea       69-year-old female with a past history of CHF last ejection fraction 60 to 65%, COPD, hyperlipidemia, ESRD on HD Monday Wednesday Friday, hypertension, diabetes, A-fib on Eliquis presents to the emergency department today complaining of shortness of breath and chest pain.  Patient states last night she began feeling \"off\".  States that she was acutely short of breath when ambulating to the bathroom.  She sat back down and remained short of breath.  Positive orthopnea.  No lower extremity swelling.  She began having chest pain last night as well.  It is nonexertional and midsternal with no radiation.  No associated nausea, vomiting or diaphoresis.  Shortness of breath has continued throughout the night and into this morning.  The chest pain has resolved at this point.  Patient denies any fever or chills.  No cough or recent sick contacts.                          Strong Coma Scale Score: 15                  Patient History   Past Medical History:   Diagnosis Date    Arrhythmia     Benign essential HTN     Cancer (Multi)     Chronic kidney disease     ESRD (end stage renal disease) (Multi)      Past Surgical History:   Procedure Laterality Date    CARDIAC CATHETERIZATION N/A 3/6/2024    Procedure: Left Heart Cath;  Surgeon: Carol Johnson MD;  Location: [facility];  Service: Cardiovascular;  Laterality: N/A;    CARDIAC ELECTROPHYSIOLOGY PROCEDURE N/A 2/29/2024    Procedure: Ablation A-Fib Persistant;  Surgeon: Adam Valentine MD;  Location: [place] [address] Cardiac Cath Lab;  Service: Electrophysiology;  Laterality: N/A;  CARTO/ANY EP LAB  GENERAL ANESTHESIA WHOLE CASE    US GUIDED ABDOMINAL PARACENTESIS  8/31/2023    US GUIDED ABDOMINAL PARACENTESIS 8/31/2023 " POR US     No family history on file.  Social History     Tobacco Use    Smoking status: Former     Current packs/day: 0.00     Types: Cigarettes     Quit date: 2/4/2021     Years since quitting: 3.5    Smokeless tobacco: Not on file   Vaping Use    Vaping status: Every Day   Substance Use Topics    Alcohol use: Not Currently    Drug use: Never       Physical Exam   ED Triage Vitals [08/25/24 1442]   Temperature Heart Rate Respirations BP   36.7 °C (98.1 °F) 87 18 (!) 182/80      Pulse Ox Temp src Heart Rate Source Patient Position   97 % -- -- --      BP Location FiO2 (%)     -- --       Physical Exam  Vitals and nursing note reviewed.   Constitutional:       General: She is not in acute distress.     Appearance: Normal appearance. She is not toxic-appearing.   HENT:      Right Ear: Tympanic membrane normal.      Left Ear: Tympanic membrane normal.      Mouth/Throat:      Mouth: Mucous membranes are moist.      Pharynx: Oropharynx is clear.   Eyes:      Extraocular Movements: Extraocular movements intact.      Pupils: Pupils are equal, round, and reactive to light.   Cardiovascular:      Rate and Rhythm: Normal rate and regular rhythm.      Pulses: Normal pulses.      Heart sounds: Normal heart sounds.   Pulmonary:      Effort: Pulmonary effort is normal.      Breath sounds: Examination of the right-lower field reveals decreased breath sounds. Examination of the left-lower field reveals decreased breath sounds. Decreased breath sounds present.   Abdominal:      General: Abdomen is flat. Bowel sounds are normal.      Palpations: Abdomen is soft.      Tenderness: There is no abdominal tenderness.   Musculoskeletal:         General: Normal range of motion.      Cervical back: Normal range of motion and neck supple.      Right lower leg: No edema.      Left lower leg: No edema.   Skin:     General: Skin is warm and dry.      Capillary Refill: Capillary refill takes less than 2 seconds.   Neurological:      General: No  focal deficit present.      Mental Status: She is alert and oriented to person, place, and time.   Psychiatric:         Mood and Affect: Mood normal.         Behavior: Behavior normal.         Judgment: Judgment normal.         Labs Reviewed   CBC WITH AUTO DIFFERENTIAL - Abnormal       Result Value    WBC 17.4 (*)     nRBC 0.0      RBC 3.18 (*)     Hemoglobin 10.5 (*)     Hematocrit 33.5 (*)      (*)     MCH 33.0      MCHC 31.3 (*)     RDW 16.0 (*)     Platelets 280      Neutrophils % 78.7      Immature Granulocytes %, Automated 0.4      Lymphocytes % 10.0      Monocytes % 9.5      Eosinophils % 0.8      Basophils % 0.6      Neutrophils Absolute 13.71 (*)     Immature Granulocytes Absolute, Automated 0.07      Lymphocytes Absolute 1.74      Monocytes Absolute 1.65 (*)     Eosinophils Absolute 0.14      Basophils Absolute 0.10     COMPREHENSIVE METABOLIC PANEL - Abnormal    Glucose 211 (*)     Sodium 135 (*)     Potassium 4.8      Chloride 98      Bicarbonate 20 (*)     Anion Gap 22 (*)     Urea Nitrogen 77 (*)     Creatinine 12.26 (*)     eGFR 3 (*)     Calcium 8.8      Albumin 3.8      Alkaline Phosphatase 120      Total Protein 7.2      AST 16      Bilirubin, Total 0.8      ALT 13     B-TYPE NATRIURETIC PEPTIDE - Abnormal     (*)     Narrative:        <100 pg/mL - Heart failure unlikely  100-299 pg/mL - Intermediate probability of acute heart                  failure exacerbation. Correlate with clinical                  context and patient history.    >=300 pg/mL - Heart Failure likely. Correlate with clinical                  context and patient history.    BNP testing is performed using different testing methodology at [facility] than at other [facility] hospitals. Direct result comparisons should only be made within the same method.      SERIAL TROPONIN-INITIAL - Abnormal    Troponin I, High Sensitivity 16 (*)     Narrative:     Less than 99th percentile of normal range cutoff-  Female  and children under 18 years old <14 ng/L; Male <21 ng/L: Negative  Repeat testing should be performed if clinically indicated.     Female and children under 18 years old 14-50 ng/L; Male 21-50 ng/L:  Consistent with possible cardiac damage and possible increased clinical   risk. Serial measurements may help to assess extent of myocardial damage.     >50 ng/L: Consistent with cardiac damage, increased clinical risk and  myocardial infarction. Serial measurements may help assess extent of   myocardial damage.      NOTE: Children less than 1 year old may have higher baseline troponin   levels and results should be interpreted in conjunction with the overall   clinical context.     NOTE: Troponin I testing is performed using a different   testing methodology at [facility] than at other   [facility]. Direct result comparisons should only   be made within the same method.   SERIAL TROPONIN, 1 HOUR - Abnormal    Troponin I, High Sensitivity 16 (*)     Narrative:     Less than 99th percentile of normal range cutoff-  Female and children under 18 years old <14 ng/L; Male <21 ng/L: Negative  Repeat testing should be performed if clinically indicated.     Female and children under 18 years old 14-50 ng/L; Male 21-50 ng/L:  Consistent with possible cardiac damage and possible increased clinical   risk. Serial measurements may help to assess extent of myocardial damage.     >50 ng/L: Consistent with cardiac damage, increased clinical risk and  myocardial infarction. Serial measurements may help assess extent of   myocardial damage.      NOTE: Children less than 1 year old may have higher baseline troponin   levels and results should be interpreted in conjunction with the overall   clinical context.     NOTE: Troponin I testing is performed using a different   testing methodology at [facility] than at other   [facility]. Direct result comparisons should only   be made within the same  method.   MAGNESIUM - Normal    Magnesium 2.26     LACTATE - Normal    Lactate 1.5      Narrative:     Venipuncture immediately after or during the administration of Metamizole may lead to falsely low results. Testing should be performed immediately  prior to Metamizole dosing.   BLOOD CULTURE   BLOOD CULTURE   TROPONIN SERIES- (INITIAL, 1 HR)    Narrative:     The following orders were created for panel order Troponin I Series, High Sensitivity (0, 1 HR).  Procedure                               Abnormality         Status                     ---------                               -----------         ------                     Troponin I, High Sensiti...[[id number]]  Abnormal            Final result               Troponin, High Sensitivi...[[id number]]  Abnormal            Final result                 Please view results for these tests on the individual orders.   URINALYSIS WITH REFLEX MICROSCOPIC   HEMOGLOBIN A1C   POCT GLUCOSE METER   POCT GLUCOSE METER     Pain Management Panel           No data to display              XR chest 1 view   Final Result   1. Cardiomegaly with findings concerning for congestive heart failure   with mild-to-moderate interstitial edema. Recommend correlation with   fluid status and cardiac function.   2. Bibasilar airspace opacities are also likely favored to be related   to pulmonary edema, however developing pneumonia can also be   considered in the differential in appropriate clinical setting.   3. Possible trace bilateral pleural effusions.        MACRO:   None        Signed by: Charu Corona 8/25/2024 4:09 PM   Dictation workstation:   [id number]          ED Course & [facility]   ED Course as of 08/25/24 175   Sun Aug 25, 2024   1541 Patient is found to have leukocytosis at 17.4 and anemia with anemia is baseline for the patient. [RB]   1624 Patient was ambulated with a pulse ox became extremely dyspneic and desatted to 86%.  Decision to admit due to fluid overload. [RB]   6844  Patient does still make urine.  80 of Lasix is ordered at this time.  IMS was paged. [RB]      ED Course User Index  [RB] Clare MEYER FREDY Jeff         Diagnoses as of 08/25/24 1752   Acute on chronic congestive heart failure, unspecified heart failure type (Multi)       Medical Decision Making  Initial valuation patient is well-appearing emergency department at this time.  She does have decreased lung sounds bilaterally.  Labs and imaging were obtained.  She did miss her dialysis on Friday.  Troponin initially of 16 with a repeat of 16.  BNP elevated at 930 CMP shows elevated BUN and creat patient does have CKD mag and lactic are within normal limits.  Chest x-ray shows cardiomegaly with mild to moderate interstitial edema.  Trace bilateral pleural effusions with opacities likely to be related to pulmonary edema.  Patient has no fever or chills.  No cough.  On reevaluation we did attempt to ambulate the patient.  She became hypoxic.  Due to the fluid overload and hypoxia will admit stepdown unit IMS.  They are agreeable to plan.  She was given 80 of Lasix in the ED.  Will continue to monitor patient in the ED and patient will be monitored until transfer to the floor.        Procedure  Procedures       Clare S FREDY Jeff  08/25/24 3023

## 2024-08-25 NOTE — PROGRESS NOTES
Rosa Soto is a 69 y.o. female admitted for CHF (congestive heart failure), NYHA class I, acute on chronic, combined (Multi). Pharmacy reviewed the patient's [id number] medications and allergies for accuracy.    The list below reflects the PTA list prior to pharmacy medication history. A summary a changes to the PTA medication list has been listed below. Please review each medication in order reconciliation for additional clarification and justification.    Source of information:  PATIENT    Medications added:  DOXEPIN 100MG 1 at bedtime  ESCITALOPRAM 10MG 1 QD    Medications modified:  BUSPIRONE 5MG 1 every day---------->HS    Medications to be removed:  DOXEPIN 75MG  FLONASE    Medications of concern:      Prior to Admission Medications   Prescriptions Last Dose Informant Patient Reported? Taking?   Auryxia 210 mg iron tablet  Self Yes No   Sig: TAKE 2 TABLETS BY MOUTH THREE TIMES A DAY WITH MEALS. SWALLOW WHOLE, DO NOT CHEW OR CRUSH MEDICATION   DULoxetine (Cymbalta) 30 mg DR capsule  Self Yes No   Sig: Take 1 capsule (30 mg) by mouth once daily.   Flonase Allergy Relief 50 mcg/actuation nasal spray  Self Yes No   Sig: Administer 2 sprays into each nostril once daily.   Nephro-Gloria 0.8 mg tablet  Self Yes No   Sig: Take 1 tablet by mouth once daily.   acetaminophen (Tylenol) 325 mg tablet   No No   Sig: Take 3 tablets (975 mg) by mouth every 8 hours if needed (breakthrough pain).   albuterol 90 mcg/actuation inhaler   No No   Sig: Inhale 1 puff every 4 hours if needed for shortness of breath.   amLODIPine (Norvasc) 5 mg tablet  Self Yes No   Sig: Take 1 tablet (5 mg) by mouth once daily.   apixaban (Eliquis) 5 mg tablet   Yes No   Sig: Take 1 tablet (5 mg) by mouth 2 times a day.   atorvastatin (Lipitor) 40 mg tablet  Self Yes No   Sig: Take 1 tablet (40 mg) by mouth once daily.   busPIRone (Buspar) 5 mg tablet  Self Yes No   Sig: Take 1 tablet (5 mg) by mouth once daily.   cholecalciferol (Vitamin  D-3) 25 MCG (1000 UT) tablet  Self Yes No   Sig: Take 1 tablet (25 mcg) by mouth once daily.   cinacalcet (Sensipar) 30 mg tablet  Self Yes No   Sig: Take 1 tablet (30 mg) by mouth once daily. Take with food or shortly afer a meal. Swallow tablet whole; do not break or divide.   doxepin (SINEquan) 75 mg capsule  Self Yes No   Sig: Take 1 capsule (75 mg) by mouth once daily at bedtime.   metoprolol tartrate (Lopressor) 100 mg tablet  Self No No   Sig: Take 1 tablet by mouth 2 times a day.   oxymetazoline (Afrin) 0.05 % nasal spray   No No   Sig: Administer 2 sprays into each nostril 2 times a day as needed (nose bleeds) for up to 8 doses. Do not use for more than 3 days.   torsemide (Demadex) 20 mg tablet  Self Yes No   Sig: Take 1 tablet (20 mg) by mouth 2 times a day.      Facility-Administered Medications: None       Kina Duarte

## 2024-08-26 ENCOUNTER — APPOINTMENT (OUTPATIENT)
Dept: DIALYSIS | Facility: HOSPITAL | Age: 69
End: 2024-08-26
Payer: COMMERCIAL

## 2024-08-26 LAB
ANION GAP SERPL CALC-SCNC: 24 MMOL/L (ref 10–20)
ATRIAL RATE: 85 BPM
BUN SERPL-MCNC: 83 MG/DL (ref 6–23)
CALCIUM SERPL-MCNC: 8.8 MG/DL (ref 8.6–10.3)
CHLORIDE SERPL-SCNC: 98 MMOL/L (ref 98–107)
CO2 SERPL-SCNC: 20 MMOL/L (ref 21–32)
CREAT SERPL-MCNC: 12.58 MG/DL (ref 0.5–1.05)
EGFRCR SERPLBLD CKD-EPI 2021: 3 ML/MIN/1.73M*2
ERYTHROCYTE [DISTWIDTH] IN BLOOD BY AUTOMATED COUNT: 16.1 % (ref 11.5–14.5)
EST. AVERAGE GLUCOSE BLD GHB EST-MCNC: 146 MG/DL
GLUCOSE BLD MANUAL STRIP-MCNC: 164 MG/DL (ref 74–99)
GLUCOSE BLD MANUAL STRIP-MCNC: 223 MG/DL (ref 74–99)
GLUCOSE BLD MANUAL STRIP-MCNC: 230 MG/DL (ref 74–99)
GLUCOSE SERPL-MCNC: 138 MG/DL (ref 74–99)
HBA1C MFR BLD: 6.7 %
HCT VFR BLD AUTO: 32.5 % (ref 36–46)
HGB BLD-MCNC: 10.1 G/DL (ref 12–16)
MCH RBC QN AUTO: 32.7 PG (ref 26–34)
MCHC RBC AUTO-ENTMCNC: 31.1 G/DL (ref 32–36)
MCV RBC AUTO: 105 FL (ref 80–100)
NRBC BLD-RTO: 0 /100 WBCS (ref 0–0)
P AXIS: 0 DEGREES
PLATELET # BLD AUTO: 269 X10*3/UL (ref 150–450)
POTASSIUM SERPL-SCNC: 4.9 MMOL/L (ref 3.5–5.3)
PR INTERVAL: 54 MS
PROCALCITONIN SERPL-MCNC: 0.94 NG/ML
Q ONSET: 251 MS
QRS COUNT: 13 BEATS
QRS DURATION: 108 MS
QT INTERVAL: 403 MS
QTC CALCULATION(BAZETT): 480 MS
QTC FREDERICIA: 452 MS
R AXIS: 37 DEGREES
RBC # BLD AUTO: 3.09 X10*6/UL (ref 4–5.2)
SODIUM SERPL-SCNC: 137 MMOL/L (ref 136–145)
T AXIS: 21 DEGREES
T OFFSET: 452 MS
VENTRICULAR RATE: 85 BPM
WBC # BLD AUTO: 14.6 X10*3/UL (ref 4.4–11.3)

## 2024-08-26 PROCEDURE — 2500000001 HC RX 250 WO HCPCS SELF ADMINISTERED DRUGS (ALT 637 FOR MEDICARE OP): Performed by: NURSE PRACTITIONER

## 2024-08-26 PROCEDURE — 2500000001 HC RX 250 WO HCPCS SELF ADMINISTERED DRUGS (ALT 637 FOR MEDICARE OP)

## 2024-08-26 PROCEDURE — 85027 COMPLETE CBC AUTOMATED: CPT | Performed by: NURSE PRACTITIONER

## 2024-08-26 PROCEDURE — 87040 BLOOD CULTURE FOR BACTERIA: CPT | Mod: PORLAB | Performed by: NURSE PRACTITIONER

## 2024-08-26 PROCEDURE — 2500000002 HC RX 250 W HCPCS SELF ADMINISTERED DRUGS (ALT 637 FOR MEDICARE OP, ALT 636 FOR OP/ED): Performed by: NURSE PRACTITIONER

## 2024-08-26 PROCEDURE — 8010000001 HC DIALYSIS - HEMODIALYSIS PER DAY

## 2024-08-26 PROCEDURE — 84145 PROCALCITONIN (PCT): CPT | Mod: PORLAB | Performed by: NURSE PRACTITIONER

## 2024-08-26 PROCEDURE — G0378 HOSPITAL OBSERVATION PER HR: HCPCS

## 2024-08-26 PROCEDURE — 99233 SBSQ HOSP IP/OBS HIGH 50: CPT | Performed by: INTERNAL MEDICINE

## 2024-08-26 PROCEDURE — 2500000001 HC RX 250 WO HCPCS SELF ADMINISTERED DRUGS (ALT 637 FOR MEDICARE OP): Performed by: STUDENT IN AN ORGANIZED HEALTH CARE EDUCATION/TRAINING PROGRAM

## 2024-08-26 PROCEDURE — 36415 COLL VENOUS BLD VENIPUNCTURE: CPT | Performed by: NURSE PRACTITIONER

## 2024-08-26 PROCEDURE — 80048 BASIC METABOLIC PNL TOTAL CA: CPT | Performed by: NURSE PRACTITIONER

## 2024-08-26 PROCEDURE — 2500000002 HC RX 250 W HCPCS SELF ADMINISTERED DRUGS (ALT 637 FOR MEDICARE OP, ALT 636 FOR OP/ED)

## 2024-08-26 PROCEDURE — 82947 ASSAY GLUCOSE BLOOD QUANT: CPT | Mod: 59

## 2024-08-26 RX ORDER — TALC
6 POWDER (GRAM) TOPICAL NIGHTLY PRN
Status: DISCONTINUED | OUTPATIENT
Start: 2024-08-26 | End: 2024-08-27 | Stop reason: HOSPADM

## 2024-08-26 RX ORDER — DOXEPIN HYDROCHLORIDE 25 MG/1
100 CAPSULE ORAL NIGHTLY
Status: DISCONTINUED | OUTPATIENT
Start: 2024-08-26 | End: 2024-08-27 | Stop reason: HOSPADM

## 2024-08-26 RX ORDER — ATORVASTATIN CALCIUM 40 MG/1
40 TABLET, FILM COATED ORAL NIGHTLY
Status: DISCONTINUED | OUTPATIENT
Start: 2024-08-26 | End: 2024-08-27 | Stop reason: HOSPADM

## 2024-08-26 RX ORDER — ACETAMINOPHEN 325 MG/1
650 TABLET ORAL EVERY 4 HOURS PRN
Status: DISCONTINUED | OUTPATIENT
Start: 2024-08-26 | End: 2024-08-27 | Stop reason: HOSPADM

## 2024-08-26 RX ADMIN — ACETAMINOPHEN 650 MG: 325 TABLET ORAL at 03:43

## 2024-08-26 RX ADMIN — DOXEPIN HYDROCHLORIDE 100 MG: 25 CAPSULE ORAL at 22:17

## 2024-08-26 RX ADMIN — ATORVASTATIN CALCIUM 40 MG: 40 TABLET, FILM COATED ORAL at 22:17

## 2024-08-26 RX ADMIN — Medication 6 MG: at 01:05

## 2024-08-26 RX ADMIN — INSULIN LISPRO 4 UNITS: 100 INJECTION, SOLUTION INTRAVENOUS; SUBCUTANEOUS at 17:10

## 2024-08-26 RX ADMIN — APIXABAN 5 MG: 5 TABLET, FILM COATED ORAL at 20:35

## 2024-08-26 RX ADMIN — METOPROLOL TARTRATE 100 MG: 50 TABLET, FILM COATED ORAL at 20:35

## 2024-08-26 ASSESSMENT — COGNITIVE AND FUNCTIONAL STATUS - GENERAL
MOBILITY SCORE: 23
CLIMB 3 TO 5 STEPS WITH RAILING: A LITTLE
HELP NEEDED FOR BATHING: A LITTLE
MOBILITY SCORE: 23
CLIMB 3 TO 5 STEPS WITH RAILING: A LITTLE
DAILY ACTIVITIY SCORE: 22
DRESSING REGULAR LOWER BODY CLOTHING: A LITTLE

## 2024-08-26 ASSESSMENT — PAIN - FUNCTIONAL ASSESSMENT
PAIN_FUNCTIONAL_ASSESSMENT: 0-10

## 2024-08-26 ASSESSMENT — PAIN SCALES - GENERAL
PAINLEVEL_OUTOF10: 0 - NO PAIN

## 2024-08-26 ASSESSMENT — ACTIVITIES OF DAILY LIVING (ADL): LACK_OF_TRANSPORTATION: NO

## 2024-08-26 NOTE — CONSULTS
Nephrology Consult Note                                                                                                                                         Inpatient consult to Nephrology  Consult performed by: Lorena Guzman DO  Consult ordered by: Collin Sanchez, APRN-CNP                                                                                                               HPI  Patient is a 69 y.o. female ESRD, diastolic heart failure who is admitted to hospital with complaints of   shortness of breath. Nephrology consulted in view of ESRD.   Patient is on a Monday Wednesday Friday schedule at [facility].  The patient regularly skips Friday dialysis.  This is despite multiple efforts in counseling by dialysis staff and her daughter.  I spoke with daughter Andrew by phone  today who states the patient has never done what she is supposed to do for herself and her health.  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  Patient did have a white count of 17,000 when she arrived.  She was given IV Lasix.  She still makes some urine but not considerable amount.  ER charting shows 97% on room air when she arrived.  She did not require any NIV.  Arrange dialysis for this morning.  Patient is seen on dialysis.  She is tired she is not in any respiratory distress and not requiring oxygen.  Patient denies any cough.  She has felt that maybe she had a fever.  She was unable to walk to her bathroom due to the shortness of breath and called her daughter.        Past Medical History:   Diagnosis Date    Arrhythmia     Benign essential HTN     Cancer (Multi)     Chronic kidney disease     ESRD (end stage renal disease) (Multi)       Social History     Socioeconomic History    Marital status:      Spouse name: Not on file    Number of children: Not on file     Years of education: Not on file    Highest education level: Not on file   Occupational History    Not on file   Tobacco Use    Smoking status: Former     Current packs/day: 0.00     Types: Cigarettes     Quit date: 2/4/2021     Years since quitting: 3.5    Smokeless tobacco: Not on file   Vaping Use    Vaping status: Every Day   Substance and Sexual Activity    Alcohol use: Not Currently    Drug use: Never    Sexual activity: Defer   Other Topics Concern    Not on file   Social History Narrative    Not on file     Social Determinants of Health     Financial Resource Strain: Low Risk  (8/25/2024)    Overall Financial Resource Strain (CARDIA)     Difficulty of Paying Living Expenses: Not hard at all   Food Insecurity: Not on file   Transportation Needs: No Transportation Needs (8/25/2024)    PRAPARE - Transportation     Lack of Transportation (Medical): No     Lack of Transportation (Non-Medical): No   Physical Activity: Not on file   Stress: Not on file   Social Connections: Not on file   Intimate Partner Violence: Not on file   Housing Stability: Low Risk  (8/25/2024)    Housing Stability Vital Sign     Unable to Pay for Housing in the Last Year: No     Number of Times Moved in the Last Year: 0     Homeless in the Last Year: No      No family history on file.   No current facility-administered medications on file prior to encounter.     Current Outpatient Medications on File Prior to Encounter   Medication Sig Dispense Refill    acetaminophen (Tylenol) 325 mg tablet Take 3 tablets (975 mg) by mouth every 8 hours if needed (breakthrough pain).      albuterol 90 mcg/actuation inhaler Inhale 1 puff every 4 hours if needed for shortness of breath. 18 g 11    amLODIPine (Norvasc) 5 mg tablet Take 1 tablet (5 mg) by mouth once daily.      apixaban (Eliquis) 5 mg tablet Take 1 tablet (5 mg) by mouth 2 times a day.      atorvastatin (Lipitor) 40 mg tablet Take 1 tablet (40 mg) by mouth once daily.      Auryxia 210 mg  iron tablet TAKE 2 TABLETS BY MOUTH THREE TIMES A DAY WITH MEALS. SWALLOW WHOLE, DO NOT CHEW OR CRUSH MEDICATION      busPIRone (Buspar) 5 mg tablet Take 1 tablet (5 mg) by mouth once daily at bedtime.      cholecalciferol (Vitamin D-3) 25 MCG (1000 UT) tablet Take 1 tablet (25 mcg) by mouth once daily.      cinacalcet (Sensipar) 30 mg tablet Take 1 tablet (30 mg) by mouth once daily. Take with food or shortly afer a meal. Swallow tablet whole; do not break or divide.      doxepin (SINEquan) 100 mg capsule Take 1 capsule (100 mg) by mouth once daily at bedtime.      DULoxetine (Cymbalta) 30 mg DR capsule Take 1 capsule (30 mg) by mouth once daily.      escitalopram (Lexapro) 10 mg tablet Take 1 tablet (10 mg) by mouth once daily.      metoprolol tartrate (Lopressor) 100 mg tablet Take 1 tablet by mouth 2 times a day. 60 tablet 11    Nephro-Gloria 0.8 mg tablet Take 1 tablet by mouth once daily.      oxymetazoline (Afrin) 0.05 % nasal spray Administer 2 sprays into each nostril 2 times a day as needed (nose bleeds) for up to 8 doses. Do not use for more than 3 days. 30 mL 0    torsemide (Demadex) 20 mg tablet Take 1 tablet (20 mg) by mouth 2 times a day.      [DISCONTINUED] doxepin (SINEquan) 75 mg capsule Take 1 capsule (75 mg) by mouth once daily at bedtime.      [DISCONTINUED] Flonase Allergy Relief 50 mcg/actuation nasal spray Administer 2 sprays into each nostril once daily.        Scheduled medications  apixaban, 5 mg, oral, BID  insulin lispro, 0-10 Units, subcutaneous, TID  metoprolol tartrate, 100 mg, oral, BID  polyethylene glycol, 17 g, oral, Daily      Continuous medications     PRN medications  PRN medications: acetaminophen, dextrose, dextrose, glucagon, glucagon, melatonin, ondansetron ODT **OR** ondansetron     Review of systems  as per HPI otherwise 10 point review systems negative    /70 (BP Location: Left arm, Patient Position: Lying)   Pulse 74   Temp 36.5 °C (97.7 °F) (Temporal)   Resp 18   " Ht 1.702 m (5' 7\")   Wt 119 kg (263 lb 6.4 oz)   SpO2 93%   BMI 41.25 kg/m²     Input / Output:  24 HR:   Intake/Output Summary (Last 24 hours) at 8/26/2024 1245  Last data filed at 8/26/2024 0926  Gross per 24 hour   Intake 600 ml   Output --   Net 600 ml       Physical Exam   Alert and oriented x 3, NAD, obese  EOMI  OP clear  Neck: supple, No JVD  CV: RRR without m/r/g  Lungs: CTA bilaterally  Abd: soft NT/ND +BS, obese  Ext: No lower extremity edema   + AV fistula  Neuro: grossly intact  Skin: no rashes    Results from last 7 days   Lab Units 08/26/24  0330 08/25/24  1519   SODIUM mmol/L 137 135*   POTASSIUM mmol/L 4.9 4.8   CHLORIDE mmol/L 98 98   CO2 mmol/L 20* 20*   BUN mg/dL 83* 77*   CREATININE mg/dL 12.58* 12.26*   GLUCOSE mg/dL 138* 211*   CALCIUM mg/dL 8.8 8.8        Results from last 7 days   Lab Units 08/26/24  0330 08/25/24  1519   SODIUM mmol/L 137 135*   POTASSIUM mmol/L 4.9 4.8   CHLORIDE mmol/L 98 98   CO2 mmol/L 20* 20*   BUN mg/dL 83* 77*   CREATININE mg/dL 12.58* 12.26*   CALCIUM mg/dL 8.8 8.8   PROTEIN TOTAL g/dL  --  7.2   BILIRUBIN TOTAL mg/dL  --  0.8   ALK PHOS U/L  --  120   ALT U/L  --  13   AST U/L  --  16   GLUCOSE mg/dL 138* 211*      Results from last 7 days   Lab Units 08/25/24  1519   MAGNESIUM mg/dL 2.26      Results from last 7 days   Lab Units 08/26/24  0330 08/25/24  1519   WBC AUTO x10*3/uL 14.6* 17.4*   HEMOGLOBIN g/dL 10.1* 10.5*   HEMATOCRIT % 32.5* 33.5*   PLATELETS AUTO x10*3/uL 269 280        XR chest 1 view   Final Result   1. Cardiomegaly with findings concerning for congestive heart failure   with mild-to-moderate interstitial edema. Recommend correlation with   fluid status and cardiac function.   2. Bibasilar airspace opacities are also likely favored to be related   to pulmonary edema, however developing pneumonia can also be   considered in the differential in appropriate clinical setting.   3. Possible trace bilateral pleural effusions.        MACRO:   None "        Signed by: Charu Corona 8/25/2024 4:09 PM   Dictation workstation:   [id number]           Assessment:     Patient is 69 y.o. female who is admitted to hospital for shortness of breath/volume overload. Nephrology consulted in view of ESRD.    ESRD  -HD Monday Wednesday Friday [facility]  -Access is AV fistula right arm   -EDW is 114.5 kg    Noncompliance with dialysis    -refuses to go on Fridays    Volume overload  -Patient may have had a flash pulmonary edema type picture  -asymptomatic now even before dialysis started by 3 kg   -she has been leaving above her target weight though    Anemia of CKD  -Hemoglobin at goal    CKD mineral bone disease  Phosphorus has been controlled on Auryxia which is not on formulary here    Hypertension   -Was high when she arrived but has been reasonably controlled on amlodipine and metoprolol    Recommendations:   -HD today UF 2-as BP tolerates3 L   -If BP remains accelerated after HD/UF we will add amlodipine which is on her outpatient list  -Continue Monday Wednesday Friday      Discussed with daughter by phone today, discussed with outpatient dialysis center regarding social work to involvement regarding patient    Please message me through biNu chat with any questions or concerns.     Lorena Guzman DO  8/26/2024  12:45 PM         [facility]    [address], [address]   [place] [address]  Office: [phone]

## 2024-08-26 NOTE — PROGRESS NOTES
Rosa Soto is a 69 y.o. female on day 0 of admission presenting with CHF (congestive heart failure), NYHA class I, acute on chronic, combined (Multi).      Subjective   History Obtained From: Patient     History Of Present Illness:  Rosa Soto is a 69 y.o. female with PMHx s/f diastolic heart failure atrial fibrillation, chronic anticoagulation with Eliquis, end-stage renal disease on hemodialysis Monday Wednesday Friday with Dr. Sloan's group presenting with complaints of dyspnea on exertion orthopnea.  The patient has end-stage renal disease is on dialysis as previously stated she does produce urine takes torsemide.  She states that she has been adherent to her medication regime.  She does not weigh herself on a daily basis and was last dialyzed on Wednesday.  She missed her dialysis session on Friday.  Patient states that she feels that she has become more short of breath with activity she has complaints of orthopnea.  She does not really notice any increase in lower extremity edema and is quite shocked that she could actually be retaining any fluid.  She has felt warm and cold from time to time but denies any fevers denies any cough any sputum production any dysuria abdominal pain diarrhea any new rashes or sores.  In the emergency department patient's temperature 98.1 heart rate 87 respiratory rate 18 blood pressure 182/80 SpO2 97% on room air.Chemistry panel significant for sodium 135 glucose 211 bicarb 20 anion gap 22 BUN 77 creatinine 12.26 liver enzymes are within normal limits lactate is 1.5 BN peptide 930 troponin 16 with repeat troponin of 16.  CBC showing leukocytosis with white blood cell count 17.4 hemoglobin 10.5 hematocrit 33.5 platelets 280.  Chest x-ray showing cardiomegaly and findings concerning for congestive heart failure with mild to moderate interstitial edema.  Bibasilar airspace opacities are also noted possible trace bilateral pleural effusions.  Patient was given 80 mg of IV  furosemide and referred for admission due to volume overload and congestive heart failure exacerbation.              ED Course:      ED Course as of 08/25/24 1732   Sun Aug 25, 2024   1546 Patient is found to have leukocytosis at 17.4 and anemia with anemia is baseline for the patient. [RB]   1622 Patient was ambulated with a pulse ox became extremely dyspneic and desatted to 86%.  Decision to admit due to fluid overload. [RB]   1625 Patient does still make urine.  80 of Lasix is ordered at this time.  IMS was paged. [RB]       08/26: Patient was evaluated this morning. Not in acute distress. No acute changes overnight. Patient was resting comfortably in bed. Denies nausea/vomiting, fever/chills, dizziness, shortness of breath, chest pain, abdominal pain, calf pain. Patient missed her dialysis appointment on Friday, likely causing her presenting symptoms. Patient is currently receiving dialysis this morning. Patient is clinically improving today.        Objective     Last Recorded Vitals  /70 (BP Location: Left arm, Patient Position: Lying)   Pulse 73   Temp 36.8 °C (98.2 °F) (Temporal)   Resp 18   Wt 119 kg (263 lb 6.4 oz)   SpO2 93%   Intake/Output last 3 Shifts:  No intake or output data in the 24 hours ending 08/26/24 1054    Admission Weight  Weight: 113 kg (250 lb) (08/25/24 1442)    Daily Weight  08/25/24 : 119 kg (263 lb 6.4 oz)    Image Results  ECG 12 lead  Sinus rhythm  Short NC interval  Low voltage, precordial leads  Nonspecific T abnormalities, inferior leads      Physical Exam      Constitutional: Pleasant and cooperative. Laying in bed in no acute distress. Conversant.   Skin: Warm and dry  Eyes: EOMI. Anicteric sclera.   ENT: Mucous membranes moist  Head and Neck: Normocephalic, atraumatic. No appreciable JVD. No appreciable thyromegaly.   Respiratory: Nonlabored. Lungs clear to auscultation bilaterally without obvious adventitious sounds. Chest rise is equal.  Cardiovascular: Regular  rate, rhythm is irregular.  Gastro: Abdomen soft, nontender, nondistended. Bowel sounds are present and normoactive.  : No CVA tenderness.   MSK: No gross abnormalities appreciated.   Extremities: No cyanosis, edema, or clubbing evident.    Neuro: A&Ox3. CN 2-12 grossly intact. Able to respond to questions appropriately and clearly. No new focal neurologic deficits appreciated.  Psych: Appropriate mood and behavior.     Relevant Results  Scheduled medications  apixaban, 5 mg, oral, BID  insulin lispro, 0-10 Units, subcutaneous, TID  metoprolol tartrate, 100 mg, oral, BID  polyethylene glycol, 17 g, oral, Daily      Continuous medications     PRN medications  PRN medications: acetaminophen, dextrose, dextrose, glucagon, glucagon, melatonin, ondansetron ODT **OR** ondansetron          Assessment/Plan        Assessment & Plan  CHF (congestive heart failure), NYHA class I, acute on chronic, combined (Multi)  Given Lasix in ED  Fluid Restriction 1200mL  Nephrology consult  Inpatient dialysis  Continue home meds  A-fib (Multi)  Continue rate control  Continue anticoagulation  Anemia  Continue to monitor  Appears stable, continue ongoing treatments  CKD (chronic kidney disease) stage 5, GFR less than 15 ml/min (Multi)  Nephrology consult  DM (diabetes mellitus), type 2 (Multi)  Cover with sliding scale  Diet control  Fluid overload, unspecified  Likely due to missed dialysis and diet                MARIAH JOSHI      I am the supervising physician in the management of the patient. I have evaluated patient independently,  reviewed and agree with student's documented note. I have edited the above assessment/plan to reflect my final impressions and plans.  Ericka Garcia MD  8/26/2024  4:10 PM

## 2024-08-26 NOTE — PRE-PROCEDURE NOTE
Report from Sending RN:    Report From: Steph Mtz RN  Recent Surgery of Procedure: No  Baseline Level of Consciousness (LOC): x4  Oxygen Use: Yes  Type: NC  Diabetic: Yes  Last BP Med Given Day of Dialysis:   See MAR  Last Pain Med Given: See MAR  Lab Tests to be Obtained with Dialysis: No  Blood Transfusion to be Given During Dialysis: No  Available IV Access: Yes  Medications to be Administered During Dialysis: No  Continuous IV Infusion Running: No  Restraints on Currently or in the Last 24 Hours: No  Hand-Off Communication: Pt ready for HD  Dialysis Catheter Dressing: Will check prior to HD  Last Dressing Change: Will check prior to HD

## 2024-08-26 NOTE — CARE PLAN
The patient's goals for the shift include      The clinical goals for the shift include To remain free from fall/injury    Over the shift, the patient is making adequate progress towards stated care plan goals

## 2024-08-26 NOTE — ASSESSMENT & PLAN NOTE
Given Lasix in ED  Fluid Restriction 1200mL  Nephrology consult  Inpatient dialysis  Continue home meds

## 2024-08-26 NOTE — POST-PROCEDURE NOTE
Report to Receiving RN:    Report To: Steph thompson   Time Report Called: [id number]  Hand-Off Communication: stable, 3 liters net removed, no issues   Complications During Treatment: No  Ultrafiltration Treatment: No  Medications Administered During Dialysis: No  Blood Products Administered During Dialysis: No  Labs Sent During Dialysis: No  Heparin Drip Rate Changes: No  Dialysis Catheter Dressing: na avf  Last Dressing Change: NA     Electronic Signatures:  Quentin Read RN  (Signed )   Authored:    (Signed )   Authored:     Last Updated: 1:32 PM by QUENTIN READ

## 2024-08-26 NOTE — PROGRESS NOTES
08/26/24 1520   Discharge Planning   Living Arrangements Children;Family members   Support Systems Children;Family members   Assistance Needed Independent   Type of Residence Private residence   Home or Post Acute Services None   Expected Discharge Disposition Home   Financial Resource Strain   How hard is it for you to pay for the very basics like food, housing, medical care, and heating? Not hard   Housing Stability   In the last 12 months, was there a time when you were not able to pay the mortgage or rent on time? N   At any time in the past 12 months, were you homeless or living in a shelter (including now)? N   Transportation Needs   In the past 12 months, has lack of transportation kept you from medical appointments or from getting medications? no   In the past 12 months, has lack of transportation kept you from meetings, work, or from getting things needed for daily living? No     PCP is Amanuel Monreal MD. Patient is from home alone with support from her daughter and grandson. Patient is independent with a rollator on ambulation, self care, shopping, and meals.  Patients daughter or grandson provide transportation. Patient wears 2-3 L Oxygen PRN at home, uncertain of company. She is a ESRD MWF at [facility] at 10:30AM and her daughter or grandson provide transportation. Patient prefers to return home with no needs upon discharge. TCC will continue to follow for needs if they arise.

## 2024-08-27 VITALS
DIASTOLIC BLOOD PRESSURE: 70 MMHG | HEIGHT: 67 IN | RESPIRATION RATE: 16 BRPM | BODY MASS INDEX: 41.34 KG/M2 | HEART RATE: 72 BPM | TEMPERATURE: 97.7 F | SYSTOLIC BLOOD PRESSURE: 128 MMHG | WEIGHT: 263.4 LBS | OXYGEN SATURATION: 89 %

## 2024-08-27 PROBLEM — E87.70 FLUID OVERLOAD, UNSPECIFIED: Status: RESOLVED | Noted: 2023-08-25 | Resolved: 2024-08-27

## 2024-08-27 LAB
ANION GAP SERPL CALC-SCNC: 17 MMOL/L (ref 10–20)
BUN SERPL-MCNC: 43 MG/DL (ref 6–23)
CALCIUM SERPL-MCNC: 8.7 MG/DL (ref 8.6–10.3)
CHLORIDE SERPL-SCNC: 97 MMOL/L (ref 98–107)
CO2 SERPL-SCNC: 28 MMOL/L (ref 21–32)
CREAT SERPL-MCNC: 6.69 MG/DL (ref 0.5–1.05)
EGFRCR SERPLBLD CKD-EPI 2021: 6 ML/MIN/1.73M*2
ERYTHROCYTE [DISTWIDTH] IN BLOOD BY AUTOMATED COUNT: 16.3 % (ref 11.5–14.5)
GLUCOSE BLD MANUAL STRIP-MCNC: 160 MG/DL (ref 74–99)
GLUCOSE BLD MANUAL STRIP-MCNC: 184 MG/DL (ref 74–99)
GLUCOSE BLD MANUAL STRIP-MCNC: 214 MG/DL (ref 74–99)
GLUCOSE SERPL-MCNC: 151 MG/DL (ref 74–99)
HCT VFR BLD AUTO: 30.4 % (ref 36–46)
HGB BLD-MCNC: 9.7 G/DL (ref 12–16)
MAGNESIUM SERPL-MCNC: 2.1 MG/DL (ref 1.6–2.4)
MCH RBC QN AUTO: 33.3 PG (ref 26–34)
MCHC RBC AUTO-ENTMCNC: 31.9 G/DL (ref 32–36)
MCV RBC AUTO: 105 FL (ref 80–100)
NRBC BLD-RTO: 0 /100 WBCS (ref 0–0)
PLATELET # BLD AUTO: 260 X10*3/UL (ref 150–450)
POTASSIUM SERPL-SCNC: 3.8 MMOL/L (ref 3.5–5.3)
RBC # BLD AUTO: 2.91 X10*6/UL (ref 4–5.2)
SODIUM SERPL-SCNC: 138 MMOL/L (ref 136–145)
WBC # BLD AUTO: 10.5 X10*3/UL (ref 4.4–11.3)

## 2024-08-27 PROCEDURE — 82947 ASSAY GLUCOSE BLOOD QUANT: CPT | Mod: 59

## 2024-08-27 PROCEDURE — G0378 HOSPITAL OBSERVATION PER HR: HCPCS

## 2024-08-27 PROCEDURE — 2500000001 HC RX 250 WO HCPCS SELF ADMINISTERED DRUGS (ALT 637 FOR MEDICARE OP): Performed by: NURSE PRACTITIONER

## 2024-08-27 PROCEDURE — 83735 ASSAY OF MAGNESIUM: CPT | Performed by: INTERNAL MEDICINE

## 2024-08-27 PROCEDURE — 36415 COLL VENOUS BLD VENIPUNCTURE: CPT | Performed by: INTERNAL MEDICINE

## 2024-08-27 PROCEDURE — 80048 BASIC METABOLIC PNL TOTAL CA: CPT | Performed by: INTERNAL MEDICINE

## 2024-08-27 PROCEDURE — 99239 HOSP IP/OBS DSCHRG MGMT >30: CPT | Performed by: INTERNAL MEDICINE

## 2024-08-27 PROCEDURE — 2500000002 HC RX 250 W HCPCS SELF ADMINISTERED DRUGS (ALT 637 FOR MEDICARE OP, ALT 636 FOR OP/ED): Performed by: NURSE PRACTITIONER

## 2024-08-27 PROCEDURE — 85027 COMPLETE CBC AUTOMATED: CPT | Performed by: INTERNAL MEDICINE

## 2024-08-27 RX ADMIN — INSULIN LISPRO 2 UNITS: 100 INJECTION, SOLUTION INTRAVENOUS; SUBCUTANEOUS at 11:55

## 2024-08-27 RX ADMIN — INSULIN LISPRO 2 UNITS: 100 INJECTION, SOLUTION INTRAVENOUS; SUBCUTANEOUS at 08:14

## 2024-08-27 RX ADMIN — APIXABAN 5 MG: 5 TABLET, FILM COATED ORAL at 08:12

## 2024-08-27 RX ADMIN — METOPROLOL TARTRATE 100 MG: 50 TABLET, FILM COATED ORAL at 08:12

## 2024-08-27 ASSESSMENT — COGNITIVE AND FUNCTIONAL STATUS - GENERAL
CLIMB 3 TO 5 STEPS WITH RAILING: A LITTLE
MOBILITY SCORE: 23

## 2024-08-27 ASSESSMENT — PAIN SCALES - GENERAL: PAINLEVEL_OUTOF10: 0 - NO PAIN

## 2024-08-27 ASSESSMENT — PAIN - FUNCTIONAL ASSESSMENT: PAIN_FUNCTIONAL_ASSESSMENT: 0-10

## 2024-08-27 NOTE — PROGRESS NOTES
"      Nephrology Progress Note      Nephrology following for ESRD.   Events over night: none    Seen in room. Feels better today. Well known to me from outpatient HD unit. We had another long discussion today regarding the need to come to all of her scheduled HD treatments. We discussed her labs and volume status in detail.     /73 (BP Location: Left arm, Patient Position: Lying)   Pulse 72   Temp 36.6 °C (97.9 °F) (Temporal)   Resp 16   Ht 1.702 m (5' 7\")   Wt 119 kg (263 lb 6.4 oz)   SpO2 98%   BMI 41.25 kg/m²     Input / Output:  24 HR:   Intake/Output Summary (Last 24 hours) at 8/27/2024 0952  Last data filed at 8/26/2024 1930  Gross per 24 hour   Intake 1281 ml   Output 3400 ml   Net -2119 ml       Physical Exam   Alert and oriented x 3 NAD  Neck: no JVD  CV: RRR  Lungs: CTA bilaterally  Abd: soft, NT, ND   Ext: trace lower extremity edema    Scheduled medications  apixaban, 5 mg, oral, BID  atorvastatin, 40 mg, oral, Nightly  doxepin, 100 mg, oral, Nightly  insulin lispro, 0-10 Units, subcutaneous, TID  metoprolol tartrate, 100 mg, oral, BID  polyethylene glycol, 17 g, oral, Daily      Continuous medications     PRN medications  PRN medications: acetaminophen, dextrose, dextrose, glucagon, glucagon, melatonin, ondansetron ODT **OR** ondansetron   Results from last 7 days   Lab Units 08/27/24  0630 08/26/24  0330 08/25/24  1519   SODIUM mmol/L 138   < > 135*   POTASSIUM mmol/L 3.8   < > 4.8   CHLORIDE mmol/L 97*   < > 98   CO2 mmol/L 28   < > 20*   BUN mg/dL 43*   < > 77*   CREATININE mg/dL 6.69*   < > 12.26*   CALCIUM mg/dL 8.7   < > 8.8   PROTEIN TOTAL g/dL  --   --  7.2   BILIRUBIN TOTAL mg/dL  --   --  0.8   ALK PHOS U/L  --   --  120   ALT U/L  --   --  13   AST U/L  --   --  16   GLUCOSE mg/dL 151*   < > 211*    < > = values in this interval not displayed.      Results from last 7 days   Lab Units 08/27/24  0630 08/25/24  1519   MAGNESIUM mg/dL 2.10 2.26      Results from last 7 days   Lab " Units 08/27/24  0630 08/26/24  0330 08/25/24  1519   WBC AUTO x10*3/uL 10.5 14.6* 17.4*   HEMOGLOBIN g/dL 9.7* 10.1* 10.5*   HEMATOCRIT % 30.4* 32.5* 33.5*   PLATELETS AUTO x10*3/uL 260 269 280        Assessment & Plan:        Patient is 69 y.o. female who is admitted to hospital for shortness of breath/volume overload. Nephrology consulted in view of ESRD.     ESRD  -HD Monday Wednesday Friday [facility]  -Access is AV fistula right arm   -EDW is 114.5 kg     Noncompliance with dialysis    -refuses to go on Fridays     Volume overload  -corrected      Anemia of CKD  -Hemoglobin at goal     CKD mineral bone disease  Phosphorus has been controlled on Auryxia which is not on formulary here     Hypertension   -Was high when she arrived but has been reasonably controlled on amlodipine and metoprolol     Recommendations:   -HD tomorrow- no need for IUF tx today   -BP better after UF yesterday  -Hopefully she will start to come to all of her outpatient HD sessions       Please message me through [employer] chat with any questions or concerns.     Jed Ruby PA-C  8/27/2024  9:52 AM     [facility]    [address] [place] [address], [address]   [place] [id number]  Office: [phone]     Patient seen and examined independently by me. I reviewed with Darshan Ruby PA-C the medical history and findings on physical exam. I discussed the patient's diagnosis and concur with the treatment plan as documented in his note.  Please Epic chat with me with any questions or concerns.

## 2024-08-27 NOTE — DISCHARGE SUMMARY
Discharge Diagnosis  Acute on chronic diastolic congestive heart failure  Acute pulmonary edema  Fluid overload  ESRD      This discharge took greater than 35 minutes.    Test Results Pending At Discharge  Pending Labs       Order Current Status    Blood Culture Preliminary result    Blood Culture Preliminary result            Hospital Course   Patient presented with shortness of breath after missing hemodialysis.  She was admitted with a diagnosis of fluid overload and pulmonary edema.  Received hemodialysis with ultrafiltration yesterday.  Symptom improved.  She is discharged home.  She will follow-up with PCP as an outpatient.  She will continue on hemodialysis as scheduled.    Pertinent Physical Exam At Time of Discharge  Physical Exam  Patient is awake and orient, not in apparent distress  Eyes: PERRLA, no conjunctival congestion  Chest: Bilateral Air entry, no crackles or wheezing  Heart: s1S2 regular, no murmur  Abdomen: Soft, non tender, BS present  Ext:    Home Medications     Medication List      CONTINUE taking these medications     acetaminophen 325 mg tablet; Commonly known as: Tylenol; Take 3 tablets   (975 mg) by mouth every 8 hours if needed (breakthrough pain).   albuterol 90 mcg/actuation inhaler; Inhale 1 puff every 4 hours if   needed for shortness of breath.   amLODIPine 5 mg tablet; Commonly known as: Norvasc   atorvastatin 40 mg tablet; Commonly known as: Lipitor   Auryxia 210 mg iron tablet; Generic drug: ferric citrate   busPIRone 5 mg tablet; Commonly known as: Buspar   cholecalciferol 25 MCG (1000 UT) tablet; Commonly known as: Vitamin D-3   cinacalcet 30 mg tablet; Commonly known as: Sensipar   doxepin 100 mg capsule; Commonly known as: SINEquan   DULoxetine 30 mg DR capsule; Commonly known as: Cymbalta   Eliquis 5 mg tablet; Generic drug: apixaban   escitalopram 10 mg tablet; Commonly known as: Lexapro   metoprolol tartrate 100 mg tablet; Commonly known as: Lopressor; Take 1   tablet by  mouth 2 times a day.   Nephro-Gloria 0.8 mg tablet; Generic drug: B complex-vitamin C-folic acid   oxymetazoline 0.05 % nasal spray; Commonly known as: Afrin; Administer 2   sprays into each nostril 2 times a day as needed (nose bleeds) for up to 8   doses. Do not use for more than 3 days.   torsemide 20 mg tablet; Commonly known as: Demadex       Outpatient Follow-Up  No follow-ups on file.     Ericka Garcia MD  8/27/2024  1:24 PM

## 2024-08-27 NOTE — CARE PLAN
The patient's goals for the shift include      The clinical goals for the shift include To remain free from fall/injury and discharge home by end of day    Over the shift, the patient is making adequate progress towards stated care plan goals

## 2024-08-30 LAB
BACTERIA BLD CULT: NORMAL
BACTERIA BLD CULT: NORMAL

## 2024-10-15 ENCOUNTER — OFFICE VISIT (OUTPATIENT)
Dept: CARDIOLOGY | Facility: CLINIC | Age: 69
End: 2024-10-15
Payer: COMMERCIAL

## 2024-10-15 VITALS
WEIGHT: 255 LBS | BODY MASS INDEX: 39.94 KG/M2 | DIASTOLIC BLOOD PRESSURE: 48 MMHG | SYSTOLIC BLOOD PRESSURE: 140 MMHG | HEART RATE: 110 BPM

## 2024-10-15 DIAGNOSIS — I50.32 CHRONIC DIASTOLIC HEART FAILURE: ICD-10-CM

## 2024-10-15 DIAGNOSIS — I48.0 PAROXYSMAL ATRIAL FIBRILLATION (MULTI): Primary | ICD-10-CM

## 2024-10-15 PROCEDURE — 1160F RVW MEDS BY RX/DR IN RCRD: CPT | Performed by: NURSE PRACTITIONER

## 2024-10-15 PROCEDURE — 3078F DIAST BP <80 MM HG: CPT | Performed by: NURSE PRACTITIONER

## 2024-10-15 PROCEDURE — 3048F LDL-C <100 MG/DL: CPT | Performed by: NURSE PRACTITIONER

## 2024-10-15 PROCEDURE — 3077F SYST BP >= 140 MM HG: CPT | Performed by: NURSE PRACTITIONER

## 2024-10-15 PROCEDURE — 99215 OFFICE O/P EST HI 40 MIN: CPT | Performed by: NURSE PRACTITIONER

## 2024-10-15 PROCEDURE — 1159F MED LIST DOCD IN RCRD: CPT | Performed by: NURSE PRACTITIONER

## 2024-10-15 PROCEDURE — 3044F HG A1C LEVEL LT 7.0%: CPT | Performed by: NURSE PRACTITIONER

## 2024-10-15 RX ORDER — VERICIGUAT 5 MG/1
5 TABLET, FILM COATED ORAL DAILY
Qty: 14 TABLET | Refills: 0 | Status: SHIPPED | OUTPATIENT
Start: 2024-10-15

## 2024-10-15 RX ORDER — VERICIGUAT 2.5 MG/1
2.5 TABLET, FILM COATED ORAL DAILY
Qty: 14 TABLET | Refills: 0 | Status: SHIPPED | OUTPATIENT
Start: 2024-10-15

## 2024-10-15 NOTE — PROGRESS NOTES
Chief Complaint:   Hospital Follow Up     History Of Present Illness:    Rosa Soto is a 69 y.o. female here for hospital follow up.     Hospitalized 8/25/24 with acute on chronic diastolic heart failure 2/2 missed dialysis. C/O SOB which improved with dialysis. Documented she frequently misses Friday dialysis.     Rosa reports she suddenly became SOB. Denies LE edema or gradual onset of SOB. States she suddenly just became SOB. Put herself on supplemental 02, SOB improved.     Yesterday went to dialysis and they took 3 L off with subsequent cramping.     At present she denies SOB and LE edema. States she misses dialysis most Fridays.     Presented to ED, on 6/3, with nose bleed. Started post dialysis. Rhino Rocket placed. She returned to the ED the next day with another nose bleed. Bleeding resolved with cauterization. She was transferred from [facility] to [facility] for ENT consult.     Patient underwent RFA on 2/29 with Dr. Valentine. She then presented to ED on 3/2/24 with acute diastolic heart failure AEB volume overload and subsequent respiratory failure 2/2 miss dialysis. While hospitalized she went into afib rvr. Started IV diltiazem. Converted to NSR and diltiazem was weaned off. Underwent LHC, on 3/6, which showed mild nonobstructive CAD. Patient has not followed up with us or EP since RFA.       Cath (Normal Coronaries) - 10/2/2008    Past Medical History:  She has a past medical history of Arrhythmia, Benign essential HTN, Cancer (Multi), Chronic kidney disease, and ESRD (end stage renal disease) (Multi).    She has no past medical history of CHF (congestive heart failure) (Multi).    Past Surgical History:  She has a past surgical history that includes US guided abdominal paracentesis (8/31/2023); Cardiac catheterization (N/A, 3/6/2024); and Cardiac electrophysiology procedure (N/A, 2/29/2024).      Social History:  She reports that she quit smoking about 3 years ago. Her smoking use included cigarettes. She  does not have any smokeless tobacco history on file. She reports that she does not currently use alcohol. She reports that she does not use drugs.    Family History:  No family history on file.  Allergies:  Penicillins, Codeine, Ropinirole, and Adhesive    Review of Systems  All pertinent systems have been reviewed and are negative except for what is stated in the history of present illness.    All other systems have been reviewed and are negative and noncontributory to this patient's current ailments.     Visit Vitals  BP (!) 140/48 (BP Location: Left arm, Patient Position: Sitting, BP Cuff Size: Adult long)   Pulse 110   Wt 116 kg (255 lb)   BMI 39.94 kg/m²   OB Status Postmenopausal   Smoking Status Former   BSA 2.34 m²       Last Labs:  CBC -  Lab Results   Component Value Date    WBC 10.5 08/27/2024    HGB 9.7 (L) 08/27/2024    HCT 30.4 (L) 08/27/2024     (H) 08/27/2024     08/27/2024       CMP -  Lab Results   Component Value Date    CALCIUM 8.7 08/27/2024    PHOS 5.4 (H) 06/06/2024    PROT 7.2 08/25/2024    ALBUMIN 3.8 08/25/2024    AST 16 08/25/2024    ALT 13 08/25/2024    ALKPHOS 120 08/25/2024    BILITOT 0.8 08/25/2024    BUN 43 (H) 08/27/2024    CREATININE 6.69 (H) 08/27/2024       LIPID PANEL -   Lab Results   Component Value Date    CHOL 101 03/03/2024    TRIG 158 (H) 03/03/2024    HDL 31.5 03/03/2024    CHHDL 3.2 03/03/2024    LDLF 58 04/23/2018    VLDL 32 03/03/2024    NHDL 70 03/03/2024       RENAL FUNCTION PANEL -   Lab Results   Component Value Date    GLUCOSE 151 (H) 08/27/2024     08/27/2024    K 3.8 08/27/2024    CL 97 (L) 08/27/2024    CO2 28 08/27/2024    ANIONGAP 17 08/27/2024    BUN 43 (H) 08/27/2024    CREATININE 6.69 (H) 08/27/2024    CALCIUM 8.7 08/27/2024    PHOS 5.4 (H) 06/06/2024    ALBUMIN 3.8 08/25/2024        Lab Results   Component Value Date     (H) 08/25/2024    HGBA1C 6.7 (H) 08/25/2024         Objective   Vitals reviewed.   Constitutional:        Appearance: Healthy appearance. Not in distress.   Eyes:      Conjunctiva/sclera: Conjunctivae normal.   Neck:      Vascular: No JVR. JVD normal.   Pulmonary:      Effort: Pulmonary effort is normal.      Breath sounds: Normal breath sounds. No wheezing. No rhonchi. No rales.   Chest:      Chest wall: Not tender to palpatation.   Cardiovascular:      PMI at left midclavicular line. Normal rate. Regular rhythm. Normal S1. Normal S2.       Murmurs: There is no murmur.      No gallop.  No click. No rub.   Pulses:     Intact distal pulses.   Edema:     Peripheral edema absent.   Abdominal:      General: Bowel sounds are normal.      Palpations: Abdomen is soft.      Tenderness: There is no abdominal tenderness.   Musculoskeletal: Normal range of motion.         General: No tenderness. Skin:     General: Skin is warm and dry.   Neurological:      General: No focal deficit present.      Mental Status: Alert and oriented to person, place and time.   Psychiatric:         Attention and Perception: Attention normal.         Mood and Affect: Mood normal.       Assessment/Plan   Diagnoses and all orders for this visit:  Chronic diastolic heart failure (Multi)  - euvolemic  - 2/2 missing dialysis  - encouraged patient to do daily weights to monitor for fluid retention  - starting verquvo due to recent acute HF episode   Paroxysmal atrial fibrillation (Multi)  - maintaining NSR post RFA  - we will continue Eliquis        Follow up in 6 weeks    Current Outpatient Medications on File Prior to Visit   Medication Sig Dispense Refill    acetaminophen (Tylenol) 325 mg tablet Take 3 tablets (975 mg) by mouth every 8 hours if needed (breakthrough pain).      albuterol 90 mcg/actuation inhaler Inhale 1 puff every 4 hours if needed for shortness of breath. 18 g 11    amLODIPine (Norvasc) 5 mg tablet Take 1 tablet (5 mg) by mouth once daily.      apixaban (Eliquis) 5 mg tablet Take 1 tablet (5 mg) by mouth 2 times a day.      atorvastatin  (Lipitor) 40 mg tablet Take 1 tablet (40 mg) by mouth once daily.      Auryxia 210 mg iron tablet TAKE 2 TABLETS BY MOUTH THREE TIMES A DAY WITH MEALS. SWALLOW WHOLE, DO NOT CHEW OR CRUSH MEDICATION      busPIRone (Buspar) 5 mg tablet Take 1 tablet (5 mg) by mouth once daily at bedtime.      cholecalciferol (Vitamin D-3) 25 MCG (1000 UT) tablet Take 1 tablet (25 mcg) by mouth once daily.      cinacalcet (Sensipar) 30 mg tablet Take 1 tablet (30 mg) by mouth once daily. Take with food or shortly afer a meal. Swallow tablet whole; do not break or divide.      doxepin (SINEquan) 100 mg capsule Take 1 capsule (100 mg) by mouth once daily at bedtime.      DULoxetine (Cymbalta) 30 mg DR capsule Take 1 capsule (30 mg) by mouth once daily.      escitalopram (Lexapro) 10 mg tablet Take 1 tablet (10 mg) by mouth once daily.      metoprolol tartrate (Lopressor) 100 mg tablet Take 1 tablet by mouth 2 times a day. 60 tablet 11    Nephro-Gloria 0.8 mg tablet Take 1 tablet by mouth once daily.      oxymetazoline (Afrin) 0.05 % nasal spray Administer 2 sprays into each nostril 2 times a day as needed (nose bleeds) for up to 8 doses. Do not use for more than 3 days. 30 mL 0    torsemide (Demadex) 20 mg tablet Take 1 tablet (20 mg) by mouth 2 times a day.       No current facility-administered medications on file prior to visit.         Exclusive of any other services or procedures performed, Caroline CONNORS, spent 40 minutes in duration for this visit today.  This time consisted of chart review, obtaining history, and/or performing the exam as documented above, as well as, documenting the clinical information for the encounter in the electronic record, discussing treatment options, plans, and/or goals with patient, family, and/or caregiver, refilling medications, updating the electronic record, ordering medicines, lab work, imaging, referrals, and/or procedures as documented above and communicating with other [facility]  professionals. I have discussed the results of laboratory, radiology, and cardiology studies with the patient and their family/caregiver.

## 2024-11-14 ENCOUNTER — TELEPHONE (OUTPATIENT)
Dept: CARDIOLOGY | Facility: CLINIC | Age: 69
End: 2024-11-14
Payer: COMMERCIAL

## 2024-11-14 DIAGNOSIS — I50.43 CHF (CONGESTIVE HEART FAILURE), NYHA CLASS I, ACUTE ON CHRONIC, COMBINED: Primary | ICD-10-CM

## 2024-11-14 RX ORDER — VERICIGUAT 10 MG/1
10 TABLET, FILM COATED ORAL DAILY
Qty: 90 TABLET | Refills: 3 | Status: SHIPPED | OUTPATIENT
Start: 2024-11-14

## 2024-11-26 ENCOUNTER — APPOINTMENT (OUTPATIENT)
Dept: CARDIOLOGY | Facility: CLINIC | Age: 69
End: 2024-11-26
Payer: COMMERCIAL

## 2024-12-03 ENCOUNTER — APPOINTMENT (OUTPATIENT)
Dept: CARDIOLOGY | Facility: CLINIC | Age: 69
End: 2024-12-03
Payer: COMMERCIAL

## 2024-12-03 VITALS
DIASTOLIC BLOOD PRESSURE: 68 MMHG | WEIGHT: 257.9 LBS | TEMPERATURE: 97.7 F | SYSTOLIC BLOOD PRESSURE: 183 MMHG | HEART RATE: 99 BPM | BODY MASS INDEX: 41.45 KG/M2 | HEIGHT: 66 IN

## 2024-12-03 DIAGNOSIS — I48.0 PAROXYSMAL ATRIAL FIBRILLATION (MULTI): ICD-10-CM

## 2024-12-03 DIAGNOSIS — I10 PRIMARY HYPERTENSION: ICD-10-CM

## 2024-12-03 DIAGNOSIS — I50.43 CHF (CONGESTIVE HEART FAILURE), NYHA CLASS I, ACUTE ON CHRONIC, COMBINED: Primary | ICD-10-CM

## 2024-12-03 DIAGNOSIS — R06.09 DOE (DYSPNEA ON EXERTION): ICD-10-CM

## 2024-12-03 PROCEDURE — 3078F DIAST BP <80 MM HG: CPT | Performed by: NURSE PRACTITIONER

## 2024-12-03 PROCEDURE — 99214 OFFICE O/P EST MOD 30 MIN: CPT | Performed by: NURSE PRACTITIONER

## 2024-12-03 PROCEDURE — 3008F BODY MASS INDEX DOCD: CPT | Performed by: NURSE PRACTITIONER

## 2024-12-03 PROCEDURE — 3048F LDL-C <100 MG/DL: CPT | Performed by: NURSE PRACTITIONER

## 2024-12-03 PROCEDURE — 3044F HG A1C LEVEL LT 7.0%: CPT | Performed by: NURSE PRACTITIONER

## 2024-12-03 PROCEDURE — 1036F TOBACCO NON-USER: CPT | Performed by: NURSE PRACTITIONER

## 2024-12-03 PROCEDURE — 3077F SYST BP >= 140 MM HG: CPT | Performed by: NURSE PRACTITIONER

## 2024-12-03 PROCEDURE — 1159F MED LIST DOCD IN RCRD: CPT | Performed by: NURSE PRACTITIONER

## 2024-12-03 PROCEDURE — 1160F RVW MEDS BY RX/DR IN RCRD: CPT | Performed by: NURSE PRACTITIONER

## 2024-12-03 RX ORDER — PREDNISONE 10 MG/1
TABLET ORAL
COMMUNITY
Start: 2024-10-23

## 2024-12-03 RX ORDER — METOPROLOL SUCCINATE 100 MG/1
100 TABLET, EXTENDED RELEASE ORAL EVERY EVENING
Qty: 90 TABLET | Refills: 3 | Status: SHIPPED | OUTPATIENT
Start: 2024-12-03 | End: 2025-12-03

## 2024-12-03 NOTE — PROGRESS NOTES
Chief Complaint:   Hospital Follow Up     History Of Present Illness:    Rosa Soto is a 69 y.o. female here for hospital follow up. Started her on verquvo at last visit. She could not afford verquvo. She was broke until today.   Today is she very SOB. Missed dialysis Friday. Went to dialysis yesterday. Yesterday noted SOB. Thought it would get better after dialysis and it did not. Up 2lbs from last visit. SOB not as severe as what it was prior when she went to hospital.     Hospitalized 8/25/24 with acute on chronic diastolic heart failure 2/2 missed dialysis. C/O SOB which improved with dialysis. Documented she frequently misses Friday dialysis.  Rosa reports she suddenly became SOB. Denies LE edema or gradual onset of SOB. States she suddenly just became SOB. Put herself on supplemental 02, SOB improved.     Presented to ED, on 6/3, with nose bleed. Started post dialysis. Rhino Rocket placed. She returned to the ED the next day with another nose bleed. Bleeding resolved with cauterization. She was transferred from [facility] [place].     Patient underwent RFA on 2/29 with Dr. Valentine. She then presented to ED on 3/2/24 with acute diastolic heart failure AEB volume overload and subsequent respiratory failure 2/2 miss dialysis. While hospitalized she went into afib rvr. Started IV diltiazem. Converted to NSR and diltiazem was weaned off. Underwent LHC, on 3/6, which showed mild nonobstructive CAD. Patient has not followed up with us or EP since RFA.     Cath (Normal Coronaries) - 10/2/2008    Past Medical History:  She has a past medical history of Arrhythmia, Benign essential HTN, Cancer (Multi), Chronic kidney disease, and ESRD (end stage renal disease) (Multi).    She has no past medical history of CHF (congestive heart failure).    Past Surgical History:  She has a past surgical history that includes US guided abdominal paracentesis (8/31/2023); Cardiac catheterization (N/A, 3/6/2024); and  "Cardiac electrophysiology procedure (N/A, 2/29/2024).      Social History:  She reports that she quit smoking about 3 years ago. Her smoking use included cigarettes. She does not have any smokeless tobacco history on file. She reports that she does not currently use alcohol. She reports that she does not use drugs.    Family History:  No family history on file.  Allergies:  Penicillins, Codeine, Ropinirole, and Adhesive    Review of Systems  All pertinent systems have been reviewed and are negative except for what is stated in the history of present illness.    All other systems have been reviewed and are negative and noncontributory to this patient's current ailments.     Visit Vitals  BP (!) 183/68   Pulse 99   Temp 36.5 °C (97.7 °F)   Ht 1.676 m (5' 6\")   Wt 117 kg (257 lb 14.4 oz)   BMI 41.63 kg/m²   OB Status Postmenopausal   Smoking Status Former   BSA 2.33 m²       Last Labs:  CBC -  Lab Results   Component Value Date    WBC 10.5 08/27/2024    HGB 9.7 (L) 08/27/2024    HCT 30.4 (L) 08/27/2024     (H) 08/27/2024     08/27/2024       CMP -  Lab Results   Component Value Date    CALCIUM 8.7 08/27/2024    PHOS 5.4 (H) 06/06/2024    PROT 7.2 08/25/2024    ALBUMIN 3.8 08/25/2024    AST 16 08/25/2024    ALT 13 08/25/2024    ALKPHOS 120 08/25/2024    BILITOT 0.8 08/25/2024    BUN 43 (H) 08/27/2024    CREATININE 6.69 (H) 08/27/2024       LIPID PANEL -   Lab Results   Component Value Date    CHOL 101 03/03/2024    TRIG 158 (H) 03/03/2024    HDL 31.5 03/03/2024    CHHDL 3.2 03/03/2024    LDLF 58 04/23/2018    VLDL 32 03/03/2024    NHDL 70 03/03/2024       RENAL FUNCTION PANEL -   Lab Results   Component Value Date    GLUCOSE 151 (H) 08/27/2024     08/27/2024    K 3.8 08/27/2024    CL 97 (L) 08/27/2024    CO2 28 08/27/2024    ANIONGAP 17 08/27/2024    BUN 43 (H) 08/27/2024    CREATININE 6.69 (H) 08/27/2024    CALCIUM 8.7 08/27/2024    PHOS 5.4 (H) 06/06/2024    ALBUMIN 3.8 08/25/2024        Lab " Results   Component Value Date     (H) 08/25/2024    HGBA1C 6.7 (H) 08/25/2024         Objective   Vitals reviewed.   Constitutional:       Appearance: Healthy appearance. Not in distress.   Eyes:      Conjunctiva/sclera: Conjunctivae normal.   Neck:      Vascular: No JVR. JVD normal.   Pulmonary:      Effort: Pulmonary effort is normal.      Breath sounds: Normal breath sounds. No wheezing. No rhonchi. No rales.   Chest:      Chest wall: Not tender to palpatation.   Cardiovascular:      PMI at left midclavicular line. Normal rate. Regular rhythm. Normal S1. Normal S2.       Murmurs: There is no murmur.      No gallop.  No click. No rub.   Pulses:     Intact distal pulses.   Edema:     Peripheral edema absent.   Abdominal:      General: Bowel sounds are normal.      Palpations: Abdomen is soft.      Tenderness: There is no abdominal tenderness.   Musculoskeletal: Normal range of motion.         General: No tenderness. Skin:     General: Skin is warm and dry.   Neurological:      General: No focal deficit present.      Mental Status: Alert and oriented to person, place and time.   Psychiatric:         Attention and Perception: Attention normal.         Mood and Affect: Mood normal.       Assessment/Plan   Diagnoses and all orders for this visit:  Chronic diastolic heart failure (Multi)  - c/o SOB, thankfully, not as severe as when she went to hospital. I suspect she is retaining fluid. States she feels like she cannot take a full deep breath. She missed dialysis Friday.   - suspect needs fluid removal at dialysis tomorrow   - she did not have the $11 for verquvo 10mg, she does today. Will hopefully get. Consulted  pharmacy to see if they might be able to assist.   Paroxysmal atrial fibrillation (Multi)  - maintaining NSR post RFA  - we will continue Eliquis    SOB  - suspect fluid retention  - lungs, thankfully, are clear  - not in afib  - see above   Hypertension  - continue to monitor  - management per  nephrology   - monitor with dialysis, BP reportedly drops with dialysis, particularly if she takes metoprolol tartrate in am. Changed to succinate for her to take once a day in pm.     Follow up in  2 months     Current Outpatient Medications on File Prior to Visit   Medication Sig Dispense Refill    acetaminophen (Tylenol) 325 mg tablet Take 3 tablets (975 mg) by mouth every 8 hours if needed (breakthrough pain).      albuterol 90 mcg/actuation inhaler Inhale 1 puff every 4 hours if needed for shortness of breath. 18 g 11    amLODIPine (Norvasc) 5 mg tablet Take 1 tablet (5 mg) by mouth once daily.      apixaban (Eliquis) 5 mg tablet Take 1 tablet (5 mg) by mouth 2 times a day.      atorvastatin (Lipitor) 40 mg tablet Take 1 tablet (40 mg) by mouth once daily.      Auryxia 210 mg iron tablet TAKE 2 TABLETS BY MOUTH THREE TIMES A DAY WITH MEALS. SWALLOW WHOLE, DO NOT CHEW OR CRUSH MEDICATION      busPIRone (Buspar) 5 mg tablet Take 1 tablet (5 mg) by mouth once daily at bedtime.      cholecalciferol (Vitamin D-3) 25 MCG (1000 UT) tablet Take 1 tablet (25 mcg) by mouth once daily.      cinacalcet (Sensipar) 30 mg tablet Take 1 tablet (30 mg) by mouth once daily. Take with food or shortly afer a meal. Swallow tablet whole; do not break or divide.      doxepin (SINEquan) 100 mg capsule Take 1 capsule (100 mg) by mouth once daily at bedtime.      DULoxetine (Cymbalta) 30 mg DR capsule Take 1 capsule (30 mg) by mouth once daily.      escitalopram (Lexapro) 10 mg tablet Take 1 tablet (10 mg) by mouth once daily.      metoprolol tartrate (Lopressor) 100 mg tablet Take 1 tablet by mouth 2 times a day. 60 tablet 11    Nephro-Gloria 0.8 mg tablet Take 1 tablet by mouth once daily.      oxymetazoline (Afrin) 0.05 % nasal spray Administer 2 sprays into each nostril 2 times a day as needed (nose bleeds) for up to 8 doses. Do not use for more than 3 days. 30 mL 0    torsemide (Demadex) 20 mg tablet Take 1 tablet (20 mg) by mouth 2  times a day.      vericiguat (Verquvo) 10 mg tablet Take 10 mg by mouth once daily. 90 tablet 3    vericiguat (Verquvo) 2.5 mg tablet Take 2.5 mg by mouth once daily. Take for two weeks 14 tablet 0    vericiguat (Verquvo) 5 mg tablet Take 5 mg by mouth once daily. Start after 2 weeks of 2.5mg 14 tablet 0     No current facility-administered medications on file prior to visit.       Exclusive of any other services or procedures performed, I, Caroline DANIEL, spent 30 minutes in duration for this visit today.  This time consisted of chart review, obtaining history, and/or performing the exam as documented above, as well as, documenting the clinical information for the encounter in the electronic record, discussing treatment options, plans, and/or goals with patient, family, and/or caregiver, refilling medications, updating the electronic record, ordering medicines, lab work, imaging, referrals, and/or procedures as documented above and communicating with other [facility] professionals. I have discussed the results of laboratory, radiology, and cardiology studies with the patient and their family/caregiver.

## 2024-12-16 ENCOUNTER — APPOINTMENT (OUTPATIENT)
Dept: CARDIOLOGY | Facility: HOSPITAL | Age: 69
DRG: 813 | End: 2024-12-16
Payer: COMMERCIAL

## 2024-12-16 ENCOUNTER — APPOINTMENT (OUTPATIENT)
Dept: RADIOLOGY | Facility: HOSPITAL | Age: 69
DRG: 813 | End: 2024-12-16
Payer: COMMERCIAL

## 2024-12-16 ENCOUNTER — LAB REQUISITION (OUTPATIENT)
Dept: LAB | Facility: HOSPITAL | Age: 69
End: 2024-12-16
Payer: COMMERCIAL

## 2024-12-16 ENCOUNTER — HOSPITAL ENCOUNTER (INPATIENT)
Facility: HOSPITAL | Age: 69
Discharge: HOME | DRG: 813 | End: 2024-12-16
Attending: EMERGENCY MEDICINE | Admitting: INTERNAL MEDICINE
Payer: COMMERCIAL

## 2024-12-16 DIAGNOSIS — D64.9 ANEMIA, UNSPECIFIED TYPE: ICD-10-CM

## 2024-12-16 DIAGNOSIS — D50.9 IRON DEFICIENCY ANEMIA, UNSPECIFIED IRON DEFICIENCY ANEMIA TYPE: ICD-10-CM

## 2024-12-16 DIAGNOSIS — D62 ACUTE BLOOD LOSS ANEMIA: Primary | ICD-10-CM

## 2024-12-16 DIAGNOSIS — N18.6 ESRD ON DIALYSIS (MULTI): ICD-10-CM

## 2024-12-16 DIAGNOSIS — Z99.2 ESRD ON DIALYSIS (MULTI): ICD-10-CM

## 2024-12-16 DIAGNOSIS — R19.5 FECAL OCCULT BLOOD TEST POSITIVE: ICD-10-CM

## 2024-12-16 LAB
ABO GROUP (TYPE) IN BLOOD: NORMAL
ALBUMIN SERPL BCP-MCNC: 3.5 G/DL (ref 3.4–5)
ALP SERPL-CCNC: 96 U/L (ref 33–136)
ALT SERPL W P-5'-P-CCNC: 9 U/L (ref 7–45)
ANION GAP SERPL CALC-SCNC: 16 MMOL/L (ref 10–20)
ANTIBODY SCREEN: NORMAL
AST SERPL W P-5'-P-CCNC: 12 U/L (ref 9–39)
BASOPHILS # BLD AUTO: 0.04 X10*3/UL (ref 0–0.1)
BASOPHILS NFR BLD AUTO: 0.3 %
BILIRUB SERPL-MCNC: 0.7 MG/DL (ref 0–1.2)
BUN SERPL-MCNC: 42 MG/DL (ref 6–23)
CALCIUM SERPL-MCNC: 7.5 MG/DL (ref 8.6–10.3)
CARDIAC TROPONIN I PNL SERPL HS: 15 NG/L (ref 0–13)
CARDIAC TROPONIN I PNL SERPL HS: 16 NG/L (ref 0–13)
CHLORIDE SERPL-SCNC: 91 MMOL/L (ref 98–107)
CO2 SERPL-SCNC: 34 MMOL/L (ref 21–32)
CREAT SERPL-MCNC: 5.17 MG/DL (ref 0.5–1.05)
EGFRCR SERPLBLD CKD-EPI 2021: 9 ML/MIN/1.73M*2
EOSINOPHIL # BLD AUTO: 0.1 X10*3/UL (ref 0–0.7)
EOSINOPHIL NFR BLD AUTO: 0.8 %
ERYTHROCYTE [DISTWIDTH] IN BLOOD BY AUTOMATED COUNT: 16.4 % (ref 11.5–14.5)
GLUCOSE BLD MANUAL STRIP-MCNC: 120 MG/DL (ref 74–99)
GLUCOSE BLD MANUAL STRIP-MCNC: 135 MG/DL (ref 74–99)
GLUCOSE SERPL-MCNC: 155 MG/DL (ref 74–99)
HCT VFR BLD AUTO: 10.6 % (ref 36–46)
HCT VFR BLD AUTO: 16.9 % (ref 36–46)
HCT VFR BLD AUTO: 20.3 % (ref 36–46)
HEMOCCULT STL QL IA: POSITIVE
HGB BLD-MCNC: 3.5 G/DL (ref 12–16)
HGB BLD-MCNC: 5.4 G/DL (ref 12–16)
HGB BLD-MCNC: 6.3 G/DL (ref 12–16)
HOLD SPECIMEN: NORMAL
IMM GRANULOCYTES # BLD AUTO: 0.18 X10*3/UL (ref 0–0.7)
IMM GRANULOCYTES NFR BLD AUTO: 1.4 % (ref 0–0.9)
INR PPP: 1.7 (ref 0.9–1.1)
LACTATE SERPL-SCNC: 1.1 MMOL/L (ref 0.4–2)
LACTATE SERPL-SCNC: 2.1 MMOL/L (ref 0.4–2)
LIPASE SERPL-CCNC: 27 U/L (ref 9–82)
LYMPHOCYTES # BLD AUTO: 1.77 X10*3/UL (ref 1.2–4.8)
LYMPHOCYTES NFR BLD AUTO: 13.3 %
MAGNESIUM SERPL-MCNC: 1.87 MG/DL (ref 1.6–2.4)
MCH RBC QN AUTO: 35.4 PG (ref 26–34)
MCHC RBC AUTO-ENTMCNC: 33 G/DL (ref 32–36)
MCV RBC AUTO: 107 FL (ref 80–100)
MONOCYTES # BLD AUTO: 1.18 X10*3/UL (ref 0.1–1)
MONOCYTES NFR BLD AUTO: 8.9 %
NEUTROPHILS # BLD AUTO: 10.03 X10*3/UL (ref 1.2–7.7)
NEUTROPHILS NFR BLD AUTO: 75.3 %
NRBC BLD-RTO: 0 /100 WBCS (ref 0–0)
PLATELET # BLD AUTO: 315 X10*3/UL (ref 150–450)
POTASSIUM SERPL-SCNC: 3 MMOL/L (ref 3.5–5.3)
PROT SERPL-MCNC: 6.4 G/DL (ref 6.4–8.2)
PROTHROMBIN TIME: 19 SECONDS (ref 9.8–12.8)
RBC # BLD AUTO: 0.99 X10*6/UL (ref 4–5.2)
RH FACTOR (ANTIGEN D): NORMAL
SODIUM SERPL-SCNC: 138 MMOL/L (ref 136–145)
WBC # BLD AUTO: 13.3 X10*3/UL (ref 4.4–11.3)

## 2024-12-16 PROCEDURE — 86923 COMPATIBILITY TEST ELECTRIC: CPT

## 2024-12-16 PROCEDURE — 36430 TRANSFUSION BLD/BLD COMPNT: CPT

## 2024-12-16 PROCEDURE — 84484 ASSAY OF TROPONIN QUANT: CPT | Performed by: EMERGENCY MEDICINE

## 2024-12-16 PROCEDURE — 2500000001 HC RX 250 WO HCPCS SELF ADMINISTERED DRUGS (ALT 637 FOR MEDICARE OP)

## 2024-12-16 PROCEDURE — 2500000002 HC RX 250 W HCPCS SELF ADMINISTERED DRUGS (ALT 637 FOR MEDICARE OP, ALT 636 FOR OP/ED)

## 2024-12-16 PROCEDURE — 74176 CT ABD & PELVIS W/O CONTRAST: CPT | Performed by: RADIOLOGY

## 2024-12-16 PROCEDURE — 71045 X-RAY EXAM CHEST 1 VIEW: CPT

## 2024-12-16 PROCEDURE — 74176 CT ABD & PELVIS W/O CONTRAST: CPT

## 2024-12-16 PROCEDURE — 2500000004 HC RX 250 GENERAL PHARMACY W/ HCPCS (ALT 636 FOR OP/ED): Performed by: EMERGENCY MEDICINE

## 2024-12-16 PROCEDURE — 96374 THER/PROPH/DIAG INJ IV PUSH: CPT

## 2024-12-16 PROCEDURE — 85610 PROTHROMBIN TIME: CPT | Performed by: EMERGENCY MEDICINE

## 2024-12-16 PROCEDURE — 85025 COMPLETE CBC W/AUTO DIFF WBC: CPT | Performed by: EMERGENCY MEDICINE

## 2024-12-16 PROCEDURE — 80053 COMPREHEN METABOLIC PANEL: CPT | Performed by: EMERGENCY MEDICINE

## 2024-12-16 PROCEDURE — 83690 ASSAY OF LIPASE: CPT | Performed by: EMERGENCY MEDICINE

## 2024-12-16 PROCEDURE — 2500000004 HC RX 250 GENERAL PHARMACY W/ HCPCS (ALT 636 FOR OP/ED)

## 2024-12-16 PROCEDURE — 82947 ASSAY GLUCOSE BLOOD QUANT: CPT

## 2024-12-16 PROCEDURE — 96376 TX/PRO/DX INJ SAME DRUG ADON: CPT

## 2024-12-16 PROCEDURE — 2500000002 HC RX 250 W HCPCS SELF ADMINISTERED DRUGS (ALT 637 FOR MEDICARE OP, ALT 636 FOR OP/ED): Performed by: EMERGENCY MEDICINE

## 2024-12-16 PROCEDURE — 99291 CRITICAL CARE FIRST HOUR: CPT | Mod: 25 | Performed by: EMERGENCY MEDICINE

## 2024-12-16 PROCEDURE — 83735 ASSAY OF MAGNESIUM: CPT | Performed by: EMERGENCY MEDICINE

## 2024-12-16 PROCEDURE — 93005 ELECTROCARDIOGRAM TRACING: CPT

## 2024-12-16 PROCEDURE — 71045 X-RAY EXAM CHEST 1 VIEW: CPT | Mod: FOREIGN READ | Performed by: RADIOLOGY

## 2024-12-16 PROCEDURE — 99223 1ST HOSP IP/OBS HIGH 75: CPT

## 2024-12-16 PROCEDURE — 85014 HEMATOCRIT: CPT

## 2024-12-16 PROCEDURE — P9016 RBC LEUKOCYTES REDUCED: HCPCS

## 2024-12-16 PROCEDURE — 82274 ASSAY TEST FOR BLOOD FECAL: CPT | Performed by: EMERGENCY MEDICINE

## 2024-12-16 PROCEDURE — 86901 BLOOD TYPING SEROLOGIC RH(D): CPT | Performed by: EMERGENCY MEDICINE

## 2024-12-16 PROCEDURE — 36415 COLL VENOUS BLD VENIPUNCTURE: CPT | Performed by: EMERGENCY MEDICINE

## 2024-12-16 PROCEDURE — 85018 HEMOGLOBIN: CPT

## 2024-12-16 PROCEDURE — 83605 ASSAY OF LACTIC ACID: CPT | Performed by: EMERGENCY MEDICINE

## 2024-12-16 RX ORDER — DEXTROSE 50 % IN WATER (D50W) INTRAVENOUS SYRINGE
25
Status: DISCONTINUED | OUTPATIENT
Start: 2024-12-16 | End: 2024-12-23 | Stop reason: HOSPADM

## 2024-12-16 RX ORDER — POTASSIUM CHLORIDE 750 MG/1
40 TABLET, FILM COATED, EXTENDED RELEASE ORAL ONCE
Status: COMPLETED | OUTPATIENT
Start: 2024-12-16 | End: 2024-12-16

## 2024-12-16 RX ORDER — METOPROLOL SUCCINATE 50 MG/1
100 TABLET, EXTENDED RELEASE ORAL EVERY EVENING
Status: DISCONTINUED | OUTPATIENT
Start: 2024-12-16 | End: 2024-12-23 | Stop reason: HOSPADM

## 2024-12-16 RX ORDER — DEXTROSE 50 % IN WATER (D50W) INTRAVENOUS SYRINGE
12.5
Status: DISCONTINUED | OUTPATIENT
Start: 2024-12-16 | End: 2024-12-23 | Stop reason: HOSPADM

## 2024-12-16 RX ORDER — PANTOPRAZOLE SODIUM 40 MG/10ML
40 INJECTION, POWDER, LYOPHILIZED, FOR SOLUTION INTRAVENOUS DAILY
Status: DISCONTINUED | OUTPATIENT
Start: 2024-12-16 | End: 2024-12-16

## 2024-12-16 RX ORDER — ATORVASTATIN CALCIUM 40 MG/1
40 TABLET, FILM COATED ORAL DAILY
Status: DISCONTINUED | OUTPATIENT
Start: 2024-12-16 | End: 2024-12-23 | Stop reason: HOSPADM

## 2024-12-16 RX ORDER — ACETAMINOPHEN 325 MG/1
650 TABLET ORAL EVERY 4 HOURS PRN
Status: DISCONTINUED | OUTPATIENT
Start: 2024-12-16 | End: 2024-12-23 | Stop reason: HOSPADM

## 2024-12-16 RX ORDER — ESCITALOPRAM OXALATE 10 MG/1
10 TABLET ORAL DAILY
Status: DISCONTINUED | OUTPATIENT
Start: 2024-12-16 | End: 2024-12-23 | Stop reason: HOSPADM

## 2024-12-16 RX ORDER — TALC
3 POWDER (GRAM) TOPICAL NIGHTLY PRN
Status: DISCONTINUED | OUTPATIENT
Start: 2024-12-16 | End: 2024-12-23 | Stop reason: HOSPADM

## 2024-12-16 RX ORDER — HYDROXYZINE HYDROCHLORIDE 10 MG/1
10 TABLET, FILM COATED ORAL ONCE
Status: COMPLETED | OUTPATIENT
Start: 2024-12-16 | End: 2024-12-16

## 2024-12-16 RX ORDER — ALBUTEROL SULFATE 90 UG/1
1 INHALANT RESPIRATORY (INHALATION) EVERY 4 HOURS PRN
Status: DISCONTINUED | OUTPATIENT
Start: 2024-12-16 | End: 2024-12-23 | Stop reason: HOSPADM

## 2024-12-16 RX ORDER — BUSPIRONE HYDROCHLORIDE 5 MG/1
5 TABLET ORAL NIGHTLY
Status: DISCONTINUED | OUTPATIENT
Start: 2024-12-16 | End: 2024-12-23 | Stop reason: HOSPADM

## 2024-12-16 RX ORDER — DOXEPIN HYDROCHLORIDE 25 MG/1
100 CAPSULE ORAL NIGHTLY
Status: DISCONTINUED | OUTPATIENT
Start: 2024-12-16 | End: 2024-12-23 | Stop reason: HOSPADM

## 2024-12-16 RX ORDER — AMLODIPINE BESYLATE 5 MG/1
5 TABLET ORAL DAILY
Status: DISCONTINUED | OUTPATIENT
Start: 2024-12-16 | End: 2024-12-23 | Stop reason: HOSPADM

## 2024-12-16 RX ORDER — ONDANSETRON HYDROCHLORIDE 2 MG/ML
4 INJECTION, SOLUTION INTRAVENOUS EVERY 6 HOURS PRN
Status: DISCONTINUED | OUTPATIENT
Start: 2024-12-16 | End: 2024-12-23 | Stop reason: HOSPADM

## 2024-12-16 RX ORDER — PANTOPRAZOLE SODIUM 40 MG/10ML
40 INJECTION, POWDER, LYOPHILIZED, FOR SOLUTION INTRAVENOUS 2 TIMES DAILY
Status: DISCONTINUED | OUTPATIENT
Start: 2024-12-16 | End: 2024-12-17

## 2024-12-16 RX ORDER — INSULIN LISPRO 100 [IU]/ML
0-5 INJECTION, SOLUTION INTRAVENOUS; SUBCUTANEOUS EVERY 4 HOURS
Status: DISCONTINUED | OUTPATIENT
Start: 2024-12-16 | End: 2024-12-23 | Stop reason: HOSPADM

## 2024-12-16 ASSESSMENT — ENCOUNTER SYMPTOMS
VOMITING: 0
WHEEZING: 0
NAUSEA: 0
CHILLS: 0
COUGH: 0
JOINT SWELLING: 0
CONSTIPATION: 0
TREMORS: 0
HEADACHES: 0
FEVER: 0
DIARRHEA: 0
BACK PAIN: 0
FLANK PAIN: 0
HEMATURIA: 0
CHEST TIGHTNESS: 0
ABDOMINAL PAIN: 0
SHORTNESS OF BREATH: 1
WOUND: 0
PALPITATIONS: 0
LIGHT-HEADEDNESS: 1
FATIGUE: 0
WEAKNESS: 0

## 2024-12-16 ASSESSMENT — PAIN SCALES - GENERAL
PAINLEVEL_OUTOF10: 3
PAINLEVEL_OUTOF10: 2
PAINLEVEL_OUTOF10: 7
PAINLEVEL_OUTOF10: 0 - NO PAIN

## 2024-12-16 ASSESSMENT — PAIN DESCRIPTION - LOCATION: LOCATION: ABDOMEN

## 2024-12-16 ASSESSMENT — PAIN DESCRIPTION - PAIN TYPE: TYPE: ACUTE PAIN

## 2024-12-16 ASSESSMENT — LIFESTYLE VARIABLES
EVER HAD A DRINK FIRST THING IN THE MORNING TO STEADY YOUR NERVES TO GET RID OF A HANGOVER: NO
TOTAL SCORE: 0
EVER FELT BAD OR GUILTY ABOUT YOUR DRINKING: NO
HAVE YOU EVER FELT YOU SHOULD CUT DOWN ON YOUR DRINKING: NO
HAVE PEOPLE ANNOYED YOU BY CRITICIZING YOUR DRINKING: NO

## 2024-12-16 ASSESSMENT — PAIN - FUNCTIONAL ASSESSMENT: PAIN_FUNCTIONAL_ASSESSMENT: 0-10

## 2024-12-16 ASSESSMENT — PAIN DESCRIPTION - ORIENTATION: ORIENTATION: RIGHT

## 2024-12-16 NOTE — Clinical Note
ED  Recommendation:  ED- Rec. (12/16/24 4734 : Irais Valdez RN)   Patient c/o nose bleeding x 3 days, bleeding worse yesterday from right nares. No active bleeding present at this time.

## 2024-12-16 NOTE — ED NOTES
Pt arrives to ED via ambulance from dialysis center for shortness of breath and anemia.    Code Status:  Full Code    HPI     Chief Complaint   Patient presents with    Shortness of Breath     C/O shortness of breath x 1 week       /58 (BP Location: Left arm, Patient Position: Sitting)   Pulse 84   Temp 36.9 °C (98.4 °F) (Tympanic)   Resp 20   Wt 116 kg (255 lb 15.3 oz)   SpO2 98%     Rima Coma Scale Score: 15      LDA:   Peripheral IV 12/16/24 20 G Left;Anterior Forearm (Active)   Placement Date/Time: 12/16/24 1541   Hand Hygiene Completed: Yes  Size (Gauge): 20 G  Orientation: Left;Anterior  Location: Forearm  Site Prep: Chlorhexidine   Technique: Ultrasound guidance   Number of days: 0       External Urinary Catheter Female (Active)   Placement Date/Time: 12/16/24 1542   Placed by: Isela  Hand Hygiene Completed: Yes  External Catheter Type: Female   Number of days: 0        BACKGROUND  Past Medical History:   Diagnosis Date    Arrhythmia     Benign essential HTN     Cancer (Multi)     Chronic kidney disease     ESRD (end stage renal disease) (Multi)      Past Surgical History:   Procedure Laterality Date    CARDIAC CATHETERIZATION N/A 3/6/2024    Procedure: Left Heart Cath;  Surgeon: Carol Johnson MD;  Location: [facility] Cardiac Cath Lab;  Service: Cardiovascular;  Laterality: N/A;    CARDIAC ELECTROPHYSIOLOGY PROCEDURE N/A 2/29/2024    Procedure: Ablation A-Fib Persistant;  Surgeon: Adam Valentine MD;  Location: [place] [address] Cardiac Cath Lab;  Service: Electrophysiology;  Laterality: N/A;  CARTO/ANY EP LAB  GENERAL ANESTHESIA WHOLE CASE    US GUIDED ABDOMINAL PARACENTESIS  8/31/2023    US GUIDED ABDOMINAL PARACENTESIS 8/31/2023 POR US     No current facility-administered medications on file prior to encounter.     Current Outpatient Medications on File Prior to Encounter   Medication Sig Dispense Refill    acetaminophen (Tylenol) 325 mg tablet Take 3 tablets (975 mg) by mouth every 8 hours if  needed (breakthrough pain).      albuterol 90 mcg/actuation inhaler Inhale 1 puff every 4 hours if needed for shortness of breath. 18 g 11    amLODIPine (Norvasc) 5 mg tablet Take 1 tablet (5 mg) by mouth once daily.      apixaban (Eliquis) 5 mg tablet Take 1 tablet (5 mg) by mouth 2 times a day.      atorvastatin (Lipitor) 40 mg tablet Take 1 tablet (40 mg) by mouth once daily.      Auryxia 210 mg iron tablet TAKE 2 TABLETS BY MOUTH THREE TIMES A DAY WITH MEALS. SWALLOW WHOLE, DO NOT CHEW OR CRUSH MEDICATION      busPIRone (Buspar) 5 mg tablet Take 1 tablet (5 mg) by mouth once daily at bedtime.      cholecalciferol (Vitamin D-3) 25 MCG (1000 UT) tablet Take 1 tablet (25 mcg) by mouth once daily.      cinacalcet (Sensipar) 30 mg tablet Take 1 tablet (30 mg) by mouth once daily. Take with food or shortly afer a meal. Swallow tablet whole; do not break or divide.      doxepin (SINEquan) 100 mg capsule Take 1 capsule (100 mg) by mouth once daily at bedtime.      escitalopram (Lexapro) 10 mg tablet Take 1 tablet (10 mg) by mouth once daily.      methoxy peg-epoetin beta (MIRCERA INJ) every 14 (fourteen) days.      methoxy peg-epoetin beta (MIRCERA INJ) 100 mcg every 14 (fourteen) days.      metoprolol succinate XL (Toprol-XL) 100 mg 24 hr tablet Take 1 tablet (100 mg) by mouth once daily in the evening. Do not crush or chew. 90 tablet 3    Nephro-Gloria 0.8 mg tablet Take 1 tablet by mouth once daily.      oxymetazoline (Afrin) 0.05 % nasal spray Administer 2 sprays into each nostril 2 times a day as needed (nose bleeds) for up to 8 doses. Do not use for more than 3 days. 30 mL 0    predniSONE (Deltasone) 10 mg tablet       torsemide (Demadex) 20 mg tablet Take 1 tablet (20 mg) by mouth 2 times a day.      vericiguat (Verquvo) 10 mg tablet Take 10 mg by mouth once daily. (Patient not taking: Reported on 12/3/2024) 90 tablet 3    vericiguat (Verquvo) 2.5 mg tablet Take 2.5 mg by mouth once daily. Take for two weeks  (Patient not taking: Reported on 12/3/2024) 14 tablet 0    vericiguat (Verquvo) 5 mg tablet Take 5 mg by mouth once daily. Start after 2 weeks of 2.5mg (Patient not taking: Reported on 12/3/2024) 14 tablet 0        ASSESSMENT  ED Course as of 12/16/24 1830   Mon Dec 16, 2024   1649 White blood cell count 13.3  Hemoglobin 3.5, hematocrit 10.6, platelet count 315,000 [EC]   1649 Comprehensive metabolic panel(!)  Glucose 155, sodium 138, potassium 3.0 will give oral replacement.  Chloride 91, bicarb 34, anion gap 16    BUN 42, creatinine 5.17.  GFR 9.  Patient has on hemodialysis for chronic end-stage renal failure. [EC]   1650 Lactate 2.1 [EC]   1650 Pro time 19, INR 1.7 [EC]   1650 Patient is on Eliquis. [EC]   1828 I did explain risk versus benefits of blood transfusion the patient was agreeable.  Consent was signed earlier.    I discussed presentation with the hospitalist certified nurse practitioner.  The patient accepted for admission by Dr.B Ferrara.    Patient has received IV Protonix.  Vitals are grossly stable.  Will admit to Telemetry. [EC]      ED Course User Index  [EC] Bryant Michaels,          Diagnoses as of 12/16/24 1830   Acute blood loss anemia   Fecal occult blood test positive   ESRD on dialysis (Multi)       Medications Currently Running:        Medications Given:  ED Medication Administration from 12/16/2024 1441 to 12/16/2024 1830         Date/Time Order Dose Route Action Action by     12/16/2024 1601 EST pantoprazole (ProtoNix) injection 40 mg 40 mg intravenous Given MIGUELITO Galloway     12/16/2024 1714 EST potassium chloride CR (Klor-Con) ER tablet 40 mEq 40 mEq oral Given MIGUELITO Galloway     12/16/2024 1819 EST apixaban (Eliquis) tablet 5 mg -- oral Held by provider GINA Ojeda     12/16/2024 1825 EST amLODIPine (Norvasc) tablet 5 mg 5 mg oral Given MIGUELITO Galloway     12/16/2024 1825 EST atorvastatin (Lipitor) tablet 40 mg 40 mg oral Given MIGUELITO Galloway     12/16/2024 1825 EST escitalopram (Lexapro) tablet  10 mg 10 mg oral Given MIGUELITO Galloway     12/16/2024 2100 EST apixaban (Eliquis) tablet 5 mg -- oral Dose Auto Held (none)     12/17/2024 0900 EST apixaban (Eliquis) tablet 5 mg -- oral Dose Auto Held (none)     12/17/2024 2100 EST apixaban (Eliquis) tablet 5 mg -- oral Dose Auto Held (none)     12/18/2024 0900 EST apixaban (Eliquis) tablet 5 mg -- oral Dose Auto Held (none)     12/18/2024 2100 EST apixaban (Eliquis) tablet 5 mg -- oral Dose Auto Held (none)     12/19/2024 0900 EST apixaban (Eliquis) tablet 5 mg -- oral Dose Auto Held (none)     12/19/2024 2100 EST apixaban (Eliquis) tablet 5 mg -- oral Dose Auto Held (none)     12/20/2024 0900 EST apixaban (Eliquis) tablet 5 mg -- oral Dose Auto Held (none)     12/20/2024 2100 EST apixaban (Eliquis) tablet 5 mg -- oral Dose Auto Held (none)                 RESULTS    Imaging:  XR chest 1 view   Final Result   Borderline cardiomegaly with prominent central pulmonary vascularity.    This is improved from the prior chest x-ray and there is no overt   interstitial or alveolar edema.  The lungs are clear..   Signed by Collin Ruiz MD      CT abdomen pelvis wo IV contrast    (Results Pending)      }  Labs ::99  Abnormal Labs Reviewed   FECAL OCCULT BLOOD IMMUNOASSAY - Abnormal; Notable for the following components:       Result Value    Fecal Occult Blood Immunoassay Positive (*)     All other components within normal limits   CBC WITH AUTO DIFFERENTIAL - Abnormal; Notable for the following components:    WBC 13.3 (*)     RBC 0.99 (*)     Hemoglobin 3.5 (*)     Hematocrit 10.6 (*)      (*)     MCH 35.4 (*)     RDW 16.4 (*)     Immature Granulocytes %, Automated 1.4 (*)     Neutrophils Absolute 10.03 (*)     Monocytes Absolute 1.18 (*)     All other components within normal limits   COMPREHENSIVE METABOLIC PANEL - Abnormal; Notable for the following components:    Glucose 155 (*)     Potassium 3.0 (*)     Chloride 91 (*)     Bicarbonate 34 (*)     Urea Nitrogen 42  (*)     Creatinine 5.17 (*)     eGFR 9 (*)     Calcium 7.5 (*)     All other components within normal limits   LACTATE - Abnormal; Notable for the following components:    Lactate 2.1 (*)     All other components within normal limits    Narrative:     Venipuncture immediately after or during the administration of Metamizole may lead to falsely low results. Testing should be performed immediately prior to Metamizole dosing.   PROTIME-INR - Abnormal; Notable for the following components:    Protime 19.0 (*)     INR 1.7 (*)     All other components within normal limits   SERIAL TROPONIN-INITIAL - Abnormal; Notable for the following components:    Troponin I, High Sensitivity 16 (*)     All other components within normal limits    Narrative:     Less than 99th percentile of normal range cutoff-                  Female and children under 18 years old <14 ng/L; Male <21 ng/L: Negative                  Repeat testing should be performed if clinically indicated.                                     Female and children under 18 years old 14-50 ng/L; Male 21-50 ng/L:                  Consistent with possible cardiac damage and possible increased clinical                   risk. Serial measurements may help to assess extent of myocardial damage.                                     >50 ng/L: Consistent with cardiac damage, increased clinical risk and                  myocardial infarction. Serial measurements may help assess extent of                   myocardial damage.                                      NOTE: Children less than 1 year old may have higher baseline troponin                   levels and results should be interpreted in conjunction with the overall                   clinical context.                                     NOTE: Troponin I testing is performed using a different                   testing methodology at [facility] than at other                   system hospitals. Direct result  comparisons should only                   be made within the same method.   SERIAL TROPONIN, 1 HOUR - Abnormal; Notable for the following components:    Troponin I, High Sensitivity 15 (*)     All other components within normal limits    Narrative:     Less than 99th percentile of normal range cutoff-                  Female and children under 18 years old <14 ng/L; Male <21 ng/L: Negative                  Repeat testing should be performed if clinically indicated.                                     Female and children under 18 years old 14-50 ng/L; Male 21-50 ng/L:                  Consistent with possible cardiac damage and possible increased clinical                   risk. Serial measurements may help to assess extent of myocardial damage.                                     >50 ng/L: Consistent with cardiac damage, increased clinical risk and                  myocardial infarction. Serial measurements may help assess extent of                   myocardial damage.                                      NOTE: Children less than 1 year old may have higher baseline troponin                   levels and results should be interpreted in conjunction with the overall                   clinical context.                                     NOTE: Troponin I testing is performed using a different                   testing methodology at [facility] than at other                   [facility]. Direct result comparisons should only                   be made within the same method.                 Monae Galloway RN  12/16/24 0551

## 2024-12-16 NOTE — ED PROVIDER NOTES
Department of Emergency Medicine   ED  Provider Note  Admit Date/RoomTime: 12/16/2024  2:41 PM  ED Room: 06/06                  History of Present Illness:   Rosa Soto is a 69 y.o. female presenting to the ED for low hemoglobin, generalized weakness, worse after dialysis today, weakness beginning last several days.  The complaint has been persistent, moderate in severity, and worsened by  standing/exertion .  Patient had dialysis today full-time full amount and was very weak and possible low hemoglobin.  She complains of shortness of breath worse with exertion.  Arrives via EMS from dialysis.  Patient has chronically dark stools that is been no change in that.  No obvious blood.  She denies any associated chest pain.  She complains of generalized weakness and fatigue and shortness of breath.  Normally she is able to ambulate but today her son had to use a wheelchair to get from the car into dialysis.      Review of Systems:   Pertinent positives and review of systems as noted above.  Remaining 10 review of systems is negative or noncontributory to today's episode of care.  Review of Systems   A complete review of systems is otherwise negative except as noted above    --------------------------------------------- PAST HISTORY ---------------------------------------------  Past Medical History:  has a past medical history of Arrhythmia, Benign essential HTN, Cancer (Multi), Chronic kidney disease, and ESRD (end stage renal disease) (Multi).    She has no past medical history of CHF (congestive heart failure).    Past Surgical History:  has a past surgical history that includes US guided abdominal paracentesis (8/31/2023); Cardiac catheterization (N/A, 3/6/2024); and Cardiac electrophysiology procedure (N/A, 2/29/2024).    Social History:  reports that she quit smoking about 3 years ago. Her smoking use included cigarettes. She has never used smokeless tobacco. She reports that she does not currently use  alcohol. She reports that she does not use drugs.    Family History: family history is not on file. Unless otherwise noted, family history is non contributory    Patient's Medications   New Prescriptions    No medications on file   Previous Medications    ACETAMINOPHEN (TYLENOL) 325 MG TABLET    Take 3 tablets (975 mg) by mouth every 8 hours if needed (breakthrough pain).    ALBUTEROL 90 MCG/ACTUATION INHALER    Inhale 1 puff every 4 hours if needed for shortness of breath.    AMLODIPINE (NORVASC) 5 MG TABLET    Take 1 tablet (5 mg) by mouth once daily.    APIXABAN (ELIQUIS) 5 MG TABLET    Take 1 tablet (5 mg) by mouth 2 times a day.    ATORVASTATIN (LIPITOR) 40 MG TABLET    Take 1 tablet (40 mg) by mouth once daily.    AURYXIA 210 MG IRON TABLET    TAKE 2 TABLETS BY MOUTH THREE TIMES A DAY WITH MEALS. SWALLOW WHOLE, DO NOT CHEW OR CRUSH MEDICATION    BUSPIRONE (BUSPAR) 5 MG TABLET    Take 1 tablet (5 mg) by mouth once daily at bedtime.    CHOLECALCIFEROL (VITAMIN D-3) 25 MCG (1000 UT) TABLET    Take 1 tablet (25 mcg) by mouth once daily.    CINACALCET (SENSIPAR) 30 MG TABLET    Take 1 tablet (30 mg) by mouth once daily. Take with food or shortly afer a meal. Swallow tablet whole; do not break or divide.    DOXEPIN (SINEQUAN) 100 MG CAPSULE    Take 1 capsule (100 mg) by mouth once daily at bedtime.    ESCITALOPRAM (LEXAPRO) 10 MG TABLET    Take 1 tablet (10 mg) by mouth once daily.    METHOXY PEG-EPOETIN BETA (MIRCERA INJ)    every 14 (fourteen) days.    METHOXY PEG-EPOETIN BETA (MIRCERA INJ)    100 mcg every 14 (fourteen) days.    METOPROLOL SUCCINATE XL (TOPROL-XL) 100 MG 24 HR TABLET    Take 1 tablet (100 mg) by mouth once daily in the evening. Do not crush or chew.    NEPHRO-BRITTANY 0.8 MG TABLET    Take 1 tablet by mouth once daily.    OXYMETAZOLINE (AFRIN) 0.05 % NASAL SPRAY    Administer 2 sprays into each nostril 2 times a day as needed (nose bleeds) for up to 8 doses. Do not use for more than 3 days.     PREDNISONE (DELTASONE) 10 MG TABLET        TORSEMIDE (DEMADEX) 20 MG TABLET    Take 1 tablet (20 mg) by mouth 2 times a day.    VERICIGUAT (VERQUVO) 10 MG TABLET    Take 10 mg by mouth once daily.    VERICIGUAT (VERQUVO) 2.5 MG TABLET    Take 2.5 mg by mouth once daily. Take for two weeks    VERICIGUAT (VERQUVO) 5 MG TABLET    Take 5 mg by mouth once daily. Start after 2 weeks of 2.5mg   Modified Medications    No medications on file   Discontinued Medications    No medications on file      The patient’s home medications have been reviewed.    Allergies: Codeine, Ropinirole, and Adhesive    -------------------------------------------------- RESULTS -------------------------------------------------  All laboratory and radiology results have been personally reviewed by myself   LABS:  Labs Reviewed   FECAL OCCULT BLOOD IMMUNOASSAY - Abnormal       Result Value    Fecal Occult Blood Immunoassay Positive (*)    CBC WITH AUTO DIFFERENTIAL - Abnormal    WBC 13.3 (*)     nRBC 0.0      RBC 0.99 (*)     Hemoglobin 3.5 (*)     Hematocrit 10.6 (*)      (*)     MCH 35.4 (*)     MCHC 33.0      RDW 16.4 (*)     Platelets 315      Neutrophils % 75.3      Immature Granulocytes %, Automated 1.4 (*)     Lymphocytes % 13.3      Monocytes % 8.9      Eosinophils % 0.8      Basophils % 0.3      Neutrophils Absolute 10.03 (*)     Immature Granulocytes Absolute, Automated 0.18      Lymphocytes Absolute 1.77      Monocytes Absolute 1.18 (*)     Eosinophils Absolute 0.10      Basophils Absolute 0.04     COMPREHENSIVE METABOLIC PANEL - Abnormal    Glucose 155 (*)     Sodium 138      Potassium 3.0 (*)     Chloride 91 (*)     Bicarbonate 34 (*)     Anion Gap 16      Urea Nitrogen 42 (*)     Creatinine 5.17 (*)     eGFR 9 (*)     Calcium 7.5 (*)     Albumin 3.5      Alkaline Phosphatase 96      Total Protein 6.4      AST 12      Bilirubin, Total 0.7      ALT 9     LACTATE - Abnormal    Lactate 2.1 (*)     Narrative:     Venipuncture  immediately after or during the administration of Metamizole may lead to falsely low results. Testing should be performed immediately prior to Metamizole dosing.   PROTIME-INR - Abnormal    Protime 19.0 (*)     INR 1.7 (*)    SERIAL TROPONIN-INITIAL - Abnormal    Troponin I, High Sensitivity 16 (*)     Narrative:     Less than 99th percentile of normal range cutoff-  Female and children under 18 years old <14 ng/L; Male <21 ng/L: Negative  Repeat testing should be performed if clinically indicated.     Female and children under 18 years old 14-50 ng/L; Male 21-50 ng/L:  Consistent with possible cardiac damage and possible increased clinical   risk. Serial measurements may help to assess extent of myocardial damage.     >50 ng/L: Consistent with cardiac damage, increased clinical risk and  myocardial infarction. Serial measurements may help assess extent of   myocardial damage.      NOTE: Children less than 1 year old may have higher baseline troponin   levels and results should be interpreted in conjunction with the overall   clinical context.     NOTE: Troponin I testing is performed using a different   testing methodology at [facility] than at other   [facility]. Direct result comparisons should only   be made within the same method.   SERIAL TROPONIN, 1 HOUR - Abnormal    Troponin I, High Sensitivity 15 (*)     Narrative:     Less than 99th percentile of normal range cutoff-  Female and children under 18 years old <14 ng/L; Male <21 ng/L: Negative  Repeat testing should be performed if clinically indicated.     Female and children under 18 years old 14-50 ng/L; Male 21-50 ng/L:  Consistent with possible cardiac damage and possible increased clinical   risk. Serial measurements may help to assess extent of myocardial damage.     >50 ng/L: Consistent with cardiac damage, increased clinical risk and  myocardial infarction. Serial measurements may help assess extent of   myocardial damage.       NOTE: Children less than 1 year old may have higher baseline troponin   levels and results should be interpreted in conjunction with the overall   clinical context.     NOTE: Troponin I testing is performed using a different   testing methodology at [facility] than at other   [facility]. Direct result comparisons should only   be made within the same method.   MAGNESIUM - Normal    Magnesium 1.87     LIPASE - Normal    Lipase 27      Narrative:     Venipuncture immediately after or during the administration of Metamizole may lead to falsely low results. Testing should be performed immediately prior to Metamizole dosing.   LACTATE - Normal    Lactate 1.1      Narrative:     Venipuncture immediately after or during the administration of Metamizole may lead to falsely low results. Testing should be performed immediately prior to Metamizole dosing.   TROPONIN SERIES- (INITIAL, 1 HR)    Narrative:     The following orders were created for panel order Troponin I Series, High Sensitivity (0, 1 HR).  Procedure                               Abnormality         Status                     ---------                               -----------         ------                     Troponin I, High Sensiti...[[id number]]  Abnormal            Final result               Troponin, High Sensitivi...[[id number]]  Abnormal            Final result                 Please view results for these tests on the individual orders.   TYPE AND SCREEN    ABO TYPE A      Rh TYPE POS      ANTIBODY SCREEN NEG     URINALYSIS WITH REFLEX CULTURE AND MICROSCOPIC    Narrative:     The following orders were created for panel order Urinalysis with Reflex Culture and Microscopic.  Procedure                               Abnormality         Status                     ---------                               -----------         ------                     Urinalysis with Reflex C...[[id number]]                                                  Extra Urine Gray Tube[[id number]]                                                         Please view results for these tests on the individual orders.   URINALYSIS WITH REFLEX CULTURE AND MICROSCOPIC   EXTRA URINE GRAY TUBE   CBC   COMPREHENSIVE METABOLIC PANEL   HEMOGLOBIN AND HEMATOCRIT, BLOOD   HEMOGLOBIN AND HEMATOCRIT, BLOOD   HEMOGLOBIN AND HEMATOCRIT, BLOOD   PREPARE RBC    PRODUCT CODE [id number]      Unit Number [id number]      Unit ABO A      Unit RH POS      XM INTEP COMP      Dispense Status IS      Blood Expiration Date 12/25/2024 11:59:00 PM EST      PRODUCT BLOOD TYPE 6200      UNIT VOLUME 350      PRODUCT CODE D9294W18      Unit Number [id number]      Unit ABO A      Unit RH POS      XM INTEP COMP      Dispense Status XM      Blood Expiration Date 12/30/2024 11:59:00 PM EST      PRODUCT BLOOD TYPE 6200      UNIT VOLUME 350      PRODUCT CODE K6603G26      Unit Number [id number]      Unit ABO A      Unit RH POS      XM INTEP COMP      Dispense Status XM      Blood Expiration Date 12/30/2024 11:59:00 PM EST      PRODUCT BLOOD TYPE 6200      UNIT VOLUME 350           RADIOLOGY:  Interpreted by Radiologist.  XR chest 1 view   Final Result   Borderline cardiomegaly with prominent central pulmonary vascularity.    This is improved from the prior chest x-ray and there is no overt   interstitial or alveolar edema.  The lungs are clear..   Signed by Collin Ruiz MD      CT abdomen pelvis wo IV contrast    (Results Pending)       Encounter Date: 08/25/24   ECG 12 lead   Result Value    Ventricular Rate 85    Atrial Rate 85    KS Interval 54    QRS Duration 108    QT Interval 403    QTC Calculation(Bazett) 480    P Axis 0    R Axis 37    T Axis 21    QRS Count 13    Q Onset 251    T Offset 452    QTC Fredericia 452    Narrative    Sinus rhythm  Short KS interval  Low voltage, precordial leads  Nonspecific T abnormalities, inferior leads    See ED provider note for full interpretation and  "clinical correlation  Confirmed by Jackie Croft ([id number]) on 8/26/2024 9:40:20 PM     ------------------------- NURSING NOTES AND VITALS REVIEWED ---------------------------   The nursing notes within the ED encounter and vital signs as below have been reviewed.   /56   Pulse 85   Temp 37.5 °C (99.5 °F)   Resp 18   Ht 1.702 m (5' 7\")   Wt 116 kg (255 lb 15.3 oz)   SpO2 100%   BMI 40.09 kg/m²   Oxygen Saturation Interpretation: Normal      ---------------------------------------------------PHYSICAL EXAM--------------------------------------  Physical Exam  Vitals and nursing note reviewed.   Constitutional:       General: She is not in acute distress.     Appearance: She is well-developed. She is ill-appearing. She is not toxic-appearing.      Comments: Chronically ill-appearing.  Pale in color.   HENT:      Head: Normocephalic and atraumatic.      Mouth/Throat:      Mouth: Mucous membranes are moist.      Pharynx: No pharyngeal swelling or oropharyngeal exudate.   Eyes:      Extraocular Movements: Extraocular movements intact.      Conjunctiva/sclera: Conjunctivae normal.      Pupils: Pupils are equal, round, and reactive to light.   Neck:      Thyroid: No thyromegaly.      Vascular: No hepatojugular reflux or JVD.      Trachea: No tracheal deviation.   Cardiovascular:      Rate and Rhythm: Normal rate and regular rhythm.      Pulses: Normal pulses.      Heart sounds: Normal heart sounds. No murmur heard.  Pulmonary:      Effort: Pulmonary effort is normal. No respiratory distress.      Breath sounds: Examination of the right-lower field reveals decreased breath sounds. Examination of the left-lower field reveals decreased breath sounds. Decreased breath sounds present. No wheezing, rhonchi or rales.   Chest:      Chest wall: No mass, deformity, tenderness, crepitus or edema. There is no dullness to percussion.   Abdominal:      General: Bowel sounds are normal.      Palpations: Abdomen is soft. " There is no mass.      Tenderness: There is no abdominal tenderness. There is no guarding or rebound.   Musculoskeletal:         General: No swelling.      Cervical back: Normal range of motion and neck supple.      Right lower leg: No tenderness. Edema present.      Left lower leg: No tenderness. Edema present.   Skin:     General: Skin is warm and dry.      Capillary Refill: Capillary refill takes less than 2 seconds.      Coloration: Skin is not cyanotic.      Nails: There is no clubbing.   Neurological:      Mental Status: She is alert and oriented to person, place, and time.      Comments: Speech is grossly normal.  Cranial nerves are intact.  Negative pronator drift.  Negative heel drift.  Finger-nose testing is intact.  Speech is normal.  Motor and sensory are grossly intact.  No focal neurologic findings.  NIH 0   Psychiatric:         Mood and Affect: Mood normal.            Critical Care    Performed by: Bryant Michaels DO  Authorized by: Bryant Michaels DO    Critical care provider statement:     Critical care time (minutes):  45    Critical care time was exclusive of:  Separately billable procedures and treating other patients and teaching time    Critical care was necessary to treat or prevent imminent or life-threatening deterioration of the following conditions:  Respiratory failure (anemia/sob)    Critical care was time spent personally by me on the following activities:  Blood draw for specimens, development of treatment plan with patient or surrogate, discussions with consultants, evaluation of patient's response to treatment, examination of patient, obtaining history from patient or surrogate, ordering and performing treatments and interventions, ordering and review of laboratory studies, ordering and review of radiographic studies, pulse oximetry and review of old charts    Care discussed with: admitting provider    Comments:      Discussed with patient and daughter  Discussing with  nursing  Discussion with admitting certified nurse practitioner/physician    None  ------------------------------ ED COURSE/MEDICAL DECISION MAKING----------------------    Medical Decision Making:   Patient seen and examined by me  An IV is started appropriate labs are drawn.      ED Course as of 12/16/24 1951   Mon Dec 16, 2024   1649 White blood cell count 13.3  Hemoglobin 3.5, hematocrit 10.6, platelet count 315,000 [EC]   1649 Comprehensive metabolic panel(!)  Glucose 155, sodium 138, potassium 3.0 will give oral replacement.  Chloride 91, bicarb 34, anion gap 16    BUN 42, creatinine 5.17.  GFR 9.  Patient has on hemodialysis for chronic end-stage renal failure. [EC]   1650 Lactate 2.1 [EC]   1650 Pro time 19, INR 1.7 [EC]   1650 Patient is on Eliquis. [EC]   1828 I did explain risk versus benefits of blood transfusion the patient was agreeable.  Consent was signed earlier.    I discussed presentation with the hospitalist certified nurse practitioner.  The patient accepted for admission by Dr.B Ferrara.    Patient has received IV Protonix.  Vitals are grossly stable.  Will admit to Telemetry. [EC]   1948 This is a late entry.  EKG at 1504 interpreted by me at 1507.  Sinus rhythm 96 bpm.  Normal axis.  AK, QRS, QT intervals are grossly normal.  No acute ST-T change.  No STEMI. [EC]      ED Course User Index  [EC] Bryant Michaels,          Diagnoses as of 12/16/24 1951   Acute blood loss anemia   Fecal occult blood test positive   ESRD on dialysis (Multi)      Counseling:   The emergency provider has spoken with the patient and daughter  and discussed today’s results, in addition to providing specific details for the plan of care and counseling regarding the diagnosis and prognosis.  Questions are answered at this time and they are agreeable with the plan.      --------------------------------- IMPRESSION AND DISPOSITION ---------------------------------        IMPRESSION  1. Acute blood loss anemia    2.  Fecal occult blood test positive    3. ESRD on dialysis (Multi)        DISPOSITION  Disposition: Admit to telemetry  Dr BRONWYN Ferrara.  Patient is agreeable to admission and blood transfusions.    Patient condition is stable      Billing Provider Critical Care Time: 45 minutes     Bryant Michaels, DO  12/16/24 1832       Bryant Michaels, DO  12/16/24 1858       Bryant Michaels, DO  12/16/24 1859       Bryant Michaels, DO  12/16/24 1951

## 2024-12-16 NOTE — H&P
[facility] - GENERAL MEDICINE HISTORY AND PHYSICAL    History Obtained From (Primary Source): Patient  Collateral History (Secondary Sources): D/w ED provider, chart review    History Of Present Illness (HPI):  Rosa Soto is a 69 y.o. female with PMHx s/f HTN, NSTEMI, A-fib s/p RFA on 2/29/2024 and on Eliquis, HLD, chronic diastolic heart failure, ESRD on dialysis (MW, refused to go on fridays), iron deficiency anemia, bladder cancer s/p resection, moderate recurrent MDD, YELENA presenting with low hemoglobin, generalized weakness.  She states she has an ongoing generalized weakness for 3 weeks and worsened after dialysis today. She was so weak that she couldn't walk and was brought into ED with a wheelchair. Has associated lightheadedness. She notes of shortness of breath with exertion with walking short distances. She had an epistaxis on 6/5/24 and notes she had a recurrence of it yesterday and today. She has been using afrin and manual pressure but notes of multiple blood clots coming out with blowing her nose. She denies hematochezia, BRBPR. She reports of dark green coffee ground-like stools and that she takes auryxia daily. Denies history of UC, Crohn's, recent peptobismol intakes.     ED Course (Summary - please note all labs, imaging studies, and interventions noted below have been personally reviewed and/or interpreted on day of admission):   Vitals on presentation: 97.0 F, 98 bpm, 20 RR, 140/60, 95% on RA  Labs: CMP-glucose 155, potassium 3.0, bicarb 34, BUN/creatinine 22/5.17, EGFR 9  Lactate 2.1-1.1, INR/PT 1.7/19.0  Troponin flat 16-15  CBC-WBC 13.3, hemoglobin 5.4-3.5, , neutrophils absolute 10.03  EKG: A-fib at 96 bpm  Imaging: CXR - Borderline cardiomegaly with prominent central pulmonary vascularity. This is improved from the prior chest x-ray and there is no overt interstitial or alveolar edema. The lungs are clear.   Interventions: 3 units of PRBC, admission for further  management    12-point ROS reviewed and found to be negative aside from aforementioned positives in HPI and/or noted in dedicated ROS section below.     ED Course (From ED Provider):  ED Course as of 12/16/24 1841   Mon Dec 16, 2024   1649 White blood cell count 13.3  Hemoglobin 3.5, hematocrit 10.6, platelet count 315,000 [EC]   1649 Comprehensive metabolic panel(!)  Glucose 155, sodium 138, potassium 3.0 will give oral replacement.  Chloride 91, bicarb 34, anion gap 16    BUN 42, creatinine 5.17.  GFR 9.  Patient has on hemodialysis for chronic end-stage renal failure. [EC]   1650 Lactate 2.1 [EC]   1650 Pro time 19, INR 1.7 [EC]   1650 Patient is on Eliquis. [EC]   1828 I did explain risk versus benefits of blood transfusion the patient was agreeable.  Consent was signed earlier.    I discussed presentation with the hospitalist certified nurse practitioner.  The patient accepted for admission by Dr.B Ferrara.    Patient has received IV Protonix.  Vitals are grossly stable.  Will admit to Telemetry. [EC]      ED Course User Index  [EC] Bryant Michaels, DO         Diagnoses as of 12/16/24 1841   Acute blood loss anemia   Fecal occult blood test positive   ESRD on dialysis (Multi)     Relevant Results  Results for orders placed or performed during the hospital encounter of 12/16/24 (from the past 24 hours)   CBC and Auto Differential   Result Value Ref Range    WBC 13.3 (H) 4.4 - 11.3 x10*3/uL    nRBC 0.0 0.0 - 0.0 /100 WBCs    RBC 0.99 (L) 4.00 - 5.20 x10*6/uL    Hemoglobin 3.5 (LL) 12.0 - 16.0 g/dL    Hematocrit 10.6 (L) 36.0 - 46.0 %     (H) 80 - 100 fL    MCH 35.4 (H) 26.0 - 34.0 pg    MCHC 33.0 32.0 - 36.0 g/dL    RDW 16.4 (H) 11.5 - 14.5 %    Platelets 315 150 - 450 x10*3/uL    Neutrophils % 75.3 40.0 - 80.0 %    Immature Granulocytes %, Automated 1.4 (H) 0.0 - 0.9 %    Lymphocytes % 13.3 13.0 - 44.0 %    Monocytes % 8.9 2.0 - 10.0 %    Eosinophils % 0.8 0.0 - 6.0 %    Basophils % 0.3 0.0 - 2.0 %     Neutrophils Absolute 10.03 (H) 1.20 - 7.70 x10*3/uL    Immature Granulocytes Absolute, Automated 0.18 0.00 - 0.70 x10*3/uL    Lymphocytes Absolute 1.77 1.20 - 4.80 x10*3/uL    Monocytes Absolute 1.18 (H) 0.10 - 1.00 x10*3/uL    Eosinophils Absolute 0.10 0.00 - 0.70 x10*3/uL    Basophils Absolute 0.04 0.00 - 0.10 x10*3/uL   Magnesium   Result Value Ref Range    Magnesium 1.87 1.60 - 2.40 mg/dL   Comprehensive metabolic panel   Result Value Ref Range    Glucose 155 (H) 74 - 99 mg/dL    Sodium 138 136 - 145 mmol/L    Potassium 3.0 (L) 3.5 - 5.3 mmol/L    Chloride 91 (L) 98 - 107 mmol/L    Bicarbonate 34 (H) 21 - 32 mmol/L    Anion Gap 16 10 - 20 mmol/L    Urea Nitrogen 42 (H) 6 - 23 mg/dL    Creatinine 5.17 (H) 0.50 - 1.05 mg/dL    eGFR 9 (L) >60 mL/min/1.73m*2    Calcium 7.5 (L) 8.6 - 10.3 mg/dL    Albumin 3.5 3.4 - 5.0 g/dL    Alkaline Phosphatase 96 33 - 136 U/L    Total Protein 6.4 6.4 - 8.2 g/dL    AST 12 9 - 39 U/L    Bilirubin, Total 0.7 0.0 - 1.2 mg/dL    ALT 9 7 - 45 U/L   Lipase   Result Value Ref Range    Lipase 27 9 - 82 U/L   Lactate   Result Value Ref Range    Lactate 2.1 (H) 0.4 - 2.0 mmol/L   Protime-INR   Result Value Ref Range    Protime 19.0 (H) 9.8 - 12.8 seconds    INR 1.7 (H) 0.9 - 1.1   Type and Screen   Result Value Ref Range    ABO TYPE A     Rh TYPE POS     ANTIBODY SCREEN NEG    Troponin I, High Sensitivity, Initial   Result Value Ref Range    Troponin I, High Sensitivity 16 (H) 0 - 13 ng/L   Fecal Occult Blood Immunoassy    Specimen: Stool   Result Value Ref Range    Fecal Occult Blood Immunoassay Positive (A) Negative   Prepare RBC: 3 Units   Result Value Ref Range    PRODUCT CODE G1060E87     Unit Number [id number]     Unit ABO A     Unit RH POS     XM INTEP COMP     Dispense Status IS     Blood Expiration Date 12/25/2024 11:59:00 PM EST     PRODUCT BLOOD TYPE 6200     UNIT VOLUME 350     PRODUCT CODE X5026Y20     Unit Number [id number]     Unit ABO A     Unit RH POS     XM INTEP  COMP     Dispense Status XM     Blood Expiration Date 12/30/2024 11:59:00 PM EST     PRODUCT BLOOD TYPE 6200     UNIT VOLUME 350     PRODUCT CODE U0053N91     Unit Number [id number]     Unit ABO A     Unit RH POS     XM INTEP COMP     Dispense Status XM     Blood Expiration Date 12/30/2024 11:59:00 PM EST     PRODUCT BLOOD TYPE 6200     UNIT VOLUME 350    Troponin, High Sensitivity, 1 Hour   Result Value Ref Range    Troponin I, High Sensitivity 15 (H) 0 - 13 ng/L   Lactate   Result Value Ref Range    Lactate 1.1 0.4 - 2.0 mmol/L   Lavender Top   Result Value Ref Range    Extra Tube Hold for add-ons.       XR chest 1 view    Result Date: 12/16/2024  STUDY: Chest Radiograph;  12/16/2024 4:07PM INDICATION: Shortness of breath. COMPARISON: 8/25/2024 XR Chest, 3/28/2024 XR Chest, 3/2/2024 XR Chest ACCESSION NUMBER(S): [id number] ORDERING CLINICIAN: KATIE ENCARNACION TECHNIQUE:  Frontal chest was obtained at 16:06 hours. FINDINGS: CARDIOMEDIASTINAL SILHOUETTE: The cardiac silhouette approaches upper limits of normal in size on today's study and there is some mild or borderline central vascular congestion but it is improved when compared to the prior chest x-ray and there is no interstitial or alveolar edema..  LUNGS: Lungs are clear.  There is no consolidation effusion or pneumothorax.  ABDOMEN: No remarkable upper abdominal findings.  BONES: No acute osseous changes.    Borderline cardiomegaly with prominent central pulmonary vascularity. This is improved from the prior chest x-ray and there is no overt interstitial or alveolar edema.  The lungs are clear.. Signed by Collin Ruiz MD    Scheduled medications:  amLODIPine, 5 mg, oral, Daily  [Held by provider] apixaban, 5 mg, oral, BID  atorvastatin, 40 mg, oral, Daily  busPIRone, 5 mg, oral, Nightly  doxepin, 100 mg, oral, Nightly  escitalopram, 10 mg, oral, Daily  metoprolol succinate XL, 100 mg, oral, q PM  pantoprazole, 40 mg, intravenous, Daily  pantoprazole,  40 mg, intravenous, BID      Continuous medications:     PRN medications:  PRN medications: acetaminophen, albuterol, melatonin, ondansetron     Past Medical History  She has a past medical history of Arrhythmia, Benign essential HTN, Cancer (Multi), Chronic kidney disease, and ESRD (end stage renal disease) (Multi).    She has no past medical history of CHF (congestive heart failure).    Surgical History  She has a past surgical history that includes US guided abdominal paracentesis (8/31/2023); Cardiac catheterization (N/A, 3/6/2024); and Cardiac electrophysiology procedure (N/A, 2/29/2024).     Social History  She reports that she quit smoking about 3 years ago. Her smoking use included cigarettes. She has never used smokeless tobacco. She reports that she does not currently use alcohol. She reports that she does not use drugs.    Family History  No family history on file.    Allergies  Codeine, Ropinirole, and Adhesive    Code Status  Full Code     Review of Systems   Constitutional:  Negative for chills, fatigue and fever.        Generalized weakness   Respiratory:  Positive for shortness of breath. Negative for cough, chest tightness and wheezing.    Cardiovascular:  Negative for chest pain, palpitations and leg swelling.   Gastrointestinal:  Negative for abdominal pain, constipation, diarrhea, nausea and vomiting.   Genitourinary:  Negative for flank pain and hematuria.   Musculoskeletal:  Negative for back pain and joint swelling.   Skin:  Negative for rash and wound.   Neurological:  Positive for light-headedness. Negative for tremors, syncope, weakness and headaches.       Last Recorded Vitals  /58 (BP Location: Left arm, Patient Position: Sitting)   Pulse 84   Temp 36.9 °C (98.4 °F) (Tympanic)   Resp 20   Wt 116 kg (255 lb 15.3 oz)   SpO2 98%      Physical Exam:  Vital signs and nursing notes reviewed.   Constitutional: Pleasant and cooperative. Obese. Laying in bed in no acute distress.  Conversant.   Skin: Warm and dry; no obvious lesions, rashes, pallor, or jaundice.   Eyes: EOMI. Anicteric sclera.   ENT: Mucous membranes moist; no obvious injury or deformity appreciated.   Head and Neck: Normocephalic, atraumatic. ROM preserved. Trachea midline. No appreciable JVD.   Respiratory: Nonlabored on 3L NC. Lungs sound decreased bilaterally without obvious adventitious sounds. Chest rise is equal.  Cardiovascular: RRR. No gross murmur, gallop, or rub. Extremities are warm and well-perfused with good capillary refill (< 3 seconds). No chest wall tenderness.   GI: Abdomen soft, tender at left middle quadrant, nondistended. Limited exam secondary to body habitus. Bowel sounds are present.  : No CVA tenderness.   MSK: No gross abnormalities appreciated. No limitations to AROM/PROM appreciated.   Extremities: No cyanosis, edema, or clubbing evident. Neurovascularly intact.   Neuro: A&Ox3. Able to respond to questions appropriately and clearly. No acute focal neurologic deficits appreciated.  Psych: Appropriate mood and behavior.    Assessment/Plan     69 y.o. female with PMHx s/f HTN, NSTEMI, A-fib s/p RFA on 2/29/2024 and on Eliquis, HLD, chronic diastolic heart failure, ESRD on dialysis (MW, refused to go on fridays), iron deficiency anemia, bladder cancer s/p resection, moderate recurrent MDD, YELENA presenting with low hemoglobin, generalized weakness.    Acute blood loss anemia  -possible dark stools from swallowing blood from epistaxis  -CT AP wo ordered  -hgb 3.5 --> started 3 units of PRBCs transfusion in ED --> repeat pending  -lactate 2.1-1.1; suspect d/t blood loss, she's normotensive currently  -trend H&H q6h, hold eliquis  -NPO  -protonix 40 mg BID  -GI consult appreciated    Leukocytosis  -no cough/sputum, fever, no PNA on CXR  -UA pending    Hypokalemia  -replacement ordered, replete PRN    ESRD on dialysis  -received a full session of dialysis today  -Monday Wednesday Friday [facility]  [place]  -Access is AV fistula right arm   -noncompliance and refused to go on Fridays  -nephrology consult    A-fib s/p RFA on 2/29/2024  -hold eliquis given acute blood loss anemia  -continue rate control    HTN, HLD, Chronic diastolic HF  -does not appear to be overtly volume overloaded on exam  -Continue home medications with hold parameters  -Monitor and adjust as needed while admitted     DM-II with hyperglycemia  -A1c of 6.7 3 months ago  -not on home meds/insulin currently  -Continue with SSI ACHS for now  -Accucheks, hypoglycemic protocol   -Monitor and adjust as needed     Morbid obesity  -Severe obesity requiring increased utilization of hospital resources as demonstrated by BMI 40.09    Diet: NPO  DVT Prophylaxis: SCDs, hold eliquis given acute anemia  Code Status: full code  Case Discussed With: ED provider  Additional Sources Reviewed: cardiology notes    Anticipated Length of Stay (LOS): Patient will require 2+ midnights for anemia workup     Flavio Ojeda PA-C    Dragon dictation software was used to dictate this note and thus there may be minor errors in translation/transcription including garbled speech or misspellings. Please contact for clarification if needed.

## 2024-12-17 LAB
ALBUMIN SERPL BCP-MCNC: 3.2 G/DL (ref 3.4–5)
ALP SERPL-CCNC: 87 U/L (ref 33–136)
ALT SERPL W P-5'-P-CCNC: 9 U/L (ref 7–45)
ANION GAP SERPL CALC-SCNC: 16 MMOL/L (ref 10–20)
APPEARANCE UR: CLEAR
AST SERPL W P-5'-P-CCNC: 14 U/L (ref 9–39)
ATRIAL RATE: 96 BPM
BACTERIA #/AREA URNS AUTO: ABNORMAL /HPF
BILIRUB SERPL-MCNC: 1.2 MG/DL (ref 0–1.2)
BILIRUB UR STRIP.AUTO-MCNC: NEGATIVE MG/DL
BLOOD EXPIRATION DATE: NORMAL
BUN SERPL-MCNC: 51 MG/DL (ref 6–23)
CALCIUM SERPL-MCNC: 7.8 MG/DL (ref 8.6–10.3)
CHLORIDE SERPL-SCNC: 94 MMOL/L (ref 98–107)
CO2 SERPL-SCNC: 32 MMOL/L (ref 21–32)
COLOR UR: COLORLESS
CREAT SERPL-MCNC: 6.43 MG/DL (ref 0.5–1.05)
DISPENSE STATUS: NORMAL
EGFRCR SERPLBLD CKD-EPI 2021: 7 ML/MIN/1.73M*2
ERYTHROCYTE [DISTWIDTH] IN BLOOD BY AUTOMATED COUNT: 24.9 % (ref 11.5–14.5)
GLUCOSE BLD MANUAL STRIP-MCNC: 133 MG/DL (ref 74–99)
GLUCOSE BLD MANUAL STRIP-MCNC: 137 MG/DL (ref 74–99)
GLUCOSE BLD MANUAL STRIP-MCNC: 143 MG/DL (ref 74–99)
GLUCOSE BLD MANUAL STRIP-MCNC: 150 MG/DL (ref 74–99)
GLUCOSE BLD MANUAL STRIP-MCNC: 188 MG/DL (ref 74–99)
GLUCOSE BLD MANUAL STRIP-MCNC: 204 MG/DL (ref 74–99)
GLUCOSE SERPL-MCNC: 139 MG/DL (ref 74–99)
GLUCOSE UR STRIP.AUTO-MCNC: ABNORMAL MG/DL
HCT VFR BLD AUTO: 21.5 % (ref 36–46)
HCT VFR BLD AUTO: 24.8 % (ref 36–46)
HCT VFR BLD AUTO: 24.8 % (ref 36–46)
HGB BLD-MCNC: 7 G/DL (ref 12–16)
HGB BLD-MCNC: 7.9 G/DL (ref 12–16)
HGB BLD-MCNC: 7.9 G/DL (ref 12–16)
HOLD SPECIMEN: NORMAL
KETONES UR STRIP.AUTO-MCNC: NEGATIVE MG/DL
LEUKOCYTE ESTERASE UR QL STRIP.AUTO: ABNORMAL
MCH RBC QN AUTO: 29.5 PG (ref 26–34)
MCHC RBC AUTO-ENTMCNC: 31.9 G/DL (ref 32–36)
MCV RBC AUTO: 93 FL (ref 80–100)
NITRITE UR QL STRIP.AUTO: NEGATIVE
NRBC BLD-RTO: 0 /100 WBCS (ref 0–0)
P AXIS: 69 DEGREES
PH UR STRIP.AUTO: 8 [PH]
PLATELET # BLD AUTO: 225 X10*3/UL (ref 150–450)
POTASSIUM SERPL-SCNC: 4.5 MMOL/L (ref 3.5–5.3)
PR INTERVAL: 181 MS
PRODUCT BLOOD TYPE: 6200
PRODUCT CODE: NORMAL
PROT SERPL-MCNC: 6 G/DL (ref 6.4–8.2)
PROT UR STRIP.AUTO-MCNC: ABNORMAL MG/DL
Q ONSET: 251 MS
QRS COUNT: 16 BEATS
QRS DURATION: 100 MS
QT INTERVAL: 409 MS
QTC CALCULATION(BAZETT): 517 MS
QTC FREDERICIA: 478 MS
R AXIS: 17 DEGREES
RBC # BLD AUTO: 2.68 X10*6/UL (ref 4–5.2)
RBC # UR STRIP.AUTO: NEGATIVE /UL
RBC #/AREA URNS AUTO: ABNORMAL /HPF
SODIUM SERPL-SCNC: 137 MMOL/L (ref 136–145)
SP GR UR STRIP.AUTO: 1.01
SQUAMOUS #/AREA URNS AUTO: ABNORMAL /HPF
T AXIS: 25 DEGREES
T OFFSET: 455 MS
UNIT ABO: NORMAL
UNIT NUMBER: NORMAL
UNIT RH: NORMAL
UNIT VOLUME: 350
UROBILINOGEN UR STRIP.AUTO-MCNC: NORMAL MG/DL
VENTRICULAR RATE: 96 BPM
WBC # BLD AUTO: 9.9 X10*3/UL (ref 4.4–11.3)
WBC #/AREA URNS AUTO: ABNORMAL /HPF
XM INTEP: NORMAL

## 2024-12-17 PROCEDURE — 36415 COLL VENOUS BLD VENIPUNCTURE: CPT

## 2024-12-17 PROCEDURE — 82947 ASSAY GLUCOSE BLOOD QUANT: CPT

## 2024-12-17 PROCEDURE — 81003 URINALYSIS AUTO W/O SCOPE: CPT

## 2024-12-17 PROCEDURE — 2500000001 HC RX 250 WO HCPCS SELF ADMINISTERED DRUGS (ALT 637 FOR MEDICARE OP): Performed by: HOSPITALIST

## 2024-12-17 PROCEDURE — 99222 1ST HOSP IP/OBS MODERATE 55: CPT | Performed by: INTERNAL MEDICINE

## 2024-12-17 PROCEDURE — 36430 TRANSFUSION BLD/BLD COMPNT: CPT

## 2024-12-17 PROCEDURE — 2500000004 HC RX 250 GENERAL PHARMACY W/ HCPCS (ALT 636 FOR OP/ED)

## 2024-12-17 PROCEDURE — 99233 SBSQ HOSP IP/OBS HIGH 50: CPT | Performed by: HOSPITALIST

## 2024-12-17 PROCEDURE — 85027 COMPLETE CBC AUTOMATED: CPT

## 2024-12-17 PROCEDURE — P9016 RBC LEUKOCYTES REDUCED: HCPCS

## 2024-12-17 PROCEDURE — 2500000001 HC RX 250 WO HCPCS SELF ADMINISTERED DRUGS (ALT 637 FOR MEDICARE OP)

## 2024-12-17 PROCEDURE — 87086 URINE CULTURE/COLONY COUNT: CPT | Mod: PORLAB

## 2024-12-17 PROCEDURE — 85014 HEMATOCRIT: CPT

## 2024-12-17 PROCEDURE — 2500000001 HC RX 250 WO HCPCS SELF ADMINISTERED DRUGS (ALT 637 FOR MEDICARE OP): Performed by: REGISTERED NURSE

## 2024-12-17 PROCEDURE — 96376 TX/PRO/DX INJ SAME DRUG ADON: CPT

## 2024-12-17 PROCEDURE — 1210000001 HC SEMI-PRIVATE ROOM DAILY

## 2024-12-17 PROCEDURE — 84075 ASSAY ALKALINE PHOSPHATASE: CPT

## 2024-12-17 PROCEDURE — 2500000002 HC RX 250 W HCPCS SELF ADMINISTERED DRUGS (ALT 637 FOR MEDICARE OP, ALT 636 FOR OP/ED)

## 2024-12-17 RX ORDER — PANTOPRAZOLE SODIUM 40 MG/1
40 TABLET, DELAYED RELEASE ORAL DAILY
Status: DISCONTINUED | OUTPATIENT
Start: 2024-12-17 | End: 2024-12-23 | Stop reason: HOSPADM

## 2024-12-17 RX ORDER — TEMAZEPAM 15 MG/1
15 CAPSULE ORAL NIGHTLY PRN
Status: DISCONTINUED | OUTPATIENT
Start: 2024-12-17 | End: 2024-12-23 | Stop reason: HOSPADM

## 2024-12-17 RX ORDER — OXYMETAZOLINE HCL 0.05 %
2 SPRAY, NON-AEROSOL (ML) NASAL EVERY 12 HOURS PRN
Status: DISPENSED | OUTPATIENT
Start: 2024-12-17 | End: 2024-12-20

## 2024-12-17 SDOH — SOCIAL STABILITY: SOCIAL INSECURITY: ABUSE: ADULT

## 2024-12-17 SDOH — HEALTH STABILITY: PHYSICAL HEALTH: ON AVERAGE, HOW MANY MINUTES DO YOU ENGAGE IN EXERCISE AT THIS LEVEL?: 0 MIN

## 2024-12-17 SDOH — ECONOMIC STABILITY: TRANSPORTATION INSECURITY: IN THE PAST 12 MONTHS, HAS LACK OF TRANSPORTATION KEPT YOU FROM MEDICAL APPOINTMENTS OR FROM GETTING MEDICATIONS?: NO

## 2024-12-17 SDOH — SOCIAL STABILITY: SOCIAL INSECURITY: WITHIN THE LAST YEAR, HAVE YOU BEEN HUMILIATED OR EMOTIONALLY ABUSED IN OTHER WAYS BY YOUR PARTNER OR EX-PARTNER?: NO

## 2024-12-17 SDOH — ECONOMIC STABILITY: FOOD INSECURITY: WITHIN THE PAST 12 MONTHS, YOU WORRIED THAT YOUR FOOD WOULD RUN OUT BEFORE YOU GOT THE MONEY TO BUY MORE.: NEVER TRUE

## 2024-12-17 SDOH — SOCIAL STABILITY: SOCIAL INSECURITY
WITHIN THE LAST YEAR, HAVE YOU BEEN RAPED OR FORCED TO HAVE ANY KIND OF SEXUAL ACTIVITY BY YOUR PARTNER OR EX-PARTNER?: NO

## 2024-12-17 SDOH — ECONOMIC STABILITY: FOOD INSECURITY: WITHIN THE PAST 12 MONTHS, THE FOOD YOU BOUGHT JUST DIDN'T LAST AND YOU DIDN'T HAVE MONEY TO GET MORE.: NEVER TRUE

## 2024-12-17 SDOH — SOCIAL STABILITY: SOCIAL INSECURITY: HAVE YOU HAD THOUGHTS OF HARMING ANYONE ELSE?: NO

## 2024-12-17 SDOH — ECONOMIC STABILITY: HOUSING INSECURITY: IN THE PAST 12 MONTHS, HOW MANY TIMES HAVE YOU MOVED WHERE YOU WERE LIVING?: 0

## 2024-12-17 SDOH — ECONOMIC STABILITY: FOOD INSECURITY: HOW HARD IS IT FOR YOU TO PAY FOR THE VERY BASICS LIKE FOOD, HOUSING, MEDICAL CARE, AND HEATING?: NOT VERY HARD

## 2024-12-17 SDOH — SOCIAL STABILITY: SOCIAL INSECURITY
WITHIN THE LAST YEAR, HAVE YOU BEEN KICKED, HIT, SLAPPED, OR OTHERWISE PHYSICALLY HURT BY YOUR PARTNER OR EX-PARTNER?: NO

## 2024-12-17 SDOH — ECONOMIC STABILITY: HOUSING INSECURITY: IN THE LAST 12 MONTHS, WAS THERE A TIME WHEN YOU WERE NOT ABLE TO PAY THE MORTGAGE OR RENT ON TIME?: NO

## 2024-12-17 SDOH — SOCIAL STABILITY: SOCIAL INSECURITY: WITHIN THE LAST YEAR, HAVE YOU BEEN AFRAID OF YOUR PARTNER OR EX-PARTNER?: NO

## 2024-12-17 SDOH — HEALTH STABILITY: PHYSICAL HEALTH: ON AVERAGE, HOW MANY DAYS PER WEEK DO YOU ENGAGE IN MODERATE TO STRENUOUS EXERCISE (LIKE A BRISK WALK)?: 0 DAYS

## 2024-12-17 SDOH — ECONOMIC STABILITY: INCOME INSECURITY: IN THE PAST 12 MONTHS HAS THE ELECTRIC, GAS, OIL, OR WATER COMPANY THREATENED TO SHUT OFF SERVICES IN YOUR HOME?: NO

## 2024-12-17 SDOH — ECONOMIC STABILITY: HOUSING INSECURITY: AT ANY TIME IN THE PAST 12 MONTHS, WERE YOU HOMELESS OR LIVING IN A SHELTER (INCLUDING NOW)?: NO

## 2024-12-17 SDOH — SOCIAL STABILITY: SOCIAL INSECURITY: WERE YOU ABLE TO COMPLETE ALL THE BEHAVIORAL HEALTH SCREENINGS?: YES

## 2024-12-17 ASSESSMENT — COGNITIVE AND FUNCTIONAL STATUS - GENERAL
MOBILITY SCORE: 24
PATIENT BASELINE BEDBOUND: NO
DAILY ACTIVITIY SCORE: 24
MOBILITY SCORE: 24
MOBILITY SCORE: 24
DAILY ACTIVITIY SCORE: 24
DAILY ACTIVITIY SCORE: 24

## 2024-12-17 ASSESSMENT — PAIN - FUNCTIONAL ASSESSMENT
PAIN_FUNCTIONAL_ASSESSMENT: 0-10

## 2024-12-17 ASSESSMENT — PAIN SCALES - GENERAL
PAINLEVEL_OUTOF10: 4
PAINLEVEL_OUTOF10: 0 - NO PAIN

## 2024-12-17 ASSESSMENT — ACTIVITIES OF DAILY LIVING (ADL)
BATHING: INDEPENDENT
GROOMING: INDEPENDENT
PATIENT'S MEMORY ADEQUATE TO SAFELY COMPLETE DAILY ACTIVITIES?: YES
JUDGMENT_ADEQUATE_SAFELY_COMPLETE_DAILY_ACTIVITIES: YES
HEARING - LEFT EAR: FUNCTIONAL
DRESSING YOURSELF: INDEPENDENT
LACK_OF_TRANSPORTATION: NO
ASSISTIVE_DEVICE: WALKER
WALKS IN HOME: INDEPENDENT
FEEDING YOURSELF: INDEPENDENT
TOILETING: INDEPENDENT
HEARING - RIGHT EAR: FUNCTIONAL
ADEQUATE_TO_COMPLETE_ADL: YES

## 2024-12-17 ASSESSMENT — PAIN DESCRIPTION - LOCATION: LOCATION: BACK

## 2024-12-17 ASSESSMENT — LIFESTYLE VARIABLES
SKIP TO QUESTIONS 9-10: 1
HOW OFTEN DO YOU HAVE A DRINK CONTAINING ALCOHOL: NEVER
HOW OFTEN DO YOU HAVE 6 OR MORE DRINKS ON ONE OCCASION: NEVER
HOW MANY STANDARD DRINKS CONTAINING ALCOHOL DO YOU HAVE ON A TYPICAL DAY: PATIENT DOES NOT DRINK
AUDIT-C TOTAL SCORE: 0
AUDIT-C TOTAL SCORE: 0

## 2024-12-17 NOTE — CONSULTS
Nephrology Consult Note                                                                                                                                         Inpatient consult to Renal Care  Consult performed by: Jed Ruby PA-C  Consult ordered by: Flavio Ojeda PA-C                                                                                                               HPI  Patient is a 69 y.o. female known to our service for ESRD. She receives HD MWF at the [facility].  She has been noon-compliant with her Friday treatments. She has a PMHx s/f HTN, NSTEMI, A-fib s/p RFA on 2/29/2024 and on Eliquis, HLD, chronic diastolic heart failure, ESRD,  iron deficiency anemia, bladder cancer s/p resection, moderate recurrent MDD, YELENA presenting with low hemoglobin, generalized weakness.  She states she has an ongoing generalized weakness for 3 weeks and worsened after dialysis yesterday. She presented to the ER and found to have a HgB of 3.5. She was given 3 units PRBC's and is feeling a little better now. Her last HgB at the HD unit was 7.4 on 12/11. She continues to have some SOB with exertion.  She had a episodes of epistaxis Sunday and Monday with some clots. She also noted dark green stools but has been on auryxia as well. Nephrology consulted in view of ESRD. She denies any SOB at rest, CP, nausea, vomiting, hematemesis, or LUTS.         Past Medical History:   Diagnosis Date    Arrhythmia     Benign essential HTN     Cancer (Multi)     Chronic kidney disease     ESRD (end stage renal disease) (Multi)       Social History     Socioeconomic History    Marital status:      Spouse name: Not on file    Number of children: Not on file    Years of education: Not on file    Highest education level: Not on file   Occupational History    Not on file   Tobacco Use     Smoking status: Former     Current packs/day: 0.00     Types: Cigarettes     Quit date: 2/4/2021     Years since quitting: 3.8    Smokeless tobacco: Never   Vaping Use    Vaping status: Every Day   Substance and Sexual Activity    Alcohol use: Not Currently    Drug use: Never    Sexual activity: Defer   Other Topics Concern    Not on file   Social History Narrative    Not on file     Social Drivers of Health     Financial Resource Strain: Low Risk  (8/26/2024)    Overall Financial Resource Strain (CARDIA)     Difficulty of Paying Living Expenses: Not hard at all   Food Insecurity: Not on file   Transportation Needs: No Transportation Needs (8/26/2024)    PRAPARE - Transportation     Lack of Transportation (Medical): No     Lack of Transportation (Non-Medical): No   Physical Activity: Not on file   Stress: Not on file   Social Connections: Not on file   Intimate Partner Violence: Not on file   Housing Stability: Low Risk  (8/26/2024)    Housing Stability Vital Sign     Unable to Pay for Housing in the Last Year: No     Number of Times Moved in the Last Year: 0     Homeless in the Last Year: No      No family history on file.   No current facility-administered medications on file prior to encounter.     Current Outpatient Medications on File Prior to Encounter   Medication Sig Dispense Refill    acetaminophen (Tylenol) 325 mg tablet Take 3 tablets (975 mg) by mouth every 8 hours if needed (breakthrough pain).      albuterol 90 mcg/actuation inhaler Inhale 1 puff every 4 hours if needed for shortness of breath. 18 g 11    amLODIPine (Norvasc) 5 mg tablet Take 1 tablet (5 mg) by mouth once daily.      apixaban (Eliquis) 5 mg tablet Take 1 tablet (5 mg) by mouth 2 times a day.      atorvastatin (Lipitor) 40 mg tablet Take 1 tablet (40 mg) by mouth once daily.      Auryxia 210 mg iron tablet Take 3 tablets (630 mg) by mouth 3 times a day. With meals. SWALLOW WHOLE, DO NOT CHEW OR CRUSH MEDICATION      busPIRone  (Buspar) 5 mg tablet Take 1 tablet (5 mg) by mouth once daily at bedtime.      cholecalciferol (Vitamin D-3) 25 MCG (1000 UT) tablet Take 1 tablet (25 mcg) by mouth once daily.      cinacalcet (Sensipar) 30 mg tablet Take 1 tablet (30 mg) by mouth once daily. Take with food or shortly afer a meal. Swallow tablet whole; do not break or divide.      doxepin (SINEquan) 100 mg capsule Take 1 capsule (100 mg) by mouth once daily at bedtime.      escitalopram (Lexapro) 10 mg tablet Take 1 tablet (10 mg) by mouth once daily.      metoprolol succinate XL (Toprol-XL) 100 mg 24 hr tablet Take 1 tablet (100 mg) by mouth once daily in the evening. Do not crush or chew. 90 tablet 3    Nephro-Gloria 0.8 mg tablet Take 1 tablet by mouth once daily.      oxymetazoline (Afrin) 0.05 % nasal spray Administer 2 sprays into each nostril 2 times a day as needed (nose bleeds) for up to 8 doses. Do not use for more than 3 days. (Patient not taking: Reported on 12/17/2024) 30 mL 0    torsemide (Demadex) 20 mg tablet Take 1 tablet (20 mg) by mouth 2 times a day.      vericiguat (Verquvo) 10 mg tablet Take 10 mg by mouth once daily. (Patient not taking: Reported on 12/3/2024) 90 tablet 3    vericiguat (Verquvo) 2.5 mg tablet Take 2.5 mg by mouth once daily. Take for two weeks (Patient not taking: Reported on 12/3/2024) 14 tablet 0    vericiguat (Verquvo) 5 mg tablet Take 5 mg by mouth once daily. Start after 2 weeks of 2.5mg (Patient not taking: Reported on 12/3/2024) 14 tablet 0    [DISCONTINUED] methoxy peg-epoetin beta (MIRCERA INJ) every 14 (fourteen) days.      [DISCONTINUED] methoxy peg-epoetin beta (MIRCERA INJ) 100 mcg every 14 (fourteen) days.      [DISCONTINUED] predniSONE (Deltasone) 10 mg tablet         Scheduled medications  amLODIPine, 5 mg, oral, Daily  [Held by provider] apixaban, 5 mg, oral, BID  atorvastatin, 40 mg, oral, Daily  busPIRone, 5 mg, oral, Nightly  doxepin, 100 mg, oral, Nightly  escitalopram, 10 mg, oral,  "Daily  insulin lispro, 0-5 Units, subcutaneous, q4h  metoprolol succinate XL, 100 mg, oral, q PM  pantoprazole, 40 mg, intravenous, BID      Continuous medications     PRN medications  PRN medications: acetaminophen, albuterol, dextrose, dextrose, glucagon, glucagon, melatonin, ondansetron     Review of systems as per HPI otherwise 10 point review systems negative    /62   Pulse 75   Temp 36.8 °C (98.2 °F)   Resp 20   Ht 1.702 m (5' 7\")   Wt 116 kg (255 lb 15.3 oz)   SpO2 97%   BMI 40.09 kg/m²     Input / Output:  24 HR:   Intake/Output Summary (Last 24 hours) at 12/17/2024 1139  Last data filed at 12/17/2024 0300  Gross per 24 hour   Intake 1400 ml   Output 400 ml   Net 1000 ml       Physical Exam   Alert and oriented x 4, NAD  EOMI  OP clear  Neck: supple, No JVD  CV: RRR without m/r/g  Lungs: CTA bilaterally  Abd: soft NT/ND +BS  Ext: 1+ lower extremity edema, DAVIDA AVF-good bruit /thrill   : no auguste  Neuro: grossly intact  Skin: no rashes    Results from last 7 days   Lab Units 12/17/24  0433 12/16/24  1551   SODIUM mmol/L 137 138   POTASSIUM mmol/L 4.5 3.0*   CHLORIDE mmol/L 94* 91*   CO2 mmol/L 32 34*   BUN mg/dL 51* 42*   CREATININE mg/dL 6.43* 5.17*   GLUCOSE mg/dL 139* 155*   CALCIUM mg/dL 7.8* 7.5*        Results from last 7 days   Lab Units 12/17/24  0433   SODIUM mmol/L 137   POTASSIUM mmol/L 4.5   CHLORIDE mmol/L 94*   CO2 mmol/L 32   BUN mg/dL 51*   CREATININE mg/dL 6.43*   CALCIUM mg/dL 7.8*   PROTEIN TOTAL g/dL 6.0*   BILIRUBIN TOTAL mg/dL 1.2   ALK PHOS U/L 87   ALT U/L 9   AST U/L 14   GLUCOSE mg/dL 139*      Results from last 7 days   Lab Units 12/16/24  1551   MAGNESIUM mg/dL 1.87      Results from last 7 days   Lab Units 12/17/24  0433 12/17/24  0016 12/16/24 2051 12/16/24  1551   WBC AUTO x10*3/uL 9.9  --   --  13.3*   HEMOGLOBIN g/dL 7.9*  7.9* 7.0* 6.3* 3.5*   HEMATOCRIT % 24.8*  24.8* 21.5* 20.3* 10.6*   PLATELETS AUTO x10*3/uL 225  --   --  315        CT abdomen pelvis wo " IV contrast   Final Result   1.  Cirrhotic appearing liver but no liver masses within this limited   study. Recommend clinical correlation and with liver function tests.   2. Numerous bilateral renal masses as above some of which increased   in size since prior, incompletely characterized due to small size and   lack of IV contrast. Correlation with ultrasound an or MRI findings   recommended.   3. Nonobstructing bilateral renal calculi. No hydronephrosis or   hydroureter bilaterally.   4. Mild colonic diverticulosis but no CT evidence for acute   diverticulitis. No signs of bowel obstruction or free air.   5. Bibasilar atelectasis or infiltrate and trace amount of right   pleural effusion as well as bilateral pleural thickening. Recommend   clinical correlation and follow-up to document resolution.   6. No evidence for hyperdense intestinal fluid is seen to suggest   active GI bleed although evaluation is limited without IV contrast.        Critical Finding:  See findings. Notification was initiated on   12/16/2024 at 9:01 pm by  Roula Chow.  (**-YCF-**) Instructions:        Signed by: Roula Chow 12/16/2024 9:55 PM   Dictation workstation:   [id number]      XR chest 1 view   Final Result   Borderline cardiomegaly with prominent central pulmonary vascularity.    This is improved from the prior chest x-ray and there is no overt   interstitial or alveolar edema.  The lungs are clear..   Signed by Collin Ruiz MD           Assessment:   Patient is 69 y.o. female who is admitted to hospital for acute blood loss anemia. Nephrology consulted in view of ESRD.    1: Acute blood loss anemia-S/P 3 units PRBC's    2: ESRD- HD MWF    3: Hypokalemia-transient post HD- resolved     4: Recurrent Epistaxis    5: Volume status acceptable- no need for IUF tx today    6: Hx of A-fib- Eliquis on hold due to #1    Recommendations:   HD tomorrow and continue MWF  Transfuse further as needed       Please message me through EPIC  chat with any questions or concerns.     Jed Ruby PA-C  12/17/2024  11:39 AM         [facility]    [address], [address]   [place] [id number]  Office: [phone]

## 2024-12-17 NOTE — PROGRESS NOTES
Physical Therapy                 Therapy Communication Note    Patient Name: Rosa Soto  MRN: [id number]  Department: [facility] [address]  Room: [address] [place], [place] [address]  Today's Date: 12/17/2024     Discipline: Physical Therapy          Missed Visit Reason: Missed Visit Reason:  (SCREEN/DC, PT INDEP W/ BED MOBILITY AND TO AMB IN ROOM, NO LOB, SOB OR LIGHTHEADEDNESS, NO ACUTE REHAB NEEDS AT [facility], DISCH FROM THERAPY)

## 2024-12-17 NOTE — CONSULTS
"Inpatient consult to gastroenterology  Consult performed by: Micah Hunt MD  Consult ordered by: Flavio Ojeda PA-C          Reason For Consult  \"anemia\"      [facility] Gastroenterology Consultation Note    ASSESSMENT and PLAN:       Rosa Soto is a 69 y.o. female with a significant past medical history of CAD, ESRD on HD, CHF, A-fib, HTN, LEE ANN, bladder cancer, MDD, and YELENA who presented with weakness and fatigue. GI was consulted for \"anemia\".       Anemia  Longstanding anemia that markedly worsened recently. No overt GI bleeding. She did have some epistaxis recently which may be contributing. She has responded to transfusions since admission. Fecal occult testing was inappropriately ordered for work up of anemia and as that test is not validated in this setting the result should be ignored. No recent iron studies and MCV prior to transfusion was actually macrocytic rather than the microcytic anemia that would be expected in iron deficiency anemia. With her marked anemia will plan for EGD/colonoscopy to rule out underlying GI lesion, but ultimately there is no evidence of acute GI bleeding. She was started on Pantoprazole (IV twice daily), but there is no indication for that medication/dosing regimen. Okay to continue once daily PPI (oral) given her reported GERD symptoms. She is on Eliquis and will need that to be adequately held prior to EGD/colonoscopy. With her eGFR Eliquis would need to be held for 4 days prior to EGD/colonoscopy although some clearance should be achieved through dialysis that is currently scheduled for tomorrow. Will tentatively plan for EGD/colonoscopy later this week.  - monitor H&H and transfuse as needed            Micah Hunt MD        Senior Attending Physician, Gastroenterology    [facility]    Clinical   [facility] [employer]        HISTORY OF PRESENT ILLNESS:     History " "Of Present Illness:    Rosa Soto is a 69 y.o. female with a significant past medical history of CAD, ESRD on HD, CHF, A-fib, HTN, LEE ANN, bladder cancer, MDD, and YELENA who presented with weakness and fatigue. GI was consulted for \"anemia\".    Today she says that she has been feeling more weak and fatigued recently. This has been accompanied by shortness of breath. She says that she ultimately came to the ER after she was told that her blood counts were low. She has not had any GI symptoms. She specifically denies any abdominal pain, nausea/vomiting, or diarrhea. She has some heartburn. She has not had any GI bleeding. She has not had any hematemesis, hematochezia, or melena. Stool is chronically dark, but no melena. She does say that she had a couple nosebleeds over the weekend with clots. She takes Eliquis and she says that she last took it yesterday.    She says that she did previously have a colonoscopy about 5-10 years ago and she thinks that precancerous polyps were found at that time.      Review of systems:     Patient denies any N/V, dysphagia, odynophagia, abdominal pain, diarrhea, constipation, hematemesis, hematochezia, melena, or weight loss.    I performed a complete 10 point review of systems and it is negative except as noted in HPI or above. All other systems have been reviewed and are negative.        PAST HISTORIES:       Past Medical History:  Patient Active Problem List   Diagnosis    Anaphylactic shock, unspecified, sequela    Anemia    Atrial fibrillation (Multi)    Chronic obstructive pulmonary disease (Multi)    CKD (chronic kidney disease) stage 5, GFR less than 15 ml/min (Multi)    Coagulation defect, unspecified    Lumbosacral spondylosis without myelopathy    End stage renal disease (Multi)    Essential hypertension    Exertional dyspnea    Generalized anxiety disorder    History of colon polyps    Hypercalcemia    Hypokalemia    Insomnia due to mental condition    Iron deficiency anemia, " "unspecified    Malignant neoplasm of bladder, unspecified    Mixed hyperlipidemia    Moderate episode of recurrent major depressive disorder    Morbid obesity (Multi)    Central sleep apnea in conditions classified elsewhere    Obstructive sleep apnea    Pain, unspecified    Pneumonia due to COVID-19 virus    Pure hypercholesterolemia    Restlessness and agitation    Secondary hyperparathyroidism of renal origin (Multi)    Type 2 diabetes mellitus with diabetic chronic kidney disease    A-fib (Multi)    NSTEMI (non-ST elevated myocardial infarction) (Multi)    DM (diabetes mellitus), type 2 (Multi)    Epistaxis    CHF (congestive heart failure), NYHA class I, acute on chronic, combined    Acute blood loss anemia     She has a past medical history of Arrhythmia, Benign essential HTN, Cancer (Multi), Chronic kidney disease, and ESRD (end stage renal disease) (Multi).    She has no past medical history of CHF (congestive heart failure).    Past Surgical History:  She has a past surgical history that includes US guided abdominal paracentesis (8/31/2023); Cardiac catheterization (N/A, 3/6/2024); and Cardiac electrophysiology procedure (N/A, 2/29/2024).      Social History:  She reports that she quit smoking about 3 years ago. Her smoking use included cigarettes. She has never used smokeless tobacco. She reports that she does not currently use alcohol. She reports that she does not use drugs.    Family History:    No family history on file.     Allergies:  Codeine, Ropinirole, and Adhesive      OBJECTIVE:       Last Recorded Vitals:  Vitals:    12/17/24 0503 12/17/24 0900 12/17/24 0930 12/17/24 1000   BP: 139/68 124/52 131/58 134/62   Pulse: 75 73 75 75   Resp: 16 20 16 20   Temp:       TempSrc:       SpO2: 94% 98% 97%    Weight:       Height:         /62   Pulse 75   Temp 36.8 °C (98.2 °F)   Resp 20   Ht 1.702 m (5' 7\")   Wt 116 kg (255 lb 15.3 oz)   SpO2 97%   BMI 40.09 kg/m²      Physical Exam:    Physical " Exam  Vitals reviewed.   Constitutional:       General: She is not in acute distress.     Appearance: She is obese. She is not ill-appearing.   HENT:      Head: Normocephalic and atraumatic.   Eyes:      General: No scleral icterus.  Cardiovascular:      Rate and Rhythm: Normal rate and regular rhythm.      Pulses: Normal pulses.      Heart sounds: Normal heart sounds. No murmur heard.  Pulmonary:      Effort: Pulmonary effort is normal. No respiratory distress.      Breath sounds: Normal breath sounds. No wheezing.   Abdominal:      General: Bowel sounds are normal.      Palpations: Abdomen is soft.      Tenderness: There is no abdominal tenderness. There is no rebound.   Musculoskeletal:         General: No swelling or deformity.   Skin:     General: Skin is warm and dry.      Coloration: Skin is pale. Skin is not jaundiced.      Findings: No rash.   Neurological:      General: No focal deficit present.      Mental Status: She is alert and oriented to person, place, and time.   Psychiatric:         Mood and Affect: Mood normal.         Behavior: Behavior normal.         Thought Content: Thought content normal.         Judgment: Judgment normal.           Inpatient Medications:  amLODIPine, 5 mg, oral, Daily  [Held by provider] apixaban, 5 mg, oral, BID  atorvastatin, 40 mg, oral, Daily  busPIRone, 5 mg, oral, Nightly  doxepin, 100 mg, oral, Nightly  escitalopram, 10 mg, oral, Daily  insulin lispro, 0-5 Units, subcutaneous, q4h  metoprolol succinate XL, 100 mg, oral, q PM  pantoprazole, 40 mg, intravenous, BID      PRN medications: acetaminophen, albuterol, dextrose, dextrose, glucagon, glucagon, melatonin, ondansetron    Outpatient Medications:  Prior to Admission medications    Medication Sig Start Date End Date Taking? Authorizing Provider   acetaminophen (Tylenol) 325 mg tablet Take 3 tablets (975 mg) by mouth every 8 hours if needed (breakthrough pain). 6/6/24   Sangita Downing, DO   albuterol 90  mcg/actuation inhaler Inhale 1 puff every 4 hours if needed for shortness of breath. 6/6/24   Sangita Downing, DO   amLODIPine (Norvasc) 5 mg tablet Take 1 tablet (5 mg) by mouth once daily.    Historical Provider, MD   apixaban (Eliquis) 5 mg tablet Take 1 tablet (5 mg) by mouth 2 times a day.    Historical Provider, MD   atorvastatin (Lipitor) 40 mg tablet Take 1 tablet (40 mg) by mouth once daily.    Historical Provider, MD   Auryxia 210 mg iron tablet Take 3 tablets (630 mg) by mouth 3 times a day. With meals. SWALLOW WHOLE, DO NOT CHEW OR CRUSH MEDICATION 5/10/23   Historical Provider, MD   busPIRone (Buspar) 5 mg tablet Take 1 tablet (5 mg) by mouth once daily at bedtime. 10/17/23   Historical Provider, MD   cholecalciferol (Vitamin D-3) 25 MCG (1000 UT) tablet Take 1 tablet (25 mcg) by mouth once daily. 5/12/23   Historical Provider, MD   cinacalcet (Sensipar) 30 mg tablet Take 1 tablet (30 mg) by mouth once daily. Take with food or shortly afer a meal. Swallow tablet whole; do not break or divide.    Historical Provider, MD   doxepin (SINEquan) 100 mg capsule Take 1 capsule (100 mg) by mouth once daily at bedtime.    Historical Provider, MD   escitalopram (Lexapro) 10 mg tablet Take 1 tablet (10 mg) by mouth once daily.    Historical Provider, MD   metoprolol succinate XL (Toprol-XL) 100 mg 24 hr tablet Take 1 tablet (100 mg) by mouth once daily in the evening. Do not crush or chew. 12/3/24 12/3/25  MATTHIAS Love-CNP   Nephro-Gloria 0.8 mg tablet Take 1 tablet by mouth once daily. 1/24/24   Historical Provider, MD   oxymetazoline (Afrin) 0.05 % nasal spray Administer 2 sprays into each nostril 2 times a day as needed (nose bleeds) for up to 8 doses. Do not use for more than 3 days.  Patient not taking: Reported on 12/17/2024 6/6/24   Sangita Downing DO   torsemide (Demadex) 20 mg tablet Take 1 tablet (20 mg) by mouth 2 times a day. 9/22/09   Historical Provider, MD   vericiguat (Verquvo) 10 mg  "tablet Take 10 mg by mouth once daily.  Patient not taking: Reported on 12/3/2024 11/14/24   FREDY Love   vericiguat (Verquvo) 2.5 mg tablet Take 2.5 mg by mouth once daily. Take for two weeks  Patient not taking: Reported on 12/3/2024 10/15/24   FREDY Love   vericiguat (Verquvo) 5 mg tablet Take 5 mg by mouth once daily. Start after 2 weeks of 2.5mg  Patient not taking: Reported on 12/3/2024 10/15/24   FREDY Love   methoxy peg-epoetin beta (MIRCERA INJ) every 14 (fourteen) days. 8/7/24 12/17/24  Historical Provider, MD   methoxy peg-epoetin beta (MIRCERA INJ) 100 mcg every 14 (fourteen) days. 8/21/24 12/17/24  Historical Provider, MD   predniSONE (Deltasone) 10 mg tablet  10/23/24 12/17/24  Historical Provider, MD       LABS AND IMAGING:     Labs:  Recent labs reviewed in the EMR.    Lab Results   Component Value Date    WBC 9.9 12/17/2024    HGB 7.9 (L) 12/17/2024    HGB 7.9 (L) 12/17/2024    HGB 7.0 (L) 12/17/2024    MCV 93 12/17/2024     12/17/2024     12/16/2024       Lab Results   Component Value Date     12/17/2024    K 4.5 12/17/2024    CL 94 (L) 12/17/2024    BUN 51 (H) 12/17/2024    CREATININE 6.43 (H) 12/17/2024    CREATININE 5.17 (H) 12/16/2024       Lab Results   Component Value Date    BILITOT 1.2 12/17/2024    BILITOT 0.7 12/16/2024    ALKPHOS 87 12/17/2024    ALKPHOS 96 12/16/2024    AST 14 12/17/2024    AST 12 12/16/2024    ALT 9 12/17/2024    ALT 9 12/16/2024    LIPASE 27 12/16/2024       No results found for: \"CRP\", \"CALPS\"      Imaging:    CT abdomen pelvis wo IV contrast    Result Date: 12/16/2024  Interpreted By:  Roula Chow, STUDY: CT ABDOMEN PELVIS WO IV CONTRAST;  12/16/2024 9:45 pm   INDICATION: Signs/Symptoms:acute blood loss anemia, r/o GI bleed.   COMPARISON: 08/30/2023   ACCESSION NUMBER(S): [id number]   ORDERING CLINICIAN: JUVENAL HASTINGS   TECHNIQUE: CT of the abdomen and pelvis was performed.  Standard contiguous axial " images were obtained at 3 mm slice thickness through the abdomen and pelvis. Coronal and sagittal reconstructions at 3 mm slice thickness were performed.   FINDINGS: Limited study without IV contrast.   LOWER CHEST: Bibasilar atelectasis or infiltrate and trace amount of right pleural effusion. Bilateral basilar pleural thickening. These findings are new since previous exam and worse on the right..   ABDOMEN:   LIVER: Microlobulated contours of the liver. No liver masses within limitation of this unenhanced exam.   BILE DUCTS: Biliary system is nondilated.   GALLBLADDER: The gallbladder has been surgically removed.   PANCREAS: No focal lesions. No peripancreatic fat stranding.   SPLEEN: The spleen is normal in size. No focal abnormalities are seen.   ADRENAL GLANDS: The adrenal glands bilaterally within normal limits.   KIDNEYS AND URETERS: Multiple liver masses bilaterally which may represent cysts but incompletely characterized due to small size and lack of IV contrast. The dominant mass within the interpolar region of the right kidney measures approximately 6.1 x 7.2 cm in size and has increased in size since previous exam from 6 cm in greatest dimension. This may represent hemorrhagic complication of the cyst, however, evaluation limited. Similar size but slightly smaller low-attenuation lesion arises from the inferior pole cortex of the right kidney measuring up to 6.6 cm in greatest dimension. This has slightly increased in size since prior from 6.2 cm. There is an isodense lesion adjacent to the interpolar cortex of the left kidney laterally measuring approximate 3.4 cm in size. This measures 41 Hounsfield units in density and has not significantly changed in size since previous exam. Findings most likely represent a hemorrhagic cyst. Exophytic low-density lesion with interpolar cortex of the left kidney laterally measuring up to 3.1 cm in size is overall stable since previous exam. Nonobstructing focal  calcifications within the interpolar and lower pole collecting system of the bilateral kidneys suggestive of nonobstructing calculi. No hydronephrosis or hydroureter bilaterally.   PELVIS:   BLADDER: Within normal limits as distended. No bladder calculi or masses.   REPRODUCTIVE ORGANS: Uterus is present. No pelvic free fluid, masses or lymphadenopathy   BOWEL: The small and large bowel are nondilated.  The appendix is normal. No mesenteric edema or lymphadenopathy.   VESSELS: Diffuse wall calcification of the aorta and its main branches. No aneurysm or dissection within limitation of this exam.   PERITONEUM/RETROPERITONEUM/LYMPH NODES: No retroperitoneal masses are seen.  No lymphadenopathy.   BONES AND ABDOMINAL WALL: There is no evidence for destructive lytic or blastic bone lesions identified.  No abdominal wall masses or hernias are identified.       1.  Cirrhotic appearing liver but no liver masses within this limited study. Recommend clinical correlation and with liver function tests. 2. Numerous bilateral renal masses as above some of which increased in size since prior, incompletely characterized due to small size and lack of IV contrast. Correlation with ultrasound an or MRI findings recommended. 3. Nonobstructing bilateral renal calculi. No hydronephrosis or hydroureter bilaterally. 4. Mild colonic diverticulosis but no CT evidence for acute diverticulitis. No signs of bowel obstruction or free air. 5. Bibasilar atelectasis or infiltrate and trace amount of right pleural effusion as well as bilateral pleural thickening. Recommend clinical correlation and follow-up to document resolution. 6. No evidence for hyperdense intestinal fluid is seen to suggest active GI bleed although evaluation is limited without IV contrast.   Critical Finding:  See findings. Notification was initiated on 12/16/2024 at 9:01 pm by  Roula Chow.  (**-YCF-**) Instructions:   Signed by: Roula Chow 12/16/2024 9:55 PM Dictation  workstation:   [id number]

## 2024-12-17 NOTE — CARE PLAN
The patient's goals for the shift include      The clinical goals for the shift include pox > 93%      Problem: Diabetes  Goal: Achieve decreasing blood glucose levels by end of shift  Outcome: Progressing  Goal: Increase stability of blood glucose readings by end of shift  Outcome: Progressing  Goal: Decrease in ketones present in urine by end of shift  Outcome: Progressing  Goal: Maintain electrolyte levels within acceptable range throughout shift  Outcome: Progressing  Goal: Maintain glucose levels >70mg/dl to <250mg/dl throughout shift  Outcome: Progressing  Goal: No changes in neurological exam by end of shift  Outcome: Progressing  Goal: Learn about and adhere to nutrition recommendations by end of shift  Outcome: Progressing  Goal: Vital signs within normal range for age by end of shift  Outcome: Progressing  Goal: Increase self care and/or family involovement by end of shift  Outcome: Progressing  Goal: Receive DSME education by end of shift  Outcome: Progressing     Problem: Pain - Adult  Goal: Verbalizes/displays adequate comfort level or baseline comfort level  Outcome: Progressing     Problem: Safety - Adult  Goal: Free from fall injury  Outcome: Progressing     Problem: Discharge Planning  Goal: Discharge to home or other facility with appropriate resources  Outcome: Progressing     Problem: Chronic Conditions and Co-morbidities  Goal: Patient's chronic conditions and co-morbidity symptoms are monitored and maintained or improved  Outcome: Progressing

## 2024-12-17 NOTE — PROGRESS NOTES
Rosa Soto [id number]   Service: Internal Medicine / Hospitalist Date of service: 12/17/2024                          Full Code        Subjective    History Of Present Illness (HPI):  Rosa Soto is a 69 y.o. female with PMHx s/f HTN, NSTEMI, A-fib s/p RFA on 2/29/2024 and on Eliquis, HLD, chronic diastolic heart failure, ESRD on dialysis (MW, refused to go on fridays), iron deficiency anemia, bladder cancer s/p resection, moderate recurrent MDD, YELENA presenting with low hemoglobin, generalized weakness.  She states she has an ongoing generalized weakness for 3 weeks and worsened after dialysis today. She was so weak that she couldn't walk and was brought into ED with a wheelchair. Has associated lightheadedness. She notes of shortness of breath with exertion with walking short distances. She had an epistaxis on 6/5/24 and notes she had a recurrence of it yesterday and today. She has been using afrin and manual pressure but notes of multiple blood clots coming out with blowing her nose. She denies hematochezia, BRBPR. She reports of dark green coffee ground-like stools and that she takes auryxia daily. Denies history of UC, Crohn's, recent peptobismol intakes.      ED Course (Summary - please note all labs, imaging studies, and interventions noted below have been personally reviewed and/or interpreted on day of admission):   Vitals on presentation: 97.0 F, 98 bpm, 20 RR, 140/60, 95% on RA  Labs: CMP-glucose 155, potassium 3.0, bicarb 34, BUN/creatinine 22/5.17, EGFR 9  Lactate 2.1-1.1, INR/PT 1.7/19.0  Troponin flat 16-15  CBC-WBC 13.3, hemoglobin 5.4-3.5, , neutrophils absolute 10.03  EKG: A-fib at 96 bpm  Imaging: CXR - Borderline cardiomegaly with prominent central pulmonary vascularity. This is improved from the prior chest x-ray and there is no overt interstitial or alveolar edema. The lungs are clear.   Interventions: 3 units of PRBC, admission for further management       12/17...............Patient  seen and examined emergency room department patient is status post transfusion it appears of 3 days of packed RBCs.  She admitted to history of epistaxis, with recent clots coming from her nose up.  She states that she applied Afrin to help the situation.  No reported: Melena, hematochezia, hematuria, chest pains, palpitations, syncope or presyncope.  Reported history of GERD.        Review of Systems:   Review of system otherwise negative if not aforementioned above in subjective.    Objective    Physical Exam     Constitutional:       Appearance: Patient appeared in no acute cardiopulmonary distress.     Comments: Patient alert and oriented to person place time and situation.  HEENT:      Head: Normocephalic and atraumatic.Trachea midline      Nose:No observed congestion or rhinorrhea.     Mouth/Throat: Mucous membranes Moist, Trachea appeared  midline.  Eyes:      Extraocular Movements: Extraocular movements intact.      Pupils: Pupils are equal, round, and reactive to light.      Comments: No scleral icterus or conjunctival injection appreciated.   Cardiovascular:      Rate and Rhythm: Irregular rate if and rhythm with acceptable ventricular response No clicks rubs or gallops, normal S2.No peripheral stigmata of endocarditis appreciated.     Pulmonary:      Lungs appeared clear to auscultation, no adventitious sound appreciated.  Abdominal:      General: Abdomen soft, obese,nontender, active bowel sounds, no involuntary guarding or rebound tenderness appreciated.     Comments: None   Musculoskeletal:       Patient appeared to have full active range of motion for upper and lower extremities, no acute apparent joint deformity appreciated on examination.   No pitting edema or cyanosis appreciated.       Lymphadenopathy:      No appreciable palpable lymphadenopathy  Skin:     General: Skin is warm.      Coloration:  No jaundice     Findings: No abnormal appearing skin rashes or lesions that appeared acute noted on  unclothed area of the skin..   Neurological:      General: No focal sensory or motor deficits appreciated, no meningeal signs or dysmetria noted.      Cranial Nerves: Cranial nerves II to XII appearing grossly intact.     Genitals:  Deferred  Psychiatric:         The patient appears to be displaying normal mood and affect at the time of evaluation.    Labs:     Lab Results   Component Value Date    GLUCOSE 139 (H) 12/17/2024    CALCIUM 7.8 (L) 12/17/2024     12/17/2024    K 4.5 12/17/2024    CO2 32 12/17/2024    CL 94 (L) 12/17/2024    BUN 51 (H) 12/17/2024    CREATININE 6.43 (H) 12/17/2024      Lab Results   Component Value Date    WBC 9.9 12/17/2024    HGB 7.9 (L) 12/17/2024    HGB 7.9 (L) 12/17/2024    HCT 24.8 (L) 12/17/2024    HCT 24.8 (L) 12/17/2024    MCV 93 12/17/2024     12/17/2024      [unfilled]   [unfilled]   No results found for the last 90 days.              X-rays/ Images    [unfilled]   Radiology Results (last 21 days)    Procedure Component Value Units Date/Time   CT abdomen pelvis wo IV contrast [[id number]] Collected: 12/16/24 2156   Order Status: Completed Updated: 12/16/24 2156   Narrative:     Interpreted By:  Roula Chow,  STUDY:  CT ABDOMEN PELVIS WO IV CONTRAST;  12/16/2024 9:45 pm      INDICATION:  Signs/Symptoms:acute blood loss anemia, r/o GI bleed.      COMPARISON:  08/30/2023      ACCESSION NUMBER(S):  [id number]      ORDERING CLINICIAN:  JUVENAL HASTINGS      TECHNIQUE:  CT of the abdomen and pelvis was performed.  Standard contiguous  axial images were obtained at 3 mm slice thickness through the  abdomen and pelvis. Coronal and sagittal reconstructions at 3 mm  slice thickness were performed.      FINDINGS:  Limited study without IV contrast.      LOWER CHEST:  Bibasilar atelectasis or infiltrate and trace amount of right pleural  effusion. Bilateral basilar pleural thickening. These findings are  new since previous exam and worse on the right..      ABDOMEN:       LIVER:  Microlobulated contours of the liver. No liver masses within  limitation of this unenhanced exam.      BILE DUCTS:  Biliary system is nondilated.      GALLBLADDER:  The gallbladder has been surgically removed.      PANCREAS:  No focal lesions. No peripancreatic fat stranding.      SPLEEN:  The spleen is normal in size. No focal abnormalities are seen.      ADRENAL GLANDS:  The adrenal glands bilaterally within normal limits.      KIDNEYS AND URETERS:  Multiple liver masses bilaterally which may represent cysts but  incompletely characterized due to small size and lack of IV contrast.  The dominant mass within the interpolar region of the right kidney  measures approximately 6.1 x 7.2 cm in size and has increased in size  since previous exam from 6 cm in greatest dimension. This may  represent hemorrhagic complication of the cyst, however, evaluation  limited. Similar size but slightly smaller low-attenuation lesion  arises from the inferior pole cortex of the right kidney measuring up  to 6.6 cm in greatest dimension. This has slightly increased in size  since prior from 6.2 cm. There is an isodense lesion adjacent to the  interpolar cortex of the left kidney laterally measuring approximate  3.4 cm in size. This measures 41 Hounsfield units in density and has  not significantly changed in size since previous exam. Findings most  likely represent a hemorrhagic cyst. Exophytic low-density lesion  with interpolar cortex of the left kidney laterally measuring up to  3.1 cm in size is overall stable since previous exam. Nonobstructing  focal calcifications within the interpolar and lower pole collecting  system of the bilateral kidneys suggestive of nonobstructing calculi.  No hydronephrosis or hydroureter bilaterally.      PELVIS:      BLADDER:  Within normal limits as distended. No bladder calculi or masses.      REPRODUCTIVE ORGANS:  Uterus is present. No pelvic free fluid, masses or lymphadenopathy       BOWEL:  The small and large bowel are nondilated.  The appendix is normal. No  mesenteric edema or lymphadenopathy.      VESSELS:  Diffuse wall calcification of the aorta and its main branches. No  aneurysm or dissection within limitation of this exam.      PERITONEUM/RETROPERITONEUM/LYMPH NODES:  No retroperitoneal masses are seen.  No lymphadenopathy.      BONES AND ABDOMINAL WALL:  There is no evidence for destructive lytic or blastic bone lesions  identified.  No abdominal wall masses or hernias are identified.       Impression:     1.  Cirrhotic appearing liver but no liver masses within this limited  study. Recommend clinical correlation and with liver function tests.  2. Numerous bilateral renal masses as above some of which increased  in size since prior, incompletely characterized due to small size and  lack of IV contrast. Correlation with ultrasound an or MRI findings  recommended.  3. Nonobstructing bilateral renal calculi. No hydronephrosis or  hydroureter bilaterally.  4. Mild colonic diverticulosis but no CT evidence for acute  diverticulitis. No signs of bowel obstruction or free air.  5. Bibasilar atelectasis or infiltrate and trace amount of right  pleural effusion as well as bilateral pleural thickening. Recommend  clinical correlation and follow-up to document resolution.  6. No evidence for hyperdense intestinal fluid is seen to suggest  active GI bleed although evaluation is limited without IV contrast.      Critical Finding:  See findings. Notification was initiated on  12/16/2024 at 9:01 pm by  Roula Chow.  (**-YCF-**) Instructions:      Signed by: Roula Chow 12/16/2024 9:55 PM  Dictation workstation:   [id number]   XR chest 1 view [[id number]] Collected: 12/16/24 1614   Order Status: Completed Updated: 12/16/24 1708   Narrative:     STUDY:  Chest Radiograph;  12/16/2024 4:07PM  INDICATION:  Shortness of breath.  COMPARISON:  8/25/2024 XR Chest, 3/28/2024 XR Chest, 3/2/2024 XR  Chest  ACCESSION NUMBER(S):  [id number]  ORDERING CLINICIAN:  KATIE ENCARNACION  TECHNIQUE:  Frontal chest was obtained at 16:06 hours.  FINDINGS:  CARDIOMEDIASTINAL SILHOUETTE:  The cardiac silhouette approaches upper limits of normal in size on  today's study and there is some mild or borderline central vascular  congestion but it is improved when compared to the prior chest x-ray  and there is no interstitial or alveolar edema..     LUNGS:  Lungs are clear.  There is no consolidation effusion or pneumothorax.     ABDOMEN:  No remarkable upper abdominal findings.     BONES:  No acute osseous changes.   Impression:     Borderline cardiomegaly with prominent central pulmonary vascularity.  This is improved from the prior chest x-ray and there is no overt  interstitial or alveolar edema.  The lungs are clear..  Signed by Collin Ruiz MD             Medical Problems       Problem List       * (Principal) Acute blood loss anemia    Anaphylactic shock, unspecified, sequela    Anemia    Atrial fibrillation (Multi)    Chronic obstructive pulmonary disease (Multi)    CKD (chronic kidney disease) stage 5, GFR less than 15 ml/min (Multi)    Coagulation defect, unspecified    Lumbosacral spondylosis without myelopathy    End stage renal disease (Multi)    Essential hypertension    Exertional dyspnea    Generalized anxiety disorder    History of colon polyps    Overview Signed 11/8/2023 12:11 PM by Arlen Aceves RN     Formatting of this note might be different from the original. 8/29/2019 colonoscopy - mid ascending colon polyp bx/cauterization (inflammatory polyp), proximal transverse colon polyp bx/cauterized (tubular adenoma), descending colon polyp 80 cm from anus bx/cauterized (hyperplastic polyp), sigmoid colon polyp 35 cm from anus bx/cauterized (tubular adenoma), sigmoid colon polyps 30 cm from anus bx/cauterized (tubular adenoma), Dr. Castaneda [facility], recommended repeat colonoscopy 3 years         Hypercalcemia     Hypokalemia    Insomnia due to mental condition    Iron deficiency anemia, unspecified    Malignant neoplasm of bladder, unspecified    Mixed hyperlipidemia    Moderate episode of recurrent major depressive disorder    Morbid obesity (Multi)    Central sleep apnea in conditions classified elsewhere    Obstructive sleep apnea    Pain, unspecified    Pneumonia due to COVID-19 virus    Pure hypercholesterolemia    Restlessness and agitation    Secondary hyperparathyroidism of renal origin (Multi)    Type 2 diabetes mellitus with diabetic chronic kidney disease    A-fib (Multi)    DM (diabetes mellitus), type 2 (Multi)    Epistaxis    CHF (congestive heart failure), NYHA class I, acute on chronic, combined    NSTEMI (non-ST elevated myocardial infarction) (Multi)               Above medical problems may be reflective of historical medical problems that may have resolved and may not related to acute clinical condition/medical problems.    Clinical impression/plan:      Assessment/Plan  69 y.o. female with PMHx s/f HTN, NSTEMI, A-fib s/p RFA on 2/29/2024 and on Eliquis, HLD, chronic diastolic heart failure, ESRD on dialysis (MW, refused to go on fridays), iron deficiency anemia, bladder cancer s/p resection, moderate recurrent MDD, YELENA presenting with low hemoglobin, generalized weakness.     Acute blood loss anemia  -possible dark stools from swallowing blood from epistaxis  -CT AP wo ordered  -hgb 3.5 --> started 3 units of PRBCs transfusion in ED --> repeat pending  -lactate 2.1-1.1; suspect d/t blood loss, she's normotensive currently  -trend H&H q6h, hold eliquis  -NPO  -protonix 40 mg BID  -GI consult appreciated     Leukocytosis  -no cough/sputum, fever, no PNA on CXR  -UA pending     Hypokalemia  -replacement ordered, replete PRN     ESRD on dialysis  -received a full session of dialysis today  -Monday Wednesday Friday [facility]  -Access is AV fistula right arm   -noncompliance and refused to go on  Fridays  -nephrology consult     A-fib s/p RFA on 2/29/2024  -hold eliquis given acute blood loss anemia  -continue rate control     HTN, HLD, Chronic diastolic HF  -does not appear to be overtly volume overloaded on exam  -Continue home medications with hold parameters  -Monitor and adjust as needed while admitted      DM-II with hyperglycemia  -A1c of 6.7 3 months ago  -not on home meds/insulin currently  -Continue with SSI ACHS for now  -Accucheks, hypoglycemic protocol   -Monitor and adjust as needed     Epistaxis    Continue supportive care     Morbid obesity  -Severe obesity requiring increased utilization of hospital resources as demonstrated by BMI 40.09     Diet: NPO  DVT Prophylaxis: SCDs, hold eliquis given acute anemia  Code Status: full code        Disposition/additional care plan/interventions : 12/17/2024      She appeared hemodynamically stable monitor closely if clinical signs of bleeding hold/de-escalate antihypertensive therapy accordingly.  Acute on chronic blood loss anemia    Blood transfusion as clinically warranted continue monitor H&H CBC in the a.m.    Tamponade strategies for epistaxis.  Rhino Rocket to be on the floor discussed with patient's nurse.    Continue Afrin    Suspected stress-induced neutrophilia    Renal replacement therapy as scheduled, discussed with my nephrology colleague.    Care plan discussed with gastroenterology Dr. Hunt via secure chat.  Will hold Eliquis in light of tentative plans for EGD and colonoscopy and GI source should be ruled out despite no overt signs of GI bleeding at this juncture.      In addition patient should follow-up with ENT concerning recurrent nosebleeds.      Cirrhotic appearing liver on CT imaging follow-up with family physician and GI recommended    The patient was informed of differential diagnosis , work up , plan of care and possible sequelae of clinical disposition.Patient in agreement with plan of care. Further recommendations  forthcoming in accordance with patient's clinical disposition and response to care.    Discharge planning:Discharge timing to be determined, with plans for colonoscopy and need for holding anticoagulation hospital course may be prolonged.    Care time: > 55  mins           Dictation performed with assistance of voice recognition device therefore transcription errors are possible.

## 2024-12-17 NOTE — PROGRESS NOTES
Rosa Soto is a 69 y.o. female admitted for Acute blood loss anemia. Pharmacy reviewed the patient's [id number] medications and allergies for accuracy.    The list below reflects the PTA list prior to pharmacy medication history. A summary a changes to the PTA medication list has been listed below. Please review each medication in order reconciliation for additional clarification and justification.    Source of information: t2p     Medications added:    Medications modified:  Auryxia iron tablet -2 tid with meals --> 3 t tid with meals     Medications to be removed:  Mircera inj- x2  Afrin nasal spray   Prednisone 10mg   Verquvo 2.5mg  Verquvo 5mg     Medications of concern:  Verquvo 10mg- pt stopped, said it made her heart feel weird       Prior to Admission Medications   Prescriptions Last Dose Informant Patient Reported? Taking?   Auryxia 210 mg iron tablet  Self Yes No   Sig: TAKE 2 TABLETS BY MOUTH THREE TIMES A DAY WITH MEALS. SWALLOW WHOLE, DO NOT CHEW OR CRUSH MEDICATION   Nephro-Gloria 0.8 mg tablet  Self Yes No   Sig: Take 1 tablet by mouth once daily.   acetaminophen (Tylenol) 325 mg tablet   No No   Sig: Take 3 tablets (975 mg) by mouth every 8 hours if needed (breakthrough pain).   albuterol 90 mcg/actuation inhaler   No No   Sig: Inhale 1 puff every 4 hours if needed for shortness of breath.   amLODIPine (Norvasc) 5 mg tablet  Self Yes No   Sig: Take 1 tablet (5 mg) by mouth once daily.   apixaban (Eliquis) 5 mg tablet   Yes No   Sig: Take 1 tablet (5 mg) by mouth 2 times a day.   atorvastatin (Lipitor) 40 mg tablet  Self Yes No   Sig: Take 1 tablet (40 mg) by mouth once daily.   busPIRone (Buspar) 5 mg tablet  Self Yes No   Sig: Take 1 tablet (5 mg) by mouth once daily at bedtime.   cholecalciferol (Vitamin D-3) 25 MCG (1000 UT) tablet  Self Yes No   Sig: Take 1 tablet (25 mcg) by mouth once daily.   cinacalcet (Sensipar) 30 mg tablet  Self Yes No   Sig: Take 1 tablet (30 mg) by mouth once  daily. Take with food or shortly afer a meal. Swallow tablet whole; do not break or divide.   doxepin (SINEquan) 100 mg capsule   Yes No   Sig: Take 1 capsule (100 mg) by mouth once daily at bedtime.   escitalopram (Lexapro) 10 mg tablet   Yes No   Sig: Take 1 tablet (10 mg) by mouth once daily.   methoxy peg-epoetin beta (MIRCERA INJ)   Yes No   Sig: every 14 (fourteen) days.   methoxy peg-epoetin beta (MIRCERA INJ)   Yes No   Si mcg every 14 (fourteen) days.   metoprolol succinate XL (Toprol-XL) 100 mg 24 hr tablet   No No   Sig: Take 1 tablet (100 mg) by mouth once daily in the evening. Do not crush or chew.   oxymetazoline (Afrin) 0.05 % nasal spray   No No   Sig: Administer 2 sprays into each nostril 2 times a day as needed (nose bleeds) for up to 8 doses. Do not use for more than 3 days.   predniSONE (Deltasone) 10 mg tablet   Yes No   torsemide (Demadex) 20 mg tablet  Self Yes No   Sig: Take 1 tablet (20 mg) by mouth 2 times a day.   vericiguat (Verquvo) 10 mg tablet   No No   Sig: Take 10 mg by mouth once daily.   Patient not taking: Reported on 12/3/2024   vericiguat (Verquvo) 2.5 mg tablet   No No   Sig: Take 2.5 mg by mouth once daily. Take for two weeks   Patient not taking: Reported on 12/3/2024   vericiguat (Verquvo) 5 mg tablet   No No   Sig: Take 5 mg by mouth once daily. Start after 2 weeks of 2.5mg   Patient not taking: Reported on 12/3/2024      Facility-Administered Medications: None       GARY HERRERA

## 2024-12-17 NOTE — H&P (VIEW-ONLY)
"Inpatient consult to gastroenterology  Consult performed by: Micah Hunt MD  Consult ordered by: Flavio Ojeda PA-C          Reason For Consult  \"anemia\"      [facility] Gastroenterology Consultation Note    ASSESSMENT and PLAN:       Rosa Soto is a 69 y.o. female with a significant past medical history of CAD, ESRD on HD, CHF, A-fib, HTN, LEE ANN, bladder cancer, MDD, and YELENA who presented with weakness and fatigue. GI was consulted for \"anemia\".       Anemia  Longstanding anemia that markedly worsened recently. No overt GI bleeding. She did have some epistaxis recently which may be contributing. She has responded to transfusions since admission. Fecal occult testing was inappropriately ordered for work up of anemia and as that test is not validated in this setting the result should be ignored. No recent iron studies and MCV prior to transfusion was actually macrocytic rather than the microcytic anemia that would be expected in iron deficiency anemia. With her marked anemia will plan for EGD/colonoscopy to rule out underlying GI lesion, but ultimately there is no evidence of acute GI bleeding. She was started on Pantoprazole (IV twice daily), but there is no indication for that medication/dosing regimen. Okay to continue once daily PPI (oral) given her reported GERD symptoms. She is on Eliquis and will need that to be adequately held prior to EGD/colonoscopy. With her eGFR Eliquis would need to be held for 4 days prior to EGD/colonoscopy although some clearance should be achieved through dialysis that is currently scheduled for tomorrow. Will tentatively plan for EGD/colonoscopy later this week.  - monitor H&H and transfuse as needed            Micah Hunt MD        Senior Attending Physician, Gastroenterology    [facility]    Clinical   [facility] [employer]        HISTORY OF PRESENT ILLNESS:     History " "Of Present Illness:    Rosa Soto is a 69 y.o. female with a significant past medical history of CAD, ESRD on HD, CHF, A-fib, HTN, LEE ANN, bladder cancer, MDD, and YELENA who presented with weakness and fatigue. GI was consulted for \"anemia\".    Today she says that she has been feeling more weak and fatigued recently. This has been accompanied by shortness of breath. She says that she ultimately came to the ER after she was told that her blood counts were low. She has not had any GI symptoms. She specifically denies any abdominal pain, nausea/vomiting, or diarrhea. She has some heartburn. She has not had any GI bleeding. She has not had any hematemesis, hematochezia, or melena. Stool is chronically dark, but no melena. She does say that she had a couple nosebleeds over the weekend with clots. She takes Eliquis and she says that she last took it yesterday.    She says that she did previously have a colonoscopy about 5-10 years ago and she thinks that precancerous polyps were found at that time.      Review of systems:     Patient denies any N/V, dysphagia, odynophagia, abdominal pain, diarrhea, constipation, hematemesis, hematochezia, melena, or weight loss.    I performed a complete 10 point review of systems and it is negative except as noted in HPI or above. All other systems have been reviewed and are negative.        PAST HISTORIES:       Past Medical History:  Patient Active Problem List   Diagnosis   • Anaphylactic shock, unspecified, sequela   • Anemia   • Atrial fibrillation (Multi)   • Chronic obstructive pulmonary disease (Multi)   • CKD (chronic kidney disease) stage 5, GFR less than 15 ml/min (Multi)   • Coagulation defect, unspecified   • Lumbosacral spondylosis without myelopathy   • End stage renal disease (Multi)   • Essential hypertension   • Exertional dyspnea   • Generalized anxiety disorder   • History of colon polyps   • Hypercalcemia   • Hypokalemia   • Insomnia due to mental condition   • Iron " "deficiency anemia, unspecified   • Malignant neoplasm of bladder, unspecified   • Mixed hyperlipidemia   • Moderate episode of recurrent major depressive disorder   • Morbid obesity (Multi)   • Central sleep apnea in conditions classified elsewhere   • Obstructive sleep apnea   • Pain, unspecified   • Pneumonia due to COVID-19 virus   • Pure hypercholesterolemia   • Restlessness and agitation   • Secondary hyperparathyroidism of renal origin (Multi)   • Type 2 diabetes mellitus with diabetic chronic kidney disease   • A-fib (Multi)   • NSTEMI (non-ST elevated myocardial infarction) (Multi)   • DM (diabetes mellitus), type 2 (Multi)   • Epistaxis   • CHF (congestive heart failure), NYHA class I, acute on chronic, combined   • Acute blood loss anemia     She has a past medical history of Arrhythmia, Benign essential HTN, Cancer (Multi), Chronic kidney disease, and ESRD (end stage renal disease) (Multi).    She has no past medical history of CHF (congestive heart failure).    Past Surgical History:  She has a past surgical history that includes US guided abdominal paracentesis (8/31/2023); Cardiac catheterization (N/A, 3/6/2024); and Cardiac electrophysiology procedure (N/A, 2/29/2024).      Social History:  She reports that she quit smoking about 3 years ago. Her smoking use included cigarettes. She has never used smokeless tobacco. She reports that she does not currently use alcohol. She reports that she does not use drugs.    Family History:    No family history on file.     Allergies:  Codeine, Ropinirole, and Adhesive      OBJECTIVE:       Last Recorded Vitals:  Vitals:    12/17/24 0503 12/17/24 0900 12/17/24 0930 12/17/24 1000   BP: 139/68 124/52 131/58 134/62   Pulse: 75 73 75 75   Resp: 16 20 16 20   Temp:       TempSrc:       SpO2: 94% 98% 97%    Weight:       Height:         /62   Pulse 75   Temp 36.8 °C (98.2 °F)   Resp 20   Ht 1.702 m (5' 7\")   Wt 116 kg (255 lb 15.3 oz)   SpO2 97%   BMI 40.09 " kg/m²      Physical Exam:    Physical Exam  Vitals reviewed.   Constitutional:       General: She is not in acute distress.     Appearance: She is obese. She is not ill-appearing.   HENT:      Head: Normocephalic and atraumatic.   Eyes:      General: No scleral icterus.  Cardiovascular:      Rate and Rhythm: Normal rate and regular rhythm.      Pulses: Normal pulses.      Heart sounds: Normal heart sounds. No murmur heard.  Pulmonary:      Effort: Pulmonary effort is normal. No respiratory distress.      Breath sounds: Normal breath sounds. No wheezing.   Abdominal:      General: Bowel sounds are normal.      Palpations: Abdomen is soft.      Tenderness: There is no abdominal tenderness. There is no rebound.   Musculoskeletal:         General: No swelling or deformity.   Skin:     General: Skin is warm and dry.      Coloration: Skin is pale. Skin is not jaundiced.      Findings: No rash.   Neurological:      General: No focal deficit present.      Mental Status: She is alert and oriented to person, place, and time.   Psychiatric:         Mood and Affect: Mood normal.         Behavior: Behavior normal.         Thought Content: Thought content normal.         Judgment: Judgment normal.           Inpatient Medications:  amLODIPine, 5 mg, oral, Daily  [Held by provider] apixaban, 5 mg, oral, BID  atorvastatin, 40 mg, oral, Daily  busPIRone, 5 mg, oral, Nightly  doxepin, 100 mg, oral, Nightly  escitalopram, 10 mg, oral, Daily  insulin lispro, 0-5 Units, subcutaneous, q4h  metoprolol succinate XL, 100 mg, oral, q PM  pantoprazole, 40 mg, intravenous, BID      PRN medications: acetaminophen, albuterol, dextrose, dextrose, glucagon, glucagon, melatonin, ondansetron    Outpatient Medications:  Prior to Admission medications    Medication Sig Start Date End Date Taking? Authorizing Provider   acetaminophen (Tylenol) 325 mg tablet Take 3 tablets (975 mg) by mouth every 8 hours if needed (breakthrough pain). 6/6/24   Samantha  Mao Downing, DO   albuterol 90 mcg/actuation inhaler Inhale 1 puff every 4 hours if needed for shortness of breath. 6/6/24   Sangita Downing, DO   amLODIPine (Norvasc) 5 mg tablet Take 1 tablet (5 mg) by mouth once daily.    Historical Provider, MD   apixaban (Eliquis) 5 mg tablet Take 1 tablet (5 mg) by mouth 2 times a day.    Historical Provider, MD   atorvastatin (Lipitor) 40 mg tablet Take 1 tablet (40 mg) by mouth once daily.    Historical Provider, MD   Auryxia 210 mg iron tablet Take 3 tablets (630 mg) by mouth 3 times a day. With meals. SWALLOW WHOLE, DO NOT CHEW OR CRUSH MEDICATION 5/10/23   Historical Provider, MD   busPIRone (Buspar) 5 mg tablet Take 1 tablet (5 mg) by mouth once daily at bedtime. 10/17/23   Historical Provider, MD   cholecalciferol (Vitamin D-3) 25 MCG (1000 UT) tablet Take 1 tablet (25 mcg) by mouth once daily. 5/12/23   Historical Provider, MD   cinacalcet (Sensipar) 30 mg tablet Take 1 tablet (30 mg) by mouth once daily. Take with food or shortly afer a meal. Swallow tablet whole; do not break or divide.    Historical Provider, MD   doxepin (SINEquan) 100 mg capsule Take 1 capsule (100 mg) by mouth once daily at bedtime.    Historical Provider, MD   escitalopram (Lexapro) 10 mg tablet Take 1 tablet (10 mg) by mouth once daily.    Historical Provider, MD   metoprolol succinate XL (Toprol-XL) 100 mg 24 hr tablet Take 1 tablet (100 mg) by mouth once daily in the evening. Do not crush or chew. 12/3/24 12/3/25  Caroline Martin, APRN-CNP   Nephro-Gloria 0.8 mg tablet Take 1 tablet by mouth once daily. 1/24/24   Historical Provider, MD   oxymetazoline (Afrin) 0.05 % nasal spray Administer 2 sprays into each nostril 2 times a day as needed (nose bleeds) for up to 8 doses. Do not use for more than 3 days.  Patient not taking: Reported on 12/17/2024 6/6/24   Samantha Cavanaugh Chang, DO   torsemide (Demadex) 20 mg tablet Take 1 tablet (20 mg) by mouth 2 times a day. 9/22/09   Historical Provider, MD  "  vericiguat (Verquvo) 10 mg tablet Take 10 mg by mouth once daily.  Patient not taking: Reported on 12/3/2024 11/14/24   FREDY Love   vericiguat (Verquvo) 2.5 mg tablet Take 2.5 mg by mouth once daily. Take for two weeks  Patient not taking: Reported on 12/3/2024 10/15/24   FREDY Love   vericiguat (Verquvo) 5 mg tablet Take 5 mg by mouth once daily. Start after 2 weeks of 2.5mg  Patient not taking: Reported on 12/3/2024 10/15/24   FREDY Love   methoxy peg-epoetin beta (MIRCERA INJ) every 14 (fourteen) days. 8/7/24 12/17/24  Historical Provider, MD   methoxy peg-epoetin beta (MIRCERA INJ) 100 mcg every 14 (fourteen) days. 8/21/24 12/17/24  Historical Provider, MD   predniSONE (Deltasone) 10 mg tablet  10/23/24 12/17/24  Historical Provider, MD       LABS AND IMAGING:     Labs:  Recent labs reviewed in the EMR.    Lab Results   Component Value Date    WBC 9.9 12/17/2024    HGB 7.9 (L) 12/17/2024    HGB 7.9 (L) 12/17/2024    HGB 7.0 (L) 12/17/2024    MCV 93 12/17/2024     12/17/2024     12/16/2024       Lab Results   Component Value Date     12/17/2024    K 4.5 12/17/2024    CL 94 (L) 12/17/2024    BUN 51 (H) 12/17/2024    CREATININE 6.43 (H) 12/17/2024    CREATININE 5.17 (H) 12/16/2024       Lab Results   Component Value Date    BILITOT 1.2 12/17/2024    BILITOT 0.7 12/16/2024    ALKPHOS 87 12/17/2024    ALKPHOS 96 12/16/2024    AST 14 12/17/2024    AST 12 12/16/2024    ALT 9 12/17/2024    ALT 9 12/16/2024    LIPASE 27 12/16/2024       No results found for: \"CRP\", \"CALPS\"      Imaging:    CT abdomen pelvis wo IV contrast    Result Date: 12/16/2024  Interpreted By:  Roula Chow, STUDY: CT ABDOMEN PELVIS WO IV CONTRAST;  12/16/2024 9:45 pm   INDICATION: Signs/Symptoms:acute blood loss anemia, r/o GI bleed.   COMPARISON: 08/30/2023   ACCESSION NUMBER(S): [id number]   ORDERING CLINICIAN: JUVENAL HASTINGS   TECHNIQUE: CT of the abdomen and pelvis was performed.  " Standard contiguous axial images were obtained at 3 mm slice thickness through the abdomen and pelvis. Coronal and sagittal reconstructions at 3 mm slice thickness were performed.   FINDINGS: Limited study without IV contrast.   LOWER CHEST: Bibasilar atelectasis or infiltrate and trace amount of right pleural effusion. Bilateral basilar pleural thickening. These findings are new since previous exam and worse on the right..   ABDOMEN:   LIVER: Microlobulated contours of the liver. No liver masses within limitation of this unenhanced exam.   BILE DUCTS: Biliary system is nondilated.   GALLBLADDER: The gallbladder has been surgically removed.   PANCREAS: No focal lesions. No peripancreatic fat stranding.   SPLEEN: The spleen is normal in size. No focal abnormalities are seen.   ADRENAL GLANDS: The adrenal glands bilaterally within normal limits.   KIDNEYS AND URETERS: Multiple liver masses bilaterally which may represent cysts but incompletely characterized due to small size and lack of IV contrast. The dominant mass within the interpolar region of the right kidney measures approximately 6.1 x 7.2 cm in size and has increased in size since previous exam from 6 cm in greatest dimension. This may represent hemorrhagic complication of the cyst, however, evaluation limited. Similar size but slightly smaller low-attenuation lesion arises from the inferior pole cortex of the right kidney measuring up to 6.6 cm in greatest dimension. This has slightly increased in size since prior from 6.2 cm. There is an isodense lesion adjacent to the interpolar cortex of the left kidney laterally measuring approximate 3.4 cm in size. This measures 41 Hounsfield units in density and has not significantly changed in size since previous exam. Findings most likely represent a hemorrhagic cyst. Exophytic low-density lesion with interpolar cortex of the left kidney laterally measuring up to 3.1 cm in size is overall stable since previous exam.  Nonobstructing focal calcifications within the interpolar and lower pole collecting system of the bilateral kidneys suggestive of nonobstructing calculi. No hydronephrosis or hydroureter bilaterally.   PELVIS:   BLADDER: Within normal limits as distended. No bladder calculi or masses.   REPRODUCTIVE ORGANS: Uterus is present. No pelvic free fluid, masses or lymphadenopathy   BOWEL: The small and large bowel are nondilated.  The appendix is normal. No mesenteric edema or lymphadenopathy.   VESSELS: Diffuse wall calcification of the aorta and its main branches. No aneurysm or dissection within limitation of this exam.   PERITONEUM/RETROPERITONEUM/LYMPH NODES: No retroperitoneal masses are seen.  No lymphadenopathy.   BONES AND ABDOMINAL WALL: There is no evidence for destructive lytic or blastic bone lesions identified.  No abdominal wall masses or hernias are identified.       1.  Cirrhotic appearing liver but no liver masses within this limited study. Recommend clinical correlation and with liver function tests. 2. Numerous bilateral renal masses as above some of which increased in size since prior, incompletely characterized due to small size and lack of IV contrast. Correlation with ultrasound an or MRI findings recommended. 3. Nonobstructing bilateral renal calculi. No hydronephrosis or hydroureter bilaterally. 4. Mild colonic diverticulosis but no CT evidence for acute diverticulitis. No signs of bowel obstruction or free air. 5. Bibasilar atelectasis or infiltrate and trace amount of right pleural effusion as well as bilateral pleural thickening. Recommend clinical correlation and follow-up to document resolution. 6. No evidence for hyperdense intestinal fluid is seen to suggest active GI bleed although evaluation is limited without IV contrast.   Critical Finding:  See findings. Notification was initiated on 12/16/2024 at 9:01 pm by  Roula Chow.  (**-YCF-**) Instructions:   Signed by: Roula Chow 12/16/2024  9:55 PM Dictation workstation:   [id number]

## 2024-12-17 NOTE — PROGRESS NOTES
Occupational Therapy                 Therapy Communication Note    Patient Name: Rosa Soto  MRN: [id number]  Department: [facility] 3 E  Room: [address] [place], [place] [address]  Today's Date: 12/17/2024     Discipline: Occupational Therapy          Missed Visit Reason: Other (Comment) (Pt. supervised for ADLs and amb. in room without difficulty. No O.T. needs. Discharge O.T.)

## 2024-12-18 ENCOUNTER — APPOINTMENT (OUTPATIENT)
Dept: DIALYSIS | Facility: HOSPITAL | Age: 69
End: 2024-12-18
Payer: COMMERCIAL

## 2024-12-18 LAB
ANION GAP SERPL CALC-SCNC: 18 MMOL/L (ref 10–20)
BACTERIA UR CULT: NORMAL
BUN SERPL-MCNC: 74 MG/DL (ref 6–23)
CALCIUM SERPL-MCNC: 8.7 MG/DL (ref 8.6–10.3)
CHLORIDE SERPL-SCNC: 93 MMOL/L (ref 98–107)
CO2 SERPL-SCNC: 29 MMOL/L (ref 21–32)
CREAT SERPL-MCNC: 8.62 MG/DL (ref 0.5–1.05)
EGFRCR SERPLBLD CKD-EPI 2021: 5 ML/MIN/1.73M*2
ERYTHROCYTE [DISTWIDTH] IN BLOOD BY AUTOMATED COUNT: 23.8 % (ref 11.5–14.5)
GLUCOSE BLD MANUAL STRIP-MCNC: 128 MG/DL (ref 74–99)
GLUCOSE BLD MANUAL STRIP-MCNC: 134 MG/DL (ref 74–99)
GLUCOSE BLD MANUAL STRIP-MCNC: 140 MG/DL (ref 74–99)
GLUCOSE BLD MANUAL STRIP-MCNC: 172 MG/DL (ref 74–99)
GLUCOSE BLD MANUAL STRIP-MCNC: 177 MG/DL (ref 74–99)
GLUCOSE SERPL-MCNC: 153 MG/DL (ref 74–99)
HCT VFR BLD AUTO: 25.1 % (ref 36–46)
HGB BLD-MCNC: 7.8 G/DL (ref 12–16)
MCH RBC QN AUTO: 29.3 PG (ref 26–34)
MCHC RBC AUTO-ENTMCNC: 31.1 G/DL (ref 32–36)
MCV RBC AUTO: 94 FL (ref 80–100)
NRBC BLD-RTO: 0 /100 WBCS (ref 0–0)
PLATELET # BLD AUTO: 267 X10*3/UL (ref 150–450)
POTASSIUM SERPL-SCNC: 4.7 MMOL/L (ref 3.5–5.3)
RBC # BLD AUTO: 2.66 X10*6/UL (ref 4–5.2)
SODIUM SERPL-SCNC: 135 MMOL/L (ref 136–145)
WBC # BLD AUTO: 9.5 X10*3/UL (ref 4.4–11.3)

## 2024-12-18 PROCEDURE — 5A1D70Z PERFORMANCE OF URINARY FILTRATION, INTERMITTENT, LESS THAN 6 HOURS PER DAY: ICD-10-PCS | Performed by: INTERNAL MEDICINE

## 2024-12-18 PROCEDURE — 2500000001 HC RX 250 WO HCPCS SELF ADMINISTERED DRUGS (ALT 637 FOR MEDICARE OP)

## 2024-12-18 PROCEDURE — 8010000001 HC DIALYSIS - HEMODIALYSIS PER DAY

## 2024-12-18 PROCEDURE — 2500000002 HC RX 250 W HCPCS SELF ADMINISTERED DRUGS (ALT 637 FOR MEDICARE OP, ALT 636 FOR OP/ED)

## 2024-12-18 PROCEDURE — 36415 COLL VENOUS BLD VENIPUNCTURE: CPT | Performed by: HOSPITALIST

## 2024-12-18 PROCEDURE — 1210000001 HC SEMI-PRIVATE ROOM DAILY

## 2024-12-18 PROCEDURE — 82947 ASSAY GLUCOSE BLOOD QUANT: CPT

## 2024-12-18 PROCEDURE — 82374 ASSAY BLOOD CARBON DIOXIDE: CPT | Performed by: HOSPITALIST

## 2024-12-18 PROCEDURE — 99232 SBSQ HOSP IP/OBS MODERATE 35: CPT | Performed by: NURSE PRACTITIONER

## 2024-12-18 PROCEDURE — 85027 COMPLETE CBC AUTOMATED: CPT | Performed by: HOSPITALIST

## 2024-12-18 PROCEDURE — 2500000001 HC RX 250 WO HCPCS SELF ADMINISTERED DRUGS (ALT 637 FOR MEDICARE OP): Performed by: NURSE PRACTITIONER

## 2024-12-18 PROCEDURE — 2500000001 HC RX 250 WO HCPCS SELF ADMINISTERED DRUGS (ALT 637 FOR MEDICARE OP): Performed by: HOSPITALIST

## 2024-12-18 RX ORDER — POLYETHYLENE GLYCOL 3350, SODIUM CHLORIDE, SODIUM BICARBONATE, POTASSIUM CHLORIDE 420; 11.2; 5.72; 1.48 G/4L; G/4L; G/4L; G/4L
4000 POWDER, FOR SOLUTION ORAL ONCE
Status: COMPLETED | OUTPATIENT
Start: 2024-12-18 | End: 2024-12-18

## 2024-12-18 ASSESSMENT — COGNITIVE AND FUNCTIONAL STATUS - GENERAL
DAILY ACTIVITIY SCORE: 24
MOBILITY SCORE: 24
DAILY ACTIVITIY SCORE: 24
MOBILITY SCORE: 24

## 2024-12-18 ASSESSMENT — PAIN DESCRIPTION - LOCATION
LOCATION: HEAD
LOCATION: BACK

## 2024-12-18 ASSESSMENT — PAIN DESCRIPTION - DESCRIPTORS
DESCRIPTORS: ACHING

## 2024-12-18 ASSESSMENT — PAIN - FUNCTIONAL ASSESSMENT
PAIN_FUNCTIONAL_ASSESSMENT: 0-10

## 2024-12-18 ASSESSMENT — PAIN SCALES - GENERAL
PAINLEVEL_OUTOF10: 0 - NO PAIN
PAINLEVEL_OUTOF10: 1
PAINLEVEL_OUTOF10: 3
PAINLEVEL_OUTOF10: 3
PAINLEVEL_OUTOF10: 1

## 2024-12-18 ASSESSMENT — PAIN DESCRIPTION - ORIENTATION
ORIENTATION: RIGHT;LEFT;MID
ORIENTATION: RIGHT;MID;LEFT

## 2024-12-18 NOTE — POST-PROCEDURE NOTE
Report to Receiving RN:    Report To: Caryn Bryson  Time Report Called: [id number]  Hand-Off Communication: no issues ,last vitals ,liters removed  Complications During Treatment: No  Ultrafiltration Treatment: No  Medications Administered During Dialysis: No  Blood Products Administered During Dialysis: No  Labs Sent During Dialysis: No  Heparin Drip Rate Changes: No  Dialysis Catheter Dressing: na  Last Dressing Change: na    Electronic Signatures:  YAIR OCDT    Last Updated: 5:11 PM by NITISH BRADY

## 2024-12-18 NOTE — PROGRESS NOTES
12/18/24 1006   Discharge Planning   Living Arrangements Alone   Support Systems Children;Family members   Assistance Needed A&Ox4, independent with ADLs, utilizes a rollator, does not drive (daughter or grandkids transport), dialysis MWF @ 1030 at [facility]. Has oxygen, 2-3L, PRN, unsure of company. PCP Dr. Amanuel Sanchez   Type of Residence Private residence   Number of Stairs to Enter Residence 0   Number of Stairs Within Residence 0   Do you have animals or pets at home? Yes   Type of Animals or Pets dog   Home or Post Acute Services None   Expected Discharge Disposition Home  (PT/OT evaluated pt, pt independent, no further needs)   Does the patient need discharge transport arranged? No   Stroke Family Assessment   Stroke Family Assessment Needed No   Intensity of Service   Intensity of Service 0-30 min

## 2024-12-18 NOTE — PROGRESS NOTES
Rosa Soto is a 69 y.o. female on day 1 of admission presenting with Acute blood loss anemia.  69 y.o. female with PMHx s/f HTN, NSTEMI, A-fib s/p RFA on 2/29/2024 and on Eliquis, HLD, chronic diastolic heart failure, ESRD on dialysis ([facility], refused to go on fridays), iron deficiency anemia, bladder cancer s/p resection, moderate recurrent MDD, YELENA presenting with low hemoglobin, generalized weakness.   Has epistaxis for few days prior to admission.  H/H stable s/p PRBC  GI consulted and plan for EGD and colonoscopy     Subjective   Seen and examined.   Lying comfortably on bed   Tolerating diet  No overt bleeding.        Objective     Last Recorded Vitals  /67 (BP Location: Left arm, Patient Position: Lying)   Pulse 74   Temp 36.2 °C (97.1 °F) (Temporal)   Resp 18   Wt 116 kg (255 lb 15.3 oz)   SpO2 92%   Intake/Output last 3 Shifts:    Intake/Output Summary (Last 24 hours) at 12/18/2024 1202  Last data filed at 12/18/2024 1104  Gross per 24 hour   Intake 590 ml   Output 0 ml   Net 590 ml       Admission Weight  Weight: 116 kg (255 lb 15.3 oz) (12/16/24 1443)    Daily Weight  12/16/24 : 116 kg (255 lb 15.3 oz)    Image Results  ECG 12 lead  Atrial fibrillation  Borderline repolarization abnormality  Prolonged QT interval    See ED provider note for full interpretation and clinical correlation  Confirmed by Terri Trotter ([id number]) on 12/17/2024 11:18:09 AM      Physical Exam  Constitutional:       Appearance: Normal appearance. She is obese.   Cardiovascular:      Rate and Rhythm: Normal rate. Rhythm irregular.      Pulses: Normal pulses.      Heart sounds: Normal heart sounds.   Pulmonary:      Effort: Pulmonary effort is normal.      Breath sounds: Normal breath sounds.   Abdominal:      General: Bowel sounds are normal.      Palpations: Abdomen is soft.   Musculoskeletal:      Right lower leg: No edema.      Left lower leg: No edema.      Comments: AV fistula on right arm    Neurological:      General: No  focal deficit present.      Mental Status: She is alert. Mental status is at baseline.         Relevant Results               Assessment/Plan     Acuate /acute on chronic :  Acute on chronic anemia-  no overt bleeding. Has h/o epistaxis few days before discharge . S/p 3 unite PRBC.  Hemoglobin 3.5 on admission , now 7.9. Gi consulted - planning for EGD and colonoscopy   - apixaban on hold   Mild hyponatremia   PAF , rate controlled, on anticoagulation- oh hold for anemia/EGD  IDDM with hyperglycemia   ESRD on HD  Obesity   HLP  HTN  Chronic diastolic CHF- compensated   Plan:  Monitor H/H, CBC in am . No overt bleeding  Continue to hold apixaban   Plan for EGD and colonoscopy in am                 Ramana Angela MD

## 2024-12-18 NOTE — PROGRESS NOTES
"  [facility] Gastroenterology Progress Note    ASSESSMENT and PLAN:       Rosa Soto is a 69 y.o. female with a significant past medical history of CAD, ESRD on HD, CHF, A-fib, HTN, LEE ANN, bladder cancer, MDD, and YELENA who presented with weakness and fatigue. GI was consulted for \"anemia\".        Anemia  Longstanding anemia that markedly worsened recently. No overt GI bleeding. She did have some epistaxis recently which may be contributing. She has responded to transfusions since admission. No recent iron studies and MCV prior to transfusion was actually macrocytic rather than the microcytic anemia that would be expected in iron deficiency anemia. With her marked anemia, we will plan for EGD/colonoscopy to rule out underlying GI lesion, but ultimately there has been no evidence of acute GI bleeding thusfar. She is on Eliquis and her renal function would require that to be held for 4 days prior to EGD/colonoscopy, but that medication should also be cleared by dialysis which is scheduled for today. Will plan for EGD/colonoscopy tomorrow.  - monitor H&H and transfuse as needed  - EGD/colonoscopy tomorrow  - clear liquid diet currently  - start bowel prep this evening (Golytely 4 L total)  - patient should drink 2 L of the Golytely (240 mL or 8 ounces every 15-20 minutes) starting at about 1700  - patient should drink the second 2 L of the Golytely (240 mL or 8 ounces every 15-20 minutes) starting at about midnight  - Golytely may be served chilled (do not pour over ice) or with Crystal Lite (clear, Lemonade flavor) to improve taste  - if patient is unable to tolerate the Golytely orally then NG tube placement should be considered, contact the primary service (IM) for orders if NG tube is needed  - monitor BMs, if the effluent is not clear by 0300 tomorrow then give an additional 1 L of Golytely which should be finished by 0400 (contact primary service for order for additional prep if needed)  - NPO after midnight " "except for prep  - completely NPO starting at 0500 tomorrow except for sips of water with needed medications        Discussed with Dr. Hunt.      Maribell Cho CNP        SUBJECTIVE and INTERVAL HISTORY:       Rosa Soto is a 69 y.o. female with a significant past medical history of CAD, ESRD on HD, CHF, A-fib, HTN, LEE ANN, bladder cancer, MDD, and YELENA who presented with weakness and fatigue. GI was consulted for \"anemia\".     HPI:  Patient seen and examined this AM. She was eating an omelet at time of exam. She denies any melena, hematemesis, or hematochezia. No abdominal pain, N/V, diarrhea.     Review of systems:       I performed a complete 10 point review of systems and it is negative except as noted in HPI or above.    All other systems have been reviewed and are negative.          OBJECTIVE:       Last Recorded Vitals:  Vitals:    12/17/24 2134 12/18/24 0051 12/18/24 0529 12/18/24 0946   BP: 147/64 116/50 128/62 136/67   BP Location: Left arm Left arm Left arm Left arm   Patient Position: Lying Lying Lying Lying   Pulse: 92 75 71 74   Resp: 20 18 19 18   Temp: 36.2 °C (97.2 °F) 36.3 °C (97.4 °F) 36.2 °C (97.2 °F) 36.2 °C (97.1 °F)   TempSrc: Temporal Temporal Temporal Temporal   SpO2: 90% 90% (!) 88% 92%   Weight:       Height:         /67 (BP Location: Left arm, Patient Position: Lying)   Pulse 74   Temp 36.2 °C (97.1 °F) (Temporal)   Resp 18   Ht 1.702 m (5' 7\")   Wt 116 kg (255 lb 15.3 oz)   SpO2 92%   BMI 40.09 kg/m²      Physical Exam:    Physical Exam  Constitutional:       General: She is not in acute distress.     Appearance: Normal appearance.   HENT:      Mouth/Throat:      Mouth: Mucous membranes are moist.      Comments: pink  Eyes:      Conjunctiva/sclera: Conjunctivae normal.      Pupils: Pupils are equal, round, and reactive to light.   Cardiovascular:      Rate and Rhythm: Normal rate and regular rhythm.      Heart sounds: No murmur heard.  Pulmonary:      Effort: Pulmonary " effort is normal.      Breath sounds: Normal breath sounds.   Abdominal:      General: Bowel sounds are normal. There is no distension.      Palpations: Abdomen is soft.      Tenderness: There is no abdominal tenderness. There is no guarding.   Skin:     General: Skin is warm and dry.      Coloration: Skin is not jaundiced.   Neurological:      Mental Status: She is alert and oriented to person, place, and time.   Psychiatric:         Mood and Affect: Mood normal.         Behavior: Behavior normal.           Inpatient Medications:  amLODIPine, 5 mg, oral, Daily  [Held by provider] apixaban, 5 mg, oral, BID  atorvastatin, 40 mg, oral, Daily  busPIRone, 5 mg, oral, Nightly  doxepin, 100 mg, oral, Nightly  escitalopram, 10 mg, oral, Daily  insulin lispro, 0-5 Units, subcutaneous, q4h  metoprolol succinate XL, 100 mg, oral, q PM  pantoprazole, 40 mg, oral, Daily      PRN medications: acetaminophen, albuterol, dextrose, dextrose, glucagon, glucagon, melatonin, ondansetron, oxymetazoline, temazepam    Outpatient Medications:  Prior to Admission medications    Medication Sig Start Date End Date Taking? Authorizing Provider   acetaminophen (Tylenol) 325 mg tablet Take 3 tablets (975 mg) by mouth every 8 hours if needed (breakthrough pain). 6/6/24   Sangita Downing, DO   albuterol 90 mcg/actuation inhaler Inhale 1 puff every 4 hours if needed for shortness of breath. 6/6/24   Sangita Downing, DO   amLODIPine (Norvasc) 5 mg tablet Take 1 tablet (5 mg) by mouth once daily.    Historical Provider, MD   apixaban (Eliquis) 5 mg tablet Take 1 tablet (5 mg) by mouth 2 times a day.    Historical Provider, MD   atorvastatin (Lipitor) 40 mg tablet Take 1 tablet (40 mg) by mouth once daily.    Historical Provider, MD   Auryxia 210 mg iron tablet Take 3 tablets (630 mg) by mouth 3 times a day. With meals. SWALLOW WHOLE, DO NOT CHEW OR CRUSH MEDICATION 5/10/23   Historical Provider, MD   busPIRone (Buspar) 5 mg tablet Take 1 tablet  (5 mg) by mouth once daily at bedtime. 10/17/23   Historical Provider, MD   cholecalciferol (Vitamin D-3) 25 MCG (1000 UT) tablet Take 1 tablet (25 mcg) by mouth once daily. 5/12/23   Historical Provider, MD   cinacalcet (Sensipar) 30 mg tablet Take 1 tablet (30 mg) by mouth once daily. Take with food or shortly afer a meal. Swallow tablet whole; do not break or divide.    Historical Provider, MD   doxepin (SINEquan) 100 mg capsule Take 1 capsule (100 mg) by mouth once daily at bedtime.    Historical Provider, MD   escitalopram (Lexapro) 10 mg tablet Take 1 tablet (10 mg) by mouth once daily.    Historical Provider, MD   metoprolol succinate XL (Toprol-XL) 100 mg 24 hr tablet Take 1 tablet (100 mg) by mouth once daily in the evening. Do not crush or chew. 12/3/24 12/3/25  FREDY Love   Nephro-Gloria 0.8 mg tablet Take 1 tablet by mouth once daily. 1/24/24   Historical Provider, MD   oxymetazoline (Afrin) 0.05 % nasal spray Administer 2 sprays into each nostril 2 times a day as needed (nose bleeds) for up to 8 doses. Do not use for more than 3 days.  Patient not taking: Reported on 12/17/2024 6/6/24   Sangita Downing DO   torsemide (Demadex) 20 mg tablet Take 1 tablet (20 mg) by mouth 2 times a day. 9/22/09   Historical Provider, MD   vericiguat (Verquvo) 10 mg tablet Take 10 mg by mouth once daily.  Patient not taking: Reported on 12/3/2024 11/14/24   FREDY Love   vericiguat (Verquvo) 2.5 mg tablet Take 2.5 mg by mouth once daily. Take for two weeks  Patient not taking: Reported on 12/3/2024 10/15/24   FREDY Love   vericiguat (Verquvo) 5 mg tablet Take 5 mg by mouth once daily. Start after 2 weeks of 2.5mg  Patient not taking: Reported on 12/3/2024 10/15/24   Caroline M Mario, APRN-CNP   methoxy peg-epoetin beta (MIRCERA INJ) every 14 (fourteen) days. 8/7/24 12/17/24  Historical Provider, MD   methoxy peg-epoetin beta (MIRCERA INJ) 100 mcg every 14 (fourteen) days. 8/21/24  "12/17/24  Historical Provider, MD   predniSONE (Deltasone) 10 mg tablet  10/23/24 12/17/24  Historical Provider, MD       LABS AND IMAGING:     Labs:  Recent labs reviewed in the EMR.    Lab Results   Component Value Date    WBC 9.5 12/18/2024    HGB 7.8 (L) 12/18/2024    HGB 7.9 (L) 12/17/2024    HGB 7.9 (L) 12/17/2024    MCV 94 12/18/2024     12/18/2024     12/17/2024     12/16/2024       Lab Results   Component Value Date     (L) 12/18/2024    K 4.7 12/18/2024    CL 93 (L) 12/18/2024    BUN 74 (H) 12/18/2024    CREATININE 8.62 (H) 12/18/2024    CREATININE 6.43 (H) 12/17/2024       Lab Results   Component Value Date    BILITOT 1.2 12/17/2024    BILITOT 0.7 12/16/2024    ALKPHOS 87 12/17/2024    ALKPHOS 96 12/16/2024    AST 14 12/17/2024    AST 12 12/16/2024    ALT 9 12/17/2024    ALT 9 12/16/2024    LIPASE 27 12/16/2024       No results found for: \"CRP\", \"CALPS\"      Imaging:  Recent imaging results reviewed.        "

## 2024-12-18 NOTE — CARE PLAN
The patient's goals for the shift include        Problem: Diabetes  Goal: Achieve decreasing blood glucose levels by end of shift  Outcome: Progressing  Goal: Increase stability of blood glucose readings by end of shift  Outcome: Progressing  Goal: Decrease in ketones present in urine by end of shift  Outcome: Progressing  Goal: Maintain electrolyte levels within acceptable range throughout shift  Outcome: Progressing  Goal: Maintain glucose levels >70mg/dl to <250mg/dl throughout shift  Outcome: Progressing  Goal: No changes in neurological exam by end of shift  Outcome: Progressing  Goal: Learn about and adhere to nutrition recommendations by end of shift  Outcome: Progressing  Goal: Vital signs within normal range for age by end of shift  Outcome: Progressing  Goal: Increase self care and/or family involovement by end of shift  Outcome: Progressing  Goal: Receive DSME education by end of shift  Outcome: Progressing     Problem: Pain - Adult  Goal: Verbalizes/displays adequate comfort level or baseline comfort level  Outcome: Progressing     Problem: Safety - Adult  Goal: Free from fall injury  Outcome: Progressing     Problem: Discharge Planning  Goal: Discharge to home or other facility with appropriate resources  Outcome: Progressing     Problem: Chronic Conditions and Co-morbidities  Goal: Patient's chronic conditions and co-morbidity symptoms are monitored and maintained or improved  Outcome: Progressing     Problem: Skin  Goal: Decreased wound size/increased tissue granulation at next dressing change  Outcome: Progressing  Flowsheets (Taken 12/17/2024 1917)  Decreased wound size/increased tissue granulation at next dressing change:   Promote sleep for wound healing   Protective dressings over bony prominences  Goal: Participates in plan/prevention/treatment measures  Outcome: Progressing  Flowsheets (Taken 12/17/2024 1917)  Participates in plan/prevention/treatment measures:   Discuss with provider PT/OT  consult   Elevate heels   Increase activity/out of bed for meals  Goal: Prevent/manage excess moisture  Outcome: Progressing  Flowsheets (Taken 12/17/2024 1917)  Prevent/manage excess moisture:   Follow provider orders for dressing changes   Monitor for/manage infection if present   Cleanse incontinence/protect with barrier cream  Goal: Prevent/minimize sheer/friction injuries  Outcome: Progressing  Flowsheets (Taken 12/17/2024 1917)  Prevent/minimize sheer/friction injuries:   Complete micro-shifts as needed if patient unable. Adjust patient position to relieve pressure points, not a full turn   HOB 30 degrees or less   Increase activity/out of bed for meals   Use pull sheet  Goal: Promote/optimize nutrition  Outcome: Progressing  Flowsheets (Taken 12/17/2024 1917)  Promote/optimize nutrition:   Consume > 50% meals/supplements   Monitor/record intake including meals   Offer water/supplements/favorite foods  Goal: Promote skin healing  Outcome: Progressing  Flowsheets (Taken 12/17/2024 1917)  Promote skin healing:   Assess skin/pad under line(s)/device(s)   Protective dressings over bony prominences   Turn/reposition every 2 hours/use positioning/transfer devices   Ensure correct size (line/device) and apply per  instructions   Rotate device position/do not position patient on device     Problem: Fall/Injury  Goal: Not fall by end of shift  Outcome: Progressing  Goal: Be free from injury by end of the shift  Outcome: Progressing  Goal: Verbalize understanding of personal risk factors for fall in the hospital  Outcome: Progressing  Goal: Verbalize understanding of risk factor reduction measures to prevent injury from fall in the home  Outcome: Progressing  Goal: Use assistive devices by end of the shift  Outcome: Progressing  Goal: Pace activities to prevent fatigue by end of the shift  Outcome: Progressing     Problem: Pain  Goal: Takes deep breaths with improved pain control throughout the  shift  Outcome: Progressing  Goal: Turns in bed with improved pain control throughout the shift  Outcome: Progressing  Goal: Walks with improved pain control throughout the shift  Outcome: Progressing  Goal: Performs ADL's with improved pain control throughout shift  Outcome: Progressing  Goal: Participates in PT with improved pain control throughout the shift  Outcome: Progressing  Goal: Free from opioid side effects throughout the shift  Outcome: Progressing  Goal: Free from acute confusion related to pain meds throughout the shift  Outcome: Progressing

## 2024-12-18 NOTE — PROGRESS NOTES
"      Nephrology Progress Note      Nephrology following for ESRD.   Events over night: none    Seen in room. Some fatigue but no other symptoms. Will have HD later today     /67 (BP Location: Left arm, Patient Position: Lying)   Pulse 74   Temp 36.2 °C (97.1 °F) (Temporal)   Resp 18   Ht 1.702 m (5' 7\")   Wt 116 kg (255 lb 15.3 oz)   SpO2 92%   BMI 40.09 kg/m²     Input / Output:  24 HR:   Intake/Output Summary (Last 24 hours) at 12/18/2024 1004  Last data filed at 12/18/2024 0946  Gross per 24 hour   Intake 250 ml   Output 0 ml   Net 250 ml       Physical Exam   Alert and oriented x 3 NAD  Neck: no JVD  CV: RRR  Lungs: CTA bilaterally  Abd: soft, NT, ND   Ext: trace lower extremity edema    Scheduled medications  amLODIPine, 5 mg, oral, Daily  [Held by provider] apixaban, 5 mg, oral, BID  atorvastatin, 40 mg, oral, Daily  busPIRone, 5 mg, oral, Nightly  doxepin, 100 mg, oral, Nightly  escitalopram, 10 mg, oral, Daily  insulin lispro, 0-5 Units, subcutaneous, q4h  metoprolol succinate XL, 100 mg, oral, q PM  pantoprazole, 40 mg, oral, Daily      Continuous medications     PRN medications  PRN medications: acetaminophen, albuterol, dextrose, dextrose, glucagon, glucagon, melatonin, ondansetron, oxymetazoline, temazepam   Results from last 7 days   Lab Units 12/18/24  0542 12/17/24  0433   SODIUM mmol/L 135* 137   POTASSIUM mmol/L 4.7 4.5   CHLORIDE mmol/L 93* 94*   CO2 mmol/L 29 32   BUN mg/dL 74* 51*   CREATININE mg/dL 8.62* 6.43*   CALCIUM mg/dL 8.7 7.8*   PROTEIN TOTAL g/dL  --  6.0*   BILIRUBIN TOTAL mg/dL  --  1.2   ALK PHOS U/L  --  87   ALT U/L  --  9   AST U/L  --  14   GLUCOSE mg/dL 153* 139*      Results from last 7 days   Lab Units 12/16/24  1551   MAGNESIUM mg/dL 1.87      Results from last 7 days   Lab Units 12/18/24  0542 12/17/24  0433 12/17/24  0016 12/16/24 2051 12/16/24  1551   WBC AUTO x10*3/uL 9.5 9.9  --   --  13.3*   HEMOGLOBIN g/dL 7.8* 7.9*  7.9* 7.0*   < > 3.5*   HEMATOCRIT % " 25.1* 24.8*  24.8* 21.5*   < > 10.6*   PLATELETS AUTO x10*3/uL 267 225  --   --  315    < > = values in this interval not displayed.        Assessment & Plan:      Patient is 69 y.o. female who is admitted to hospital for acute blood loss anemia. Nephrology consulted in view of ESRD.     1: Acute blood loss anemia-S/P 3 units PRBC's- GI work up-planned for EGD/colonoscopy this admit- Eliquis on hold      2: ESRD- HD MWF     3: Hypokalemia-transient post HD- resolved      4: Recurrent Epistaxis- resolved      5: Volume status acceptable     6: Hx of A-fib- Eliquis on hold due to #1     Recommendations:   HD today, monitor H/H  GI work up   Transfuse further as needed         Please message me through EPIC chat with any questions or concerns.     Jed Ruby PA-C  12/18/2024  10:04 AM     [facility]    [address], [address]   [place] [address]  Office: [phone]

## 2024-12-18 NOTE — PROGRESS NOTES
Social work consult placed for positive medical risk screen. SW reviewed pt's chart and communicated with TCC. No SW needs foreseen at this time. SW signing off; available upon request.    JAE Corral, LSW ([id number])   Care Transitions

## 2024-12-19 ENCOUNTER — APPOINTMENT (OUTPATIENT)
Dept: OPERATING ROOM | Facility: HOSPITAL | Age: 69
DRG: 813 | End: 2024-12-19
Payer: COMMERCIAL

## 2024-12-19 ENCOUNTER — ANESTHESIA (OUTPATIENT)
Dept: OPERATING ROOM | Facility: HOSPITAL | Age: 69
End: 2024-12-19
Payer: COMMERCIAL

## 2024-12-19 ENCOUNTER — ANESTHESIA EVENT (OUTPATIENT)
Dept: OPERATING ROOM | Facility: HOSPITAL | Age: 69
End: 2024-12-19
Payer: COMMERCIAL

## 2024-12-19 LAB
ALBUMIN SERPL BCP-MCNC: 3.4 G/DL (ref 3.4–5)
ALP SERPL-CCNC: 88 U/L (ref 33–136)
ALT SERPL W P-5'-P-CCNC: 14 U/L (ref 7–45)
ANION GAP SERPL CALC-SCNC: 17 MMOL/L (ref 10–20)
AST SERPL W P-5'-P-CCNC: 18 U/L (ref 9–39)
BASOPHILS # BLD AUTO: 0.06 X10*3/UL (ref 0–0.1)
BASOPHILS NFR BLD AUTO: 0.7 %
BILIRUB SERPL-MCNC: 0.8 MG/DL (ref 0–1.2)
BUN SERPL-MCNC: 34 MG/DL (ref 6–23)
CALCIUM SERPL-MCNC: 8.5 MG/DL (ref 8.6–10.3)
CHLORIDE SERPL-SCNC: 93 MMOL/L (ref 98–107)
CO2 SERPL-SCNC: 28 MMOL/L (ref 21–32)
CREAT SERPL-MCNC: 5.48 MG/DL (ref 0.5–1.05)
EGFRCR SERPLBLD CKD-EPI 2021: 8 ML/MIN/1.73M*2
EOSINOPHIL # BLD AUTO: 0.2 X10*3/UL (ref 0–0.7)
EOSINOPHIL NFR BLD AUTO: 2.3 %
ERYTHROCYTE [DISTWIDTH] IN BLOOD BY AUTOMATED COUNT: 22.1 % (ref 11.5–14.5)
GLUCOSE BLD MANUAL STRIP-MCNC: 117 MG/DL (ref 74–99)
GLUCOSE BLD MANUAL STRIP-MCNC: 124 MG/DL (ref 74–99)
GLUCOSE BLD MANUAL STRIP-MCNC: 133 MG/DL (ref 74–99)
GLUCOSE BLD MANUAL STRIP-MCNC: 133 MG/DL (ref 74–99)
GLUCOSE BLD MANUAL STRIP-MCNC: 151 MG/DL (ref 74–99)
GLUCOSE BLD MANUAL STRIP-MCNC: 154 MG/DL (ref 74–99)
GLUCOSE BLD MANUAL STRIP-MCNC: 186 MG/DL (ref 74–99)
GLUCOSE SERPL-MCNC: 123 MG/DL (ref 74–99)
HCT VFR BLD AUTO: 25.4 % (ref 36–46)
HGB BLD-MCNC: 8.2 G/DL (ref 12–16)
IMM GRANULOCYTES # BLD AUTO: 0.05 X10*3/UL (ref 0–0.7)
IMM GRANULOCYTES NFR BLD AUTO: 0.6 % (ref 0–0.9)
LYMPHOCYTES # BLD AUTO: 1.63 X10*3/UL (ref 1.2–4.8)
LYMPHOCYTES NFR BLD AUTO: 18.7 %
MCH RBC QN AUTO: 30.5 PG (ref 26–34)
MCHC RBC AUTO-ENTMCNC: 32.3 G/DL (ref 32–36)
MCV RBC AUTO: 94 FL (ref 80–100)
MONOCYTES # BLD AUTO: 1.19 X10*3/UL (ref 0.1–1)
MONOCYTES NFR BLD AUTO: 13.7 %
NEUTROPHILS # BLD AUTO: 5.57 X10*3/UL (ref 1.2–7.7)
NEUTROPHILS NFR BLD AUTO: 64 %
NRBC BLD-RTO: 0 /100 WBCS (ref 0–0)
OVALOCYTES BLD QL SMEAR: NORMAL
PLATELET # BLD AUTO: 270 X10*3/UL (ref 150–450)
POLYCHROMASIA BLD QL SMEAR: NORMAL
POTASSIUM SERPL-SCNC: 3.8 MMOL/L (ref 3.5–5.3)
PROT SERPL-MCNC: 6.3 G/DL (ref 6.4–8.2)
RBC # BLD AUTO: 2.69 X10*6/UL (ref 4–5.2)
RBC MORPH BLD: NORMAL
SODIUM SERPL-SCNC: 134 MMOL/L (ref 136–145)
WBC # BLD AUTO: 8.7 X10*3/UL (ref 4.4–11.3)

## 2024-12-19 PROCEDURE — 82947 ASSAY GLUCOSE BLOOD QUANT: CPT

## 2024-12-19 PROCEDURE — 0DJ08ZZ INSPECTION OF UPPER INTESTINAL TRACT, VIA NATURAL OR ARTIFICIAL OPENING ENDOSCOPIC: ICD-10-PCS | Performed by: INTERNAL MEDICINE

## 2024-12-19 PROCEDURE — 2500000002 HC RX 250 W HCPCS SELF ADMINISTERED DRUGS (ALT 637 FOR MEDICARE OP, ALT 636 FOR OP/ED)

## 2024-12-19 PROCEDURE — 3700000001 HC GENERAL ANESTHESIA TIME - INITIAL BASE CHARGE: Performed by: NURSE ANESTHETIST, CERTIFIED REGISTERED

## 2024-12-19 PROCEDURE — 2500000004 HC RX 250 GENERAL PHARMACY W/ HCPCS (ALT 636 FOR OP/ED)

## 2024-12-19 PROCEDURE — 0DBP8ZZ EXCISION OF RECTUM, VIA NATURAL OR ARTIFICIAL OPENING ENDOSCOPIC: ICD-10-PCS | Performed by: INTERNAL MEDICINE

## 2024-12-19 PROCEDURE — 80053 COMPREHEN METABOLIC PANEL: CPT | Performed by: STUDENT IN AN ORGANIZED HEALTH CARE EDUCATION/TRAINING PROGRAM

## 2024-12-19 PROCEDURE — 36415 COLL VENOUS BLD VENIPUNCTURE: CPT | Performed by: STUDENT IN AN ORGANIZED HEALTH CARE EDUCATION/TRAINING PROGRAM

## 2024-12-19 PROCEDURE — 2500000001 HC RX 250 WO HCPCS SELF ADMINISTERED DRUGS (ALT 637 FOR MEDICARE OP)

## 2024-12-19 PROCEDURE — 85025 COMPLETE CBC W/AUTO DIFF WBC: CPT | Performed by: STUDENT IN AN ORGANIZED HEALTH CARE EDUCATION/TRAINING PROGRAM

## 2024-12-19 PROCEDURE — 45385 COLONOSCOPY W/LESION REMOVAL: CPT | Performed by: INTERNAL MEDICINE

## 2024-12-19 PROCEDURE — 43235 EGD DIAGNOSTIC BRUSH WASH: CPT | Performed by: INTERNAL MEDICINE

## 2024-12-19 PROCEDURE — 2500000001 HC RX 250 WO HCPCS SELF ADMINISTERED DRUGS (ALT 637 FOR MEDICARE OP): Performed by: HOSPITALIST

## 2024-12-19 PROCEDURE — 99232 SBSQ HOSP IP/OBS MODERATE 35: CPT | Performed by: STUDENT IN AN ORGANIZED HEALTH CARE EDUCATION/TRAINING PROGRAM

## 2024-12-19 PROCEDURE — 3600000002 HC OR TIME - INITIAL BASE CHARGE - PROCEDURE LEVEL TWO: Performed by: NURSE ANESTHETIST, CERTIFIED REGISTERED

## 2024-12-19 PROCEDURE — 3600000007 HC OR TIME - EACH INCREMENTAL 1 MINUTE - PROCEDURE LEVEL TWO: Performed by: NURSE ANESTHETIST, CERTIFIED REGISTERED

## 2024-12-19 PROCEDURE — 2720000007 HC OR 272 NO HCPCS: Performed by: NURSE ANESTHETIST, CERTIFIED REGISTERED

## 2024-12-19 PROCEDURE — 7100000002 HC RECOVERY ROOM TIME - EACH INCREMENTAL 1 MINUTE: Performed by: NURSE ANESTHETIST, CERTIFIED REGISTERED

## 2024-12-19 PROCEDURE — 2500000004 HC RX 250 GENERAL PHARMACY W/ HCPCS (ALT 636 FOR OP/ED): Performed by: NURSE ANESTHETIST, CERTIFIED REGISTERED

## 2024-12-19 PROCEDURE — 7100000001 HC RECOVERY ROOM TIME - INITIAL BASE CHARGE: Performed by: NURSE ANESTHETIST, CERTIFIED REGISTERED

## 2024-12-19 PROCEDURE — 3700000002 HC GENERAL ANESTHESIA TIME - EACH INCREMENTAL 1 MINUTE: Performed by: NURSE ANESTHETIST, CERTIFIED REGISTERED

## 2024-12-19 PROCEDURE — 2500000001 HC RX 250 WO HCPCS SELF ADMINISTERED DRUGS (ALT 637 FOR MEDICARE OP): Performed by: REGISTERED NURSE

## 2024-12-19 PROCEDURE — 1210000001 HC SEMI-PRIVATE ROOM DAILY

## 2024-12-19 RX ORDER — PROPOFOL 10 MG/ML
INJECTION, EMULSION INTRAVENOUS AS NEEDED
Status: DISCONTINUED | OUTPATIENT
Start: 2024-12-19 | End: 2024-12-19

## 2024-12-19 RX ORDER — FENTANYL CITRATE 50 UG/ML
INJECTION, SOLUTION INTRAMUSCULAR; INTRAVENOUS AS NEEDED
Status: DISCONTINUED | OUTPATIENT
Start: 2024-12-19 | End: 2024-12-19

## 2024-12-19 SDOH — HEALTH STABILITY: MENTAL HEALTH: CURRENT SMOKER: 0

## 2024-12-19 ASSESSMENT — COGNITIVE AND FUNCTIONAL STATUS - GENERAL
DAILY ACTIVITIY SCORE: 24
MOBILITY SCORE: 24
DAILY ACTIVITIY SCORE: 24
MOBILITY SCORE: 24

## 2024-12-19 ASSESSMENT — PAIN SCALES - GENERAL
PAINLEVEL_OUTOF10: 0 - NO PAIN
PAIN_LEVEL: 0
PAINLEVEL_OUTOF10: 0 - NO PAIN

## 2024-12-19 ASSESSMENT — PAIN - FUNCTIONAL ASSESSMENT
PAIN_FUNCTIONAL_ASSESSMENT: 0-10

## 2024-12-19 NOTE — PROGRESS NOTES
12/19/24 1200   Discharge Planning   Expected Discharge Disposition Home     Plan remains unchanged at this time she will plans to return home as before with family transporting her to Dialysis MWF at 1030 at [facility] and home oxygen at 2-3L via n/c. Pt lives independently other than transportation and declines all additional assistance and as per PT/OT notes does not need it. [facility]. Care Transitions to follow. Zainab Mackay BSN/RN-TCC

## 2024-12-19 NOTE — ANESTHESIA PREPROCEDURE EVALUATION
Patient: Rosa Soto    Procedure Information       Date/Time: 12/19/24 1330    Scheduled providers: Micah Hunt MD; Shanita Jaime RN    Procedures:       EGD      COLONOSCOPY    Location: [facility] OR            Relevant Problems   Anesthesia (within normal limits)      Cardiac   (+) A-fib (Multi)   (+) Atrial fibrillation (Multi)   (+) Essential hypertension   (+) Mixed hyperlipidemia   (+) NSTEMI (non-ST elevated myocardial infarction) (Multi)   (+) Pure hypercholesterolemia      Pulmonary   (+) Chronic obstructive pulmonary disease (Multi)   (+) Exertional dyspnea   (+) Pneumonia due to COVID-19 virus      Neuro   (+) Generalized anxiety disorder   (+) Moderate episode of recurrent major depressive disorder      /Renal   (+) End stage renal disease (Multi)      Endocrine   (+) DM (diabetes mellitus), type 2 (Multi)   (+) Morbid obesity (Multi)   (+) Secondary hyperparathyroidism of renal origin (Multi)   (+) Type 2 diabetes mellitus with diabetic chronic kidney disease      Hematology   (+) Acute blood loss anemia   (+) Anemia   (+) Coagulation defect, unspecified   (+) Iron deficiency anemia, unspecified      Musculoskeletal   (+) Lumbosacral spondylosis without myelopathy      ID   (+) Pneumonia due to COVID-19 virus       Clinical information reviewed:   Tobacco  Allergies  Meds  Problems  Med Hx  Surg Hx   Fam Hx  Soc   Hx        NPO Detail:  No data recorded     Physical Exam    Airway  Mallampati: III  TM distance: >3 FB  Neck ROM: full     Cardiovascular - normal exam     Dental   Comments: Poor dentition with multiple missing and cracked teeth.   Pulmonary - normal exam     Abdominal   (+) obese         Anesthesia Plan    History of general anesthesia?: yes  History of complications of general anesthesia?: no    ASA 3     MAC     The patient is not a current smoker.    intravenous induction   Anesthetic plan and risks discussed with patient.    Plan discussed with CRNA.

## 2024-12-19 NOTE — PROGRESS NOTES
Rosa Soto is a 69 y.o. female on day 2 of admission presenting with Acute blood loss anemia.  69 y.o. female with PMHx s/f HTN, NSTEMI, A-fib s/p RFA on 2/29/2024 and on Eliquis, HLD, chronic diastolic heart failure, ESRD on dialysis ([facility], refused to go on fridays), iron deficiency anemia, bladder cancer s/p resection, moderate recurrent MDD, YELENA presenting with low hemoglobin, generalized weakness.   Has epistaxis for few days prior to admission.  H/H stable s/p PRBC  GI consulted and plan for EGD and colonoscopy     Subjective   Plan for colonoscopy today   Afebrile        Objective     Last Recorded Vitals  /56   Pulse 77   Temp 36.2 °C (97.1 °F) (Temporal)   Resp 20   Wt 116 kg (255 lb 15.3 oz)   SpO2 93%   Intake/Output last 3 Shifts:    Intake/Output Summary (Last 24 hours) at 12/19/2024 1228  Last data filed at 12/19/2024 0742  Gross per 24 hour   Intake 1400 ml   Output 4400 ml   Net -3000 ml       Admission Weight  Weight: 116 kg (255 lb 15.3 oz) (12/16/24 1443)    Daily Weight  12/16/24 : 116 kg (255 lb 15.3 oz)    Image Results  ECG 12 lead  Atrial fibrillation  Borderline repolarization abnormality  Prolonged QT interval    See ED provider note for full interpretation and clinical correlation  Confirmed by Terri Trotter ([id number]) on 12/17/2024 11:18:09 AM      Physical Exam  Constitutional:       Appearance: Normal appearance. She is obese.   Cardiovascular:      Rate and Rhythm: Normal rate. Rhythm irregular.      Pulses: Normal pulses.      Heart sounds: Normal heart sounds.   Pulmonary:      Effort: Pulmonary effort is normal.      Breath sounds: Normal breath sounds.   Abdominal:      General: Bowel sounds are normal.      Palpations: Abdomen is soft.   Musculoskeletal:      Right lower leg: No edema.      Left lower leg: No edema.      Comments: AV fistula on right arm    Neurological:      General: No focal deficit present.      Mental Status: She is alert. Mental status is at baseline.          Relevant Results               Assessment/Plan     Acuate /acute on chronic :  Acute on chronic anemia-  no overt bleeding. Has h/o epistaxis few days before discharge . S/p 3 unite PRBC.  Hemoglobin 3.5 on admission , now 7.9. Gi consulted - planning for EGD and colonoscopy   - apixaban on hold   Mild hyponatremia   PAF , rate controlled, on anticoagulation- oh hold for anemia/EGD  IDDM with hyperglycemia   ESRD on HD  Obesity   HLP  HTN  Chronic diastolic CHF- compensated   Plan:  Colonoscopy today  Eliquis on hold , will resume when OK with GI  Labs in am                  Ramana Angela MD

## 2024-12-19 NOTE — POST-PROCEDURE NOTE
Upper endoscopy (EGD) and colonoscopy completed. Full reports in the EMR.      EGD was normal. Colonoscopy showed a rectal polyp, but was otherwise normal. There is no evidence of any GI source of bleeding or anemia.      Recommendations:  - advance diet as tolerated  - restart Eliquis tomorrow from standpoint of colonoscopy with polypectomy; other determination of risks/benefits of Eliquis per IMS/PCP  - no further GI evaluation recommended at this time  - monitoring of H&H with transfusions as needed as well as iron supplementation per primary service        I will sign off at this time, but please call with questions.        iMcah Hunt MD      Senior Attending Physician, Gastroenterology    [facility]    Clinical   [employer]

## 2024-12-19 NOTE — ANESTHESIA POSTPROCEDURE EVALUATION
Patient: Rosa Soto    Procedure Summary       Date: 12/19/24 Room / Location: [facility] OR    Anesthesia Start: 1207 Anesthesia Stop: 1257    Procedures:       EGD      COLONOSCOPY Diagnosis: Anemia, unspecified type    Scheduled Providers: Micah Hunt MD; Shanita Jaime RN Responsible Provider: JAEL Clarke    Anesthesia Type: MAC ASA Status: 3            Anesthesia Type: MAC    Vitals Value Taken Time   /78 12/19/24 1315   Temp 36.7 °C (98 °F) 12/19/24 1255   Pulse 78 12/19/24 1315   Resp 18 12/19/24 1315   SpO2 99 % 12/19/24 1315       Anesthesia Post Evaluation    Patient location during evaluation: PACU  Patient participation: complete - patient participated  Level of consciousness: awake  Pain score: 0  Pain management: adequate  Airway patency: patent  Cardiovascular status: acceptable  Respiratory status: acceptable  Hydration status: acceptable  Postoperative Nausea and Vomiting: none    There were no known notable events for this encounter.

## 2024-12-19 NOTE — CARE PLAN
The patient's goals for the shift include          Problem: Diabetes  Goal: Achieve decreasing blood glucose levels by end of shift  Outcome: Progressing  Goal: Increase stability of blood glucose readings by end of shift  Outcome: Progressing  Goal: Decrease in ketones present in urine by end of shift  Outcome: Progressing  Goal: Maintain electrolyte levels within acceptable range throughout shift  Outcome: Progressing  Goal: Maintain glucose levels >70mg/dl to <250mg/dl throughout shift  Outcome: Progressing  Goal: No changes in neurological exam by end of shift  Outcome: Progressing  Goal: Learn about and adhere to nutrition recommendations by end of shift  Outcome: Progressing  Goal: Vital signs within normal range for age by end of shift  Outcome: Progressing  Goal: Increase self care and/or family involovement by end of shift  Outcome: Progressing  Goal: Receive DSME education by end of shift  Outcome: Progressing     Problem: Pain - Adult  Goal: Verbalizes/displays adequate comfort level or baseline comfort level  Outcome: Progressing     Problem: Safety - Adult  Goal: Free from fall injury  Outcome: Progressing     Problem: Discharge Planning  Goal: Discharge to home or other facility with appropriate resources  Outcome: Progressing     Problem: Chronic Conditions and Co-morbidities  Goal: Patient's chronic conditions and co-morbidity symptoms are monitored and maintained or improved  Outcome: Progressing     Problem: Skin  Goal: Decreased wound size/increased tissue granulation at next dressing change  Outcome: Progressing  Flowsheets (Taken 12/18/2024 1909)  Decreased wound size/increased tissue granulation at next dressing change:   Promote sleep for wound healing   Protective dressings over bony prominences  Goal: Participates in plan/prevention/treatment measures  Outcome: Progressing  Flowsheets (Taken 12/18/2024 1909)  Participates in plan/prevention/treatment measures:   Discuss with provider PT/OT  consult   Increase activity/out of bed for meals   Elevate heels  Goal: Prevent/manage excess moisture  Outcome: Progressing  Flowsheets (Taken 12/18/2024 1909)  Prevent/manage excess moisture:   Follow provider orders for dressing changes   Monitor for/manage infection if present  Goal: Prevent/minimize sheer/friction injuries  Outcome: Progressing  Flowsheets (Taken 12/18/2024 1909)  Prevent/minimize sheer/friction injuries:   HOB 30 degrees or less   Increase activity/out of bed for meals   Turn/reposition every 2 hours/use positioning/transfer devices   Use pull sheet  Goal: Promote/optimize nutrition  Outcome: Progressing  Flowsheets (Taken 12/18/2024 1909)  Promote/optimize nutrition:   Consume > 50% meals/supplements   Monitor/record intake including meals   Offer water/supplements/favorite foods  Goal: Promote skin healing  Outcome: Progressing  Flowsheets (Taken 12/18/2024 1909)  Promote skin healing:   Assess skin/pad under line(s)/device(s)   Protective dressings over bony prominences   Turn/reposition every 2 hours/use positioning/transfer devices   Ensure correct size (line/device) and apply per  instructions   Rotate device position/do not position patient on device     Problem: Fall/Injury  Goal: Not fall by end of shift  Outcome: Progressing  Goal: Be free from injury by end of the shift  Outcome: Progressing  Goal: Verbalize understanding of personal risk factors for fall in the hospital  Outcome: Progressing  Goal: Verbalize understanding of risk factor reduction measures to prevent injury from fall in the home  Outcome: Progressing  Goal: Use assistive devices by end of the shift  Outcome: Progressing  Goal: Pace activities to prevent fatigue by end of the shift  Outcome: Progressing     Problem: Pain  Goal: Takes deep breaths with improved pain control throughout the shift  Outcome: Progressing  Goal: Turns in bed with improved pain control throughout the shift  Outcome:  Progressing  Goal: Walks with improved pain control throughout the shift  Outcome: Progressing  Goal: Performs ADL's with improved pain control throughout shift  Outcome: Progressing  Goal: Participates in PT with improved pain control throughout the shift  Outcome: Progressing  Goal: Free from opioid side effects throughout the shift  Outcome: Progressing  Goal: Free from acute confusion related to pain meds throughout the shift  Outcome: Progressing

## 2024-12-20 ENCOUNTER — APPOINTMENT (OUTPATIENT)
Dept: DIALYSIS | Facility: HOSPITAL | Age: 69
End: 2024-12-20
Payer: COMMERCIAL

## 2024-12-20 LAB
BASO STIPL BLD QL SMEAR: PRESENT
BASOPHILS # BLD AUTO: 0.07 X10*3/UL (ref 0–0.1)
BASOPHILS NFR BLD AUTO: 0.8 %
EOSINOPHIL # BLD AUTO: 0.2 X10*3/UL (ref 0–0.7)
EOSINOPHIL NFR BLD AUTO: 2.4 %
ERYTHROCYTE [DISTWIDTH] IN BLOOD BY AUTOMATED COUNT: 21.3 % (ref 11.5–14.5)
GLUCOSE BLD MANUAL STRIP-MCNC: 130 MG/DL (ref 74–99)
GLUCOSE BLD MANUAL STRIP-MCNC: 130 MG/DL (ref 74–99)
GLUCOSE BLD MANUAL STRIP-MCNC: 132 MG/DL (ref 74–99)
GLUCOSE BLD MANUAL STRIP-MCNC: 148 MG/DL (ref 74–99)
GLUCOSE BLD MANUAL STRIP-MCNC: 169 MG/DL (ref 74–99)
GLUCOSE BLD MANUAL STRIP-MCNC: 178 MG/DL (ref 74–99)
HCT VFR BLD AUTO: 24.2 % (ref 36–46)
HGB BLD-MCNC: 7.7 G/DL (ref 12–16)
IMM GRANULOCYTES # BLD AUTO: 0.07 X10*3/UL (ref 0–0.7)
IMM GRANULOCYTES NFR BLD AUTO: 0.8 % (ref 0–0.9)
LYMPHOCYTES # BLD AUTO: 1.52 X10*3/UL (ref 1.2–4.8)
LYMPHOCYTES NFR BLD AUTO: 18.2 %
MCH RBC QN AUTO: 30.4 PG (ref 26–34)
MCHC RBC AUTO-ENTMCNC: 31.8 G/DL (ref 32–36)
MCV RBC AUTO: 96 FL (ref 80–100)
MONOCYTES # BLD AUTO: 1.18 X10*3/UL (ref 0.1–1)
MONOCYTES NFR BLD AUTO: 14.1 %
NEUTROPHILS # BLD AUTO: 5.3 X10*3/UL (ref 1.2–7.7)
NEUTROPHILS NFR BLD AUTO: 63.7 %
NRBC BLD-RTO: 0 /100 WBCS (ref 0–0)
OVALOCYTES BLD QL SMEAR: NORMAL
PLATELET # BLD AUTO: 263 X10*3/UL (ref 150–450)
POLYCHROMASIA BLD QL SMEAR: NORMAL
RBC # BLD AUTO: 2.53 X10*6/UL (ref 4–5.2)
RBC MORPH BLD: NORMAL
WBC # BLD AUTO: 8.3 X10*3/UL (ref 4.4–11.3)

## 2024-12-20 PROCEDURE — 2500000002 HC RX 250 W HCPCS SELF ADMINISTERED DRUGS (ALT 637 FOR MEDICARE OP, ALT 636 FOR OP/ED)

## 2024-12-20 PROCEDURE — 2500000001 HC RX 250 WO HCPCS SELF ADMINISTERED DRUGS (ALT 637 FOR MEDICARE OP): Performed by: STUDENT IN AN ORGANIZED HEALTH CARE EDUCATION/TRAINING PROGRAM

## 2024-12-20 PROCEDURE — 1210000001 HC SEMI-PRIVATE ROOM DAILY

## 2024-12-20 PROCEDURE — 2500000001 HC RX 250 WO HCPCS SELF ADMINISTERED DRUGS (ALT 637 FOR MEDICARE OP): Performed by: HOSPITALIST

## 2024-12-20 PROCEDURE — 8010000001 HC DIALYSIS - HEMODIALYSIS PER DAY

## 2024-12-20 PROCEDURE — 82947 ASSAY GLUCOSE BLOOD QUANT: CPT

## 2024-12-20 PROCEDURE — 2500000001 HC RX 250 WO HCPCS SELF ADMINISTERED DRUGS (ALT 637 FOR MEDICARE OP)

## 2024-12-20 PROCEDURE — 2500000001 HC RX 250 WO HCPCS SELF ADMINISTERED DRUGS (ALT 637 FOR MEDICARE OP): Performed by: REGISTERED NURSE

## 2024-12-20 PROCEDURE — 85025 COMPLETE CBC W/AUTO DIFF WBC: CPT | Performed by: STUDENT IN AN ORGANIZED HEALTH CARE EDUCATION/TRAINING PROGRAM

## 2024-12-20 RX ORDER — HYDROXYZINE HYDROCHLORIDE 25 MG/1
25 TABLET, FILM COATED ORAL EVERY 6 HOURS PRN
Status: DISCONTINUED | OUTPATIENT
Start: 2024-12-20 | End: 2024-12-23 | Stop reason: HOSPADM

## 2024-12-20 ASSESSMENT — COGNITIVE AND FUNCTIONAL STATUS - GENERAL
WALKING IN HOSPITAL ROOM: A LITTLE
MOBILITY SCORE: 24
DRESSING REGULAR LOWER BODY CLOTHING: A LITTLE
MOBILITY SCORE: 22
CLIMB 3 TO 5 STEPS WITH RAILING: A LITTLE
DAILY ACTIVITIY SCORE: 24
DAILY ACTIVITIY SCORE: 23

## 2024-12-20 ASSESSMENT — PAIN SCALES - GENERAL
PAINLEVEL_OUTOF10: 0 - NO PAIN
PAINLEVEL_OUTOF10: 0 - NO PAIN

## 2024-12-20 NOTE — PROGRESS NOTES
"      Nephrology Progress Note      Nephrology following for ESRD.   Events over night: none    Seen on HD- tolerating well so far. Feels tired but no other complaints. She did have a small amount of blood on her pillow this morning but not sure where it came from.     /62 (BP Location: Left arm, Patient Position: Lying)   Pulse 71   Temp 36.5 °C (97.7 °F) (Temporal)   Resp 17   Ht 1.702 m (5' 7.01\")   Wt 116 kg (255 lb 15.3 oz)   SpO2 93%   BMI 40.08 kg/m²     Input / Output:  24 HR:   Intake/Output Summary (Last 24 hours) at 12/20/2024 1036  Last data filed at 12/20/2024 1010  Gross per 24 hour   Intake 621 ml   Output --   Net 621 ml       Physical Exam   Alert and oriented x 3 NAD  Neck: no JVD  CV: RRR  Lungs: CTA bilaterally  Abd: soft, NT, ND   Ext: trace lower extremity edema    Scheduled medications  amLODIPine, 5 mg, oral, Daily  apixaban, 5 mg, oral, BID  atorvastatin, 40 mg, oral, Daily  busPIRone, 5 mg, oral, Nightly  doxepin, 100 mg, oral, Nightly  escitalopram, 10 mg, oral, Daily  insulin lispro, 0-5 Units, subcutaneous, q4h  metoprolol succinate XL, 100 mg, oral, q PM  pantoprazole, 40 mg, oral, Daily      Continuous medications     PRN medications  PRN medications: acetaminophen, albuterol, dextrose, dextrose, glucagon, glucagon, melatonin, ondansetron, oxymetazoline, temazepam   Results from last 7 days   Lab Units 12/19/24  0521   SODIUM mmol/L 134*   POTASSIUM mmol/L 3.8   CHLORIDE mmol/L 93*   CO2 mmol/L 28   BUN mg/dL 34*   CREATININE mg/dL 5.48*   CALCIUM mg/dL 8.5*   PROTEIN TOTAL g/dL 6.3*   BILIRUBIN TOTAL mg/dL 0.8   ALK PHOS U/L 88   ALT U/L 14   AST U/L 18   GLUCOSE mg/dL 123*      Results from last 7 days   Lab Units 12/16/24  1551   MAGNESIUM mg/dL 1.87      Results from last 7 days   Lab Units 12/20/24  0354 12/19/24  0521 12/18/24  0542   WBC AUTO x10*3/uL 8.3 8.7 9.5   HEMOGLOBIN g/dL 7.7* 8.2* 7.8*   HEMATOCRIT % 24.2* 25.4* 25.1*   PLATELETS AUTO x10*3/uL 263 270 267 "        Assessment & Plan:      Patient is 69 y.o. female who is admitted to hospital for acute blood loss anemia. Nephrology consulted in view of ESRD.     1: Acute blood loss anemia-S/P 3 units PRBC's- colonoscopy negative other than polyp, HgB 7.7 today     2: ESRD- HD MWF-seen on HD      3: Hypokalemia-transient post HD- resolved      4: Recurrent Epistaxis- resolved      5: Volume status acceptable     6: Hx of A-fib- Eliquis on hold due to #1     Recommendations:   HD today, monitor H/H  Transfuse further as needed         Please message me through [employer] chat with any questions or concerns.     Jed Ruby PA-C  12/20/2024  10:36 AM     [facility]    [address], [address]   [place] [address]  Office: [phone]

## 2024-12-20 NOTE — POST-PROCEDURE NOTE
Report to Receiving RN:    Report To: Kim Muller RN  Time Report Called: 1318  Hand-Off Communication: 833ml removed with dialysis; Pt off TX 19 minutes early AMA, d/t pt c/o feeling anxious. Post BP is 102/44 p71. AVF has clean gauze and tape post HD.  Complications During Treatment: Yes  Ultrafiltration Treatment: No  Medications Administered During Dialysis: Yes  Blood Products Administered During Dialysis: No  Labs Sent During Dialysis: No  Heparin Drip Rate Changes: N/A    Electronic Signatures:   (Signed Viola Cosby OCDT)     Last Updated: 1:15 PM by VIOLA COSBY

## 2024-12-20 NOTE — PROGRESS NOTES
Rosa Soto is a 69 y.o. female on day 3 of admission presenting with Acute blood loss anemia.  69 y.o. female with PMHx s/f HTN, NSTEMI, A-fib s/p RFA on 2/29/2024 and on Eliquis, HLD, chronic diastolic heart failure, ESRD on dialysis ([facility], refused to go on fridays), iron deficiency anemia, bladder cancer s/p resection, moderate recurrent MDD, YELENA presenting with low hemoglobin, generalized weakness.   Had epistaxis for few days prior to admission.  H/H stable s/p PRBC  GI consulted . EGD and  Colonoscopy done on 12/19- normal EGD. Polyp removed on colonoscopy.  Apixaban resumed on 12/20    Subjective   Small red spot noted on pillow. No overt bleeding   Denies any blood in stool or black stool.  Afebrile, BP maintained and in room air.  No abdominal pain, nausea or vomiting.   Hemoglobin 7.7 today. Has been stable.  Resume eliquis. HD today.  If no overt bleeding, H/H stable, DC home tomorrow.     Objective     Last Recorded Vitals  /62 (BP Location: Left arm, Patient Position: Lying)   Pulse 71   Temp 36.5 °C (97.7 °F) (Temporal)   Resp 17   Wt 116 kg (255 lb 15.3 oz)   SpO2 93%   Intake/Output last 3 Shifts:    Intake/Output Summary (Last 24 hours) at 12/20/2024 1106  Last data filed at 12/20/2024 1010  Gross per 24 hour   Intake 621 ml   Output --   Net 621 ml       Admission Weight  Weight: 116 kg (255 lb 15.3 oz) (12/16/24 1443)    Daily Weight  12/16/24 : 116 kg (255 lb 15.3 oz)    Image Results  Colonoscopy Diagnostic  Table formatting from the original result was not included.  Impression  One 10 mm polyp in the proximal rectum; performed hot snare removal  Diverticulosis in the descending colon and sigmoid colon    Findings  One 10 mm sessile polyp in the proximal rectum; performed hot snare with   complete en bloc removal and retrieved specimen. Verification of patient   identification for the specimen was done by the physician, nurse and   technician using the patient's name, birth date and  medical record number.  Multiple diverticula in the descending colon and sigmoid colon       Recommendation  Await pathology results   Repeat colonoscopy not recommended due to age and comorbidities.    Return to hospital [facility] for ongoing care.       Indication  Anemia, unspecified type    Rosa Soto is a 69 y.o. female with a history of CAD, ESRD on HD, CHF,   A-fib, HTN, LEE ANN, bladder cancer, MDD, and YELENA who presents for   EGD/colonoscopy to evaluate anemia.     She has been feeling more weak and fatigued recently. This has been   accompanied by shortness of breath. She says that she ultimately came to   the ER after she was told that her blood counts were low. She has not had   any GI symptoms and no overt GI bleeding.     She says that she did previously have a colonoscopy about 5-10 years ago   and she thinks that precancerous polyps were found at that time.    she has taken Eliquis (Apixaban) with the last dose 3 days prior to the   procedure and she has also undergone dialysis yesterday.    Medications  See Anesthesia Record.     Preprocedure  A history and physical has been performed, and patient medication   allergies have been reviewed. The patient's tolerance of previous   anesthesia has been reviewed. The risks and benefits of the procedure and   the sedation options and risks were discussed with the patient. All   questions were answered and informed consent obtained.    Details of the Procedure  The patient underwent monitored anesthesia care, which was administered by   an anesthesia professional. The patient's blood pressure, ECG, ETCO2,   heart rate, level of consciousness, oxygen and respirations were monitored   throughout the procedure. A digital rectal exam was performed. The scope   was introduced through the anus and advanced to the cecum. The quality of   bowel preparation was evaluated using the [place] Bowel Preparation Scale   with scores of: right colon = 2, transverse colon = 2, left  colon = 2. The   total BBPS score was 6. Bowel prep was adequate. The patient's estimated   blood loss was minimal (<5 mL). The procedure was not difficult. The   patient tolerated the procedure well. There were no apparent adverse   events.   Prior to the procedure, a History and Physical was performed, and patient   medications and allergies were reviewed. Immediately prior to the   administration of medications, the patient was re-assessed for adequacy to   receive sedatives. The heart rate, respiratory rate, oxygen saturations,   blood pressure, adequacy of pulmonary ventilation, and response to care   were monitored throughout the procedure. The physical status of the   patient was re-assessed after the procedure.     The risks and benefits of the procedure and the sedation options and risks   were discussed with the patient and/or family including the risk of injury   to internal organs (including perforation) and the risk of missed lesions.   All questions were answered and informed consent was obtained.     Patient identification and proposed procedure were verified by the   physician, the nurse, the anesthetist and the technician in the   pre-procedure area and in the procedure room. After this verification the   variable stiffness pediatric colonoscope was passed under direct vision.   Anatomic landmarks were photographed including the appendiceal orifice,   ileocecal valve, and rectum.    No sedation was administered by endoscopist. Sedation was administered by   the anesthesia staff. Refer to anesthesia documentation/charting for   details regarding medications administered and doses given.     Events  Procedure Events   Event Event Time   ENDO SCOPE IN TIME 12/19/2024 12:32 PM   ENDO CECUM REACHED 12/19/2024 12:38 PM   ENDO SCOPE OUT TIME 12/19/2024 12:49 PM     Specimens  ID Type Source Tests Collected by Time   1 : RECTAL POLYP Tissue RECTUM POLYPECTOMY SURGICAL PATHOLOGY EXAM   Micah Hunt MD  12/19/2024 1248     Procedure Location  [facility] [address] [facility]  [address] [address] [phone]    Referring Provider  Micah Hunt MD    Procedure Provider  Micah Hunt MD  Esophagogastroduodenoscopy (EGD)  Table formatting from the original result was not included.  Impression  The esophagus appeared normal.  The stomach appeared normal.  The duodenal bulb, 1st part of the duodenum and 2nd part of the duodenum   appeared normal.    Findings  The esophagus appeared normal.  Regular Z-line 40 cm from the incisors  The stomach appeared normal.  The duodenal bulb, 1st part of the duodenum and 2nd part of the duodenum   appeared normal.    *Images were obtained, but were unable to be included in report due to an   error with EPIC/LAQUITA.*    Recommendation  Return to hospital [facility] for ongoing care.       Indication  Anemia, unspecified type    Rosa Soto is a 69 y.o. female with a history of CAD, ESRD on HD, CHF,   A-fib, HTN, LEE ANN, bladder cancer, MDD, and YELENA who presents for   EGD/colonoscopy to evaluate anemia.     She has been feeling more weak and fatigued recently. This has been   accompanied by shortness of breath. She says that she ultimately came to   the ER after she was told that her blood counts were low. She has not had   any GI symptoms and no overt GI bleeding.     She says that she did previously have a colonoscopy about 5-10 years ago   and she thinks that precancerous polyps were found at that time.    she has taken Eliquis (Apixaban) with the last dose 3 days prior to the   procedure and she has also undergone dialysis yesterday.    Medications  See Anesthesia Record.     Preprocedure  A history and physical has been performed, and patient medication   allergies have been reviewed. The patient's tolerance of previous   anesthesia has been reviewed. The risks and benefits of the procedure and   the sedation options and risks were  discussed with the patient. All   questions were answered and informed consent obtained.    Details of the Procedure  The patient underwent monitored anesthesia care, which was administered by   an anesthesia professional. The patient's blood pressure, ECG, ETCO2,   heart rate, level of consciousness, oxygen and respirations were monitored   throughout the procedure. The scope was introduced through the mouth and   advanced to the second part of the duodenum. Retroflexion was performed in   the cardia. The patient experienced no blood loss. The procedure was not   difficult. The patient tolerated the procedure well. There were no   apparent adverse events.   Prior to the procedure, a History and Physical was performed, and patient   medications and allergies were reviewed. Immediately prior to the   administration of medications, the patient was re-assessed for adequacy to   receive sedatives. The heart rate, respiratory rate, oxygen saturations,   blood pressure, adequacy of pulmonary ventilation, and response to care   were monitored throughout the procedure. The physical status of the   patient was re-assessed after the procedure.     The risks and benefits of the procedure and the sedation options and risks   were discussed with the patient and/or family including the risk of injury   to internal organs (including perforation) and the risk of missed lesions.   All questions were answered and informed consent was obtained.     Patient identification and proposed procedure were verified by the   physician, the nurse, the anesthetist and the technician in the   pre-procedure area and in the procedure room. After this verification the   upper endoscope was passed under direct vision. Anatomic landmarks were   photographed.    No sedation was administered by endoscopist. Sedation was administered by   the anesthesia staff. Refer to anesthesia documentation/charting for   details regarding medications administered and  doses given.     Events  Procedure Events   Event Event Time   ENDO SCOPE IN TIME 12/19/2024 12:23 PM   ENDO SCOPE OUT TIME 12/19/2024 12:25 PM     Specimens  No specimens collected    Procedure Location  [address] [address]  [phone]    Referring Provider  Micah Hunt MD    Procedure Provider  Micah Hunt MD      Physical Exam  Constitutional:       Appearance: Normal appearance. She is obese.   Cardiovascular:      Rate and Rhythm: Normal rate.      Pulses: Normal pulses.      Heart sounds: Normal heart sounds.   Pulmonary:      Effort: Pulmonary effort is normal.      Breath sounds: Normal breath sounds.   Abdominal:      General: Bowel sounds are normal.      Palpations: Abdomen is soft.   Musculoskeletal:      Right lower leg: No edema.      Left lower leg: No edema.      Comments: AV fistula on right arm    Neurological:      General: No focal deficit present.      Mental Status: She is alert. Mental status is at baseline.         Relevant Results               Assessment/Plan     Acuate /acute on chronic :  Acute on chronic anemia-  no overt bleeding. Has h/o epistaxis few days before discharge . S/p 3 unite PRBC.  Hemoglobin 3.5 on admission , now 7.9. Gi consulted - planning for EGD and colonoscopy   - apixaban on hold   Mild hyponatremia   PAF , rate controlled, on anticoagulation-  anticoagulation resumed on 12/20  IDDM with hyperglycemia   ESRD on HD  Obesity   HLP  HTN  Chronic diastolic CHF- compensated   Plan:  Colonoscopy and EGD on 12/19. Normal EGD. Polyp removed from colon.  Resume anticoagulation.   CBC in am     DC plan: if hemoglobin stable and no overt bleeding, DC home tomorrow. No discharge needs.                 Ramana Angela MD

## 2024-12-20 NOTE — DOCUMENTATION CLARIFICATION NOTE
"    PATIENT:               TYREL CONTREARS  ACCT #:                  [id number]  MRN:                       [id number]  :                       1955  ADMIT DATE:       2024 2:41 PM  DISCH DATE:  RESPONDING PROVIDER #:        [id number]          PROVIDER RESPONSE TEXT:    ABLA due to or enhanced by Eliquis    CDI QUERY TEXT:    Clarification        Instruction:    Based on your assessment of the patient and the clinical information, please provide the requested documentation by clicking on the appropriate radio button and enter any additional information if prompted.    Question: Please further clarify if a relationship exists between Acute blood loss anemia and Eliquis    When answering this query, please exercise your independent professional judgment. The fact that a question is being asked, does not imply that any particular answer is desired or expected.    The patient's clinical indicators include:  Clinical Information:  - 68 yo female admitted with Acute blood loss anemia requiring transfusions, recent nosebleeds, and heme positive stools. PMH includes ESRD, Afib on Eliquis.    Clinical Indicators:  - Labs :  1105     Hgb 5.4,  Hct 16.9,  INR 1.7  1551     Hgb 3.5,  Hct 10.6  2051    Hgb 6.3,  Hct 20.3                Hgb 7.0,   Hct 21.5               Hgb 7.7,   Hct 24.2    -  Dr Hunt GI : \" EGD was normal. Colonoscopy showed a rectal polyp, but was otherwise normal. There is no evidence of any GI source of bleeding or anemia.\"    Treatment:  Lab monitoring, GI consult,  EGD/ Colonoscopy, Eliquis held, transfusion PRBC x3.    Risk Factors: Eliquis, nosebleeds.  Options provided:  -- ABLA due to or enhanced by Eliquis  -- ABLA not related to Eliquis  -- Other - I will add my own diagnosis  -- Refer to Clinical Documentation Reviewer    Query created by: Diane Coleman on 2024 10:55 AM      Electronically signed by:  MARKELL HUNT MD 2024 11:07 AM          "

## 2024-12-20 NOTE — PROGRESS NOTES
Care Transitions: Patient reviewed in care round meeting this afternoon and is not medically ready for discharge today. ADOD 24 hours. Discharge plan remains to return home and resume out patient dialysis at [facility] @ 1030 M-W-F with family transporting to treatments. [facility] 24/24 per nursing. No other needs identified at this time. Care team to follow. Kylah Pacheco RN/TCC

## 2024-12-20 NOTE — PRE-PROCEDURE NOTE
Report from Sending RN:    Report From: Kim Muller RN  Recent Surgery of Procedure: No  Baseline Level of Consciousness (LOC): A&Ox4  Oxygen Use: No  Type: RA  Diabetic: Yes  Last BP Med Given Day of Dialysis: See MAR  Last Pain Med Given: See MAR  Lab Tests to be Obtained with Dialysis: No  Blood Transfusion to be Given During Dialysis: No  Available IV Access: Yes  Medications to be Administered During Dialysis: No  Continuous IV Infusion Running: No  Restraints on Currently or in the Last 24 Hours: No  Hand-Off Communication: Full Code.

## 2024-12-20 NOTE — CARE PLAN
Problem: Safety - Adult  Goal: Free from fall injury  Outcome: Progressing     Problem: Skin  Goal: Decreased wound size/increased tissue granulation at next dressing change  Outcome: Progressing  Flowsheets (Taken 12/20/2024 1443)  Decreased wound size/increased tissue granulation at next dressing change:   Promote sleep for wound healing   Protective dressings over bony prominences  Goal: Participates in plan/prevention/treatment measures  Outcome: Progressing  Flowsheets (Taken 12/20/2024 1443)  Participates in plan/prevention/treatment measures:   Discuss with provider PT/OT consult   Elevate heels   Increase activity/out of bed for meals  Goal: Prevent/manage excess moisture  Outcome: Progressing  Flowsheets (Taken 12/20/2024 1443)  Prevent/manage excess moisture:   Cleanse incontinence/protect with barrier cream   Moisturize dry skin   Follow provider orders for dressing changes   Monitor for/manage infection if present  Goal: Prevent/minimize sheer/friction injuries  Outcome: Progressing  Flowsheets (Taken 12/20/2024 1443)  Prevent/minimize sheer/friction injuries:   HOB 30 degrees or less   Increase activity/out of bed for meals   Turn/reposition every 2 hours/use positioning/transfer devices   Use pull sheet  Goal: Promote/optimize nutrition  Outcome: Progressing  Flowsheets (Taken 12/20/2024 1443)  Promote/optimize nutrition:   Offer water/supplements/favorite foods   Consume > 50% meals/supplements   Monitor/record intake including meals  Goal: Promote skin healing  Outcome: Progressing  Flowsheets (Taken 12/20/2024 1443)  Promote skin healing:   Turn/reposition every 2 hours/use positioning/transfer devices   Rotate device position/do not position patient on device   Protective dressings over bony prominences   Ensure correct size (line/device) and apply per  instructions        The clinical goals for the shift include Pt will remain free from fall or injury through end of this shift.    No  fall or injury this shift. Current safety interventions continue, All needs met this shift. No new skin breakdown this shift.

## 2024-12-20 NOTE — CARE PLAN
The patient's goals for the shift include        Problem: Diabetes  Goal: Achieve decreasing blood glucose levels by end of shift  12/20/2024 0157 by January Cortes RN  Outcome: Progressing  12/20/2024 0157 by January Cortes RN  Outcome: Progressing  Goal: Increase stability of blood glucose readings by end of shift  12/20/2024 0157 by January Cortes RN  Outcome: Progressing  12/20/2024 0157 by January Cortes RN  Outcome: Progressing  Goal: Decrease in ketones present in urine by end of shift  12/20/2024 0157 by January Cortes RN  Outcome: Progressing  12/20/2024 0157 by January Cortes RN  Outcome: Progressing  Goal: Maintain electrolyte levels within acceptable range throughout shift  12/20/2024 0157 by January Cortes RN  Outcome: Progressing  12/20/2024 0157 by January Cortes RN  Outcome: Progressing  Goal: Maintain glucose levels >70mg/dl to <250mg/dl throughout shift  12/20/2024 0157 by January Cortes RN  Outcome: Progressing  12/20/2024 0157 by January Cortes RN  Outcome: Progressing  Goal: No changes in neurological exam by end of shift  12/20/2024 0157 by January Cortes RN  Outcome: Progressing  12/20/2024 0157 by January Cortes RN  Outcome: Progressing  Goal: Learn about and adhere to nutrition recommendations by end of shift  12/20/2024 0157 by January Cortes RN  Outcome: Progressing  12/20/2024 0157 by January Cortes RN  Outcome: Progressing  Goal: Vital signs within normal range for age by end of shift  12/20/2024 0157 by January Cortes RN  Outcome: Progressing  12/20/2024 0157 by January Cortes RN  Outcome: Progressing  Goal: Increase self care and/or family involovement by end of shift  12/20/2024 0157 by January Cortes RN  Outcome: Progressing  12/20/2024 0157 by January Cortes RN  Outcome: Progressing  Goal: Receive DSME education by end of shift  12/20/2024 0157 by January Cortes RN  Outcome: Progressing  12/20/2024 0157 by January Cortes, RN  Outcome: Progressing     Problem: Pain - Adult  Goal: Verbalizes/displays  adequate comfort level or baseline comfort level  12/20/2024 0157 by January Cortes RN  Outcome: Progressing  Flowsheets (Taken 12/20/2024 0157)  Verbalizes/displays adequate comfort level or baseline comfort level:   Assess pain using appropriate pain scale   Administer analgesics based on type and severity of pain and evaluate response   Implement non-pharmacological measures as appropriate and evaluate response  12/20/2024 0157 by January Cortes RN  Outcome: Progressing  Flowsheets (Taken 12/20/2024 0157)  Verbalizes/displays adequate comfort level or baseline comfort level:   Assess pain using appropriate pain scale   Administer analgesics based on type and severity of pain and evaluate response   Implement non-pharmacological measures as appropriate and evaluate response     Problem: Safety - Adult  Goal: Free from fall injury  12/20/2024 0157 by January Cortes RN  Outcome: Progressing  12/20/2024 0157 by January Cortes RN  Outcome: Progressing     Problem: Discharge Planning  Goal: Discharge to home or other facility with appropriate resources  12/20/2024 0157 by January Cortes RN  Outcome: Progressing  12/20/2024 0157 by January Cortes RN  Outcome: Progressing     Problem: Chronic Conditions and Co-morbidities  Goal: Patient's chronic conditions and co-morbidity symptoms are monitored and maintained or improved  12/20/2024 0157 by January Cortes RN  Outcome: Progressing  12/20/2024 0157 by January Cortes RN  Outcome: Progressing     Problem: Skin  Goal: Decreased wound size/increased tissue granulation at next dressing change  12/20/2024 0157 by January Cortes RN  Outcome: Progressing  Flowsheets (Taken 12/20/2024 0157)  Decreased wound size/increased tissue granulation at next dressing change: Promote sleep for wound healing  12/20/2024 0157 by January Cortes RN  Outcome: Progressing  Flowsheets (Taken 12/20/2024 0157)  Decreased wound size/increased tissue granulation at next dressing change: Promote sleep for wound  healing  Goal: Participates in plan/prevention/treatment measures  12/20/2024 0157 by January Cortes RN  Outcome: Progressing  Flowsheets (Taken 12/20/2024 0157)  Participates in plan/prevention/treatment measures:   Discuss with provider PT/OT consult   Increase activity/out of bed for meals   Elevate heels  12/20/2024 0157 by January Cortes RN  Outcome: Progressing  Flowsheets (Taken 12/20/2024 0157)  Participates in plan/prevention/treatment measures:   Discuss with provider PT/OT consult   Increase activity/out of bed for meals   Elevate heels  Goal: Prevent/manage excess moisture  12/20/2024 0157 by January Cortes RN  Outcome: Progressing  Flowsheets (Taken 12/20/2024 0157)  Prevent/manage excess moisture:   Monitor for/manage infection if present   Follow provider orders for dressing changes  12/20/2024 0157 by January Cortes RN  Outcome: Progressing  Flowsheets (Taken 12/20/2024 0157)  Prevent/manage excess moisture:   Monitor for/manage infection if present   Follow provider orders for dressing changes  Goal: Prevent/minimize sheer/friction injuries  12/20/2024 0157 by January Cortes RN  Outcome: Progressing  Flowsheets (Taken 12/20/2024 0157)  Prevent/minimize sheer/friction injuries:   Increase activity/out of bed for meals   Turn/reposition every 2 hours/use positioning/transfer devices  12/20/2024 0157 by January Cortes RN  Outcome: Progressing  Flowsheets (Taken 12/20/2024 0157)  Prevent/minimize sheer/friction injuries:   Increase activity/out of bed for meals   Turn/reposition every 2 hours/use positioning/transfer devices  Goal: Promote/optimize nutrition  12/20/2024 0157 by January Cortes RN  Outcome: Progressing  Flowsheets (Taken 12/20/2024 0157)  Promote/optimize nutrition:   Consume > 50% meals/supplements   Monitor/record intake including meals   Offer water/supplements/favorite foods  12/20/2024 0157 by January Cortes RN  Outcome: Progressing  Flowsheets (Taken 12/20/2024 0157)  Promote/optimize  nutrition:   Consume > 50% meals/supplements   Monitor/record intake including meals   Offer water/supplements/favorite foods  Goal: Promote skin healing  12/20/2024 0157 by January Cortes RN  Outcome: Progressing  Flowsheets (Taken 12/20/2024 0157)  Promote skin healing:   Assess skin/pad under line(s)/device(s)   Rotate device position/do not position patient on device   Turn/reposition every 2 hours/use positioning/transfer devices  12/20/2024 0157 by January Cortes RN  Outcome: Progressing  Flowsheets (Taken 12/20/2024 0157)  Promote skin healing:   Assess skin/pad under line(s)/device(s)   Rotate device position/do not position patient on device   Turn/reposition every 2 hours/use positioning/transfer devices     Problem: Fall/Injury  Goal: Not fall by end of shift  12/20/2024 0157 by January Cortes RN  Outcome: Progressing  12/20/2024 0157 by January Cortes RN  Outcome: Progressing  Goal: Be free from injury by end of the shift  12/20/2024 0157 by January Cortes RN  Outcome: Progressing  12/20/2024 0157 by January Cortes RN  Outcome: Progressing  Goal: Verbalize understanding of personal risk factors for fall in the hospital  12/20/2024 0157 by January Cortes RN  Outcome: Progressing  12/20/2024 0157 by January Cortes RN  Outcome: Progressing  Goal: Verbalize understanding of risk factor reduction measures to prevent injury from fall in the home  12/20/2024 0157 by January Cortes RN  Outcome: Progressing  12/20/2024 0157 by January Cortes RN  Outcome: Progressing  Goal: Use assistive devices by end of the shift  12/20/2024 0157 by January Cortes RN  Outcome: Progressing  12/20/2024 0157 by January Cortes RN  Outcome: Progressing  Goal: Pace activities to prevent fatigue by end of the shift  12/20/2024 0157 by January Cortes RN  Outcome: Progressing  12/20/2024 0157 by January Cortes RN  Outcome: Progressing     Problem: Pain  Goal: Takes deep breaths with improved pain control throughout the shift  12/20/2024 0157 by January  REINA Cortes  Outcome: Progressing  12/20/2024 0157 by January Cortes RN  Outcome: Progressing  Goal: Turns in bed with improved pain control throughout the shift  12/20/2024 0157 by January Cortes RN  Outcome: Progressing  12/20/2024 0157 by January Cortes RN  Outcome: Progressing  Goal: Walks with improved pain control throughout the shift  12/20/2024 0157 by January Cortes RN  Outcome: Progressing  12/20/2024 0157 by January Cortes RN  Outcome: Progressing  Goal: Performs ADL's with improved pain control throughout shift  12/20/2024 0157 by January Cortes RN  Outcome: Progressing  12/20/2024 0157 by January Cortes RN  Outcome: Progressing  Goal: Participates in PT with improved pain control throughout the shift  12/20/2024 0157 by January Cortes RN  Outcome: Progressing  12/20/2024 0157 by January Cortes RN  Outcome: Progressing  Goal: Free from opioid side effects throughout the shift  12/20/2024 0157 by January Cortes RN  Outcome: Progressing  12/20/2024 0157 by January Cortes RN  Outcome: Progressing  Goal: Free from acute confusion related to pain meds throughout the shift  12/20/2024 0157 by January Cortes RN  Outcome: Progressing  12/20/2024 0157 by January Cortes RN  Outcome: Progressing

## 2024-12-21 LAB
BASO STIPL BLD QL SMEAR: PRESENT
BASOPHILS # BLD AUTO: 0.05 X10*3/UL (ref 0–0.1)
BASOPHILS NFR BLD AUTO: 0.7 %
EOSINOPHIL # BLD AUTO: 0.16 X10*3/UL (ref 0–0.7)
EOSINOPHIL NFR BLD AUTO: 2.2 %
ERYTHROCYTE [DISTWIDTH] IN BLOOD BY AUTOMATED COUNT: 20.7 % (ref 11.5–14.5)
GLUCOSE BLD MANUAL STRIP-MCNC: 130 MG/DL (ref 74–99)
GLUCOSE BLD MANUAL STRIP-MCNC: 132 MG/DL (ref 74–99)
GLUCOSE BLD MANUAL STRIP-MCNC: 148 MG/DL (ref 74–99)
GLUCOSE BLD MANUAL STRIP-MCNC: 149 MG/DL (ref 74–99)
GLUCOSE BLD MANUAL STRIP-MCNC: 150 MG/DL (ref 74–99)
GLUCOSE BLD MANUAL STRIP-MCNC: 198 MG/DL (ref 74–99)
GLUCOSE BLD MANUAL STRIP-MCNC: 206 MG/DL (ref 74–99)
HCT VFR BLD AUTO: 23.8 % (ref 36–46)
HGB BLD-MCNC: 7.5 G/DL (ref 12–16)
HYPOCHROMIA BLD QL SMEAR: NORMAL
IMM GRANULOCYTES # BLD AUTO: 0.06 X10*3/UL (ref 0–0.7)
IMM GRANULOCYTES NFR BLD AUTO: 0.8 % (ref 0–0.9)
LYMPHOCYTES # BLD AUTO: 1.44 X10*3/UL (ref 1.2–4.8)
LYMPHOCYTES NFR BLD AUTO: 19.5 %
MCH RBC QN AUTO: 30.6 PG (ref 26–34)
MCHC RBC AUTO-ENTMCNC: 31.5 G/DL (ref 32–36)
MCV RBC AUTO: 97 FL (ref 80–100)
MONOCYTES # BLD AUTO: 1.16 X10*3/UL (ref 0.1–1)
MONOCYTES NFR BLD AUTO: 15.7 %
NEUTROPHILS # BLD AUTO: 4.53 X10*3/UL (ref 1.2–7.7)
NEUTROPHILS NFR BLD AUTO: 61.1 %
NRBC BLD-RTO: 0 /100 WBCS (ref 0–0)
OVALOCYTES BLD QL SMEAR: NORMAL
PLATELET # BLD AUTO: 267 X10*3/UL (ref 150–450)
POLYCHROMASIA BLD QL SMEAR: NORMAL
RBC # BLD AUTO: 2.45 X10*6/UL (ref 4–5.2)
RBC MORPH BLD: NORMAL
WBC # BLD AUTO: 7.4 X10*3/UL (ref 4.4–11.3)

## 2024-12-21 PROCEDURE — 99232 SBSQ HOSP IP/OBS MODERATE 35: CPT | Performed by: INTERNAL MEDICINE

## 2024-12-21 PROCEDURE — 82947 ASSAY GLUCOSE BLOOD QUANT: CPT

## 2024-12-21 PROCEDURE — 36415 COLL VENOUS BLD VENIPUNCTURE: CPT | Performed by: STUDENT IN AN ORGANIZED HEALTH CARE EDUCATION/TRAINING PROGRAM

## 2024-12-21 PROCEDURE — 2500000001 HC RX 250 WO HCPCS SELF ADMINISTERED DRUGS (ALT 637 FOR MEDICARE OP): Performed by: REGISTERED NURSE

## 2024-12-21 PROCEDURE — 2500000001 HC RX 250 WO HCPCS SELF ADMINISTERED DRUGS (ALT 637 FOR MEDICARE OP): Performed by: HOSPITALIST

## 2024-12-21 PROCEDURE — 85025 COMPLETE CBC W/AUTO DIFF WBC: CPT | Performed by: STUDENT IN AN ORGANIZED HEALTH CARE EDUCATION/TRAINING PROGRAM

## 2024-12-21 PROCEDURE — 2500000001 HC RX 250 WO HCPCS SELF ADMINISTERED DRUGS (ALT 637 FOR MEDICARE OP): Performed by: STUDENT IN AN ORGANIZED HEALTH CARE EDUCATION/TRAINING PROGRAM

## 2024-12-21 PROCEDURE — 2500000002 HC RX 250 W HCPCS SELF ADMINISTERED DRUGS (ALT 637 FOR MEDICARE OP, ALT 636 FOR OP/ED)

## 2024-12-21 PROCEDURE — 1210000001 HC SEMI-PRIVATE ROOM DAILY

## 2024-12-21 PROCEDURE — 2500000001 HC RX 250 WO HCPCS SELF ADMINISTERED DRUGS (ALT 637 FOR MEDICARE OP)

## 2024-12-21 PROCEDURE — 94761 N-INVAS EAR/PLS OXIMETRY MLT: CPT

## 2024-12-21 ASSESSMENT — COGNITIVE AND FUNCTIONAL STATUS - GENERAL
MOBILITY SCORE: 24
DAILY ACTIVITIY SCORE: 24
DAILY ACTIVITIY SCORE: 24
MOBILITY SCORE: 24

## 2024-12-21 ASSESSMENT — PAIN SCALES - GENERAL
PAINLEVEL_OUTOF10: 0 - NO PAIN
PAINLEVEL_OUTOF10: 0 - NO PAIN

## 2024-12-21 ASSESSMENT — PAIN - FUNCTIONAL ASSESSMENT: PAIN_FUNCTIONAL_ASSESSMENT: 0-10

## 2024-12-21 NOTE — CARE PLAN
The patient's goals for the shift include      Problem: Diabetes  Goal: Achieve decreasing blood glucose levels by end of shift  Outcome: Progressing  Goal: Increase stability of blood glucose readings by end of shift  Outcome: Progressing  Goal: Decrease in ketones present in urine by end of shift  Outcome: Progressing  Goal: Maintain electrolyte levels within acceptable range throughout shift  Outcome: Progressing  Goal: Maintain glucose levels >70mg/dl to <250mg/dl throughout shift  Outcome: Progressing  Goal: No changes in neurological exam by end of shift  Outcome: Progressing  Goal: Learn about and adhere to nutrition recommendations by end of shift  Outcome: Progressing  Goal: Vital signs within normal range for age by end of shift  Outcome: Progressing  Goal: Increase self care and/or family involovement by end of shift  Outcome: Progressing  Goal: Receive DSME education by end of shift  Outcome: Progressing     Problem: Pain - Adult  Goal: Verbalizes/displays adequate comfort level or baseline comfort level  Outcome: Progressing  Flowsheets (Taken 12/21/2024 0008)  Verbalizes/displays adequate comfort level or baseline comfort level:   Assess pain using appropriate pain scale   Administer analgesics based on type and severity of pain and evaluate response   Implement non-pharmacological measures as appropriate and evaluate response     Problem: Safety - Adult  Goal: Free from fall injury  Outcome: Progressing     Problem: Discharge Planning  Goal: Discharge to home or other facility with appropriate resources  Outcome: Progressing     Problem: Chronic Conditions and Co-morbidities  Goal: Patient's chronic conditions and co-morbidity symptoms are monitored and maintained or improved  Outcome: Progressing     Problem: Skin  Goal: Decreased wound size/increased tissue granulation at next dressing change  Outcome: Progressing  Flowsheets (Taken 12/21/2024 0008)  Decreased wound size/increased tissue granulation  at next dressing change: Promote sleep for wound healing  Goal: Participates in plan/prevention/treatment measures  Outcome: Progressing  Flowsheets (Taken 12/21/2024 0008)  Participates in plan/prevention/treatment measures:   Elevate heels   Increase activity/out of bed for meals  Goal: Prevent/manage excess moisture  Outcome: Progressing  Flowsheets (Taken 12/21/2024 0008)  Prevent/manage excess moisture:   Moisturize dry skin   Monitor for/manage infection if present  Goal: Prevent/minimize sheer/friction injuries  Outcome: Progressing  Flowsheets (Taken 12/21/2024 0008)  Prevent/minimize sheer/friction injuries:   Turn/reposition every 2 hours/use positioning/transfer devices   Increase activity/out of bed for meals  Goal: Promote/optimize nutrition  Outcome: Progressing  Flowsheets (Taken 12/21/2024 0008)  Promote/optimize nutrition:   Monitor/record intake including meals   Consume > 50% meals/supplements  Goal: Promote skin healing  Outcome: Progressing  Flowsheets (Taken 12/21/2024 0008)  Promote skin healing:   Turn/reposition every 2 hours/use positioning/transfer devices   Assess skin/pad under line(s)/device(s)     Problem: Fall/Injury  Goal: Not fall by end of shift  Outcome: Progressing  Goal: Be free from injury by end of the shift  Outcome: Progressing  Goal: Verbalize understanding of personal risk factors for fall in the hospital  Outcome: Progressing  Goal: Verbalize understanding of risk factor reduction measures to prevent injury from fall in the home  Outcome: Progressing  Goal: Use assistive devices by end of the shift  Outcome: Progressing  Goal: Pace activities to prevent fatigue by end of the shift  Outcome: Progressing     Problem: Pain  Goal: Takes deep breaths with improved pain control throughout the shift  Outcome: Progressing  Goal: Turns in bed with improved pain control throughout the shift  Outcome: Progressing  Goal: Walks with improved pain control throughout the shift  Outcome:  Progressing  Goal: Performs ADL's with improved pain control throughout shift  Outcome: Progressing  Goal: Participates in PT with improved pain control throughout the shift  Outcome: Progressing  Goal: Free from opioid side effects throughout the shift  Outcome: Progressing  Goal: Free from acute confusion related to pain meds throughout the shift  Outcome: Progressing

## 2024-12-21 NOTE — PROGRESS NOTES
"      Nephrology Progress Note      Nephrology following for ESRD.   Events over night: none    Seen and examined s/p iHD yesterday.    /88 (BP Location: Left arm, Patient Position: Lying)   Pulse 79   Temp 36.6 °C (97.8 °F) (Temporal)   Resp 17   Ht 1.702 m (5' 7.01\")   Wt 116 kg (255 lb 15.3 oz)   SpO2 92%   BMI 40.08 kg/m²     Input / Output:  24 HR:   Intake/Output Summary (Last 24 hours) at 12/21/2024 1401  Last data filed at 12/21/2024 1100  Gross per 24 hour   Intake 960 ml   Output --   Net 960 ml       Physical Exam   Alert and oriented x 3 NAD  Neck: no JVD  CV: RRR  Lungs: CTA bilaterally  Abd: soft, NT, ND   Ext: trace lower extremity edema    Scheduled medications  amLODIPine, 5 mg, oral, Daily  apixaban, 5 mg, oral, BID  atorvastatin, 40 mg, oral, Daily  busPIRone, 5 mg, oral, Nightly  doxepin, 100 mg, oral, Nightly  escitalopram, 10 mg, oral, Daily  insulin lispro, 0-5 Units, subcutaneous, q4h  metoprolol succinate XL, 100 mg, oral, q PM  pantoprazole, 40 mg, oral, Daily      Continuous medications     PRN medications  PRN medications: acetaminophen, albuterol, dextrose, dextrose, glucagon, glucagon, hydrOXYzine HCL, melatonin, ondansetron, temazepam   Results from last 7 days   Lab Units 12/19/24  0521   SODIUM mmol/L 134*   POTASSIUM mmol/L 3.8   CHLORIDE mmol/L 93*   CO2 mmol/L 28   BUN mg/dL 34*   CREATININE mg/dL 5.48*   CALCIUM mg/dL 8.5*   PROTEIN TOTAL g/dL 6.3*   BILIRUBIN TOTAL mg/dL 0.8   ALK PHOS U/L 88   ALT U/L 14   AST U/L 18   GLUCOSE mg/dL 123*      Results from last 7 days   Lab Units 12/16/24  1551   MAGNESIUM mg/dL 1.87      Results from last 7 days   Lab Units 12/21/24  0506 12/20/24  0354 12/19/24  0521   WBC AUTO x10*3/uL 7.4 8.3 8.7   HEMOGLOBIN g/dL 7.5* 7.7* 8.2*   HEMATOCRIT % 23.8* 24.2* 25.4*   PLATELETS AUTO x10*3/uL 267 263 270        Assessment & Plan:      Patient is 69 y.o. female who is admitted to hospital for acute blood loss anemia. Nephrology " consulted in view of ESRD.     1: Acute blood loss anemia-S/P 3 units PRBC's- colonoscopy negative other than polyp, HgB 7.7 today     2: ESRD- HD MW     3: Hypokalemia-transient post HD- resolved      4: Recurrent Epistaxis- resolved      5: Volume status acceptable     6: Hx of A-fib- Eliquis on hold due to #1     Recommendations:   iHD MWF no indication for today, monitor H/H  Transfuse further as needed         Please message me through [employer] chat with any questions or concerns.     Kamaljit Price MD  12/21/2024  2:01 PM     [facility]    [address], [address]   [place] [address]  Office: [phone]

## 2024-12-21 NOTE — PROCEDURES
Home O2 Evaluation:    SpO2 on room air at rest:     92%    SpO2 on room air with exertion:     86%    SpO2 on oxygen at rest:    N/A    SpO2 on oxygen with exertion:     94%    SpO2 on 4L/min O2 with exertion:    94%    Ambulation distance:     100 ft    Recommended O2 device: REST ...NO OXYGEN                                             AMBULATION 4.0 LPM NASAL CANNULA    Recommended O2 4.0  L/min:    Recommended FiO2 37%:

## 2024-12-21 NOTE — PROGRESS NOTES
Rosa Soto is a 69 y.o. female on day 4 of admission presenting with Acute blood loss anemia.    69 y.o. female with PMHx s/f HTN, NSTEMI, A-fib s/p RFA on 2/29/2024 and on Eliquis, HLD, chronic diastolic heart failure, ESRD on dialysis (MW, refused to go on fridays), iron deficiency anemia, bladder cancer s/p resection, moderate recurrent MDD, YELENA presenting with low hemoglobin, generalized weakness.   Had epistaxis for few days prior to admission.  H/H stable s/p PRBC  GI consulted . EGD and  Colonoscopy done on 12/19- normal EGD. Polyp removed on colonoscopy.  Apixaban resumed on 12/20    Subjective     Patient reported feeling more winded today, like when she came in.  She denied any evidence of overt bleeding.  She noted tolerating diet without complication.  No new issues reported by nursing.    Objective     Last Recorded Vitals  /51 (BP Location: Left arm, Patient Position: Lying)   Pulse 82   Temp 37 °C (98.6 °F) (Temporal)   Resp 18   Wt 116 kg (255 lb 15.3 oz)   SpO2 90%   Intake/Output last 3 Shifts:    Intake/Output Summary (Last 24 hours) at 12/21/2024 1613  Last data filed at 12/21/2024 1500  Gross per 24 hour   Intake 1440 ml   Output --   Net 1440 ml       Admission Weight  Weight: 116 kg (255 lb 15.3 oz) (12/16/24 1443)    Daily Weight  12/16/24 : 116 kg (255 lb 15.3 oz)    Image Results  Colonoscopy Diagnostic  Table formatting from the original result was not included.  Impression  One 10 mm polyp in the proximal rectum; performed hot snare removal  Diverticulosis in the descending colon and sigmoid colon    Findings  One 10 mm sessile polyp in the proximal rectum; performed hot snare with   complete en bloc removal and retrieved specimen. Verification of patient   identification for the specimen was done by the physician, nurse and   technician using the patient's name, birth date and medical record number.  Multiple diverticula in the descending colon and sigmoid colon        Recommendation  Await pathology results   Repeat colonoscopy not recommended due to age and comorbidities.    Return to hospital [facility] for ongoing care.       Indication  Anemia, unspecified type    Rosa Soto is a 69 y.o. female with a history of CAD, ESRD on HD, CHF,   A-fib, HTN, LEE ANN, bladder cancer, MDD, and YELENA who presents for   EGD/colonoscopy to evaluate anemia.     She has been feeling more weak and fatigued recently. This has been   accompanied by shortness of breath. She says that she ultimately came to   the ER after she was told that her blood counts were low. She has not had   any GI symptoms and no overt GI bleeding.     She says that she did previously have a colonoscopy about 5-10 years ago   and she thinks that precancerous polyps were found at that time.    she has taken Eliquis (Apixaban) with the last dose 3 days prior to the   procedure and she has also undergone dialysis yesterday.    Medications  See Anesthesia Record.     Preprocedure  A history and physical has been performed, and patient medication   allergies have been reviewed. The patient's tolerance of previous   anesthesia has been reviewed. The risks and benefits of the procedure and   the sedation options and risks were discussed with the patient. All   questions were answered and informed consent obtained.    Details of the Procedure  The patient underwent monitored anesthesia care, which was administered by   an anesthesia professional. The patient's blood pressure, ECG, ETCO2,   heart rate, level of consciousness, oxygen and respirations were monitored   throughout the procedure. A digital rectal exam was performed. The scope   was introduced through the anus and advanced to the cecum. The quality of   bowel preparation was evaluated using the Crapo Bowel Preparation Scale   with scores of: right colon = 2, transverse colon = 2, left colon = 2. The   total BBPS score was 6. Bowel prep was adequate. The patient's estimated    blood loss was minimal (<5 mL). The procedure was not difficult. The   patient tolerated the procedure well. There were no apparent adverse   events.   Prior to the procedure, a History and Physical was performed, and patient   medications and allergies were reviewed. Immediately prior to the   administration of medications, the patient was re-assessed for adequacy to   receive sedatives. The heart rate, respiratory rate, oxygen saturations,   blood pressure, adequacy of pulmonary ventilation, and response to care   were monitored throughout the procedure. The physical status of the   patient was re-assessed after the procedure.     The risks and benefits of the procedure and the sedation options and risks   were discussed with the patient and/or family including the risk of injury   to internal organs (including perforation) and the risk of missed lesions.   All questions were answered and informed consent was obtained.     Patient identification and proposed procedure were verified by the   physician, the nurse, the anesthetist and the technician in the   pre-procedure area and in the procedure room. After this verification the   variable stiffness pediatric colonoscope was passed under direct vision.   Anatomic landmarks were photographed including the appendiceal orifice,   ileocecal valve, and rectum.    No sedation was administered by endoscopist. Sedation was administered by   the anesthesia staff. Refer to anesthesia documentation/charting for   details regarding medications administered and doses given.     Events  Procedure Events   Event Event Time   ENDO SCOPE IN TIME 12/19/2024 12:32 PM   ENDO CECUM REACHED 12/19/2024 12:38 PM   ENDO SCOPE OUT TIME 12/19/2024 12:49 PM     Specimens  ID Type Source Tests Collected by Time   1 : RECTAL POLYP Tissue RECTUM POLYPECTOMY SURGICAL PATHOLOGY EXAM   Micah Hunt MD 12/19/2024 1248     Procedure Location  [facility] [address]  [address] [id number] [phone]    Referring Provider  Micah Hunt MD    Procedure Provider  Micah Hunt MD  Esophagogastroduodenoscopy (EGD)  Table formatting from the original result was not included.  Impression  The esophagus appeared normal.  The stomach appeared normal.  The duodenal bulb, 1st part of the duodenum and 2nd part of the duodenum   appeared normal.    Findings  The esophagus appeared normal.  Regular Z-line 40 cm from the incisors  The stomach appeared normal.  The duodenal bulb, 1st part of the duodenum and 2nd part of the duodenum   appeared normal.    *Images were obtained, but were unable to be included in report due to an   error with EPIC/bitFlyer.*    Recommendation  Return to hospital [facility] for ongoing care.       Indication  Anemia, unspecified type    Rosa Soto is a 69 y.o. female with a history of CAD, ESRD on HD, CHF,   A-fib, HTN, LEE ANN, bladder cancer, MDD, and YELENA who presents for   EGD/colonoscopy to evaluate anemia.     She has been feeling more weak and fatigued recently. This has been   accompanied by shortness of breath. She says that she ultimately came to   the ER after she was told that her blood counts were low. She has not had   any GI symptoms and no overt GI bleeding.     She says that she did previously have a colonoscopy about 5-10 years ago   and she thinks that precancerous polyps were found at that time.    she has taken Eliquis (Apixaban) with the last dose 3 days prior to the   procedure and she has also undergone dialysis yesterday.    Medications  See Anesthesia Record.     Preprocedure  A history and physical has been performed, and patient medication   allergies have been reviewed. The patient's tolerance of previous   anesthesia has been reviewed. The risks and benefits of the procedure and   the sedation options and risks were discussed with the patient. All   questions were answered and informed consent  obtained.    Details of the Procedure  The patient underwent monitored anesthesia care, which was administered by   an anesthesia professional. The patient's blood pressure, ECG, ETCO2,   heart rate, level of consciousness, oxygen and respirations were monitored   throughout the procedure. The scope was introduced through the mouth and   advanced to the second part of the duodenum. Retroflexion was performed in   the cardia. The patient experienced no blood loss. The procedure was not   difficult. The patient tolerated the procedure well. There were no   apparent adverse events.   Prior to the procedure, a History and Physical was performed, and patient   medications and allergies were reviewed. Immediately prior to the   administration of medications, the patient was re-assessed for adequacy to   receive sedatives. The heart rate, respiratory rate, oxygen saturations,   blood pressure, adequacy of pulmonary ventilation, and response to care   were monitored throughout the procedure. The physical status of the   patient was re-assessed after the procedure.     The risks and benefits of the procedure and the sedation options and risks   were discussed with the patient and/or family including the risk of injury   to internal organs (including perforation) and the risk of missed lesions.   All questions were answered and informed consent was obtained.     Patient identification and proposed procedure were verified by the   physician, the nurse, the anesthetist and the technician in the   pre-procedure area and in the procedure room. After this verification the   upper endoscope was passed under direct vision. Anatomic landmarks were   photographed.    No sedation was administered by endoscopist. Sedation was administered by   the anesthesia staff. Refer to anesthesia documentation/charting for   details regarding medications administered and doses given.     Events  Procedure Events   Event Event Time   ENDO SCOPE IN  TIME 12/19/2024 12:23 PM   ENDO SCOPE OUT TIME 12/19/2024 12:25 PM     Specimens  No specimens collected    Procedure Location  [address] [address] [phone]    Referring Provider  Micah Hunt MD    Procedure Provider  Micah Hunt MD      Physical Exam  Constitutional:       Appearance: Normal appearance. She is obese.   Cardiovascular:      Rate and Rhythm: Normal rate.      Pulses: Normal pulses.      Heart sounds: Normal heart sounds.   Pulmonary:      Effort: Pulmonary effort is normal.      Breath sounds: Normal breath sounds.   Abdominal:      General: Bowel sounds are normal.      Palpations: Abdomen is soft.   Musculoskeletal:      Right lower leg: No edema.      Left lower leg: No edema.      Comments: AV fistula on right arm    Neurological:      General: No focal deficit present.      Mental Status: She is alert. Mental status is at baseline.         Relevant Results               Assessment/Plan     Acuate /acute on chronic :  Acute on chronic anemia-  no overt bleeding. Has h/o epistaxis few days before discharge . S/p 3 units PRBC.  Hemoglobin 3.5 on admission , now 7.9-7.7-7.5. Gi consulted - Colonoscopy and EGD on 12/19. Normal EGD. Polyp removed from colon -resumed apixaban on 12/20 with relative stability in hemoglobin.  Mild hyponatremia, improved  PAF , rate controlled, on anticoagulation-  anticoagulation resumed on 12/20  IDDM with hyperglycemia   ESRD on HD (MWF), does not appear volume overloaded  Obesity   HLP  HTN  Chronic diastolic CHF- compensated   Chronic hypoxemic respiratory failure -nursing reported patient desats with exertion though resting oxygen levels normal.  On further discussion with patient she does have home oxygen for this issue but does not regularly wear it.  Importance of compliance discussed.    Plan:  -Given symptoms, plan to monitor additional day to ensure stable  -Recheck CMP  and CBC in a.m. tomorrow  -Recommend iron supplementation as able with hemodialysis                Alvarez Littlejohn MD

## 2024-12-22 ENCOUNTER — APPOINTMENT (OUTPATIENT)
Dept: DIALYSIS | Facility: HOSPITAL | Age: 69
End: 2024-12-22
Payer: COMMERCIAL

## 2024-12-22 VITALS
TEMPERATURE: 96.1 F | DIASTOLIC BLOOD PRESSURE: 62 MMHG | WEIGHT: 255.95 LBS | OXYGEN SATURATION: 97 % | SYSTOLIC BLOOD PRESSURE: 101 MMHG | HEART RATE: 72 BPM | RESPIRATION RATE: 18 BRPM | HEIGHT: 67 IN | BODY MASS INDEX: 40.17 KG/M2

## 2024-12-22 LAB
ALBUMIN SERPL BCP-MCNC: 3.2 G/DL (ref 3.4–5)
ALP SERPL-CCNC: 93 U/L (ref 33–136)
ALT SERPL W P-5'-P-CCNC: 15 U/L (ref 7–45)
ANION GAP SERPL CALC-SCNC: 13 MMOL/L (ref 10–20)
AST SERPL W P-5'-P-CCNC: 16 U/L (ref 9–39)
BILIRUB SERPL-MCNC: 0.7 MG/DL (ref 0–1.2)
BUN SERPL-MCNC: 25 MG/DL (ref 6–23)
CALCIUM SERPL-MCNC: 8.5 MG/DL (ref 8.6–10.3)
CHLORIDE SERPL-SCNC: 99 MMOL/L (ref 98–107)
CO2 SERPL-SCNC: 28 MMOL/L (ref 21–32)
CREAT SERPL-MCNC: 4.24 MG/DL (ref 0.5–1.05)
EGFRCR SERPLBLD CKD-EPI 2021: 11 ML/MIN/1.73M*2
ERYTHROCYTE [DISTWIDTH] IN BLOOD BY AUTOMATED COUNT: 20 % (ref 11.5–14.5)
GLUCOSE BLD MANUAL STRIP-MCNC: 118 MG/DL (ref 74–99)
GLUCOSE BLD MANUAL STRIP-MCNC: 131 MG/DL (ref 74–99)
GLUCOSE BLD MANUAL STRIP-MCNC: 152 MG/DL (ref 74–99)
GLUCOSE BLD MANUAL STRIP-MCNC: 180 MG/DL (ref 74–99)
GLUCOSE BLD MANUAL STRIP-MCNC: 182 MG/DL (ref 74–99)
GLUCOSE BLD MANUAL STRIP-MCNC: 241 MG/DL (ref 74–99)
GLUCOSE SERPL-MCNC: 141 MG/DL (ref 74–99)
HCT VFR BLD AUTO: 23.7 % (ref 36–46)
HGB BLD-MCNC: 7.7 G/DL (ref 12–16)
MAGNESIUM SERPL-MCNC: 1.72 MG/DL (ref 1.6–2.4)
MCH RBC QN AUTO: 31.3 PG (ref 26–34)
MCHC RBC AUTO-ENTMCNC: 32.5 G/DL (ref 32–36)
MCV RBC AUTO: 96 FL (ref 80–100)
NRBC BLD-RTO: 0 /100 WBCS (ref 0–0)
PLATELET # BLD AUTO: 276 X10*3/UL (ref 150–450)
POTASSIUM SERPL-SCNC: 4.1 MMOL/L (ref 3.5–5.3)
PROT SERPL-MCNC: 6.3 G/DL (ref 6.4–8.2)
RBC # BLD AUTO: 2.46 X10*6/UL (ref 4–5.2)
SODIUM SERPL-SCNC: 136 MMOL/L (ref 136–145)
WBC # BLD AUTO: 10.8 X10*3/UL (ref 4.4–11.3)

## 2024-12-22 PROCEDURE — 2500000001 HC RX 250 WO HCPCS SELF ADMINISTERED DRUGS (ALT 637 FOR MEDICARE OP)

## 2024-12-22 PROCEDURE — 82947 ASSAY GLUCOSE BLOOD QUANT: CPT

## 2024-12-22 PROCEDURE — 80053 COMPREHEN METABOLIC PANEL: CPT | Performed by: INTERNAL MEDICINE

## 2024-12-22 PROCEDURE — 36415 COLL VENOUS BLD VENIPUNCTURE: CPT | Performed by: INTERNAL MEDICINE

## 2024-12-22 PROCEDURE — 2500000002 HC RX 250 W HCPCS SELF ADMINISTERED DRUGS (ALT 637 FOR MEDICARE OP, ALT 636 FOR OP/ED)

## 2024-12-22 PROCEDURE — 2500000004 HC RX 250 GENERAL PHARMACY W/ HCPCS (ALT 636 FOR OP/ED)

## 2024-12-22 PROCEDURE — 2500000001 HC RX 250 WO HCPCS SELF ADMINISTERED DRUGS (ALT 637 FOR MEDICARE OP): Performed by: HOSPITALIST

## 2024-12-22 PROCEDURE — 1210000001 HC SEMI-PRIVATE ROOM DAILY

## 2024-12-22 PROCEDURE — 2500000001 HC RX 250 WO HCPCS SELF ADMINISTERED DRUGS (ALT 637 FOR MEDICARE OP): Performed by: STUDENT IN AN ORGANIZED HEALTH CARE EDUCATION/TRAINING PROGRAM

## 2024-12-22 PROCEDURE — 99239 HOSP IP/OBS DSCHRG MGMT >30: CPT | Performed by: INTERNAL MEDICINE

## 2024-12-22 PROCEDURE — 8010000001 HC DIALYSIS - HEMODIALYSIS PER DAY

## 2024-12-22 PROCEDURE — 2500000001 HC RX 250 WO HCPCS SELF ADMINISTERED DRUGS (ALT 637 FOR MEDICARE OP): Performed by: REGISTERED NURSE

## 2024-12-22 PROCEDURE — 83735 ASSAY OF MAGNESIUM: CPT | Performed by: INTERNAL MEDICINE

## 2024-12-22 PROCEDURE — 85027 COMPLETE CBC AUTOMATED: CPT | Performed by: INTERNAL MEDICINE

## 2024-12-22 ASSESSMENT — COGNITIVE AND FUNCTIONAL STATUS - GENERAL
MOBILITY SCORE: 24
DAILY ACTIVITIY SCORE: 24
MOBILITY SCORE: 24
DAILY ACTIVITIY SCORE: 24

## 2024-12-22 ASSESSMENT — PAIN SCALES - GENERAL
PAINLEVEL_OUTOF10: 0 - NO PAIN
PAINLEVEL_OUTOF10: 3
PAINLEVEL_OUTOF10: 0 - NO PAIN

## 2024-12-22 ASSESSMENT — PAIN - FUNCTIONAL ASSESSMENT
PAIN_FUNCTIONAL_ASSESSMENT: 0-10

## 2024-12-22 ASSESSMENT — PAIN DESCRIPTION - LOCATION: LOCATION: HEAD

## 2024-12-22 NOTE — PROGRESS NOTES
"      Nephrology Progress Note      Nephrology following for ESRD.   Events over night: none    Seen and examined.     /72 (BP Location: Left arm, Patient Position: Lying)   Pulse 85   Temp 36.7 °C (98 °F) (Temporal)   Resp 15   Ht 1.702 m (5' 7.01\")   Wt 116 kg (255 lb 15.3 oz)   SpO2 90%   BMI 40.08 kg/m²     Input / Output:  24 HR:   Intake/Output Summary (Last 24 hours) at 12/22/2024 1718  Last data filed at 12/22/2024 1547  Gross per 24 hour   Intake 3210 ml   Output 2403 ml   Net 807 ml       Physical Exam   Alert and oriented x 3 NAD  Neck: no JVD  CV: RRR  Lungs: CTA bilaterally  Abd: soft, NT, ND   Ext: trace lower extremity edema    Scheduled medications  amLODIPine, 5 mg, oral, Daily  apixaban, 5 mg, oral, BID  atorvastatin, 40 mg, oral, Daily  busPIRone, 5 mg, oral, Nightly  doxepin, 100 mg, oral, Nightly  escitalopram, 10 mg, oral, Daily  insulin lispro, 0-5 Units, subcutaneous, q4h  metoprolol succinate XL, 100 mg, oral, q PM  pantoprazole, 40 mg, oral, Daily      Continuous medications     PRN medications  PRN medications: acetaminophen, albuterol, dextrose, dextrose, glucagon, glucagon, hydrOXYzine HCL, melatonin, ondansetron, temazepam   Results from last 7 days   Lab Units 12/22/24  1157   SODIUM mmol/L 136   POTASSIUM mmol/L 4.1   CHLORIDE mmol/L 99   CO2 mmol/L 28   BUN mg/dL 25*   CREATININE mg/dL 4.24*   CALCIUM mg/dL 8.5*   PROTEIN TOTAL g/dL 6.3*   BILIRUBIN TOTAL mg/dL 0.7   ALK PHOS U/L 93   ALT U/L 15   AST U/L 16   GLUCOSE mg/dL 141*      Results from last 7 days   Lab Units 12/22/24  1157 12/16/24  1551   MAGNESIUM mg/dL 1.72 1.87      Results from last 7 days   Lab Units 12/22/24  1157 12/21/24  0506 12/20/24  0354   WBC AUTO x10*3/uL 10.8 7.4 8.3   HEMOGLOBIN g/dL 7.7* 7.5* 7.7*   HEMATOCRIT % 23.7* 23.8* 24.2*   PLATELETS AUTO x10*3/uL 276 267 263        Assessment & Plan:      Patient is 69 y.o. female who is admitted to hospital for acute blood loss anemia. Nephrology " consulted in view of ESRD.     1: Acute blood loss anemia-S/P 3 units PRBC's- colonoscopy negative other than polyp, HgB 7.7 today     2: ESRD- HD MW     3: Hypokalemia-transient post HD- resolved      4: Recurrent Epistaxis- resolved      5: Volume status acceptable     6: Hx of A-fib- Eliquis resumed     Recommendations:   iHD today for adjusted iHD schedule for holiday week otherwise resume iHD MWF accordingly  Transfuse further as needed         Please message me through [facility] chat with any questions or concerns.     Kamaljit Price MD  12/22/2024  5:18 PM     [facility]    [address], [address]   [place] [address]  Office: [phone]

## 2024-12-22 NOTE — DISCHARGE INSTRUCTIONS
You are encouraged to wear your supplemental oxygen at home at 2 L per nasal cannula when exerting yourself.  Please return to the [facility] laboratory on Thursday, December 26 for follow-up blood work to ensure your blood count is stable.  No appointment is necessary and fasting is not necessary.  Orders will be in the lab.

## 2024-12-22 NOTE — CARE PLAN
Problem: Diabetes  Goal: Achieve decreasing blood glucose levels by end of shift  Outcome: Progressing  Goal: Increase stability of blood glucose readings by end of shift  Outcome: Progressing  Goal: Decrease in ketones present in urine by end of shift  Outcome: Progressing  Goal: Maintain electrolyte levels within acceptable range throughout shift  Outcome: Progressing  Goal: Maintain glucose levels >70mg/dl to <250mg/dl throughout shift  Outcome: Progressing  Goal: No changes in neurological exam by end of shift  Outcome: Progressing  Goal: Learn about and adhere to nutrition recommendations by end of shift  Outcome: Progressing  Goal: Vital signs within normal range for age by end of shift  Outcome: Progressing  Goal: Increase self care and/or family involovement by end of shift  Outcome: Progressing  Goal: Receive DSME education by end of shift  Outcome: Progressing     Problem: Skin  Goal: Decreased wound size/increased tissue granulation at next dressing change  Outcome: Progressing  Flowsheets (Taken 12/22/2024 0729)  Decreased wound size/increased tissue granulation at next dressing change: Promote sleep for wound healing  Goal: Participates in plan/prevention/treatment measures  Outcome: Progressing  Flowsheets (Taken 12/22/2024 0729)  Participates in plan/prevention/treatment measures:   Discuss with provider PT/OT consult   Elevate heels   Increase activity/out of bed for meals  Goal: Prevent/manage excess moisture  Outcome: Progressing  Flowsheets (Taken 12/22/2024 0729)  Prevent/manage excess moisture:   Cleanse incontinence/protect with barrier cream   Monitor for/manage infection if present   Moisturize dry skin  Goal: Prevent/minimize sheer/friction injuries  Outcome: Progressing  Flowsheets (Taken 12/22/2024 0729)  Prevent/minimize sheer/friction injuries:   Complete micro-shifts as needed if patient unable. Adjust patient position to relieve pressure points, not a full turn   Turn/reposition  every 2 hours/use positioning/transfer devices   HOB 30 degrees or less  Goal: Promote/optimize nutrition  Outcome: Progressing  Flowsheets (Taken 12/22/2024 0729)  Promote/optimize nutrition:   Monitor/record intake including meals   Consume > 50% meals/supplements   Offer water/supplements/favorite foods  Goal: Promote skin healing  Outcome: Progressing  Flowsheets (Taken 12/22/2024 0729)  Promote skin healing:   Assess skin/pad under line(s)/device(s)   Protective dressings over bony prominences   Turn/reposition every 2 hours/use positioning/transfer devices     Problem: Pain  Goal: Takes deep breaths with improved pain control throughout the shift  Outcome: Progressing  Goal: Turns in bed with improved pain control throughout the shift  Outcome: Progressing  Goal: Walks with improved pain control throughout the shift  Outcome: Progressing  Goal: Performs ADL's with improved pain control throughout shift  Outcome: Progressing  Goal: Participates in PT with improved pain control throughout the shift  Outcome: Progressing  Goal: Free from opioid side effects throughout the shift  Outcome: Progressing  Goal: Free from acute confusion related to pain meds throughout the shift  Outcome: Progressing    The patient's goals for the shift include      The clinical goals for the shift include Patient will remain hemodynamically stable during shift

## 2024-12-22 NOTE — PRE-PROCEDURE NOTE
Report from Sending RN:    Report From: Mi Singletary RN  Recent Surgery of Procedure: No  Baseline Level of Consciousness (LOC): x4   Oxygen Use: Yes, 2L  Type: NC  Diabetic: No  Last BP Med Given Day of Dialysis: Yes, See MAR  Last Pain Med Given: No, See MAR  Lab Tests to be Obtained with Dialysis: No  Blood Transfusion to be Given During Dialysis: No  Available IV Access: Yes  Medications to be Administered During Dialysis: No  Continuous IV Infusion Running: No  Restraints on Currently or in the Last 24 Hours: No  Hand-Off Communication: Stable for dialysis  Dialysis Catheter Dressing: AVF  Last Dressing Change: AVF

## 2024-12-22 NOTE — DISCHARGE SUMMARY
Discharge Diagnosis  Acute on chronic anemia  Hyponatremia  Paroxysmal atrial fibrillation  Type 2 diabetes mellitus with hyperglycemia  End-stage renal disease on hemodialysis  Obesity  Hyperlipidemia  Hypertension  Chronic diastolic CHF  Chronic hypoxemic respiratory failure    Issues Requiring Follow-Up  You are encouraged to wear your supplemental oxygen at home at 2 L per nasal cannula when exerting yourself.  Please return to the [facility] laboratory on Thursday, December 26 for follow-up blood work to ensure your blood count is stable.  No appointment is necessary and fasting is not necessary.  Orders will be in the lab.    Discharge Meds     Medication List      CONTINUE taking these medications     acetaminophen 325 mg tablet; Commonly known as: Tylenol; Take 3 tablets   (975 mg) by mouth every 8 hours if needed (breakthrough pain).   albuterol 90 mcg/actuation inhaler; Inhale 1 puff every 4 hours if   needed for shortness of breath.   amLODIPine 5 mg tablet; Commonly known as: Norvasc   atorvastatin 40 mg tablet; Commonly known as: Lipitor   Auryxia 210 mg iron tablet; Generic drug: ferric citrate   busPIRone 5 mg tablet; Commonly known as: Buspar   cholecalciferol 25 MCG (1000 UT) tablet; Commonly known as: Vitamin D-3   cinacalcet 30 mg tablet; Commonly known as: Sensipar   doxepin 100 mg capsule; Commonly known as: SINEquan   Eliquis 5 mg tablet; Generic drug: apixaban   escitalopram 10 mg tablet; Commonly known as: Lexapro   metoprolol succinate  mg 24 hr tablet; Commonly known as:   Toprol-XL; Take 1 tablet (100 mg) by mouth once daily in the evening. Do   not crush or chew.   Nephro-Gloria 0.8 mg tablet; Generic drug: B complex-vitamin C-folic acid   torsemide 20 mg tablet; Commonly known as: Demadex       Test Results Pending At Discharge  Pending Labs       Order Current Status    Surgical Pathology Exam In process            Hospital Course   Rosa Soto is a 69-year-old female  with a history of end-stage renal disease on hemodialysis, atrial fibrillation, and prior non-STEMI who presented to the emergency department on December 16 complaining of progressively worsening generalized weakness and lightheadedness.  She noted shortness of breath with exertion and walking short distances.  She reported an episode of epistaxis on 6/5 and had a recurrence the day prior to presentation.  She noted using Afrin at home with only intermittent success.  She denied any blood in stool or black, tarry stools.  She was noted to be significantly anemic on presentation with a hemoglobin of  5.4 then 3.5.  She was ordered 3 units of packed red blood cells and admitted for further evaluation and treatment.    After admission, she showed no evidence of overt bleeding.  Her hemoglobin improved from 3.5-7.9 with the transfusion.  Gastroenterology was consulted.  They performed colonoscopy on December 19 that showed a polyp, benign-appearing.  This was removed.  She also had an EGD on the same date that was essentially normal.  Her apixaban was on 20th.  Was monitored for 2 additional days stable at 7.7 on the day of discharge.  She noted feeling improved.  She was tolerating dialysis without complication.  Nursing did report desats when exerting herself on room air.  On further discussion with patient she did admit to having supplemental oxygen at home that had only used intermittently.  She was encouraged to use her supplemental oxygen with exertion at home.  Iron supplementation was recommended with dialysis.  Otherwise the patient noted doing better and was deemed safe for home-going on December 22.  Outpatient follow-up CBC was recommended for December 26.    Pertinent Physical Exam At Time of Discharge  Constitutional:       Appearance: Normal appearance. She is obese.   Cardiovascular:      Rate and Rhythm: Normal rate.      Pulses: Normal pulses.      Heart sounds: Normal heart sounds.   Pulmonary:       Effort: Pulmonary effort is normal.      Breath sounds: Normal breath sounds.   Abdominal:      General: Bowel sounds are normal.      Palpations: Abdomen is soft.   Musculoskeletal:      Right lower leg: No edema.      Left lower leg: No edema.      Comments: AV fistula on right arm    Neurological:      General: No focal deficit present.      Mental Status: She is alert. Mental status is at baseline.         Outpatient Follow-Up  Future Appointments   Date Time Provider Department Center   2/4/2025  2:00 PM [facility]       Discharge planning took 35 minutes        Alvarez Littlejohn MD

## 2024-12-23 ENCOUNTER — APPOINTMENT (OUTPATIENT)
Dept: RADIOLOGY | Facility: HOSPITAL | Age: 69
DRG: 813 | End: 2024-12-23
Payer: COMMERCIAL

## 2024-12-23 VITALS
TEMPERATURE: 97.9 F | DIASTOLIC BLOOD PRESSURE: 57 MMHG | BODY MASS INDEX: 40.17 KG/M2 | SYSTOLIC BLOOD PRESSURE: 122 MMHG | RESPIRATION RATE: 17 BRPM | OXYGEN SATURATION: 92 % | WEIGHT: 255.95 LBS | HEART RATE: 87 BPM | HEIGHT: 67 IN

## 2024-12-23 LAB
ALBUMIN SERPL BCP-MCNC: 3.2 G/DL (ref 3.4–5)
ALP SERPL-CCNC: 92 U/L (ref 33–136)
ALT SERPL W P-5'-P-CCNC: 15 U/L (ref 7–45)
ANION GAP SERPL CALC-SCNC: 15 MMOL/L (ref 10–20)
AST SERPL W P-5'-P-CCNC: 15 U/L (ref 9–39)
BILIRUB SERPL-MCNC: 0.7 MG/DL (ref 0–1.2)
BUN SERPL-MCNC: 27 MG/DL (ref 6–23)
CALCIUM SERPL-MCNC: 8.8 MG/DL (ref 8.6–10.3)
CHLORIDE SERPL-SCNC: 98 MMOL/L (ref 98–107)
CO2 SERPL-SCNC: 29 MMOL/L (ref 21–32)
CREAT SERPL-MCNC: 4.86 MG/DL (ref 0.5–1.05)
EGFRCR SERPLBLD CKD-EPI 2021: 9 ML/MIN/1.73M*2
ERYTHROCYTE [DISTWIDTH] IN BLOOD BY AUTOMATED COUNT: 20 % (ref 11.5–14.5)
GLUCOSE BLD MANUAL STRIP-MCNC: 141 MG/DL (ref 74–99)
GLUCOSE BLD MANUAL STRIP-MCNC: 141 MG/DL (ref 74–99)
GLUCOSE BLD MANUAL STRIP-MCNC: 200 MG/DL (ref 74–99)
GLUCOSE SERPL-MCNC: 134 MG/DL (ref 74–99)
HCT VFR BLD AUTO: 24.8 % (ref 36–46)
HGB BLD-MCNC: 7.5 G/DL (ref 12–16)
MAGNESIUM SERPL-MCNC: 1.91 MG/DL (ref 1.6–2.4)
MCH RBC QN AUTO: 29.8 PG (ref 26–34)
MCHC RBC AUTO-ENTMCNC: 30.2 G/DL (ref 32–36)
MCV RBC AUTO: 98 FL (ref 80–100)
NRBC BLD-RTO: 0 /100 WBCS (ref 0–0)
PLATELET # BLD AUTO: 274 X10*3/UL (ref 150–450)
POTASSIUM SERPL-SCNC: 4.9 MMOL/L (ref 3.5–5.3)
PROT SERPL-MCNC: 6.2 G/DL (ref 6.4–8.2)
RBC # BLD AUTO: 2.52 X10*6/UL (ref 4–5.2)
SODIUM SERPL-SCNC: 137 MMOL/L (ref 136–145)
WBC # BLD AUTO: 8.1 X10*3/UL (ref 4.4–11.3)

## 2024-12-23 PROCEDURE — 36415 COLL VENOUS BLD VENIPUNCTURE: CPT | Performed by: INTERNAL MEDICINE

## 2024-12-23 PROCEDURE — 2500000001 HC RX 250 WO HCPCS SELF ADMINISTERED DRUGS (ALT 637 FOR MEDICARE OP): Performed by: HOSPITALIST

## 2024-12-23 PROCEDURE — 85027 COMPLETE CBC AUTOMATED: CPT | Performed by: INTERNAL MEDICINE

## 2024-12-23 PROCEDURE — 2500000001 HC RX 250 WO HCPCS SELF ADMINISTERED DRUGS (ALT 637 FOR MEDICARE OP)

## 2024-12-23 PROCEDURE — 99232 SBSQ HOSP IP/OBS MODERATE 35: CPT | Performed by: INTERNAL MEDICINE

## 2024-12-23 PROCEDURE — 71045 X-RAY EXAM CHEST 1 VIEW: CPT

## 2024-12-23 PROCEDURE — 2500000001 HC RX 250 WO HCPCS SELF ADMINISTERED DRUGS (ALT 637 FOR MEDICARE OP): Performed by: STUDENT IN AN ORGANIZED HEALTH CARE EDUCATION/TRAINING PROGRAM

## 2024-12-23 PROCEDURE — 82947 ASSAY GLUCOSE BLOOD QUANT: CPT

## 2024-12-23 PROCEDURE — 2500000002 HC RX 250 W HCPCS SELF ADMINISTERED DRUGS (ALT 637 FOR MEDICARE OP, ALT 636 FOR OP/ED)

## 2024-12-23 PROCEDURE — 80053 COMPREHEN METABOLIC PANEL: CPT | Performed by: INTERNAL MEDICINE

## 2024-12-23 PROCEDURE — 83735 ASSAY OF MAGNESIUM: CPT | Performed by: INTERNAL MEDICINE

## 2024-12-23 ASSESSMENT — PAIN - FUNCTIONAL ASSESSMENT: PAIN_FUNCTIONAL_ASSESSMENT: 0-10

## 2024-12-23 ASSESSMENT — COGNITIVE AND FUNCTIONAL STATUS - GENERAL
DAILY ACTIVITIY SCORE: 24
MOBILITY SCORE: 24

## 2024-12-23 ASSESSMENT — PAIN SCALES - GENERAL: PAINLEVEL_OUTOF10: 0 - NO PAIN

## 2024-12-23 NOTE — SIGNIFICANT EVENT
MD messaged regarding pt desat at night requiring 3L supp o2. may need repeat oxygen eval as plan for discharge today. will repeat cxr

## 2024-12-23 NOTE — PROGRESS NOTES
Nephrology Progress Note    Following for ESRD.  Patient denies current chest pain.  She does have dyspnea on exertion.  There is no nausea or vomiting.  Appetite has been good.    Current Inpatient Medications:  Current Facility-Administered Medications Ordered in Epic   Medication Dose Route Frequency Provider Last Rate Last Admin    acetaminophen (Tylenol) tablet 650 mg  650 mg oral q4h PRN LAURA StewartC   650 mg at 12/22/24 1652    albuterol 90 mcg/actuation inhaler 1 puff  1 puff inhalation q4h PRN Flavio Ojeda PA-C        amLODIPine (Norvasc) tablet 5 mg  5 mg oral Daily KAYLEEN Stewart-C   5 mg at 12/22/24 0814    apixaban (Eliquis) tablet 5 mg  5 mg oral BID Ramana Angela MD   5 mg at 12/22/24 2115    atorvastatin (Lipitor) tablet 40 mg  40 mg oral Daily KAYLEEN Stewart-C   40 mg at 12/22/24 0814    busPIRone (Buspar) tablet 5 mg  5 mg oral Nightly KAYLEEN Stewart-C   5 mg at 12/22/24 2116    dextrose 50 % injection 12.5 g  12.5 g intravenous q15 min PRN KAYLEEN Stewart-C        dextrose 50 % injection 25 g  25 g intravenous q15 min PRN LAURA StewartC        doxepin (SINEquan) capsule 100 mg  100 mg oral Nightly KAYLEEN Stewart-C   100 mg at 12/22/24 2115    escitalopram (Lexapro) tablet 10 mg  10 mg oral Daily KAYLEEN Stewart-C   10 mg at 12/22/24 0814    glucagon (Glucagen) injection 1 mg  1 mg intramuscular q15 min PRN Flavio Ojeda PA-C        glucagon (Glucagen) injection 1 mg  1 mg intramuscular q15 min PRN LAURA StewartC        hydrOXYzine HCL (Atarax) tablet 25 mg  25 mg oral q6h PRN Ramana Angela MD   25 mg at 12/22/24 2127    insulin lispro injection 0-5 Units  0-5 Units subcutaneous q4h LAURA StewartC   1 Units at 12/22/24 2208    melatonin tablet 3 mg  3 mg oral Nightly PRN Flavio Ojeda PA-C        metoprolol succinate XL (Toprol-XL) 24 hr tablet 100 mg  100 mg oral q PM Flavio Ojeda PA-C   100 mg at 12/21/24 2048    ondansetron (Zofran) injection 4 mg  4 mg intravenous q6h PRN Flavio Ojeda PA-C   4  "mg at 24 1720    pantoprazole (ProtoNix) EC tablet 40 mg  40 mg oral Daily Jacobo SowDO   40 mg at 24 0814    temazepam (Restoril) capsule 15 mg  15 mg oral Nightly PRN MATTHIAS Garcia-CNP   15 mg at 24     No current [facility]-ordered outpatient medications on file.         Vitals:   /68 (BP Location: Left arm, Patient Position: Lying)   Pulse 85   Temp 36.6 °C (97.8 °F) (Temporal)   Resp 17   Ht 1.702 m (5' 7.01\")   Wt 116 kg (255 lb 15.3 oz)   SpO2 99%   BMI 40.08 kg/m²   BLOOD PRESSURE RANGE:  Systolic (24hrs), Av , Min:101 , Max:155   ; Diastolic (24hrs), Av, Min:58, Max:80    24HR INTAKE/OUTPUT:    Intake/Output Summary (Last 24 hours) at 2024 1002  Last data filed at 2024 0909  Gross per 24 hour   Intake 1340 ml   Output 2403 ml   Net -1063 ml       Physical exam:   Constitutional: NAD  Skin: no rash, turgor wnl  Heent:  eomi, mmm  Neck: no bruits or jvd noted  Cardiovascular:  Normal S1, S2 without m/r/g  Respiratory: CTAB anteriorly  Abdomen:  +bs, soft, nt, nd  Ext: Trace lower extremity edema        Data:   Labs:  Results for orders placed or performed during the hospital encounter of 24 (from the past 24 hours)   CBC   Result Value Ref Range    WBC 10.8 4.4 - 11.3 x10*3/uL    nRBC 0.0 0.0 - 0.0 /100 WBCs    RBC 2.46 (L) 4.00 - 5.20 x10*6/uL    Hemoglobin 7.7 (L) 12.0 - 16.0 g/dL    Hematocrit 23.7 (L) 36.0 - 46.0 %    MCV 96 80 - 100 fL    MCH 31.3 26.0 - 34.0 pg    MCHC 32.5 32.0 - 36.0 g/dL    RDW 20.0 (H) 11.5 - 14.5 %    Platelets 276 150 - 450 x10*3/uL   Comprehensive metabolic panel   Result Value Ref Range    Glucose 141 (H) 74 - 99 mg/dL    Sodium 136 136 - 145 mmol/L    Potassium 4.1 3.5 - 5.3 mmol/L    Chloride 99 98 - 107 mmol/L    Bicarbonate 28 21 - 32 mmol/L    Anion Gap 13 10 - 20 mmol/L    Urea Nitrogen 25 (H) 6 - 23 mg/dL    Creatinine 4.24 (H) 0.50 - 1.05 mg/dL    eGFR 11 (L) >60 mL/min/1.73m*2    Calcium 8.5 " (L) 8.6 - 10.3 mg/dL    Albumin 3.2 (L) 3.4 - 5.0 g/dL    Alkaline Phosphatase 93 33 - 136 U/L    Total Protein 6.3 (L) 6.4 - 8.2 g/dL    AST 16 9 - 39 U/L    Bilirubin, Total 0.7 0.0 - 1.2 mg/dL    ALT 15 7 - 45 U/L   Magnesium   Result Value Ref Range    Magnesium 1.72 1.60 - 2.40 mg/dL   POCT GLUCOSE   Result Value Ref Range    POCT Glucose 118 (H) 74 - 99 mg/dL   POCT GLUCOSE   Result Value Ref Range    POCT Glucose 241 (H) 74 - 99 mg/dL   POCT GLUCOSE   Result Value Ref Range    POCT Glucose 152 (H) 74 - 99 mg/dL   POCT GLUCOSE   Result Value Ref Range    POCT Glucose 182 (H) 74 - 99 mg/dL   POCT GLUCOSE   Result Value Ref Range    POCT Glucose 141 (H) 74 - 99 mg/dL   CBC   Result Value Ref Range    WBC 8.1 4.4 - 11.3 x10*3/uL    nRBC 0.0 0.0 - 0.0 /100 WBCs    RBC 2.52 (L) 4.00 - 5.20 x10*6/uL    Hemoglobin 7.5 (L) 12.0 - 16.0 g/dL    Hematocrit 24.8 (L) 36.0 - 46.0 %    MCV 98 80 - 100 fL    MCH 29.8 26.0 - 34.0 pg    MCHC 30.2 (L) 32.0 - 36.0 g/dL    RDW 20.0 (H) 11.5 - 14.5 %    Platelets 274 150 - 450 x10*3/uL   Comprehensive Metabolic Panel   Result Value Ref Range    Glucose 134 (H) 74 - 99 mg/dL    Sodium 137 136 - 145 mmol/L    Potassium 4.9 3.5 - 5.3 mmol/L    Chloride 98 98 - 107 mmol/L    Bicarbonate 29 21 - 32 mmol/L    Anion Gap 15 10 - 20 mmol/L    Urea Nitrogen 27 (H) 6 - 23 mg/dL    Creatinine 4.86 (H) 0.50 - 1.05 mg/dL    eGFR 9 (L) >60 mL/min/1.73m*2    Calcium 8.8 8.6 - 10.3 mg/dL    Albumin 3.2 (L) 3.4 - 5.0 g/dL    Alkaline Phosphatase 92 33 - 136 U/L    Total Protein 6.2 (L) 6.4 - 8.2 g/dL    AST 15 9 - 39 U/L    Bilirubin, Total 0.7 0.0 - 1.2 mg/dL    ALT 15 7 - 45 U/L   Magnesium   Result Value Ref Range    Magnesium 1.91 1.60 - 2.40 mg/dL   POCT GLUCOSE   Result Value Ref Range    POCT Glucose 141 (H) 74 - 99 mg/dL            Assessment and Plan:  The patient is a 69-year-old female with past history of ESRD, bladder cancer, hypertension, atrial fibrillation, HFpEF, and  hyperlipidemia.  Patient was admitted to the hospital on 12/16/2024 due to acute blood loss anemia attributed to epistaxis.  Nephrology is following for ESRD.    ESRD.  Patient dialyzes on MWF schedule as outpatient.    We are dialyzing patient on Sunday, Tuesday and Friday this week due to [place] holiday.  Patient was dialyzed yesterday without any problems.  There is no need for dialysis today.  Next dialysis to be scheduled for tomorrow unless patient is discharge.      Please do not hesitate to contact me at [phone] if there is any question or concern.   Joseph Guerrero MD (Gilberto Rios MD)

## 2024-12-23 NOTE — CARE PLAN
Problem: Diabetes  Goal: Achieve decreasing blood glucose levels by end of shift  Outcome: Progressing  Goal: Increase stability of blood glucose readings by end of shift  Outcome: Progressing  Goal: Decrease in ketones present in urine by end of shift  Outcome: Progressing  Goal: Maintain electrolyte levels within acceptable range throughout shift  Outcome: Progressing  Goal: Maintain glucose levels >70mg/dl to <250mg/dl throughout shift  Outcome: Progressing  Goal: No changes in neurological exam by end of shift  Outcome: Progressing  Goal: Learn about and adhere to nutrition recommendations by end of shift  Outcome: Progressing  Goal: Vital signs within normal range for age by end of shift  Outcome: Progressing  Goal: Increase self care and/or family involovement by end of shift  Outcome: Progressing  Goal: Receive DSME education by end of shift  Outcome: Progressing     Problem: Skin  Goal: Decreased wound size/increased tissue granulation at next dressing change  Outcome: Progressing  Flowsheets (Taken 12/23/2024 0335)  Decreased wound size/increased tissue granulation at next dressing change:   Promote sleep for wound healing   Protective dressings over bony prominences  Goal: Participates in plan/prevention/treatment measures  Outcome: Progressing  Flowsheets (Taken 12/23/2024 0335)  Participates in plan/prevention/treatment measures:   Discuss with provider PT/OT consult   Elevate heels   Increase activity/out of bed for meals  Goal: Prevent/manage excess moisture  Outcome: Progressing  Flowsheets (Taken 12/23/2024 0335)  Prevent/manage excess moisture:   Monitor for/manage infection if present   Follow provider orders for dressing changes  Goal: Prevent/minimize sheer/friction injuries  Outcome: Progressing  Flowsheets (Taken 12/23/2024 0335)  Prevent/minimize sheer/friction injuries:   Increase activity/out of bed for meals   HOB 30 degrees or less   Turn/reposition every 2 hours/use positioning/transfer  devices  Goal: Promote/optimize nutrition  Outcome: Progressing  Flowsheets (Taken 12/23/2024 0335)  Promote/optimize nutrition:   Monitor/record intake including meals   Consume > 50% meals/supplements  Goal: Promote skin healing  Outcome: Progressing  Flowsheets (Taken 12/23/2024 0335)  Promote skin healing: Assess skin/pad under line(s)/device(s)     Problem: Pain  Goal: Takes deep breaths with improved pain control throughout the shift  Outcome: Progressing  Goal: Turns in bed with improved pain control throughout the shift  Outcome: Progressing  Goal: Walks with improved pain control throughout the shift  Outcome: Progressing  Goal: Performs ADL's with improved pain control throughout shift  Outcome: Progressing  Goal: Participates in PT with improved pain control throughout the shift  Outcome: Progressing  Goal: Free from opioid side effects throughout the shift  Outcome: Progressing  Goal: Free from acute confusion related to pain meds throughout the shift  Outcome: Progressing   The patient's goals for the shift include      The clinical goals for the shift include Patient will remain hemodynamically stable during shift

## 2024-12-23 NOTE — PROGRESS NOTES
"Nutrition Initial Assessment:   Nutrition Assessment    Reason for Assessment: Length of stay    Medical history per chart: 69 y.o. female with PMHx s/f HTN, NSTEMI, A-fib s/p RFA on 2/29/2024 and on Eliquis, HLD, chronic diastolic heart failure, ESRD on dialysis (MW, refused to go on fridays), iron deficiency anemia, bladder cancer s/p resection, moderate recurrent MDD, YELENA presenting with low hemoglobin, generalized weakness.   Had epistaxis for few days prior to admission.  H/H stable s/p PRBC  GI consulted . EGD and  Colonoscopy done on 12/19- normal EGD. Polyp removed on colonoscopy.  Apixaban resumed on 12/20 12/23: Patient reports appetite is \"so-so\". Patient declined supplements, she reports taking a protein bar when at dialysis. She declines the need for RRT nutrition information. Patient is aware of elevation of phosphorus, but reports sometimes she consume prepackaged meals as she does not always have the energy to cook.     Current Diet: Adult diet Consistent Carb; CCD 60 gm/meal    Nutrition Related Findings:   Oral Symptoms: Teeth: Missing teeth   GI symptoms: no GI issues at this time.   BM: Last BM Date: 12/23/24  Food allergies: NKFA.   Meds/Labs reviewed.  amLODIPine, 5 mg, oral, Daily  apixaban, 5 mg, oral, BID  atorvastatin, 40 mg, oral, Daily  busPIRone, 5 mg, oral, Nightly  doxepin, 100 mg, oral, Nightly  escitalopram, 10 mg, oral, Daily  insulin lispro, 0-5 Units, subcutaneous, q4h  metoprolol succinate XL, 100 mg, oral, q PM  pantoprazole, 40 mg, oral, Daily             Nutrition Significant Labs:  Results from last 7 days   Lab Units 12/23/24  0550 12/22/24  1157 12/19/24  0521 12/17/24  0433 12/16/24  1551   GLUCOSE mg/dL 134* 141* 123*   < > 155*   SODIUM mmol/L 137 136 134*   < > 138   POTASSIUM mmol/L 4.9 4.1 3.8   < > 3.0*   CHLORIDE mmol/L 98 99 93*   < > 91*   CO2 mmol/L 29 28 28   < > 34*   BUN mg/dL 27* 25* 34*   < > 42*   CREATININE mg/dL 4.86* 4.24* 5.48*   < > 5.17*   EGFR " "mL/min/1.73m*2 9* 11* 8*   < > 9*   CALCIUM mg/dL 8.8 8.5* 8.5*   < > 7.5*   MAGNESIUM mg/dL 1.91 1.72  --   --  1.87    < > = values in this interval not displayed.     Lab Results   Component Value Date    HGBA1C 6.7 (H) 08/25/2024    HGBA1C 5.4 06/05/2024     Results from last 7 days   Lab Units 12/23/24  1012 12/23/24  0600 12/23/24  0140 12/22/24  2155 12/22/24  2012 12/22/24  1719 12/22/24  1424 12/22/24  0605   POCT GLUCOSE mg/dL 200* 141* 141* 182* 152* 241* 118* 131*       Anthropometrics:  Height: 170.2 cm (5' 7.01\")   Weight: 116 kg (255 lb 15.3 oz)   BMI (Calculated): 40.08  IBW/kg (Dietitian Calculated): 61.4 kg        Weight History:   Wt Readings from Last 10 Encounters:   12/16/24 116 kg (255 lb 15.3 oz)   12/03/24 117 kg (257 lb 14.4 oz)   10/15/24 116 kg (255 lb)   08/25/24 119 kg (263 lb 6.4 oz)   06/25/24 112 kg (246 lb)   06/05/24 112 kg (246 lb)   06/04/24 112 kg (246 lb 14.6 oz)   06/03/24 112 kg (246 lb 11.1 oz)   03/28/24 116 kg (255 lb 11.7 oz)   03/07/24 122 kg (268 lb 1.3 oz)        Weight Change %:  Weight History / % Weight Change: UBW: 255 lbs, weight flucations noted related to fluid  Significant Weight Loss: No          Nutrition Focused Physical Exam Findings:  Subcutaneous Fat Loss:   Orbital Fat Pads: Well nourished (slightly bulging fat pads)  Buccal Fat Pads: Well nourished (full, rounded cheeks)  Triceps: Well nourished (ample fat tissue)  Muscle Wasting:  Temporalis: Well nourished (well-defined muscle)  Pectoralis (Clavicular Region): Well nourished (clavicle not visible)  Deltoid/Trapezius: Well nourished (rounded appearance at arm, shoulder, neck)  Interosseous: Well nourished (muscle bulges)  Edema:  Edema Location: non pittingn BLE  Physical Findings:  Skin: Negative    Estimated Needs:   Total Energy Estimated Needs (kCal):  (9915-1052)  Method for Estimating Needs: 25-30 kcal/kg IBW  Total Protein Estimated Needs (g): 74 g  Method for Estimating Needs: 1.2 g/kg IBW   "   Method for Estimating Needs: per MD        Nutrition Diagnosis   Nutrition Diagnosis:  Malnutrition Diagnosis  Patient has Malnutrition Diagnosis: No    Nutrition Diagnosis  Patient has Nutrition Diagnosis: Yes  Diagnosis Status (1): New  Nutrition Diagnosis 1: Increased nutrient needs  Related to (1): increased demand for nutrient (protein)  As Evidenced by (1): ESRD on HD       Nutrition Interventions/Recommendations   Nutrition Interventions and Recommendations:        Nutrition Prescription:  Individualized Nutrition Prescription Provided for : Continue current diet        Nutrition Interventions:   Food and/or Nutrient Delivery Interventions  Interventions: Meals and snacks  Meals and Snacks: Carbohydrate-modified diet  Goal: consume >75%         Nutrition Education:   Education Documentation  No documentation found.      Nutrition Counseling  Counseling Theoretical Approach: Other (Comment)  Goal: pt declined diet information       Nutrition Monitoring and Evaluation   Monitoring/Evaluation:   Food/Nutrient Related History Monitoring  Monitoring and Evaluation Plan: Energy intake  Energy Intake: Estimated energy intake  Criteria: meet >75%    Body Composition/Growth/Weight History  Monitoring and Evaluation Plan: Weight  Weight: Measured weight  Criteria: stable    Biochemical Data, Medical Tests and Procedures  Monitoring and Evaluation Plan: Electrolyte/renal panel, Glucose/endocrine profile  Electrolyte and Renal Panel: Phosphorus, Potassium, Sodium  Criteria: wnl  Glucose/Endocrine Profile: Glucose, casual  Criteria: wnl    Nutrition Focused Physical Findings  Monitoring and Evaluation Plan: Skin  Skin: Other (Comment)  Other: maintain skin integrity    Other: maintain skin integrity       Time Spent/Follow-up Reminder:   Follow Up  Time Spent (min): 45 minutes  Last Date of Nutrition Visit: 12/23/24  Nutrition Follow-Up Needed?: Dietitian to reassess per policy  Follow up Comment: 12/30

## 2024-12-23 NOTE — PROGRESS NOTES
Rosa Soto is a 69 y.o. female on day 6 of admission presenting with Acute blood loss anemia.    69 y.o. female with PMHx s/f HTN, NSTEMI, A-fib s/p RFA on 2/29/2024 and on Eliquis, HLD, chronic diastolic heart failure, ESRD on dialysis (MW, refused to go on fridays), iron deficiency anemia, bladder cancer s/p resection, moderate recurrent MDD, YELENA presenting with low hemoglobin, generalized weakness.   Had epistaxis for few days prior to admission.  H/H stable s/p PRBC  GI consulted . EGD and  Colonoscopy done on 12/19- normal EGD. Polyp removed on colonoscopy.  Apixaban resumed on 12/20 12/23: Patient seen.  Held 1 additional day due to scheduling conflicts with her ride.  Patient has no new complaints.  Discussed with nephrology, no new issues.  Discussed with nursing, no new issues.  Patient expects to leave later today.    Subjective     Patient reported feeling more winded today, like when she came in.  She denied any evidence of overt bleeding.  She noted tolerating diet without complication.  No new issues reported by nursing.    Objective     Last Recorded Vitals  /68 (BP Location: Left arm, Patient Position: Lying)   Pulse 85   Temp 36.6 °C (97.8 °F) (Temporal)   Resp 17   Wt 116 kg (255 lb 15.3 oz)   SpO2 99%   Intake/Output last 3 Shifts:    Intake/Output Summary (Last 24 hours) at 12/23/2024 1032  Last data filed at 12/23/2024 0909  Gross per 24 hour   Intake 1320 ml   Output 2403 ml   Net -1083 ml       Admission Weight  Weight: 116 kg (255 lb 15.3 oz) (12/16/24 1443)    Daily Weight  12/16/24 : 116 kg (255 lb 15.3 oz)    Image Results  XR chest 1 view  Narrative: Interpreted By:  Daphne yAoub,   STUDY:  XR CHEST 1 VIEW;  12/23/2024 6:53 am      INDICATION:  Signs/Symptoms:hypoxemia.          COMPARISON:  12/16/2024      ACCESSION NUMBER(S):  [id number]      ORDERING CLINICIAN:  SPARKLE JUÁREZ      TECHNIQUE:  Portable AP upright      FINDINGS:  Cardiac silhouette is borderline  in size but unchanged. Aorta is  atherosclerotic. Pulmonary vasculature is congested and there is  minimal interstitial edema. Left costophrenic angle is blunted  consistent with a small pleural effusion. Osseous structures show no  acute abnormality.      Impression: Borderline cardiac size, pulmonary vascular congestion and mild  interstitial edema are findings consistent with heart failure      MACRO:  None.      Signed by: Daphne Ayoub 12/23/2024 7:54 AM  Dictation workstation:   [id number]      Physical Exam  Constitutional:       Appearance: Normal appearance. She is obese.   Cardiovascular:      Rate and Rhythm: Normal rate.      Pulses: Normal pulses.      Heart sounds: Normal heart sounds.   Pulmonary:      Effort: Pulmonary effort is normal.      Breath sounds: Normal breath sounds.   Abdominal:      General: Bowel sounds are normal.      Palpations: Abdomen is soft.   Musculoskeletal:      Right lower leg: No edema.      Left lower leg: No edema.      Comments: AV fistula on right arm    Neurological:      General: No focal deficit present.      Mental Status: She is alert. Mental status is at baseline.         Relevant Results               Assessment/Plan     Acuate /acute on chronic :  Acute on chronic anemia-  no overt bleeding. Has h/o epistaxis few days before discharge . S/p 3 units PRBC.  Hemoglobin 3.5 on admission , now 7.9-7.7-7.5. Gi consulted - Colonoscopy and EGD on 12/19. Normal EGD. Polyp removed from colon -resumed apixaban on 12/20 with relative stability in hemoglobin.  Mild hyponatremia, improved  PAF , rate controlled, on anticoagulation-  anticoagulation resumed on 12/20  IDDM with hyperglycemia   ESRD on HD (MWF), does not appear volume overloaded  Obesity   HLP  HTN  Chronic diastolic CHF- compensated   Chronic hypoxemic respiratory failure -nursing reported patient desats with exertion though resting oxygen levels normal.  On further discussion with patient she does have home  oxygen for this issue but does not regularly wear it.  Importance of compliance discussed.    Plan:  Okay to discharge to home.  Stable at present.  See previously completed discharge instructions and discharge summary.        Zana Live MD

## 2024-12-23 NOTE — CARE PLAN
The patient's goals for the shift include   Problem: Diabetes  Goal: Achieve decreasing blood glucose levels by end of shift  Outcome: Progressing  Goal: Increase stability of blood glucose readings by end of shift  Outcome: Progressing  Goal: Decrease in ketones present in urine by end of shift  Outcome: Progressing  Goal: Maintain electrolyte levels within acceptable range throughout shift  Outcome: Progressing  Goal: Maintain glucose levels >70mg/dl to <250mg/dl throughout shift  Outcome: Progressing  Goal: No changes in neurological exam by end of shift  Outcome: Progressing  Goal: Learn about and adhere to nutrition recommendations by end of shift  Outcome: Progressing  Goal: Vital signs within normal range for age by end of shift  Outcome: Progressing  Goal: Increase self care and/or family involovement by end of shift  Outcome: Progressing  Goal: Receive DSME education by end of shift  Outcome: Progressing     Problem: Skin  Goal: Decreased wound size/increased tissue granulation at next dressing change  Outcome: Progressing  Goal: Participates in plan/prevention/treatment measures  Outcome: Progressing  Flowsheets (Taken 12/23/2024 1258)  Participates in plan/prevention/treatment measures: Increase activity/out of bed for meals  Goal: Prevent/manage excess moisture  Outcome: Progressing  Flowsheets (Taken 12/23/2024 1258)  Prevent/manage excess moisture: Monitor for/manage infection if present  Goal: Prevent/minimize sheer/friction injuries  Outcome: Progressing  Flowsheets (Taken 12/23/2024 1258)  Prevent/minimize sheer/friction injuries: Use pull sheet  Goal: Promote/optimize nutrition  Outcome: Progressing  Flowsheets (Taken 12/23/2024 1258)  Promote/optimize nutrition:   Monitor/record intake including meals   Consume > 50% meals/supplements   Offer water/supplements/favorite foods  Goal: Promote skin healing  Outcome: Progressing     Problem: Fall/Injury  Goal: Not fall by end of shift  Outcome:  Progressing  Goal: Be free from injury by end of the shift  Outcome: Progressing  Goal: Verbalize understanding of personal risk factors for fall in the hospital  Outcome: Progressing  Goal: Verbalize understanding of risk factor reduction measures to prevent injury from fall in the home  Outcome: Progressing  Goal: Use assistive devices by end of the shift  Outcome: Progressing  Goal: Pace activities to prevent fatigue by end of the shift  Outcome: Progressing     Problem: Pain  Goal: Takes deep breaths with improved pain control throughout the shift  Outcome: Progressing  Goal: Turns in bed with improved pain control throughout the shift  Outcome: Progressing  Goal: Walks with improved pain control throughout the shift  Outcome: Progressing  Goal: Performs ADL's with improved pain control throughout shift  Outcome: Progressing  Goal: Participates in PT with improved pain control throughout the shift  Outcome: Progressing  Goal: Free from opioid side effects throughout the shift  Outcome: Progressing  Goal: Free from acute confusion related to pain meds throughout the shift  Outcome: Progressing        The clinical goals for the shift include no shortness of breath throughout shift.    See assessment and mar and flowsheets. Discharge home today. See blood sugars.

## 2024-12-26 LAB
LABORATORY COMMENT REPORT: NORMAL
PATH REPORT.FINAL DX SPEC: NORMAL
PATH REPORT.GROSS SPEC: NORMAL
PATH REPORT.RELEVANT HX SPEC: NORMAL
PATH REPORT.TOTAL CANCER: NORMAL

## 2024-12-29 ENCOUNTER — APPOINTMENT (OUTPATIENT)
Dept: CARDIOLOGY | Facility: HOSPITAL | Age: 69
End: 2024-12-29
Payer: COMMERCIAL

## 2024-12-29 ENCOUNTER — APPOINTMENT (OUTPATIENT)
Dept: RADIOLOGY | Facility: HOSPITAL | Age: 69
End: 2024-12-29
Payer: COMMERCIAL

## 2024-12-29 ENCOUNTER — HOSPITAL ENCOUNTER (EMERGENCY)
Facility: HOSPITAL | Age: 69
Discharge: HOME | End: 2024-12-29
Attending: STUDENT IN AN ORGANIZED HEALTH CARE EDUCATION/TRAINING PROGRAM
Payer: COMMERCIAL

## 2024-12-29 VITALS
SYSTOLIC BLOOD PRESSURE: 158 MMHG | RESPIRATION RATE: 18 BRPM | WEIGHT: 266.76 LBS | HEIGHT: 67 IN | HEART RATE: 78 BPM | TEMPERATURE: 97.9 F | OXYGEN SATURATION: 97 % | BODY MASS INDEX: 41.87 KG/M2 | DIASTOLIC BLOOD PRESSURE: 66 MMHG

## 2024-12-29 DIAGNOSIS — D64.9 ANEMIA, UNSPECIFIED TYPE: Primary | ICD-10-CM

## 2024-12-29 LAB
ABO GROUP (TYPE) IN BLOOD: NORMAL
ANION GAP SERPL CALC-SCNC: 20 MMOL/L (ref 10–20)
ANTIBODY SCREEN: NORMAL
BASOPHILS # BLD MANUAL: 0.23 X10*3/UL (ref 0–0.1)
BASOPHILS NFR BLD MANUAL: 2 %
BLOOD EXPIRATION DATE: NORMAL
BNP SERPL-MCNC: 255 PG/ML (ref 0–99)
BUN SERPL-MCNC: 58 MG/DL (ref 6–23)
CALCIUM SERPL-MCNC: 8 MG/DL (ref 8.6–10.3)
CARDIAC TROPONIN I PNL SERPL HS: 13 NG/L (ref 0–13)
CHLORIDE SERPL-SCNC: 94 MMOL/L (ref 98–107)
CO2 SERPL-SCNC: 27 MMOL/L (ref 21–32)
CREAT SERPL-MCNC: 7.2 MG/DL (ref 0.5–1.05)
DISPENSE STATUS: NORMAL
EGFRCR SERPLBLD CKD-EPI 2021: 6 ML/MIN/1.73M*2
EOSINOPHIL # BLD MANUAL: 0 X10*3/UL (ref 0–0.7)
EOSINOPHIL NFR BLD MANUAL: 0 %
ERYTHROCYTE [DISTWIDTH] IN BLOOD BY AUTOMATED COUNT: 18.7 % (ref 11.5–14.5)
GLUCOSE SERPL-MCNC: 125 MG/DL (ref 74–99)
HCT VFR BLD AUTO: 19.5 % (ref 36–46)
HGB BLD-MCNC: 6 G/DL (ref 12–16)
IMM GRANULOCYTES # BLD AUTO: 0.18 X10*3/UL (ref 0–0.7)
IMM GRANULOCYTES NFR BLD AUTO: 1.6 % (ref 0–0.9)
LYMPHOCYTES # BLD MANUAL: 0.68 X10*3/UL (ref 1.2–4.8)
LYMPHOCYTES NFR BLD MANUAL: 6 %
MCH RBC QN AUTO: 30.3 PG (ref 26–34)
MCHC RBC AUTO-ENTMCNC: 30.8 G/DL (ref 32–36)
MCV RBC AUTO: 99 FL (ref 80–100)
MONOCYTES # BLD MANUAL: 0.57 X10*3/UL (ref 0.1–1)
MONOCYTES NFR BLD MANUAL: 5 %
NEUTS SEG # BLD MANUAL: 9.92 X10*3/UL (ref 1.2–7)
NEUTS SEG NFR BLD MANUAL: 87 %
NRBC BLD-RTO: 0 /100 WBCS (ref 0–0)
PLATELET # BLD AUTO: 250 X10*3/UL (ref 150–450)
POTASSIUM SERPL-SCNC: 3.8 MMOL/L (ref 3.5–5.3)
PRODUCT BLOOD TYPE: 6200
PRODUCT CODE: NORMAL
RBC # BLD AUTO: 1.98 X10*6/UL (ref 4–5.2)
RBC MORPH BLD: ABNORMAL
RH FACTOR (ANTIGEN D): NORMAL
SODIUM SERPL-SCNC: 137 MMOL/L (ref 136–145)
TOTAL CELLS COUNTED BLD: 100
UNIT ABO: NORMAL
UNIT NUMBER: NORMAL
UNIT RH: NORMAL
UNIT VOLUME: 350
WBC # BLD AUTO: 11.4 X10*3/UL (ref 4.4–11.3)
XM INTEP: NORMAL

## 2024-12-29 PROCEDURE — 93005 ELECTROCARDIOGRAM TRACING: CPT

## 2024-12-29 PROCEDURE — 85007 BL SMEAR W/DIFF WBC COUNT: CPT | Performed by: STUDENT IN AN ORGANIZED HEALTH CARE EDUCATION/TRAINING PROGRAM

## 2024-12-29 PROCEDURE — 86850 RBC ANTIBODY SCREEN: CPT | Performed by: STUDENT IN AN ORGANIZED HEALTH CARE EDUCATION/TRAINING PROGRAM

## 2024-12-29 PROCEDURE — 85027 COMPLETE CBC AUTOMATED: CPT | Performed by: STUDENT IN AN ORGANIZED HEALTH CARE EDUCATION/TRAINING PROGRAM

## 2024-12-29 PROCEDURE — 99285 EMERGENCY DEPT VISIT HI MDM: CPT | Mod: 25 | Performed by: STUDENT IN AN ORGANIZED HEALTH CARE EDUCATION/TRAINING PROGRAM

## 2024-12-29 PROCEDURE — 83880 ASSAY OF NATRIURETIC PEPTIDE: CPT | Performed by: STUDENT IN AN ORGANIZED HEALTH CARE EDUCATION/TRAINING PROGRAM

## 2024-12-29 PROCEDURE — 36430 TRANSFUSION BLD/BLD COMPNT: CPT

## 2024-12-29 PROCEDURE — 86923 COMPATIBILITY TEST ELECTRIC: CPT

## 2024-12-29 PROCEDURE — 71046 X-RAY EXAM CHEST 2 VIEWS: CPT

## 2024-12-29 PROCEDURE — 80048 BASIC METABOLIC PNL TOTAL CA: CPT | Performed by: STUDENT IN AN ORGANIZED HEALTH CARE EDUCATION/TRAINING PROGRAM

## 2024-12-29 PROCEDURE — 36415 COLL VENOUS BLD VENIPUNCTURE: CPT | Performed by: STUDENT IN AN ORGANIZED HEALTH CARE EDUCATION/TRAINING PROGRAM

## 2024-12-29 PROCEDURE — 71046 X-RAY EXAM CHEST 2 VIEWS: CPT | Mod: FOREIGN READ | Performed by: RADIOLOGY

## 2024-12-29 PROCEDURE — 84484 ASSAY OF TROPONIN QUANT: CPT | Performed by: STUDENT IN AN ORGANIZED HEALTH CARE EDUCATION/TRAINING PROGRAM

## 2024-12-29 PROCEDURE — P9016 RBC LEUKOCYTES REDUCED: HCPCS

## 2024-12-29 ASSESSMENT — PAIN SCALES - GENERAL
PAINLEVEL_OUTOF10: 0 - NO PAIN
PAINLEVEL_OUTOF10: 0 - NO PAIN

## 2024-12-29 ASSESSMENT — LIFESTYLE VARIABLES
HAVE PEOPLE ANNOYED YOU BY CRITICIZING YOUR DRINKING: NO
TOTAL SCORE: 0
EVER FELT BAD OR GUILTY ABOUT YOUR DRINKING: NO
EVER HAD A DRINK FIRST THING IN THE MORNING TO STEADY YOUR NERVES TO GET RID OF A HANGOVER: NO
HAVE YOU EVER FELT YOU SHOULD CUT DOWN ON YOUR DRINKING: NO

## 2024-12-29 ASSESSMENT — PAIN - FUNCTIONAL ASSESSMENT: PAIN_FUNCTIONAL_ASSESSMENT: 0-10

## 2024-12-29 NOTE — ED PROVIDER NOTES
HPI   Chief Complaint   Patient presents with    Weakness, Gen     Weakness for 1 week       69-year-old female with past medical history of end-stage renal disease on dialysis Tuesday, Thursday, Sunday presented ED with concerns for shortness of breath.  Patient says her shortness of breath started ever since she got home to the hospital.  She was admitted and discharged about a week ago.  She was admitted for acute on chronic anemia.  She has ever since she got home she been feels short of breath.  Feels like she may have a slight cough.  No chest pain.  No worsening leg swelling.  She is compliant with her oral Eliquis.  No history of COPD.  She is a remote smoker.              Patient History   Past Medical History:   Diagnosis Date    Arrhythmia     Benign essential HTN     Cancer (Multi)     Chronic kidney disease     ESRD (end stage renal disease) (Multi)      Past Surgical History:   Procedure Laterality Date    CARDIAC CATHETERIZATION N/A 3/6/2024    Procedure: Left Heart Cath;  Surgeon: Carol Johnson MD;  Location: [facility] Cardiac Cath Lab;  Service: Cardiovascular;  Laterality: N/A;    CARDIAC ELECTROPHYSIOLOGY PROCEDURE N/A 2/29/2024    Procedure: Ablation A-Fib Persistant;  Surgeon: Adam Valentine MD;  Location: [place] [address] Cardiac Cath Lab;  Service: Electrophysiology;  Laterality: N/A;  [facility] EP LAB  GENERAL ANESTHESIA WHOLE CASE    US GUIDED ABDOMINAL PARACENTESIS  8/31/2023    US GUIDED ABDOMINAL PARACENTESIS 8/31/2023 [facility]     No family history on file.  Social History     Tobacco Use    Smoking status: Former     Current packs/day: 0.00     Types: Cigarettes     Quit date: 2/4/2021     Years since quitting: 3.9    Smokeless tobacco: Never   Vaping Use    Vaping status: Every Day   Substance Use Topics    Alcohol use: Not Currently    Drug use: Never       Physical Exam   ED Triage Vitals [12/29/24 1341]   Temperature Heart Rate Respirations BP   36.4 °C (97.5 °F) 97 18 144/65      Pulse  Ox Temp Source Heart Rate Source Patient Position   95 % Temporal Monitor --      BP Location FiO2 (%)     -- --       Physical Exam  Vitals and nursing note reviewed.   Constitutional:       General: She is not in acute distress.     Appearance: She is well-developed.   HENT:      Head: Normocephalic and atraumatic.   Eyes:      Conjunctiva/sclera: Conjunctivae normal.   Cardiovascular:      Rate and Rhythm: Normal rate and regular rhythm.      Heart sounds: No murmur heard.  Pulmonary:      Effort: Pulmonary effort is normal. No respiratory distress.      Breath sounds: Normal breath sounds.   Abdominal:      Palpations: Abdomen is soft.      Tenderness: There is no abdominal tenderness.   Musculoskeletal:         General: No swelling.      Cervical back: Neck supple.      Right lower leg: No edema.      Left lower leg: No edema.   Skin:     General: Skin is warm and dry.      Capillary Refill: Capillary refill takes less than 2 seconds.   Neurological:      Mental Status: She is alert.   Psychiatric:         Mood and Affect: Mood normal.           ED Course & MDM   ED Course as of 01/02/25 1101   Sun Dec 29, 2024   1435 EKG shows atrial fibrillation.  Rate controlled.  No STEMI.  Normal axis. [RS]   1601 Hemoglobin found to be 6.0.  Will transfuse 1 unit PRBCs.  Of consent obtained. [RS]      ED Course User Index  [RS] Quentin Darling DO         Diagnoses as of 01/02/25 1101   Anemia, unspecified type                 No data recorded     Rima Coma Scale Score: 15 (12/29/24 1343 : Sofia Alcaraz RN)                           Medical Decision Making  HISTORIAN:  Patient    CHART REVIEW:  Patient was admitted to this hospital and discharged 7 days ago.  She is admitted for acute on chronic anemia.  She had an EGD and a colonoscopy which were negative.    PT SUMMARY:  69-year-old female presented ED with shortness of breath.  Vital signs stable.    DDX:  ACS, PE, PNA, COPD, CHF, Anema, Pericardial effusion,  Asthma      PLAN:  Will obtain CBC, BMP, EKG, troponin, BNP, chest x-ray    DISPO/RE-EVAL:  Lab work this point showed a hemoglobin of 6.0, will give 1u prbc.  Patient presented to oncoming provider pending rest of lab work and chest x-ray results.          Procedure  Critical Care    Performed by: Quentin Darilng DO  Authorized by: Quentin Darling DO    Critical care provider statement:     Critical care time (minutes):  30    Critical care time was exclusive of:  Separately billable procedures and treating other patients    Critical care was necessary to treat or prevent imminent or life-threatening deterioration of the following conditions:  Circulatory failure    Critical care was time spent personally by me on the following activities:  Development of treatment plan with patient or surrogate, evaluation of patient's response to treatment, examination of patient, interpretation of cardiac output measurements, obtaining history from patient or surrogate, ordering and performing treatments and interventions, ordering and review of laboratory studies, ordering and review of radiographic studies, pulse oximetry and re-evaluation of patient's condition       Quentin Darling DO  12/29/24 1602       Quentin Darling DO  01/02/25 1101       Quentin Darling DO  01/13/25 0105

## 2024-12-30 LAB
ATRIAL RATE: 89 BPM
P AXIS: 141 DEGREES
PR INTERVAL: 52 MS
Q ONSET: 249 MS
QRS COUNT: 14 BEATS
QRS DURATION: 96 MS
QT INTERVAL: 403 MS
QTC CALCULATION(BAZETT): 477 MS
QTC FREDERICIA: 450 MS
R AXIS: 40 DEGREES
T AXIS: 87 DEGREES
T OFFSET: 451 MS
VENTRICULAR RATE: 84 BPM

## 2024-12-30 NOTE — PROGRESS NOTES
I have accept care of this patient in signout.    In summary:  I received this patient in signout as it was a 69-year-old female who presents emergency department for concerns of shortness of breath.  She is on 4 L here.  Patient was found to be anemic by prior provider she did just have a workup for her anemia most likely from her ESRD she did get dialysis today.  Her chest x-ray does show pleural effusions.  She was signed out pending ambulation trial after transfusion.  Patient ambulating well saturated at 88 to 89% on 4 L at this time she is safe for discharge home will follow-up with her primary care provider.      Labs Reviewed   CBC WITH AUTO DIFFERENTIAL - Abnormal       Result Value    WBC 11.4 (*)     nRBC 0.0      RBC 1.98 (*)     Hemoglobin 6.0 (*)     Hematocrit 19.5 (*)     MCV 99      MCH 30.3      MCHC 30.8 (*)     RDW 18.7 (*)     Platelets 250      Immature Granulocytes %, Automated 1.6 (*)     Immature Granulocytes Absolute, Automated 0.18     BASIC METABOLIC PANEL - Abnormal    Glucose 125 (*)     Sodium 137      Potassium 3.8      Chloride 94 (*)     Bicarbonate 27      Anion Gap 20      Urea Nitrogen 58 (*)     Creatinine 7.20 (*)     eGFR 6 (*)     Calcium 8.0 (*)    B-TYPE NATRIURETIC PEPTIDE - Abnormal     (*)     Narrative:        <100 pg/mL - Heart failure unlikely  100-299 pg/mL - Intermediate probability of acute heart                  failure exacerbation. Correlate with clinical                  context and patient history.    >=300 pg/mL - Heart Failure likely. Correlate with clinical                  context and patient history.    BNP testing is performed using different testing methodology at [facility] than at other [facility] hospitals. Direct result comparisons should only be made within the same method.      MANUAL DIFFERENTIAL - Abnormal    Neutrophils %, Manual 87.0      Lymphocytes %, Manual 6.0      Monocytes %, Manual 5.0      Eosinophils %, Manual 0.0       Basophils %, Manual 2.0      Seg Neutrophils Absolute, Manual 9.92 (*)     Lymphocytes Absolute, Manual 0.68 (*)     Monocytes Absolute, Manual 0.57      Eosinophils Absolute, Manual 0.00      Basophils Absolute, Manual 0.23 (*)     Total Cells Counted 100      RBC Morphology No significant RBC morphology present     TROPONIN I, HIGH SENSITIVITY - Normal    Troponin I, High Sensitivity 13      Narrative:     Less than 99th percentile of normal range cutoff-  Female and children under 18 years old <14 ng/L; Male <21 ng/L: Negative  Repeat testing should be performed if clinically indicated.     Female and children under 18 years old 14-50 ng/L; Male 21-50 ng/L:  Consistent with possible cardiac damage and possible increased clinical   risk. Serial measurements may help to assess extent of myocardial damage.     >50 ng/L: Consistent with cardiac damage, increased clinical risk and  myocardial infarction. Serial measurements may help assess extent of   myocardial damage.      NOTE: Children less than 1 year old may have higher baseline troponin   levels and results should be interpreted in conjunction with the overall   clinical context.     NOTE: Troponin I testing is performed using a different   testing methodology at [facility] than at other   [facility]. Direct result comparisons should only   be made within the same method.   TYPE AND SCREEN    ABO TYPE A      Rh TYPE POS      ANTIBODY SCREEN NEG     PREPARE RBC    PRODUCT CODE N5303A76      Unit Number [id number]      Unit ABO A      Unit RH POS      XM INTEP COMP      Dispense Status TR      Blood Expiration Date 1/1/2025 11:59:00 PM EST      PRODUCT BLOOD TYPE 6200      UNIT VOLUME 350       XR chest 2 views   Final Result   [Cardiomegaly with vascular congestion remains.  There are now small   to moderate bilateral pleural effusions..   Signed by Collin Ruiz MD

## 2024-12-31 ENCOUNTER — TELEPHONE (OUTPATIENT)
Dept: CARDIOLOGY | Facility: CLINIC | Age: 69
End: 2024-12-31
Payer: COMMERCIAL

## 2025-01-06 ENCOUNTER — LAB REQUISITION (OUTPATIENT)
Dept: LAB | Facility: HOSPITAL | Age: 70
End: 2025-01-06
Payer: COMMERCIAL

## 2025-01-06 ENCOUNTER — HOSPITAL ENCOUNTER (EMERGENCY)
Facility: HOSPITAL | Age: 70
Discharge: HOME | End: 2025-01-06
Attending: STUDENT IN AN ORGANIZED HEALTH CARE EDUCATION/TRAINING PROGRAM
Payer: COMMERCIAL

## 2025-01-06 VITALS
SYSTOLIC BLOOD PRESSURE: 167 MMHG | WEIGHT: 255.73 LBS | DIASTOLIC BLOOD PRESSURE: 74 MMHG | HEIGHT: 67 IN | RESPIRATION RATE: 18 BRPM | HEART RATE: 75 BPM | OXYGEN SATURATION: 99 % | TEMPERATURE: 98.8 F | BODY MASS INDEX: 40.14 KG/M2

## 2025-01-06 DIAGNOSIS — D64.9 ANEMIA, UNSPECIFIED TYPE: Primary | ICD-10-CM

## 2025-01-06 DIAGNOSIS — N18.6 END STAGE RENAL DISEASE (MULTI): ICD-10-CM

## 2025-01-06 DIAGNOSIS — Z51.89 ENCOUNTER FOR BLOOD TRANSFUSION: ICD-10-CM

## 2025-01-06 LAB
ABO GROUP (TYPE) IN BLOOD: NORMAL
ALBUMIN SERPL BCP-MCNC: 3.4 G/DL (ref 3.4–5)
ALP SERPL-CCNC: 97 U/L (ref 33–136)
ALT SERPL W P-5'-P-CCNC: 16 U/L (ref 7–45)
ANION GAP SERPL CALC-SCNC: 14 MMOL/L (ref 10–20)
ANTIBODY SCREEN: NORMAL
AST SERPL W P-5'-P-CCNC: 15 U/L (ref 9–39)
BASO STIPL BLD QL SMEAR: PRESENT
BASOPHILS # BLD AUTO: 0.07 X10*3/UL (ref 0–0.1)
BASOPHILS NFR BLD AUTO: 0.5 %
BILIRUB SERPL-MCNC: 0.6 MG/DL (ref 0–1.2)
BLOOD EXPIRATION DATE: NORMAL
BUN SERPL-MCNC: 33 MG/DL (ref 6–23)
CALCIUM SERPL-MCNC: 8 MG/DL (ref 8.6–10.3)
CHLORIDE SERPL-SCNC: 94 MMOL/L (ref 98–107)
CO2 SERPL-SCNC: 32 MMOL/L (ref 21–32)
CREAT SERPL-MCNC: 6.2 MG/DL (ref 0.5–1.05)
DISPENSE STATUS: NORMAL
EGFRCR SERPLBLD CKD-EPI 2021: 7 ML/MIN/1.73M*2
EOSINOPHIL # BLD AUTO: 0.21 X10*3/UL (ref 0–0.7)
EOSINOPHIL NFR BLD AUTO: 1.6 %
ERYTHROCYTE [DISTWIDTH] IN BLOOD BY AUTOMATED COUNT: 21.4 % (ref 11.5–14.5)
GLUCOSE SERPL-MCNC: 177 MG/DL (ref 74–99)
HCT VFR BLD AUTO: 20.7 % (ref 36–46)
HCT VFR BLD AUTO: 21.1 % (ref 36–46)
HGB BLD-MCNC: 6.4 G/DL (ref 12–16)
HGB BLD-MCNC: 6.7 G/DL (ref 12–16)
HYPOCHROMIA BLD QL SMEAR: NORMAL
IMM GRANULOCYTES # BLD AUTO: 0.19 X10*3/UL (ref 0–0.7)
IMM GRANULOCYTES NFR BLD AUTO: 1.5 % (ref 0–0.9)
INR PPP: 1.5 (ref 0.9–1.1)
LYMPHOCYTES # BLD AUTO: 1.33 X10*3/UL (ref 1.2–4.8)
LYMPHOCYTES NFR BLD AUTO: 10.3 %
MCH RBC QN AUTO: 32.8 PG (ref 26–34)
MCHC RBC AUTO-ENTMCNC: 31.8 G/DL (ref 32–36)
MCV RBC AUTO: 103 FL (ref 80–100)
MONOCYTES # BLD AUTO: 1.4 X10*3/UL (ref 0.1–1)
MONOCYTES NFR BLD AUTO: 10.8 %
NEUTROPHILS # BLD AUTO: 9.74 X10*3/UL (ref 1.2–7.7)
NEUTROPHILS NFR BLD AUTO: 75.3 %
NRBC BLD-RTO: 0.2 /100 WBCS (ref 0–0)
PLATELET # BLD AUTO: 263 X10*3/UL (ref 150–450)
POLYCHROMASIA BLD QL SMEAR: NORMAL
POTASSIUM SERPL-SCNC: 3.4 MMOL/L (ref 3.5–5.3)
PRODUCT BLOOD TYPE: 6200
PRODUCT CODE: NORMAL
PROT SERPL-MCNC: 6.6 G/DL (ref 6.4–8.2)
PROTHROMBIN TIME: 17.4 SECONDS (ref 9.8–12.8)
RBC # BLD AUTO: 2.04 X10*6/UL (ref 4–5.2)
RBC MORPH BLD: NORMAL
RH FACTOR (ANTIGEN D): NORMAL
SODIUM SERPL-SCNC: 137 MMOL/L (ref 136–145)
UNIT ABO: NORMAL
UNIT NUMBER: NORMAL
UNIT RH: NORMAL
UNIT VOLUME: 350
WBC # BLD AUTO: 12.9 X10*3/UL (ref 4.4–11.3)
XM INTEP: NORMAL

## 2025-01-06 PROCEDURE — 85014 HEMATOCRIT: CPT

## 2025-01-06 PROCEDURE — 86901 BLOOD TYPING SEROLOGIC RH(D): CPT | Performed by: STUDENT IN AN ORGANIZED HEALTH CARE EDUCATION/TRAINING PROGRAM

## 2025-01-06 PROCEDURE — 85018 HEMOGLOBIN: CPT

## 2025-01-06 PROCEDURE — 99284 EMERGENCY DEPT VISIT MOD MDM: CPT | Mod: 25 | Performed by: STUDENT IN AN ORGANIZED HEALTH CARE EDUCATION/TRAINING PROGRAM

## 2025-01-06 PROCEDURE — 85610 PROTHROMBIN TIME: CPT | Performed by: STUDENT IN AN ORGANIZED HEALTH CARE EDUCATION/TRAINING PROGRAM

## 2025-01-06 PROCEDURE — 86923 COMPATIBILITY TEST ELECTRIC: CPT

## 2025-01-06 PROCEDURE — 36430 TRANSFUSION BLD/BLD COMPNT: CPT

## 2025-01-06 PROCEDURE — P9016 RBC LEUKOCYTES REDUCED: HCPCS

## 2025-01-06 PROCEDURE — 36415 COLL VENOUS BLD VENIPUNCTURE: CPT | Performed by: STUDENT IN AN ORGANIZED HEALTH CARE EDUCATION/TRAINING PROGRAM

## 2025-01-06 PROCEDURE — 85025 COMPLETE CBC W/AUTO DIFF WBC: CPT | Performed by: STUDENT IN AN ORGANIZED HEALTH CARE EDUCATION/TRAINING PROGRAM

## 2025-01-06 PROCEDURE — 80053 COMPREHEN METABOLIC PANEL: CPT | Performed by: STUDENT IN AN ORGANIZED HEALTH CARE EDUCATION/TRAINING PROGRAM

## 2025-01-06 ASSESSMENT — PAIN SCALES - GENERAL
PAINLEVEL_OUTOF10: 5 - MODERATE PAIN
PAINLEVEL_OUTOF10: 0 - NO PAIN

## 2025-01-06 ASSESSMENT — PAIN DESCRIPTION - PAIN TYPE: TYPE: ACUTE PAIN

## 2025-01-06 ASSESSMENT — PAIN - FUNCTIONAL ASSESSMENT
PAIN_FUNCTIONAL_ASSESSMENT: 0-10
PAIN_FUNCTIONAL_ASSESSMENT: 0-10

## 2025-01-06 ASSESSMENT — PAIN DESCRIPTION - DESCRIPTORS: DESCRIPTORS: ACHING

## 2025-01-06 ASSESSMENT — PAIN DESCRIPTION - LOCATION: LOCATION: HEAD

## 2025-01-06 NOTE — ED PROVIDER NOTES
HPI   Chief Complaint   Patient presents with    Abnormal Lab     6.4 hgb today along with dark stools. Dialysis pt MWF. Denies CP. +SOB with exertion. On eliquis for afib       HPI: The patient is a 69-year-old female, history of end-stage renal disease on dialysis through a right upper extremity fistula with dialysis schedule Monday Wednesday and Friday, last got dialysis today, history of A-fib on Eliquis, history of NSTEMI, history of chronic anemia, hyponatremia, type 2 diabetes, obesity, hypertension, hyperlipidemia, heart failure, and chronic hypoxic respiratory failure, who is presenting to the emergency department for abnormal labs.  Patient was at dialysis today and was noted to have low hemoglobin.  She does report that she has been more short of breath, more fatigued, and more tired over the past few days.  She states that her stool has been black but this has been ongoing for the past several weeks.  Patient recently had both a colonoscopy and an EGD on 19 December, all of which were negative for any evidence of any acute bleed.  Patient denies any recent trauma      ROS: Complete 12 point review of systems performed, otherwise negative except as noted in the history of present illness    PMH: Reviewed, documented below in note. Pertinents in HPI  PSH: Reviewed and documented below in note. Pertinents in HPI  SH: No illicits.  Not homeless.  Fam: Reviewed, noncontributory to patients current complaint  MEDS: Reviewed and documented below in note. Pertinents in HPI  ALLERGIES: Reviewed and documented below in note.              History provided by:  Patient and medical records   used: No                          No data recorded                Patient History   Past Medical History:   Diagnosis Date    Arrhythmia     Benign essential HTN     Cancer (Multi)     Chronic kidney disease     ESRD (end stage renal disease) (Multi)      Past Surgical History:   Procedure Laterality Date  "   CARDIAC CATHETERIZATION N/A 3/6/2024    Procedure: Left Heart Cath;  Surgeon: Carol Johnson MD;  Location: [facility];  Service: Cardiovascular;  Laterality: N/A;    CARDIAC ELECTROPHYSIOLOGY PROCEDURE N/A 2/29/2024    Procedure: Ablation A-Fib Persistant;  Surgeon: Adam Valentine MD;  Location: [place] [address] Cardiac Cath Lab;  Service: Electrophysiology;  Laterality: N/A;  CARTO/ANY EP LAB  GENERAL ANESTHESIA WHOLE CASE    US GUIDED ABDOMINAL PARACENTESIS  8/31/2023    US GUIDED ABDOMINAL PARACENTESIS 8/31/2023 [facility]     No family history on file.  Social History     Tobacco Use    Smoking status: Former     Current packs/day: 0.00     Types: Cigarettes     Quit date: 2/4/2021     Years since quitting: 3.9    Smokeless tobacco: Never   Vaping Use    Vaping status: Every Day   Substance Use Topics    Alcohol use: Not Currently    Drug use: Never       Physical Exam   Visit Vitals  /58   Pulse 71   Temp 37.2 °C (98.9 °F)   Resp 20   Ht 1.702 m (5' 7\")   Wt 116 kg (255 lb 11.7 oz)   SpO2 98%   BMI 40.05 kg/m²   OB Status Postmenopausal   Smoking Status Former   BSA 2.34 m²      Physical Exam  Vitals and nursing note reviewed.   Constitutional:       Appearance: Normal appearance. She is obese.      Comments: Chronically ill appearing   HENT:      Head: Normocephalic and atraumatic.   Eyes:      Extraocular Movements: Extraocular movements intact.      Pupils: Pupils are equal, round, and reactive to light.      Comments: Pale conjunctiva   Neck:      Vascular: No carotid bruit.   Cardiovascular:      Rate and Rhythm: Normal rate and regular rhythm.      Pulses: Normal pulses.      Heart sounds: Normal heart sounds.   Pulmonary:      Effort: Pulmonary effort is normal.      Breath sounds: Normal breath sounds.   Abdominal:      General: There is no distension.      Palpations: Abdomen is soft.      Tenderness: There is no abdominal tenderness. There is no guarding or rebound. "   Musculoskeletal:         General: No tenderness, deformity or signs of injury.      Cervical back: Normal range of motion. No rigidity.   Skin:     General: Skin is warm and dry.      Capillary Refill: Capillary refill takes less than 2 seconds.      Coloration: Skin is pale.   Neurological:      General: No focal deficit present.      Mental Status: She is alert and oriented to person, place, and time.      Sensory: No sensory deficit.      Motor: No weakness.   Psychiatric:         Mood and Affect: Mood normal.         Behavior: Behavior normal.         No orders to display       Labs Reviewed   CBC WITH AUTO DIFFERENTIAL - Abnormal       Result Value    WBC 12.9 (*)     nRBC 0.2 (*)     RBC 2.04 (*)     Hemoglobin 6.7 (*)     Hematocrit 21.1 (*)      (*)     MCH 32.8      MCHC 31.8 (*)     RDW 21.4 (*)     Platelets 263      Neutrophils % 75.3      Immature Granulocytes %, Automated 1.5 (*)     Lymphocytes % 10.3      Monocytes % 10.8      Eosinophils % 1.6      Basophils % 0.5      Neutrophils Absolute 9.74 (*)     Immature Granulocytes Absolute, Automated 0.19      Lymphocytes Absolute 1.33      Monocytes Absolute 1.40 (*)     Eosinophils Absolute 0.21      Basophils Absolute 0.07     COMPREHENSIVE METABOLIC PANEL - Abnormal    Glucose 177 (*)     Sodium 137      Potassium 3.4 (*)     Chloride 94 (*)     Bicarbonate 32      Anion Gap 14      Urea Nitrogen 33 (*)     Creatinine 6.20 (*)     eGFR 7 (*)     Calcium 8.0 (*)     Albumin 3.4      Alkaline Phosphatase 97      Total Protein 6.6      AST 15      Bilirubin, Total 0.6      ALT 16     PROTIME-INR - Abnormal    Protime 17.4 (*)     INR 1.5 (*)    TYPE AND SCREEN    ABO TYPE A      Rh TYPE POS      ANTIBODY SCREEN NEG     PREPARE RBC    PRODUCT CODE G0693G72      Unit Number [id number]      Unit ABO A      Unit RH POS      XM INTEP COMP      Dispense Status TR      Blood Expiration Date 1/28/2025 11:59:00 PM EST      PRODUCT BLOOD TYPE 6200       UNIT VOLUME 350     MORPHOLOGY    RBC Morphology See Below      Polychromasia Mild      Hypochromia Mild      Basophilic Stippling Present           ED Course & MDM   Diagnoses as of 01/06/25 2120   Anemia, unspecified type   Encounter for blood transfusion           Medical Decision Making  All mentioned lab results, ECGs, and imaging were independently reviewed by myself  - Patient evaluated. Patient is presenting to the emergency department today for concern for low blood counts.  He is symptomatic, more short of breath, more fatigued, more tired.  She has had black stools however she has negative colonoscopies and EGD showing no evidence of any active bleeding. Likely anemia of chronic disease but iron or vitamin deficiency was also considered.  She is otherwise hemodynamically stable.  Confirmatory blood work was ordered on the patient.  This confirms a hemoglobin of 6.7, macrocytic in nature.  The rest of her labs are consistent with her chronic kidney disease.  Potassium slightly low at 3.4 but in the setting of her chronic kidney disease, this was not replaced.  Patient was consented and typed and crossed for 1 unit of blood.  Given her end-stage renal disease status as well as her tenuous fluid status, the blood was transfused over 2 hours.  She did not develop any reactions to this.  On repeat evaluation she remains hemodynamically stable, symptoms in terms of fatigue appear to be slightly improved. Admission considered but feel she is stable for discharge and follow-up given her otherwise stable hemodynamics and improving symptoms. Strict return precautions were given and discussed.  - Monitored for any changes in stability or symptomatology. Patient remained stable.   - Counseled regarding labs, imaging, diagnosis, and plan. Patient was agreeable. All questions were answered. The patient was receptive and agreeable to the plan of care.   -The patient was instructed to return to the emergency  department if any symptoms recurred, worsened, or if there were any additional concerns.    *Disclaimer: This note was dictated by speech recognition. Minor errors in transcription may be present. Please call with questions.    Aron Callahan MD             Your medication list        ASK your doctor about these medications        Instructions Last Dose Given Next Dose Due   acetaminophen 325 mg tablet  Commonly known as: Tylenol      Take 3 tablets (975 mg) by mouth every 8 hours if needed (breakthrough pain).       albuterol 90 mcg/actuation inhaler      Inhale 1 puff every 4 hours if needed for shortness of breath.       amLODIPine 5 mg tablet  Commonly known as: Norvasc           atorvastatin 40 mg tablet  Commonly known as: Lipitor           Auryxia 210 mg iron tablet  Generic drug: ferric citrate           busPIRone 5 mg tablet  Commonly known as: Buspar           cholecalciferol 25 MCG (1000 UT) tablet  Commonly known as: Vitamin D-3           cinacalcet 30 mg tablet  Commonly known as: Sensipar           doxepin 100 mg capsule  Commonly known as: SINEquan           Eliquis 5 mg tablet  Generic drug: apixaban           escitalopram 10 mg tablet  Commonly known as: Lexapro           metoprolol succinate  mg 24 hr tablet  Commonly known as: Toprol-XL      Take 1 tablet (100 mg) by mouth once daily in the evening. Do not crush or chew.       Nephro-Gloria 0.8 mg tablet  Generic drug: B complex-vitamin C-folic acid           torsemide 20 mg tablet  Commonly known as: Demadex                    Procedure  Procedures     *This report was transcribed using voice recognition software.  Every effort was made to ensure accuracy; however, inadvertent computerized transcription errors may be present.*  Jorge Callahan MD  01/06/25         Jorge Callahan MD  01/06/25 2118       Jorge Callahan MD  01/06/25 2120

## 2025-01-09 ENCOUNTER — TELEPHONE (OUTPATIENT)
Dept: CARDIOLOGY | Facility: CLINIC | Age: 70
End: 2025-01-09
Payer: COMMERCIAL

## 2025-01-13 ENCOUNTER — LAB REQUISITION (OUTPATIENT)
Dept: LAB | Facility: HOSPITAL | Age: 70
End: 2025-01-13
Payer: COMMERCIAL

## 2025-01-13 DIAGNOSIS — N18.6 END STAGE RENAL DISEASE (MULTI): ICD-10-CM

## 2025-01-13 LAB
HCT VFR BLD AUTO: 27.1 % (ref 36–46)
HGB BLD-MCNC: 8.6 G/DL (ref 12–16)

## 2025-01-13 PROCEDURE — 85014 HEMATOCRIT: CPT

## 2025-01-13 PROCEDURE — 85018 HEMOGLOBIN: CPT

## 2025-01-13 PROCEDURE — 3000000012 STAT CHARGE BILL (LAB USE ONLY): Mod: OUT

## 2025-01-15 ENCOUNTER — OFFICE VISIT (OUTPATIENT)
Dept: CARDIOLOGY | Facility: CLINIC | Age: 70
End: 2025-01-15
Payer: COMMERCIAL

## 2025-01-15 VITALS
HEART RATE: 83 BPM | BODY MASS INDEX: 39.94 KG/M2 | OXYGEN SATURATION: 93 % | WEIGHT: 255 LBS | SYSTOLIC BLOOD PRESSURE: 138 MMHG | DIASTOLIC BLOOD PRESSURE: 63 MMHG

## 2025-01-15 DIAGNOSIS — D64.9 ANEMIA, UNSPECIFIED TYPE: ICD-10-CM

## 2025-01-15 DIAGNOSIS — I50.43 CHF (CONGESTIVE HEART FAILURE), NYHA CLASS I, ACUTE ON CHRONIC, COMBINED: Primary | ICD-10-CM

## 2025-01-15 DIAGNOSIS — I48.0 PAROXYSMAL ATRIAL FIBRILLATION (MULTI): ICD-10-CM

## 2025-01-15 DIAGNOSIS — I10 PRIMARY HYPERTENSION: ICD-10-CM

## 2025-01-15 PROCEDURE — 3075F SYST BP GE 130 - 139MM HG: CPT | Performed by: NURSE PRACTITIONER

## 2025-01-15 PROCEDURE — 1160F RVW MEDS BY RX/DR IN RCRD: CPT | Performed by: NURSE PRACTITIONER

## 2025-01-15 PROCEDURE — 1111F DSCHRG MED/CURRENT MED MERGE: CPT | Performed by: NURSE PRACTITIONER

## 2025-01-15 PROCEDURE — 1036F TOBACCO NON-USER: CPT | Performed by: NURSE PRACTITIONER

## 2025-01-15 PROCEDURE — 99214 OFFICE O/P EST MOD 30 MIN: CPT | Performed by: NURSE PRACTITIONER

## 2025-01-15 PROCEDURE — 1159F MED LIST DOCD IN RCRD: CPT | Performed by: NURSE PRACTITIONER

## 2025-01-15 PROCEDURE — 1123F ACP DISCUSS/DSCN MKR DOCD: CPT | Performed by: NURSE PRACTITIONER

## 2025-01-15 PROCEDURE — 3078F DIAST BP <80 MM HG: CPT | Performed by: NURSE PRACTITIONER

## 2025-01-15 NOTE — PROGRESS NOTES
Chief Complaint:   Hospital Follow Up     History Of Present Illness:    Rosa Soto is a 69 y.o. female here for hospital follow up.    She was hospitalized 12/16/2024 with anemia and hyponatremia. Hgb found to be 3.5. She was transfused with 3 units of PRBC. Nothing untoward noted on colonoscopy and EGD. She reported weakness x 3 weeks and it was worse after dialysis. She did note recurrent epistaxis.     Returned to ED 12/29/24 with SOB. Hbg was 6.0 transfused with 1 unit of PRBC.    Returned to ED 1/6/25 when she was noted to have low hgb at dialysis (6.7). She reported having melena, SOB and fatigue. Again, found to be anemia and transfused with 1 U PRBC.     Today she is doing well. Recent Hgb check showed improvement.     __________________________________________________________________________  At last visit, 12/3/24, she was very SOB. Missed dialysis Friday. Went to dialysis yesterday. Yesterday noted SOB. Thought it would get better after dialysis and it did not. Up 2lbs from last visit. SOB not as severe as what it was prior when she went to hospital.     Hospitalized 8/25/24 with acute on chronic diastolic heart failure 2/2 missed dialysis. C/O SOB which improved with dialysis. Documented she frequently misses Friday dialysis.  Rosa reports she suddenly became SOB. Denies LE edema or gradual onset of SOB. States she suddenly just became SOB. Put herself on supplemental 02, SOB improved.     Presented to ED, on 6/3, with nose bleed. Started post dialysis. Rhino Rocket placed. She returned to the ED the next day with another nose bleed. Bleeding resolved with cauterization. She was transferred from [facility] to [facility] [place] consult.     Patient underwent RFA on 2/29 with Dr. Valentine. She then presented to ED on 3/2/24 with acute diastolic heart failure AEB volume overload and subsequent respiratory failure 2/2 miss dialysis. While hospitalized she went into afib rvr. Started IV diltiazem. Converted to  NSR and diltiazem was weaned off. Underwent LHC, on 3/6, which showed mild nonobstructive CAD. Patient has not followed up with us or EP since RFA.     Cath (Normal Coronaries) - 10/2/2008    Past Medical History:  She has a past medical history of Arrhythmia, Benign essential HTN, Cancer (Multi), Chronic kidney disease, and ESRD (end stage renal disease) (Multi).    She has no past medical history of CHF (congestive heart failure).    Past Surgical History:  She has a past surgical history that includes US guided abdominal paracentesis (8/31/2023); Cardiac catheterization (N/A, 3/6/2024); and Cardiac electrophysiology procedure (N/A, 2/29/2024).      Social History:  She reports that she quit smoking about 3 years ago. Her smoking use included cigarettes. She has never used smokeless tobacco. She reports that she does not currently use alcohol. She reports that she does not use drugs.    Family History:  No family history on file.  Allergies:  Codeine, Ropinirole, and Adhesive    Review of Systems  All pertinent systems have been reviewed and are negative except for what is stated in the history of present illness.    All other systems have been reviewed and are negative and noncontributory to this patient's current ailments.     Visit Vitals  /63 (BP Location: Left arm, Patient Position: Sitting, BP Cuff Size: Adult)   Pulse 83   Wt 116 kg (255 lb)   SpO2 93%   BMI 39.94 kg/m²   OB Status Postmenopausal   Smoking Status Former   BSA 2.34 m²       Last Labs:  CBC -  Lab Results   Component Value Date    WBC 12.9 (H) 01/06/2025    HGB 8.6 (L) 01/13/2025    HCT 27.1 (L) 01/13/2025     (H) 01/06/2025     01/06/2025       CMP -  Lab Results   Component Value Date    CALCIUM 8.0 (L) 01/06/2025    PHOS 5.4 (H) 06/06/2024    PROT 6.6 01/06/2025    ALBUMIN 3.4 01/06/2025    AST 15 01/06/2025    ALT 16 01/06/2025    ALKPHOS 97 01/06/2025    BILITOT 0.6 01/06/2025    BUN 33 (H) 01/06/2025    CREATININE  6.20 (H) 01/06/2025       LIPID PANEL -   Lab Results   Component Value Date    CHOL 101 03/03/2024    TRIG 158 (H) 03/03/2024    HDL 31.5 03/03/2024    CHHDL 3.2 03/03/2024    LDLF 58 04/23/2018    VLDL 32 03/03/2024    NHDL 70 03/03/2024       RENAL FUNCTION PANEL -   Lab Results   Component Value Date    GLUCOSE 177 (H) 01/06/2025     01/06/2025    K 3.4 (L) 01/06/2025    CL 94 (L) 01/06/2025    CO2 32 01/06/2025    ANIONGAP 14 01/06/2025    BUN 33 (H) 01/06/2025    CREATININE 6.20 (H) 01/06/2025    CALCIUM 8.0 (L) 01/06/2025    PHOS 5.4 (H) 06/06/2024    ALBUMIN 3.4 01/06/2025        Lab Results   Component Value Date     (H) 12/29/2024    HGBA1C 6.7 (H) 08/25/2024         Objective   Vitals reviewed.   Constitutional:       Appearance: Healthy appearance. Not in distress.   Eyes:      Conjunctiva/sclera: Conjunctivae normal.   Neck:      Vascular: No JVR. JVD normal.   Pulmonary:      Effort: Pulmonary effort is normal.      Breath sounds: Normal breath sounds. No wheezing. No rhonchi. No rales.   Chest:      Chest wall: Not tender to palpatation.   Cardiovascular:      PMI at left midclavicular line. Normal rate. Regular rhythm. Normal S1. Normal S2.       Murmurs: There is no murmur.      No gallop.  No click. No rub.   Pulses:     Intact distal pulses.   Edema:     Peripheral edema absent.   Abdominal:      General: Bowel sounds are normal.      Palpations: Abdomen is soft.      Tenderness: There is no abdominal tenderness.   Musculoskeletal: Normal range of motion.         General: No tenderness. Skin:     General: Skin is warm and dry.   Neurological:      General: No focal deficit present.      Mental Status: Alert and oriented to person, place and time.   Psychiatric:         Attention and Perception: Attention normal.         Mood and Affect: Mood normal.       Assessment/Plan   Diagnoses and all orders for this visit:  Chronic diastolic heart failure (Multi)  - continue dialysis   - she  did not have the $11 for verquvo 10mg, she does today. Will hopefully get. Consulted  pharmacy to see if they might be able to assist.   Paroxysmal atrial fibrillation (Multi)  - maintaining NSR post RFA; however, she does note that she goes in and out of afib.   - We discussed stopping Eliquis and the risk of CVA, since her hgb is improving and she has restarted the eliquis she wants to continue it.   - [id number]: 5 Has bled: 3  - high risk for cva and high risk for bleed   - will discuss with EP if she would be a watchman candidate; however, she has to be on eliquis for awhile before we could place the device   - if she bleeds again we can consider 2.5mg BID; however, she does not meet the criteria for lower dose   Anemia   - see above   Hypertension  - continue to monitor  - management per nephrology   - reasonably well controlled today    Outpatient Medications:  Current Outpatient Medications   Medication Instructions    acetaminophen (TYLENOL) 975 mg, oral, Every 8 hours PRN    albuterol 90 mcg/actuation inhaler 1 puff, inhalation, Every 4 hours PRN    amLODIPine (NORVASC) 5 mg, Daily    apixaban (ELIQUIS) 5 mg, 2 times daily    atorvastatin (LIPITOR) 40 mg, Daily    Auryxia 210 mg iron tablet 3 tablets, 3 times daily    busPIRone (BUSPAR) 5 mg, Nightly    cholecalciferol (Vitamin D-3) 25 MCG (1000 UT) tablet 1 tablet, Daily    cinacalcet (SENSIPAR) 30 mg, Daily    doxepin (SINEQUAN) 100 mg, Nightly    escitalopram (LEXAPRO) 10 mg, Daily    metoprolol succinate XL (TOPROL-XL) 100 mg, oral, Every evening, Do not crush or chew.    Nephro-Gloria 0.8 mg tablet 1 tablet, Daily    torsemide (DEMADEX) 20 mg, 2 times daily       Exclusive of any other services or procedures performed, I, Caroline DANIEL, spent 33 minutes in duration for this visit today.  This time consisted of chart review, obtaining history, and/or performing the exam as documented above, as well as, documenting the clinical information for  the encounter in the electronic record, discussing treatment options, plans, and/or goals with patient, family, and/or caregiver, refilling medications, updating the electronic record, ordering medicines, lab work, imaging, referrals, and/or procedures as documented above and communicating with other [facility] professionals. I have discussed the results of laboratory, radiology, and cardiology studies with the patient and their family/caregiver.       I reviewed hospital records, labs

## 2025-01-21 ENCOUNTER — APPOINTMENT (OUTPATIENT)
Dept: CARDIOLOGY | Facility: CLINIC | Age: 70
End: 2025-01-21
Payer: COMMERCIAL

## 2025-02-04 ENCOUNTER — APPOINTMENT (OUTPATIENT)
Dept: CARDIOLOGY | Facility: CLINIC | Age: 70
End: 2025-02-04
Payer: COMMERCIAL

## 2025-02-17 ENCOUNTER — LAB REQUISITION (OUTPATIENT)
Dept: LAB | Facility: HOSPITAL | Age: 70
End: 2025-02-17
Payer: COMMERCIAL

## 2025-02-17 DIAGNOSIS — N18.6 END STAGE RENAL DISEASE (MULTI): ICD-10-CM

## 2025-02-17 DIAGNOSIS — D64.9 ANEMIA, UNSPECIFIED: ICD-10-CM

## 2025-02-17 LAB
HCT VFR BLD AUTO: 23.1 % (ref 36–46)
HGB BLD-MCNC: 7.3 G/DL (ref 12–16)

## 2025-02-17 PROCEDURE — 3000000012 STAT CHARGE BILL (LAB USE ONLY): Mod: OUT

## 2025-02-17 PROCEDURE — 85014 HEMATOCRIT: CPT

## 2025-02-17 PROCEDURE — 85018 HEMOGLOBIN: CPT

## 2025-02-18 ENCOUNTER — HOSPITAL ENCOUNTER (INPATIENT)
Facility: HOSPITAL | Age: 70
LOS: 2 days | Discharge: HOME | DRG: 811 | End: 2025-02-20
Attending: STUDENT IN AN ORGANIZED HEALTH CARE EDUCATION/TRAINING PROGRAM | Admitting: INTERNAL MEDICINE
Payer: COMMERCIAL

## 2025-02-18 DIAGNOSIS — M25.572 ACUTE LEFT ANKLE PAIN: ICD-10-CM

## 2025-02-18 DIAGNOSIS — D64.9 ANEMIA: Primary | ICD-10-CM

## 2025-02-18 PROCEDURE — 85027 COMPLETE CBC AUTOMATED: CPT | Performed by: INTERNAL MEDICINE

## 2025-02-18 PROCEDURE — 1200000002 HC GENERAL ROOM WITH TELEMETRY DAILY

## 2025-02-18 PROCEDURE — 36415 COLL VENOUS BLD VENIPUNCTURE: CPT | Performed by: INTERNAL MEDICINE

## 2025-02-18 PROCEDURE — 86923 COMPATIBILITY TEST ELECTRIC: CPT

## 2025-02-18 PROCEDURE — 86850 RBC ANTIBODY SCREEN: CPT | Performed by: INTERNAL MEDICINE

## 2025-02-18 PROCEDURE — 2500000002 HC RX 250 W HCPCS SELF ADMINISTERED DRUGS (ALT 637 FOR MEDICARE OP, ALT 636 FOR OP/ED): Performed by: INTERNAL MEDICINE

## 2025-02-18 PROCEDURE — 2500000001 HC RX 250 WO HCPCS SELF ADMINISTERED DRUGS (ALT 637 FOR MEDICARE OP): Performed by: INTERNAL MEDICINE

## 2025-02-18 RX ORDER — PANTOPRAZOLE SODIUM 40 MG/10ML
40 INJECTION, POWDER, LYOPHILIZED, FOR SOLUTION INTRAVENOUS
Status: DISCONTINUED | OUTPATIENT
Start: 2025-02-19 | End: 2025-02-20 | Stop reason: HOSPADM

## 2025-02-18 RX ORDER — ALBUTEROL SULFATE 0.83 MG/ML
2.5 SOLUTION RESPIRATORY (INHALATION) EVERY 2 HOUR PRN
Status: DISCONTINUED | OUTPATIENT
Start: 2025-02-18 | End: 2025-02-20 | Stop reason: HOSPADM

## 2025-02-18 RX ORDER — OXYCODONE HYDROCHLORIDE 5 MG/1
5 TABLET ORAL EVERY 6 HOURS PRN
Status: DISCONTINUED | OUTPATIENT
Start: 2025-02-18 | End: 2025-02-20 | Stop reason: HOSPADM

## 2025-02-18 RX ORDER — ALBUTEROL SULFATE 90 UG/1
1 INHALANT RESPIRATORY (INHALATION) EVERY 4 HOURS PRN
Status: DISCONTINUED | OUTPATIENT
Start: 2025-02-18 | End: 2025-02-18

## 2025-02-18 RX ORDER — B-COMPLEX WITH VITAMIN C
1 TABLET ORAL DAILY
Status: DISCONTINUED | OUTPATIENT
Start: 2025-02-19 | End: 2025-02-20 | Stop reason: HOSPADM

## 2025-02-18 RX ORDER — CINACALCET 30 MG/1
30 TABLET, FILM COATED ORAL DAILY
Status: DISCONTINUED | OUTPATIENT
Start: 2025-02-19 | End: 2025-02-20 | Stop reason: HOSPADM

## 2025-02-18 RX ORDER — ACETAMINOPHEN 160 MG/5ML
650 SOLUTION ORAL EVERY 4 HOURS PRN
Status: DISCONTINUED | OUTPATIENT
Start: 2025-02-18 | End: 2025-02-20 | Stop reason: HOSPADM

## 2025-02-18 RX ORDER — BUSPIRONE HYDROCHLORIDE 5 MG/1
5 TABLET ORAL NIGHTLY
Status: DISCONTINUED | OUTPATIENT
Start: 2025-02-18 | End: 2025-02-20 | Stop reason: HOSPADM

## 2025-02-18 RX ORDER — METOPROLOL TARTRATE 50 MG/1
50 TABLET ORAL 2 TIMES DAILY
Status: DISCONTINUED | OUTPATIENT
Start: 2025-02-18 | End: 2025-02-18

## 2025-02-18 RX ORDER — CHOLECALCIFEROL (VITAMIN D3) 25 MCG
1000 TABLET ORAL DAILY
Status: DISCONTINUED | OUTPATIENT
Start: 2025-02-19 | End: 2025-02-20 | Stop reason: HOSPADM

## 2025-02-18 RX ORDER — ONDANSETRON HYDROCHLORIDE 2 MG/ML
4 INJECTION, SOLUTION INTRAVENOUS EVERY 8 HOURS PRN
Status: DISCONTINUED | OUTPATIENT
Start: 2025-02-18 | End: 2025-02-20 | Stop reason: HOSPADM

## 2025-02-18 RX ORDER — PANTOPRAZOLE SODIUM 40 MG/1
40 TABLET, DELAYED RELEASE ORAL
Status: DISCONTINUED | OUTPATIENT
Start: 2025-02-19 | End: 2025-02-20 | Stop reason: HOSPADM

## 2025-02-18 RX ORDER — AMLODIPINE BESYLATE 5 MG/1
5 TABLET ORAL DAILY
Status: DISCONTINUED | OUTPATIENT
Start: 2025-02-19 | End: 2025-02-20 | Stop reason: HOSPADM

## 2025-02-18 RX ORDER — ALBUTEROL SULFATE 0.83 MG/ML
2.5 SOLUTION RESPIRATORY (INHALATION) EVERY 4 HOURS PRN
Status: DISCONTINUED | OUTPATIENT
Start: 2025-02-18 | End: 2025-02-18

## 2025-02-18 RX ORDER — DOXEPIN HYDROCHLORIDE 25 MG/1
100 CAPSULE ORAL NIGHTLY
Status: DISCONTINUED | OUTPATIENT
Start: 2025-02-18 | End: 2025-02-20 | Stop reason: HOSPADM

## 2025-02-18 RX ORDER — ATORVASTATIN CALCIUM 40 MG/1
40 TABLET, FILM COATED ORAL DAILY
Status: DISCONTINUED | OUTPATIENT
Start: 2025-02-19 | End: 2025-02-20 | Stop reason: HOSPADM

## 2025-02-18 RX ORDER — TORSEMIDE 20 MG/1
20 TABLET ORAL 2 TIMES DAILY
Status: DISCONTINUED | OUTPATIENT
Start: 2025-02-19 | End: 2025-02-20 | Stop reason: HOSPADM

## 2025-02-18 RX ORDER — METOPROLOL SUCCINATE 50 MG/1
100 TABLET, EXTENDED RELEASE ORAL DAILY
Status: DISCONTINUED | OUTPATIENT
Start: 2025-02-19 | End: 2025-02-20 | Stop reason: HOSPADM

## 2025-02-18 RX ORDER — ACETAMINOPHEN 325 MG/1
650 TABLET ORAL EVERY 4 HOURS PRN
Status: DISCONTINUED | OUTPATIENT
Start: 2025-02-18 | End: 2025-02-20 | Stop reason: HOSPADM

## 2025-02-18 RX ORDER — METOPROLOL SUCCINATE 50 MG/1
100 TABLET, EXTENDED RELEASE ORAL EVERY EVENING
Status: DISCONTINUED | OUTPATIENT
Start: 2025-02-18 | End: 2025-02-18

## 2025-02-18 RX ORDER — ESCITALOPRAM OXALATE 10 MG/1
10 TABLET ORAL DAILY
Status: DISCONTINUED | OUTPATIENT
Start: 2025-02-19 | End: 2025-02-20 | Stop reason: HOSPADM

## 2025-02-18 RX ORDER — ONDANSETRON 4 MG/1
4 TABLET, FILM COATED ORAL EVERY 8 HOURS PRN
Status: DISCONTINUED | OUTPATIENT
Start: 2025-02-18 | End: 2025-02-20 | Stop reason: HOSPADM

## 2025-02-18 RX ORDER — ACETAMINOPHEN 650 MG/1
650 SUPPOSITORY RECTAL EVERY 4 HOURS PRN
Status: DISCONTINUED | OUTPATIENT
Start: 2025-02-18 | End: 2025-02-20 | Stop reason: HOSPADM

## 2025-02-18 RX ORDER — ROSUVASTATIN CALCIUM 10 MG/1
10 TABLET, COATED ORAL NIGHTLY
Status: DISCONTINUED | OUTPATIENT
Start: 2025-02-18 | End: 2025-02-18

## 2025-02-18 RX ORDER — OXYCODONE HYDROCHLORIDE 5 MG/1
10 TABLET ORAL EVERY 6 HOURS PRN
Status: DISCONTINUED | OUTPATIENT
Start: 2025-02-18 | End: 2025-02-20 | Stop reason: HOSPADM

## 2025-02-18 RX ADMIN — OXYCODONE HYDROCHLORIDE 5 MG: 5 TABLET ORAL at 23:13

## 2025-02-18 RX ADMIN — BUSPIRONE HYDROCHLORIDE 5 MG: 5 TABLET ORAL at 23:13

## 2025-02-18 RX ADMIN — APIXABAN 5 MG: 5 TABLET, FILM COATED ORAL at 23:12

## 2025-02-18 RX ADMIN — ACETAMINOPHEN 650 MG: 325 TABLET, FILM COATED ORAL at 23:13

## 2025-02-18 RX ADMIN — DOXEPIN HYDROCHLORIDE 100 MG: 25 CAPSULE ORAL at 23:22

## 2025-02-18 ASSESSMENT — ENCOUNTER SYMPTOMS
ENDOCRINE NEGATIVE: 1
ALLERGIC/IMMUNOLOGIC NEGATIVE: 1
GASTROINTESTINAL NEGATIVE: 1
EYES NEGATIVE: 1
FATIGUE: 1
ACTIVITY CHANGE: 1
PSYCHIATRIC NEGATIVE: 1
SHORTNESS OF BREATH: 1
NEUROLOGICAL NEGATIVE: 1
CARDIOVASCULAR NEGATIVE: 1
HEMATOLOGIC/LYMPHATIC NEGATIVE: 1
MUSCULOSKELETAL NEGATIVE: 1

## 2025-02-18 ASSESSMENT — PAIN SCALES - GENERAL
PAINLEVEL_OUTOF10: 9
PAINLEVEL_OUTOF10: 4

## 2025-02-18 ASSESSMENT — PAIN - FUNCTIONAL ASSESSMENT: PAIN_FUNCTIONAL_ASSESSMENT: 0-10

## 2025-02-19 ENCOUNTER — APPOINTMENT (OUTPATIENT)
Dept: RADIOLOGY | Facility: HOSPITAL | Age: 70
DRG: 811 | End: 2025-02-19
Payer: COMMERCIAL

## 2025-02-19 ENCOUNTER — APPOINTMENT (OUTPATIENT)
Dept: DIALYSIS | Facility: HOSPITAL | Age: 70
End: 2025-02-19
Payer: COMMERCIAL

## 2025-02-19 PROBLEM — D63.1 ESRD WITH ANEMIA (MULTI): Status: ACTIVE | Noted: 2025-02-19

## 2025-02-19 PROBLEM — N18.6 ESRD WITH ANEMIA (MULTI): Status: ACTIVE | Noted: 2025-02-19

## 2025-02-19 PROBLEM — N18.6 ESRD (END STAGE RENAL DISEASE) (MULTI): Status: ACTIVE | Noted: 2025-02-19

## 2025-02-19 LAB
ABO GROUP (TYPE) IN BLOOD: NORMAL
ABO GROUP (TYPE) IN BLOOD: NORMAL
ALBUMIN SERPL BCP-MCNC: 3.2 G/DL (ref 3.4–5)
ALP SERPL-CCNC: 62 U/L (ref 33–136)
ALT SERPL W P-5'-P-CCNC: 17 U/L (ref 7–45)
ANION GAP SERPL CALC-SCNC: 17 MMOL/L (ref 10–20)
ANTIBODY SCREEN: NORMAL
AST SERPL W P-5'-P-CCNC: 10 U/L (ref 9–39)
BASOPHILS # BLD AUTO: 0.05 X10*3/UL (ref 0–0.1)
BASOPHILS NFR BLD AUTO: 0.4 %
BILIRUB SERPL-MCNC: 0.4 MG/DL (ref 0–1.2)
BLOOD EXPIRATION DATE: NORMAL
BUN SERPL-MCNC: 70 MG/DL (ref 6–23)
CALCIUM SERPL-MCNC: 8.5 MG/DL (ref 8.6–10.3)
CHLORIDE SERPL-SCNC: 95 MMOL/L (ref 98–107)
CO2 SERPL-SCNC: 30 MMOL/L (ref 21–32)
CREAT SERPL-MCNC: 9.06 MG/DL (ref 0.5–1.05)
DISPENSE STATUS: NORMAL
EGFRCR SERPLBLD CKD-EPI 2021: 4 ML/MIN/1.73M*2
EOSINOPHIL # BLD AUTO: 0.57 X10*3/UL (ref 0–0.7)
EOSINOPHIL NFR BLD AUTO: 4.9 %
ERYTHROCYTE [DISTWIDTH] IN BLOOD BY AUTOMATED COUNT: 17.1 % (ref 11.5–14.5)
ERYTHROCYTE [DISTWIDTH] IN BLOOD BY AUTOMATED COUNT: 17.3 % (ref 11.5–14.5)
ERYTHROCYTE [DISTWIDTH] IN BLOOD BY AUTOMATED COUNT: 19.9 % (ref 11.5–14.5)
FERRITIN SERPL-MCNC: 424 NG/ML (ref 8–150)
GLUCOSE SERPL-MCNC: 223 MG/DL (ref 74–99)
HCT VFR BLD AUTO: 20.2 % (ref 36–46)
HCT VFR BLD AUTO: 21.2 % (ref 36–46)
HCT VFR BLD AUTO: 21.8 % (ref 36–46)
HGB BLD-MCNC: 6.1 G/DL (ref 12–16)
HGB BLD-MCNC: 6.4 G/DL (ref 12–16)
HGB BLD-MCNC: 6.6 G/DL (ref 12–16)
HGB RETIC QN: 30 PG (ref 28–38)
HOLD SPECIMEN: NORMAL
HOLD SPECIMEN: NORMAL
IMM GRANULOCYTES # BLD AUTO: 0.12 X10*3/UL (ref 0–0.7)
IMM GRANULOCYTES NFR BLD AUTO: 1 % (ref 0–0.9)
IMMATURE RETIC FRACTION: 23.9 %
IRON SATN MFR SERPL: 26 % (ref 25–45)
IRON SERPL-MCNC: 59 UG/DL (ref 35–150)
LDH SERPL L TO P-CCNC: 133 U/L (ref 84–246)
LYMPHOCYTES # BLD AUTO: 1.62 X10*3/UL (ref 1.2–4.8)
LYMPHOCYTES NFR BLD AUTO: 13.9 %
MCH RBC QN AUTO: 31.9 PG (ref 26–34)
MCH RBC QN AUTO: 32.8 PG (ref 26–34)
MCH RBC QN AUTO: 33 PG (ref 26–34)
MCHC RBC AUTO-ENTMCNC: 30.2 G/DL (ref 32–36)
MCHC RBC AUTO-ENTMCNC: 30.2 G/DL (ref 32–36)
MCHC RBC AUTO-ENTMCNC: 30.3 G/DL (ref 32–36)
MCV RBC AUTO: 105 FL (ref 80–100)
MCV RBC AUTO: 109 FL (ref 80–100)
MCV RBC AUTO: 109 FL (ref 80–100)
MONOCYTES # BLD AUTO: 1.48 X10*3/UL (ref 0.1–1)
MONOCYTES NFR BLD AUTO: 12.7 %
NEUTROPHILS # BLD AUTO: 7.78 X10*3/UL (ref 1.2–7.7)
NEUTROPHILS NFR BLD AUTO: 67.1 %
NRBC BLD-RTO: 0 /100 WBCS (ref 0–0)
PLATELET # BLD AUTO: 180 X10*3/UL (ref 150–450)
PLATELET # BLD AUTO: 186 X10*3/UL (ref 150–450)
PLATELET # BLD AUTO: 192 X10*3/UL (ref 150–450)
POTASSIUM SERPL-SCNC: 3.9 MMOL/L (ref 3.5–5.3)
PRODUCT BLOOD TYPE: 6200
PRODUCT CODE: NORMAL
PROT SERPL-MCNC: 5.4 G/DL (ref 6.4–8.2)
RBC # BLD AUTO: 1.86 X10*6/UL (ref 4–5.2)
RBC # BLD AUTO: 1.94 X10*6/UL (ref 4–5.2)
RBC # BLD AUTO: 2.07 X10*6/UL (ref 4–5.2)
RETICS #: 0.15 X10*6/UL (ref 0.02–0.11)
RETICS/RBC NFR AUTO: 8.2 % (ref 0.5–2)
RH FACTOR (ANTIGEN D): NORMAL
RH FACTOR (ANTIGEN D): NORMAL
SODIUM SERPL-SCNC: 138 MMOL/L (ref 136–145)
TIBC SERPL-MCNC: 231 UG/DL (ref 240–445)
UIBC SERPL-MCNC: 172 UG/DL (ref 110–370)
UNIT ABO: NORMAL
UNIT NUMBER: NORMAL
UNIT RH: NORMAL
UNIT VOLUME: 350
WBC # BLD AUTO: 11.6 X10*3/UL (ref 4.4–11.3)
WBC # BLD AUTO: 12.2 X10*3/UL (ref 4.4–11.3)
WBC # BLD AUTO: 13 X10*3/UL (ref 4.4–11.3)
XM INTEP: NORMAL

## 2025-02-19 PROCEDURE — 83550 IRON BINDING TEST: CPT | Performed by: NURSE PRACTITIONER

## 2025-02-19 PROCEDURE — 5A1D70Z PERFORMANCE OF URINARY FILTRATION, INTERMITTENT, LESS THAN 6 HOURS PER DAY: ICD-10-PCS | Performed by: INTERNAL MEDICINE

## 2025-02-19 PROCEDURE — 36415 COLL VENOUS BLD VENIPUNCTURE: CPT | Performed by: INTERNAL MEDICINE

## 2025-02-19 PROCEDURE — 1200000002 HC GENERAL ROOM WITH TELEMETRY DAILY

## 2025-02-19 PROCEDURE — 2500000001 HC RX 250 WO HCPCS SELF ADMINISTERED DRUGS (ALT 637 FOR MEDICARE OP): Performed by: INTERNAL MEDICINE

## 2025-02-19 PROCEDURE — 99222 1ST HOSP IP/OBS MODERATE 55: CPT | Performed by: INTERNAL MEDICINE

## 2025-02-19 PROCEDURE — 99233 SBSQ HOSP IP/OBS HIGH 50: CPT | Performed by: INTERNAL MEDICINE

## 2025-02-19 PROCEDURE — 82728 ASSAY OF FERRITIN: CPT | Performed by: NURSE PRACTITIONER

## 2025-02-19 PROCEDURE — 84155 ASSAY OF PROTEIN SERUM: CPT | Mod: AHULAB | Performed by: NURSE PRACTITIONER

## 2025-02-19 PROCEDURE — 82746 ASSAY OF FOLIC ACID SERUM: CPT | Mod: AHULAB | Performed by: NURSE PRACTITIONER

## 2025-02-19 PROCEDURE — 85027 COMPLETE CBC AUTOMATED: CPT | Performed by: INTERNAL MEDICINE

## 2025-02-19 PROCEDURE — 85045 AUTOMATED RETICULOCYTE COUNT: CPT | Performed by: NURSE PRACTITIONER

## 2025-02-19 PROCEDURE — 83010 ASSAY OF HAPTOGLOBIN QUANT: CPT | Mod: AHULAB | Performed by: NURSE PRACTITIONER

## 2025-02-19 PROCEDURE — 36430 TRANSFUSION BLD/BLD COMPNT: CPT

## 2025-02-19 PROCEDURE — 73700 CT LOWER EXTREMITY W/O DYE: CPT | Mod: LT

## 2025-02-19 PROCEDURE — 8010000001 HC DIALYSIS - HEMODIALYSIS PER DAY

## 2025-02-19 PROCEDURE — 85025 COMPLETE CBC W/AUTO DIFF WBC: CPT | Performed by: INTERNAL MEDICINE

## 2025-02-19 PROCEDURE — 82607 VITAMIN B-12: CPT | Mod: AHULAB | Performed by: NURSE PRACTITIONER

## 2025-02-19 PROCEDURE — 84075 ASSAY ALKALINE PHOSPHATASE: CPT | Performed by: INTERNAL MEDICINE

## 2025-02-19 PROCEDURE — 83615 LACTATE (LD) (LDH) ENZYME: CPT | Performed by: NURSE PRACTITIONER

## 2025-02-19 PROCEDURE — 99222 1ST HOSP IP/OBS MODERATE 55: CPT | Performed by: NURSE PRACTITIONER

## 2025-02-19 PROCEDURE — 87340 HEPATITIS B SURFACE AG IA: CPT | Mod: AHULAB | Performed by: INTERNAL MEDICINE

## 2025-02-19 PROCEDURE — P9016 RBC LEUKOCYTES REDUCED: HCPCS

## 2025-02-19 PROCEDURE — 2500000002 HC RX 250 W HCPCS SELF ADMINISTERED DRUGS (ALT 637 FOR MEDICARE OP, ALT 636 FOR OP/ED): Performed by: INTERNAL MEDICINE

## 2025-02-19 RX ADMIN — TORSEMIDE 20 MG: 20 TABLET ORAL at 10:07

## 2025-02-19 RX ADMIN — ACETAMINOPHEN 650 MG: 325 TABLET, FILM COATED ORAL at 22:11

## 2025-02-19 RX ADMIN — Medication 1 TABLET: at 10:06

## 2025-02-19 RX ADMIN — METOPROLOL SUCCINATE 100 MG: 50 TABLET, EXTENDED RELEASE ORAL at 10:07

## 2025-02-19 RX ADMIN — ATORVASTATIN CALCIUM 40 MG: 40 TABLET, FILM COATED ORAL at 10:07

## 2025-02-19 RX ADMIN — ACETAMINOPHEN 650 MG: 325 TABLET, FILM COATED ORAL at 16:11

## 2025-02-19 RX ADMIN — OXYCODONE HYDROCHLORIDE 10 MG: 5 TABLET ORAL at 03:40

## 2025-02-19 RX ADMIN — APIXABAN 5 MG: 5 TABLET, FILM COATED ORAL at 10:07

## 2025-02-19 RX ADMIN — OXYCODONE HYDROCHLORIDE 10 MG: 5 TABLET ORAL at 11:13

## 2025-02-19 RX ADMIN — TORSEMIDE 20 MG: 20 TABLET ORAL at 17:21

## 2025-02-19 RX ADMIN — APIXABAN 5 MG: 5 TABLET, FILM COATED ORAL at 22:11

## 2025-02-19 RX ADMIN — CINACALCET HYDROCHLORIDE 30 MG: 30 TABLET, FILM COATED ORAL at 10:07

## 2025-02-19 RX ADMIN — OXYCODONE HYDROCHLORIDE 10 MG: 5 TABLET ORAL at 17:21

## 2025-02-19 RX ADMIN — DOXEPIN HYDROCHLORIDE 100 MG: 25 CAPSULE ORAL at 22:11

## 2025-02-19 RX ADMIN — PANTOPRAZOLE SODIUM 40 MG: 40 TABLET, DELAYED RELEASE ORAL at 06:13

## 2025-02-19 RX ADMIN — AMLODIPINE BESYLATE 5 MG: 5 TABLET ORAL at 10:07

## 2025-02-19 RX ADMIN — ESCITALOPRAM OXALATE 10 MG: 10 TABLET ORAL at 10:07

## 2025-02-19 RX ADMIN — Medication 1000 UNITS: at 10:07

## 2025-02-19 RX ADMIN — BUSPIRONE HYDROCHLORIDE 5 MG: 5 TABLET ORAL at 22:11

## 2025-02-19 RX ADMIN — ACETAMINOPHEN 650 MG: 325 TABLET, FILM COATED ORAL at 10:07

## 2025-02-19 SDOH — SOCIAL STABILITY: SOCIAL INSECURITY: DO YOU FEEL ANYONE HAS EXPLOITED OR TAKEN ADVANTAGE OF YOU FINANCIALLY OR OF YOUR PERSONAL PROPERTY?: NO

## 2025-02-19 SDOH — HEALTH STABILITY: MENTAL HEALTH: HOW MANY DRINKS CONTAINING ALCOHOL DO YOU HAVE ON A TYPICAL DAY WHEN YOU ARE DRINKING?: PATIENT DOES NOT DRINK

## 2025-02-19 SDOH — SOCIAL STABILITY: SOCIAL INSECURITY: HAVE YOU HAD ANY THOUGHTS OF HARMING ANYONE ELSE?: NO

## 2025-02-19 SDOH — SOCIAL STABILITY: SOCIAL INSECURITY: DOES ANYONE TRY TO KEEP YOU FROM HAVING/CONTACTING OTHER FRIENDS OR DOING THINGS OUTSIDE YOUR HOME?: NO

## 2025-02-19 SDOH — HEALTH STABILITY: MENTAL HEALTH: HOW OFTEN DO YOU HAVE A DRINK CONTAINING ALCOHOL?: NEVER

## 2025-02-19 SDOH — SOCIAL STABILITY: SOCIAL INSECURITY: DO YOU FEEL UNSAFE GOING BACK TO THE PLACE WHERE YOU ARE LIVING?: NO

## 2025-02-19 SDOH — SOCIAL STABILITY: SOCIAL INSECURITY: WITHIN THE LAST YEAR, HAVE YOU BEEN AFRAID OF YOUR PARTNER OR EX-PARTNER?: NO

## 2025-02-19 SDOH — ECONOMIC STABILITY: HOUSING INSECURITY: IN THE LAST 12 MONTHS, WAS THERE A TIME WHEN YOU WERE NOT ABLE TO PAY THE MORTGAGE OR RENT ON TIME?: NO

## 2025-02-19 SDOH — ECONOMIC STABILITY: HOUSING INSECURITY: AT ANY TIME IN THE PAST 12 MONTHS, WERE YOU HOMELESS OR LIVING IN A SHELTER (INCLUDING NOW)?: NO

## 2025-02-19 SDOH — SOCIAL STABILITY: SOCIAL INSECURITY: WITHIN THE LAST YEAR, HAVE YOU BEEN HUMILIATED OR EMOTIONALLY ABUSED IN OTHER WAYS BY YOUR PARTNER OR EX-PARTNER?: NO

## 2025-02-19 SDOH — SOCIAL STABILITY: SOCIAL INSECURITY: ARE THERE ANY APPARENT SIGNS OF INJURIES/BEHAVIORS THAT COULD BE RELATED TO ABUSE/NEGLECT?: NO

## 2025-02-19 SDOH — SOCIAL STABILITY: SOCIAL INSECURITY: ABUSE: ADULT

## 2025-02-19 SDOH — HEALTH STABILITY: MENTAL HEALTH: HOW OFTEN DO YOU HAVE SIX OR MORE DRINKS ON ONE OCCASION?: NEVER

## 2025-02-19 SDOH — ECONOMIC STABILITY: FOOD INSECURITY: WITHIN THE PAST 12 MONTHS, THE FOOD YOU BOUGHT JUST DIDN'T LAST AND YOU DIDN'T HAVE MONEY TO GET MORE.: NEVER TRUE

## 2025-02-19 SDOH — ECONOMIC STABILITY: INCOME INSECURITY: IN THE PAST 12 MONTHS HAS THE ELECTRIC, GAS, OIL, OR WATER COMPANY THREATENED TO SHUT OFF SERVICES IN YOUR HOME?: NO

## 2025-02-19 SDOH — SOCIAL STABILITY: SOCIAL INSECURITY: WERE YOU ABLE TO COMPLETE ALL THE BEHAVIORAL HEALTH SCREENINGS?: YES

## 2025-02-19 SDOH — SOCIAL STABILITY: SOCIAL INSECURITY: HAS ANYONE EVER THREATENED TO HURT YOUR FAMILY OR YOUR PETS?: NO

## 2025-02-19 SDOH — SOCIAL STABILITY: SOCIAL INSECURITY: ARE YOU OR HAVE YOU BEEN THREATENED OR ABUSED PHYSICALLY, EMOTIONALLY, OR SEXUALLY BY ANYONE?: NO

## 2025-02-19 SDOH — ECONOMIC STABILITY: FOOD INSECURITY: WITHIN THE PAST 12 MONTHS, YOU WORRIED THAT YOUR FOOD WOULD RUN OUT BEFORE YOU GOT THE MONEY TO BUY MORE.: NEVER TRUE

## 2025-02-19 SDOH — ECONOMIC STABILITY: FOOD INSECURITY: HOW HARD IS IT FOR YOU TO PAY FOR THE VERY BASICS LIKE FOOD, HOUSING, MEDICAL CARE, AND HEATING?: NOT VERY HARD

## 2025-02-19 SDOH — ECONOMIC STABILITY: TRANSPORTATION INSECURITY: IN THE PAST 12 MONTHS, HAS LACK OF TRANSPORTATION KEPT YOU FROM MEDICAL APPOINTMENTS OR FROM GETTING MEDICATIONS?: NO

## 2025-02-19 SDOH — HEALTH STABILITY: PHYSICAL HEALTH: ON AVERAGE, HOW MANY DAYS PER WEEK DO YOU ENGAGE IN MODERATE TO STRENUOUS EXERCISE (LIKE A BRISK WALK)?: 2 DAYS

## 2025-02-19 SDOH — HEALTH STABILITY: PHYSICAL HEALTH: ON AVERAGE, HOW MANY MINUTES DO YOU ENGAGE IN EXERCISE AT THIS LEVEL?: 20 MIN

## 2025-02-19 SDOH — SOCIAL STABILITY: SOCIAL INSECURITY: HAVE YOU HAD THOUGHTS OF HARMING ANYONE ELSE?: NO

## 2025-02-19 ASSESSMENT — COGNITIVE AND FUNCTIONAL STATUS - GENERAL
MOVING TO AND FROM BED TO CHAIR: A LITTLE
DRESSING REGULAR LOWER BODY CLOTHING: A LITTLE
DAILY ACTIVITIY SCORE: 21
CLIMB 3 TO 5 STEPS WITH RAILING: A LOT
MOBILITY SCORE: 16
DAILY ACTIVITIY SCORE: 22
CLIMB 3 TO 5 STEPS WITH RAILING: A LOT
HELP NEEDED FOR BATHING: A LITTLE
MOVING FROM LYING ON BACK TO SITTING ON SIDE OF FLAT BED WITH BEDRAILS: A LITTLE
TOILETING: A LITTLE
CLIMB 3 TO 5 STEPS WITH RAILING: A LOT
DRESSING REGULAR LOWER BODY CLOTHING: A LITTLE
TURNING FROM BACK TO SIDE WHILE IN FLAT BAD: A LITTLE
HELP NEEDED FOR BATHING: A LITTLE
DAILY ACTIVITIY SCORE: 19
TOILETING: A LITTLE
MOVING TO AND FROM BED TO CHAIR: A LITTLE
PERSONAL GROOMING: A LITTLE
PERSONAL GROOMING: A LITTLE
WALKING IN HOSPITAL ROOM: A LOT
MOBILITY SCORE: 17
STANDING UP FROM CHAIR USING ARMS: A LITTLE
PATIENT BASELINE BEDBOUND: NO
WALKING IN HOSPITAL ROOM: A LITTLE
MOVING TO AND FROM BED TO CHAIR: A LITTLE
STANDING UP FROM CHAIR USING ARMS: A LITTLE
TOILETING: A LITTLE
MOVING FROM LYING ON BACK TO SITTING ON SIDE OF FLAT BED WITH BEDRAILS: A LITTLE
TURNING FROM BACK TO SIDE WHILE IN FLAT BAD: A LITTLE
WALKING IN HOSPITAL ROOM: A LOT
MOBILITY SCORE: 16
STANDING UP FROM CHAIR USING ARMS: A LITTLE
TURNING FROM BACK TO SIDE WHILE IN FLAT BAD: A LITTLE
MOVING FROM LYING ON BACK TO SITTING ON SIDE OF FLAT BED WITH BEDRAILS: A LITTLE
DRESSING REGULAR UPPER BODY CLOTHING: A LITTLE

## 2025-02-19 ASSESSMENT — ACTIVITIES OF DAILY LIVING (ADL)
ASSISTIVE_DEVICE: WALKER
GROOMING: INDEPENDENT
LACK_OF_TRANSPORTATION: NO
HEARING - RIGHT EAR: FUNCTIONAL
JUDGMENT_ADEQUATE_SAFELY_COMPLETE_DAILY_ACTIVITIES: YES
WALKS IN HOME: NEEDS ASSISTANCE
HEARING - LEFT EAR: FUNCTIONAL
BATHING: INDEPENDENT
PATIENT'S MEMORY ADEQUATE TO SAFELY COMPLETE DAILY ACTIVITIES?: YES
DRESSING YOURSELF: INDEPENDENT
TOILETING: NEEDS ASSISTANCE
FEEDING YOURSELF: INDEPENDENT
ADEQUATE_TO_COMPLETE_ADL: YES
LACK_OF_TRANSPORTATION: NO

## 2025-02-19 ASSESSMENT — ENCOUNTER SYMPTOMS
SHORTNESS OF BREATH: 1
BLOOD IN STOOL: 0
WEAKNESS: 1
HEADACHES: 0
NECK PAIN: 0
HEMATURIA: 0
JOINT SWELLING: 0
COUGH: 0
NAUSEA: 0
ARTHRALGIAS: 1
FATIGUE: 1
VOMITING: 0
ACTIVITY CHANGE: 1
MYALGIAS: 1
FEVER: 0
APPETITE CHANGE: 1
PALPITATIONS: 0
LIGHT-HEADEDNESS: 1
ABDOMINAL PAIN: 0
SORE THROAT: 0
CHILLS: 0

## 2025-02-19 ASSESSMENT — PAIN SCALES - GENERAL
PAINLEVEL_OUTOF10: 10 - WORST POSSIBLE PAIN
PAINLEVEL_OUTOF10: 7
PAINLEVEL_OUTOF10: 10 - WORST POSSIBLE PAIN
PAINLEVEL_OUTOF10: 10 - WORST POSSIBLE PAIN
PAINLEVEL_OUTOF10: 7
PAINLEVEL_OUTOF10: 10 - WORST POSSIBLE PAIN
PAINLEVEL_OUTOF10: 9

## 2025-02-19 ASSESSMENT — PATIENT HEALTH QUESTIONNAIRE - PHQ9
2. FEELING DOWN, DEPRESSED OR HOPELESS: NOT AT ALL
1. LITTLE INTEREST OR PLEASURE IN DOING THINGS: NOT AT ALL
SUM OF ALL RESPONSES TO PHQ9 QUESTIONS 1 & 2: 0

## 2025-02-19 ASSESSMENT — LIFESTYLE VARIABLES
AUDIT-C TOTAL SCORE: 0
HOW MANY STANDARD DRINKS CONTAINING ALCOHOL DO YOU HAVE ON A TYPICAL DAY: PATIENT DOES NOT DRINK
SKIP TO QUESTIONS 9-10: 1
SKIP TO QUESTIONS 9-10: 1
HOW OFTEN DO YOU HAVE A DRINK CONTAINING ALCOHOL: NEVER
HOW OFTEN DO YOU HAVE 6 OR MORE DRINKS ON ONE OCCASION: NEVER
AUDIT-C TOTAL SCORE: 0
AUDIT-C TOTAL SCORE: 0

## 2025-02-19 ASSESSMENT — PAIN DESCRIPTION - DESCRIPTORS: DESCRIPTORS: ACHING

## 2025-02-19 ASSESSMENT — PAIN DESCRIPTION - ORIENTATION
ORIENTATION: LEFT

## 2025-02-19 ASSESSMENT — PAIN - FUNCTIONAL ASSESSMENT
PAIN_FUNCTIONAL_ASSESSMENT: 0-10

## 2025-02-19 ASSESSMENT — PAIN DESCRIPTION - LOCATION
LOCATION: ANKLE

## 2025-02-19 NOTE — CARE PLAN
The patient's goals for the shift include      The clinical goals for the shift include Blood transfusion, maintain hemoglobin of >7    Over the shift, the patient did make progress toward the following goals.   Problem: Safety - Adult  Goal: Free from fall injury  Outcome: Progressing     Problem: Pain - Adult  Goal: Verbalizes/displays adequate comfort level or baseline comfort level  Outcome: Progressing     Problem: Chronic Conditions and Co-morbidities  Goal: Patient's chronic conditions and co-morbidity symptoms are monitored and maintained or improved  Outcome: Progressing     Problem: Discharge Planning  Goal: Discharge to home or other facility with appropriate resources  Outcome: Progressing     Problem: Nutrition  Goal: Nutrient intake appropriate for maintaining nutritional needs  Outcome: Progressing     Problem: Respiratory  Goal: Verbalize decreased shortness of breath this shift  Outcome: Progressing     Problem: Fall/Injury  Goal: Not fall by end of shift  Outcome: Progressing

## 2025-02-19 NOTE — PROGRESS NOTES
Physical Therapy                 Therapy Communication Note    Patient Name: Rosa Soto  MRN: [id number]  Department: [place] [address]  Room: [address] [place] [address]  Today's Date: 2/19/2025     Discipline: Physical Therapy    PT Missed Visit: Yes     Missed Visit Reason: Pt order received, chart reviewed.  Pt's hemoglobin is 6.6, and has a CT of the L ankle pending. Will hold for now and attempt again later as appropriate.    Missed Time: Attempt

## 2025-02-19 NOTE — PROGRESS NOTES
Occupational Therapy                 Therapy Communication Note    Patient Name: Rosa Soto  MRN: [id number]  Department: [place] [address]  Room: [address] [place] [address]  Today's Date: 2/19/2025     Discipline: Occupational Therapy    OT Missed Visit: Yes     Missed Visit Reason:  (Referral received, chart reviewed, however patient continues to have low H & H of 6.6 and still requires another unit of blood after already receiving a unit this AM. RN aware of reason for medical hold.)    Missed Time: Attempt    Comment:

## 2025-02-19 NOTE — CONSULTS
Reason For Consult  anemia    History Of Present Illness  Rosa Soto is a 70 y.o. female with h/o atrial fibrillation on apixaban, depression, essential hypertension, GERD, hyperlipidemia, ESRD on HD, neoplasm of the left kidney, DM, YELENA, chronic anemia, presented with c/o  generalized weakness, fatigue, shortness of breath. H&H on admission 6.4 and 21.2, baseline hemoglobin around 7-8, WBC 12.2 at baseline, , platelets 186, BUN 70, creatinine 9.06.  Iron studies pending.  Patient denies any melena, hematochezia, hematemesis.  She takes iron daily.  Reports chronic constipation. She stated she take Auryxia and it makes her stool dark and grainy. She recently had GI work up for anemia in 12/2024. See reports below. Denies using NSAIDs.     EGD 12/19/2024 for anemia  The esophagus appeared normal.  The stomach appeared normal.  The duodenal bulb, 1st part of the duodenum and 2nd part of the duodenum appeared normal.     Colonoscopy 12/19/2024 for anemia  One 10 mm polyp in the proximal rectum; performed hot snare removal  Diverticulosis in the descending colon and sigmoid colon  Pathology of the rectum polyp showed tubular adenoma and hyperplastic polyp     Past Medical History  She has a past medical history of Arrhythmia, Benign essential HTN, Cancer (Multi), Chronic kidney disease, and ESRD (end stage renal disease) (Multi).    She has no past medical history of CHF (congestive heart failure).    Surgical History  She has a past surgical history that includes US guided abdominal paracentesis (8/31/2023); Cardiac catheterization (N/A, 3/6/2024); and Cardiac electrophysiology procedure (N/A, 2/29/2024).     Social History  She reports that she quit smoking about 4 years ago. Her smoking use included cigarettes. She has never used smokeless tobacco. She reports that she does not currently use alcohol. She reports that she does not use drugs.    Family History  No family history on file.    "  Allergies  Codeine, Ropinirole, and Adhesive    Review of Systems  10 systems reviewed and negative other than HPI     Physical Exam  Physical Exam  Vitals reviewed.   Constitutional:       Appearance: Normal appearance. She is obese.   HENT:      Head: Normocephalic and atraumatic.      Nose: Nose normal.      Mouth/Throat:      Mouth: Mucous membranes are moist.      Pharynx: Oropharynx is clear.   Eyes:      Conjunctiva/sclera: Conjunctivae normal.   Cardiovascular:      Rate and Rhythm: Normal rate and regular rhythm.      Heart sounds: Normal heart sounds.   Pulmonary:      Effort: Pulmonary effort is normal.      Breath sounds: Normal breath sounds.   Abdominal:      General: Bowel sounds are normal. There is distension.      Palpations: Abdomen is soft.      Tenderness: There is no abdominal tenderness. There is no guarding.   Skin:     General: Skin is warm.   Neurological:      General: No focal deficit present.      Mental Status: She is alert and oriented to person, place, and time. Mental status is at baseline.   Psychiatric:         Mood and Affect: Mood normal.            Last Recorded Vitals  Blood pressure 153/72, pulse 80, temperature 36.3 °C (97.3 °F), resp. rate 18, height 1.72 m (5' 7.72\"), weight 114 kg (251 lb), SpO2 100%.    Relevant Results      Scheduled medications  amLODIPine, 5 mg, oral, Daily  apixaban, 5 mg, oral, BID  atorvastatin, 40 mg, oral, Daily  B complex-vitamin C, 1 tablet, oral, Daily  busPIRone, 5 mg, oral, Nightly  cholecalciferol, 1,000 Units, oral, Daily  cinacalcet, 30 mg, oral, Daily  doxepin, 100 mg, oral, Nightly  escitalopram, 10 mg, oral, Daily  metoprolol succinate XL, 100 mg, oral, Daily  pantoprazole, 40 mg, oral, Daily before breakfast   Or  pantoprazole, 40 mg, intravenous, Daily before breakfast  torsemide, 20 mg, oral, BID      Continuous medications     PRN medications  PRN medications: acetaminophen **OR** acetaminophen **OR** acetaminophen, albuterol, " ondansetron **OR** ondansetron, oxyCODONE, oxyCODONE  CT ankle left wo IV contrast    Result Date: 2/19/2025  Interpreted By:  Charu Corona, STUDY: CT ANKLE LEFT WO IV CONTRAST   INDICATION: Signs/Symptoms:brusing and pain   COMPARISON: None.   ACCESSION NUMBER(S): [id number]   ORDERING CLINICIAN: ELOY VEGA   TECHNIQUE: Contiguous axial CT images of the left ankle were obtained without intravenous contrast administration. Coronal and sagittal reformatted images were performed. 3D reformatted images were created and reviewed.   FINDINGS: OSSEOUS STRUCTURES: Bones demonstrate mild diffuse osseous demineralization limiting evaluation for subtle nondisplaced fractures. Within this limitation, no acute displaced fracture deformity is noted. Ankle mortise is intact. There are mild degenerative changes of the tibiotalar joint with small marginal osteophytes and joint space narrowing. Tarsal bones and intertarsal joint articulations are maintained. Included metatarsals appear grossly unremarkable. There is small plantar calcaneal enthesophyte.   SOFT TISSUES: Evaluation is limited by the lack of intravenous contrast. Within this limitation, there is soft tissue swelling about the lateral aspect of the ankle. There is a relatively confluence dense fluid collection in the subcutaneous soft tissues about the lateral aspect of the ankle at the level of distal fibular diaphysis/fibular neck measuring up to 4 x 2.5 x 2 cm. This measures up to 55 Hounsfield units and is suggestive of a small hematoma. Evaluation of tendons and ligaments is limited due to CT technique. Within this limitation, flexor, extensor and peroneal compartment tendons appear grossly unremarkable.       1. Soft tissue swelling with a small hematoma measuring up to 4 x 2.5 x 2 cm in the subcutaneous soft tissues about the lateral aspect of the ankle as described above. Otherwise no definite CT evidence of acute fracture or dislocation. 2. Small plantar  calcaneal enthesophyte.   MACRO: None.   Signed by: Charu Corona 2/19/2025 1:51 PM Dictation workstation:   [id number]    XR ankle left 3+ views    Result Date: 2/18/2025  * * *Final Report* * * DATE OF EXAM: Feb 18 2025 11:14AM   TWX   5298  -  XR ANKLE 3V AP/LAT/OBL LT  / ACCESSION #  [id number] PROCEDURE REASON: Ankle pain, no prior imaging      * * * * Physician Interpretation * * * *  LEFT ANKLE X-RAY   TECHNIQUE: XR ANKLE 3V AP/LAT/OBL LT COMPARISON: 02/17/2025 at 1548 hours INDICATION:  INJURY LEFT ANKLE PAIN LATERAL DISTAL ONE THIRD TIB FIB RESULT: Soft tissue swelling about the ankle again noted.  Ankle mortise and talar dome are intact.  No fracture, dislocation, or osseous erosion. -    IMPRESSION: Soft tissue swelling of the ankle again noted: No significant interval change since 02/17/2025 at 1548 hours. : EMMIE   Transcribe Date/Time: Feb 18 2025 11:32A Dictated by : MARLEN WILSON MD This examination was interpreted and the report reviewed and electronically signed by: MARLEN WILSON MD on Feb 18 2025 11:34AM  EST    XR ankle left 3+ views    Result Date: 2/17/2025  * * *Final Report* * * DATE OF EXAM: Feb 17 2025  3:55PM   AKX   5298  -  XR ANKLE 3V AP/LAT/OBL LT  / ACCESSION #  [id number] PROCEDURE REASON: Ankle pain, no prior imaging      * * * * Physician Interpretation * * * *  EXAMINATION:  XR ANKLE 3V AP/LAT/OBL LT CLINICAL HISTORY: Ankle pain, no prior imaging Technique:   XR ANKLE 3V AP/LAT/OBL LT -- LEFT with 3 views on 3 images Comparison: None RESULT: There is no acute fracture or malalignment. There is no significant joint effusion. There is no radiopaque foreign body or soft tissue gas.    IMPRESSION: No acute abnormality. : EMMIE   Transcribe Date/Time: Feb 17 2025  5:11P Dictated by : RAY JARRELL MD This examination was interpreted and the report reviewed and electronically signed by: RAY JARRELL MD on Feb 17 2025  5:12PM  EST   Results for orders placed or  performed during the hospital encounter of 02/18/25 (from the past 24 hours)   Prepare RBC: 1 Units   Result Value Ref Range    PRODUCT CODE V0678W18     Unit Number [id number]     Unit ABO A     Unit RH POS     XM INTEP COMP     Dispense Status TR     Blood Expiration Date 3/3/2025 11:59:00 PM EST     PRODUCT BLOOD TYPE 6200     UNIT VOLUME 350    Type and screen   Result Value Ref Range    ABO TYPE A     Rh TYPE POS     ANTIBODY SCREEN NEG    CBC   Result Value Ref Range    WBC 12.2 (H) 4.4 - 11.3 x10*3/uL    nRBC 0.0 0.0 - 0.0 /100 WBCs    RBC 1.94 (L) 4.00 - 5.20 x10*6/uL    Hemoglobin 6.4 (LL) 12.0 - 16.0 g/dL    Hematocrit 21.2 (L) 36.0 - 46.0 %     (H) 80 - 100 fL    MCH 33.0 26.0 - 34.0 pg    MCHC 30.2 (L) 32.0 - 36.0 g/dL    RDW 17.3 (H) 11.5 - 14.5 %    Platelets 186 150 - 450 x10*3/uL   Comprehensive Metabolic Panel   Result Value Ref Range    Glucose 223 (H) 74 - 99 mg/dL    Sodium 138 136 - 145 mmol/L    Potassium 3.9 3.5 - 5.3 mmol/L    Chloride 95 (L) 98 - 107 mmol/L    Bicarbonate 30 21 - 32 mmol/L    Anion Gap 17 10 - 20 mmol/L    Urea Nitrogen 70 (H) 6 - 23 mg/dL    Creatinine 9.06 (H) 0.50 - 1.05 mg/dL    eGFR 4 (L) >60 mL/min/1.73m*2    Calcium 8.5 (L) 8.6 - 10.3 mg/dL    Albumin 3.2 (L) 3.4 - 5.0 g/dL    Alkaline Phosphatase 62 33 - 136 U/L    Total Protein 5.4 (L) 6.4 - 8.2 g/dL    AST 10 9 - 39 U/L    Bilirubin, Total 0.4 0.0 - 1.2 mg/dL    ALT 17 7 - 45 U/L   CBC and Auto Differential   Result Value Ref Range    WBC 11.6 (H) 4.4 - 11.3 x10*3/uL    nRBC 0.0 0.0 - 0.0 /100 WBCs    RBC 1.86 (L) 4.00 - 5.20 x10*6/uL    Hemoglobin 6.1 (LL) 12.0 - 16.0 g/dL    Hematocrit 20.2 (L) 36.0 - 46.0 %     (H) 80 - 100 fL    MCH 32.8 26.0 - 34.0 pg    MCHC 30.2 (L) 32.0 - 36.0 g/dL    RDW 17.1 (H) 11.5 - 14.5 %    Platelets 180 150 - 450 x10*3/uL    Neutrophils % 67.1 40.0 - 80.0 %    Immature Granulocytes %, Automated 1.0 (H) 0.0 - 0.9 %    Lymphocytes % 13.9 13.0 - 44.0 %    Monocytes %  12.7 2.0 - 10.0 %    Eosinophils % 4.9 0.0 - 6.0 %    Basophils % 0.4 0.0 - 2.0 %    Neutrophils Absolute 7.78 (H) 1.20 - 7.70 x10*3/uL    Immature Granulocytes Absolute, Automated 0.12 0.00 - 0.70 x10*3/uL    Lymphocytes Absolute 1.62 1.20 - 4.80 x10*3/uL    Monocytes Absolute 1.48 (H) 0.10 - 1.00 x10*3/uL    Eosinophils Absolute 0.57 0.00 - 0.70 x10*3/uL    Basophils Absolute 0.05 0.00 - 0.10 x10*3/uL   Lavender Top   Result Value Ref Range    Extra Tube Hold for add-ons.    Iron and TIBC   Result Value Ref Range    Iron 59 35 - 150 ug/dL    UIBC 172 110 - 370 ug/dL    TIBC 231 (L) 240 - 445 ug/dL    % Saturation 26 25 - 45 %   Ferritin   Result Value Ref Range    Ferritin 424 (H) 8 - 150 ng/mL   Lactate Dehydrogenase   Result Value Ref Range     84 - 246 U/L   Reticulocytes   Result Value Ref Range    Retic % 8.2 (H) 0.5 - 2.0 %    Retic Absolute 0.155 (H) 0.017 - 0.110 x10*6/uL    Reticulocyte Hemoglobin 30 28 - 38 pg    Immature Retic fraction 23.9 (H) <=16.0 %   VERIFY ABO/Rh Group Test   Result Value Ref Range    ABO TYPE A     Rh TYPE POS    SST TOP   Result Value Ref Range    Extra Tube Hold for add-ons.    CBC   Result Value Ref Range    WBC 13.0 (H) 4.4 - 11.3 x10*3/uL    nRBC 0.0 0.0 - 0.0 /100 WBCs    RBC 2.07 (L) 4.00 - 5.20 x10*6/uL    Hemoglobin 6.6 (L) 12.0 - 16.0 g/dL    Hematocrit 21.8 (L) 36.0 - 46.0 %     (H) 80 - 100 fL    MCH 31.9 26.0 - 34.0 pg    MCHC 30.3 (L) 32.0 - 36.0 g/dL    RDW 19.9 (H) 11.5 - 14.5 %    Platelets 192 150 - 450 x10*3/uL   Prepare RBC: 1 Units   Result Value Ref Range    PRODUCT CODE [id number]     Unit Number [id number]     Unit ABO A     Unit RH POS     XM INTEP COMP     Dispense Status IS     Blood Expiration Date 3/7/2025 11:59:00 PM EST     PRODUCT BLOOD TYPE 6200     UNIT VOLUME 289           Assessment/Plan     70 y.o. F with h/o atrial fibrillation on apixaban, depression, essential hypertension, GERD, hyperlipidemia, ESRD on HD, neoplasm of the  left kidney, DM, YELENA, chronic anemia, presented with acute on chronic macrocytic anemia. Suspect multifactorial anemia in the setting of anticoagulation, ESRD, could be bone marrow dysfunction, do not suspect significant GI blood loss    -monitor h&H and transfuse as needed to keep Hgb>7  -await hematology consultation  -no plans for further inpatient GI work up  -consider op capsule endoscopy.   -diet as tolerated        Suad Hall, MATTHIAS-CNP

## 2025-02-19 NOTE — PROGRESS NOTES
"Rosa Soto is a 70 y.o. female on day 1 of admission presenting with Anemia.    Subjective   No acute events overnight. Did get transfused. Wants to know why her hgb keeps dropping. I explained this is likely her kindeys though could be a GI source but suspect further work up will be outpatient. Also notes some L ankle pain. Since Monday did not fall on it.        Objective     VITALS  Blood pressure 153/72, pulse 80, temperature 36.3 °C (97.3 °F), resp. rate 18, height 1.72 m (5' 7.72\"), weight 114 kg (251 lb), SpO2 100%.  Physical Exam  Vitals and nursing note reviewed.   Constitutional:       General: She is not in acute distress.     Appearance: Normal appearance. She is obese.   HENT:      Head: Normocephalic and atraumatic.   Cardiovascular:      Rate and Rhythm: Normal rate and regular rhythm.      Heart sounds: Normal heart sounds.   Pulmonary:      Effort: Pulmonary effort is normal. No respiratory distress.      Breath sounds: Normal breath sounds. No wheezing.   Abdominal:      General: Abdomen is flat. Bowel sounds are normal. There is no distension.      Palpations: Abdomen is soft.      Tenderness: There is no abdominal tenderness.   Musculoskeletal:         General: No swelling or tenderness. Normal range of motion.      Comments: Bruising on the lateral L ankle very TTP    Skin:     General: Skin is warm and dry.      Capillary Refill: Capillary refill takes less than 2 seconds.   Neurological:      General: No focal deficit present.      Mental Status: She is alert and oriented to person, place, and time.   Psychiatric:         Mood and Affect: Mood normal.           Intake/Output last 3 Shifts:  I/O last 3 completed shifts:  In: 350 (3.1 mL/kg) [Blood:350]  Out: - (0 mL/kg)   Weight: 113.9 kg     Relevant Results  Results for orders placed or performed during the hospital encounter of 02/18/25 (from the past 24 hours)   Prepare RBC: 1 Units   Result Value Ref Range    PRODUCT CODE E2750D29     " Unit Number [id number]     Unit ABO A     Unit RH POS     XM INTEP COMP     Dispense Status TR     Blood Expiration Date 3/3/2025 11:59:00 PM EST     PRODUCT BLOOD TYPE 6200     UNIT VOLUME 350    Type and screen   Result Value Ref Range    ABO TYPE A     Rh TYPE POS     ANTIBODY SCREEN NEG    CBC   Result Value Ref Range    WBC 12.2 (H) 4.4 - 11.3 x10*3/uL    nRBC 0.0 0.0 - 0.0 /100 WBCs    RBC 1.94 (L) 4.00 - 5.20 x10*6/uL    Hemoglobin 6.4 (LL) 12.0 - 16.0 g/dL    Hematocrit 21.2 (L) 36.0 - 46.0 %     (H) 80 - 100 fL    MCH 33.0 26.0 - 34.0 pg    MCHC 30.2 (L) 32.0 - 36.0 g/dL    RDW 17.3 (H) 11.5 - 14.5 %    Platelets 186 150 - 450 x10*3/uL   Comprehensive Metabolic Panel   Result Value Ref Range    Glucose 223 (H) 74 - 99 mg/dL    Sodium 138 136 - 145 mmol/L    Potassium 3.9 3.5 - 5.3 mmol/L    Chloride 95 (L) 98 - 107 mmol/L    Bicarbonate 30 21 - 32 mmol/L    Anion Gap 17 10 - 20 mmol/L    Urea Nitrogen 70 (H) 6 - 23 mg/dL    Creatinine 9.06 (H) 0.50 - 1.05 mg/dL    eGFR 4 (L) >60 mL/min/1.73m*2    Calcium 8.5 (L) 8.6 - 10.3 mg/dL    Albumin 3.2 (L) 3.4 - 5.0 g/dL    Alkaline Phosphatase 62 33 - 136 U/L    Total Protein 5.4 (L) 6.4 - 8.2 g/dL    AST 10 9 - 39 U/L    Bilirubin, Total 0.4 0.0 - 1.2 mg/dL    ALT 17 7 - 45 U/L   CBC and Auto Differential   Result Value Ref Range    WBC 11.6 (H) 4.4 - 11.3 x10*3/uL    nRBC 0.0 0.0 - 0.0 /100 WBCs    RBC 1.86 (L) 4.00 - 5.20 x10*6/uL    Hemoglobin 6.1 (LL) 12.0 - 16.0 g/dL    Hematocrit 20.2 (L) 36.0 - 46.0 %     (H) 80 - 100 fL    MCH 32.8 26.0 - 34.0 pg    MCHC 30.2 (L) 32.0 - 36.0 g/dL    RDW 17.1 (H) 11.5 - 14.5 %    Platelets 180 150 - 450 x10*3/uL    Neutrophils % 67.1 40.0 - 80.0 %    Immature Granulocytes %, Automated 1.0 (H) 0.0 - 0.9 %    Lymphocytes % 13.9 13.0 - 44.0 %    Monocytes % 12.7 2.0 - 10.0 %    Eosinophils % 4.9 0.0 - 6.0 %    Basophils % 0.4 0.0 - 2.0 %    Neutrophils Absolute 7.78 (H) 1.20 - 7.70 x10*3/uL    Immature  Granulocytes Absolute, Automated 0.12 0.00 - 0.70 x10*3/uL    Lymphocytes Absolute 1.62 1.20 - 4.80 x10*3/uL    Monocytes Absolute 1.48 (H) 0.10 - 1.00 x10*3/uL    Eosinophils Absolute 0.57 0.00 - 0.70 x10*3/uL    Basophils Absolute 0.05 0.00 - 0.10 x10*3/uL   Lavender Top   Result Value Ref Range    Extra Tube Hold for add-ons.    Iron and TIBC   Result Value Ref Range    Iron 59 35 - 150 ug/dL    UIBC 172 110 - 370 ug/dL    TIBC 231 (L) 240 - 445 ug/dL    % Saturation 26 25 - 45 %   Ferritin   Result Value Ref Range    Ferritin 424 (H) 8 - 150 ng/mL   Lactate Dehydrogenase   Result Value Ref Range     84 - 246 U/L   Reticulocytes   Result Value Ref Range    Retic % 8.2 (H) 0.5 - 2.0 %    Retic Absolute 0.155 (H) 0.017 - 0.110 x10*6/uL    Reticulocyte Hemoglobin 30 28 - 38 pg    Immature Retic fraction 23.9 (H) <=16.0 %   VERIFY ABO/Rh Group Test   Result Value Ref Range    ABO TYPE A     Rh TYPE POS        Imaging Results  CT ankle left wo IV contrast    (Results Pending)       Medications:  amLODIPine, 5 mg, oral, Daily  apixaban, 5 mg, oral, BID  atorvastatin, 40 mg, oral, Daily  B complex-vitamin C, 1 tablet, oral, Daily  busPIRone, 5 mg, oral, Nightly  cholecalciferol, 1,000 Units, oral, Daily  cinacalcet, 30 mg, oral, Daily  doxepin, 100 mg, oral, Nightly  escitalopram, 10 mg, oral, Daily  metoprolol succinate XL, 100 mg, oral, Daily  pantoprazole, 40 mg, oral, Daily before breakfast   Or  pantoprazole, 40 mg, intravenous, Daily before breakfast  torsemide, 20 mg, oral, BID       PRN medications: acetaminophen **OR** acetaminophen **OR** acetaminophen, albuterol, ondansetron **OR** ondansetron, oxyCODONE, oxyCODONE        Assessment/Plan          Assessment & Plan  Anemia  Acute on chronic anemia.   End-stage renal disease c  -stool for occult blood will likely be positive as she does take iron supplements   - Consult GI  -type cross and transfuse to keep hemoglobin greater than 7.0  -Protonix      End-stage renal disease  -Nephrology consultation, for dialysis        Hypertension  -Amlodipine 5 mg orally daily  -Metoprolol tartrate 50 mg twice daily     Persistent atrial fibrillation  -Continue apixaban 5 mg twice daily     Hyperlipidemia  -Atorvastatin 40 mg orally daily     Depression  -Lexapro 10 mg orally daily     Class II obesity BMI 38  Lifestyle modification     Type 2 diabetes mellitus  Lispro 4 corrective scale  Diabetic diet    L ankle pain with bruising   -CT ankle ordered, may need MRI outpatient       DVT Prophylaxis:  Eliquis    Disposition:  Awaiting GI recs, likely DC tomorrow if hgb stable     Jason Flynn, [facility]

## 2025-02-19 NOTE — CONSULTS
Inpatient consult to Hematology-Oncology  Consult performed by: MATTHIAS Leach-CNP  Consult ordered by: Fili Salazar DO  Reason for consult: Anemia  Assessment/Recommendations: Acute on chronic macrocytic anemia  - on iron and SANIA for CKD  - repeat B12 and folate  - SPEP  - LDH normal retic borderline response  - will review slide    Discussed with Dr. Majano            Reason For Consult  Anemia    History Of Present Illness  Rosa Soto is a 70 y.o. female presenting with generalized weakness, fatigue, shortness of breath. 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 She had dialysis yesterday and her hemoglobin was 7.3. She is on apixaban for atrial fibrillation and has recurrent anemia but a definitive source of bleeding was never found. She had a colonoscopy which showed a colon polyp. She denies any dark stools or melena. She c/o poor appetite, no desire to eat, bad taste in her mouth since COVID in 2021. She does take iron supplements and SANIA during dialysis. Patient hit her left ankle on her daughters car door coming to the hospital and is in extreme pain.  Hematology is consulted for anemia.      Iron Sucrose (Venofer) During Dialysis, 3X Week 100 mg Intravenous - push January 17, 2025 February 07, 2025 Active   Mircera During Dialysis, Every 2 weeks 225 mcg Intravenous - push January 20, 2025 January 19, 2026         Past Medical History  She has a past medical history of Arrhythmia, Benign essential HTN, Cancer (Multi), Chronic kidney disease, and ESRD (end stage renal disease) (Multi).    She has no past medical history of CHF (congestive heart failure).    Surgical History  She has a past surgical history that includes US guided abdominal paracentesis (8/31/2023);  Cardiac catheterization (N/A, 3/6/2024); and Cardiac electrophysiology procedure (N/A, 2/29/2024).     Social History  She reports that she quit smoking about 4 years ago. Her smoking use included cigarettes. She has never used smokeless tobacco. She reports that she does not currently use alcohol. She reports that she does not use drugs.    Family History  No family history on file.     Allergies  Ropinirole and Adhesive    Review of Systems   Constitutional:  Positive for activity change, appetite change and fatigue. Negative for chills and fever.   HENT:  Negative for congestion, nosebleeds and sore throat.    Eyes:  Negative for visual disturbance.   Respiratory:  Positive for shortness of breath. Negative for cough.    Cardiovascular:  Negative for chest pain and palpitations.   Gastrointestinal:  Negative for abdominal pain, blood in stool, nausea and vomiting.   Genitourinary:  Negative for hematuria.   Musculoskeletal:  Positive for arthralgias and myalgias. Negative for joint swelling and neck pain.   Skin:  Negative for rash.   Allergic/Immunologic: Positive for immunocompromised state.   Neurological:  Positive for weakness and light-headedness. Negative for syncope and headaches.        Physical Exam  Vitals and nursing note reviewed.   Constitutional:       Appearance: She is ill-appearing.   HENT:      Head: Normocephalic and atraumatic.      Nose: No congestion.      Mouth/Throat:      Mouth: Mucous membranes are moist.   Eyes:      General: No scleral icterus.  Cardiovascular:      Rate and Rhythm: Normal rate.   Pulmonary:      Effort: No respiratory distress.   Abdominal:      Palpations: Abdomen is soft.      Tenderness: There is no abdominal tenderness.   Musculoskeletal:         General: Swelling, tenderness and signs of injury present.      Cervical back: No tenderness.      Right lower leg: No edema.      Left lower leg: No edema.      Comments: Left lateral ankle swelling ecchymosis and point  "tenderness   Lymphadenopathy:      Cervical: No cervical adenopathy.   Skin:     General: Skin is warm and dry.      Findings: Bruising present. No rash.   Neurological:      General: No focal deficit present.      Mental Status: She is alert and oriented to person, place, and time.          Last Recorded Vitals  Blood pressure 153/72, pulse 80, temperature 36.3 °C (97.3 °F), resp. rate 18, height 1.72 m (5' 7.72\"), weight 114 kg (251 lb), SpO2 100%.    Relevant Results  Results for orders placed or performed during the hospital encounter of 02/18/25 (from the past 96 hours)   Prepare RBC: 1 Units   Result Value Ref Range    PRODUCT CODE F2114M91     Unit Number [id number]     Unit ABO A     Unit RH POS     XM INTEP COMP     Dispense Status TR     Blood Expiration Date 3/3/2025 11:59:00 PM EST     PRODUCT BLOOD TYPE 6200     UNIT VOLUME 350    Type and screen   Result Value Ref Range    ABO TYPE A     Rh TYPE POS     ANTIBODY SCREEN NEG    CBC   Result Value Ref Range    WBC 12.2 (H) 4.4 - 11.3 x10*3/uL    nRBC 0.0 0.0 - 0.0 /100 WBCs    RBC 1.94 (L) 4.00 - 5.20 x10*6/uL    Hemoglobin 6.4 (LL) 12.0 - 16.0 g/dL    Hematocrit 21.2 (L) 36.0 - 46.0 %     (H) 80 - 100 fL    MCH 33.0 26.0 - 34.0 pg    MCHC 30.2 (L) 32.0 - 36.0 g/dL    RDW 17.3 (H) 11.5 - 14.5 %    Platelets 186 150 - 450 x10*3/uL   Comprehensive Metabolic Panel   Result Value Ref Range    Glucose 223 (H) 74 - 99 mg/dL    Sodium 138 136 - 145 mmol/L    Potassium 3.9 3.5 - 5.3 mmol/L    Chloride 95 (L) 98 - 107 mmol/L    Bicarbonate 30 21 - 32 mmol/L    Anion Gap 17 10 - 20 mmol/L    Urea Nitrogen 70 (H) 6 - 23 mg/dL    Creatinine 9.06 (H) 0.50 - 1.05 mg/dL    eGFR 4 (L) >60 mL/min/1.73m*2    Calcium 8.5 (L) 8.6 - 10.3 mg/dL    Albumin 3.2 (L) 3.4 - 5.0 g/dL    Alkaline Phosphatase 62 33 - 136 U/L    Total Protein 5.4 (L) 6.4 - 8.2 g/dL    AST 10 9 - 39 U/L    Bilirubin, Total 0.4 0.0 - 1.2 mg/dL    ALT 17 7 - 45 U/L   CBC and Auto Differential "   Result Value Ref Range    WBC 11.6 (H) 4.4 - 11.3 x10*3/uL    nRBC 0.0 0.0 - 0.0 /100 WBCs    RBC 1.86 (L) 4.00 - 5.20 x10*6/uL    Hemoglobin 6.1 (LL) 12.0 - 16.0 g/dL    Hematocrit 20.2 (L) 36.0 - 46.0 %     (H) 80 - 100 fL    MCH 32.8 26.0 - 34.0 pg    MCHC 30.2 (L) 32.0 - 36.0 g/dL    RDW 17.1 (H) 11.5 - 14.5 %    Platelets 180 150 - 450 x10*3/uL    Neutrophils % 67.1 40.0 - 80.0 %    Immature Granulocytes %, Automated 1.0 (H) 0.0 - 0.9 %    Lymphocytes % 13.9 13.0 - 44.0 %    Monocytes % 12.7 2.0 - 10.0 %    Eosinophils % 4.9 0.0 - 6.0 %    Basophils % 0.4 0.0 - 2.0 %    Neutrophils Absolute 7.78 (H) 1.20 - 7.70 x10*3/uL    Immature Granulocytes Absolute, Automated 0.12 0.00 - 0.70 x10*3/uL    Lymphocytes Absolute 1.62 1.20 - 4.80 x10*3/uL    Monocytes Absolute 1.48 (H) 0.10 - 1.00 x10*3/uL    Eosinophils Absolute 0.57 0.00 - 0.70 x10*3/uL    Basophils Absolute 0.05 0.00 - 0.10 x10*3/uL   Lavender Top   Result Value Ref Range    Extra Tube Hold for add-ons.    Iron and TIBC   Result Value Ref Range    Iron 59 35 - 150 ug/dL    UIBC 172 110 - 370 ug/dL    TIBC 231 (L) 240 - 445 ug/dL    % Saturation 26 25 - 45 %   Ferritin   Result Value Ref Range    Ferritin 424 (H) 8 - 150 ng/mL   Lactate Dehydrogenase   Result Value Ref Range     84 - 246 U/L   Reticulocytes   Result Value Ref Range    Retic % 8.2 (H) 0.5 - 2.0 %    Retic Absolute 0.155 (H) 0.017 - 0.110 x10*6/uL    Reticulocyte Hemoglobin 30 28 - 38 pg    Immature Retic fraction 23.9 (H) <=16.0 %   VERIFY ABO/Rh Group Test   Result Value Ref Range    ABO TYPE A     Rh TYPE POS    SST TOP   Result Value Ref Range    Extra Tube Hold for add-ons.    CBC   Result Value Ref Range    WBC 13.0 (H) 4.4 - 11.3 x10*3/uL    nRBC 0.0 0.0 - 0.0 /100 WBCs    RBC 2.07 (L) 4.00 - 5.20 x10*6/uL    Hemoglobin 6.6 (L) 12.0 - 16.0 g/dL    Hematocrit 21.8 (L) 36.0 - 46.0 %     (H) 80 - 100 fL    MCH 31.9 26.0 - 34.0 pg    MCHC 30.3 (L) 32.0 - 36.0 g/dL     RDW 19.9 (H) 11.5 - 14.5 %    Platelets 192 150 - 450 x10*3/uL   Prepare RBC: 1 Units   Result Value Ref Range    PRODUCT CODE O4091X13     Unit Number [id number]     Unit ABO A     Unit RH POS     XM INTEP COMP     Dispense Status IS     Blood Expiration Date 3/7/2025 11:59:00 PM EST     PRODUCT BLOOD TYPE 6200     UNIT VOLUME 289        I spent 60 minutes in the professional and overall care of this patient.

## 2025-02-19 NOTE — CONSULTS
Reason For Consult  ESKD on hemodialysis on Monday/Wednesday/Friday at [facility] facility in [place] via right AV fistula with positive bruit and thrill.    History Of Present Illness  Rosa Soto is a 70 y.o. female presenting with generalized weakness, fatigue, shortness of breath patient started getting worse last few weeks prior to visitation to ED at [facility] on February 18 patient hemodialysis 1 day prior to presentation she goes to [facility] and [place].  She has history of atrial fibrillation on apixaban, depression, hypertension, GERD, hyperlipidemia, diabetes mellitus type 2, obstructive sleep apnea, chronic anemia patient has recurrent anemia definitive source of bleeding was never found.  She had colonoscopy which showed colon polyp.  She denies history of any dark stools or melena or hematochezia she is still on iron supplementation presentation yesterday hemoglobin was 7.3 which dropped to 6.4 since patient gets her primary care through  she was transferred to [facility] to receive drugs RBC on presentation during the course of illness with [facility] emergency department in [place].  Past Medical History  She has a past medical history of Arrhythmia, Benign essential HTN, Cancer (Multi), Chronic kidney disease, and ESRD (end stage renal disease) (Multi).    She has no past medical history of CHF (congestive heart failure).    Surgical History  She has a past surgical history that includes US guided abdominal paracentesis (8/31/2023); Cardiac catheterization (N/A, 3/6/2024); and Cardiac electrophysiology procedure (N/A, 2/29/2024).     Social History  She reports that she quit smoking about 4 years ago. Her smoking use included cigarettes. She has never used smokeless tobacco. She reports that she does not currently use alcohol. She reports that she does not use drugs.    Family History  No family history on file.     Allergies  Codeine, Ropinirole, and Adhesive    Review of Systems  All systems were  "reviewed     Physical Exam  General appearance: Elderly lady sick looking but in no acute distress  Head and ENT: Normocephalic/atraumatic/supple neck/no JVD  Lungs; CTA  Heart; irregular/irregular rhythm  Abdomen; soft no organomegaly no tenderness  Extremities; no edema  Neurologic; physiologic    Dialysis access= right arm AV fistula with positive bruit and thrill             I&O 24HR    Intake/Output Summary (Last 24 hours) at 2/19/2025 1125  Last data filed at 2/19/2025 0600  Gross per 24 hour   Intake 350 ml   Output --   Net 350 ml       Vitals 24HR  Heart Rate:  [76-86]   Temp:  [36.1 °C (96.9 °F)-37.2 °C (98.9 °F)]   Resp:  [18-20]   BP: (132-155)/(66-74)   Height:  [172 cm (5' 7.72\")]   Weight:  [113 kg (249 lb)-114 kg (251 lb)]   SpO2:  [97 %-100 %]         Relevant Results  Results from last 7 days   Lab Units 02/19/25  0109 02/18/25  2325 02/17/25  0915   WBC AUTO x10*3/uL 11.6* 12.2*  --    HEMOGLOBIN g/dL 6.1* 6.4* 7.3*   HEMATOCRIT % 20.2* 21.2* 23.1*   PLATELETS AUTO x10*3/uL 180 186  --       Results from last 7 days   Lab Units 02/19/25  0109   SODIUM mmol/L 138   POTASSIUM mmol/L 3.9   CHLORIDE mmol/L 95*   CO2 mmol/L 30   BUN mg/dL 70*   CREATININE mg/dL 9.06*   GLUCOSE mg/dL 223*   CALCIUM mg/dL 8.5*    XR ankle left 3+ views    Result Date: 2/18/2025  * * *Final Report* * * DATE OF EXAM: Feb 18 2025 11:14AM   TWX   5298  -  XR ANKLE 3V AP/LAT/OBL LT  / ACCESSION #  [id number] PROCEDURE REASON: Ankle pain, no prior imaging      * * * * Physician Interpretation * * * *  LEFT ANKLE X-RAY   TECHNIQUE: XR ANKLE 3V AP/LAT/OBL LT COMPARISON: 02/17/2025 at 1548 hours INDICATION:  INJURY LEFT ANKLE PAIN LATERAL DISTAL ONE THIRD TIB FIB RESULT: Soft tissue swelling about the ankle again noted.  Ankle mortise and talar dome are intact.  No fracture, dislocation, or osseous erosion. -    IMPRESSION: Soft tissue swelling of the ankle again noted: No significant interval change since 02/17/2025 at 1548 " hours. [facility]   Transcribe Date/Time: Feb 18 2025 11:32A Dictated by : MARLEN WILSON MD This examination was interpreted and the report reviewed and electronically signed by: MARLEN WILSON MD on Feb 18 2025 11:34AM  EST    XR ankle left 3+ views    Result Date: 2/17/2025  * * *Final Report* * * DATE OF EXAM: Feb 17 2025  3:55PM   AKX   5298  -  XR ANKLE 3V AP/LAT/OBL LT  / ACCESSION #  [id number] PROCEDURE REASON: Ankle pain, no prior imaging      * * * * Physician Interpretation * * * *  EXAMINATION:  XR ANKLE 3V AP/LAT/OBL LT CLINICAL HISTORY: Ankle pain, no prior imaging Technique:   XR ANKLE 3V AP/LAT/OBL LT -- LEFT with 3 views on 3 images Comparison: None RESULT: There is no acute fracture or malalignment. There is no significant joint effusion. There is no radiopaque foreign body or soft tissue gas.    IMPRESSION: No acute abnormality. : EMMIE   Transcribe Date/Time: Feb 17 2025  5:11P Dictated by : RAY JARRELL MD This examination was interpreted and the report reviewed and electronically signed by: RAY JARRELL MD on Feb 17 2025  5:12PM  EST       Assessment/Plan   1.  ESKD on hemodialysis on Monday/Wednesday/Friday at [facility] in [place]  Will schedule for hemodialysis today on Wednesday.  2.  Severe anemia with GI evaluation ongoing and history of left kidney neoplasm,  Will continue evaluation and transfusion of packed RBC to keep hemoglobin above 7.  3.  Diabetes mellitus, continue current management  4.  Essential hypertension, continue current management.  5.  We will continue giving SANIA and iron supplementation as needed  6.  Metabolic bone disease secondary CKD we will continue giving phosphate binders and vitamin D products as needed    Will continue evaluation and review BMP and CBC on daily basis with all other consultants input.      Assessment & Plan  Anemia      I spent 54 minutes in the professional and overall care of this patient.      Raghavendra Moreira MD

## 2025-02-19 NOTE — PROGRESS NOTES
Pharmacy Medication History     Source of Information: PATIENT    Additional concerns with the patient's PTA list.   N/A  The following updates were made to the Prior to Admission medication list:     Medications ADDED:   N/A  Medications CHANGED:  N/A  Medications REMOVED:   N/A  Medications NOT TAKING:   N/A    Allergy reviewed : Yes    Meds 2 Beds : No    Outpatient pharmacy confirmed and updated in chart : Yes    [facility]: [facility]    The list below reflectives the updated PTA list. Please review each medication in order reconciliation for additional clarification and justification.    Prior to Admission Medications   Prescriptions Last Dose Informant   Auryxia 210 mg iron tablet Unknown Self   Sig: Take 3 tablets (630 mg) by mouth 3 times a day. With meals. SWALLOW WHOLE, DO NOT CHEW OR CRUSH MEDICATION   Nephro-Gloria 0.8 mg tablet Unknown Self   Sig: Take 1 tablet by mouth once daily.   acetaminophen (Tylenol) 325 mg tablet Unknown Self   Sig: Take 3 tablets (975 mg) by mouth every 8 hours if needed (breakthrough pain).   albuterol 90 mcg/actuation inhaler Unknown Self   Sig: Inhale 1 puff every 4 hours if needed for shortness of breath.   amLODIPine (Norvasc) 5 mg tablet Unknown Self   Sig: Take 1 tablet (5 mg) by mouth once daily.   apixaban (Eliquis) 5 mg tablet Unknown Self   Sig: Take 1 tablet (5 mg) by mouth 2 times a day.   atorvastatin (Lipitor) 40 mg tablet Unknown Self   Sig: Take 1 tablet (40 mg) by mouth once daily.   busPIRone (Buspar) 5 mg tablet Unknown Self   Sig: Take 1 tablet (5 mg) by mouth once daily at bedtime.   cholecalciferol (Vitamin D-3) 25 MCG (1000 UT) tablet Unknown Self   Sig: Take 1 tablet (25 mcg) by mouth once daily.   cinacalcet (Sensipar) 30 mg tablet Unknown Self   Sig: Take 1 tablet (30 mg) by mouth once daily. Take with food or shortly afer a meal. Swallow tablet whole; do not break or divide.   doxepin (SINEquan) 100 mg capsule Unknown Self   Sig: Take 1  capsule (100 mg) by mouth once daily at bedtime.   escitalopram (Lexapro) 10 mg tablet Unknown Self   Sig: Take 1 tablet (10 mg) by mouth once daily.   metoprolol succinate XL (Toprol-XL) 100 mg 24 hr tablet Unknown Self   Sig: Take 1 tablet (100 mg) by mouth once daily in the evening. Do not crush or chew.   torsemide (Demadex) 20 mg tablet Unknown Self   Sig: Take 1 tablet (20 mg) by mouth 2 times a day.      Facility-Administered Medications: None       The list below reflectives the updated allergy list. Please review each documented allergy for additional clarification and justification.    Allergies   Allergen Reactions    Ropinirole Hallucinations    Adhesive Rash          02/19/25 at 12:04 PM - Skyler Arita

## 2025-02-19 NOTE — ASSESSMENT & PLAN NOTE
Acute on chronic anemia.   End-stage renal disease c  -stool for occult blood will likely be positive as she does take iron supplements   - Consult GI  -type cross and transfuse to keep hemoglobin greater than 7.0  -Protonix     End-stage renal disease  -Nephrology consultation, for dialysis        Hypertension  -Amlodipine 5 mg orally daily  -Metoprolol tartrate 50 mg twice daily     Persistent atrial fibrillation  -Continue apixaban 5 mg twice daily     Hyperlipidemia  -Atorvastatin 40 mg orally daily     Depression  -Lexapro 10 mg orally daily     Class II obesity BMI 38  Lifestyle modification     Type 2 diabetes mellitus  Lispro 4 corrective scale  Diabetic diet    L ankle pain with bruising   -CT ankle ordered, may need MRI outpatient

## 2025-02-19 NOTE — PROGRESS NOTES
02/19/25 0740   Financial Resource Strain   How hard is it for you to pay for the very basics like food, housing, medical care, and heating? Not very   Housing Stability   In the last 12 months, was there a time when you were not able to pay the mortgage or rent on time? N   At any time in the past 12 months, were you homeless or living in a shelter (including now)? N   Transportation Needs   In the past 12 months, has lack of transportation kept you from medical appointments or from getting medications? no   In the past 12 months, has lack of transportation kept you from meetings, work, or from getting things needed for daily living? No   Patient Choice   Provider Choice list and CMS website (https://medicare.gov/care-compare#search) for post-acute Quality and Resource Measure Data were provided and reviewed with: Patient     I met with this patient at her bedside she is alertx3 at her baseline she is independent with her ADL's lives at home with her dog, she does use home 02, also uses a Rolator, she gets HD in [place] through [facility] schedule is M/W/F,  she may need transportation home, she is not interested in HHC on discharge, she did verify all information on her face sheet is correct, I will continue to monitor for discharge planing.

## 2025-02-19 NOTE — H&P
History Of Present Illness  Rosa Soto is a 70 y.o. female with a past medical history of persistent atrial fibrillation on apixaban, depression, essential hypertension, GERD, hyperlipidemia, ESRD on hemodialysis neoplasm of the left kidney, type 2 diabetes mellitus, YELENA, chronic anemia she was transferred from [facility] ER where she presented with generalized weakness, fatigue, shortness of breath.  She endorsed that her symptoms have been recently getting progressively worse over the past week.  She had dialysis yesterday and her hemoglobin was 7.3.  She is on apixaban for atrial fibrillation and has recurrent anemia but a definitive source of bleeding was never found.  She had a colonoscopy which showed a colon polyp.  She denies any dark stools or melena.  She does take iron supplements.  She also has left ankle pain.  She says she was getting in the near daughters cardiovascular and hit her ankle on the door frame because she was feeling too weak and fatigued to lift her leg up.  Laboratory evaluation showed hemoglobin from yesterday is 7.3.  She also had an x-ray of the left ankle that did not show any acute fracture.  The note from the ER physician states that she has been complaining of increasing fatigue shortness of breath and a low hemoglobin at dialysis yesterday of 7.3.  She is on apixaban but denies any source of bleeding no melena.  Her hemoglobin today was 6.4 since the patient gets her primary care through  she was transferred to [facility] to receive crossed RBCs.     Past Medical History  Past Medical History:   Diagnosis Date    Arrhythmia     Benign essential HTN     Cancer (Multi)     Chronic kidney disease     ESRD (end stage renal disease) (Multi)         Surgical History  Past Surgical History:   Procedure Laterality Date    CARDIAC CATHETERIZATION N/A 3/6/2024    Procedure: Left Heart Cath;  Surgeon: Carol Johnson MD;  Location: [facility] Cardiac Cath Lab;  Service:  Cardiovascular;  Laterality: N/A;    CARDIAC ELECTROPHYSIOLOGY PROCEDURE N/A 2/29/2024    Procedure: Ablation A-Fib Persistant;  Surgeon: Adam Valentine MD;  Location: [place] [address] Cardiac Cath Lab;  Service: Electrophysiology;  Laterality: N/A;  CARTO/ANY EP LAB  GENERAL ANESTHESIA WHOLE CASE    US GUIDED ABDOMINAL PARACENTESIS  8/31/2023    US GUIDED ABDOMINAL PARACENTESIS 8/31/2023 POR US         Social History  She reports that she quit smoking about 4 years ago. Her smoking use included cigarettes. She has never used smokeless tobacco. She reports that she does not currently use alcohol. She reports that she does not use drugs.    Family History  Problem Relation Age of Onset   Diabetes Other   Hypertension Other      Allergies  Codeine, Ropinirole, and Adhesive    Review of Systems   Constitutional:  Positive for activity change and fatigue.   HENT: Negative.     Eyes: Negative.    Respiratory:  Positive for shortness of breath.    Cardiovascular: Negative.    Gastrointestinal: Negative.    Endocrine: Negative.    Genitourinary: Negative.    Musculoskeletal: Negative.    Skin: Negative.    Allergic/Immunologic: Negative.    Neurological: Negative.    Hematological: Negative.    Psychiatric/Behavioral: Negative.     All other systems reviewed and are negative.       Physical Exam  Vitals and nursing note reviewed.   Constitutional:       Appearance: Normal appearance.   HENT:      Head: Normocephalic.      Right Ear: External ear normal.      Left Ear: External ear normal.      Nose: Nose normal.      Mouth/Throat:      Mouth: Mucous membranes are dry.      Pharynx: Oropharynx is clear.   Eyes:      Extraocular Movements: Extraocular movements intact.      Conjunctiva/sclera: Conjunctivae normal.      Pupils: Pupils are equal, round, and reactive to light.   Cardiovascular:      Rate and Rhythm: Normal rate and regular rhythm.   Pulmonary:      Effort: Pulmonary effort is normal.      Breath sounds: Normal  breath sounds.   Abdominal:      General: Abdomen is flat. Bowel sounds are normal.      Palpations: Abdomen is soft.   Musculoskeletal:         General: Normal range of motion.   Skin:     General: Skin is warm and dry.   Neurological:      General: No focal deficit present.      Mental Status: She is alert. Mental status is at baseline.   Psychiatric:         Mood and Affect: Mood normal.         Behavior: Behavior normal.          Last Recorded Vitals  Blood pressure 147/66, pulse 76, temperature 37.2 °C (98.9 °F), temperature source Oral, resp. rate 20, SpO2 100%.    Relevant Results  Meds:  Scheduled medications  [START ON 2/19/2025] amLODIPine, 5 mg, oral, Daily  apixaban, 5 mg, oral, BID  [START ON 2/19/2025] atorvastatin, 40 mg, oral, Daily  [START ON 2/19/2025] B complex-vitamin C, 1 tablet, oral, Daily  busPIRone, 5 mg, oral, Nightly  [START ON 2/19/2025] cholecalciferol, 1,000 Units, oral, Daily  [START ON 2/19/2025] cinacalcet, 30 mg, oral, Daily  doxepin, 100 mg, oral, Nightly  [START ON 2/19/2025] escitalopram, 10 mg, oral, Daily  metoprolol succinate XL, 100 mg, oral, q PM  [START ON 2/19/2025] pantoprazole, 40 mg, oral, Daily before breakfast   Or  [START ON 2/19/2025] pantoprazole, 40 mg, intravenous, Daily before breakfast  rosuvastatin, 10 mg, oral, Nightly  [START ON 2/19/2025] torsemide, 20 mg, oral, BID      Continuous medications     PRN medications  PRN medications: acetaminophen **OR** acetaminophen **OR** acetaminophen, albuterol, ondansetron **OR** ondansetron   Current Outpatient Medications   Medication Instructions    acetaminophen (TYLENOL) 975 mg, oral, Every 8 hours PRN    albuterol 90 mcg/actuation inhaler 1 puff, inhalation, Every 4 hours PRN    amLODIPine (NORVASC) 5 mg, Daily    apixaban (ELIQUIS) 5 mg, 2 times daily    atorvastatin (LIPITOR) 40 mg, Daily    Auryxia 210 mg iron tablet 3 tablets, 3 times daily    busPIRone (BUSPAR) 5 mg, Nightly    cholecalciferol (Vitamin D-3) 25  MCG (1000 UT) tablet 1 tablet, Daily    cinacalcet (SENSIPAR) 30 mg, Daily    doxepin (SINEQUAN) 100 mg, Nightly    escitalopram (LEXAPRO) 10 mg, Daily    metoprolol succinate XL (TOPROL-XL) 100 mg, oral, Every evening, Do not crush or chew.    Nephro-Gloria 0.8 mg tablet 1 tablet, Daily    torsemide (DEMADEX) 20 mg, 2 times daily        Labs:  No results found for this or any previous visit (from the past 24 hours).   Imaging:  XR ankle left 3+ views    Result Date: 2/18/2025  * * *Final Report* * * DATE OF EXAM: Feb 18 2025 11:14AM   TWX   [id number]  -  XR ANKLE 3V AP/LAT/OBL LT  / ACCESSION #  [id number] PROCEDURE REASON: Ankle pain, no prior imaging      * * * * Physician Interpretation * * * *  LEFT ANKLE X-RAY   TECHNIQUE: XR ANKLE 3V AP/LAT/OBL LT COMPARISON: 02/17/2025 at 1548 hours INDICATION:  INJURY LEFT ANKLE PAIN LATERAL DISTAL ONE THIRD TIB FIB RESULT: Soft tissue swelling about the ankle again noted.  Ankle mortise and talar dome are intact.  No fracture, dislocation, or osseous erosion. -    IMPRESSION: Soft tissue swelling of the ankle again noted: No significant interval change since 02/17/2025 at 1548 hours. : [facility]   Transcribe Date/Time: Feb 18 2025 11:32A Dictated by : MARLEN WILSON MD This examination was interpreted and the report reviewed and electronically signed by: MARLEN WILSON MD on Feb 18 2025 11:34AM  EST    XR ankle left 3+ views    Result Date: 2/17/2025  * * *Final Report* * * DATE OF EXAM: Feb 17 2025  3:55PM   AKX   [id number]  -  XR ANKLE 3V AP/LAT/OBL LT  / ACCESSION #  [id number] PROCEDURE REASON: Ankle pain, no prior imaging      * * * * Physician Interpretation * * * *  EXAMINATION:  XR ANKLE 3V AP/LAT/OBL LT CLINICAL HISTORY: Ankle pain, no prior imaging Technique:   XR ANKLE 3V AP/LAT/OBL LT -- LEFT with 3 views on 3 images Comparison: None RESULT: There is no acute fracture or malalignment. There is no significant joint effusion. There is no radiopaque foreign body or  soft tissue gas.    IMPRESSION: No acute abnormality. : [facility]   Transcribe Date/Time: Feb 17 2025  5:11P Dictated by : RAY JARRELL MD This examination was interpreted and the report reviewed and electronically signed by: RAY JARRELL MD on Feb 17 2025  5:12PM  EST      Assessment/Plan   Acute on chronic anemia.   End-stage renal disease could be contributing factor but you would not expect her hemoglobin to drop this low.  Plan:  Will send stool for occult blood  Consult GI  -type cross and transfuse to keep hemoglobin greater than 7.0  Protonix    End-stage renal disease  Plan:  Nephrology consultation  Renal diet    Hypertension  Plan:  Amlodipine 5 mg orally daily  Metoprolol tartrate 50 mg twice daily    Persistent atrial fibrillation  Plan:  Continue apixaban 5 mg twice daily    Hyperlipidemia  Atorvastatin 40 mg orally daily    Depression  Lexapro 10 mg orally daily    DVT prophylaxis  Covered with apixaban    Class II obesity BMI 38  Lifestyle modification    Type 2 diabetes mellitus  Lispro 4 corrective scale  Diabetic diet    I spent 60 minutes in the professional and overall care of this patient.      Fili Salazar DO                   denies

## 2025-02-20 ENCOUNTER — PHARMACY VISIT (OUTPATIENT)
Dept: PHARMACY | Facility: CLINIC | Age: 70
End: 2025-02-20
Payer: MEDICARE

## 2025-02-20 VITALS
RESPIRATION RATE: 14 BRPM | HEART RATE: 87 BPM | TEMPERATURE: 97.9 F | WEIGHT: 251 LBS | OXYGEN SATURATION: 92 % | HEIGHT: 68 IN | BODY MASS INDEX: 38.04 KG/M2 | DIASTOLIC BLOOD PRESSURE: 52 MMHG | SYSTOLIC BLOOD PRESSURE: 145 MMHG

## 2025-02-20 PROBLEM — D53.9 MACROCYTIC ANEMIA: Status: ACTIVE | Noted: 2025-02-20

## 2025-02-20 LAB
ALBUMIN SERPL BCP-MCNC: 3.3 G/DL (ref 3.4–5)
ALP SERPL-CCNC: 71 U/L (ref 33–136)
ALT SERPL W P-5'-P-CCNC: 16 U/L (ref 7–45)
ANION GAP SERPL CALC-SCNC: 14 MMOL/L (ref 10–20)
AST SERPL W P-5'-P-CCNC: 13 U/L (ref 9–39)
BASO STIPL BLD QL SMEAR: PRESENT
BASOPHILS # BLD AUTO: 0.07 X10*3/UL (ref 0–0.1)
BASOPHILS NFR BLD AUTO: 0.5 %
BILIRUB SERPL-MCNC: 0.7 MG/DL (ref 0–1.2)
BUN SERPL-MCNC: 38 MG/DL (ref 6–23)
CALCIUM SERPL-MCNC: 8.3 MG/DL (ref 8.6–10.3)
CHLORIDE SERPL-SCNC: 98 MMOL/L (ref 98–107)
CO2 SERPL-SCNC: 30 MMOL/L (ref 21–32)
CREAT SERPL-MCNC: 6.32 MG/DL (ref 0.5–1.05)
DAT-POLYSPECIFIC: NORMAL
EGFRCR SERPLBLD CKD-EPI 2021: 7 ML/MIN/1.73M*2
EOSINOPHIL # BLD AUTO: 0.45 X10*3/UL (ref 0–0.7)
EOSINOPHIL NFR BLD AUTO: 3.3 %
ERYTHROCYTE [DISTWIDTH] IN BLOOD BY AUTOMATED COUNT: 21.1 % (ref 11.5–14.5)
FOLATE SERPL-MCNC: 22.2 NG/ML
GLUCOSE SERPL-MCNC: 189 MG/DL (ref 74–99)
HAPTOGLOB SERPL NEPH-MCNC: 177 MG/DL (ref 30–200)
HBV SURFACE AG SERPL QL IA: NONREACTIVE
HCT VFR BLD AUTO: 25.8 % (ref 36–46)
HGB BLD-MCNC: 7.9 G/DL (ref 12–16)
HOLD SPECIMEN: NORMAL
IMM GRANULOCYTES # BLD AUTO: 0.13 X10*3/UL (ref 0–0.7)
IMM GRANULOCYTES NFR BLD AUTO: 0.9 % (ref 0–0.9)
LYMPHOCYTES # BLD AUTO: 1.83 X10*3/UL (ref 1.2–4.8)
LYMPHOCYTES NFR BLD AUTO: 13.3 %
MCH RBC QN AUTO: 32.1 PG (ref 26–34)
MCHC RBC AUTO-ENTMCNC: 30.6 G/DL (ref 32–36)
MCV RBC AUTO: 105 FL (ref 80–100)
MONOCYTES # BLD AUTO: 1.82 X10*3/UL (ref 0.1–1)
MONOCYTES NFR BLD AUTO: 13.3 %
NEUTROPHILS # BLD AUTO: 9.43 X10*3/UL (ref 1.2–7.7)
NEUTROPHILS NFR BLD AUTO: 68.7 %
NRBC BLD-RTO: 0.1 /100 WBCS (ref 0–0)
PLATELET # BLD AUTO: 195 X10*3/UL (ref 150–450)
POLYCHROMASIA BLD QL SMEAR: NORMAL
POTASSIUM SERPL-SCNC: 4.7 MMOL/L (ref 3.5–5.3)
PROT SERPL-MCNC: 5.5 G/DL (ref 6.4–8.2)
PROT SERPL-MCNC: 5.9 G/DL (ref 6.4–8.2)
RBC # BLD AUTO: 2.46 X10*6/UL (ref 4–5.2)
RBC MORPH BLD: NORMAL
SODIUM SERPL-SCNC: 137 MMOL/L (ref 136–145)
TSH SERPL-ACNC: 1.04 MIU/L (ref 0.44–3.98)
VIT B12 SERPL-MCNC: 559 PG/ML (ref 211–911)
WBC # BLD AUTO: 13.7 X10*3/UL (ref 4.4–11.3)

## 2025-02-20 PROCEDURE — 36415 COLL VENOUS BLD VENIPUNCTURE: CPT | Performed by: INTERNAL MEDICINE

## 2025-02-20 PROCEDURE — 82525 ASSAY OF COPPER: CPT | Performed by: NURSE PRACTITIONER

## 2025-02-20 PROCEDURE — 80053 COMPREHEN METABOLIC PANEL: CPT | Performed by: INTERNAL MEDICINE

## 2025-02-20 PROCEDURE — 84443 ASSAY THYROID STIM HORMONE: CPT | Performed by: NURSE PRACTITIONER

## 2025-02-20 PROCEDURE — 85025 COMPLETE CBC W/AUTO DIFF WBC: CPT | Performed by: INTERNAL MEDICINE

## 2025-02-20 PROCEDURE — 86880 COOMBS TEST DIRECT: CPT

## 2025-02-20 PROCEDURE — 2500000002 HC RX 250 W HCPCS SELF ADMINISTERED DRUGS (ALT 637 FOR MEDICARE OP, ALT 636 FOR OP/ED): Performed by: INTERNAL MEDICINE

## 2025-02-20 PROCEDURE — 97165 OT EVAL LOW COMPLEX 30 MIN: CPT | Mod: GO

## 2025-02-20 PROCEDURE — 99232 SBSQ HOSP IP/OBS MODERATE 35: CPT | Performed by: NURSE PRACTITIONER

## 2025-02-20 PROCEDURE — RXMED WILLOW AMBULATORY MEDICATION CHARGE

## 2025-02-20 PROCEDURE — 83090 ASSAY OF HOMOCYSTEINE: CPT | Mod: AHULAB | Performed by: NURSE PRACTITIONER

## 2025-02-20 PROCEDURE — 83921 ORGANIC ACID SINGLE QUANT: CPT | Performed by: NURSE PRACTITIONER

## 2025-02-20 PROCEDURE — 99239 HOSP IP/OBS DSCHRG MGMT >30: CPT | Performed by: INTERNAL MEDICINE

## 2025-02-20 PROCEDURE — 36415 COLL VENOUS BLD VENIPUNCTURE: CPT | Performed by: NURSE PRACTITIONER

## 2025-02-20 PROCEDURE — 81450 HL NEO GSAP 5-50DNA/DNA&RNA: CPT | Performed by: NURSE PRACTITIONER

## 2025-02-20 PROCEDURE — 84630 ASSAY OF ZINC: CPT | Performed by: NURSE PRACTITIONER

## 2025-02-20 PROCEDURE — 2500000001 HC RX 250 WO HCPCS SELF ADMINISTERED DRUGS (ALT 637 FOR MEDICARE OP): Performed by: INTERNAL MEDICINE

## 2025-02-20 RX ORDER — OXYCODONE HYDROCHLORIDE 5 MG/1
5 TABLET ORAL EVERY 6 HOURS PRN
Qty: 15 TABLET | Refills: 0 | Status: SHIPPED | OUTPATIENT
Start: 2025-02-20

## 2025-02-20 RX ADMIN — OXYCODONE HYDROCHLORIDE 10 MG: 5 TABLET ORAL at 11:24

## 2025-02-20 RX ADMIN — ESCITALOPRAM OXALATE 10 MG: 10 TABLET ORAL at 09:08

## 2025-02-20 RX ADMIN — APIXABAN 5 MG: 5 TABLET, FILM COATED ORAL at 09:08

## 2025-02-20 RX ADMIN — Medication 1000 UNITS: at 09:08

## 2025-02-20 RX ADMIN — ATORVASTATIN CALCIUM 40 MG: 40 TABLET, FILM COATED ORAL at 09:08

## 2025-02-20 RX ADMIN — METOPROLOL SUCCINATE 100 MG: 50 TABLET, EXTENDED RELEASE ORAL at 09:23

## 2025-02-20 RX ADMIN — Medication 1 TABLET: at 09:08

## 2025-02-20 RX ADMIN — TORSEMIDE 20 MG: 20 TABLET ORAL at 09:23

## 2025-02-20 RX ADMIN — OXYCODONE HYDROCHLORIDE 10 MG: 5 TABLET ORAL at 02:26

## 2025-02-20 RX ADMIN — PANTOPRAZOLE SODIUM 40 MG: 40 TABLET, DELAYED RELEASE ORAL at 06:23

## 2025-02-20 RX ADMIN — CINACALCET HYDROCHLORIDE 30 MG: 30 TABLET, FILM COATED ORAL at 09:20

## 2025-02-20 ASSESSMENT — PAIN SCALES - GENERAL
PAINLEVEL_OUTOF10: 3
PAINLEVEL_OUTOF10: 8
PAINLEVEL_OUTOF10: 10 - WORST POSSIBLE PAIN
PAINLEVEL_OUTOF10: 0 - NO PAIN
PAINLEVEL_OUTOF10: 3
PAINLEVEL_OUTOF10: 5 - MODERATE PAIN

## 2025-02-20 ASSESSMENT — PAIN DESCRIPTION - ORIENTATION
ORIENTATION: LEFT
ORIENTATION: LEFT

## 2025-02-20 ASSESSMENT — ACTIVITIES OF DAILY LIVING (ADL)
ADL_ASSISTANCE: INDEPENDENT
BATHING_ASSISTANCE: MINIMAL

## 2025-02-20 ASSESSMENT — COGNITIVE AND FUNCTIONAL STATUS - GENERAL
HELP NEEDED FOR BATHING: A LITTLE
DRESSING REGULAR LOWER BODY CLOTHING: A LITTLE
DAILY ACTIVITIY SCORE: 19
DRESSING REGULAR UPPER BODY CLOTHING: A LITTLE
TOILETING: A LITTLE
PERSONAL GROOMING: A LITTLE

## 2025-02-20 ASSESSMENT — PAIN DESCRIPTION - LOCATION
LOCATION: ANKLE
LOCATION: ANKLE

## 2025-02-20 ASSESSMENT — PAIN - FUNCTIONAL ASSESSMENT
PAIN_FUNCTIONAL_ASSESSMENT: 0-10
PAIN_FUNCTIONAL_ASSESSMENT: 0-10
PAIN_FUNCTIONAL_ASSESSMENT: NO/DENIES PAIN
PAIN_FUNCTIONAL_ASSESSMENT: 0-10
PAIN_FUNCTIONAL_ASSESSMENT: 0-10

## 2025-02-20 ASSESSMENT — PAIN SCALES - WONG BAKER: WONGBAKER_NUMERICALRESPONSE: HURTS WHOLE LOT

## 2025-02-20 NOTE — PROGRESS NOTES
Rosa Soto is a 70 y.o. female on day 2 of admission presenting with Anemia.      Subjective   Doing better no new complaints       Objective        Vitals 24HR  Heart Rate:  [77-87]   Temp:  [36.5 °C (97.7 °F)-37.4 °C (99.3 °F)]   Resp:  [14-20]   BP: (109-156)/(50-70)   SpO2:  [91 %-98 %]     Intake/Output last 3 Shifts:    Intake/Output Summary (Last 24 hours) at 2/20/2025 1054  Last data filed at 2/20/2025 0204  Gross per 24 hour   Intake 2440 ml   Output 3000 ml   Net -560 ml       Physical Exam        General appearance: Elderly lady sick looking but in no acute distress  Head and ENT: Normocephalic/atraumatic/supple neck/no JVD  Lungs; CTA  Heart; irregular/irregular rhythm  Abdomen; soft no organomegaly no tenderness  Extremities; no edema  Neurologic; physiologic     Dialysis access= right arm AV fistula with positive bruit and thrill           Relevant Results     Results from last 7 days   Lab Units 02/20/25  0746 02/19/25  1218 02/19/25  0109   WBC AUTO x10*3/uL 13.7* 13.0* 11.6*   HEMOGLOBIN g/dL 7.9* 6.6* 6.1*   HEMATOCRIT % 25.8* 21.8* 20.2*   PLATELETS AUTO x10*3/uL 195 192 180      Results from last 7 days   Lab Units 02/20/25  0746 02/19/25  0109   SODIUM mmol/L 137 138   POTASSIUM mmol/L 4.7 3.9   CHLORIDE mmol/L 98 95*   CO2 mmol/L 30 30   BUN mg/dL 38* 70*   CREATININE mg/dL 6.32* 9.06*   GLUCOSE mg/dL 189* 223*   CALCIUM mg/dL 8.3* 8.5*        Current Facility-Administered Medications:     acetaminophen (Tylenol) tablet 650 mg, 650 mg, oral, q4h PRN, 650 mg at 02/19/25 2211 **OR** acetaminophen (Tylenol) oral liquid 650 mg, 650 mg, oral, q4h PRN **OR** acetaminophen (Tylenol) suppository 650 mg, 650 mg, rectal, q4h PRN, Fili G Eduardoe,     albuterol 2.5 mg /3 mL (0.083 %) nebulizer solution 2.5 mg, 2.5 mg, nebulization, q2h PRN, Fili G Salomone, DO    amLODIPine (Norvasc) tablet 5 mg, 5 mg, oral, Daily, Fili G Salpaulinee, DO, 5 mg at 02/19/25 1007    apixaban (Eliquis) tablet 5 mg, 5 mg,  oral, BID, Fili ARITA Salomone, DO, 5 mg at 02/20/25 0908    atorvastatin (Lipitor) tablet 40 mg, 40 mg, oral, Daily, Fili ARITA Salomone, DO, 40 mg at 02/20/25 0908    B complex-vitamin C tablet 1 tablet, 1 tablet, oral, Daily, Fili ARITA Salomone, DO, 1 tablet at 02/20/25 0908    busPIRone (Buspar) tablet 5 mg, 5 mg, oral, Nightly, Fili ARITA Salomone, DO, 5 mg at 02/19/25 2211    cholecalciferol (Vitamin D-3) tablet 1,000 Units, 1,000 Units, oral, Daily, Fili ARITA Salomone, DO, 1,000 Units at 02/20/25 0908    cinacalcet (Sensipar) tablet 30 mg, 30 mg, oral, Daily, Fili ARITA Salomone, DO, 30 mg at 02/20/25 0920    doxepin (SINEquan) capsule 100 mg, 100 mg, oral, Nightly, Fili ARITA Salomone, DO, 100 mg at 02/19/25 2211    escitalopram (Lexapro) tablet 10 mg, 10 mg, oral, Daily, Fili ARITA Salomone, DO, 10 mg at 02/20/25 0908    metoprolol succinate XL (Toprol-XL) 24 hr tablet 100 mg, 100 mg, oral, Daily, Fili ARITA Salomone, DO, 100 mg at 02/20/25 0923    ondansetron (Zofran) tablet 4 mg, 4 mg, oral, q8h PRN **OR** ondansetron (Zofran) injection 4 mg, 4 mg, intravenous, q8h PRN, Fili ARITA Salomone, DO    oxyCODONE (Roxicodone) immediate release tablet 10 mg, 10 mg, oral, q6h PRN, Fili ARITA Salomone, DO, 10 mg at 02/20/25 0226    oxyCODONE (Roxicodone) immediate release tablet 5 mg, 5 mg, oral, q6h PRN, Fili ARITA Salomone, DO, 5 mg at 02/18/25 2313    pantoprazole (ProtoNix) EC tablet 40 mg, 40 mg, oral, Daily before breakfast, 40 mg at 02/20/25 0623 **OR** pantoprazole (ProtoNix) injection 40 mg, 40 mg, intravenous, Daily before breakfast, Fili Salazar DO    torsemide (Demadex) tablet 20 mg, 20 mg, oral, BID, Fili Salazar DO, 20 mg at 02/20/25 0923           Assessment/Plan              1.  ESKD on hemodialysis on Monday/Wednesday/Friday at [facility] in [place]  Will schedule for hemodialysis tomorrow on Friday.  2.  Severe anemia with GI evaluation ongoing and history of left kidney neoplasm,  Will continue evaluation and transfusion of packed RBC  to keep hemoglobin above 7.  3.  Diabetes mellitus, continue current management  4.  Essential hypertension, continue current management.  5.  We will continue giving SANIA and iron supplementation as needed  6.  Metabolic bone disease secondary CKD we will continue giving phosphate binders and vitamin D products as needed     Will continue evaluation and review BMP and CBC on daily basis with all other consultants input.           Assessment & Plan  Anemia    ESRD (end stage renal disease) (Multi)    ESRD with anemia (Multi)              I spent 35 minutes in the professional and overall care of this patient.      Raghavendra Moreira MD

## 2025-02-20 NOTE — PROGRESS NOTES
"Rosa Soto is a 70 y.o. female on day 2 of admission presenting with Anemia.    Subjective   \"My ankle feels a little better today\"       Objective     Physical Exam  Vitals and nursing note reviewed.   Constitutional:       General: She is not in acute distress.     Appearance: She is obese. She is ill-appearing.   HENT:      Head: Normocephalic and atraumatic.      Nose: Nose normal.   Eyes:      General: No scleral icterus.  Cardiovascular:      Rate and Rhythm: Normal rate.      Pulses: Normal pulses.   Pulmonary:      Effort: No respiratory distress.   Abdominal:      Palpations: Abdomen is soft.      Tenderness: There is no abdominal tenderness.   Musculoskeletal:         General: Tenderness and signs of injury present. No deformity.      Comments: Left lateral ankle swelling and ecchymosis   Lymphadenopathy:      Cervical: No cervical adenopathy.   Skin:     General: Skin is warm and dry.      Findings: Bruising present. No rash.   Neurological:      Mental Status: She is alert and oriented to person, place, and time.      Motor: Weakness present.         Last Recorded Vitals  Blood pressure 115/50, pulse 87, temperature 36.7 °C (98.1 °F), temperature source Temporal, resp. rate 14, height 1.72 m (5' 7.72\"), weight 114 kg (251 lb), SpO2 92%.  Intake/Output last 3 Shifts:  I/O last 3 completed shifts:  In: 3150 (27.7 mL/kg) [P.O.:1200; I.V.:1200 (10.5 mL/kg); Blood:350; Other:400]  Out: 3000 (26.4 mL/kg) [Other:3000]  Weight: 113.9 kg     Relevant Results  Results for orders placed or performed during the hospital encounter of 02/18/25 (from the past 96 hours)   Prepare RBC: 1 Units   Result Value Ref Range    PRODUCT CODE [id number]     Unit Number [id number]     Unit ABO A     Unit RH POS     XM INTEP COMP     Dispense Status TR     Blood Expiration Date 3/3/2025 11:59:00 PM EST     PRODUCT BLOOD TYPE 6200     UNIT VOLUME 350    Type and screen   Result Value Ref Range    ABO TYPE A     Rh TYPE POS     " ANTIBODY SCREEN NEG    CBC   Result Value Ref Range    WBC 12.2 (H) 4.4 - 11.3 x10*3/uL    nRBC 0.0 0.0 - 0.0 /100 WBCs    RBC 1.94 (L) 4.00 - 5.20 x10*6/uL    Hemoglobin 6.4 (LL) 12.0 - 16.0 g/dL    Hematocrit 21.2 (L) 36.0 - 46.0 %     (H) 80 - 100 fL    MCH 33.0 26.0 - 34.0 pg    MCHC 30.2 (L) 32.0 - 36.0 g/dL    RDW 17.3 (H) 11.5 - 14.5 %    Platelets 186 150 - 450 x10*3/uL   Comprehensive Metabolic Panel   Result Value Ref Range    Glucose 223 (H) 74 - 99 mg/dL    Sodium 138 136 - 145 mmol/L    Potassium 3.9 3.5 - 5.3 mmol/L    Chloride 95 (L) 98 - 107 mmol/L    Bicarbonate 30 21 - 32 mmol/L    Anion Gap 17 10 - 20 mmol/L    Urea Nitrogen 70 (H) 6 - 23 mg/dL    Creatinine 9.06 (H) 0.50 - 1.05 mg/dL    eGFR 4 (L) >60 mL/min/1.73m*2    Calcium 8.5 (L) 8.6 - 10.3 mg/dL    Albumin 3.2 (L) 3.4 - 5.0 g/dL    Alkaline Phosphatase 62 33 - 136 U/L    Total Protein 5.4 (L) 6.4 - 8.2 g/dL    AST 10 9 - 39 U/L    Bilirubin, Total 0.4 0.0 - 1.2 mg/dL    ALT 17 7 - 45 U/L   CBC and Auto Differential   Result Value Ref Range    WBC 11.6 (H) 4.4 - 11.3 x10*3/uL    nRBC 0.0 0.0 - 0.0 /100 WBCs    RBC 1.86 (L) 4.00 - 5.20 x10*6/uL    Hemoglobin 6.1 (LL) 12.0 - 16.0 g/dL    Hematocrit 20.2 (L) 36.0 - 46.0 %     (H) 80 - 100 fL    MCH 32.8 26.0 - 34.0 pg    MCHC 30.2 (L) 32.0 - 36.0 g/dL    RDW 17.1 (H) 11.5 - 14.5 %    Platelets 180 150 - 450 x10*3/uL    Neutrophils % 67.1 40.0 - 80.0 %    Immature Granulocytes %, Automated 1.0 (H) 0.0 - 0.9 %    Lymphocytes % 13.9 13.0 - 44.0 %    Monocytes % 12.7 2.0 - 10.0 %    Eosinophils % 4.9 0.0 - 6.0 %    Basophils % 0.4 0.0 - 2.0 %    Neutrophils Absolute 7.78 (H) 1.20 - 7.70 x10*3/uL    Immature Granulocytes Absolute, Automated 0.12 0.00 - 0.70 x10*3/uL    Lymphocytes Absolute 1.62 1.20 - 4.80 x10*3/uL    Monocytes Absolute 1.48 (H) 0.10 - 1.00 x10*3/uL    Eosinophils Absolute 0.57 0.00 - 0.70 x10*3/uL    Basophils Absolute 0.05 0.00 - 0.10 x10*3/uL   Lavender Top   Result  Value Ref Range    Extra Tube Hold for add-ons.    Iron and TIBC   Result Value Ref Range    Iron 59 35 - 150 ug/dL    UIBC 172 110 - 370 ug/dL    TIBC 231 (L) 240 - 445 ug/dL    % Saturation 26 25 - 45 %   Ferritin   Result Value Ref Range    Ferritin 424 (H) 8 - 150 ng/mL   Lactate Dehydrogenase   Result Value Ref Range     84 - 246 U/L   Reticulocytes   Result Value Ref Range    Retic % 8.2 (H) 0.5 - 2.0 %    Retic Absolute 0.155 (H) 0.017 - 0.110 x10*6/uL    Reticulocyte Hemoglobin 30 28 - 38 pg    Immature Retic fraction 23.9 (H) <=16.0 %   VERIFY ABO/Rh Group Test   Result Value Ref Range    ABO TYPE A     Rh TYPE POS    SST TOP   Result Value Ref Range    Extra Tube Hold for add-ons.    Vitamin B12   Result Value Ref Range    Vitamin B12 559 211 - 911 pg/mL   Folate   Result Value Ref Range    Folate, Serum 22.2 >5.0 ng/mL   Protein, Total   Result Value Ref Range    Total Protein 5.5 (L) 6.4 - 8.2 g/dL   CBC   Result Value Ref Range    WBC 13.0 (H) 4.4 - 11.3 x10*3/uL    nRBC 0.0 0.0 - 0.0 /100 WBCs    RBC 2.07 (L) 4.00 - 5.20 x10*6/uL    Hemoglobin 6.6 (L) 12.0 - 16.0 g/dL    Hematocrit 21.8 (L) 36.0 - 46.0 %     (H) 80 - 100 fL    MCH 31.9 26.0 - 34.0 pg    MCHC 30.3 (L) 32.0 - 36.0 g/dL    RDW 19.9 (H) 11.5 - 14.5 %    Platelets 192 150 - 450 x10*3/uL   Prepare RBC: 1 Units   Result Value Ref Range    PRODUCT CODE [id number]     Unit Number [id number]     Unit ABO A     Unit RH POS     XM INTEP COMP     Dispense Status IS     Blood Expiration Date 3/7/2025 11:59:00 PM EST     PRODUCT BLOOD TYPE 6200     UNIT VOLUME 289    Hepatitis B surface antigen   Result Value Ref Range    Hepatitis B Surface AG Nonreactive Nonreactive   Comprehensive Metabolic Panel   Result Value Ref Range    Glucose 189 (H) 74 - 99 mg/dL    Sodium 137 136 - 145 mmol/L    Potassium 4.7 3.5 - 5.3 mmol/L    Chloride 98 98 - 107 mmol/L    Bicarbonate 30 21 - 32 mmol/L    Anion Gap 14 10 - 20 mmol/L    Urea Nitrogen 38  (H) 6 - 23 mg/dL    Creatinine 6.32 (H) 0.50 - 1.05 mg/dL    eGFR 7 (L) >60 mL/min/1.73m*2    Calcium 8.3 (L) 8.6 - 10.3 mg/dL    Albumin 3.3 (L) 3.4 - 5.0 g/dL    Alkaline Phosphatase 71 33 - 136 U/L    Total Protein 5.9 (L) 6.4 - 8.2 g/dL    AST 13 9 - 39 U/L    Bilirubin, Total 0.7 0.0 - 1.2 mg/dL    ALT 16 7 - 45 U/L   CBC and Auto Differential   Result Value Ref Range    WBC 13.7 (H) 4.4 - 11.3 x10*3/uL    nRBC 0.1 (H) 0.0 - 0.0 /100 WBCs    RBC 2.46 (L) 4.00 - 5.20 x10*6/uL    Hemoglobin 7.9 (L) 12.0 - 16.0 g/dL    Hematocrit 25.8 (L) 36.0 - 46.0 %     (H) 80 - 100 fL    MCH 32.1 26.0 - 34.0 pg    MCHC 30.6 (L) 32.0 - 36.0 g/dL    RDW 21.1 (H) 11.5 - 14.5 %    Platelets 195 150 - 450 x10*3/uL    Neutrophils % 68.7 40.0 - 80.0 %    Immature Granulocytes %, Automated 0.9 0.0 - 0.9 %    Lymphocytes % 13.3 13.0 - 44.0 %    Monocytes % 13.3 2.0 - 10.0 %    Eosinophils % 3.3 0.0 - 6.0 %    Basophils % 0.5 0.0 - 2.0 %    Neutrophils Absolute 9.43 (H) 1.20 - 7.70 x10*3/uL    Immature Granulocytes Absolute, Automated 0.13 0.00 - 0.70 x10*3/uL    Lymphocytes Absolute 1.83 1.20 - 4.80 x10*3/uL    Monocytes Absolute 1.82 (H) 0.10 - 1.00 x10*3/uL    Eosinophils Absolute 0.45 0.00 - 0.70 x10*3/uL    Basophils Absolute 0.07 0.00 - 0.10 x10*3/uL   Morphology   Result Value Ref Range    RBC Morphology See Below     Polychromasia Mild     Basophilic Stippling Present            Assessment/Plan   Assessment & Plan  Anemia    ESRD (end stage renal disease) (Multi)    ESRD with anemia (Multi)    Macrocytic anemia    Reason for consult: Anemia  Assessment/Recommendations:   Acute on chronic macrocytic anemia  - on iron and SANIA for CKD managed at dialysis center  - repeat B12 559 and folate 22.2  - MMA and homocystine ordered to further evaluate B12 deficiency   - SPEP (P)  - LDH normal retic borderline response, haptoglobin normal  - recent EGD colonoscopy, no bleeding  - no other cytopenias     chronic neutrophil  predominant leukocytosis ANC 9.43 AMC 1.82  - BCR-ABL FISH  - NGS myeloid panel  - will need bone marrow biopsy if work up does not explain the anemia  - recommend IR obtain BMBx given likely difficult access iso obesity and difficulty positioning  - recommend f/up with hematology  - will be referred to malignant hematology if concerning mutations are noted    ECOG score 3-4     Discussed with Dr. Majano    I spent 45 minutes in the professional and overall care of this patient.      Francy Dinh, MATTHIAS-CNP

## 2025-02-20 NOTE — DISCHARGE SUMMARY
Discharge Diagnosis  Anemia    Issues Requiring Follow-Up  PCP follow-up  Hematology follow-up     Discharge Meds     Your medication list        START taking these medications        Instructions Last Dose Given Next Dose Due   oxyCODONE 5 mg immediate release tablet  Commonly known as: Roxicodone      Take 1 tablet (5 mg) by mouth every 6 hours if needed for severe pain (7 - 10).              CONTINUE taking these medications        Instructions Last Dose Given Next Dose Due   acetaminophen 325 mg tablet  Commonly known as: Tylenol      Take 3 tablets (975 mg) by mouth every 8 hours if needed (breakthrough pain).       albuterol 90 mcg/actuation inhaler      Inhale 1 puff every 4 hours if needed for shortness of breath.       amLODIPine 5 mg tablet  Commonly known as: Norvasc           atorvastatin 40 mg tablet  Commonly known as: Lipitor           Auryxia 210 mg iron tablet  Generic drug: ferric citrate           busPIRone 5 mg tablet  Commonly known as: Buspar           cholecalciferol 25 mcg (1000 units) tablet  Commonly known as: Vitamin D-3           cinacalcet 30 mg tablet  Commonly known as: Sensipar           doxepin 100 mg capsule  Commonly known as: SINEquan           Eliquis 5 mg tablet  Generic drug: apixaban           escitalopram 10 mg tablet  Commonly known as: Lexapro           metoprolol succinate  mg 24 hr tablet  Commonly known as: Toprol-XL      Take 1 tablet (100 mg) by mouth once daily in the evening. Do not crush or chew.       Nephro-Gloria 0.8 mg tablet  Generic drug: B complex-vitamin C-folic acid           torsemide 20 mg tablet  Commonly known as: Demadex                     Where to Get Your Medications        These medications were sent to [facility]  [address], [place] [address]      Hours: 8 AM to 6 PM Mon-Fri, 9 AM to 1 PM Saturday Phone: [phone]   oxyCODONE 5 mg immediate release tablet         Test Results Pending At Discharge  Pending Labs  "      Order Current Status    TSH with reflex to Free T4 if abnormal In process            Procedures       Hospital Course   Rosa was admitted to hospital with acute on chronic anemia.  She does have ESRD and gets dialysis.  She did have a colonoscopy and EGD back in December of last year that did not show any significant signs of bleeding.  She did receive 2 units of PRBCs here in the hospital and her hemoglobin did come up.  She was also evaluated by the hematology group and will need to continue following up outpatient.  At this time she is otherwise hemodynamically stable appropriate for discharge.  Of note she did have some bruising on her left ankle.  I did get a CT scan to see if there is any dislocation or fracture which was negative.  This plan was discussed with her and she is in agreement.  All questions were answered.    Blood pressure (!) 121/44, pulse 87, temperature 36.9 °C (98.4 °F), temperature source Temporal, resp. rate 14, height 1.72 m (5' 7.72\"), weight 114 kg (251 lb), SpO2 92%.  Pertinent Physical Exam At Time of Discharge  Physical Exam  Vitals and nursing note reviewed.   Constitutional:       General: She is not in acute distress.     Appearance: Normal appearance. She is obese.   HENT:      Head: Normocephalic and atraumatic.   Cardiovascular:      Rate and Rhythm: Normal rate and regular rhythm.      Heart sounds: Normal heart sounds.   Pulmonary:      Effort: Pulmonary effort is normal. No respiratory distress.      Breath sounds: Normal breath sounds. No wheezing.   Abdominal:      General: Abdomen is flat. Bowel sounds are normal. There is no distension.      Palpations: Abdomen is soft.      Tenderness: There is no abdominal tenderness.   Musculoskeletal:         General: No swelling or tenderness. Normal range of motion.      Comments: Bruising on the lateral L ankle very TTP    Skin:     General: Skin is warm and dry.      Capillary Refill: Capillary refill takes less than 2 " seconds.   Neurological:      General: No focal deficit present.      Mental Status: She is alert and oriented to person, place, and time.   Psychiatric:         Mood and Affect: Mood normal.         Outpatient Follow-Up  Future Appointments   Date Time Provider Department Center   7/17/2025  3:00 PM MATTHIAS Love-CNP [id number]         Jason Flynn DO FACP     Time spent during this discharge: >30 min

## 2025-02-20 NOTE — CARE PLAN
The patient's goals for the shift include      The clinical goals for the shift include Patient will remain free of any pain to her lower extremities

## 2025-02-20 NOTE — PROGRESS NOTES
Occupational Therapy    Evaluation    Patient Name: Rosa Soto  MRN: [id number]  Department: [facility] DIALYSIS  [facility]  Today's Date: 2/20/2025  Time Calculation  Start Time: 1350  Stop Time: 1405  Time Calculation (min): 15 min    Assessment  IP OT Assessment  OT Assessment:  (OT Eval complete, patient with decreased ADLs, decreased transfers, decreased activity tolerance. Patient would benefit from LOW intensity therapy to maximize functional independence.)  Prognosis: Good  Barriers to Discharge Home: No anticipated barriers  Evaluation/Treatment Tolerance: Patient limited by fatigue  Medical Staff Made Aware: Yes  End of Session Communication: Bedside nurse  End of Session Patient Position: Bed, 3 rail up, Alarm on  Plan:  Treatment Interventions: ADL retraining, Functional transfer training, Endurance training, Patient/family training, Neuromuscular reeducation  OT Frequency: 3 times per week  OT Discharge Recommendations: Low intensity level of continued care  Equipment Recommended upon Discharge: Wheeled walker  OT Recommended Transfer Status: Stand by assist  OT - OK to Discharge: Yes (Per POC)    Subjective   Current Problem:  1. Anemia  Referral To Hematology and Oncology      2. Acute left ankle pain  oxyCODONE (Roxicodone) 5 mg immediate release tablet        General:  General  Reason for Referral:  (Anemia, Generalized weakness)  Referred By:  (Rina CALHOUN)  OT Missed Visit: Yes  Missed Visit Reason:  (Referral received, chart reviewed, however patient continues to have low H & H of 6.6 and still requires another unit of blood after already receiving a unit this AM. RN aware of reason for medical hold.)  Family/Caregiver Present: No  Prior to Session Communication: Bedside nurse  Patient Position Received: Bed, 3 rail up, Alarm on  General Comment:  (Patient is pleasant and cooperative and motivated to regain prior level of independence.)  Precautions:  Medical Precautions: Fall  precautions    Pain:  Pain Assessment  Pain Assessment: 0-10  0-10 (Numeric) Pain Score: 5 - Moderate pain  Pain Type: Acute pain  Pain Location: Ankle (Shoulder)  Pain Orientation: Left    Objective   Cognition:  Overall Cognitive Status: Within Functional Limits  Orientation Level: Oriented X4     Home Living:  Type of Home:  (1st floor Apartment, no steps, bed/bath on 1st floor, tub/shower combo, std ht toilet.)  Lives With: Alone  Home Adaptive Equipment:  (Rollator)   Prior Function:  Level of [place]: Independent with ADLs and functional transfers  Receives Help From:  (Limited family support)  ADL Assistance: Independent  Homemaking Assistance: Independent (Cleans apt, [employer] for meals.)  Ambulatory Assistance: Independent (with rollator)  Hand Dominance: Right  IADL History:  Homemaking Responsibilities: Yes  Meal Prep Responsibility: Secondary ([employer])  Laundry Responsibility: Primary  ADL:  Eating Assistance: Independent  Grooming Assistance: Stand by  Bathing Assistance: Minimal (Assist to bathe L LE/foot, limited functional reach)  UE Dressing Assistance: Stand by (hospital gown around shoulders.)  LE Dressing Assistance: Moderate (Assist to don/doff L Sock able to complete R sock)  Toileting Assistance with Device: Stand by  Functional Assistance: Minimal  Activity Tolerance:  Endurance: Tolerates 10 - 20 min exercise with multiple rests  Activity Tolerance Comments:  (Fair activity tolerance, patient requires rest periods.)  Bed Mobility/Transfers: Bed Mobility  Bed Mobility: Yes  Bed Mobility 1  Bed Mobility 1: Supine to sitting  Level of Assistance 1: Contact guard  Bed Mobility Comments 1:  (HOB Elevated)  Bed Mobility 2  Bed Mobility  2: Sitting to supine  Level of Assistance 2: Contact guard  Bed Mobility Comments 2:  (HOB Elevated)    Transfers  Transfer: Yes  Transfer 1  Transfer From 1: Bed to  Transfer to 1: Toilet  Technique 1: Sit to stand  Transfer Device 1: Walker  Transfer Level  of Assistance 1: Contact guard  Trials/Comments 1:  (Cues for hand placement and sequencing.)  Transfers 2  Transfer From 2: Toilet to  Transfer to 2: Bed  Technique 2: Sit to stand  Transfer Device 2: Walker  Transfer Level of Assistance 2: Contact guard  Trials/Comments 2:  (Cues for hand placement)    Sitting Balance:  Static Sitting Balance  Static Sitting-Balance Support: Feet supported  Static Sitting-Level of Assistance: Contact guard  Dynamic Sitting Balance  Dynamic Sitting-Balance Support: Feet supported  Dynamic Sitting-Level of Assistance: Contact guard  Dynamic Sitting-Balance: Reaching for objects  Standing Balance:  Static Standing Balance  Static Standing-Balance Support: Bilateral upper extremity supported  Static Standing-Level of Assistance: Contact guard  Dynamic Standing Balance  Dynamic Standing-Balance Support: Bilateral upper extremity supported  Dynamic Standing-Level of Assistance: Contact guard  Dynamic Standing-Balance: Reaching for objects     IADL's:   Homemaking Responsibilities: Yes  Meal Prep Responsibility: Secondary ([employer])  Laundry Responsibility: Primary  Vision: Vision - Basic Assessment  Current Vision: Wears glasses all the time  Sensation:  Light Touch: No apparent deficits  Strength:  Strength Comments:  (B UEs- 4/5 throughout)  Coordination:  Movements are Fluid and Coordinated: Yes   Hand Function:  Hand Function  Gross Grasp: Functional  Coordination: Functional    Outcome Measures: [facility] Daily Activity  Putting on and taking off regular lower body clothing: A little  Bathing (including washing, rinsing, drying): A little  Putting on and taking off regular upper body clothing: A little  Toileting, which includes using toilet, bedpan or urinal: A little  Taking care of personal grooming such as brushing teeth: A little  Eating Meals: None  Daily Activity - Total Score: 19    Goals:   Encounter Problems       Encounter Problems (Active)       ADLs       Patient will  perform UB and LB bathing  with independent level of assistance.        Start:  02/20/25    Expected End:  03/06/25            Patient with complete upper body dressing with independent level of assistance donning and doffing all UE clothes with no adaptive equipment while edge of bed        Start:  02/20/25    Expected End:  03/06/25            Patient with complete lower body dressing with independent level of assistance donning and doffing all LE clothes  with  adaptive equipment while edge of bed        Start:  02/20/25    Expected End:  03/06/25            Patient will complete daily grooming tasks brushing teeth and washing face/hair with independent level of assistance and PRN adaptive equipment while standing.       Start:  02/20/25    Expected End:  03/06/25            Patient will complete toileting including hygiene clothing management/hygiene with independent level of assistance and raised toilet seat.       Start:  02/20/25    Expected End:  03/06/25               BALANCE       Pt will maintain dynamic standing balance during ADL task with independent level of assistance in order to demonstrate decreased risk of falling and improved postural control.       Start:  02/20/25    Expected End:  03/06/25               TRANSFERS       Patient will perform bed mobility independent level of assistance and bed rails in order to improve safety and independence with mobility       Start:  02/20/25    Expected End:  03/06/25            Patient will complete functional transfer to all surfaces with front wheeled walker with modified independent level of assistance.       Start:  02/20/25    Expected End:  03/06/25

## 2025-02-20 NOTE — POST-PROCEDURE NOTE
Report to Receiving RN:    Report To: REINA Mukherjee at bedside  Time Report Called: now post HD  Hand-Off Communication: tx details, fluid removal, patient vitals, and status  Complications During Treatment: No  Ultrafiltration Treatment: Yes, 3.0kg removed  Medications Administered During Dialysis: No  Blood Products Administered During Dialysis: No  Labs Sent During Dialysis: Yes, hep B AG  Heparin Drip Rate Changes: No  Dialysis Catheter Dressing: n/a  Last Dressing Change: n/a    MARIELLE Boston

## 2025-02-20 NOTE — PROGRESS NOTES
Physical Therapy                 Therapy Communication Note    Patient Name: Rosa Soto  MRN: [id number]  Department: [facility] DIALYSIS  Room: 503/503-A  Today's Date: 2/20/2025     Discipline: Physical Therapy    PT Missed Visit: Yes     Missed Visit Reason: Missed Visit Reason:  (Per discussion with MD, pt will be discharging shortly. No acute PT needs prior to discharge.)    Missed Time: Attempt    Comment:

## 2025-02-20 NOTE — CARE PLAN
The patient's goals for the shift include      The clinical goals for the shift include Pt will remain free of injury this shift    Over the shift, the patient did make progress toward the following goals.  Problem: Pain - Adult  Goal: Verbalizes/displays adequate comfort level or baseline comfort level  Outcome: Progressing     Problem: Safety - Adult  Goal: Free from fall injury  Outcome: Progressing     Problem: Discharge Planning  Goal: Discharge to home or other facility with appropriate resources  Outcome: Progressing     Problem: Chronic Conditions and Co-morbidities  Goal: Patient's chronic conditions and co-morbidity symptoms are monitored and maintained or improved  Outcome: Progressing     Problem: Nutrition  Goal: Nutrient intake appropriate for maintaining nutritional needs  Outcome: Progressing     Problem: Respiratory  Goal: Verbalize decreased shortness of breath this shift  Outcome: Progressing  Goal: Wean oxygen to maintain O2 saturation per order/standard this shift  Outcome: Progressing     Problem: Fall/Injury  Goal: Not fall by end of shift  Outcome: Progressing  Goal: Be free from injury by end of the shift  Outcome: Progressing  Goal: Verbalize understanding of personal risk factors for fall in the hospital  Outcome: Progressing  Goal: Verbalize understanding of risk factor reduction measures to prevent injury from fall in the home  Outcome: Progressing  Goal: Use assistive devices by end of the shift  Outcome: Progressing  Goal: Pace activities to prevent fatigue by end of the shift  Outcome: Progressing     Problem: Skin  Goal: Participates in plan/prevention/treatment measures  Outcome: Progressing  Goal: Prevent/manage excess moisture  Outcome: Progressing  Goal: Prevent/minimize sheer/friction injuries  Outcome: Progressing

## 2025-02-20 NOTE — PRE-PROCEDURE NOTE
Report from Sending RN:    Report From: RN MW at bedside  Recent Surgery of Procedure: No  Baseline Level of Consciousness (LOC): a&o x4  Oxygen Use: Yes, 3 liters  Type: nasal rodney  Diabetic: Yes  Last BP Med Given Day of Dialysis: MarcosAR  Last Pain Med Given: MarcosAR  Lab Tests to be Obtained with Dialysis: Yes, hep B AG/AB  Blood Transfusion to be Given During Dialysis: No  Available IV Access: Yes, MarcosAR  Medications to be Administered During Dialysis: No  Continuous IV Infusion Running: No  Restraints on Currently or in the Last 24 Hours: No  Hand-Off Communication: patient arrived for anemia and blood transfusion.  Dialysis Catheter Dressing: n/a   Last Dressing Change: n/a

## 2025-02-21 LAB
BLOOD EXPIRATION DATE: NORMAL
DISPENSE STATUS: NORMAL
HCYS SERPL-SCNC: 10.32 UMOL/L (ref 5–13.9)
PRODUCT BLOOD TYPE: 6200
PRODUCT CODE: NORMAL
UNIT ABO: NORMAL
UNIT NUMBER: NORMAL
UNIT RH: NORMAL
UNIT VOLUME: 289
XM INTEP: NORMAL

## 2025-02-21 NOTE — NURSING NOTE
Discharge instructions provided using teachback method.  Patient's health-related risk factors discussed with patient.  Patient educated to look for worsening signs and symptoms and educated to seek medical attention if experiencing medical emergency.  Patient aware of needs to follow up with outpatient clinics as scheduled. Home going meds reviewed with patient.  Patient verbalized understanding of disposition and discharge instructions.  All questions answered to patient's satisfaction and within nursing scope of practice.  Vitals stable; IV(s) to be removed by bedside nurse.

## 2025-02-23 ENCOUNTER — APPOINTMENT (OUTPATIENT)
Dept: CARDIOLOGY | Facility: HOSPITAL | Age: 70
End: 2025-02-23
Payer: COMMERCIAL

## 2025-02-23 ENCOUNTER — APPOINTMENT (OUTPATIENT)
Dept: RADIOLOGY | Facility: HOSPITAL | Age: 70
End: 2025-02-23
Payer: COMMERCIAL

## 2025-02-23 ENCOUNTER — HOSPITAL ENCOUNTER (EMERGENCY)
Facility: HOSPITAL | Age: 70
End: 2025-02-23
Attending: STUDENT IN AN ORGANIZED HEALTH CARE EDUCATION/TRAINING PROGRAM
Payer: COMMERCIAL

## 2025-02-23 VITALS
WEIGHT: 249.12 LBS | BODY MASS INDEX: 39.1 KG/M2 | DIASTOLIC BLOOD PRESSURE: 57 MMHG | HEART RATE: 77 BPM | HEIGHT: 67 IN | TEMPERATURE: 98.2 F | RESPIRATION RATE: 17 BRPM | SYSTOLIC BLOOD PRESSURE: 129 MMHG | OXYGEN SATURATION: 99 %

## 2025-02-23 DIAGNOSIS — K85.80 OTHER ACUTE PANCREATITIS, UNSPECIFIED COMPLICATION STATUS (HHS-HCC): Primary | ICD-10-CM

## 2025-02-23 DIAGNOSIS — Z51.5 HOSPICE CARE: ICD-10-CM

## 2025-02-23 DIAGNOSIS — R10.13 EPIGASTRIC PAIN: ICD-10-CM

## 2025-02-23 PROBLEM — K85.90 ACUTE PANCREATITIS, UNSPECIFIED COMPLICATION STATUS, UNSPECIFIED PANCREATITIS TYPE (HHS-HCC): Status: ACTIVE | Noted: 2025-02-23

## 2025-02-23 LAB
ALBUMIN SERPL BCP-MCNC: 3.1 G/DL (ref 3.4–5)
ALP SERPL-CCNC: 101 U/L (ref 33–136)
ALT SERPL W P-5'-P-CCNC: 19 U/L (ref 7–45)
ANION GAP SERPL CALC-SCNC: 19 MMOL/L (ref 10–20)
AST SERPL W P-5'-P-CCNC: 21 U/L (ref 9–39)
BASOPHILS # BLD AUTO: 0.07 X10*3/UL (ref 0–0.1)
BASOPHILS NFR BLD AUTO: 0.6 %
BILIRUB SERPL-MCNC: 0.5 MG/DL (ref 0–1.2)
BUN SERPL-MCNC: 70 MG/DL (ref 6–23)
CALCIUM SERPL-MCNC: 7.8 MG/DL (ref 8.6–10.3)
CARDIAC TROPONIN I PNL SERPL HS: 25 NG/L (ref 0–13)
CARDIAC TROPONIN I PNL SERPL HS: 33 NG/L (ref 0–13)
CARDIAC TROPONIN I PNL SERPL HS: 35 NG/L (ref 0–13)
CHLORIDE SERPL-SCNC: 97 MMOL/L (ref 98–107)
CO2 SERPL-SCNC: 23 MMOL/L (ref 21–32)
COPPER SERPL-MCNC: 112 UG/DL (ref 80–155)
CREAT SERPL-MCNC: 12.13 MG/DL (ref 0.5–1.05)
EGFRCR SERPLBLD CKD-EPI 2021: 3 ML/MIN/1.73M*2
EOSINOPHIL # BLD AUTO: 0.56 X10*3/UL (ref 0–0.7)
EOSINOPHIL NFR BLD AUTO: 4.7 %
ERYTHROCYTE [DISTWIDTH] IN BLOOD BY AUTOMATED COUNT: 19.2 % (ref 11.5–14.5)
GLUCOSE BLD MANUAL STRIP-MCNC: 125 MG/DL (ref 74–99)
GLUCOSE SERPL-MCNC: 215 MG/DL (ref 74–99)
HCT VFR BLD AUTO: 26.4 % (ref 36–46)
HGB BLD-MCNC: 8 G/DL (ref 12–16)
IMM GRANULOCYTES # BLD AUTO: 0.07 X10*3/UL (ref 0–0.7)
IMM GRANULOCYTES NFR BLD AUTO: 0.6 % (ref 0–0.9)
LACTATE SERPL-SCNC: 1.3 MMOL/L (ref 0.4–2)
LIPASE SERPL-CCNC: 191 U/L (ref 9–82)
LYMPHOCYTES # BLD AUTO: 1.06 X10*3/UL (ref 1.2–4.8)
LYMPHOCYTES NFR BLD AUTO: 8.9 %
MCH RBC QN AUTO: 31.9 PG (ref 26–34)
MCHC RBC AUTO-ENTMCNC: 30.3 G/DL (ref 32–36)
MCV RBC AUTO: 105 FL (ref 80–100)
MONOCYTES # BLD AUTO: 1.35 X10*3/UL (ref 0.1–1)
MONOCYTES NFR BLD AUTO: 11.3 %
NEUTROPHILS # BLD AUTO: 8.79 X10*3/UL (ref 1.2–7.7)
NEUTROPHILS NFR BLD AUTO: 73.9 %
NRBC BLD-RTO: 0 /100 WBCS (ref 0–0)
PLATELET # BLD AUTO: 260 X10*3/UL (ref 150–450)
POTASSIUM SERPL-SCNC: 5.2 MMOL/L (ref 3.5–5.3)
PROT SERPL-MCNC: 5.6 G/DL (ref 6.4–8.2)
RBC # BLD AUTO: 2.51 X10*6/UL (ref 4–5.2)
SODIUM SERPL-SCNC: 134 MMOL/L (ref 136–145)
WBC # BLD AUTO: 11.9 X10*3/UL (ref 4.4–11.3)
ZINC SERPL-MCNC: 66.1 UG/DL (ref 60–120)

## 2025-02-23 PROCEDURE — 2500000004 HC RX 250 GENERAL PHARMACY W/ HCPCS (ALT 636 FOR OP/ED): Mod: JZ | Performed by: EMERGENCY MEDICINE

## 2025-02-23 PROCEDURE — 85025 COMPLETE CBC W/AUTO DIFF WBC: CPT | Performed by: NURSE PRACTITIONER

## 2025-02-23 PROCEDURE — 2500000004 HC RX 250 GENERAL PHARMACY W/ HCPCS (ALT 636 FOR OP/ED): Performed by: NURSE PRACTITIONER

## 2025-02-23 PROCEDURE — 84484 ASSAY OF TROPONIN QUANT: CPT | Performed by: NURSE PRACTITIONER

## 2025-02-23 PROCEDURE — 71046 X-RAY EXAM CHEST 2 VIEWS: CPT | Performed by: RADIOLOGY

## 2025-02-23 PROCEDURE — 36415 COLL VENOUS BLD VENIPUNCTURE: CPT | Performed by: NURSE PRACTITIONER

## 2025-02-23 PROCEDURE — 84484 ASSAY OF TROPONIN QUANT: CPT | Performed by: STUDENT IN AN ORGANIZED HEALTH CARE EDUCATION/TRAINING PROGRAM

## 2025-02-23 PROCEDURE — 2500000002 HC RX 250 W HCPCS SELF ADMINISTERED DRUGS (ALT 637 FOR MEDICARE OP, ALT 636 FOR OP/ED)

## 2025-02-23 PROCEDURE — 80053 COMPREHEN METABOLIC PANEL: CPT | Performed by: NURSE PRACTITIONER

## 2025-02-23 PROCEDURE — 99285 EMERGENCY DEPT VISIT HI MDM: CPT | Mod: 25 | Performed by: STUDENT IN AN ORGANIZED HEALTH CARE EDUCATION/TRAINING PROGRAM

## 2025-02-23 PROCEDURE — 76705 ECHO EXAM OF ABDOMEN: CPT | Mod: FOREIGN READ | Performed by: RADIOLOGY

## 2025-02-23 PROCEDURE — 2500000001 HC RX 250 WO HCPCS SELF ADMINISTERED DRUGS (ALT 637 FOR MEDICARE OP)

## 2025-02-23 PROCEDURE — 96374 THER/PROPH/DIAG INJ IV PUSH: CPT

## 2025-02-23 PROCEDURE — 99223 1ST HOSP IP/OBS HIGH 75: CPT

## 2025-02-23 PROCEDURE — 83605 ASSAY OF LACTIC ACID: CPT | Performed by: NURSE PRACTITIONER

## 2025-02-23 PROCEDURE — 93005 ELECTROCARDIOGRAM TRACING: CPT

## 2025-02-23 PROCEDURE — 76705 ECHO EXAM OF ABDOMEN: CPT

## 2025-02-23 PROCEDURE — 83690 ASSAY OF LIPASE: CPT | Performed by: NURSE PRACTITIONER

## 2025-02-23 PROCEDURE — 82947 ASSAY GLUCOSE BLOOD QUANT: CPT | Mod: 59

## 2025-02-23 PROCEDURE — 71046 X-RAY EXAM CHEST 2 VIEWS: CPT

## 2025-02-23 RX ORDER — OXYCODONE HYDROCHLORIDE 5 MG/1
5 TABLET ORAL EVERY 4 HOURS PRN
Status: DISCONTINUED | OUTPATIENT
Start: 2025-02-23 | End: 2025-02-26 | Stop reason: HOSPADM

## 2025-02-23 RX ORDER — DEXTROSE 50 % IN WATER (D50W) INTRAVENOUS SYRINGE
12.5
Status: DISCONTINUED | OUTPATIENT
Start: 2025-02-23 | End: 2025-02-26 | Stop reason: HOSPADM

## 2025-02-23 RX ORDER — INSULIN LISPRO 100 [IU]/ML
0-10 INJECTION, SOLUTION INTRAVENOUS; SUBCUTANEOUS
Status: DISCONTINUED | OUTPATIENT
Start: 2025-02-23 | End: 2025-02-26 | Stop reason: HOSPADM

## 2025-02-23 RX ORDER — OXYCODONE HYDROCHLORIDE 10 MG/1
10 TABLET ORAL EVERY 4 HOURS PRN
Status: DISCONTINUED | OUTPATIENT
Start: 2025-02-23 | End: 2025-02-26 | Stop reason: HOSPADM

## 2025-02-23 RX ORDER — ALBUTEROL SULFATE 90 UG/1
1 INHALANT RESPIRATORY (INHALATION) EVERY 4 HOURS PRN
Status: DISCONTINUED | OUTPATIENT
Start: 2025-02-23 | End: 2025-02-26 | Stop reason: HOSPADM

## 2025-02-23 RX ORDER — BUSPIRONE HYDROCHLORIDE 5 MG/1
5 TABLET ORAL NIGHTLY
Status: DISCONTINUED | OUTPATIENT
Start: 2025-02-23 | End: 2025-02-26 | Stop reason: HOSPADM

## 2025-02-23 RX ORDER — ESCITALOPRAM OXALATE 10 MG/1
10 TABLET ORAL DAILY
Status: DISCONTINUED | OUTPATIENT
Start: 2025-02-23 | End: 2025-02-26 | Stop reason: HOSPADM

## 2025-02-23 RX ORDER — ACETAMINOPHEN 325 MG/1
650 TABLET ORAL EVERY 4 HOURS PRN
Status: DISCONTINUED | OUTPATIENT
Start: 2025-02-23 | End: 2025-02-26 | Stop reason: HOSPADM

## 2025-02-23 RX ORDER — ONDANSETRON HYDROCHLORIDE 2 MG/ML
4 INJECTION, SOLUTION INTRAVENOUS ONCE
Status: COMPLETED | OUTPATIENT
Start: 2025-02-23 | End: 2025-02-23

## 2025-02-23 RX ORDER — OXYCODONE HYDROCHLORIDE 5 MG/1
5 TABLET ORAL ONCE
Status: DISCONTINUED | OUTPATIENT
Start: 2025-02-23 | End: 2025-02-23

## 2025-02-23 RX ORDER — DOXEPIN HYDROCHLORIDE 25 MG/1
100 CAPSULE ORAL NIGHTLY
Status: DISCONTINUED | OUTPATIENT
Start: 2025-02-23 | End: 2025-02-26 | Stop reason: HOSPADM

## 2025-02-23 RX ORDER — ONDANSETRON HYDROCHLORIDE 2 MG/ML
4 INJECTION, SOLUTION INTRAVENOUS EVERY 6 HOURS PRN
Status: DISCONTINUED | OUTPATIENT
Start: 2025-02-23 | End: 2025-02-26 | Stop reason: HOSPADM

## 2025-02-23 RX ORDER — DEXTROSE 50 % IN WATER (D50W) INTRAVENOUS SYRINGE
25
Status: DISCONTINUED | OUTPATIENT
Start: 2025-02-23 | End: 2025-02-26 | Stop reason: HOSPADM

## 2025-02-23 RX ORDER — PANTOPRAZOLE SODIUM 40 MG/10ML
40 INJECTION, POWDER, LYOPHILIZED, FOR SOLUTION INTRAVENOUS
Status: DISCONTINUED | OUTPATIENT
Start: 2025-02-24 | End: 2025-02-26 | Stop reason: HOSPADM

## 2025-02-23 RX ORDER — TALC
3 POWDER (GRAM) TOPICAL NIGHTLY PRN
Status: DISCONTINUED | OUTPATIENT
Start: 2025-02-23 | End: 2025-02-26 | Stop reason: HOSPADM

## 2025-02-23 RX ORDER — AMLODIPINE BESYLATE 5 MG/1
5 TABLET ORAL DAILY
Status: DISCONTINUED | OUTPATIENT
Start: 2025-02-23 | End: 2025-02-26 | Stop reason: HOSPADM

## 2025-02-23 RX ORDER — POLYETHYLENE GLYCOL 3350 17 G/17G
17 POWDER, FOR SOLUTION ORAL DAILY PRN
Status: DISCONTINUED | OUTPATIENT
Start: 2025-02-23 | End: 2025-02-26 | Stop reason: HOSPADM

## 2025-02-23 RX ORDER — ATORVASTATIN CALCIUM 40 MG/1
40 TABLET, FILM COATED ORAL NIGHTLY
Status: DISCONTINUED | OUTPATIENT
Start: 2025-02-23 | End: 2025-02-26 | Stop reason: HOSPADM

## 2025-02-23 RX ORDER — PANTOPRAZOLE SODIUM 40 MG/1
40 TABLET, DELAYED RELEASE ORAL
Status: DISCONTINUED | OUTPATIENT
Start: 2025-02-24 | End: 2025-02-26 | Stop reason: HOSPADM

## 2025-02-23 RX ORDER — METOPROLOL SUCCINATE 50 MG/1
100 TABLET, EXTENDED RELEASE ORAL EVERY EVENING
Status: DISCONTINUED | OUTPATIENT
Start: 2025-02-23 | End: 2025-02-26 | Stop reason: HOSPADM

## 2025-02-23 RX ADMIN — METOPROLOL SUCCINATE 100 MG: 50 TABLET, EXTENDED RELEASE ORAL at 20:33

## 2025-02-23 RX ADMIN — AMLODIPINE BESYLATE 5 MG: 5 TABLET ORAL at 17:46

## 2025-02-23 RX ADMIN — DOXEPIN HYDROCHLORIDE 100 MG: 25 CAPSULE ORAL at 20:32

## 2025-02-23 RX ADMIN — ONDANSETRON 4 MG: 2 INJECTION INTRAMUSCULAR; INTRAVENOUS at 09:51

## 2025-02-23 RX ADMIN — ESCITALOPRAM OXALATE 10 MG: 10 TABLET ORAL at 17:46

## 2025-02-23 RX ADMIN — APIXABAN 5 MG: 5 TABLET, FILM COATED ORAL at 20:32

## 2025-02-23 RX ADMIN — HYDROMORPHONE HYDROCHLORIDE 0.5 MG: 0.5 INJECTION, SOLUTION INTRAMUSCULAR; INTRAVENOUS; SUBCUTANEOUS at 17:22

## 2025-02-23 RX ADMIN — BUSPIRONE HYDROCHLORIDE 5 MG: 5 TABLET ORAL at 20:32

## 2025-02-23 ASSESSMENT — ENCOUNTER SYMPTOMS
ABDOMINAL PAIN: 1
NAUSEA: 1
JOINT SWELLING: 0
WOUND: 0
PALPITATIONS: 0
HEADACHES: 1
BACK PAIN: 0
DIARRHEA: 1
FLANK PAIN: 0
VOMITING: 0
ACTIVITY CHANGE: 1
TREMORS: 0
CHEST TIGHTNESS: 0
FEVER: 0
CONSTIPATION: 0
CHILLS: 0
APPETITE CHANGE: 1
HEMATURIA: 0
COUGH: 0
WEAKNESS: 0
SHORTNESS OF BREATH: 0
WHEEZING: 0
FATIGUE: 0

## 2025-02-23 ASSESSMENT — PAIN DESCRIPTION - LOCATION
LOCATION: ABDOMEN
LOCATION: CHEST

## 2025-02-23 ASSESSMENT — PAIN DESCRIPTION - PAIN TYPE: TYPE: ACUTE PAIN

## 2025-02-23 ASSESSMENT — LIFESTYLE VARIABLES
HAVE YOU EVER FELT YOU SHOULD CUT DOWN ON YOUR DRINKING: NO
EVER FELT BAD OR GUILTY ABOUT YOUR DRINKING: NO
HAVE PEOPLE ANNOYED YOU BY CRITICIZING YOUR DRINKING: NO
TOTAL SCORE: 0
EVER HAD A DRINK FIRST THING IN THE MORNING TO STEADY YOUR NERVES TO GET RID OF A HANGOVER: NO

## 2025-02-23 ASSESSMENT — PAIN DESCRIPTION - FREQUENCY: FREQUENCY: CONSTANT/CONTINUOUS

## 2025-02-23 ASSESSMENT — PAIN - FUNCTIONAL ASSESSMENT: PAIN_FUNCTIONAL_ASSESSMENT: 0-10

## 2025-02-23 ASSESSMENT — PAIN DESCRIPTION - DESCRIPTORS
DESCRIPTORS: PRESSURE
DESCRIPTORS: PRESSURE

## 2025-02-23 ASSESSMENT — PAIN DESCRIPTION - ORIENTATION: ORIENTATION: MID

## 2025-02-23 ASSESSMENT — PAIN SCALES - GENERAL
PAINLEVEL_OUTOF10: 6
PAINLEVEL_OUTOF10: 7

## 2025-02-23 NOTE — ED PROVIDER NOTES
Chief Complaint   Patient presents with    Chest Pain       HPI       70 year old female presents to the Emergency Department today complaining of midsternal chest pressure that has been constant since yesterday, non-radiating, and varies in intensity. Has had nausea associated with the above. Denies any associated fever, chills, headache, neck pain, shortness of breath more than normal, abdominal pain, vomiting, diarrhea, constipation, or urinary symptoms.       History provided by:  Patient             Patient History   Past Medical History:   Diagnosis Date    Arrhythmia     Benign essential HTN     Cancer (Multi)     Chronic kidney disease     ESRD (end stage renal disease) (Multi)      Past Surgical History:   Procedure Laterality Date    CARDIAC CATHETERIZATION N/A 3/6/2024    Procedure: Left Heart Cath;  Surgeon: Carol Johnson MD;  Location: [facility] Cardiac Cath Lab;  Service: Cardiovascular;  Laterality: N/A;    CARDIAC ELECTROPHYSIOLOGY PROCEDURE N/A 2024    Procedure: Ablation A-Fib Persistant;  Surgeon: Adam Valentine MD;  Location: [place] [address] Cardiac Cath Lab;  Service: Electrophysiology;  Laterality: N/A;  CARTO/ANY EP LAB  GENERAL ANESTHESIA WHOLE CASE    US GUIDED ABDOMINAL PARACENTESIS  2023    US GUIDED ABDOMINAL PARACENTESIS 2023 POR US     No family history on file.  Social History     Tobacco Use    Smoking status: Former     Current packs/day: 0.00     Types: Cigarettes     Quit date: 2021     Years since quittin.0    Smokeless tobacco: Never   Vaping Use    Vaping status: Every Day   Substance Use Topics    Alcohol use: Not Currently    Drug use: Never           Physical Exam  Constitutional:       Appearance: Normal appearance.   HENT:      Head: Normocephalic.      Right Ear: Tympanic membrane, ear canal and external ear normal.      Left Ear: Tympanic membrane, ear canal and external ear normal.      Nose: Nose normal.      Mouth/Throat:      Mouth: Mucous  membranes are moist.      Pharynx: Oropharynx is clear. No oropharyngeal exudate or posterior oropharyngeal erythema.   Eyes:      Conjunctiva/sclera: Conjunctivae normal.      Pupils: Pupils are equal, round, and reactive to light.   Cardiovascular:      Rate and Rhythm: Normal rate and regular rhythm.      Pulses:           Radial pulses are 3+ on the right side and 3+ on the left side.        Dorsalis pedis pulses are 3+ on the right side and 3+ on the left side.      Heart sounds: Normal heart sounds. No murmur heard.     No friction rub. No gallop.   Pulmonary:      Effort: Pulmonary effort is normal. No respiratory distress.      Breath sounds: Normal breath sounds. No wheezing, rhonchi or rales.   Abdominal:      General: Abdomen is flat. Bowel sounds are normal.      Palpations: Abdomen is soft.      Tenderness: There is no abdominal tenderness. There is no right CVA tenderness, left CVA tenderness, guarding or rebound. Negative signs include Carroll's sign and McBurney's sign.   Musculoskeletal:         General: No swelling or deformity.      Cervical back: Full passive range of motion without pain.      Right lower leg: No edema.      Left lower leg: No edema.   Lymphadenopathy:      Cervical: No cervical adenopathy.   Skin:     Capillary Refill: Capillary refill takes less than 2 seconds.      Coloration: Skin is not jaundiced.      Findings: No rash.   Neurological:      General: No focal deficit present.      Mental Status: She is alert and oriented to person, place, and time. Mental status is at baseline.      Gait: Gait is intact.   Psychiatric:         Mood and Affect: Mood normal.         Behavior: Behavior is cooperative.         Labs Reviewed   CBC WITH AUTO DIFFERENTIAL - Abnormal       Result Value    WBC 11.9 (*)     nRBC 0.0      RBC 2.51 (*)     Hemoglobin 8.0 (*)     Hematocrit 26.4 (*)      (*)     MCH 31.9      MCHC 30.3 (*)     RDW 19.2 (*)     Platelets 260      Neutrophils % 73.9       Immature Granulocytes %, Automated 0.6      Lymphocytes % 8.9      Monocytes % 11.3      Eosinophils % 4.7      Basophils % 0.6      Neutrophils Absolute 8.79 (*)     Immature Granulocytes Absolute, Automated 0.07      Lymphocytes Absolute 1.06 (*)     Monocytes Absolute 1.35 (*)     Eosinophils Absolute 0.56      Basophils Absolute 0.07     LACTATE - Normal    Lactate 1.3      Narrative:     Venipuncture immediately after or during the administration of Metamizole may lead to falsely low results. Testing should be performed immediately prior to Metamizole dosing.   COMPREHENSIVE METABOLIC PANEL   LIPASE   TROPONIN SERIES- (INITIAL, 1 HR)    Narrative:     The following orders were created for panel order Troponin I Series, High Sensitivity (0, 1 HR).  Procedure                               Abnormality         Status                     ---------                               -----------         ------                     Troponin I, High Sensiti...[[id number]]                      In process                 Troponin, High Sensitivi...[[id number]]                                                   Please view results for these tests on the individual orders.   SERIAL TROPONIN-INITIAL   SERIAL TROPONIN, 1 HOUR       XR chest 2 views    (Results Pending)            ED Course & MDM            Medical Decision Making  EKG interpreted by Dr. Callahan. Indication: chest pain. Findings: NSR with a ventricular rate of 85, normal axis, normal intervals, and no acute ischemic or injury pattern. Impression: No acute pathology.       Patient was seen and evaluated by Dr. Callahan. Placed on a cardiac monitor which showed normal sinus rhythm without ectopy throughout ED stay. Rhythm strip obtained showed normal sinus rhythm. Impression: No acute pathology. Continuous pulse oximetry monitoring showed no signs of hypoxia. Saline lock was established with labs drawn and results as above.            Your medication list         ASK your doctor about these medications        Instructions Last Dose Given Next Dose Due   acetaminophen 325 mg tablet  Commonly known as: Tylenol      Take 3 tablets (975 mg) by mouth every 8 hours if needed (breakthrough pain).       albuterol 90 mcg/actuation inhaler      Inhale 1 puff every 4 hours if needed for shortness of breath.       amLODIPine 5 mg tablet  Commonly known as: Norvasc           atorvastatin 40 mg tablet  Commonly known as: Lipitor           Auryxia 210 mg iron tablet  Generic drug: ferric citrate           busPIRone 5 mg tablet  Commonly known as: Buspar           cholecalciferol 25 mcg (1000 units) tablet  Commonly known as: Vitamin D-3           cinacalcet 30 mg tablet  Commonly known as: Sensipar           doxepin 100 mg capsule  Commonly known as: SINEquan           Eliquis 5 mg tablet  Generic drug: apixaban           escitalopram 10 mg tablet  Commonly known as: Lexapro           metoprolol succinate  mg 24 hr tablet  Commonly known as: Toprol-XL      Take 1 tablet (100 mg) by mouth once daily in the evening. Do not crush or chew.       Nephro-Gloria 0.8 mg tablet  Generic drug: B complex-vitamin C-folic acid           oxyCODONE 5 mg immediate release tablet  Commonly known as: Roxicodone      Take 1 tablet (5 mg) by mouth every 6 hours if needed for severe pain (7 - 10).       torsemide 20 mg tablet  Commonly known as: Demadex                      Procedure  Procedures     MATTHIAS Walker-SWETA  02/23/25 0326

## 2025-02-23 NOTE — H&P
[facility] - GENERAL MEDICINE HISTORY AND PHYSICAL    History Obtained From (Primary Source): Patient  Collateral History (Secondary Sources): D/w ED provider, chart review    History Of Present Illness (HPI):  Rosa Soto is a 70 y.o. female with PMHx s/f HTN, NSTEMI, A-fib s/p RFA on 2/29/2024 and on Eliquis, HLD, chronic diastolic heart failure, ESRD on dialysis (MW, refused to go on fridays), chronic macrocytic anemia, bladder cancer s/p resection, moderate recurrent MDD, YELENA  presenting with epigastric pain.  She notes of severe persistent epigastric pain and heavy pressure since yesterday, non-radiating. Has associated nausea, headache, diarrhea.  She is taking daily iron which makes stool dark and grainy but not melena. She has pain and bruising in her left ankle after bumping into a car door, CT scan on 2/19/25 showed no dislocation or fracture. Denies f/c/v, chest pain, sob. Denies history of alcohol or smoking.  She had a recent hospitalization at [facility] from 2/18/2025 to 2/20/2025 for anemia, requiring 2 units of PRBC.  She has a history of anemia, was worked up by Dr. Micah Hunt on 12/24 with EGD and colonoscopy unrevealing for source of bleeding and a single adenoma was removed.  It was thought to be multifactorial due to CKD and acute on chronic disease. GI at [facility] recommended outpatient follow-up with primary GI to discuss elective outpatient capsule endoscopy for complete workup and hematology recommending bone marrow biopsy if workup does not explain anemia.    ED Course (Summary - please note all labs, imaging studies, and interventions noted below have been personally reviewed and/or interpreted on day of admission):   Vitals on presentation: 98.2 F, 87 bpm, 18 RR, 149/72, 97% on 3 L nasal cannula  Labs: CMP-glucose 215, sodium 134, pancreatic 70/12.13, calcium 7.8, albumin 3.1  Lactate 1.3  Lipase 181  Troponin 25-35-33  CBC-WBC 11.9, hemoglobin 8.0, , neutrophils  absolute 8.79  EKG: Sinus rhythm at 85 bpm, no acute ST elevation or depression.  .  QTc 457.  Imaging: CXR-no acute cardiopulmonary process  Ultrasound gallbladder-gallbladder surgically absent, biliary tract without significant postsurgical ectasia.  Cirrhotic liver changes unchanged from December 2024.  Right kidney lower pole multiple simple cysts, no hydronephrosis, pancreas unremarkable.  Interventions: Dilaudid 0.5 mg, Zofran 4 mg, admission for further management    12-point ROS reviewed and found to be negative aside from aforementioned positives in HPI and/or noted in dedicated ROS section below.     ED Course (From ED Provider):  Diagnoses as of 02/23/25 1748   Other acute pancreatitis, unspecified complication status ([facility])   Epigastric pain     Relevant Results  Results for orders placed or performed during the hospital encounter of 02/23/25 (from the past 24 hours)   CBC and Auto Differential   Result Value Ref Range    WBC 11.9 (H) 4.4 - 11.3 x10*3/uL    nRBC 0.0 0.0 - 0.0 /100 WBCs    RBC 2.51 (L) 4.00 - 5.20 x10*6/uL    Hemoglobin 8.0 (L) 12.0 - 16.0 g/dL    Hematocrit 26.4 (L) 36.0 - 46.0 %     (H) 80 - 100 fL    MCH 31.9 26.0 - 34.0 pg    MCHC 30.3 (L) 32.0 - 36.0 g/dL    RDW 19.2 (H) 11.5 - 14.5 %    Platelets 260 150 - 450 x10*3/uL    Neutrophils % 73.9 40.0 - 80.0 %    Immature Granulocytes %, Automated 0.6 0.0 - 0.9 %    Lymphocytes % 8.9 13.0 - 44.0 %    Monocytes % 11.3 2.0 - 10.0 %    Eosinophils % 4.7 0.0 - 6.0 %    Basophils % 0.6 0.0 - 2.0 %    Neutrophils Absolute 8.79 (H) 1.20 - 7.70 x10*3/uL    Immature Granulocytes Absolute, Automated 0.07 0.00 - 0.70 x10*3/uL    Lymphocytes Absolute 1.06 (L) 1.20 - 4.80 x10*3/uL    Monocytes Absolute 1.35 (H) 0.10 - 1.00 x10*3/uL    Eosinophils Absolute 0.56 0.00 - 0.70 x10*3/uL    Basophils Absolute 0.07 0.00 - 0.10 x10*3/uL   Lactate   Result Value Ref Range    Lactate 1.3 0.4 - 2.0 mmol/L   Troponin I, High Sensitivity, Initial    Result Value Ref Range    Troponin I, High Sensitivity 25 (H) 0 - 13 ng/L   Troponin, High Sensitivity, 1 Hour   Result Value Ref Range    Troponin I, High Sensitivity 35 (H) 0 - 13 ng/L   Comprehensive metabolic panel   Result Value Ref Range    Glucose 215 (H) 74 - 99 mg/dL    Sodium 134 (L) 136 - 145 mmol/L    Potassium 5.2 3.5 - 5.3 mmol/L    Chloride 97 (L) 98 - 107 mmol/L    Bicarbonate 23 21 - 32 mmol/L    Anion Gap 19 10 - 20 mmol/L    Urea Nitrogen 70 (H) 6 - 23 mg/dL    Creatinine 12.13 (H) 0.50 - 1.05 mg/dL    eGFR 3 (L) >60 mL/min/1.73m*2    Calcium 7.8 (L) 8.6 - 10.3 mg/dL    Albumin 3.1 (L) 3.4 - 5.0 g/dL    Alkaline Phosphatase 101 33 - 136 U/L    Total Protein 5.6 (L) 6.4 - 8.2 g/dL    AST 21 9 - 39 U/L    Bilirubin, Total 0.5 0.0 - 1.2 mg/dL    ALT 19 7 - 45 U/L   Lipase   Result Value Ref Range    Lipase 191 (H) 9 - 82 U/L   Troponin I, High Sensitivity   Result Value Ref Range    Troponin I, High Sensitivity 33 (H) 0 - 13 ng/L      US gallbladder    Result Date: 2/23/2025  INDICATION:  Epigastric abdominal pain, worse with eating. TECHNIQUE:  Ultrasound of the Right Upper Quadrant. COMPARISON:  CT A/P 12/16/2024. FINDINGS:    Pancreas demonstrates normal echogenicity, no pancreas mass. The liver parenchyma is coarse and contour is nodular suggestive of cirrhotic changes.  There is no intrahepatic biliary dilatation.  The gallbladder is surgically absent.  The common bile duct measures 0.6 cm.  The right kidney measures 12.8 cm in length.  Renal cortical thinning is present.  There is no hydronephrosis.  There are no stones.  There are multiple renal cysts present with the largest located within the lower pole and measuring 6.7 x 5.4 x 5.7 cm.    1. Gallbladder surgically absent; biliary tract without significant postsurgical ectasia. 2. Liver is cirrhotic changes unchanged from December 2024. 3. Right kidney lower pole multiple simple cysts, no hydronephrosis, pancreas unremarkable. Signed by  Osmany Land MD    XR chest 2 views    Result Date: 2/23/2025  Interpreted By:  Coy Maradiaga, STUDY: XR CHEST 2 VIEWS; 2/23/2025 10:51 am   INDICATION: Signs/Symptoms:chest pain   COMPARISON: 12/29/2024   ACCESSION NUMBER(S): [id number]   ORDERING CLINICIAN: AYESAH FUENTES   TECHNIQUE: PA and LAT views of the chest were obtained.   FINDINGS: Limited portable view. The cardiomediastinal silhouette appears borderline mildly enlarged but stable.. No definitive acute consolidation or pleural effusion. No pneumothorax.   The osseous structures are intact.       No acute cardiopulmonary process.     Signed by: Coy Maradiaga 2/23/2025 11:37 AM Dictation workstation:   [id number]    Scheduled medications:  amLODIPine, 5 mg, oral, Daily  apixaban, 5 mg, oral, BID  atorvastatin, 40 mg, oral, Nightly  busPIRone, 5 mg, oral, Nightly  doxepin, 100 mg, oral, Nightly  escitalopram, 10 mg, oral, Daily  metoprolol succinate XL, 100 mg, oral, q PM  [START ON 2/24/2025] pantoprazole, 40 mg, oral, Daily before breakfast   Or  [START ON 2/24/2025] pantoprazole, 40 mg, intravenous, Daily before breakfast      Continuous medications:     PRN medications:  PRN medications: acetaminophen, albuterol, HYDROmorphone, melatonin, ondansetron, oxyCODONE, oxyCODONE, polyethylene glycol     Past Medical History  She has a past medical history of Arrhythmia, Benign essential HTN, Cancer (Multi), Chronic kidney disease, and ESRD (end stage renal disease) (Multi).    She has no past medical history of CHF (congestive heart failure).    Surgical History  She has a past surgical history that includes US guided abdominal paracentesis (8/31/2023); Cardiac catheterization (N/A, 3/6/2024); and Cardiac electrophysiology procedure (N/A, 2/29/2024).     Social History  She reports that she quit smoking about 4 years ago. Her smoking use included cigarettes. She has never used smokeless tobacco. She reports that she does not currently use  alcohol. She reports that she does not use drugs.    Family History  No family history on file.    Allergies  Ropinirole and Adhesive    Code Status  Full Code     Review of Systems   Constitutional:  Positive for activity change and appetite change. Negative for chills, fatigue and fever.   Respiratory:  Negative for cough, chest tightness, shortness of breath and wheezing.    Cardiovascular:  Negative for chest pain, palpitations and leg swelling.   Gastrointestinal:  Positive for abdominal pain, diarrhea and nausea. Negative for constipation and vomiting.   Genitourinary:  Negative for flank pain and hematuria.   Musculoskeletal:  Negative for back pain and joint swelling.        Left ankle pain   Skin:  Negative for rash and wound.   Neurological:  Positive for headaches. Negative for tremors, syncope and weakness.       Last Recorded Vitals  /62   Pulse 71   Temp 36.8 °C (98.2 °F) (Temporal)   Resp 17   Wt 113 kg (249 lb 1.9 oz)   SpO2 98%      Physical Exam:  Vital signs and nursing notes reviewed.   Constitutional: Pleasant and cooperative. Obese. Laying in bed in no acute distress. Conversant.   Skin: Warm and dry; no obvious lesions, rashes, pallor, or jaundice.   Eyes: EOMI. Anicteric sclera.   ENT: Mucous membranes moist; no obvious injury or deformity appreciated.   Head and Neck: Normocephalic, atraumatic. ROM preserved. Trachea midline. No appreciable JVD.   Respiratory: Nonlabored on 3L NC. Lungs clear to auscultation bilaterally without obvious adventitious sounds. Chest rise is equal.  Cardiovascular: RRR. No gross murmur, gallop, or rub. Extremities are warm and well-perfused with good capillary refill (< 3 seconds). No chest wall tenderness.   GI: Abdomen soft, tender epigastric in bilateral upper quadrants, nondistended. No obvious organomegaly appreciated. Bowel sounds are present.  : No CVA tenderness.   MSK: Bruising in left ankle.   Extremities: No cyanosis, edema, or clubbing  evident. Neurovascularly intact.   Neuro: A&Ox3. CN 2-12 grossly intact. Able to respond to questions appropriately and clearly. No acute focal neurologic deficits appreciated.  Psych: Appropriate mood and behavior.    Assessment/Plan     70 y.o. female with PMHx s/f HTN, NSTEMI, A-fib s/p RFA on 2/29/2024 and on Eliquis, HLD, chronic diastolic heart failure, ESRD on dialysis (MW, refused to go on fridays), chronic macrocytic anemia, bladder cancer s/p resection, moderate recurrent MDD, YELENA  presenting with epigastric pain.      Acute pancreatitis  -clear liquid diet, ADAT  -antiemetics and pain meds PRN  -hold Lipitor  -supportive care    Elevated troponin  -Troponin adynamic at 25-35-33, suspect demand ischemia in the setting of above and ESRD  -EKG without ischemic changes  -Patient denies chest pain or anginal equivalent    ESRD on dialysis  -last session on 2/19/25  -Monday Wednesday Friday [facility]  -Access is AV fistula right arm   -noncompliance and refused to go on Fridays  -nephrology consult     A-fib s/p RFA on 2/29/2024  -Continue on Eliquis and metoprolol for rate control     HTN, HLD, Chronic diastolic HF  -does not appear to be overtly volume overloaded on exam  -Continue home medications with hold parameters  -Monitor and adjust as needed while admitted      DM-II with hyperglycemia  -not on home meds/insulin currently  -Continue with SSI ACHS for now  -Accucheks, hypoglycemic protocol   -Monitor and adjust as needed     Chronic macrocytic anemia  -multifactorial 2/2 CKD and Acute on chronic disease  -Hgb stable at 8.0  -continue follow-up with outpatient GI and heme/onc for workup  -continue to trend while admitted    Diet: Clear liquid diet, ADAT  DVT Prophylaxis: SCDs, Eliquis  Code Status: Full code  Case Discussed With: ED provider  Additional Sources Reviewed: ED note day of admission; prior hospitalization notes    Anticipated Length of Stay (LOS): Patient will require 1-2  midnights     Flavio Ojeda PA-C    Dragon dictation software was used to dictate this note and thus there may be minor errors in translation/transcription including garbled speech or misspellings. Please contact for clarification if needed.

## 2025-02-23 NOTE — PROGRESS NOTES
I assumed care the patient at change of shift.  Patient was diagnosed with acute pancreatitis and was pending a p.o. challenge.  She attempted some Jell-O however had significant abdominal pain and discomfort and required IV narcotics for pain control.  Patient did not feel comfortable with how sick she was feeling and preferred to be hospitalized for symptom control for treatment of her acute pancreatitis.  She also does live independently and is requesting home health or health help at home to help her stay independent.  Patient discussed with medical service who agreed with the plan for admission for medical management of acute pancreatitis.

## 2025-02-23 NOTE — ED TRIAGE NOTES
Pt presenting to ED with Chest pain in center of chest, 7/10. Pt says it feels like a pressure and that it feels like she needs to belch but can't. Pt denies other s/sx.

## 2025-02-24 ENCOUNTER — APPOINTMENT (OUTPATIENT)
Dept: DIALYSIS | Facility: HOSPITAL | Age: 70
End: 2025-02-24
Payer: COMMERCIAL

## 2025-02-24 LAB
ALBUMIN SERPL BCP-MCNC: 3.2 G/DL (ref 3.4–5)
ALP SERPL-CCNC: 107 U/L (ref 33–136)
ALT SERPL W P-5'-P-CCNC: 22 U/L (ref 7–45)
ANION GAP SERPL CALC-SCNC: 21 MMOL/L (ref 10–20)
AST SERPL W P-5'-P-CCNC: 25 U/L (ref 9–39)
BILIRUB SERPL-MCNC: 0.6 MG/DL (ref 0–1.2)
BUN SERPL-MCNC: 76 MG/DL (ref 6–23)
CALCIUM SERPL-MCNC: 7.9 MG/DL (ref 8.6–10.3)
CHLORIDE SERPL-SCNC: 96 MMOL/L (ref 98–107)
CO2 SERPL-SCNC: 25 MMOL/L (ref 21–32)
CREAT SERPL-MCNC: 12.74 MG/DL (ref 0.5–1.05)
EGFRCR SERPLBLD CKD-EPI 2021: 3 ML/MIN/1.73M*2
ERYTHROCYTE [DISTWIDTH] IN BLOOD BY AUTOMATED COUNT: 19 % (ref 11.5–14.5)
GLUCOSE BLD MANUAL STRIP-MCNC: 130 MG/DL (ref 74–99)
GLUCOSE BLD MANUAL STRIP-MCNC: 139 MG/DL (ref 74–99)
GLUCOSE BLD MANUAL STRIP-MCNC: 184 MG/DL (ref 74–99)
GLUCOSE SERPL-MCNC: 139 MG/DL (ref 74–99)
HCT VFR BLD AUTO: 26.7 % (ref 36–46)
HGB BLD-MCNC: 7.9 G/DL (ref 12–16)
MCH RBC QN AUTO: 31.5 PG (ref 26–34)
MCHC RBC AUTO-ENTMCNC: 29.6 G/DL (ref 32–36)
MCV RBC AUTO: 106 FL (ref 80–100)
NRBC BLD-RTO: 0 /100 WBCS (ref 0–0)
PLATELET # BLD AUTO: 262 X10*3/UL (ref 150–450)
POTASSIUM SERPL-SCNC: 5.5 MMOL/L (ref 3.5–5.3)
PROT SERPL-MCNC: 5.8 G/DL (ref 6.4–8.2)
RBC # BLD AUTO: 2.51 X10*6/UL (ref 4–5.2)
SODIUM SERPL-SCNC: 136 MMOL/L (ref 136–145)
WBC # BLD AUTO: 11.3 X10*3/UL (ref 4.4–11.3)

## 2025-02-24 PROCEDURE — 90937 HEMODIALYSIS REPEATED EVAL: CPT

## 2025-02-24 PROCEDURE — 82947 ASSAY GLUCOSE BLOOD QUANT: CPT

## 2025-02-24 PROCEDURE — 2500000002 HC RX 250 W HCPCS SELF ADMINISTERED DRUGS (ALT 637 FOR MEDICARE OP, ALT 636 FOR OP/ED)

## 2025-02-24 PROCEDURE — 99233 SBSQ HOSP IP/OBS HIGH 50: CPT | Performed by: INTERNAL MEDICINE

## 2025-02-24 PROCEDURE — 2500000001 HC RX 250 WO HCPCS SELF ADMINISTERED DRUGS (ALT 637 FOR MEDICARE OP)

## 2025-02-24 PROCEDURE — G0378 HOSPITAL OBSERVATION PER HR: HCPCS

## 2025-02-24 PROCEDURE — 36415 COLL VENOUS BLD VENIPUNCTURE: CPT

## 2025-02-24 PROCEDURE — 2500000004 HC RX 250 GENERAL PHARMACY W/ HCPCS (ALT 636 FOR OP/ED)

## 2025-02-24 PROCEDURE — 2500000001 HC RX 250 WO HCPCS SELF ADMINISTERED DRUGS (ALT 637 FOR MEDICARE OP): Performed by: INTERNAL MEDICINE

## 2025-02-24 PROCEDURE — 80053 COMPREHEN METABOLIC PANEL: CPT

## 2025-02-24 PROCEDURE — 85027 COMPLETE CBC AUTOMATED: CPT

## 2025-02-24 PROCEDURE — 96374 THER/PROPH/DIAG INJ IV PUSH: CPT | Mod: 59

## 2025-02-24 PROCEDURE — 2500000005 HC RX 250 GENERAL PHARMACY W/O HCPCS

## 2025-02-24 RX ORDER — CALCITRIOL 0.25 UG/1
2 CAPSULE ORAL 3 TIMES WEEKLY
Status: DISCONTINUED | OUTPATIENT
Start: 2025-02-24 | End: 2025-02-26 | Stop reason: HOSPADM

## 2025-02-24 RX ADMIN — CALCITRIOL CAPSULES 0.25 MCG 2 MCG: 0.25 CAPSULE ORAL at 14:39

## 2025-02-24 RX ADMIN — ESCITALOPRAM OXALATE 10 MG: 10 TABLET ORAL at 08:15

## 2025-02-24 RX ADMIN — DOXEPIN HYDROCHLORIDE 100 MG: 25 CAPSULE ORAL at 21:17

## 2025-02-24 RX ADMIN — Medication 3 MG: at 21:18

## 2025-02-24 RX ADMIN — APIXABAN 5 MG: 5 TABLET, FILM COATED ORAL at 08:15

## 2025-02-24 RX ADMIN — PANTOPRAZOLE SODIUM 40 MG: 40 INJECTION, POWDER, FOR SOLUTION INTRAVENOUS at 06:18

## 2025-02-24 RX ADMIN — METOPROLOL SUCCINATE 100 MG: 50 TABLET, EXTENDED RELEASE ORAL at 21:17

## 2025-02-24 RX ADMIN — AMLODIPINE BESYLATE 5 MG: 5 TABLET ORAL at 08:15

## 2025-02-24 RX ADMIN — APIXABAN 5 MG: 5 TABLET, FILM COATED ORAL at 21:18

## 2025-02-24 RX ADMIN — INSULIN LISPRO 2 UNITS: 100 INJECTION, SOLUTION INTRAVENOUS; SUBCUTANEOUS at 21:22

## 2025-02-24 RX ADMIN — OXYCODONE HYDROCHLORIDE 10 MG: 10 TABLET ORAL at 18:29

## 2025-02-24 RX ADMIN — BUSPIRONE HYDROCHLORIDE 5 MG: 5 TABLET ORAL at 21:18

## 2025-02-24 RX ADMIN — OXYCODONE 5 MG: 5 TABLET ORAL at 05:51

## 2025-02-24 SDOH — HEALTH STABILITY: MENTAL HEALTH: HOW OFTEN DO YOU HAVE SIX OR MORE DRINKS ON ONE OCCASION?: NEVER

## 2025-02-24 SDOH — SOCIAL STABILITY: SOCIAL INSECURITY: ARE THERE ANY APPARENT SIGNS OF INJURIES/BEHAVIORS THAT COULD BE RELATED TO ABUSE/NEGLECT?: NO

## 2025-02-24 SDOH — SOCIAL STABILITY: SOCIAL INSECURITY: ABUSE: ADULT

## 2025-02-24 SDOH — SOCIAL STABILITY: SOCIAL INSECURITY: DO YOU FEEL UNSAFE GOING BACK TO THE PLACE WHERE YOU ARE LIVING?: NO

## 2025-02-24 SDOH — SOCIAL STABILITY: SOCIAL INSECURITY: WITHIN THE LAST YEAR, HAVE YOU BEEN AFRAID OF YOUR PARTNER OR EX-PARTNER?: NO

## 2025-02-24 SDOH — ECONOMIC STABILITY: FOOD INSECURITY: WITHIN THE PAST 12 MONTHS, YOU WORRIED THAT YOUR FOOD WOULD RUN OUT BEFORE YOU GOT THE MONEY TO BUY MORE.: NEVER TRUE

## 2025-02-24 SDOH — SOCIAL STABILITY: SOCIAL INSECURITY: ARE YOU OR HAVE YOU BEEN THREATENED OR ABUSED PHYSICALLY, EMOTIONALLY, OR SEXUALLY BY ANYONE?: NO

## 2025-02-24 SDOH — SOCIAL STABILITY: SOCIAL INSECURITY: WITHIN THE LAST YEAR, HAVE YOU BEEN HUMILIATED OR EMOTIONALLY ABUSED IN OTHER WAYS BY YOUR PARTNER OR EX-PARTNER?: NO

## 2025-02-24 SDOH — SOCIAL STABILITY: SOCIAL INSECURITY: DOES ANYONE TRY TO KEEP YOU FROM HAVING/CONTACTING OTHER FRIENDS OR DOING THINGS OUTSIDE YOUR HOME?: NO

## 2025-02-24 SDOH — SOCIAL STABILITY: SOCIAL INSECURITY: HAS ANYONE EVER THREATENED TO HURT YOUR FAMILY OR YOUR PETS?: NO

## 2025-02-24 SDOH — SOCIAL STABILITY: SOCIAL INSECURITY: HAVE YOU HAD THOUGHTS OF HARMING ANYONE ELSE?: NO

## 2025-02-24 SDOH — ECONOMIC STABILITY: FOOD INSECURITY: WITHIN THE PAST 12 MONTHS, THE FOOD YOU BOUGHT JUST DIDN'T LAST AND YOU DIDN'T HAVE MONEY TO GET MORE.: NEVER TRUE

## 2025-02-24 SDOH — SOCIAL STABILITY: SOCIAL INSECURITY: HAVE YOU HAD ANY THOUGHTS OF HARMING ANYONE ELSE?: NO

## 2025-02-24 SDOH — ECONOMIC STABILITY: INCOME INSECURITY: IN THE PAST 12 MONTHS HAS THE ELECTRIC, GAS, OIL, OR WATER COMPANY THREATENED TO SHUT OFF SERVICES IN YOUR HOME?: NO

## 2025-02-24 SDOH — HEALTH STABILITY: MENTAL HEALTH: HOW MANY DRINKS CONTAINING ALCOHOL DO YOU HAVE ON A TYPICAL DAY WHEN YOU ARE DRINKING?: PATIENT DOES NOT DRINK

## 2025-02-24 SDOH — SOCIAL STABILITY: SOCIAL INSECURITY: DO YOU FEEL ANYONE HAS EXPLOITED OR TAKEN ADVANTAGE OF YOU FINANCIALLY OR OF YOUR PERSONAL PROPERTY?: NO

## 2025-02-24 SDOH — HEALTH STABILITY: MENTAL HEALTH: HOW OFTEN DO YOU HAVE A DRINK CONTAINING ALCOHOL?: NEVER

## 2025-02-24 ASSESSMENT — COGNITIVE AND FUNCTIONAL STATUS - GENERAL
MOBILITY SCORE: 17
STANDING UP FROM CHAIR USING ARMS: A LITTLE
MOVING TO AND FROM BED TO CHAIR: A LITTLE
CLIMB 3 TO 5 STEPS WITH RAILING: A LOT
MOBILITY SCORE: 17
PATIENT BASELINE BEDBOUND: NO
MOVING TO AND FROM BED TO CHAIR: A LITTLE
DAILY ACTIVITIY SCORE: 24
CLIMB 3 TO 5 STEPS WITH RAILING: A LOT
MOVING FROM LYING ON BACK TO SITTING ON SIDE OF FLAT BED WITH BEDRAILS: A LITTLE
DAILY ACTIVITIY SCORE: 24
MOVING FROM LYING ON BACK TO SITTING ON SIDE OF FLAT BED WITH BEDRAILS: A LITTLE
STANDING UP FROM CHAIR USING ARMS: A LITTLE
WALKING IN HOSPITAL ROOM: A LITTLE
WALKING IN HOSPITAL ROOM: A LITTLE
TURNING FROM BACK TO SIDE WHILE IN FLAT BAD: A LITTLE
TURNING FROM BACK TO SIDE WHILE IN FLAT BAD: A LITTLE

## 2025-02-24 ASSESSMENT — LIFESTYLE VARIABLES
SKIP TO QUESTIONS 9-10: 1
HOW OFTEN DO YOU HAVE A DRINK CONTAINING ALCOHOL: NEVER
AUDIT-C TOTAL SCORE: 0
HOW MANY STANDARD DRINKS CONTAINING ALCOHOL DO YOU HAVE ON A TYPICAL DAY: PATIENT DOES NOT DRINK
AUDIT-C TOTAL SCORE: 0
HOW OFTEN DO YOU HAVE A DRINK CONTAINING ALCOHOL: NEVER
SKIP TO QUESTIONS 9-10: 1
HOW OFTEN DO YOU HAVE 6 OR MORE DRINKS ON ONE OCCASION: NEVER
AUDIT-C TOTAL SCORE: 0
HOW OFTEN DO YOU HAVE 6 OR MORE DRINKS ON ONE OCCASION: NEVER
SKIP TO QUESTIONS 9-10: 1
HOW MANY STANDARD DRINKS CONTAINING ALCOHOL DO YOU HAVE ON A TYPICAL DAY: PATIENT DOES NOT DRINK

## 2025-02-24 ASSESSMENT — ACTIVITIES OF DAILY LIVING (ADL)
HEARING - LEFT EAR: FUNCTIONAL
PATIENT'S MEMORY ADEQUATE TO SAFELY COMPLETE DAILY ACTIVITIES?: YES
FEEDING YOURSELF: INDEPENDENT
JUDGMENT_ADEQUATE_SAFELY_COMPLETE_DAILY_ACTIVITIES: YES
GROOMING: INDEPENDENT
LACK_OF_TRANSPORTATION: NO
TOILETING: NEEDS ASSISTANCE
WALKS IN HOME: NEEDS ASSISTANCE
BATHING: INDEPENDENT
HEARING - RIGHT EAR: FUNCTIONAL
DRESSING YOURSELF: INDEPENDENT
ASSISTIVE_DEVICE: WALKER
ADEQUATE_TO_COMPLETE_ADL: YES
LACK_OF_TRANSPORTATION: NO

## 2025-02-24 ASSESSMENT — PAIN SCALES - GENERAL
PAINLEVEL_OUTOF10: 0 - NO PAIN
PAINLEVEL_OUTOF10: 8
PAINLEVEL_OUTOF10: 3
PAINLEVEL_OUTOF10: 0 - NO PAIN
PAINLEVEL_OUTOF10: 2
PAINLEVEL_OUTOF10: 8
PAINLEVEL_OUTOF10: 7
PAINLEVEL_OUTOF10: 3

## 2025-02-24 ASSESSMENT — PATIENT HEALTH QUESTIONNAIRE - PHQ9
SUM OF ALL RESPONSES TO PHQ9 QUESTIONS 1 & 2: 0
2. FEELING DOWN, DEPRESSED OR HOPELESS: NOT AT ALL
SUM OF ALL RESPONSES TO PHQ9 QUESTIONS 1 & 2: 0
1. LITTLE INTEREST OR PLEASURE IN DOING THINGS: NOT AT ALL
1. LITTLE INTEREST OR PLEASURE IN DOING THINGS: NOT AT ALL
2. FEELING DOWN, DEPRESSED OR HOPELESS: NOT AT ALL

## 2025-02-24 ASSESSMENT — PAIN - FUNCTIONAL ASSESSMENT
PAIN_FUNCTIONAL_ASSESSMENT: 0-10

## 2025-02-24 NOTE — CONSULTS
Nephrology Consult Note                                                                                                                                         Inpatient consult to Renal Care  Consult performed by: Lorena Guzman DO  Consult ordered by: Flavio Ojeda PA-C                                                                                                               HPI  Patient is a 70 y.o. female with ESRD on HD , history of bladder cancer, obesity who is admitted to hospital with complaints of   chest pain and abdominal pain. Nephrology consulted in view of ESRD.   Patient frequently misses her Friday dialysis.  Has been counseled multiple times about this.  Patient is admitted with acute pancreatitis but she is feeling well enough to try to eat today.  She had a recent admission to [facility] with anemia and hematology did work up and plan to potentially do bone marrow biopsy.  Patient did miss her last dialysis treatment on 2025.  Developed the abdominal pain on 2025.  Arranged and seen on dialysis today.  Received a message from nurse manager at her dialysis unit that the patient's daughter is wanting her to pursue hospice and having difficulty getting into hematology.    Past Medical History:   Diagnosis Date    Arrhythmia     Benign essential HTN     Cancer (Multi)     Chronic kidney disease     ESRD (end stage renal disease) (Multi)       Social History     Socioeconomic History    Marital status:      Spouse name: Not on file    Number of children: Not on file    Years of education: Not on file    Highest education level: Not on file   Occupational History    Not on file   Tobacco Use    Smoking status: Former     Current packs/day: 0.00     Types: Cigarettes     Quit date: 2021     Years since quittin.0    Smokeless  tobacco: Never   Vaping Use    Vaping status: Every Day   Substance and Sexual Activity    Alcohol use: Not Currently    Drug use: Never    Sexual activity: Defer   Other Topics Concern    Not on file   Social History Narrative    Not on file     Social Drivers of Health     Financial Resource Strain: Low Risk  (2/19/2025)    Overall Financial Resource Strain (CARDIA)     Difficulty of Paying Living Expenses: Not very hard   Food Insecurity: No Food Insecurity (2/19/2025)    Hunger Vital Sign     Worried About Running Out of Food in the Last Year: Never true     Ran Out of Food in the Last Year: Never true   Transportation Needs: No Transportation Needs (2/19/2025)    PRAPARE - Transportation     Lack of Transportation (Medical): No     Lack of Transportation (Non-Medical): No   Physical Activity: Insufficiently Active (2/19/2025)    Exercise Vital Sign     Days of Exercise per Week: 2 days     Minutes of Exercise per Session: 20 min   Stress: Not on file   Social Connections: Not on file   Intimate Partner Violence: Not At Risk (2/19/2025)    Humiliation, Afraid, Rape, and Kick questionnaire     Fear of Current or Ex-Partner: No     Emotionally Abused: No     Physically Abused: No     Sexually Abused: No   Housing Stability: Low Risk  (2/19/2025)    Housing Stability Vital Sign     Unable to Pay for Housing in the Last Year: No     Number of Times Moved in the Last Year: 0     Homeless in the Last Year: No      No family history on file.   No current facility-administered medications on file prior to encounter.     Current Outpatient Medications on File Prior to Encounter   Medication Sig Dispense Refill    acetaminophen (Tylenol) 325 mg tablet Take 3 tablets (975 mg) by mouth every 8 hours if needed (breakthrough pain).      albuterol 90 mcg/actuation inhaler Inhale 1 puff every 4 hours if needed for shortness of breath. 18 g 11    amLODIPine (Norvasc) 5 mg tablet Take 1 tablet (5 mg) by mouth once daily.       apixaban (Eliquis) 5 mg tablet Take 1 tablet (5 mg) by mouth 2 times a day.      atorvastatin (Lipitor) 40 mg tablet Take 1 tablet (40 mg) by mouth once daily.      Auryxia 210 mg iron tablet Take 3 tablets (630 mg) by mouth 3 times a day. With meals. SWALLOW WHOLE, DO NOT CHEW OR CRUSH MEDICATION      busPIRone (Buspar) 5 mg tablet Take 1 tablet (5 mg) by mouth once daily at bedtime.      cholecalciferol (Vitamin D-3) 25 MCG (1000 UT) tablet Take 1 tablet (25 mcg) by mouth once daily.      cinacalcet (Sensipar) 30 mg tablet Take 1 tablet (30 mg) by mouth once daily. Take with food or shortly afer a meal. Swallow tablet whole; do not break or divide.      doxepin (SINEquan) 100 mg capsule Take 1 capsule (100 mg) by mouth once daily at bedtime.      escitalopram (Lexapro) 10 mg tablet Take 1 tablet (10 mg) by mouth once daily.      metoprolol succinate XL (Toprol-XL) 100 mg 24 hr tablet Take 1 tablet (100 mg) by mouth once daily in the evening. Do not crush or chew. 90 tablet 3    Nephro-Gloria 0.8 mg tablet Take 1 tablet by mouth once daily.      oxyCODONE (Roxicodone) 5 mg immediate release tablet Take 1 tablet (5 mg) by mouth every 6 hours if needed for severe pain (7 - 10). 15 tablet 0    torsemide (Demadex) 20 mg tablet Take 1 tablet (20 mg) by mouth 2 times a day.        Scheduled medications  amLODIPine, 5 mg, oral, Daily  apixaban, 5 mg, oral, BID  [Held by provider] atorvastatin, 40 mg, oral, Nightly  busPIRone, 5 mg, oral, Nightly  doxepin, 100 mg, oral, Nightly  escitalopram, 10 mg, oral, Daily  insulin lispro, 0-10 Units, subcutaneous, Before meals & nightly  metoprolol succinate XL, 100 mg, oral, q PM  pantoprazole, 40 mg, oral, Daily before breakfast   Or  pantoprazole, 40 mg, intravenous, Daily before breakfast      Continuous medications     PRN medications  PRN medications: acetaminophen, albuterol, dextrose, dextrose, glucagon, glucagon, HYDROmorphone, melatonin, ondansetron, oxyCODONE, oxyCODONE,  "polyethylene glycol     Review of systems  as per HPI otherwise 10 point review systems negative    /58 (BP Location: Left arm, Patient Position: Lying)   Pulse 83   Temp 36.2 °C (97.1 °F) (Temporal)   Resp 16   Ht 1.702 m (5' 7\")   Wt 113 kg (249 lb 1.9 oz)   SpO2 99%   BMI 39.02 kg/m²     Input / Output:  24 HR:   Intake/Output Summary (Last 24 hours) at 2/24/2025 1348  Last data filed at 2/24/2025 1235  Gross per 24 hour   Intake 1200 ml   Output 2411 ml   Net -1211 ml       Physical Exam   Alert and oriented x 3, NAD  EOMI  OP clear  Neck: supple, No JVD  CV: RRR without m/r/g  Lungs: CTA bilaterally  Abd: soft NT/ND +BS  Ext: no lower extremity edema   Right arm AV fistula  Neuro: grossly intact  Skin: no rashes    Results from last 7 days   Lab Units 02/24/25  0421 02/23/25  1335 02/20/25  0746   SODIUM mmol/L 136 134* 137   POTASSIUM mmol/L 5.5* 5.2 4.7   CHLORIDE mmol/L 96* 97* 98   CO2 mmol/L 25 23 30   BUN mg/dL 76* 70* 38*   CREATININE mg/dL 12.74* 12.13* 6.32*   GLUCOSE mg/dL 139* 215* 189*   CALCIUM mg/dL 7.9* 7.8* 8.3*        Results from last 7 days   Lab Units 02/24/25  0421   SODIUM mmol/L 136   POTASSIUM mmol/L 5.5*   CHLORIDE mmol/L 96*   CO2 mmol/L 25   BUN mg/dL 76*   CREATININE mg/dL 12.74*   CALCIUM mg/dL 7.9*   PROTEIN TOTAL g/dL 5.8*   BILIRUBIN TOTAL mg/dL 0.6   ALK PHOS U/L 107   ALT U/L 22   AST U/L 25   GLUCOSE mg/dL 139*           Results from last 7 days   Lab Units 02/24/25  0421 02/23/25  0949 02/20/25  0746   WBC AUTO x10*3/uL 11.3 11.9* 13.7*   HEMOGLOBIN g/dL 7.9* 8.0* 7.9*   HEMATOCRIT % 26.7* 26.4* 25.8*   PLATELETS AUTO x10*3/uL 262 260 195        US gallbladder   Final Result   1. Gallbladder surgically absent; biliary tract without significant   postsurgical ectasia.   2. Liver is cirrhotic changes unchanged from December 2024.   3. Right kidney lower pole multiple simple cysts, no hydronephrosis,   pancreas unremarkable.    Signed by Osmany Land MD      XR " chest 2 views   Final Result   No acute cardiopulmonary process.             Signed by: Coy Maradiaga 2/23/2025 11:37 AM   Dictation workstation:   [id number]           Assessment:   Patient is 70 y.o. female history of ESRD, bladder cancer, Atrial fibrillationwho is admitted to hospital for acute pancreatitis. Nephrology consulted in view of ESRD.    ESRD  -HD Monday Wednesday Friday [facility]  -Access is right arm AV fistula    Anemia of ESRD versus other  -Has been evaluated by hematology and has outpatient follow-up being set up with them    CKD MBD  -Patient on calcitriol  and Auryxia    Atrial fibrillation          Recommendations:   -HD today, UF 2 L   -Noted daughter wanting patient placed in a nursing home  -Discussed with hospitalist regarding palliative care consult and hematology work up   -Add calcitriol on Monday Wednesday Friday      Please message me through Cloud[employer] chat with any questions or concerns.     Lorena Guzman DO  2/24/2025  1:48 PM         [facility]    [address], [address]   [place] [address]  Office: [phone]

## 2025-02-24 NOTE — PRE-PROCEDURE NOTE
Report from Sending RN:    Report From: Ifeoma HUANG   Recent Surgery of Procedure: No  Baseline Level of Consciousness (LOC): x4  Oxygen Use: Yes  Type: Nc   Diabetic: Yes  Last BP Med Given Day of Dialysis: see emar   Last Pain Med Given: see emar   Lab Tests to be Obtained with Dialysis: No  Blood Transfusion to be Given During Dialysis: No  Available IV Access: Yes  Medications to be Administered During Dialysis: No  Continuous IV Infusion Running: No  Restraints on Currently or in the Last 24 Hours: No  Hand-Off Communication: stable for hd no issues   Dialysis Catheter Dressing: na   Last Dressing Change: na

## 2025-02-24 NOTE — PROGRESS NOTES
Rosa Soto is a 70 y.o. female admitted for Acute pancreatitis, unspecified complication status, unspecified pancreatitis type ([facility]). Pharmacy reviewed the patient's [id number] medications and allergies for accuracy.    The list below reflects the PTA list prior to pharmacy medication history. A summary a changes to the PTA medication list has been listed below. Please review each medication in order reconciliation for additional clarification and justification.    Source of information:  Discharge paperwork 02/20/25  No changes!  Medications added:    Medications modified:    Medications to be removed:    Medications of concern:      Prior to Admission Medications   Prescriptions Last Dose Informant Patient Reported? Taking?   Auryxia 210 mg iron tablet  Self Yes No   Sig: Take 3 tablets (630 mg) by mouth 3 times a day. With meals. SWALLOW WHOLE, DO NOT CHEW OR CRUSH MEDICATION   Nephro-Gloria 0.8 mg tablet  Self Yes No   Sig: Take 1 tablet by mouth once daily.   acetaminophen (Tylenol) 325 mg tablet  Self No No   Sig: Take 3 tablets (975 mg) by mouth every 8 hours if needed (breakthrough pain).   albuterol 90 mcg/actuation inhaler  Self No No   Sig: Inhale 1 puff every 4 hours if needed for shortness of breath.   amLODIPine (Norvasc) 5 mg tablet  Self Yes No   Sig: Take 1 tablet (5 mg) by mouth once daily.   apixaban (Eliquis) 5 mg tablet  Self Yes No   Sig: Take 1 tablet (5 mg) by mouth 2 times a day.   atorvastatin (Lipitor) 40 mg tablet  Self Yes No   Sig: Take 1 tablet (40 mg) by mouth once daily.   busPIRone (Buspar) 5 mg tablet  Self Yes No   Sig: Take 1 tablet (5 mg) by mouth once daily at bedtime.   cholecalciferol (Vitamin D-3) 25 MCG (1000 UT) tablet  Self Yes No   Sig: Take 1 tablet (25 mcg) by mouth once daily.   cinacalcet (Sensipar) 30 mg tablet  Self Yes No   Sig: Take 1 tablet (30 mg) by mouth once daily. Take with food or shortly afer a meal. Swallow tablet whole; do not break or  divide.   doxepin (SINEquan) 100 mg capsule  Self Yes No   Sig: Take 1 capsule (100 mg) by mouth once daily at bedtime.   escitalopram (Lexapro) 10 mg tablet  Self Yes No   Sig: Take 1 tablet (10 mg) by mouth once daily.   metoprolol succinate XL (Toprol-XL) 100 mg 24 hr tablet  Self No No   Sig: Take 1 tablet (100 mg) by mouth once daily in the evening. Do not crush or chew.   oxyCODONE (Roxicodone) 5 mg immediate release tablet   No No   Sig: Take 1 tablet (5 mg) by mouth every 6 hours if needed for severe pain (7 - 10).   torsemide (Demadex) 20 mg tablet  Self Yes No   Sig: Take 1 tablet (20 mg) by mouth 2 times a day.      Facility-Administered Medications: None       Aylin Loya

## 2025-02-24 NOTE — POST-PROCEDURE NOTE
Report to Receiving RN:    Report To: Renita Brito RN  Time Report Called: [id number]   Hand-Off Communication:   Pt tolerated tx well. Removed 2 L   Post vitals: 119/62 (77) - 97.9*F - wt not obtained, no scale on ED bed  Complications During Treatment: No  Ultrafiltration Treatment: Yes, IHD 2 L removed  Medications Administered During Dialysis: No  Blood Products Administered During Dialysis: No  Labs Sent During Dialysis: No  Heparin Drip Rate Changes: N/A  Dialysis Catheter Dressing: Clean, dry, intact  Last Dressing Change: 2/248/2025 TE    Electronic Signatures:  Ian Schuler OCDT    Last Updated: 1:04 PM by IAN SCHULER

## 2025-02-24 NOTE — PROGRESS NOTES
Emergency Medicine Transition of Care Note.    I received Rosa Soto in signout from Dr. Swanson.  Please see the previous ED provider note for all HPI, PE and MDM up to the time of signout. This is in addition to the primary record.    In brief Rosa Soto is an 70 y.o. female presenting for   Chief Complaint   Patient presents with    Chest Pain        Diagnoses as of 02/24/25 1102   Other acute pancreatitis, unspecified complication status (HHS-HCC)   Epigastric pain       Medical Decision Making  Received patient in signout at this time. Patient is admitted to [facility] for pancreatitis in the setting of ESRD. Boarding in the emergency department at this time pending bed availability secondary to high patient volumes.  No acute concerns were vocalized and no significant events occurred while under my care.      Final diagnoses:   [K85.80] Other acute pancreatitis, unspecified complication status (HHS-HCC)   [R10.13] Epigastric pain           Procedure  Procedures    Brigitte Sears MD

## 2025-02-24 NOTE — PROGRESS NOTES
Rosa Soto is a 70 y.o. female on day 0 of admission presenting with Acute pancreatitis, unspecified complication status, unspecified pancreatitis type (HHS-HCC).      Subjective   Spoke with patient's daughter over the phone and patient and daughter would like her to go to rehab facility.  Told her that we will work on this with .       Objective     Last Recorded Vitals  /59 (BP Location: Left arm, Patient Position: Lying)   Pulse 77   Temp 36.6 °C (97.9 °F) (Temporal)   Resp 16   Wt 113 kg (249 lb 1.9 oz)   SpO2 96%   Intake/Output last 3 Shifts:    Intake/Output Summary (Last 24 hours) at 2/24/2025 1313  Last data filed at 2/24/2025 1235  Gross per 24 hour   Intake 1200 ml   Output 2411 ml   Net -1211 ml       Admission Weight  Weight: 113 kg (249 lb 1.9 oz) (02/23/25 0846)    Daily Weight  02/23/25 : 113 kg (249 lb 1.9 oz)      Physical Exam:  General: Not in acute distress, alert.  On nasal cannula  HEENT: PERRLA, head intact and normocephalic  Neck: Normal to inspection  Lungs: Clear to auscultation, work of breathing within normal limit  Cardiac: Regular rate and rhythm  Abdomen: Soft nontender, positive bowel sounds  : Exam deferred  Skin: Intact  Hematology: No petechia or excessive ecchymosis  Musculoskeletal: Right upper extremity fistula with a thrill and bruit  Neurological: Alert awake oriented, no focal deficit, cranial nerves grossly intact  Psych: No suicidal ideation or homicidal ideation    Relevant Results  Scheduled medications  amLODIPine, 5 mg, oral, Daily  apixaban, 5 mg, oral, BID  [Held by provider] atorvastatin, 40 mg, oral, Nightly  busPIRone, 5 mg, oral, Nightly  doxepin, 100 mg, oral, Nightly  escitalopram, 10 mg, oral, Daily  insulin lispro, 0-10 Units, subcutaneous, Before meals & nightly  metoprolol succinate XL, 100 mg, oral, q PM  pantoprazole, 40 mg, oral, Daily before breakfast   Or  pantoprazole, 40 mg, intravenous, Daily before  breakfast      Continuous medications     PRN medications  PRN medications: acetaminophen, albuterol, dextrose, dextrose, glucagon, glucagon, HYDROmorphone, melatonin, ondansetron, oxyCODONE, oxyCODONE, polyethylene glycol   Labs  Results from last 7 days   Lab Units 02/24/25  0421 02/23/25  0949 02/20/25  0746   WBC AUTO x10*3/uL 11.3 11.9* 13.7*   HEMOGLOBIN g/dL 7.9* 8.0* 7.9*   HEMATOCRIT % 26.7* 26.4* 25.8*   PLATELETS AUTO x10*3/uL 262 260 195     Results from last 7 days   Lab Units 02/24/25  0421 02/23/25  1335 02/20/25  0746   SODIUM mmol/L 136 134* 137   POTASSIUM mmol/L 5.5* 5.2 4.7   CHLORIDE mmol/L 96* 97* 98   CO2 mmol/L 25 23 30   BUN mg/dL 76* 70* 38*   CREATININE mg/dL 12.74* 12.13* 6.32*   CALCIUM mg/dL 7.9* 7.8* 8.3*   PROTEIN TOTAL g/dL 5.8* 5.6* 5.9*   BILIRUBIN TOTAL mg/dL 0.6 0.5 0.7   ALK PHOS U/L 107 101 71   ALT U/L 22 19 16   AST U/L 25 21 13   GLUCOSE mg/dL 139* 215* 189*       ECG 12 lead    Result Date: 2/24/2025  Sinus rhythm Atrial premature complex Abnormal R-wave progression, late transition    US gallbladder    Result Date: 2/23/2025  INDICATION:  Epigastric abdominal pain, worse with eating. TECHNIQUE:  Ultrasound of the Right Upper Quadrant. COMPARISON:  CT A/P 12/16/2024. FINDINGS:    Pancreas demonstrates normal echogenicity, no pancreas mass. The liver parenchyma is coarse and contour is nodular suggestive of cirrhotic changes.  There is no intrahepatic biliary dilatation.  The gallbladder is surgically absent.  The common bile duct measures 0.6 cm.  The right kidney measures 12.8 cm in length.  Renal cortical thinning is present.  There is no hydronephrosis.  There are no stones.  There are multiple renal cysts present with the largest located within the lower pole and measuring 6.7 x 5.4 x 5.7 cm.    1. Gallbladder surgically absent; biliary tract without significant postsurgical ectasia. 2. Liver is cirrhotic changes unchanged from December 2024. 3. Right kidney lower pole  multiple simple cysts, no hydronephrosis, pancreas unremarkable. Signed by Osmany Land MD    XR chest 2 views    Result Date: 2/23/2025  Interpreted By:  Coy Maradiaga, STUDY: XR CHEST 2 VIEWS; 2/23/2025 10:51 am   INDICATION: Signs/Symptoms:chest pain   COMPARISON: 12/29/2024   ACCESSION NUMBER(S): [id number]   ORDERING CLINICIAN: AYESHA FUENTES   TECHNIQUE: PA and LAT views of the chest were obtained.   FINDINGS: Limited portable view. The cardiomediastinal silhouette appears borderline mildly enlarged but stable.. No definitive acute consolidation or pleural effusion. No pneumothorax.   The osseous structures are intact.       No acute cardiopulmonary process.     Signed by: Coy Maradiaga 2/23/2025 11:37 AM Dictation workstation:   [id number]    CT ankle left wo IV contrast    Result Date: 2/19/2025  Interpreted By:  Charu Corona, STUDY: CT ANKLE LEFT WO IV CONTRAST   INDICATION: Signs/Symptoms:brusing and pain   COMPARISON: None.   ACCESSION NUMBER(S): [id number]   ORDERING CLINICIAN: ELOY VEGA   TECHNIQUE: Contiguous axial CT images of the left ankle were obtained without intravenous contrast administration. Coronal and sagittal reformatted images were performed. 3D reformatted images were created and reviewed.   FINDINGS: OSSEOUS STRUCTURES: Bones demonstrate mild diffuse osseous demineralization limiting evaluation for subtle nondisplaced fractures. Within this limitation, no acute displaced fracture deformity is noted. Ankle mortise is intact. There are mild degenerative changes of the tibiotalar joint with small marginal osteophytes and joint space narrowing. Tarsal bones and intertarsal joint articulations are maintained. Included metatarsals appear grossly unremarkable. There is small plantar calcaneal enthesophyte.   SOFT TISSUES: Evaluation is limited by the lack of intravenous contrast. Within this limitation, there is soft tissue swelling about the lateral aspect of the  ankle. There is a relatively confluence dense fluid collection in the subcutaneous soft tissues about the lateral aspect of the ankle at the level of distal fibular diaphysis/fibular neck measuring up to 4 x 2.5 x 2 cm. This measures up to 55 Hounsfield units and is suggestive of a small hematoma. Evaluation of tendons and ligaments is limited due to CT technique. Within this limitation, flexor, extensor and peroneal compartment tendons appear grossly unremarkable.       1. Soft tissue swelling with a small hematoma measuring up to 4 x 2.5 x 2 cm in the subcutaneous soft tissues about the lateral aspect of the ankle as described above. Otherwise no definite CT evidence of acute fracture or dislocation. 2. Small plantar calcaneal enthesophyte.   MACRO: None.   Signed by: Charu Corona 2/19/2025 1:51 PM Dictation workstation:   [id number]    XR ankle left 3+ views    Result Date: 2/18/2025  * * *Final Report* * * DATE OF EXAM: Feb 18 2025 11:14AM   TWX   [id number]  -  XR ANKLE 3V AP/LAT/OBL LT  / ACCESSION #  [id number] PROCEDURE REASON: Ankle pain, no prior imaging      * * * * Physician Interpretation * * * *  LEFT ANKLE X-RAY   TECHNIQUE: XR ANKLE 3V AP/LAT/OBL LT COMPARISON: 02/17/2025 at 1548 hours INDICATION:  INJURY LEFT ANKLE PAIN LATERAL DISTAL ONE THIRD TIB FIB RESULT: Soft tissue swelling about the ankle again noted.  Ankle mortise and talar dome are intact.  No fracture, dislocation, or osseous erosion. -    IMPRESSION: Soft tissue swelling of the ankle again noted: No significant interval change since 02/17/2025 at 1548 hours. : PSCB   Transcribe Date/Time: Feb 18 2025 11:32A Dictated by : MARLEN WILSON MD This examination was interpreted and the report reviewed and electronically signed by: MARLEN WILSON MD on Feb 18 2025 11:34AM  EST    XR ankle left 3+ views    Result Date: 2/17/2025  * * *Final Report* * * DATE OF EXAM: Feb 17 2025  3:55PM   AKX   [id number]  -  XR ANKLE 3V AP/LAT/OBL LT  /  ACCESSION #  [id number] PROCEDURE REASON: Ankle pain, no prior imaging      * * * * Physician Interpretation * * * *  EXAMINATION:  XR ANKLE 3V AP/LAT/OBL LT CLINICAL HISTORY: Ankle pain, no prior imaging Technique:   XR ANKLE 3V AP/LAT/OBL LT -- LEFT with 3 views on 3 images Comparison: None RESULT: There is no acute fracture or malalignment. There is no significant joint effusion. There is no radiopaque foreign body or soft tissue gas.    IMPRESSION: No acute abnormality. : PSCB   Transcribe Date/Time: Feb 17 2025  5:11P Dictated by : RAY JARRELL MD This examination was interpreted and the report reviewed and electronically signed by: RAY JARRELL MD on Feb 17 2025  5:12PM  EST                    Assessment/Plan   Rosa Soto is a 70 y.o. female on day 0 of admission presenting with Acute pancreatitis, unspecified complication status, unspecified pancreatitis type (HHS-HCC).  Assessment & Plan  Acute pancreatitis, unspecified complication status, unspecified pancreatitis type (HHS-HCC)    Acute pancreatitis  Patient eager to eat regular food  Will try how she does with the regular diet  Multimodality pain control  Unable to give excessive fluid with patient being on dialysis  Pain control    Elevated troponin  Likely secondary to ESRD  Do not believe patient is having ACS    ESRD on hemodialysis  Nephrology consulted for hemodialysis  Potassium elevated    A-fib with radiofrequency ablation in 2/29/2024  Continue with Eliquis, metoprolol  Telemetry    Deconditioning and having issues performing ADLs at home  PT OT evaluation  Daughter would like her placed at a skilled nursing facility if possible with hemodialysis    Type 2 diabetes mellitus  Acute check with sliding scale insulin    Chronic microcytic anemia  Recent hospitalization at [facility] is noted  Outpatient hematology oncology follow-up  Patient is supposed to be getting a bone marrow biopsy with them    DVT prophylaxis  Patient is on Eliquis      Plan discussed with patient at bedside and with patient's daughter who is an RN over the phone from patient's cell phone    High level of MDM based on above issue and discussing plan    This note is created using voice recognition software. All efforts are made to minimize errors, if there are errors there due to transcription.    Guido Dumas  Hospitalist

## 2025-02-24 NOTE — CARE PLAN
Problem: Pain - Adult  Goal: Verbalizes/displays adequate comfort level or baseline comfort level  Outcome: Progressing     Problem: Safety - Adult  Goal: Free from fall injury  Outcome: Progressing     Problem: Discharge Planning  Goal: Discharge to home or other facility with appropriate resources  Outcome: Progressing     Problem: Chronic Conditions and Co-morbidities  Goal: Patient's chronic conditions and co-morbidity symptoms are monitored and maintained or improved  Outcome: Progressing     Problem: Nutrition  Goal: Nutrient intake appropriate for maintaining nutritional needs  Outcome: Progressing     Problem: Diabetes  Goal: Achieve decreasing blood glucose levels by end of shift  Outcome: Progressing  Goal: Increase stability of blood glucose readings by end of shift  Outcome: Progressing  Goal: Decrease in ketones present in urine by end of shift  Outcome: Progressing  Goal: Maintain electrolyte levels within acceptable range throughout shift  Outcome: Progressing  Goal: Maintain glucose levels >70mg/dl to <250mg/dl throughout shift  Outcome: Progressing  Goal: No changes in neurological exam by end of shift  Outcome: Progressing  Goal: Learn about and adhere to nutrition recommendations by end of shift  Outcome: Progressing  Goal: Vital signs within normal range for age by end of shift  Outcome: Progressing  Goal: Increase self care and/or family involovement by end of shift  Outcome: Progressing  Goal: Receive DSME education by end of shift  Outcome: Progressing     Problem: Pain  Goal: Takes deep breaths with improved pain control throughout the shift  Outcome: Progressing  Goal: Turns in bed with improved pain control throughout the shift  Outcome: Progressing  Goal: Walks with improved pain control throughout the shift  Outcome: Progressing  Goal: Performs ADL's with improved pain control throughout shift  Outcome: Progressing  Goal: Participates in PT with improved pain control throughout the  shift  Outcome: Progressing  Goal: Free from opioid side effects throughout the shift  Outcome: Progressing  Goal: Free from acute confusion related to pain meds throughout the shift  Outcome: Progressing     Problem: Fall/Injury  Goal: Not fall by end of shift  Outcome: Progressing  Goal: Be free from injury by end of the shift  Outcome: Progressing  Goal: Verbalize understanding of personal risk factors for fall in the hospital  Outcome: Progressing  Goal: Verbalize understanding of risk factor reduction measures to prevent injury from fall in the home  Outcome: Progressing  Goal: Use assistive devices by end of the shift  Outcome: Progressing  Goal: Pace activities to prevent fatigue by end of the shift  Outcome: Progressing         The patient's goals for the shift include      The clinical goals for the shift include patient will remain hemodynamically stable throughout the shift.

## 2025-02-25 LAB
GLUCOSE BLD MANUAL STRIP-MCNC: 118 MG/DL (ref 74–99)
GLUCOSE BLD MANUAL STRIP-MCNC: 170 MG/DL (ref 74–99)
GLUCOSE BLD MANUAL STRIP-MCNC: 186 MG/DL (ref 74–99)
GLUCOSE BLD MANUAL STRIP-MCNC: 246 MG/DL (ref 74–99)
METHYLMALONATE SERPL-SCNC: 0.9 UMOL/L (ref 0–0.4)

## 2025-02-25 PROCEDURE — 2500000001 HC RX 250 WO HCPCS SELF ADMINISTERED DRUGS (ALT 637 FOR MEDICARE OP)

## 2025-02-25 PROCEDURE — G0378 HOSPITAL OBSERVATION PER HR: HCPCS

## 2025-02-25 PROCEDURE — 2500000001 HC RX 250 WO HCPCS SELF ADMINISTERED DRUGS (ALT 637 FOR MEDICARE OP): Performed by: REGISTERED NURSE

## 2025-02-25 PROCEDURE — 2500000002 HC RX 250 W HCPCS SELF ADMINISTERED DRUGS (ALT 637 FOR MEDICARE OP, ALT 636 FOR OP/ED)

## 2025-02-25 PROCEDURE — 99233 SBSQ HOSP IP/OBS HIGH 50: CPT | Performed by: INTERNAL MEDICINE

## 2025-02-25 PROCEDURE — 82947 ASSAY GLUCOSE BLOOD QUANT: CPT

## 2025-02-25 PROCEDURE — 97162 PT EVAL MOD COMPLEX 30 MIN: CPT | Mod: GP

## 2025-02-25 RX ORDER — CALCIUM CARBONATE 200(500)MG
1000 TABLET,CHEWABLE ORAL 4 TIMES DAILY PRN
Status: DISCONTINUED | OUTPATIENT
Start: 2025-02-25 | End: 2025-02-26 | Stop reason: HOSPADM

## 2025-02-25 RX ADMIN — INSULIN LISPRO 4 UNITS: 100 INJECTION, SOLUTION INTRAVENOUS; SUBCUTANEOUS at 11:46

## 2025-02-25 RX ADMIN — APIXABAN 5 MG: 5 TABLET, FILM COATED ORAL at 08:28

## 2025-02-25 RX ADMIN — DOXEPIN HYDROCHLORIDE 100 MG: 25 CAPSULE ORAL at 20:53

## 2025-02-25 RX ADMIN — BUSPIRONE HYDROCHLORIDE 5 MG: 5 TABLET ORAL at 20:53

## 2025-02-25 RX ADMIN — AMLODIPINE BESYLATE 5 MG: 5 TABLET ORAL at 08:28

## 2025-02-25 RX ADMIN — APIXABAN 5 MG: 5 TABLET, FILM COATED ORAL at 20:54

## 2025-02-25 RX ADMIN — ESCITALOPRAM OXALATE 10 MG: 10 TABLET ORAL at 08:28

## 2025-02-25 RX ADMIN — METOPROLOL SUCCINATE 100 MG: 50 TABLET, EXTENDED RELEASE ORAL at 20:53

## 2025-02-25 RX ADMIN — ANTACID TABLETS 1000 MG: 500 TABLET, CHEWABLE ORAL at 22:22

## 2025-02-25 RX ADMIN — INSULIN LISPRO 2 UNITS: 100 INJECTION, SOLUTION INTRAVENOUS; SUBCUTANEOUS at 17:51

## 2025-02-25 RX ADMIN — INSULIN LISPRO 2 UNITS: 100 INJECTION, SOLUTION INTRAVENOUS; SUBCUTANEOUS at 22:00

## 2025-02-25 RX ADMIN — PANTOPRAZOLE SODIUM 40 MG: 40 TABLET, DELAYED RELEASE ORAL at 06:30

## 2025-02-25 ASSESSMENT — PAIN - FUNCTIONAL ASSESSMENT: PAIN_FUNCTIONAL_ASSESSMENT: 0-10

## 2025-02-25 ASSESSMENT — COGNITIVE AND FUNCTIONAL STATUS - GENERAL
DAILY ACTIVITIY SCORE: 20
WALKING IN HOSPITAL ROOM: A LOT
STANDING UP FROM CHAIR USING ARMS: A LITTLE
MOBILITY SCORE: 18
HELP NEEDED FOR BATHING: A LITTLE
HELP NEEDED FOR BATHING: A LITTLE
STANDING UP FROM CHAIR USING ARMS: A LOT
PERSONAL GROOMING: A LITTLE
TURNING FROM BACK TO SIDE WHILE IN FLAT BAD: A LITTLE
WALKING IN HOSPITAL ROOM: A LITTLE
DRESSING REGULAR UPPER BODY CLOTHING: A LITTLE
CLIMB 3 TO 5 STEPS WITH RAILING: TOTAL
DRESSING REGULAR LOWER BODY CLOTHING: A LITTLE
MOVING TO AND FROM BED TO CHAIR: A LITTLE
MOBILITY SCORE: 17
TOILETING: A LITTLE
MOBILITY SCORE: 13
MOVING TO AND FROM BED TO CHAIR: A LOT
CLIMB 3 TO 5 STEPS WITH RAILING: A LOT
DRESSING REGULAR UPPER BODY CLOTHING: A LITTLE
CLIMB 3 TO 5 STEPS WITH RAILING: A LOT
TURNING FROM BACK TO SIDE WHILE IN FLAT BAD: A LITTLE
MOVING FROM LYING ON BACK TO SITTING ON SIDE OF FLAT BED WITH BEDRAILS: A LITTLE
STANDING UP FROM CHAIR USING ARMS: A LITTLE
TURNING FROM BACK TO SIDE WHILE IN FLAT BAD: A LITTLE
WALKING IN HOSPITAL ROOM: A LOT
DRESSING REGULAR LOWER BODY CLOTHING: A LITTLE
MOVING TO AND FROM BED TO CHAIR: A LITTLE
TOILETING: A LITTLE

## 2025-02-25 ASSESSMENT — ACTIVITIES OF DAILY LIVING (ADL): LACK_OF_TRANSPORTATION: NO

## 2025-02-25 ASSESSMENT — PAIN SCALES - GENERAL
PAINLEVEL_OUTOF10: 2
PAINLEVEL_OUTOF10: 0 - NO PAIN
PAINLEVEL_OUTOF10: 5 - MODERATE PAIN

## 2025-02-25 NOTE — PROGRESS NOTES
Occupational Therapy                 Therapy Communication Note    Patient Name: Rosa Soto  MRN: [id number]  Department: [facility] 2 E OBS  Room: 2311/2311-B  Today's Date: 2/25/2025     Discipline: Occupational Therapy          Missed Visit Reason: Missed Visit Reason: Parent/caregiver refused (OT consult received and chart reviewed. Per pt and pt's daughter, pt is going home on hospice and declined OT services. OT signing off.)    Missed Time: Attempt

## 2025-02-25 NOTE — CARE PLAN
Problem: Mobility  Goal: STG - Patient will ambulate  Description: FWW 50 FT SBA  Outcome: Not Progressing  Goal: Goal 1  Description: 20 REPS AROM/RROM INCREASING STRENGTH TO STABILIZE GAIT  Outcome: Not Progressing     Problem: PT Transfers  Goal: STG - Patient to transfer to and from sit to supine  Description: HOB FLAT USING RAIL SBA  Outcome: Not Progressing  Goal: STG - Patient will transfer sit to and from stand  Description: FWW USING PROPER TECHNIQUE SBA  Outcome: Not Progressing

## 2025-02-25 NOTE — PROGRESS NOTES
Rosa Soto is a 70 y.o. female on day 0 of admission presenting with Acute pancreatitis, unspecified complication status, unspecified pancreatitis type (HHS-HCC).      Subjective   Had a very long discussion with patient and daughter.  Spoke with daughter on the phone at [phone] regarding discharge with hospice.  They would like to set up hospice at home and get her discharged tomorrow after hemodialysis which I think is very reasonable       Objective     Last Recorded Vitals  /71 (BP Location: Left arm, Patient Position: Lying)   Pulse 73   Temp 36.4 °C (97.5 °F)   Resp 20   Wt 113 kg (249 lb 1.9 oz)   SpO2 94%   Intake/Output last 3 Shifts:    Intake/Output Summary (Last 24 hours) at 2/25/2025 1250  Last data filed at 2/24/2025 1705  Gross per 24 hour   Intake 300 ml   Output --   Net 300 ml       Admission Weight  Weight: 113 kg (249 lb 1.9 oz) (02/23/25 0846)    Daily Weight  02/24/25 : 113 kg (249 lb 1.9 oz)      Physical Exam:  General: Not in acute distress, alert.  On nasal cannula  HEENT: PERRLA, head intact and normocephalic  Neck: Normal to inspection  Lungs: Clear to auscultation, work of breathing within normal limit  Cardiac: Regular rate and rhythm  Abdomen: Soft nontender, positive bowel sounds  : Exam deferred  Skin: Intact  Hematology: Bruising of left foot noted  Musculoskeletal: Right upper extremity fistula with a thrill and bruit.  Pain in left foot area from previous injury with bruising  Neurological: Alert awake oriented, no focal deficit, cranial nerves grossly intact  Psych: No suicidal ideation or homicidal ideation    Relevant Results  Scheduled medications  amLODIPine, 5 mg, oral, Daily  apixaban, 5 mg, oral, BID  [Held by provider] atorvastatin, 40 mg, oral, Nightly  busPIRone, 5 mg, oral, Nightly  calcitriol, 2 mcg, oral, Once per day on Monday Wednesday Friday  doxepin, 100 mg, oral, Nightly  escitalopram, 10 mg, oral, Daily  insulin lispro, 0-10 Units,  subcutaneous, Before meals & nightly  metoprolol succinate XL, 100 mg, oral, q PM  pantoprazole, 40 mg, oral, Daily before breakfast   Or  pantoprazole, 40 mg, intravenous, Daily before breakfast      Continuous medications     PRN medications  PRN medications: acetaminophen, albuterol, dextrose, dextrose, glucagon, glucagon, HYDROmorphone, melatonin, ondansetron, oxyCODONE, oxyCODONE, polyethylene glycol   Labs  Results from last 7 days   Lab Units 02/24/25  0421 02/23/25  0949 02/20/25  0746   WBC AUTO x10*3/uL 11.3 11.9* 13.7*   HEMOGLOBIN g/dL 7.9* 8.0* 7.9*   HEMATOCRIT % 26.7* 26.4* 25.8*   PLATELETS AUTO x10*3/uL 262 260 195     Results from last 7 days   Lab Units 02/24/25  0421 02/23/25  1335 02/20/25  0746   SODIUM mmol/L 136 134* 137   POTASSIUM mmol/L 5.5* 5.2 4.7   CHLORIDE mmol/L 96* 97* 98   CO2 mmol/L 25 23 30   BUN mg/dL 76* 70* 38*   CREATININE mg/dL 12.74* 12.13* 6.32*   CALCIUM mg/dL 7.9* 7.8* 8.3*   PROTEIN TOTAL g/dL 5.8* 5.6* 5.9*   BILIRUBIN TOTAL mg/dL 0.6 0.5 0.7   ALK PHOS U/L 107 101 71   ALT U/L 22 19 16   AST U/L 25 21 13   GLUCOSE mg/dL 139* 215* 189*       ECG 12 lead    Result Date: 2/24/2025  Sinus rhythm Atrial premature complex Abnormal R-wave progression, late transition    US gallbladder    Result Date: 2/23/2025  INDICATION:  Epigastric abdominal pain, worse with eating. TECHNIQUE:  Ultrasound of the Right Upper Quadrant. COMPARISON:  CT A/P 12/16/2024. FINDINGS:    Pancreas demonstrates normal echogenicity, no pancreas mass. The liver parenchyma is coarse and contour is nodular suggestive of cirrhotic changes.  There is no intrahepatic biliary dilatation.  The gallbladder is surgically absent.  The common bile duct measures 0.6 cm.  The right kidney measures 12.8 cm in length.  Renal cortical thinning is present.  There is no hydronephrosis.  There are no stones.  There are multiple renal cysts present with the largest located within the lower pole and measuring 6.7 x 5.4 x  5.7 cm.    1. Gallbladder surgically absent; biliary tract without significant postsurgical ectasia. 2. Liver is cirrhotic changes unchanged from December 2024. 3. Right kidney lower pole multiple simple cysts, no hydronephrosis, pancreas unremarkable. Signed by Osmany Land MD    XR chest 2 views    Result Date: 2/23/2025  Interpreted By:  Coy Maradiaga, STUDY: XR CHEST 2 VIEWS; 2/23/2025 10:51 am   INDICATION: Signs/Symptoms:chest pain   COMPARISON: 12/29/2024   ACCESSION NUMBER(S): [id number]   ORDERING CLINICIAN: AYESHA FUENTES   TECHNIQUE: PA and LAT views of the chest were obtained.   FINDINGS: Limited portable view. The cardiomediastinal silhouette appears borderline mildly enlarged but stable.. No definitive acute consolidation or pleural effusion. No pneumothorax.   The osseous structures are intact.       No acute cardiopulmonary process.     Signed by: Coy Maradiaga 2/23/2025 11:37 AM Dictation workstation:   [id number]    CT ankle left wo IV contrast    Result Date: 2/19/2025  Interpreted By:  Charu Corona, STUDY: CT ANKLE LEFT WO IV CONTRAST   INDICATION: Signs/Symptoms:brusing and pain   COMPARISON: None.   ACCESSION NUMBER(S): [id number]   ORDERING CLINICIAN: ELOY VEGA   TECHNIQUE: Contiguous axial CT images of the left ankle were obtained without intravenous contrast administration. Coronal and sagittal reformatted images were performed. 3D reformatted images were created and reviewed.   FINDINGS: OSSEOUS STRUCTURES: Bones demonstrate mild diffuse osseous demineralization limiting evaluation for subtle nondisplaced fractures. Within this limitation, no acute displaced fracture deformity is noted. Ankle mortise is intact. There are mild degenerative changes of the tibiotalar joint with small marginal osteophytes and joint space narrowing. Tarsal bones and intertarsal joint articulations are maintained. Included metatarsals appear grossly unremarkable. There is small plantar  calcaneal enthesophyte.   SOFT TISSUES: Evaluation is limited by the lack of intravenous contrast. Within this limitation, there is soft tissue swelling about the lateral aspect of the ankle. There is a relatively confluence dense fluid collection in the subcutaneous soft tissues about the lateral aspect of the ankle at the level of distal fibular diaphysis/fibular neck measuring up to 4 x 2.5 x 2 cm. This measures up to 55 Hounsfield units and is suggestive of a small hematoma. Evaluation of tendons and ligaments is limited due to CT technique. Within this limitation, flexor, extensor and peroneal compartment tendons appear grossly unremarkable.       1. Soft tissue swelling with a small hematoma measuring up to 4 x 2.5 x 2 cm in the subcutaneous soft tissues about the lateral aspect of the ankle as described above. Otherwise no definite CT evidence of acute fracture or dislocation. 2. Small plantar calcaneal enthesophyte.   MACRO: None.   Signed by: Charu Corona 2/19/2025 1:51 PM Dictation workstation:   [id number]    XR ankle left 3+ views    Result Date: 2/18/2025  * * *Final Report* * * DATE OF EXAM: Feb 18 2025 11:14AM   TWX   5298  -  XR ANKLE 3V AP/LAT/OBL LT  / ACCESSION #  [id number] PROCEDURE REASON: Ankle pain, no prior imaging      * * * * Physician Interpretation * * * *  LEFT ANKLE X-RAY   TECHNIQUE: XR ANKLE 3V AP/LAT/OBL LT COMPARISON: 02/17/2025 at 1548 hours INDICATION:  INJURY LEFT ANKLE PAIN LATERAL DISTAL ONE THIRD TIB FIB RESULT: Soft tissue swelling about the ankle again noted.  Ankle mortise and talar dome are intact.  No fracture, dislocation, or osseous erosion. -    IMPRESSION: Soft tissue swelling of the ankle again noted: No significant interval change since 02/17/2025 at 1548 hours. [facility]   Transcribe Date/Time: Feb 18 2025 11:32A Dictated by : MARLEN WILSON MD This examination was interpreted and the report reviewed and electronically signed by: MARLEN WILSON MD  on Feb 18 2025 11:34AM  EST    XR ankle left 3+ views    Result Date: 2/17/2025  * * *Final Report* * * DATE OF EXAM: Feb 17 2025  3:55PM   AKX   5298  -  XR ANKLE 3V AP/LAT/OBL LT  / ACCESSION #  [id number] PROCEDURE REASON: Ankle pain, no prior imaging      * * * * Physician Interpretation * * * *  EXAMINATION:  XR ANKLE 3V AP/LAT/OBL LT CLINICAL HISTORY: Ankle pain, no prior imaging Technique:   XR ANKLE 3V AP/LAT/OBL LT -- LEFT with 3 views on 3 images Comparison: None RESULT: There is no acute fracture or malalignment. There is no significant joint effusion. There is no radiopaque foreign body or soft tissue gas.    IMPRESSION: No acute abnormality. [facility]   Transcribe Date/Time: Feb 17 2025  5:11P Dictated by : RAY JARRELL MD This examination was interpreted and the report reviewed and electronically signed by: RAY JARRELL MD on Feb 17 2025  5:12PM  EST                    Assessment/Plan   Rosa Soto is a 70 y.o. female on day 0 of admission presenting with Acute pancreatitis, unspecified complication status, unspecified pancreatitis type (HHS-HCC).  Assessment & Plan  Acute pancreatitis, unspecified complication status, unspecified pancreatitis type (HHS-HCC)    Acute pancreatitis  Tolerating over 50% of the food  No need for IV fluids  Continue with pain control    Overall decline in health  Patient and family is interested in hospice  Wants to go home with hospice tomorrow after hemodialysis  Hospice is consulted  CODE STATUS is changed to DNR CC    Elevated troponin  Likely secondary to ESRD  Do not believe patient is having ACS    ESRD on hemodialysis  Repeat labs tomorrow  Family wants to do hemodialysis tomorrow and discharge afterwards to home with home hospice    A-fib with radiofrequency ablation in 2/29/2024  Continue with Eliquis, metoprolol  Discontinue telemetry    Deconditioning and having issues performing ADLs at home  Family would like to take patient home with hospice tomorrow  after dialysis    Type 2 diabetes mellitus  Acute check with sliding scale insulin    Chronic microcytic anemia  Recent hospitalized at [facility] as noted  No further workup at this point if family is going with hospice    DVT prophylaxis  Patient is on Eliquis     Plan discussed with patient at bedside and with patient's Andrew who is medical power of  at [phone]  CODE STATUS is DNR CC  Disposition: Discharge tomorrow after hemodialysis with hospice at home.  High level of MDM based on above issue and discussing plan    This note is created using voice recognition software. All efforts are made to minimize errors, if there are errors there due to transcription.    Guido Dumas  Hospitalist

## 2025-02-25 NOTE — PROGRESS NOTES
Physical Therapy    Physical Therapy Evaluation    Patient Name: Rosa Soto  MRN: [id number]  Department: [facility] E OBS  Room: [facility]2311-B  Today's Date: 2/25/2025   Time Calculation  Start Time: 1129  Stop Time: 1150  Time Calculation (min): 21 min    Assessment/Plan   PT Assessment  PT Assessment Results: Decreased strength, Decreased endurance, Impaired balance, Decreased mobility, Decreased safety awareness  Rehab Prognosis: Fair  Barriers to Discharge Home: Caregiver assistance, Cognition needs, Physical needs  Caregiver Assistance: Patient lives alone and/or does not have reliable caregiver assistance  Cognition Needs: 24hr supervision for safety awareness needed, Medication and/or medical management daily assist needed  Physical Needs: In-home setup navigation limited by function/safety, Ambulating household distances limited by function/safety, Intermittent mobility assistance needed, Intermittent ADL assistance needed, High falls risk due to function or environment  Evaluation/Treatment Tolerance: Patient limited by fatigue  End of Session Communication: Bedside nurse (ANGELICA MEJIAS)  Assessment Comment: MIN A FOR AMB W/ FW IN ROOM FATIGUED & MILD SOB W/ AMB, 1 A FOR BED MOBILITY, FALLRISK RECOMEND MOD REHAB ON DISCH  End of Session Patient Position: Bed, 3 rail up, Alarm on (CALL LIGHT IN REACH)  IP OR SWING BED PT PLAN  Inpatient or Swing Bed: Inpatient  PT Plan  Treatment/Interventions: Bed mobility, Transfer training, Gait training, Strengthening, Endurance training  PT Plan: Ongoing PT  PT Frequency: 4 times per week  PT Discharge Recommendations: Moderate intensity level of continued care  Equipment Recommended upon Discharge:  (TBD)  PT Recommended Transfer Status: Assist x1 (FWW)  PT - OK to Discharge: Yes (WHENMEDICALLY CLEARED)    Subjective   General Visit Information:  General  Reason for Referral: IMPAIRED MOBITIY GAIT TRAINING  Referred By: SANTINO  Past Medical History Relevant to Rehab: CHEST  PAIN; DX: PANCREATITIS, INCREASED TROPONIN; HX: AFIB, HTN, CKD, ESRD ON HD, ABDOMINAL PARACENTESIS, NSTEMI, CHF, MDD, YELENA, ANEMIA, BLADDER CA, SLID OUT OF CHAIR A FEW MO AGO- NEEDED ASSIST TO GET UP  Prior to Session Communication: Bedside nurse (OK FOR THERAPY)  Patient Position Received: Bed, 3 rail up, Alarm on (ROOM 2311-B, ALERT IV  2L O2 NC, AGREES TO THERAPY)  Home Living:  Home Living  Home Living Comments: LIVES ALONE  APT, 0STE, ROLLATOR AMB, IN DEP ADL BUT IT HAS BEEN GETTING MORE DIFFICULT TO DO, TUB SHOWER/SEAT, VERYDIFFICULT TO DO CHORES,  FRANK A LOT OF DOOR DASH, SLEEPS IN HER BED OR ON THE COUCH, DTR TAKES TO APPTS  Prior Level of Function:     Precautions:  Precautions  Medical Precautions: Fall precautions, Oxygen therapy device and L/min               Objective   Pain:  Pain Assessment  0-10 (Numeric) Pain Score: 5 - Moderate pain (ABDOMINAL PAIN W/ EATING)  Cognition:  Cognition  Overall Cognitive Status: Within Functional Limits  Arousal/Alertness: Appropriate responses to stimuli  Orientation Level: Oriented X4  Following Commands: Follows one step commands without difficulty  Safety Judgment: Good awareness of safety precautions  Problem Solving: Assistance required to generate solutions  Attention: Within Functional Limits  Safety/Judgement: Exceptions to WFL  Complex Functional Tasks: Minimal  Novel Situations: Minimal  Routine Tasks: Minimal    General Assessments:                  Activity Tolerance  Endurance: Decreased tolerance for upright activites    Sensation  Sensation Comment: L FOOT NUMBNESS    Strength  Strength Comments: ROM LEGS WFL, STRENGTH IN LWGS 3-/5 IN HIPS 3/5 IN KNEES/ANKLES  Strength  Strength Comments: ROM LEGS WFL, STRENGTH IN LWGS 3-/5 IN HIPS 3/5 IN KNEES/ANKLES                     Static Sitting Balance  Static Sitting-Comment/Number of Minutes: FAIR  Dynamic Sitting Balance  Dynamic Sitting-Comments: FAIR/FAIR-    Static Standing Balance  Static  Standing-Comment/Number of Minutes: FAIR-  Dynamic Standing Balance  Dynamic Standing-Comments: FAIR-  Functional Assessments:  ADL  ADL's Addressed:  (MAX A TO DON SOCKS TODAY)    Bed Mobility  Bed Mobility:  (SUPINE>SIT SBAUSING RAILS, HOB 40 DEGREES, SITS EOB SBA, TOTAL 5 MIN, SIT>SUPNE MIN A W/ LEGS)    Transfers  Transfer:  (FWW SIT<>STAND MIN A)    Ambulation/Gait Training  Ambulation/Gait Training Performed:  (FWW AMB 20 FT W/ MIN A, FATIGUED W/ AMB MILD SOB)  Extremity/Trunk Assessments:     Outcome Measures:  [facility] Basic Mobility  Turning from your back to your side while in a flat bed without using bedrails: A little  Moving from lying on your back to sitting on the side of a flat bed without using bedrails: A little  Moving to and from bed to chair (including a wheelchair): A lot  Standing up from a chair using your arms (e.g. wheelchair or bedside chair): A lot  To walk in hospital room: A lot  Climbing 3-5 steps with railing: Total  Basic Mobility - Total Score: 13    Encounter Problems       Encounter Problems (Active)       Mobility       STG - Patient will ambulate (Not Progressing)       Start:  02/25/25    Expected End:  03/04/25       FWW 50 FT SBA         Goal 1 (Not Progressing)       Start:  02/25/25    Expected End:  03/18/25       20 REPS AROM/RROM INCREASING STRENGTH TO STABILIZE GAIT            PT Transfers       STG - Patient to transfer to and from sit to supine (Not Progressing)       Start:  02/25/25    Expected End:  02/28/25       HOB FLAT USING RAIL SBA         STG - Patient will transfer sit to and from stand (Not Progressing)       Start:  02/25/25    Expected End:  03/03/25       FWW USING PROPER TECHNIQUE SBA            Pain - Adult              Education Documentation  Mobility Training, taught by Ifeoma Quesada, PT at 2/25/2025  2:05 PM.  Learner: Patient  Readiness: Acceptance  Method: Explanation  Response: Needs Reinforcement  Comment: FWW SAFETY    Education Comments  No  comments found.

## 2025-02-25 NOTE — PROGRESS NOTES
02/25/25 1010   Discharge Planning   Living Arrangements Alone   Support Systems Family members   Assistance Needed had been independnet in bathing, dressing some cooking, daughter and granddaughter clean. Last fall from recliner due to be stuck on recliner and slid to floor.   Type of Residence Private residence   Number of Stairs to Enter Residence 0   Number of Stairs Within Residence 0   Do you have animals or pets at home? Yes   Type of Animals or Pets dog   Who is requesting discharge planning? Provider   Home or Post Acute Services None  (insurance sends NP out when needed.)   Expected Discharge Disposition Home   Does the patient need discharge transport arranged? No   Financial Resource Strain   How hard is it for you to pay for the very basics like food, housing, medical care, and heating? Not hard   Housing Stability   In the last 12 months, was there a time when you were not able to pay the mortgage or rent on time? N   In the past 12 months, how many times have you moved where you were living? 0   At any time in the past 12 months, were you homeless or living in a shelter (including now)? N   Transportation Needs   In the past 12 months, has lack of transportation kept you from medical appointments or from getting medications? no   In the past 12 months, has lack of transportation kept you from meetings, work, or from getting things needed for daily living? No   Patient Choice   Provider Choice list and CMS website (https://medicare.gov/care-compare#search) for post-acute Quality and Resource Measure Data were provided and reviewed with: Patient;Family   Stroke Family Assessment   Stroke Family Assessment Needed No   Intensity of Service   Intensity of Service >30 min     Spoke with pt, role of TCC explained, demographics confirmed. PCP- Toño with a phone conversation on 2/21/25. Pharmacy- [facility]- takes meds as prescribed, able to afford and obtain. Reports being diabetic but not  "on meds, PCP checks A1C and not on meds since being on HD. Receives HD MWF at [facility] in [facility]. Pt states, \" I normally don't go on Friday because dialysis wears me out, it takes a lot out of you and by then I am just done by then. Sometimes I go Friday if I miss Wednesday. Per notes daughter feels pt will need sNF on discharge. Pt states, \"Well normally she is the one you call because she and my other daughter usually know and say what's going on with me. They forget I'm the mom but its okay at least I know they care. I don't know what she is talking about. I mean I am weak and am getting weaker but you will have to talk to her to get her way of thinking.\" Requested PT/OT to see pt. CT will follow.     1030- Spoke with daughter, role of TCC explained. Daughter reports that plan for SNF has changed. That pt PCP discussed Hospice with her over the phone this week and that pt and family are leaning heavily toward same. States, \"It will be hospice at home. We haven't made any final decisions. But she is not going to get better she is just gettign worse and worse and worse and worse. She's not able to live alone, she's just not. And even rehab wont make her strong enough to live alone. She doesn't want to live in a nursing home and we don't want that either. We can't watch her go through anymore pain. She has the cirrotic liver, now the pancreatitis, she needs a bone marrow biopsy for anemia, that's likely cancer. She has the ESRD and she isnt strong enough to withstand cancer treatment. It wouldn't increase her quality of life, but instead it is severely decreasing it. I work from home and need to finish some things up here and then will eb going into the hospital. I already have a hospice company it would be [facility] the contact there is Andrew Baxter [phone].\" Reached out to hospitalist who reports that pal care consult is already placed. CT will follow.     1235- Per Hospitalist, \"Spoke " "with the daughter and plan is to discharge tomorrow after dialysis with hospice at home.\" SW on to assist in same. CT will follow.   "

## 2025-02-25 NOTE — PROGRESS NOTES
ISSAC met with pt and daughter at bedside this morning, notified both are expressing interest in Hospice at [place]. SW introduced self and role with Care Transitions, confirmed pt is wanting to DC with Hospice care and agency of choice is [facility]. Dr. Dumas spoke with daughter and pt, plan is for pt to receive one more dialysis session in the morning and DC home with Hospice. SW contacted [facility], requested referral be sent via fax. SW faxed referral, agency to review, SW to continue following.     Tristin Wilson, MSW, LSW ([id number])   Care Transitions

## 2025-02-25 NOTE — PROGRESS NOTES
"      Nephrology Progress Note      Nephrology following for ESRD.   Events over night:     Pt has made decision for hospice at home    /71 (BP Location: Left arm, Patient Position: Lying)   Pulse 73   Temp 36.4 °C (97.5 °F)   Resp 20   Ht 1.702 m (5' 7\")   Wt 113 kg (249 lb 1.9 oz)   SpO2 94%   BMI 39.02 kg/m²     Input / Output:  24 HR:   Intake/Output Summary (Last 24 hours) at 2/25/2025 1358  Last data filed at 2/24/2025 1705  Gross per 24 hour   Intake 300 ml   Output --   Net 300 ml       Physical Exam   Alert and oriented x 3 NAD  Neck: no JVD  CV: RRR  Lungs: CTA bilaterally  Abd: soft, NT, ND   Ext: + lower extremity edema    Scheduled medications  amLODIPine, 5 mg, oral, Daily  apixaban, 5 mg, oral, BID  [Held by provider] atorvastatin, 40 mg, oral, Nightly  busPIRone, 5 mg, oral, Nightly  calcitriol, 2 mcg, oral, Once per day on Monday Wednesday Friday  doxepin, 100 mg, oral, Nightly  escitalopram, 10 mg, oral, Daily  insulin lispro, 0-10 Units, subcutaneous, Before meals & nightly  metoprolol succinate XL, 100 mg, oral, q PM  pantoprazole, 40 mg, oral, Daily before breakfast   Or  pantoprazole, 40 mg, intravenous, Daily before breakfast      Continuous medications     PRN medications  PRN medications: acetaminophen, albuterol, dextrose, dextrose, glucagon, glucagon, HYDROmorphone, melatonin, ondansetron, oxyCODONE, oxyCODONE, polyethylene glycol   Results from last 7 days   Lab Units 02/24/25  0421   SODIUM mmol/L 136   POTASSIUM mmol/L 5.5*   CHLORIDE mmol/L 96*   CO2 mmol/L 25   BUN mg/dL 76*   CREATININE mg/dL 12.74*   CALCIUM mg/dL 7.9*   PROTEIN TOTAL g/dL 5.8*   BILIRUBIN TOTAL mg/dL 0.6   ALK PHOS U/L 107   ALT U/L 22   AST U/L 25   GLUCOSE mg/dL 139*           Results from last 7 days   Lab Units 02/24/25  0421 02/23/25  0949 02/20/25  0746   WBC AUTO x10*3/uL 11.3 11.9* 13.7*   HEMOGLOBIN g/dL 7.9* 8.0* 7.9*   HEMATOCRIT % 26.7* 26.4* 25.8*   PLATELETS AUTO x10*3/uL 262 260 195    "     Assessment & Plan:          Patient is 70 y.o. female history of ESRD, bladder cancer, Atrial fibrillationwho is admitted to hospital for acute pancreatitis. Nephrology consulted in view of ESRD.     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     Lorena Guzman DO  2/25/2025  1:58 PM     [facility]    [address], [address]   [place] [address]  Office: [phone]

## 2025-02-26 ENCOUNTER — PHARMACY VISIT (OUTPATIENT)
Dept: PHARMACY | Facility: CLINIC | Age: 70
End: 2025-02-26
Payer: MEDICARE

## 2025-02-26 ENCOUNTER — APPOINTMENT (OUTPATIENT)
Dept: DIALYSIS | Facility: HOSPITAL | Age: 70
End: 2025-02-26
Payer: COMMERCIAL

## 2025-02-26 VITALS
RESPIRATION RATE: 18 BRPM | HEART RATE: 86 BPM | TEMPERATURE: 98.4 F | SYSTOLIC BLOOD PRESSURE: 130 MMHG | BODY MASS INDEX: 39.1 KG/M2 | OXYGEN SATURATION: 100 % | WEIGHT: 249.12 LBS | HEIGHT: 67 IN | DIASTOLIC BLOOD PRESSURE: 68 MMHG

## 2025-02-26 LAB
ANION GAP SERPL CALC-SCNC: 17 MMOL/L (ref 10–20)
BASOPHILS # BLD AUTO: 0.06 X10*3/UL (ref 0–0.1)
BASOPHILS NFR BLD AUTO: 0.8 %
BUN SERPL-MCNC: 42 MG/DL (ref 6–23)
CALCIUM SERPL-MCNC: 9.1 MG/DL (ref 8.6–10.3)
CHLORIDE SERPL-SCNC: 91 MMOL/L (ref 98–107)
CO2 SERPL-SCNC: 31 MMOL/L (ref 21–32)
CREAT SERPL-MCNC: 9.42 MG/DL (ref 0.5–1.05)
EGFRCR SERPLBLD CKD-EPI 2021: 4 ML/MIN/1.73M*2
ELECTRONICALLY SIGNED BY: NORMAL
EOSINOPHIL # BLD AUTO: 0.56 X10*3/UL (ref 0–0.7)
EOSINOPHIL NFR BLD AUTO: 7.2 %
ERYTHROCYTE [DISTWIDTH] IN BLOOD BY AUTOMATED COUNT: 17.7 % (ref 11.5–14.5)
GLUCOSE BLD MANUAL STRIP-MCNC: 107 MG/DL (ref 74–99)
GLUCOSE SERPL-MCNC: 131 MG/DL (ref 74–99)
HCT VFR BLD AUTO: 25.4 % (ref 36–46)
HGB BLD-MCNC: 7.6 G/DL (ref 12–16)
IMM GRANULOCYTES # BLD AUTO: 0.05 X10*3/UL (ref 0–0.7)
IMM GRANULOCYTES NFR BLD AUTO: 0.6 % (ref 0–0.9)
LYMPHOCYTES # BLD AUTO: 1.48 X10*3/UL (ref 1.2–4.8)
LYMPHOCYTES NFR BLD AUTO: 19 %
MAGNESIUM SERPL-MCNC: 2.13 MG/DL (ref 1.6–2.4)
MCH RBC QN AUTO: 31.5 PG (ref 26–34)
MCHC RBC AUTO-ENTMCNC: 29.9 G/DL (ref 32–36)
MCV RBC AUTO: 105 FL (ref 80–100)
MONOCYTES # BLD AUTO: 1.24 X10*3/UL (ref 0.1–1)
MONOCYTES NFR BLD AUTO: 15.9 %
MYELOID NGS RESULTS: NORMAL
NEUTROPHILS # BLD AUTO: 4.39 X10*3/UL (ref 1.2–7.7)
NEUTROPHILS NFR BLD AUTO: 56.5 %
NRBC BLD-RTO: 0 /100 WBCS (ref 0–0)
PLATELET # BLD AUTO: 255 X10*3/UL (ref 150–450)
POTASSIUM SERPL-SCNC: 4.5 MMOL/L (ref 3.5–5.3)
RBC # BLD AUTO: 2.41 X10*6/UL (ref 4–5.2)
SODIUM SERPL-SCNC: 134 MMOL/L (ref 136–145)
WBC # BLD AUTO: 7.8 X10*3/UL (ref 4.4–11.3)

## 2025-02-26 PROCEDURE — RXMED WILLOW AMBULATORY MEDICATION CHARGE

## 2025-02-26 PROCEDURE — 80048 BASIC METABOLIC PNL TOTAL CA: CPT | Performed by: INTERNAL MEDICINE

## 2025-02-26 PROCEDURE — 99239 HOSP IP/OBS DSCHRG MGMT >30: CPT | Performed by: INTERNAL MEDICINE

## 2025-02-26 PROCEDURE — 36415 COLL VENOUS BLD VENIPUNCTURE: CPT | Performed by: INTERNAL MEDICINE

## 2025-02-26 PROCEDURE — 2500000001 HC RX 250 WO HCPCS SELF ADMINISTERED DRUGS (ALT 637 FOR MEDICARE OP)

## 2025-02-26 PROCEDURE — 83735 ASSAY OF MAGNESIUM: CPT | Performed by: INTERNAL MEDICINE

## 2025-02-26 PROCEDURE — 85025 COMPLETE CBC W/AUTO DIFF WBC: CPT | Performed by: INTERNAL MEDICINE

## 2025-02-26 PROCEDURE — 2500000001 HC RX 250 WO HCPCS SELF ADMINISTERED DRUGS (ALT 637 FOR MEDICARE OP): Performed by: INTERNAL MEDICINE

## 2025-02-26 PROCEDURE — 90937 HEMODIALYSIS REPEATED EVAL: CPT

## 2025-02-26 PROCEDURE — 82947 ASSAY GLUCOSE BLOOD QUANT: CPT | Mod: 59

## 2025-02-26 PROCEDURE — G0378 HOSPITAL OBSERVATION PER HR: HCPCS

## 2025-02-26 PROCEDURE — 96376 TX/PRO/DX INJ SAME DRUG ADON: CPT | Mod: 59

## 2025-02-26 PROCEDURE — 2500000004 HC RX 250 GENERAL PHARMACY W/ HCPCS (ALT 636 FOR OP/ED)

## 2025-02-26 RX ORDER — LORAZEPAM 0.5 MG/1
0.5 TABLET ORAL 3 TIMES DAILY PRN
Qty: 30 TABLET | Refills: 0 | Status: SHIPPED | OUTPATIENT
Start: 2025-02-26

## 2025-02-26 RX ORDER — MORPHINE SULFATE 20 MG/ML
5 SOLUTION ORAL EVERY 4 HOURS PRN
Qty: 30 ML | Refills: 0 | Status: SHIPPED | OUTPATIENT
Start: 2025-02-26

## 2025-02-26 RX ADMIN — ESCITALOPRAM OXALATE 10 MG: 10 TABLET ORAL at 08:14

## 2025-02-26 RX ADMIN — APIXABAN 5 MG: 5 TABLET, FILM COATED ORAL at 08:14

## 2025-02-26 RX ADMIN — CALCITRIOL CAPSULES 0.25 MCG 2 MCG: 0.25 CAPSULE ORAL at 08:14

## 2025-02-26 RX ADMIN — AMLODIPINE BESYLATE 5 MG: 5 TABLET ORAL at 08:14

## 2025-02-26 RX ADMIN — ONDANSETRON 4 MG: 2 INJECTION INTRAMUSCULAR; INTRAVENOUS at 05:17

## 2025-02-26 RX ADMIN — PANTOPRAZOLE SODIUM 40 MG: 40 TABLET, DELAYED RELEASE ORAL at 05:29

## 2025-02-26 ASSESSMENT — PAIN SCALES - GENERAL: PAINLEVEL_OUTOF10: 0 - NO PAIN

## 2025-02-26 ASSESSMENT — COGNITIVE AND FUNCTIONAL STATUS - GENERAL
MOVING TO AND FROM BED TO CHAIR: A LITTLE
DAILY ACTIVITIY SCORE: 20
TOILETING: A LITTLE
HELP NEEDED FOR BATHING: A LITTLE
STANDING UP FROM CHAIR USING ARMS: A LITTLE
TURNING FROM BACK TO SIDE WHILE IN FLAT BAD: A LITTLE
WALKING IN HOSPITAL ROOM: A LITTLE
MOBILITY SCORE: 18
DRESSING REGULAR LOWER BODY CLOTHING: A LITTLE
CLIMB 3 TO 5 STEPS WITH RAILING: A LOT
DRESSING REGULAR UPPER BODY CLOTHING: A LITTLE

## 2025-02-26 NOTE — CARE PLAN
The clinical goals for the shift include maintain patient safety    Problem: Pain - Adult  Goal: Verbalizes/displays adequate comfort level or baseline comfort level  Outcome: Progressing     Problem: Safety - Adult  Goal: Free from fall injury  Outcome: Progressing     Problem: Discharge Planning  Goal: Discharge to home or other facility with appropriate resources  Outcome: Progressing     Problem: Chronic Conditions and Co-morbidities  Goal: Patient's chronic conditions and co-morbidity symptoms are monitored and maintained or improved  Outcome: Progressing     Problem: Nutrition  Goal: Nutrient intake appropriate for maintaining nutritional needs  Outcome: Progressing     Problem: Diabetes  Goal: Achieve decreasing blood glucose levels by end of shift  Outcome: Progressing  Goal: Increase stability of blood glucose readings by end of shift  Outcome: Progressing  Goal: Decrease in ketones present in urine by end of shift  Outcome: Progressing  Goal: Maintain electrolyte levels within acceptable range throughout shift  Outcome: Progressing  Goal: Maintain glucose levels >70mg/dl to <250mg/dl throughout shift  Outcome: Progressing  Goal: No changes in neurological exam by end of shift  Outcome: Progressing  Goal: Learn about and adhere to nutrition recommendations by end of shift  Outcome: Progressing  Goal: Vital signs within normal range for age by end of shift  Outcome: Progressing  Goal: Increase self care and/or family involovement by end of shift  Outcome: Progressing  Goal: Receive DSME education by end of shift  Outcome: Progressing     Problem: Pain  Goal: Takes deep breaths with improved pain control throughout the shift  Outcome: Progressing  Goal: Turns in bed with improved pain control throughout the shift  Outcome: Progressing  Goal: Walks with improved pain control throughout the shift  Outcome: Progressing  Goal: Performs ADL's with improved pain control throughout shift  Outcome: Progressing  Goal:  Participates in PT with improved pain control throughout the shift  Outcome: Progressing  Goal: Free from opioid side effects throughout the shift  Outcome: Progressing  Goal: Free from acute confusion related to pain meds throughout the shift  Outcome: Progressing     Problem: Fall/Injury  Goal: Not fall by end of shift  Outcome: Progressing  Goal: Be free from injury by end of the shift  Outcome: Progressing  Goal: Verbalize understanding of personal risk factors for fall in the hospital  Outcome: Progressing  Goal: Verbalize understanding of risk factor reduction measures to prevent injury from fall in the home  Outcome: Progressing  Goal: Use assistive devices by end of the shift  Outcome: Progressing  Goal: Pace activities to prevent fatigue by end of the shift  Outcome: Progressing

## 2025-02-26 NOTE — PROGRESS NOTES
"      Nephrology Progress Note      Nephrology following for ESRD.   Events over night:     Pt has made decision for hospice at home  -Patient seen on dialysis today she did tell me this is her last dialysis treatment    /61 (BP Location: Left arm, Patient Position: Lying)   Pulse 71   Temp 36.7 °C (98 °F) (Temporal)   Resp 18   Ht 1.702 m (5' 7\")   Wt 113 kg (249 lb 1.9 oz)   SpO2 97%   BMI 39.02 kg/m²     Input / Output:  24 HR:   Intake/Output Summary (Last 24 hours) at 2/26/2025 1255  Last data filed at 2/26/2025 1210  Gross per 24 hour   Intake 1200 ml   Output 2408 ml   Net -1208 ml       Physical Exam   Alert and oriented x 3 NAD  Neck: no JVD  CV: RRR  Lungs: CTA bilaterally  Abd: soft, NT, ND   Ext: + lower extremity edema    Scheduled medications  amLODIPine, 5 mg, oral, Daily  apixaban, 5 mg, oral, BID  [Held by provider] atorvastatin, 40 mg, oral, Nightly  busPIRone, 5 mg, oral, Nightly  calcitriol, 2 mcg, oral, Once per day on Monday Wednesday Friday  doxepin, 100 mg, oral, Nightly  escitalopram, 10 mg, oral, Daily  insulin lispro, 0-10 Units, subcutaneous, Before meals & nightly  metoprolol succinate XL, 100 mg, oral, q PM  pantoprazole, 40 mg, oral, Daily before breakfast   Or  pantoprazole, 40 mg, intravenous, Daily before breakfast      Continuous medications     PRN medications  PRN medications: acetaminophen, albuterol, calcium carbonate, dextrose, dextrose, glucagon, glucagon, HYDROmorphone, melatonin, ondansetron, oxyCODONE, oxyCODONE, polyethylene glycol   Results from last 7 days   Lab Units 02/26/25  0517 02/24/25  0421   SODIUM mmol/L 134* 136   POTASSIUM mmol/L 4.5 5.5*   CHLORIDE mmol/L 91* 96*   CO2 mmol/L 31 25   BUN mg/dL 42* 76*   CREATININE mg/dL 9.42* 12.74*   CALCIUM mg/dL 9.1 7.9*   PROTEIN TOTAL g/dL  --  5.8*   BILIRUBIN TOTAL mg/dL  --  0.6   ALK PHOS U/L  --  107   ALT U/L  --  22   AST U/L  --  25   GLUCOSE mg/dL 131* 139*      Results from last 7 days   Lab Units " 02/26/25  0517   MAGNESIUM mg/dL 2.13      Results from last 7 days   Lab Units 02/26/25  0517 02/24/25  0421 02/23/25  0949   WBC AUTO x10*3/uL 7.8 11.3 11.9*   HEMOGLOBIN g/dL 7.6* 7.9* 8.0*   HEMATOCRIT % 25.4* 26.7* 26.4*   PLATELETS AUTO x10*3/uL 255 262 260        Assessment & Plan:          Patient is 70 y.o. female history of ESRD, bladder cancer, Atrial fibrillationwho is admitted to hospital for acute pancreatitis. Nephrology consulted in view of ESRD.     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     Lorena Guzman DO  2/26/2025  12:55 PM     [facility]    [address], [address]   [place] [address]  Office: [phone]

## 2025-02-26 NOTE — POST-PROCEDURE NOTE
Report to Receiving RN:    Report To: Michelle Hurley RN  Time Report Called: 1231  Hand-Off Communication:   Pt tolerated tx well. Removed 2L  Post vitals: 106/48 (71) - 121.7 kg - 98*F  Complications During Treatment: No  Ultrafiltration Treatment: No  Medications Administered During Dialysis: No  Blood Products Administered During Dialysis: No  Labs Sent During Dialysis: No  Heparin Drip Rate Changes: N/A  Dialysis Catheter Dressing: DAVIDA AVF      Electronic Signatures:  Amber Schuler OCDT    Last Updated: 12:32 PM by AMBER SCHULER

## 2025-02-26 NOTE — PROGRESS NOTES
SW received call from Andrew at [facility]. Informed this writer agency able to accept pt, SW confirmed they can have a nurse admit pt at home today. SW notified Dr. Dumas and bedside RN, floor to arrange transport home. No other SW needs identified at this time, SW available should additional needs arise.    Tristin Wilson, MSW, LSW ([id number])   Care Transitions

## 2025-02-26 NOTE — PROGRESS NOTES
Physical Therapy                 Therapy Communication Note    Patient Name: Rosa Soto  MRN: [id number]  Department: [facility] 2 E OBS  Room: 2311/2311-B  Today's Date: 2/26/2025     Discipline: Physical Therapy    PT Missed Visit: Yes     Missed Visit Reason: PT tx deferred as pt discaharging home today with Hospice care.    Missed Time: Attempt

## 2025-02-26 NOTE — HOSPITAL COURSE
70-year-old female with past medical history of hypertension, non-STEMI atrial fibrillation status post radiofrequency ablation on Eliquis, hyperlipidemia, chronic diastolic heart failure, ESRD on dialysis, chronic microcytic anemia, bladder cancer s/p resection, MDD, YELENA presented for epigastric pain and was found to have acute pancreatitis.  Patient was recently admitted for anemia at [facility] requiring 2 units of packed RBC and workup was performed.  Patient was referred to hematology oncology as outpatient.  She also had a fall and left foot pain.  Here in the hospital she was admitted and treated for pancreatitis once able to tolerate diet.  Patient and family has decided to go with hospice and they have been contemplating this for a while with her refusal of going to dialysis and just wanting to focus on comfort care.  Both patient's daughter are in agreement and want to take her home after dialysis on Wednesday, 2/26/2025 with hospice at home.  Patient is prescribed comfort care medication and she will be stopping dialysis after today.    44 minutes spent in discharge timing

## 2025-02-26 NOTE — DISCHARGE SUMMARY
Discharge Diagnosis  Acute pancreatitis, unspecified complication status, unspecified pancreatitis type ([facility]-HCC)    Issues Requiring Follow-Up  Follow-up with primary care provider as needed  You are being discharged home with hospice    Discharge Meds     Medication List      START taking these medications     LORazepam 0.5 mg tablet; Commonly known as: Ativan; Take 1 tablet (0.5   mg) by mouth 3 times a day as needed for anxiety.   morphine 20 mg/mL concentrated oral solution; Take 0.25 mL (5 mg) by   mouth every 4 hours if needed for severe pain (7 - 10), shortness of   breath, discomfort or moderate pain (4 - 6).     CONTINUE taking these medications     acetaminophen 325 mg tablet; Commonly known as: Tylenol; Take 3 tablets   (975 mg) by mouth every 8 hours if needed (breakthrough pain).   albuterol 90 mcg/actuation inhaler; Inhale 1 puff every 4 hours if   needed for shortness of breath.   amLODIPine 5 mg tablet; Commonly known as: Norvasc   atorvastatin 40 mg tablet; Commonly known as: Lipitor   Auryxia 210 mg iron tablet; Generic drug: ferric citrate   busPIRone 5 mg tablet; Commonly known as: Buspar   cholecalciferol 25 mcg (1000 units) tablet; Commonly known as: Vitamin   D-3   cinacalcet 30 mg tablet; Commonly known as: Sensipar   doxepin 100 mg capsule; Commonly known as: SINEquan   Eliquis 5 mg tablet; Generic drug: apixaban   escitalopram 10 mg tablet; Commonly known as: Lexapro   metoprolol succinate  mg 24 hr tablet; Commonly known as:   Toprol-XL; Take 1 tablet (100 mg) by mouth once daily in the evening. Do   not crush or chew.   Nephro-Gloria 0.8 mg tablet; Generic drug: B complex-vitamin C-folic acid   oxyCODONE 5 mg immediate release tablet; Commonly known as: Roxicodone;   Take 1 tablet (5 mg) by mouth every 6 hours if needed for severe pain (7 -   10).   torsemide 20 mg tablet; Commonly known as: Demadex       Test Results Pending At Discharge  Pending Labs       No current pending labs.             Hospital Course  70-year-old female with past medical history of hypertension, non-STEMI atrial fibrillation status post radiofrequency ablation on Eliquis, hyperlipidemia, chronic diastolic heart failure, ESRD on dialysis, chronic microcytic anemia, bladder cancer s/p resection, MDD, YELENA presented for epigastric pain and was found to have acute pancreatitis.  Patient was recently admitted for anemia at [facility] requiring 2 units of packed RBC and workup was performed.  Patient was referred to hematology oncology as outpatient.  She also had a fall and left foot pain.  Here in the hospital she was admitted and treated for pancreatitis once able to tolerate diet.  Patient and family has decided to go with hospice and they have been contemplating this for a while with her refusal of going to dialysis and just wanting to focus on comfort care.  Both patient's daughter are in agreement and want to take her home after dialysis on Wednesday, 2/26/2025 with hospice at home.  Patient is prescribed comfort care medication and she will be stopping dialysis after today.    44 minutes spent in discharge timing    Pertinent Physical Exam At Time of Discharge  General: Not in acute distress, alert.  Seen and evaluated while on hemodialysis session.  On nasal cannula  HEENT: PERRLA, head intact and normocephalic  Neck: Normal to inspection  Lungs: Clear to auscultation, work of breathing within normal limit  Cardiac: Regular rate and rhythm  Abdomen: Soft nontender, positive bowel sounds  : Exam deferred  Skin: Intact  Hematology: Bruising of left foot noted  Musculoskeletal: Right upper extremity fistula with a thrill and bruit.  Pain in left foot area from previous injury with bruising and hemodialysis running  Neurological: Alert awake oriented, no focal deficit, cranial nerves grossly intact  Psych: No suicidal ideation or homicidal ideation    Outpatient Follow-Up  Future Appointments   Date Time Provider  Department Center   7/17/2025  3:00 PM MATTHIAS Love-CNP [facility]         Guido Dumas MD

## 2025-02-26 NOTE — PRE-PROCEDURE NOTE
Report from Sending RN:    Report From: Michelle Hurley  Recent Surgery of Procedure: No  Baseline Level of Consciousness (LOC): A&O X's 3  Oxygen Use: Yes  Type: 3L/NC  Diabetic: Yes  Last BP Med Given Day of Dialysis: see EMAR  Last Pain Med Given: see EMAR  Lab Tests to be Obtained with Dialysis: No  Blood Transfusion to be Given During Dialysis: No  Available IV Access: Yes  Medications to be Administered During Dialysis: No  Continuous IV Infusion Running: No  Restraints on Currently or in the Last 24 Hours: No  Hand-Off Communication: no drips, 3L baseline, A&Ox4, diabetic yes they havent chcked her am blood sugar eyt   Dialysis Catheter Dressing: will assess upon arrival  Last Dressing Change: will assess upon arrival

## 2025-02-28 LAB
ATRIAL RATE: 86 BPM
P AXIS: 26 DEGREES
PR INTERVAL: 175 MS
Q ONSET: 249 MS
QRS COUNT: 13 BEATS
QRS DURATION: 96 MS
QT INTERVAL: 384 MS
QTC CALCULATION(BAZETT): 457 MS
QTC FREDERICIA: 431 MS
R AXIS: 9 DEGREES
T AXIS: 20 DEGREES
T OFFSET: 441 MS
VENTRICULAR RATE: 85 BPM

## 2025-03-07 LAB
CHROM ANALY OVERALL INTERP-IMP: NORMAL
ELECTRONICALLY COSIGNED BY CYTOGENETICS: NORMAL
ELECTRONICALLY SIGNED BY CYTOGENETICS: NORMAL
FISH ISCN RESULTS: NORMAL

## 2025-07-17 ENCOUNTER — APPOINTMENT (OUTPATIENT)
Dept: CARDIOLOGY | Facility: CLINIC | Age: 70
End: 2025-07-17
Payer: COMMERCIAL

## (undated) DEVICE — STANDARD HYPODERMIC NEEDLE,ALUMINUM HUB: Brand: MONOJECT

## (undated) DEVICE — TOWEL,OR,DSP,ST,BLUE,STD,6/PK,12PK/CS: Brand: MEDLINE

## (undated) DEVICE — SOLUTION IV 250ML 0.9% SOD CHL PH 5 INJ USP VIAFLX PLAS

## (undated) DEVICE — PAD GEN USE BORDERED ADH 14IN 2IN AND 12IN 4IN GZ UNIV ST

## (undated) DEVICE — STOCKINETTE,DOUBLE PLY,6X48,STERILE: Brand: MEDLINE

## (undated) DEVICE — DOUBLE BASIN SET: Brand: MEDLINE INDUSTRIES, INC.

## (undated) DEVICE — SET CLAMP NEONATAL VASCUALR

## (undated) DEVICE — AGENT HEMSTAT W4XL4IN OXIDIZED REGENERATED CELOS STRUCTURED

## (undated) DEVICE — ADAPTER CATH WHT DISP FOR ANES

## (undated) DEVICE — SET SURG INSTR MINI VASC

## (undated) DEVICE — SLING ARM XL L20IN D75IN WHT POLY MESH ENVELOP MTL SIDE

## (undated) DEVICE — CANNULA INJ L2.5IN BLNT TIP 3MM CLR BODY W/ 1 W VLV DLP

## (undated) DEVICE — FEEDING TUBE • RADIOPAQUE: Brand: ARGYLE

## (undated) DEVICE — LABEL MED 4 IN SURG PANEL W/ PEN STRL

## (undated) DEVICE — TOWEL SURG W16XL26IN WHT NONFENESTRATED ST 4 PER PK

## (undated) DEVICE — LOOP VES W13MM THK09MM MINI RED SIL FLD REPELLENT

## (undated) DEVICE — CLIP INT SM TI EZ LD LIG SYS WECK HORZ

## (undated) DEVICE — NEEDLE HYPO 25GA L1.5IN BLU POLYPR HUB S STL REG BVL STR

## (undated) DEVICE — PATIENT RETURN ELECTRODE, SINGLE-USE, CONTACT QUALITY MONITORING, ADULT, WITH 9FT CORD, FOR PATIENTS WEIGING OVER 33LBS. (15KG): Brand: MEGADYNE

## (undated) DEVICE — GOWN,SIRUS,FABRNF,L,20/CS: Brand: MEDLINE

## (undated) DEVICE — CLOSURE SYSTEM, VASCULAR, MVP 6-12FR, VENOUS

## (undated) DEVICE — CATHETER, DIAGNOSTIC, DXTERITY, 6FR JR 4.0, 100CM

## (undated) DEVICE — PENCIL,CAUTERY,ROCKER,PTFE,15'CORD: Brand: MEDLINE INDUSTRIES, INC.

## (undated) DEVICE — Z INACTIVE USE 2641837 CLIP LIG M BLU TI HRT SHP WIRE HORZ 600 PER BX

## (undated) DEVICE — PATCHES, EXTERNAL REFERENCE, CARTO3

## (undated) DEVICE — CANNULA NSL ORAL AD FOR CAPNOFLEX CO2 O2 AIRLFE

## (undated) DEVICE — FOGARTY ARTERIAL EMBOLECTOMY CATHETER 4F 40CM: Brand: FOGARTY

## (undated) DEVICE — BANDAGE,GAUZE,4.5"X4.1YD,STERILE,LF: Brand: MEDLINE

## (undated) DEVICE — CONTROL SYRINGE LUER-LOCK TIP: Brand: MONOJECT

## (undated) DEVICE — BANDAGE COMPR W4INXL10YD WHITE/BEIGE E MTRX HK LOOP CLSR

## (undated) DEVICE — SHIELD, RADPAD, EP LEFT SUBCLAVIAN, 12.5 X 16.5, YELLOW LEVEL ATTENUATION

## (undated) DEVICE — INTRODUCER, HEMOSTASIS, STR/J .038 IN, 8.5FR 12CM

## (undated) DEVICE — CABLE, CARTO 3 SYSTEM, ECO INTERFACE, 34-PIN, SPLIT HANDLE (REPROCESSED)

## (undated) DEVICE — TOWEL,OR,DSP,ST,BLUE,DLX,4/PK,20PK/CS: Brand: MEDLINE

## (undated) DEVICE — NEEDLE SPNL 22GA L5IN BLK HUB S STL W/ QNCKE PNT W/OUT

## (undated) DEVICE — GAUZE,SPONGE,POST-OP,4X3,STRL,LF: Brand: MEDLINE

## (undated) DEVICE — CLOTH SURG PREP PREOPERATIVE CHLORHEXIDINE GLUC 2% READYPREP

## (undated) DEVICE — DILATOR ART

## (undated) DEVICE — CONTAINER SPEC 60ML PH 7NEUTRAL BUFF FRMLN RDY TO USE

## (undated) DEVICE — DRESSING BORDERED ADH GZ UNIV GEN USE 8INX4IN AND 6INX2IN

## (undated) DEVICE — GLOVE SURG SZ 75 L12IN FNGR THK94MIL TRNSLUC YEL LTX

## (undated) DEVICE — TUBING SET, IRRIGATION, SMARTABLATE

## (undated) DEVICE — Device

## (undated) DEVICE — CATHETER, QDOT MICRO, 8FR, DF, BI-DIRECTIONAL W/ CURVE VISUAL

## (undated) DEVICE — TOWEL,OR,DSP,ST,WHITE,DLX,4/PK,20PK/CS: Brand: MEDLINE

## (undated) DEVICE — GLOVE ORANGE PI 7 1/2   MSG9075

## (undated) DEVICE — GOWN,SIRUS,FABRNF,XL,20/CS: Brand: MEDLINE

## (undated) DEVICE — 34" SINGLE PATIENT USE HOVERMATT BREATHABLE: Brand: SINGLE PATIENT USE HOVERMATT

## (undated) DEVICE — STETHOSCOPE FLX BELL SGL HD CHROM PLT

## (undated) DEVICE — 1.5L THIN WALL CAN: Brand: CRD

## (undated) DEVICE — SNARE ENDOSCP L240CM LOOP W13MM SHTH DIA2.4MM SM OVL FLX

## (undated) DEVICE — 3M™ MEDIPORE™ + PAD 3564: Brand: 3M™ MEDIPORE™

## (undated) DEVICE — MEDI-VAC NON-CONDUCTIVE SUCTION TUBING: Brand: CARDINAL HEALTH

## (undated) DEVICE — CABLE, CONNECTOR, 10FT

## (undated) DEVICE — SURGICAL PROCEDURE PACK VASC MAJ CUST

## (undated) DEVICE — SOLUTION IV 500ML 0.9% SOD CHL PH 5 INJ USP VIAFLX PLAS

## (undated) DEVICE — LOOP VES W25MM THK1MM MAXI RED SIL FLD REPELLENT 100 PER

## (undated) DEVICE — SKIN AFFIX SURG ADHESIVE 72/CS 0.55ML: Brand: MEDLINE

## (undated) DEVICE — CONNECTOR TBNG AUX H2O JET DISP FOR OLY 160/180 SER

## (undated) DEVICE — DRAPE SURGICAL HAND PROX AURORA

## (undated) DEVICE — CONNECTOR, CATHETER, RESPONSE, RED HEX

## (undated) DEVICE — INTRODUCER, SHEATH, FAST-CATH, 8FR X 12CM, C-LOCK

## (undated) DEVICE — NEEDLE, NRG TRANSSEPTAL, 98CM, CURVE C0

## (undated) DEVICE — SHEATH, STEERABLE, SUREFLEX, MEDIUM CURVE

## (undated) DEVICE — GUIDEWIRE, INQWIRE, 3MM J, .035 X 210CM, FIXED

## (undated) DEVICE — ELECTRODE, QUICK-COMBO, EDGE SYSTEM, REDI PACK

## (undated) DEVICE — APPLICATOR MEDICATED 26 CC SOLUTION HI LT ORNG CHLORAPREP

## (undated) DEVICE — GARMENT,MEDLINE,DVT,INT,CALF,MED, GEN2: Brand: MEDLINE

## (undated) DEVICE — SCANLAN® VASCU-STATT® SINGLE-USE BULLDOG CLAMP - MIDI ANGLED 45° (WHITE), CLAMPING PRESSURE 25-30 G (2/STERILE PKG): Brand: SCANLAN® VASCU-STATT® SINGLE-USE BULLDOG CLAMP

## (undated) DEVICE — DRAPE,HAND,STERILE: Brand: MEDLINE

## (undated) DEVICE — SOLUTION IV IRRIG WATER 1000ML POUR BRL 2F7114

## (undated) DEVICE — CABLE, CONNECTOR, DAIG RESPONSE, 210CM, BLACK

## (undated) DEVICE — SYRINGE MED 10ML LUERLOCK TIP W/O SFTY DISP

## (undated) DEVICE — GEL US 20GM NONIRRITATING OVERWRAPPED FILE PCH TRNSMIT

## (undated) DEVICE — CATHETER, EPL, 7FR DUO, 2/8 2M 95CM, STEERABLE LGCRL

## (undated) DEVICE — CATHETER, PENTARAY, NAV ECO, 7FR, 2-6-2 SPACING, D CURVE

## (undated) DEVICE — SHEATH, PINNACLE, 10 CM,  6FR INTRODUCER, 6FR DIA, 2.5 CM DIALATOR

## (undated) DEVICE — CATHETER, DIAGNOSTIC, DXTERITY, 6 FR, JL 4.0, 100C

## (undated) DEVICE — 1 ML TUBERCULIN SYRINGE LUER-LOCK TIP: Brand: MONOJECT

## (undated) DEVICE — Z DUP USE 2257490 ADHESIVE SKIN CLSRE 036ML TPCL 2CTL CNCRLTE HIGH VSCSTY DRMB

## (undated) DEVICE — CLOSURE DEVICE, VASCULAR, ANGIO-SEAL, VIP, 6FR, LF

## (undated) DEVICE — CATHETER, ACUSON ACUNAV ULTRASOUND, 8FR 90CM  (REPROCESSED)

## (undated) DEVICE — ACCESS KIT, S-MAK MINI, 4FR 10CM 0.018IN 40CM, NT/PT, ECHO ENHANCE NEEDLE

## (undated) DEVICE — BLADE CLIPPER GEN PURP NS

## (undated) DEVICE — 3M(TM) MEDIPORE(TM) +PAD SOFT CLOTH ADHESIVE WOUND DRESSING 3566: Brand: 3M™ MEDIPORE™

## (undated) DEVICE — SOLUTION IV IRRIG POUR BRL 0.9% SODIUM CHL 2F7124

## (undated) DEVICE — DEFENDO AIR WATER SUCTION AND BIOPSY VALVE KIT FOR  OLYMPUS: Brand: DEFENDO AIR/WATER/SUCTION AND BIOPSY VALVE

## (undated) DEVICE — FORCEPS BX L240CM JAW DIA2.4MM WRK CHN 2.8MM ORNG L CAP W/

## (undated) DEVICE — ADHESIVE SKIN CLOSURE TOP 36 CC HI VISC DERMBND MINI